# Patient Record
Sex: MALE | Race: WHITE | Employment: OTHER | URBAN - NONMETROPOLITAN AREA
[De-identification: names, ages, dates, MRNs, and addresses within clinical notes are randomized per-mention and may not be internally consistent; named-entity substitution may affect disease eponyms.]

---

## 2017-02-02 ENCOUNTER — OFFICE VISIT (OUTPATIENT)
Dept: OTOLARYNGOLOGY | Age: 62
End: 2017-02-02

## 2017-02-02 ENCOUNTER — TELEPHONE (OUTPATIENT)
Dept: OTOLARYNGOLOGY | Age: 62
End: 2017-02-02

## 2017-02-02 VITALS
HEIGHT: 68 IN | RESPIRATION RATE: 18 BRPM | HEART RATE: 80 BPM | WEIGHT: 240.7 LBS | DIASTOLIC BLOOD PRESSURE: 76 MMHG | SYSTOLIC BLOOD PRESSURE: 128 MMHG | TEMPERATURE: 98 F | BODY MASS INDEX: 36.48 KG/M2

## 2017-02-02 DIAGNOSIS — R09.89 THROAT CLEARING: ICD-10-CM

## 2017-02-02 DIAGNOSIS — T46.4X5D: Primary | ICD-10-CM

## 2017-02-02 DIAGNOSIS — F17.200 TOBACCO DEPENDENCE: ICD-10-CM

## 2017-02-02 DIAGNOSIS — K21.9 GASTROESOPHAGEAL REFLUX DISEASE, ESOPHAGITIS PRESENCE NOT SPECIFIED: ICD-10-CM

## 2017-02-02 PROCEDURE — G8427 DOCREV CUR MEDS BY ELIG CLIN: HCPCS | Performed by: OTOLARYNGOLOGY

## 2017-02-02 PROCEDURE — 99213 OFFICE O/P EST LOW 20 MIN: CPT | Performed by: OTOLARYNGOLOGY

## 2017-02-02 PROCEDURE — G8419 CALC BMI OUT NRM PARAM NOF/U: HCPCS | Performed by: OTOLARYNGOLOGY

## 2017-02-02 PROCEDURE — 4004F PT TOBACCO SCREEN RCVD TLK: CPT | Performed by: OTOLARYNGOLOGY

## 2017-02-02 PROCEDURE — 3017F COLORECTAL CA SCREEN DOC REV: CPT | Performed by: OTOLARYNGOLOGY

## 2017-02-02 PROCEDURE — 31575 DIAGNOSTIC LARYNGOSCOPY: CPT | Performed by: OTOLARYNGOLOGY

## 2017-02-02 PROCEDURE — G8484 FLU IMMUNIZE NO ADMIN: HCPCS | Performed by: OTOLARYNGOLOGY

## 2017-02-02 ASSESSMENT — ENCOUNTER SYMPTOMS
STRIDOR: 0
VOICE CHANGE: 1
APNEA: 0
VOMITING: 0
CHEST TIGHTNESS: 0
NAUSEA: 0
RHINORRHEA: 0
FACIAL SWELLING: 0
TROUBLE SWALLOWING: 0
DIARRHEA: 0
CHOKING: 0
SORE THROAT: 0
SINUS PRESSURE: 0
COLOR CHANGE: 0
WHEEZING: 0
COUGH: 1
SHORTNESS OF BREATH: 0
ABDOMINAL PAIN: 0

## 2017-02-06 ENCOUNTER — TELEPHONE (OUTPATIENT)
Dept: PHARMACY | Facility: CLINIC | Age: 62
End: 2017-02-06

## 2017-02-13 ENCOUNTER — OFFICE VISIT (OUTPATIENT)
Dept: FAMILY MEDICINE CLINIC | Age: 62
End: 2017-02-13

## 2017-02-13 VITALS
RESPIRATION RATE: 18 BRPM | BODY MASS INDEX: 35.98 KG/M2 | WEIGHT: 237.4 LBS | SYSTOLIC BLOOD PRESSURE: 134 MMHG | DIASTOLIC BLOOD PRESSURE: 72 MMHG | HEART RATE: 74 BPM | HEIGHT: 68 IN

## 2017-02-13 DIAGNOSIS — E11.8 TYPE 2 DIABETES MELLITUS WITH COMPLICATION, WITHOUT LONG-TERM CURRENT USE OF INSULIN (HCC): Primary | ICD-10-CM

## 2017-02-13 DIAGNOSIS — R39.9 LOWER URINARY TRACT SYMPTOMS (LUTS): ICD-10-CM

## 2017-02-13 DIAGNOSIS — J44.9 CHRONIC OBSTRUCTIVE PULMONARY DISEASE, UNSPECIFIED COPD TYPE (HCC): ICD-10-CM

## 2017-02-13 DIAGNOSIS — G89.29 CHRONIC BILATERAL LOW BACK PAIN WITHOUT SCIATICA: ICD-10-CM

## 2017-02-13 DIAGNOSIS — G47.33 OBSTRUCTIVE SLEEP APNEA SYNDROME: ICD-10-CM

## 2017-02-13 DIAGNOSIS — E78.5 HYPERLIPIDEMIA, UNSPECIFIED HYPERLIPIDEMIA TYPE: ICD-10-CM

## 2017-02-13 DIAGNOSIS — J43.9 PULMONARY EMPHYSEMA, UNSPECIFIED EMPHYSEMA TYPE (HCC): ICD-10-CM

## 2017-02-13 DIAGNOSIS — M54.50 CHRONIC BILATERAL LOW BACK PAIN WITHOUT SCIATICA: ICD-10-CM

## 2017-02-13 LAB
GLUCOSE BLD-MCNC: 185 MG/DL
HBA1C MFR BLD: 7 %

## 2017-02-13 PROCEDURE — 83036 HEMOGLOBIN GLYCOSYLATED A1C: CPT | Performed by: FAMILY MEDICINE

## 2017-02-13 PROCEDURE — 82962 GLUCOSE BLOOD TEST: CPT | Performed by: FAMILY MEDICINE

## 2017-02-13 PROCEDURE — 99213 OFFICE O/P EST LOW 20 MIN: CPT | Performed by: FAMILY MEDICINE

## 2017-02-13 RX ORDER — LOSARTAN POTASSIUM 25 MG/1
25 TABLET ORAL DAILY
Qty: 30 TABLET | Refills: 5 | Status: SHIPPED | OUTPATIENT
Start: 2017-02-13 | End: 2017-05-05 | Stop reason: ALTCHOICE

## 2017-02-13 RX ORDER — ROPINIROLE 1 MG/1
1 TABLET, FILM COATED ORAL 3 TIMES DAILY
Qty: 90 TABLET | Refills: 5 | Status: SHIPPED | OUTPATIENT
Start: 2017-02-13 | End: 2018-01-12 | Stop reason: SDUPTHER

## 2017-02-13 RX ORDER — IPRATROPIUM BROMIDE AND ALBUTEROL SULFATE 2.5; .5 MG/3ML; MG/3ML
1 SOLUTION RESPIRATORY (INHALATION) EVERY 6 HOURS PRN
Qty: 360 ML | Refills: 1 | Status: SHIPPED | OUTPATIENT
Start: 2017-02-13 | End: 2017-06-01 | Stop reason: SDUPTHER

## 2017-02-13 RX ORDER — TAMSULOSIN HYDROCHLORIDE 0.4 MG/1
0.4 CAPSULE ORAL NIGHTLY
Qty: 30 CAPSULE | Refills: 5 | Status: SHIPPED | OUTPATIENT
Start: 2017-02-13 | End: 2017-09-13 | Stop reason: ALTCHOICE

## 2017-02-13 RX ORDER — SIMVASTATIN 40 MG
TABLET ORAL
Qty: 30 TABLET | Refills: 5 | Status: SHIPPED | OUTPATIENT
Start: 2017-02-13 | End: 2017-09-13 | Stop reason: SDUPTHER

## 2017-02-13 ASSESSMENT — ENCOUNTER SYMPTOMS
BACK PAIN: 1
NAUSEA: 0
COUGH: 1
VOMITING: 0
CHEST TIGHTNESS: 0
SHORTNESS OF BREATH: 1
BLOOD IN STOOL: 0
ABDOMINAL PAIN: 0
CONSTIPATION: 0
EYES NEGATIVE: 1
DIARRHEA: 0

## 2017-02-14 RX ORDER — VARENICLINE TARTRATE 0.5 MG/1
1 TABLET, FILM COATED ORAL 2 TIMES DAILY
COMMUNITY
End: 2017-09-13 | Stop reason: ALTCHOICE

## 2017-02-20 ENCOUNTER — NURSE ONLY (OUTPATIENT)
Dept: FAMILY MEDICINE CLINIC | Age: 62
End: 2017-02-20

## 2017-02-20 DIAGNOSIS — E78.5 HYPERLIPIDEMIA, UNSPECIFIED HYPERLIPIDEMIA TYPE: ICD-10-CM

## 2017-02-20 DIAGNOSIS — E11.8 TYPE 2 DIABETES MELLITUS WITH COMPLICATION, WITHOUT LONG-TERM CURRENT USE OF INSULIN (HCC): ICD-10-CM

## 2017-02-20 LAB
ALBUMIN SERPL-MCNC: 4.5 GM/DL (ref 3.5–5.1)
ALP BLD-CCNC: 46 U/L (ref 38–126)
ALT SERPL-CCNC: 19 U/L (ref 11–66)
ANION GAP SERPL CALCULATED.3IONS-SCNC: 15 MEQ/L (ref 8–16)
AST SERPL-CCNC: 19 U/L (ref 5–40)
BILIRUB SERPL-MCNC: 0.3 MG/DL (ref 0.3–1.2)
BUN BLDV-MCNC: 15 MG/DL (ref 7–22)
CALCIUM SERPL-MCNC: 9.5 MG/DL (ref 8.5–10.5)
CHLORIDE BLD-SCNC: 98 MEQ/L (ref 98–111)
CHOLESTEROL, TOTAL: 138 MG/DL (ref 100–199)
CO2: 27 MEQ/L (ref 23–33)
CREAT SERPL-MCNC: 0.9 MG/DL (ref 0.4–1.2)
GFR SERPL CREATININE-BSD FRML MDRD: 85 ML/MIN/1.73M2
GLUCOSE BLD-MCNC: 150 MG/DL (ref 70–108)
HDLC SERPL-MCNC: 39 MG/DL
LDL CHOLESTEROL CALCULATED: 63 MG/DL
POTASSIUM SERPL-SCNC: 4.9 MEQ/L (ref 3.5–5.2)
SODIUM BLD-SCNC: 140 MEQ/L (ref 135–145)
TOTAL PROTEIN: 7.8 GM/DL (ref 6.1–8)
TRIGL SERPL-MCNC: 178 MG/DL (ref 0–199)

## 2017-03-17 ENCOUNTER — OFFICE VISIT (OUTPATIENT)
Dept: FAMILY MEDICINE CLINIC | Age: 62
End: 2017-03-17

## 2017-03-17 VITALS
SYSTOLIC BLOOD PRESSURE: 140 MMHG | WEIGHT: 239.4 LBS | RESPIRATION RATE: 18 BRPM | DIASTOLIC BLOOD PRESSURE: 78 MMHG | HEIGHT: 68 IN | HEART RATE: 78 BPM | BODY MASS INDEX: 36.28 KG/M2

## 2017-03-17 DIAGNOSIS — E11.8 TYPE 2 DIABETES MELLITUS WITH COMPLICATION, WITHOUT LONG-TERM CURRENT USE OF INSULIN (HCC): ICD-10-CM

## 2017-03-17 DIAGNOSIS — J43.9 PULMONARY EMPHYSEMA, UNSPECIFIED EMPHYSEMA TYPE (HCC): Primary | ICD-10-CM

## 2017-03-17 DIAGNOSIS — F17.200 SMOKING ADDICTION: ICD-10-CM

## 2017-03-17 PROCEDURE — 99213 OFFICE O/P EST LOW 20 MIN: CPT | Performed by: FAMILY MEDICINE

## 2017-03-17 RX ORDER — RANITIDINE 150 MG/1
TABLET ORAL
Refills: 0 | COMMUNITY
Start: 2017-03-02 | End: 2017-03-17 | Stop reason: SDUPTHER

## 2017-03-17 ASSESSMENT — ENCOUNTER SYMPTOMS
ABDOMINAL PAIN: 0
DIARRHEA: 0
EYES NEGATIVE: 1
BLOOD IN STOOL: 0
CHEST TIGHTNESS: 0
SHORTNESS OF BREATH: 0
BACK PAIN: 1
VOMITING: 0
CONSTIPATION: 0
NAUSEA: 0
COUGH: 0

## 2017-04-03 DIAGNOSIS — T46.4X5D: ICD-10-CM

## 2017-04-03 RX ORDER — RANITIDINE 150 MG/1
150 TABLET ORAL 2 TIMES DAILY
Qty: 60 TABLET | Refills: 3 | Status: SHIPPED | OUTPATIENT
Start: 2017-04-03 | End: 2017-08-22 | Stop reason: SDUPTHER

## 2017-04-17 ENCOUNTER — NURSE ONLY (OUTPATIENT)
Dept: FAMILY MEDICINE CLINIC | Age: 62
End: 2017-04-17

## 2017-04-17 DIAGNOSIS — R39.15 URGENCY OF URINATION: ICD-10-CM

## 2017-04-17 DIAGNOSIS — R39.12 WEAK URINARY STREAM: ICD-10-CM

## 2017-04-17 DIAGNOSIS — R39.9 LOWER URINARY TRACT SYMPTOMS (LUTS): ICD-10-CM

## 2017-04-17 LAB — PROSTATE SPECIFIC ANTIGEN: 0.69 NG/ML (ref 0–2.5)

## 2017-04-19 RX ORDER — SERTRALINE HYDROCHLORIDE 100 MG/1
TABLET, FILM COATED ORAL
Qty: 180 TABLET | Refills: 1 | Status: SHIPPED | OUTPATIENT
Start: 2017-04-19 | End: 2017-05-05 | Stop reason: SDUPTHER

## 2017-04-25 ENCOUNTER — OFFICE VISIT (OUTPATIENT)
Dept: PULMONOLOGY | Age: 62
End: 2017-04-25

## 2017-04-25 ENCOUNTER — NURSE ONLY (OUTPATIENT)
Dept: FAMILY MEDICINE CLINIC | Age: 62
End: 2017-04-25

## 2017-04-25 VITALS
HEIGHT: 68 IN | SYSTOLIC BLOOD PRESSURE: 136 MMHG | HEART RATE: 78 BPM | WEIGHT: 241 LBS | TEMPERATURE: 98 F | OXYGEN SATURATION: 95 % | BODY MASS INDEX: 36.53 KG/M2 | DIASTOLIC BLOOD PRESSURE: 84 MMHG

## 2017-04-25 DIAGNOSIS — Z23 NEED FOR VACCINATION FOR STREP PNEUMONIAE: Primary | ICD-10-CM

## 2017-04-25 DIAGNOSIS — J44.9 COPD, MILD (HCC): ICD-10-CM

## 2017-04-25 DIAGNOSIS — J45.41 MODERATE PERSISTENT ASTHMA WITH ACUTE EXACERBATION: Primary | ICD-10-CM

## 2017-04-25 DIAGNOSIS — J45.901 ASTHMA EXACERBATION: ICD-10-CM

## 2017-04-25 PROCEDURE — 3017F COLORECTAL CA SCREEN DOC REV: CPT | Performed by: INTERNAL MEDICINE

## 2017-04-25 PROCEDURE — G8417 CALC BMI ABV UP PARAM F/U: HCPCS | Performed by: INTERNAL MEDICINE

## 2017-04-25 PROCEDURE — G0009 ADMIN PNEUMOCOCCAL VACCINE: HCPCS | Performed by: EMERGENCY MEDICINE

## 2017-04-25 PROCEDURE — G8926 SPIRO NO PERF OR DOC: HCPCS | Performed by: INTERNAL MEDICINE

## 2017-04-25 PROCEDURE — 90670 PCV13 VACCINE IM: CPT | Performed by: EMERGENCY MEDICINE

## 2017-04-25 PROCEDURE — 3023F SPIROM DOC REV: CPT | Performed by: INTERNAL MEDICINE

## 2017-04-25 PROCEDURE — G8427 DOCREV CUR MEDS BY ELIG CLIN: HCPCS | Performed by: INTERNAL MEDICINE

## 2017-04-25 PROCEDURE — 1036F TOBACCO NON-USER: CPT | Performed by: INTERNAL MEDICINE

## 2017-04-25 PROCEDURE — 99215 OFFICE O/P EST HI 40 MIN: CPT | Performed by: INTERNAL MEDICINE

## 2017-04-25 RX ORDER — BUDESONIDE AND FORMOTEROL FUMARATE DIHYDRATE 160; 4.5 UG/1; UG/1
2 AEROSOL RESPIRATORY (INHALATION) 2 TIMES DAILY
Qty: 1 INHALER | Refills: 11 | Status: SHIPPED | OUTPATIENT
Start: 2017-04-25 | End: 2018-03-29 | Stop reason: ALTCHOICE

## 2017-04-25 RX ORDER — DOXYCYCLINE HYCLATE 100 MG/1
100 CAPSULE ORAL 2 TIMES DAILY
Qty: 14 CAPSULE | Refills: 0 | Status: SHIPPED | OUTPATIENT
Start: 2017-04-25 | End: 2017-05-02

## 2017-04-25 RX ORDER — PREDNISONE 10 MG/1
TABLET ORAL
Qty: 30 TABLET | Refills: 0 | Status: SHIPPED | OUTPATIENT
Start: 2017-04-25 | End: 2017-05-05

## 2017-04-25 RX ORDER — ZAFIRLUKAST 20 MG/1
20 TABLET, FILM COATED ORAL 2 TIMES DAILY
Qty: 60 TABLET | Refills: 11 | Status: SHIPPED | OUTPATIENT
Start: 2017-04-25 | End: 2018-05-21 | Stop reason: SDUPTHER

## 2017-05-02 ENCOUNTER — OFFICE VISIT (OUTPATIENT)
Dept: OTOLARYNGOLOGY | Age: 62
End: 2017-05-02

## 2017-05-02 VITALS
TEMPERATURE: 98.2 F | WEIGHT: 240 LBS | RESPIRATION RATE: 22 BRPM | BODY MASS INDEX: 36.37 KG/M2 | HEART RATE: 92 BPM | DIASTOLIC BLOOD PRESSURE: 88 MMHG | SYSTOLIC BLOOD PRESSURE: 144 MMHG | HEIGHT: 68 IN

## 2017-05-02 DIAGNOSIS — R09.89 THROAT CLEARING: ICD-10-CM

## 2017-05-02 DIAGNOSIS — K21.9 GASTROESOPHAGEAL REFLUX DISEASE, ESOPHAGITIS PRESENCE NOT SPECIFIED: ICD-10-CM

## 2017-05-02 DIAGNOSIS — T46.4X5D: Primary | ICD-10-CM

## 2017-05-02 DIAGNOSIS — F17.200 TOBACCO DEPENDENCE: ICD-10-CM

## 2017-05-02 DIAGNOSIS — E04.1 THYROID NODULE: ICD-10-CM

## 2017-05-02 PROCEDURE — G8417 CALC BMI ABV UP PARAM F/U: HCPCS | Performed by: OTOLARYNGOLOGY

## 2017-05-02 PROCEDURE — 99213 OFFICE O/P EST LOW 20 MIN: CPT | Performed by: OTOLARYNGOLOGY

## 2017-05-02 PROCEDURE — 3017F COLORECTAL CA SCREEN DOC REV: CPT | Performed by: OTOLARYNGOLOGY

## 2017-05-02 PROCEDURE — 31575 DIAGNOSTIC LARYNGOSCOPY: CPT | Performed by: OTOLARYNGOLOGY

## 2017-05-02 PROCEDURE — G8427 DOCREV CUR MEDS BY ELIG CLIN: HCPCS | Performed by: OTOLARYNGOLOGY

## 2017-05-02 PROCEDURE — 1036F TOBACCO NON-USER: CPT | Performed by: OTOLARYNGOLOGY

## 2017-05-02 ASSESSMENT — ENCOUNTER SYMPTOMS
WHEEZING: 0
DIARRHEA: 0
RHINORRHEA: 0
NAUSEA: 0
COLOR CHANGE: 0
FACIAL SWELLING: 0
TROUBLE SWALLOWING: 0
COUGH: 0
CHOKING: 0
APNEA: 0
STRIDOR: 0
SORE THROAT: 0
ABDOMINAL PAIN: 0
VOICE CHANGE: 0
VOMITING: 0
CHEST TIGHTNESS: 0
SHORTNESS OF BREATH: 0
SINUS PRESSURE: 0

## 2017-05-05 ENCOUNTER — OFFICE VISIT (OUTPATIENT)
Dept: FAMILY MEDICINE CLINIC | Age: 62
End: 2017-05-05

## 2017-05-05 VITALS
RESPIRATION RATE: 16 BRPM | DIASTOLIC BLOOD PRESSURE: 76 MMHG | HEART RATE: 82 BPM | SYSTOLIC BLOOD PRESSURE: 132 MMHG | WEIGHT: 234 LBS | HEIGHT: 68 IN | BODY MASS INDEX: 35.46 KG/M2

## 2017-05-05 DIAGNOSIS — E11.8 TYPE 2 DIABETES MELLITUS WITH COMPLICATION, WITHOUT LONG-TERM CURRENT USE OF INSULIN (HCC): Primary | ICD-10-CM

## 2017-05-05 DIAGNOSIS — E78.5 HYPERLIPIDEMIA, UNSPECIFIED HYPERLIPIDEMIA TYPE: ICD-10-CM

## 2017-05-05 DIAGNOSIS — J43.9 PULMONARY EMPHYSEMA, UNSPECIFIED EMPHYSEMA TYPE (HCC): ICD-10-CM

## 2017-05-05 DIAGNOSIS — B37.0 ORAL THRUSH: ICD-10-CM

## 2017-05-05 PROCEDURE — 99214 OFFICE O/P EST MOD 30 MIN: CPT | Performed by: FAMILY MEDICINE

## 2017-05-05 RX ORDER — SERTRALINE HYDROCHLORIDE 100 MG/1
TABLET, FILM COATED ORAL
Qty: 60 TABLET | Refills: 5 | Status: SHIPPED | OUTPATIENT
Start: 2017-05-05 | End: 2018-04-16 | Stop reason: SDUPTHER

## 2017-05-05 RX ORDER — FLUCONAZOLE 100 MG/1
100 TABLET ORAL DAILY
Qty: 7 TABLET | Refills: 0 | Status: SHIPPED | OUTPATIENT
Start: 2017-05-05 | End: 2017-05-12

## 2017-05-05 ASSESSMENT — ENCOUNTER SYMPTOMS
DIARRHEA: 0
SHORTNESS OF BREATH: 0
ABDOMINAL PAIN: 0
CONSTIPATION: 0
VOMITING: 0
CHEST TIGHTNESS: 0
EYES NEGATIVE: 1
NAUSEA: 0
COUGH: 1
BLOOD IN STOOL: 0
BACK PAIN: 1

## 2017-05-05 ASSESSMENT — PATIENT HEALTH QUESTIONNAIRE - PHQ9
1. LITTLE INTEREST OR PLEASURE IN DOING THINGS: 0
2. FEELING DOWN, DEPRESSED OR HOPELESS: 0
SUM OF ALL RESPONSES TO PHQ QUESTIONS 1-9: 0
SUM OF ALL RESPONSES TO PHQ9 QUESTIONS 1 & 2: 0

## 2017-05-19 ENCOUNTER — OFFICE VISIT (OUTPATIENT)
Dept: UROLOGY | Age: 62
End: 2017-05-19

## 2017-05-19 VITALS
DIASTOLIC BLOOD PRESSURE: 84 MMHG | WEIGHT: 234 LBS | SYSTOLIC BLOOD PRESSURE: 132 MMHG | BODY MASS INDEX: 35.46 KG/M2 | HEIGHT: 68 IN

## 2017-05-19 DIAGNOSIS — E29.1 HYPOGONADISM IN MALE: ICD-10-CM

## 2017-05-19 DIAGNOSIS — R39.15 URGENCY OF URINATION: Primary | ICD-10-CM

## 2017-05-19 DIAGNOSIS — N40.1 BENIGN NON-NODULAR PROSTATIC HYPERPLASIA WITH LOWER URINARY TRACT SYMPTOMS: ICD-10-CM

## 2017-05-19 DIAGNOSIS — N52.9 ERECTILE DYSFUNCTION, UNSPECIFIED ERECTILE DYSFUNCTION TYPE: ICD-10-CM

## 2017-05-19 LAB
BILIRUBIN URINE: NEGATIVE
BLOOD URINE, POC: NEGATIVE
CHARACTER, URINE: CLEAR
COLOR, URINE: YELLOW
GLUCOSE URINE: 500 MG/DL
KETONES, URINE: NEGATIVE
LEUKOCYTE CLUMPS, URINE: NEGATIVE
NITRITE, URINE: NEGATIVE
PH, URINE: 5.5
POST VOID RESIDUAL (PVR): 8 ML
PROTEIN, URINE: NEGATIVE MG/DL
SPECIFIC GRAVITY, URINE: 1.01 (ref 1–1.03)
UROBILINOGEN, URINE: 0.2 EU/DL

## 2017-05-19 PROCEDURE — G8417 CALC BMI ABV UP PARAM F/U: HCPCS | Performed by: UROLOGY

## 2017-05-19 PROCEDURE — 81003 URINALYSIS AUTO W/O SCOPE: CPT | Performed by: UROLOGY

## 2017-05-19 PROCEDURE — 99213 OFFICE O/P EST LOW 20 MIN: CPT | Performed by: UROLOGY

## 2017-05-19 PROCEDURE — G8427 DOCREV CUR MEDS BY ELIG CLIN: HCPCS | Performed by: UROLOGY

## 2017-05-19 PROCEDURE — 3017F COLORECTAL CA SCREEN DOC REV: CPT | Performed by: UROLOGY

## 2017-05-19 PROCEDURE — 51798 US URINE CAPACITY MEASURE: CPT | Performed by: UROLOGY

## 2017-05-19 PROCEDURE — 1036F TOBACCO NON-USER: CPT | Performed by: UROLOGY

## 2017-05-25 ENCOUNTER — HOSPITAL ENCOUNTER (OUTPATIENT)
Dept: GENERAL RADIOLOGY | Age: 62
Discharge: HOME | End: 2017-05-25

## 2017-05-25 DIAGNOSIS — M51.26 DISPLACEMENT OF LUMBAR INTERVERTEBRAL DISC WITHOUT MYELOPATHY: ICD-10-CM

## 2017-05-25 PROCEDURE — 72100 X-RAY EXAM L-S SPINE 2/3 VWS: CPT | Performed by: FAMILY MEDICINE

## 2017-06-01 RX ORDER — IPRATROPIUM BROMIDE AND ALBUTEROL SULFATE 2.5; .5 MG/3ML; MG/3ML
1 SOLUTION RESPIRATORY (INHALATION) EVERY 6 HOURS PRN
Qty: 360 ML | Refills: 1 | Status: SHIPPED | OUTPATIENT
Start: 2017-06-01 | End: 2018-02-21 | Stop reason: SDUPTHER

## 2017-08-22 ENCOUNTER — TELEPHONE (OUTPATIENT)
Dept: ENT CLINIC | Age: 62
End: 2017-08-22

## 2017-08-22 DIAGNOSIS — T46.4X5D: ICD-10-CM

## 2017-08-22 RX ORDER — RANITIDINE 150 MG/1
150 TABLET ORAL 2 TIMES DAILY
Qty: 60 TABLET | Refills: 3 | Status: SHIPPED | OUTPATIENT
Start: 2017-08-22 | End: 2018-04-16 | Stop reason: SDUPTHER

## 2017-09-13 ENCOUNTER — OFFICE VISIT (OUTPATIENT)
Dept: FAMILY MEDICINE CLINIC | Age: 62
End: 2017-09-13

## 2017-09-13 VITALS
HEART RATE: 74 BPM | RESPIRATION RATE: 16 BRPM | BODY MASS INDEX: 36.28 KG/M2 | HEIGHT: 68 IN | TEMPERATURE: 97.7 F | SYSTOLIC BLOOD PRESSURE: 132 MMHG | DIASTOLIC BLOOD PRESSURE: 78 MMHG | WEIGHT: 239.4 LBS

## 2017-09-13 DIAGNOSIS — R79.89 LOW TESTOSTERONE: ICD-10-CM

## 2017-09-13 DIAGNOSIS — J43.9 PULMONARY EMPHYSEMA, UNSPECIFIED EMPHYSEMA TYPE (HCC): ICD-10-CM

## 2017-09-13 DIAGNOSIS — M54.50 CHRONIC BILATERAL LOW BACK PAIN WITHOUT SCIATICA: ICD-10-CM

## 2017-09-13 DIAGNOSIS — G89.29 CHRONIC BILATERAL LOW BACK PAIN WITHOUT SCIATICA: ICD-10-CM

## 2017-09-13 DIAGNOSIS — E11.8 TYPE 2 DIABETES MELLITUS WITH COMPLICATION, WITHOUT LONG-TERM CURRENT USE OF INSULIN (HCC): Primary | ICD-10-CM

## 2017-09-13 DIAGNOSIS — K21.9 GASTROESOPHAGEAL REFLUX DISEASE, ESOPHAGITIS PRESENCE NOT SPECIFIED: ICD-10-CM

## 2017-09-13 DIAGNOSIS — F41.9 ANXIETY: ICD-10-CM

## 2017-09-13 DIAGNOSIS — J02.9 ACUTE PHARYNGITIS, UNSPECIFIED ETIOLOGY: ICD-10-CM

## 2017-09-13 DIAGNOSIS — E78.5 HYPERLIPIDEMIA, UNSPECIFIED HYPERLIPIDEMIA TYPE: ICD-10-CM

## 2017-09-13 LAB
GLUCOSE BLD-MCNC: 184 MG/DL
HBA1C MFR BLD: 7 %

## 2017-09-13 PROCEDURE — 82962 GLUCOSE BLOOD TEST: CPT | Performed by: FAMILY MEDICINE

## 2017-09-13 PROCEDURE — 83036 HEMOGLOBIN GLYCOSYLATED A1C: CPT | Performed by: FAMILY MEDICINE

## 2017-09-13 PROCEDURE — 99214 OFFICE O/P EST MOD 30 MIN: CPT | Performed by: FAMILY MEDICINE

## 2017-09-13 RX ORDER — TRAZODONE HYDROCHLORIDE 150 MG/1
TABLET ORAL
Refills: 0 | COMMUNITY
Start: 2017-08-12 | End: 2021-07-28 | Stop reason: SDUPTHER

## 2017-09-13 RX ORDER — CYCLOBENZAPRINE HCL 10 MG
TABLET ORAL
Refills: 0 | COMMUNITY
Start: 2017-09-01 | End: 2018-04-16 | Stop reason: ALTCHOICE

## 2017-09-13 RX ORDER — SIMVASTATIN 40 MG
TABLET ORAL
Qty: 30 TABLET | Refills: 5 | Status: SHIPPED | OUTPATIENT
Start: 2017-09-13 | End: 2018-03-19 | Stop reason: SDUPTHER

## 2017-09-13 RX ORDER — BUSPIRONE HYDROCHLORIDE 10 MG/1
10 TABLET ORAL 3 TIMES DAILY
Qty: 90 TABLET | Refills: 3 | Status: SHIPPED | OUTPATIENT
Start: 2017-09-13 | End: 2018-03-19 | Stop reason: SDUPTHER

## 2017-09-13 RX ORDER — MELOXICAM 7.5 MG/1
TABLET ORAL
Refills: 0 | COMMUNITY
Start: 2017-07-20 | End: 2018-03-29 | Stop reason: ALTCHOICE

## 2017-09-13 RX ORDER — AMOXICILLIN AND CLAVULANATE POTASSIUM 875; 125 MG/1; MG/1
1 TABLET, FILM COATED ORAL 2 TIMES DAILY
Qty: 20 TABLET | Refills: 0 | Status: SHIPPED | OUTPATIENT
Start: 2017-09-13 | End: 2017-09-23

## 2017-09-13 ASSESSMENT — ENCOUNTER SYMPTOMS
CHEST TIGHTNESS: 0
SHORTNESS OF BREATH: 0
ABDOMINAL PAIN: 0
NAUSEA: 0
BLOOD IN STOOL: 0
CONSTIPATION: 0
EYES NEGATIVE: 1
SORE THROAT: 1
VOMITING: 0
DIARRHEA: 0
BACK PAIN: 1

## 2017-09-25 ENCOUNTER — NURSE ONLY (OUTPATIENT)
Dept: FAMILY MEDICINE CLINIC | Age: 62
End: 2017-09-25
Payer: MEDICARE

## 2017-09-25 DIAGNOSIS — E78.5 HYPERLIPIDEMIA, UNSPECIFIED HYPERLIPIDEMIA TYPE: ICD-10-CM

## 2017-09-25 LAB
ALT SERPL-CCNC: 32 U/L (ref 11–66)
AST SERPL-CCNC: 28 U/L (ref 5–40)
CHOLESTEROL, TOTAL: 127 MG/DL (ref 100–199)
HDLC SERPL-MCNC: 44 MG/DL
LDL CHOLESTEROL CALCULATED: 44 MG/DL
TRIGL SERPL-MCNC: 193 MG/DL (ref 0–199)

## 2017-09-25 PROCEDURE — 36415 COLL VENOUS BLD VENIPUNCTURE: CPT | Performed by: NURSE PRACTITIONER

## 2017-10-04 ENCOUNTER — TELEPHONE (OUTPATIENT)
Dept: PHARMACY | Facility: CLINIC | Age: 62
End: 2017-10-04

## 2017-10-04 NOTE — TELEPHONE ENCOUNTER
- There are no outstanding medication issues at this time    CLINICAL PHARMACY NOTE - Medication Review  Patient outreach to review medications - Spoke with wife, Marlene Gibson, at request of patient as she handles his medications. SUBJECTIVE/OBJECTIVE:   Burgess Arshad is a 57 y.o. male referred to a clinical pharmacy specialist given their history of COPD and/or HF and number of home medications. Medications:  Medication Sig    cyclobenzaprine (FLEXERIL) 10 MG tablet take 1 tablet three times a day with food    meloxicam (MOBIC) 7.5 MG tablet take 1 tablet by mouth twice a day    traZODone (DESYREL) 150 MG tablet take 1/2 to 1 tablet by mouth once daily at bedtime if needed for pain SPASM, OR SLEEP    metFORMIN (GLUCOPHAGE) 500 MG tablet Take two tablets by mouth 2 times a day with meal.    busPIRone (BUSPAR) 10 MG tablet Take 1 tablet by mouth 3 times daily    simvastatin (ZOCOR) 40 MG tablet take 1 tablet by mouth at bedtime    ranitidine (ZANTAC) 150 MG tablet Take 1 tablet by mouth 2 times daily    ipratropium-albuterol (DUONEB) 0.5-2.5 (3) MG/3ML SOLN nebulizer solution Inhale 3 mLs into the lungs every 6 hours as needed for Shortness of Breath  Has been using about twice daily    VENTOLIN  (90 BASE) MCG/ACT inhaler inhale 2 puffs by mouth four times a day    sertraline (ZOLOFT) 100 MG tablet take 1 tablet by mouth twice a day    SYMBICORT 160-4.5 MCG/ACT AERO Inhale 2 puffs into the lungs 2 times daily  Swishes and spits after use. Uses with spacer.  zafirlukast (ACCOLATE) 20 MG tablet Take 1 tablet by mouth 2 times daily    rOPINIRole (REQUIP) 1 MG tablet Take 1 tablet by mouth 3 times daily    Testosterone (ANDROGEL) 20.25 MG/ACT (1.62%) GEL gel Place 1 Squirt onto the skin every morning (before breakfast)    azelastine (ASTELIN) 0.1 % nasal spray 2 sprays by Nasal route 2 times daily Use in each nostril as directed  Has not been using.  Said to remove from list    aspirin 81 MG tablet Buspirone and flexeril with Norco- CNS Depressants may enhance the CNS depressant effect of HYDROcodone  · Aspirin and Mobic- Nonsteroidal Anti-Inflammatory Agents (Nonselective) may enhance the adverse/toxic effect of Salicylates. An increased risk of bleeding may be associated with use of this combination. · Buspirone with trazodone and sertraline0- BusPIRone may enhance the serotonergic effect of Selective Serotonin Reuptake Inhibitors. This may cause serotonin syndrome. Selective Serotonin Reuptake Inhibitors may decrease the metabolism of BusPIRone    - Medications/Cost:  Denies any medication cost concerns at this time, including inhalers. - Inhaler Technique:   Reviewed inhaler technique with Symbicort, spacer, Duoneb and Ventolin    - Immunizations:  Reminded to get influenza vaccine soon this season. States plans to soon. Carlie Banerjee, JefD  [facility] Tracking Only    PHSO: Yes  Total # of Interventions Recommended: 1  - Updated Order #: 2 Updated Order Reason(s):  Other  Total Interventions Accepted: 1  Time Spent (min): 20

## 2017-10-11 ENCOUNTER — OFFICE VISIT (OUTPATIENT)
Dept: FAMILY MEDICINE CLINIC | Age: 62
End: 2017-10-11

## 2017-10-11 VITALS
SYSTOLIC BLOOD PRESSURE: 134 MMHG | WEIGHT: 242 LBS | RESPIRATION RATE: 14 BRPM | HEIGHT: 68 IN | BODY MASS INDEX: 36.68 KG/M2 | HEART RATE: 76 BPM | DIASTOLIC BLOOD PRESSURE: 84 MMHG

## 2017-10-11 DIAGNOSIS — M50.30 DDD (DEGENERATIVE DISC DISEASE), CERVICAL: ICD-10-CM

## 2017-10-11 DIAGNOSIS — R26.81 UNSTEADY GAIT: ICD-10-CM

## 2017-10-11 DIAGNOSIS — J43.9 PULMONARY EMPHYSEMA, UNSPECIFIED EMPHYSEMA TYPE (HCC): ICD-10-CM

## 2017-10-11 DIAGNOSIS — E78.5 HYPERLIPIDEMIA, UNSPECIFIED HYPERLIPIDEMIA TYPE: ICD-10-CM

## 2017-10-11 DIAGNOSIS — R42 LIGHTHEADEDNESS: Primary | ICD-10-CM

## 2017-10-11 PROCEDURE — 99214 OFFICE O/P EST MOD 30 MIN: CPT | Performed by: FAMILY MEDICINE

## 2017-10-11 ASSESSMENT — ENCOUNTER SYMPTOMS
EYES NEGATIVE: 1
CONSTIPATION: 0
COUGH: 0
ABDOMINAL PAIN: 0
CHEST TIGHTNESS: 0
VOMITING: 0
BLOOD IN STOOL: 0
DIARRHEA: 0
SHORTNESS OF BREATH: 0
NAUSEA: 0

## 2017-10-11 NOTE — PROGRESS NOTES
GASTROINTESTINAL ENDOSCOPY  1997    VEIN SURGERY Left September 2014    Dr. Wyatt Elam       Family History   Problem Relation Age of Onset    Cancer Mother     Breast Cancer Mother     Stroke Mother     Heart Disease Brother     Diabetes Brother     High Blood Pressure Brother     High Cholesterol Brother      Subjective:      Review of Systems   Constitutional: Negative for activity change, appetite change, diaphoresis and fever. HENT: Negative. Eyes: Negative. Respiratory: Negative for cough, chest tightness and shortness of breath. Cardiovascular: Negative for chest pain, palpitations and leg swelling. Gastrointestinal: Negative for abdominal pain, blood in stool, constipation, diarrhea, nausea and vomiting. Genitourinary: Negative. Musculoskeletal: Positive for gait problem. He states that when he gets up he feels like he looses his balance this last short time and then he is ok. This started about 7 weeks ago. Skin: Negative. Negative for rash. Neurological: Positive for light-headedness. Negative for syncope, weakness and headaches. When he turns    Psychiatric/Behavioral: The patient is nervous/anxious. Objective:   /84 (Site: Left Arm, Position: Sitting, Cuff Size: Large Adult)   Pulse 76   Resp 14   Ht 5' 8\" (1.727 m)   Wt 242 lb (109.8 kg)   BMI 36.80 kg/m²   Wt Readings from Last 3 Encounters:   10/11/17 242 lb (109.8 kg)   09/13/17 239 lb 6.4 oz (108.6 kg)   05/19/17 234 lb (106.1 kg)     Physical Exam   Constitutional: He is oriented to person, place, and time. He appears well-developed and well-nourished. No distress. HENT:   Head: Normocephalic and atraumatic. Eyes: Conjunctivae and EOM are normal. Pupils are equal, round, and reactive to light. Right eye exhibits no discharge. Left eye exhibits no discharge. No scleral icterus. Neck: Normal range of motion. Neck supple. No JVD present. No thyromegaly present.    Cardiovascular: Normal inhale 2 puffs by mouth four times a day  1    sertraline (ZOLOFT) 100 MG tablet take 1 tablet by mouth twice a day 60 tablet 5    SYMBICORT 160-4.5 MCG/ACT AERO Inhale 2 puffs into the lungs 2 times daily 1 Inhaler 11    zafirlukast (ACCOLATE) 20 MG tablet Take 1 tablet by mouth 2 times daily 60 tablet 11    glucose blood VI test strips (ASCENSIA AUTODISC VI;ONE TOUCH ULTRA TEST VI) strip One Touch Ultra Test Strip - Check blood sugar twice daily Dx: E11.9 200 each 1    rOPINIRole (REQUIP) 1 MG tablet Take 1 tablet by mouth 3 times daily 90 tablet 5    Testosterone (ANDROGEL) 20.25 MG/ACT (1.62%) GEL gel Place 1 Squirt onto the skin every morning (before breakfast) 1 Bottle 5    aspirin 81 MG tablet Take 81 mg by mouth daily      HYDROcodone-acetaminophen (NORCO) 5-325 MG per tablet Take 1 tablet by mouth every 6 hours as needed for Pain      ONETOUCH DELICA LANCETS FINE MISC Check blood sugar twice daily Dx: 250.00 200 each 1    Omega-3 Fatty Acids (FISH OIL) 1000 MG CAPS Take 1,000 mg by mouth daily.  sildenafil (VIAGRA) 100 MG tablet Take 1 tablet by mouth as needed. 8 tablet 5    ranitidine (ZANTAC) 150 MG tablet Take 1 tablet by mouth 2 times daily 60 tablet 3     No current facility-administered medications for this visit. Orders Placed This Encounter   Procedures    Tilt Table Test     Scheduling Instructions:      Dr Timoteo Nicholson current medications. Return in about 1 week (around 10/18/2017). Discussed use, benefit, and side effects of prescribed medications. All patient questions answered. Pt voiced understanding. Instructed to continue current medications, diet and exercise. Patient agreed with treatment plan.

## 2017-10-24 ENCOUNTER — HOSPITAL ENCOUNTER (OUTPATIENT)
Dept: NON INVASIVE DIAGNOSTICS | Age: 62
Discharge: HOME | End: 2017-10-24
Payer: MEDICARE

## 2017-10-24 ENCOUNTER — NURSE ONLY (OUTPATIENT)
Dept: FAMILY MEDICINE CLINIC | Age: 62
End: 2017-10-24

## 2017-10-24 VITALS — BODY MASS INDEX: 36.07 KG/M2 | HEIGHT: 68 IN | WEIGHT: 238 LBS

## 2017-10-24 DIAGNOSIS — Z23 NEED FOR IMMUNIZATION AGAINST INFLUENZA: Primary | ICD-10-CM

## 2017-10-24 PROCEDURE — 93660 TILT TABLE EVALUATION: CPT | Performed by: INTERNAL MEDICINE

## 2017-10-24 PROCEDURE — G0008 ADMIN INFLUENZA VIRUS VAC: HCPCS | Performed by: EMERGENCY MEDICINE

## 2017-11-01 NOTE — PROCEDURES
a pulse of 85. At the  end of the tilt, there were no signs or symptoms. Electrocardiogram:  Prior to tilt, the patient's baseline ECG  demonstrated normal sinus rhythm without any significant ST-T wave  changes and a RSR prime pattern in V1. Throughout the entire  tilt-table testing, the patient's rhythm remained stable as did his  rate. He had no change from sinus rhythm. At the end of the tilt,  the patient's electrocardiogram demonstrated normal sinus rhythm with  the same RSR prime pattern in V1. Immediately into recovery, the  electrocardiogram was unchanged. At the end of the recovery phase,  the patient's electrocardiogram demonstrated normal sinus rhythm with  RSR prime pattern and a heart rate of 85. At the time, the patient  felt sweaty. The electrocardiogram was unchanged from baseline. The patient tolerated the procedure well. There were no  complications. CONCLUSION:  Negative tilt-table test.    PLAN:  The patient to follow up with primary providers to discuss  continued management of his neurologic symptoms.         Ladi Johnson    D: 11/01/2017 6:36:43       T: 11/01/2017 7:47:08     [email or web address]  Job#: [id number]     Doc#: [id number]

## 2017-11-06 ENCOUNTER — OFFICE VISIT (OUTPATIENT)
Dept: PULMONOLOGY | Age: 62
End: 2017-11-06
Payer: MEDICARE

## 2017-11-06 VITALS
OXYGEN SATURATION: 97 % | WEIGHT: 246.6 LBS | BODY MASS INDEX: 37.38 KG/M2 | HEART RATE: 78 BPM | DIASTOLIC BLOOD PRESSURE: 78 MMHG | SYSTOLIC BLOOD PRESSURE: 158 MMHG | TEMPERATURE: 97.2 F | HEIGHT: 68 IN

## 2017-11-06 DIAGNOSIS — J45.40 MODERATE PERSISTENT ASTHMA WITHOUT COMPLICATION: Primary | ICD-10-CM

## 2017-11-06 PROCEDURE — G8427 DOCREV CUR MEDS BY ELIG CLIN: HCPCS | Performed by: INTERNAL MEDICINE

## 2017-11-06 PROCEDURE — 1036F TOBACCO NON-USER: CPT | Performed by: INTERNAL MEDICINE

## 2017-11-06 PROCEDURE — G8417 CALC BMI ABV UP PARAM F/U: HCPCS | Performed by: INTERNAL MEDICINE

## 2017-11-06 PROCEDURE — 99214 OFFICE O/P EST MOD 30 MIN: CPT | Performed by: INTERNAL MEDICINE

## 2017-11-06 PROCEDURE — G8484 FLU IMMUNIZE NO ADMIN: HCPCS | Performed by: INTERNAL MEDICINE

## 2017-11-06 PROCEDURE — 3017F COLORECTAL CA SCREEN DOC REV: CPT | Performed by: INTERNAL MEDICINE

## 2017-11-06 NOTE — PROGRESS NOTES
[facility]                                                 Pulmonary medicine clinic follow up note. Patient: Son Dennis  : 1955      Lung Nodule Screening   [X] Qualifies [ ] Does not qualify [ ] Declined [X] Completed CTA 5/21/15    Son Dennis is a 57 y. o.oldmale came for follow up regarding his moderate persistent asthma and mild COPD. He is currently using  Symbicort 160/4.5mcg spray MDI, 2 puffs via inhalation BID, Albuterol HFA 90mcg/Spray MDI, 2puffs  Q6Hprn and Duonebs Q6h prn in alternation with Albuterol HFA. He is currently not using any Oxygen. No recent hospitalizations or emergency room visits for asthma. He was initially referred from Dr. Billy Cornelius for chronic cough. He quit taking Flonase 50mcg/INH 2sprays each nostril daily due to development of nasal bleeds. Asthma control (Severity) questionnaire:  Asthma symptoms:  > 2 times per week -> Yes  Night time awakenings: > 2 times per month -> No  Use of short acting beta agonist for symptoms control (Other than pre exercise use) :> 2times per week  -> Yes  Interference with normal activity  -> none  Lung function: Fev1 >60% Predicted  -> Yes  Number of exacerbations per year: > 2 -> No.    Social History: Occupation: He is retired now. He used to work as a  in the past.      Patient admits to history of tobacco smoking. He used to smoke 1 to 1.5 PPD for 44 Years. He denies history of exposure to coal, foundry, [place], asbestos or significant farm dust exposure. His father was diagnosed with asbestos exposure. He used to move close to his father. Patient denies any recent travel. Patient denies history of exposure to birds, pigeons, or chickens in the past. The patient admits toto pet animals at home. Hecurrently had 2cats at home. He denies to history of recreational or IV drug use.   [place]enies history of pulmonary embolism or DVT in the past.      Review of Systems:   General/Constitutional: he gained 5lbs of weight from last visit with normal appetite. No fever or chills. HENT: Negative. Eyes: Negative. Upper respiratory tract: No nasal stuffiness or post nasal drip. Lower respiratory tract/ lungs: No cough with no sputum production. No hemoptysis. Exertional dyspnea. Cardiovascular: No palpitations or chest pain. Gastrointestinal: No nausea or vomiting. Neurological: No focal neurologiacal weakness. Extremities: No edema. Musculoskeletal: No complaints. Genitourinary: No complaints. Hematological: Negative. Psychiatric/Behavioral: Negative. Skin: No itching. Current Medications:          Past Medical History:   Diagnosis Date    Acid reflux     Arthritis     Asthma     Chronic back pain     Colon polyps 11/06    [facility]     Depression     panic attacks    Diverticulosis     Hyperlipidemia     Kidney disorder     Panic attack     Sleep apnea     Thumb amputation status 3/13/14    left tip of thumb amputated    Type II or unspecified type diabetes mellitus without mention of complication, not stated as uncontrolled        Past Surgical History:   Procedure Laterality Date    BACK SURGERY  2002    BACK SURGERY  08/11/2017    BICEPS TENDON REPAIR Right 08/16/2017    CARDIAC CATHETERIZATION  07/02 08/05    CARPAL TUNNEL RELEASE  2011    right hand    CHOLECYSTECTOMY  yrs ago    COLONOSCOPY  11/06 02/12 1/14    EYE SURGERY      HERNIA REPAIR  2004    KIDNEY STONE SURGERY  2008?     LARYNGOSCOPY  04/28/2016    Laryngoscopy Micro with Biopsy by Dr Kathleen Laughlin  01/07    uppp    ROTATOR CUFF REPAIR Left 5-20-15    TONSILLECTOMY  1985    UPPER GASTROINTESTINAL ENDOSCOPY  1997    VEIN SURGERY Left September 2014    Dr. Jemma Waters       No Known Allergies    Current Outpatient Prescriptions   Medication Sig Dispense Refill    cyclobenzaprine (FLEXERIL) 10 MG tablet take 1 tablet three times a day with food  0    meloxicam (MOBIC) 7.5 MG tablet take 1 tablet by mouth twice a day  0    traZODone (DESYREL) 150 MG tablet take 1/2 to 1 tablet by mouth once daily at bedtime if needed for pain SPASM, OR SLEEP  0    metFORMIN (GLUCOPHAGE) 500 MG tablet Take two tablets by mouth 2 times a day with meal. 120 tablet 5    busPIRone (BUSPAR) 10 MG tablet Take 1 tablet by mouth 3 times daily 90 tablet 3    simvastatin (ZOCOR) 40 MG tablet take 1 tablet by mouth at bedtime 30 tablet 5    ranitidine (ZANTAC) 150 MG tablet Take 1 tablet by mouth 2 times daily 60 tablet 3    ipratropium-albuterol (DUONEB) 0.5-2.5 (3) MG/3ML SOLN nebulizer solution Inhale 3 mLs into the lungs every 6 hours as needed for Shortness of Breath 360 mL 1    VENTOLIN  (90 BASE) MCG/ACT inhaler inhale 2 puffs by mouth four times a day  1    sertraline (ZOLOFT) 100 MG tablet take 1 tablet by mouth twice a day 60 tablet 5    SYMBICORT 160-4.5 MCG/ACT AERO Inhale 2 puffs into the lungs 2 times daily 1 Inhaler 11    zafirlukast (ACCOLATE) 20 MG tablet Take 1 tablet by mouth 2 times daily 60 tablet 11    glucose blood VI test strips (ASCENSIA AUTODISC VI;ONE TOUCH ULTRA TEST VI) strip One Touch Ultra Test Strip - Check blood sugar twice daily Dx: E11.9 200 each 1    rOPINIRole (REQUIP) 1 MG tablet Take 1 tablet by mouth 3 times daily 90 tablet 5    Testosterone (ANDROGEL) 20.25 MG/ACT (1.62%) GEL gel Place 1 Squirt onto the skin every morning (before breakfast) 1 Bottle 5    aspirin 81 MG tablet Take 81 mg by mouth daily      HYDROcodone-acetaminophen (NORCO) 5-325 MG per tablet Take 1 tablet by mouth every 6 hours as needed for Pain      ONETOUCH DELICA LANCETS FINE MISC Check blood sugar twice daily Dx: 250.00 200 each 1    Omega-3 Fatty Acids (FISH OIL) 1000 MG CAPS Take 1,000 mg by mouth daily.  sildenafil (VIAGRA) 100 MG tablet Take 1 tablet by mouth as needed.  8 tablet 5     No current facility-administered medications for this visit. Family History   Problem Relation Age of Onset   [place] Cancer Mother     Breast Cancer Mother     Stroke Mother     Heart Disease Brother     Diabetes Brother     High Blood Pressure Brother     High Cholesterol Brother          Physical Exam     VITALS:    BP (!) 158/78   Pulse 78   Temp 97.2 °F (36.2 °C)   Ht 5' 8\" (1.727 m)   Wt 246 lb 9.6 oz (111.9 kg)   SpO2 97% Comment: room air at rest  BMI 37.50 kg/m²   Nursing note and vitals reviewed. Constitutional: Patient appears moderately built and moderately nourished. No distress. Patient is oriented to person, place, and time. HENT:  Head: Normocephalic and atraumatic. Right Ear: External ear normal.   Left Ear: External ear normal.   Mouth/Throat: Oropharynx is clear and moist.  No oral thrush. Eyes: Conjunctivae are normal. Pupils are equal, round, and reactive to light. No scleral icterus. Neck: Neck supple. No JVD present. No tracheal deviation present. Cardiovascular: Normal rate, regular rhythm, normal heart sounds. No murmur heard. Pulmonary/Chest: Effort normal and breath sounds normal. No stridor. No respiratory distress. Bilateral diffuse wheezes. No rales. Patient exhibits no tenderness. Abdominal: Soft. Patient exhibits no distension. No tenderness. Musculoskeletal: Normal range of motion. Extremities: Patient exhibits no edema and no tenderness. Lymphadenopathy:  No cervical adenopathy. Neurological: Patient is alert and oriented to person, place, and time. Skin: Skin is warm and dry. Patient is not diaphoretic. Psychiatric: Patient  has a normal mood and affect. Patient behavior is normal.     Neck Circumference - 18.75\"   Mallampati - IV    Diagnostic Data:    Radiological Data:  Chest Xray:  Jan 21, 2016  PROCEDURE: XR CHEST STANDARD TWO VW  IMPRESSION: 1. Relative flattening of hemidiaphragms, consistent with the history of asthma.  2. Minimal fibrotic scarring in the lingula. 3. No acute findings. No change from prior study. May 21, 2015  PROCEDURE: CTA CHEST WITH CONTRAST  IMPRESSION:  Examination is negative for pulmonary embolism. Dependent atelectasis bilaterally left greater than right      Pulmonary function tests:              MCCT test:3/28/16: Positive. CT neck:  Mar 28, 2016  PROCEDURE: CT SOFT TISSUE NECK W CONTRAST  IMPRESSION: 1. There is asymmetric soft tissue prominence demonstrated along the posterior aspect of the left vocal cord. This may represent focal scarring however, cannot exclude a mucosal lesion. Consider correlation with direct visualization. 2. Small soft tissue prominence at the base of the tongue as described above. This may represent retained mucous secretions. Again, this can be correlated clinically with direct visualization. 3. Nonspecific mildly prominent lymph nodes along the posterior cervical chain on the left. The largest of these measures approximately 1 cm in short axis dimension. These may be reactive in nature. However, recommend followup CT evaluation to document stability. 4. Heterogeneous low-attenuation nodule within left thyroid gland is incompletely characterized on the current examination and can be further characterized by thyroid ultrasound followup. 5. Moderate centrilobular emphysema is noted at the lung apices. Spirometry 2017: His FEV1 slightly decreased from previous spirometry done in the past.    Chest Xray done at [facility]:  Exam Date: 07/24/17    Ordering Provider: Karan Nieves:   Normal x-ray examination of the chest.        Assessment:  -Moderate persistent bronchial asthma- under control with current inhalers. -Mild COPD- under control  -Chronic cough due to uncontrolled asthma Vs allergic rhinitis- resolved  -Allergic rhinitis- on Astelin nasal spray.  -Abnormal CT scan of neck with ?  Lesion over vocal cord and impression and plan. Patient verbalizes understanding.  - Schedule patient for follow up with my clinic in 9months with spirometry before clinic visit. Patient advised to make early appointment if needed. -He was requested to go for polysomnogram in the sleep lab to further evaluate for obstructive sleep apnea. However at the end of discussion he refused to go for the sleep test at this time. He verbalizes understanding of consequences of his decision including non diagnosis of obstructive sleep apnea resulting in increased morbidity and mortality with cerebro and cardiovascular events including death. He verbalizes understanding.

## 2017-12-07 ENCOUNTER — OFFICE VISIT (OUTPATIENT)
Dept: ENT CLINIC | Age: 62
End: 2017-12-07
Payer: MEDICARE

## 2017-12-07 VITALS
TEMPERATURE: 98 F | RESPIRATION RATE: 18 BRPM | DIASTOLIC BLOOD PRESSURE: 74 MMHG | HEIGHT: 68 IN | BODY MASS INDEX: 36.68 KG/M2 | SYSTOLIC BLOOD PRESSURE: 120 MMHG | WEIGHT: 242 LBS | HEART RATE: 84 BPM

## 2017-12-07 DIAGNOSIS — E04.1 LEFT THYROID NODULE: Primary | ICD-10-CM

## 2017-12-07 DIAGNOSIS — K21.9 GASTROESOPHAGEAL REFLUX DISEASE, ESOPHAGITIS PRESENCE NOT SPECIFIED: ICD-10-CM

## 2017-12-07 PROCEDURE — G8427 DOCREV CUR MEDS BY ELIG CLIN: HCPCS | Performed by: OTOLARYNGOLOGY

## 2017-12-07 PROCEDURE — G8417 CALC BMI ABV UP PARAM F/U: HCPCS | Performed by: OTOLARYNGOLOGY

## 2017-12-07 PROCEDURE — 31575 DIAGNOSTIC LARYNGOSCOPY: CPT | Performed by: OTOLARYNGOLOGY

## 2017-12-07 PROCEDURE — 1036F TOBACCO NON-USER: CPT | Performed by: OTOLARYNGOLOGY

## 2017-12-07 PROCEDURE — G8484 FLU IMMUNIZE NO ADMIN: HCPCS | Performed by: OTOLARYNGOLOGY

## 2017-12-07 PROCEDURE — 3017F COLORECTAL CA SCREEN DOC REV: CPT | Performed by: OTOLARYNGOLOGY

## 2017-12-07 PROCEDURE — 99213 OFFICE O/P EST LOW 20 MIN: CPT | Performed by: OTOLARYNGOLOGY

## 2017-12-07 RX ORDER — RANITIDINE 150 MG/1
150 TABLET ORAL 2 TIMES DAILY
Qty: 60 TABLET | Refills: 0 | Status: SHIPPED | OUTPATIENT
Start: 2017-12-07 | End: 2018-01-26 | Stop reason: SDUPTHER

## 2017-12-07 ASSESSMENT — ENCOUNTER SYMPTOMS
STRIDOR: 0
SHORTNESS OF BREATH: 0
COUGH: 0
CHEST TIGHTNESS: 0
TROUBLE SWALLOWING: 0
VOMITING: 0
CHOKING: 0
SORE THROAT: 0
ABDOMINAL PAIN: 0
COLOR CHANGE: 0
DIARRHEA: 0
RHINORRHEA: 0
VOICE CHANGE: 0
SINUS PRESSURE: 0
APNEA: 0
FACIAL SWELLING: 0
NAUSEA: 0
WHEEZING: 0

## 2017-12-07 NOTE — LETTER
[facility]  [facility] [address]  [address]  [address]  Phone: [phone]  Fax: [phone]    Antonio Villela MD        December 19, 2017    Bertha Colon MD  [facility] [id number]    Patient: Olivia King   MR Number: [id number]   YOB: 1955   Date of Visit: 12/7/2017     Dear Bertha Colon,    I recently saw your patient, Olivia King, regarding His GERD and thyroid nodule. Obtain another thyroid scan again this showed the nodule has not changed since the skin scan in 2016. His GERD is fairly well controlled. He is still not smoking. Below are the relevant portions of my assessment and plan of care. Assessment & Plan   Diagnoses and all orders for this visit:    1. Left thyroid nodule  US Thyroid   2. Gastroesophageal reflux disease, esophagitis presence not specified  [facility] L[facility]NGOS[facility]NOST[facility]       The findings were explained and his questions were answered. Options were discussed including getting a thyroid ultrasound For an before the year is up to check on his thyroid nodule. I also discussed having his head of bed elevated at night and not having a snack 2 hours before bed. He agreed. I will see him back in 6 months. Return in about 6 months (around 6/7/2018). If you have questions, please do not hesitate to call me. I look forward to following Yony along with you.     Sincerely,          Antonio Villela MD

## 2017-12-07 NOTE — PROGRESS NOTES
mouth twice a day 60 tablet 5    SYMBICORT 160-4.5 MCG/ACT AERO Inhale 2 puffs into the lungs 2 times daily 1 Inhaler 11    zafirlukast (ACCOLATE) 20 MG tablet Take 1 tablet by mouth 2 times daily 60 tablet 11    glucose blood VI test strips (ASCENSIA AUTODISC VI;ONE TOUCH ULTRA TEST VI) strip One Touch Ultra Test Strip - Check blood sugar twice daily Dx: E11.9 200 each 1    rOPINIRole (REQUIP) 1 MG tablet Take 1 tablet by mouth 3 times daily 90 tablet 5    Testosterone (ANDROGEL) 20.25 MG/ACT (1.62%) GEL gel Place 1 Squirt onto the skin every morning (before breakfast) 1 Bottle 5    aspirin 81 MG tablet Take 81 mg by mouth daily      HYDROcodone-acetaminophen (NORCO) 5-325 MG per tablet Take 1 tablet by mouth every 6 hours as needed for Pain      ONETOUCH DELICA LANCETS FINE MISC Check blood sugar twice daily Dx: 250.00 200 each 1    Omega-3 Fatty Acids (FISH OIL) 1000 MG CAPS Take 1,000 mg by mouth daily.  sildenafil (VIAGRA) 100 MG tablet Take 1 tablet by mouth as needed. 8 tablet 5    ranitidine (ZANTAC) 150 MG tablet Take 1 tablet by mouth 2 times daily 60 tablet 3     No current facility-administered medications for this visit.       Past Medical History:   Diagnosis Date    Acid reflux     Arthritis     Asthma     Chronic back pain     Colon polyps 11/06    COPD [facility]chronic obstructive pulmonary disease) ([facility]     Depression     panic attacks    Diverticulosis     Hyperlipidemia     Kidney disorder     Panic attack     Sleep apnea     Thumb amputation status 3/13/14    left tip of thumb amputated    Type II or unspecified type diabetes mellitus without mention of complication, not stated as uncontrolled       Past Surgical History:   Procedure Laterality Date    BACK SURGERY  2002    BACK SURGERY  08/11/2017    BICEPS TENDON REPAIR Right 08/16/2017    CARDIAC CATHETERIZATION  07/02 08/05    CARPAL TUNNEL RELEASE  2011    right hand    CHOLECYSTECTOMY  yrs ago    COLONOSCOPY 11/06 02/12 1/14    EYE SURGERY      HERNIA REPAIR  2004    KIDNEY STONE SURGERY  2008?  LARYNGOSCOPY  04/28/2016    Laryngoscopy Micro with Biopsy by Dr Elaine Wiley  01/07    uppp    ROTATOR CUFF REPAIR Left 5-20-15    TONSILLECTOMY  1985    UPPER GASTROINTESTINAL ENDOSCOPY  1997    VEIN SURGERY Left September 2014    Dr. Hiram York     Family History   Problem Relation Age of Onset    Cancer Mother     Breast Cancer Mother     Stroke Mother     Heart Disease Brother     Diabetes Brother     High Blood Pressure Brother     High Cholesterol Brother      Social History   Substance Use Topics    Smoking status: Former Smoker     Packs/day: 0.75     Years: 42.00     Types: Cigarettes     Start date: 6/5/1971     Quit date: 3/3/2017    Smokeless tobacco: Never Used    Alcohol use No       Subjective:      Review of Systems   Constitutional: Negative for activity change, appetite change, chills, diaphoresis, fatigue, fever and unexpected weight change. HENT: Negative for congestion, dental problem, ear discharge, ear pain, facial swelling, hearing loss, mouth sores, nosebleeds, postnasal drip, rhinorrhea, sinus pressure, sneezing, sore throat, tinnitus, trouble swallowing and voice change. Eyes: Negative for visual disturbance. Respiratory: Negative for apnea, cough, choking, chest tightness, shortness of breath, wheezing and stridor. Cardiovascular: Negative for chest pain, palpitations and leg swelling. Gastrointestinal: Negative for abdominal pain, diarrhea, nausea and vomiting. Endocrine: Negative for cold intolerance, heat intolerance, polydipsia and polyuria. Genitourinary: Negative for dysuria, enuresis and hematuria. Musculoskeletal: Negative for arthralgias, gait problem, neck pain and neck stiffness. Skin: Negative for color change and rash. Allergic/Immunologic: Negative for environmental allergies, food allergies and immunocompromised state. Neurological: Negative for dizziness, syncope, facial asymmetry, speech difficulty, light-headedness and headaches. Hematological: Negative for adenopathy. Does not bruise/bleed easily. Psychiatric/Behavioral: Negative for confusion and sleep disturbance. The patient is not nervous/anxious. Objective:     /74 (Site: Right Arm, Position: Sitting)   Pulse 84   Temp 98 °F (36.7 °C) (Oral)   Resp 18   Ht 5' 8\" (1.727 m)   Wt 242 lb (109.8 kg)   BMI 36.80 kg/m²     Physical Exam   Constitutional: He is oriented to person, place, and time. He appears well-developed and well-nourished. He is cooperative. HENT:   Head: Normocephalic and atraumatic. Head is without laceration. Right Ear: Hearing, tympanic membrane, external ear and ear canal normal. No drainage or swelling. Tympanic membrane is not scarred, not perforated and not erythematous. Tympanic membrane mobility is normal (on pneumatic massage). No middle ear effusion. Left Ear: Hearing, tympanic membrane, external ear and ear canal normal. No drainage or swelling. Tympanic membrane is not scarred, not perforated and not erythematous. Tympanic membrane mobility is normal (on pneumatic massage). No middle ear effusion. Nose: Nose normal. No mucosal edema, rhinorrhea or septal deviation. Mouth/Throat: Uvula is midline, oropharynx is clear and moist and mucous membranes are normal. Mucous membranes are not pale and not dry. No oral lesions. No uvula swelling. No oropharyngeal exudate, posterior oropharyngeal edema or posterior oropharyngeal erythema. Throat: Mild erythema Turbinates: normal  LIps: lips normal    Teeth and gums:good dentition Mallampati 1  Tonsils:Unremarkable  Base of tongue: symmetric,  Larynx, mirror exam: unable due to gag reflex     Eyes: Right eye exhibits normal extraocular motion and no nystagmus. Left eye exhibits normal extraocular motion and no nystagmus.    Conjugate gaze   Neck: Trachea normal and phonation normal. Neck supple. No spinous process tenderness present. No tracheal deviation present. No thyroid mass and no thyromegaly present. No adenopathy. Salivary glands not enlarged and normal to palpation     Cardiovascular:   No murmur heard. Pulmonary/Chest: Breath sounds normal. No stridor. Neurological: He is alert and oriented to person, place, and time. No cranial nerve deficit (VIIth N function intact bilat). Psychiatric: He has a normal mood and affect. Nursing note and vitals reviewed. Neck: Nodular thyroid gland    PROCEDURE: FIBEROPTIC LARYNGOSCOPY    A fiberoptic laryngoscopy was performed under topical anesthesia, after using Afrin and Lidocaine spray in the nasal fossa. The nasal fossa, nasopharynx, hypopharynx and larynx were carefully examined. Base of tongue was symmetrical. Epiglottis appeared normal and was not retrodisplaced. True vocal cords had normal mobility. There was mild erythema of the arytenoid area bilaterally. No masses or mucosal lesions were noted. No pooling in the pyriform sinuses. Data:  All of the past medical history, past surgical history, family history, social history, allergies and current medications were reviewed with the patient. Assessment & Plan   Diagnoses and all orders for this visit:    1. Left thyroid nodule  US Thyroid   2. Gastroesophageal reflux disease, esophagitis presence not specified  OK LARYNGOSCOPY FLEXIBLE DIAGNOSTIC       The findings were explained and his questions were answered. Options were discussed including getting a thyroid ultrasound For an before the year is up to check on his thyroid nodule. I also discussed having his head of bed elevated at night and not having a snack 2 hours before bed. He agreed. I will see him back in 6 months. Return in about 6 months (around 6/7/2018). I, Reina Kearns CMA ([facility]), am scribing for, and in the presence of Dr. Jada Matthews.  Electronically signed by Mark Malagon CMA ([facility]) on 12/7/17 at 12:33 PM.     (Please note that portions of this note were completed with a voice recognition program. Efforts were made to edit the dictations but occasionally words are mis-transcribed.)    I agree to the above documentation placed by my scribe. I have personally evaluated this patient. Additional findings are as noted. I reviewed the scribe's note and agree with the documented findings and plan of care. Any areas of disagreement are corrected. I agree with the chief complaint, past medical history, past surgical history, allergies, medications, social and family history as documented unless otherwise noted below.      Electronically signed by Madaline Spurling, MD on 12/19/2017 at 11:13 PM

## 2017-12-19 ENCOUNTER — HOSPITAL ENCOUNTER (OUTPATIENT)
Dept: ULTRASOUND IMAGING | Age: 62
Discharge: HOME | End: 2017-12-19
Payer: MEDICARE

## 2017-12-19 DIAGNOSIS — E04.1 LEFT THYROID NODULE: ICD-10-CM

## 2017-12-19 PROCEDURE — 76536 US EXAM OF HEAD AND NECK: CPT

## 2018-01-12 ENCOUNTER — OFFICE VISIT (OUTPATIENT)
Dept: FAMILY MEDICINE CLINIC | Age: 63
End: 2018-01-12

## 2018-01-12 VITALS
WEIGHT: 247.2 LBS | HEART RATE: 80 BPM | SYSTOLIC BLOOD PRESSURE: 132 MMHG | DIASTOLIC BLOOD PRESSURE: 78 MMHG | BODY MASS INDEX: 37.47 KG/M2 | RESPIRATION RATE: 16 BRPM | HEIGHT: 68 IN

## 2018-01-12 DIAGNOSIS — L72.9 SCALP CYST: ICD-10-CM

## 2018-01-12 DIAGNOSIS — J43.9 PULMONARY EMPHYSEMA, UNSPECIFIED EMPHYSEMA TYPE (HCC): ICD-10-CM

## 2018-01-12 DIAGNOSIS — K21.9 GASTROESOPHAGEAL REFLUX DISEASE, ESOPHAGITIS PRESENCE NOT SPECIFIED: ICD-10-CM

## 2018-01-12 DIAGNOSIS — E11.8 TYPE 2 DIABETES MELLITUS WITH COMPLICATION, WITHOUT LONG-TERM CURRENT USE OF INSULIN (HCC): Primary | ICD-10-CM

## 2018-01-12 DIAGNOSIS — G89.29 CHRONIC BILATERAL LOW BACK PAIN WITHOUT SCIATICA: ICD-10-CM

## 2018-01-12 DIAGNOSIS — F32.A DEPRESSION, UNSPECIFIED DEPRESSION TYPE: ICD-10-CM

## 2018-01-12 DIAGNOSIS — M54.50 CHRONIC BILATERAL LOW BACK PAIN WITHOUT SCIATICA: ICD-10-CM

## 2018-01-12 DIAGNOSIS — E78.5 HYPERLIPIDEMIA, UNSPECIFIED HYPERLIPIDEMIA TYPE: ICD-10-CM

## 2018-01-12 LAB
CREATININE URINE POCT: ABNORMAL
GLUCOSE BLD-MCNC: 188 MG/DL
HBA1C MFR BLD: 7.6 %
MICROALBUMIN/CREAT 24H UR: 100 MG/G{CREAT}
MICROALBUMIN/CREAT UR-RTO: ABNORMAL

## 2018-01-12 PROCEDURE — 83036 HEMOGLOBIN GLYCOSYLATED A1C: CPT | Performed by: FAMILY MEDICINE

## 2018-01-12 PROCEDURE — 82962 GLUCOSE BLOOD TEST: CPT | Performed by: FAMILY MEDICINE

## 2018-01-12 PROCEDURE — 99214 OFFICE O/P EST MOD 30 MIN: CPT | Performed by: FAMILY MEDICINE

## 2018-01-12 PROCEDURE — 82044 UR ALBUMIN SEMIQUANTITATIVE: CPT | Performed by: FAMILY MEDICINE

## 2018-01-12 RX ORDER — ROPINIROLE 1 MG/1
1 TABLET, FILM COATED ORAL 3 TIMES DAILY
Qty: 90 TABLET | Refills: 5 | Status: SHIPPED | OUTPATIENT
Start: 2018-01-12 | End: 2018-07-23 | Stop reason: SDUPTHER

## 2018-01-12 RX ORDER — LOSARTAN POTASSIUM 25 MG/1
25 TABLET ORAL DAILY
Qty: 30 TABLET | Refills: 5 | Status: SHIPPED | OUTPATIENT
Start: 2018-01-12 | End: 2018-07-23 | Stop reason: SDUPTHER

## 2018-01-12 RX ORDER — GLIMEPIRIDE 2 MG/1
2 TABLET ORAL DAILY
Qty: 90 TABLET | Refills: 1 | Status: SHIPPED | OUTPATIENT
Start: 2018-01-12 | End: 2018-04-23 | Stop reason: DRUGHIGH

## 2018-01-12 ASSESSMENT — ENCOUNTER SYMPTOMS
BLOOD IN STOOL: 0
DIARRHEA: 0
BACK PAIN: 1
COUGH: 0
VOMITING: 0
ABDOMINAL PAIN: 0
CONSTIPATION: 0
CHEST TIGHTNESS: 0
SHORTNESS OF BREATH: 0
EYES NEGATIVE: 1
NAUSEA: 0

## 2018-01-12 NOTE — PROGRESS NOTES
HPI:    Patient ID: Georgina Landaverde is a 74year old female. HPI  Here for f/u LLL pneumonia. Feels overall better, NO longer SOB when doing housechores. No fever. Cough resolving.  No more left chest pain     Requesting referral to see gyne for pelvic e GASTROINTESTINAL ENDOSCOPY  1997    VEIN SURGERY Left September 2014    Dr. Alban Nixon       Family History   Problem Relation Age of Onset    Cancer Mother     Breast Cancer Mother     Stroke Mother     Heart Disease Brother     Diabetes Brother     High Blood Pressure Brother     High Cholesterol Brother      Subjective:      Review of Systems   Constitutional: Negative for activity change, appetite change, diaphoresis and fever. HENT: Negative. Eyes: Negative. Respiratory: Negative for cough, chest tightness and shortness of breath. Cardiovascular: Negative for chest pain, palpitations and leg swelling. Gastrointestinal: Negative for abdominal pain, blood in stool, constipation, diarrhea, nausea and vomiting. Genitourinary: Negative. Musculoskeletal: Positive for arthralgias and back pain (chronic). Skin: Negative. Negative for rash. Neurological: Negative. Negative for dizziness, syncope, weakness, light-headedness and headaches. Psychiatric/Behavioral: Negative. Objective:   /78 (Site: Left Arm, Position: Sitting, Cuff Size: Large Adult)   Pulse 80   Resp 16   Ht 5' 8\" (1.727 m)   Wt 247 lb 3.2 oz (112.1 kg)   BMI 37.59 kg/m²   Wt Readings from Last 3 Encounters:   01/12/18 247 lb 3.2 oz (112.1 kg)   12/07/17 242 lb (109.8 kg)   11/06/17 246 lb 9.6 oz (111.9 kg)     Physical Exam   Constitutional: He is oriented to person, place, and time. He appears well-developed and well-nourished. No distress. HENT:   Head: Normocephalic and atraumatic. Eyes: Conjunctivae and EOM are normal. Pupils are equal, round, and reactive to light. Right eye exhibits no discharge. Left eye exhibits no discharge. No scleral icterus. Neck: Normal range of motion. Neck supple. No JVD present. No thyromegaly present. Cardiovascular: Normal rate, regular rhythm and normal heart sounds. No murmur heard.   Pulmonary/Chest: Effort normal and breath sounds normal. No respiratory normal    LLLPneumonia  Improved. F/u CXR.     To see Dr. Trey Steel for gyne exam        Imaging & Referrals:  [facility] - INTERNAL       F/u 6 mos and prn two tablets by mouth 2 times a day with meal. 120 tablet 5    busPIRone (BUSPAR) 10 MG tablet Take 1 tablet by mouth 3 times daily 90 tablet 3    simvastatin (ZOCOR) 40 MG tablet take 1 tablet by mouth at bedtime 30 tablet 5    ipratropium-albuterol (DUONEB) 0.5-2.5 (3) MG/3ML SOLN nebulizer solution Inhale 3 mLs into the lungs every 6 hours as needed for Shortness of Breath 360 mL 1    sertraline (ZOLOFT) 100 MG tablet take 1 tablet by mouth twice a day 60 tablet 5    SYMBICORT 160-4.5 MCG/ACT AERO Inhale 2 puffs into the lungs 2 times daily 1 Inhaler 11    zafirlukast (ACCOLATE) 20 MG tablet Take 1 tablet by mouth 2 times daily 60 tablet 11    glucose blood VI test strips (ASCENSIA AUTODISC VI;ONE TOUCH ULTRA TEST VI) strip One Touch Ultra Test Strip - Check blood sugar twice daily Dx: E11.9 200 each 1    Testosterone (ANDROGEL) 20.25 MG/ACT (1.62%) GEL gel Place 1 Squirt onto the skin every morning (before breakfast) 1 Bottle 5    aspirin 81 MG tablet Take 81 mg by mouth daily      HYDROcodone-acetaminophen (NORCO) 5-325 MG per tablet Take 1 tablet by mouth every 6 hours as needed for Pain      ONETOUCH DELICA LANCETS FINE MISC Check blood sugar twice daily Dx: 250.00 200 each 1    Omega-3 Fatty Acids (FISH OIL) 1000 MG CAPS Take 1,000 mg by mouth daily.  sildenafil (VIAGRA) 100 MG tablet Take 1 tablet by mouth as needed. 8 tablet 5    ranitidine (ZANTAC) 150 MG tablet Take 1 tablet by mouth 2 times daily 60 tablet 0    ranitidine (ZANTAC) 150 MG tablet Take 1 tablet by mouth 2 times daily 60 tablet 3     No current facility-administered medications for this visit.       Orders Placed This Encounter   Procedures   [address] Leoncio Holloway MD     Referral Priority:   Routine     Referral Type:   Consult for Advice and Opinion     Referral Reason:   Specialty Services Required     Referred to Provider:   Floresita Lozoya MD     Requested Specialty:   General

## 2018-01-16 PROBLEM — L72.3 SEBACEOUS CYST: Status: ACTIVE | Noted: 2018-01-16

## 2018-01-16 NOTE — PROGRESS NOTES
no preauricular adenopathy  LYMPH: No cervical  lymphadenopathy. Thank you for the interesting evaluation. Further recommendations as listed above.        Electronically signed by Poli Mullen MD on 1/17/2018 at 10:11 AM

## 2018-01-17 ENCOUNTER — OFFICE VISIT (OUTPATIENT)
Dept: SURGERY | Age: 63
End: 2018-01-17
Payer: MEDICARE

## 2018-01-17 VITALS
SYSTOLIC BLOOD PRESSURE: 136 MMHG | DIASTOLIC BLOOD PRESSURE: 76 MMHG | HEIGHT: 68 IN | HEART RATE: 80 BPM | BODY MASS INDEX: 37.04 KG/M2 | OXYGEN SATURATION: 93 % | WEIGHT: 244.4 LBS | RESPIRATION RATE: 18 BRPM | TEMPERATURE: 98.3 F

## 2018-01-17 DIAGNOSIS — L72.3 SEBACEOUS CYST: ICD-10-CM

## 2018-01-17 PROCEDURE — G8484 FLU IMMUNIZE NO ADMIN: HCPCS | Performed by: SURGERY

## 2018-01-17 PROCEDURE — 99201 PR OFFICE OUTPATIENT NEW 10 MINUTES: CPT | Performed by: SURGERY

## 2018-01-17 PROCEDURE — G8427 DOCREV CUR MEDS BY ELIG CLIN: HCPCS | Performed by: SURGERY

## 2018-01-17 PROCEDURE — 1036F TOBACCO NON-USER: CPT | Performed by: SURGERY

## 2018-01-17 PROCEDURE — G8417 CALC BMI ABV UP PARAM F/U: HCPCS | Performed by: SURGERY

## 2018-01-17 PROCEDURE — 3017F COLORECTAL CA SCREEN DOC REV: CPT | Performed by: SURGERY

## 2018-01-17 ASSESSMENT — ENCOUNTER SYMPTOMS
TROUBLE SWALLOWING: 0
SHORTNESS OF BREATH: 1
SINUS PRESSURE: 0
NAUSEA: 0
PHOTOPHOBIA: 0
ANAL BLEEDING: 0
VOMITING: 0
CONSTIPATION: 0
EYE DISCHARGE: 0
BACK PAIN: 1
CHOKING: 0
APNEA: 0
RHINORRHEA: 0
COLOR CHANGE: 0
SINUS PAIN: 0
EYE ITCHING: 0
CHEST TIGHTNESS: 0
STRIDOR: 0
BLOOD IN STOOL: 0
VOICE CHANGE: 0
FACIAL SWELLING: 0
ABDOMINAL PAIN: 0
RECTAL PAIN: 0
SORE THROAT: 0
EYE PAIN: 0
EYE REDNESS: 0
COUGH: 0
ABDOMINAL DISTENTION: 0
DIARRHEA: 0

## 2018-01-22 ENCOUNTER — PROCEDURE VISIT (OUTPATIENT)
Dept: SURGERY | Age: 63
End: 2018-01-22
Payer: MEDICARE

## 2018-01-22 VITALS
TEMPERATURE: 97.5 F | HEIGHT: 68 IN | WEIGHT: 243 LBS | BODY MASS INDEX: 36.83 KG/M2 | HEART RATE: 90 BPM | RESPIRATION RATE: 20 BRPM | DIASTOLIC BLOOD PRESSURE: 68 MMHG | SYSTOLIC BLOOD PRESSURE: 138 MMHG | OXYGEN SATURATION: 92 %

## 2018-01-22 DIAGNOSIS — L72.3 SEBACEOUS CYST: Primary | ICD-10-CM

## 2018-01-22 DIAGNOSIS — D17.0 LIPOMA OF FACE: ICD-10-CM

## 2018-01-22 PROCEDURE — 21012 EXC FACE LES SBQ 2 CM/>: CPT | Performed by: SURGERY

## 2018-01-26 RX ORDER — RANITIDINE 150 MG/1
150 TABLET ORAL 2 TIMES DAILY
Qty: 60 TABLET | Refills: 3 | Status: SHIPPED | OUTPATIENT
Start: 2018-01-26 | End: 2018-04-16 | Stop reason: SDUPTHER

## 2018-02-16 NOTE — TELEPHONE ENCOUNTER
Yuniel Mormon called back and said that the RX that was sent was for testing once a day and he tests twice a day so they are requesting a new RX be sent and she would like a call back when this has been done.

## 2018-02-21 RX ORDER — IPRATROPIUM BROMIDE AND ALBUTEROL SULFATE 2.5; .5 MG/3ML; MG/3ML
1 SOLUTION RESPIRATORY (INHALATION) EVERY 6 HOURS PRN
Qty: 360 ML | Refills: 1 | Status: SHIPPED | OUTPATIENT
Start: 2018-02-21 | End: 2018-03-29 | Stop reason: ALTCHOICE

## 2018-03-19 RX ORDER — SIMVASTATIN 40 MG
40 TABLET ORAL NIGHTLY
Qty: 30 TABLET | Refills: 5 | Status: SHIPPED | OUTPATIENT
Start: 2018-03-19 | End: 2018-07-23 | Stop reason: SDUPTHER

## 2018-03-19 RX ORDER — BUSPIRONE HYDROCHLORIDE 10 MG/1
10 TABLET ORAL 3 TIMES DAILY
Qty: 90 TABLET | Refills: 3 | Status: SHIPPED | OUTPATIENT
Start: 2018-03-19 | End: 2018-07-23 | Stop reason: SDUPTHER

## 2018-03-29 ENCOUNTER — OFFICE VISIT (OUTPATIENT)
Dept: PULMONOLOGY | Age: 63
End: 2018-03-29
Payer: MEDICARE

## 2018-03-29 VITALS
RESPIRATION RATE: 18 BRPM | HEART RATE: 84 BPM | OXYGEN SATURATION: 93 % | DIASTOLIC BLOOD PRESSURE: 88 MMHG | BODY MASS INDEX: 38.22 KG/M2 | WEIGHT: 252.2 LBS | SYSTOLIC BLOOD PRESSURE: 138 MMHG | HEIGHT: 68 IN

## 2018-03-29 DIAGNOSIS — R59.1 LYMPHADENOPATHY: ICD-10-CM

## 2018-03-29 DIAGNOSIS — Z87.891 PERSONAL HISTORY OF NICOTINE DEPENDENCE: ICD-10-CM

## 2018-03-29 DIAGNOSIS — J45.31 MILD PERSISTENT ASTHMA WITH ACUTE EXACERBATION: Primary | ICD-10-CM

## 2018-03-29 DIAGNOSIS — R06.02 SOB (SHORTNESS OF BREATH): ICD-10-CM

## 2018-03-29 DIAGNOSIS — F17.211 NICOTINE DEPENDENCE, CIGARETTES, IN REMISSION: ICD-10-CM

## 2018-03-29 DIAGNOSIS — J43.1 PANLOBULAR EMPHYSEMA (HCC): ICD-10-CM

## 2018-03-29 DIAGNOSIS — J45.31 MILD PERSISTENT ASTHMA WITH ACUTE EXACERBATION: ICD-10-CM

## 2018-03-29 DIAGNOSIS — J38.3 LESION OF VOCAL CORD: ICD-10-CM

## 2018-03-29 DIAGNOSIS — G47.33 OSA (OBSTRUCTIVE SLEEP APNEA): ICD-10-CM

## 2018-03-29 PROCEDURE — G8926 SPIRO NO PERF OR DOC: HCPCS | Performed by: NURSE PRACTITIONER

## 2018-03-29 PROCEDURE — 3023F SPIROM DOC REV: CPT | Performed by: NURSE PRACTITIONER

## 2018-03-29 PROCEDURE — 3017F COLORECTAL CA SCREEN DOC REV: CPT | Performed by: NURSE PRACTITIONER

## 2018-03-29 PROCEDURE — G8427 DOCREV CUR MEDS BY ELIG CLIN: HCPCS | Performed by: NURSE PRACTITIONER

## 2018-03-29 PROCEDURE — 99214 OFFICE O/P EST MOD 30 MIN: CPT | Performed by: NURSE PRACTITIONER

## 2018-03-29 PROCEDURE — G8482 FLU IMMUNIZE ORDER/ADMIN: HCPCS | Performed by: NURSE PRACTITIONER

## 2018-03-29 PROCEDURE — 94618 PULMONARY STRESS TESTING: CPT | Performed by: NURSE PRACTITIONER

## 2018-03-29 PROCEDURE — 1036F TOBACCO NON-USER: CPT | Performed by: NURSE PRACTITIONER

## 2018-03-29 PROCEDURE — G0296 VISIT TO DETERM LDCT ELIG: HCPCS | Performed by: NURSE PRACTITIONER

## 2018-03-29 PROCEDURE — G8417 CALC BMI ABV UP PARAM F/U: HCPCS | Performed by: NURSE PRACTITIONER

## 2018-03-29 NOTE — PROGRESS NOTES
complaints. Genitourinary: No complaints. Hematological: Negative. Psychiatric/Behavioral: Negative. Skin: No itching. Physical exam   /88 (Site: Left Arm, Position: Sitting, Cuff Size: Medium Adult)   Pulse 84   Resp 18   Ht 5' 8\" (1.727 m)   Wt 252 lb 3.2 oz (114.4 kg)   SpO2 93%   BMI 38.35 kg/m²      Neck Circumference - 18.75    Mallampati - IV    Physical Exam   Constitutional: Patient appears moderately built and moderately nourished. In no acute distress. Head: Normocephalic and atraumatic. Mouth/Throat: Oropharynx is clear and moist.  No oral thrush. Neck: Neck supple. No tracheal deviation present. Cardiovascular: Regular rate, regular rhythm, S1 and S2 with no murmur. No peripheral edema  Pulmonary/Chest: Normal effort with clear breath sounds. No stridor. No respiratory distress. Abdominal: Soft. No distension or tenderness to palpation. Musculoskeletal: Moves all extremities  Lymphadenopathy:  No cervical adenopathy. Neurological: Patient is alert and oriented to person, place, and time. No focal deficits. Skin: Warm and dry. No clubbing or cyanosis. Test results   Lung Nodule Screening     [x] Qualifies    [] Does not qualify   [] Declined    [] Completed           MCCT test:3/28/16: Positive.         Assessment     1. Mild persistent asthma with acute exacerbation  FULL PFT STUDY WITH PRE AND POST    6 MIN WALK TEST    CT LUNG SCREENING    Creatinine, Serum   2. Panlobular emphysema (HCC)  FULL PFT STUDY WITH PRE AND POST    6 MIN WALK TEST    CT LUNG SCREENING    Creatinine, Serum   3. PARRISH (obstructive sleep apnea)     4. Lymphadenopathy  CT LUNG SCREENING   5. Lesion of vocal cord     6. SOB (shortness of breath)  FULL PFT STUDY WITH PRE AND POST    6 MIN WALK TEST    CT LUNG SCREENING   7. Personal history of nicotine dependence   CT LUNG SCREENING   8.  Nicotine dependence, cigarettes, in remission  MA VISIT TO DISCUSS LUNG CA SCREEN W LDCT    CT Lung Screening    Creatinine, Serum         Plan         Stop Symbicort  Start Trelegy daily with rinse and spit  Continue SILKE PRN  CT Lung screening  PFT  I strongly  Will see Yony Borden back in: 1 month    Electronically signed by Camilla Palacio CNP on 3/29/2018 at 8:14 AM'      Low Dose CT (LDCT) Lung Screening criteria met   Age 55-77   Pack year smoking >30   Still smoking or less than 15 year since quit   No sign or symptoms of lung cancer   > 11 months since last LDCT     Risks and benefits of lung cancer screening with LDCT scans discussed:    Significance of positive screen - False-positive LDCT results often occur. 95% of all positive results do not lead to a diagnosis of cancer. Usually further imaging can resolve most false-positive results; however, some patients may require invasive procedures. Over diagnosis risk - 10% to 12% of screen-detected lung cancer cases are over diagnosedthat is, the cancer would not have been detected in the patient's lifetime without the screening. Need for follow up screens annually to continue lung cancer screening effectiveness     Risks associated with radiation from annual LDCT- Radiation exposure is about the same as for a mammogram, which is about 1/3 of the annual background radiation exposure from everyday life. Starting screening at age 54 is not likely to increase cancer risk from radiation exposure. Patients with comorbidities resulting in life expectancy of < 10 years, or that would preclude treatment of an abnormality identified on CT, should not be screened due to lack of benefit.     To obtain maximal benefit from this screening, smoking cessation and long-term abstinence from smoking is critical

## 2018-04-11 ENCOUNTER — HOSPITAL ENCOUNTER (OUTPATIENT)
Dept: PULMONOLOGY | Age: 63
Discharge: HOME | End: 2018-04-11
Payer: MEDICARE

## 2018-04-11 ENCOUNTER — HOSPITAL ENCOUNTER (OUTPATIENT)
Dept: CT IMAGING | Age: 63
Discharge: HOME | End: 2018-04-11
Payer: MEDICARE

## 2018-04-11 DIAGNOSIS — J43.1 PANLOBULAR EMPHYSEMA (HCC): ICD-10-CM

## 2018-04-11 DIAGNOSIS — J45.31 MILD PERSISTENT ASTHMA WITH ACUTE EXACERBATION: ICD-10-CM

## 2018-04-11 DIAGNOSIS — R06.02 SOB (SHORTNESS OF BREATH): ICD-10-CM

## 2018-04-11 DIAGNOSIS — F17.211 NICOTINE DEPENDENCE, CIGARETTES, IN REMISSION: ICD-10-CM

## 2018-04-11 DIAGNOSIS — R59.1 LYMPHADENOPATHY: ICD-10-CM

## 2018-04-11 DIAGNOSIS — Z87.891 PERSONAL HISTORY OF NICOTINE DEPENDENCE: ICD-10-CM

## 2018-04-11 PROCEDURE — 94729 DIFFUSING CAPACITY: CPT

## 2018-04-11 PROCEDURE — 94726 PLETHYSMOGRAPHY LUNG VOLUMES: CPT

## 2018-04-11 PROCEDURE — G0297 LDCT FOR LUNG CA SCREEN: HCPCS

## 2018-04-11 PROCEDURE — 94060 EVALUATION OF WHEEZING: CPT

## 2018-04-16 ENCOUNTER — OFFICE VISIT (OUTPATIENT)
Dept: FAMILY MEDICINE CLINIC | Age: 63
End: 2018-04-16

## 2018-04-16 VITALS
BODY MASS INDEX: 37.83 KG/M2 | HEART RATE: 78 BPM | RESPIRATION RATE: 16 BRPM | DIASTOLIC BLOOD PRESSURE: 72 MMHG | HEIGHT: 68 IN | WEIGHT: 249.6 LBS | SYSTOLIC BLOOD PRESSURE: 128 MMHG

## 2018-04-16 DIAGNOSIS — K21.9 GASTROESOPHAGEAL REFLUX DISEASE, ESOPHAGITIS PRESENCE NOT SPECIFIED: ICD-10-CM

## 2018-04-16 DIAGNOSIS — M79.672 LEFT FOOT PAIN: ICD-10-CM

## 2018-04-16 DIAGNOSIS — E11.8 TYPE 2 DIABETES MELLITUS WITH COMPLICATION, WITHOUT LONG-TERM CURRENT USE OF INSULIN (HCC): Primary | ICD-10-CM

## 2018-04-16 DIAGNOSIS — E78.5 HYPERLIPIDEMIA, UNSPECIFIED HYPERLIPIDEMIA TYPE: ICD-10-CM

## 2018-04-16 LAB
GLUCOSE BLD-MCNC: 236 MG/DL
HBA1C MFR BLD: 7.1 %

## 2018-04-16 PROCEDURE — 82962 GLUCOSE BLOOD TEST: CPT | Performed by: FAMILY MEDICINE

## 2018-04-16 PROCEDURE — 99214 OFFICE O/P EST MOD 30 MIN: CPT | Performed by: FAMILY MEDICINE

## 2018-04-16 PROCEDURE — 83036 HEMOGLOBIN GLYCOSYLATED A1C: CPT | Performed by: FAMILY MEDICINE

## 2018-04-16 RX ORDER — SERTRALINE HYDROCHLORIDE 100 MG/1
TABLET, FILM COATED ORAL
Qty: 60 TABLET | Refills: 5 | Status: SHIPPED | OUTPATIENT
Start: 2018-04-16 | End: 2018-10-29 | Stop reason: SDUPTHER

## 2018-04-16 RX ORDER — RANITIDINE 150 MG/1
TABLET ORAL
Refills: 0 | COMMUNITY
Start: 2018-03-31 | End: 2018-05-22

## 2018-04-16 RX ORDER — BACLOFEN 10 MG/1
TABLET ORAL 2 TIMES DAILY PRN
Refills: 0 | COMMUNITY
Start: 2018-04-05

## 2018-04-16 ASSESSMENT — ENCOUNTER SYMPTOMS
COUGH: 0
ABDOMINAL PAIN: 0
VOMITING: 0
DIARRHEA: 0
CHEST TIGHTNESS: 0
CONSTIPATION: 0
WHEEZING: 0
EYES NEGATIVE: 1
SHORTNESS OF BREATH: 1
NAUSEA: 0
BLOOD IN STOOL: 0

## 2018-04-16 NOTE — PROGRESS NOTES
Visit Date: 4/16/2018    Corin Solano is a 57 y.o. male who presents today for:  Chief Complaint   Patient presents with    Diabetes blood sugars running in the in the 200's in the AM and in the PM in the high 200's    Hypertension stable       HPI:     HPI    has a current medication list which includes the following prescription(s): baclofen, ranitidine, sertraline, metformin, fluticasone-umeclidin-vilant, buspirone, simvastatin, onetouch delica lancets fine, glucose blood vi test strips, ropinirole, glimepiride, losartan, ventolin hfa, trazodone, zafirlukast, testosterone, aspirin, hydrocodone-acetaminophen, fish oil, and sildenafil. No Known Allergies    Social History   Substance Use Topics    Smoking status: Former Smoker     Packs/day: 0.75     Years: 42.00     Types: Cigarettes     Start date: 6/5/1971     Quit date: 3/3/2017    Smokeless tobacco: Never Used    Alcohol use No       Past Medical History:   Diagnosis Date    Acid reflux     Arthritis     Asthma     Chronic back pain     Colon polyps 11/06    COPD (chronic obstructive pulmonary disease) ([facility] [address]     Depression     panic attacks    Diverticulosis     HTN (hypertension)     Hyperlipidemia     Kidney disorder     Panic attack     Sleep apnea     Thumb amputation status 3/13/14    left tip of thumb amputated    Type II or unspecified type diabetes mellitus without mention of complication, not stated as uncontrolled        Past Surgical History:   Procedure Laterality Date    BACK SURGERY  2002    BACK SURGERY  08/11/2017    BICEPS TENDON REPAIR Right 08/16/2017    CARDIAC CATHETERIZATION  07/02 08/05    CARPAL TUNNEL RELEASE  2011    right hand    CHOLECYSTECTOMY  yrs ago    COLONOSCOPY  11/06 02/12 1/14    EYE SURGERY      foreign body removal    HERNIA REPAIR  2004    Dr Viv Mccauley  2008?     LARYNGOSCOPY  04/28/2016    Laryngoscopy Micro with Biopsy by Dr Burt Elliott 01/07    uppp    ROTATOR CUFF REPAIR Left 5-20-15    TONSILLECTOMY  1985    UPPER GASTROINTESTINAL ENDOSCOPY  1997    VEIN SURGERY Left September 2014    Dr. Rox Esqueda       Family History   Problem Relation Age of Onset    Cancer Mother     Breast Cancer Mother     Stroke Mother     Heart Disease Brother     Diabetes Brother     High Blood Pressure Brother     High Cholesterol Brother      Subjective:      Review of Systems   Constitutional: Negative for activity change, appetite change, diaphoresis and fever. HENT: Negative. Eyes: Negative. Respiratory: Positive for shortness of breath. Negative for cough, chest tightness and wheezing. He is having problems with SOB and was seen by Pulmonary and has a follow up on the 27 of this month. He was given samples of Trelegy and he states that since he has been using it he is not using his rescue as much as he was. Cardiovascular: Negative for chest pain, palpitations and leg swelling. Gastrointestinal: Negative for abdominal pain, blood in stool, constipation, diarrhea, nausea and vomiting. Genitourinary: Negative. Musculoskeletal: Positive for arthralgias and back pain. Skin: Negative. Negative for rash. Neurological: Negative. Negative for dizziness, syncope, weakness, light-headedness and headaches. Psychiatric/Behavioral: Negative. Objective:   /72 (Site: Right Arm, Position: Sitting, Cuff Size: Large Adult)   Pulse 78   Resp 16   Ht 5' 8\" (1.727 m)   Wt 249 lb 9.6 oz (113.2 kg)   BMI 37.95 kg/m²   Wt Readings from Last 3 Encounters:   04/16/18 249 lb 9.6 oz (113.2 kg)   03/29/18 252 lb 3.2 oz (114.4 kg)   01/22/18 243 lb (110.2 kg)     Physical Exam   Constitutional: He is oriented to person, place, and time. He appears well-developed and well-nourished. No distress. HENT:   Head: Normocephalic and atraumatic. Eyes: Conjunctivae and EOM are normal. Pupils are equal, round, and reactive to light.  Right eye exhibits no discharge. Left eye exhibits no discharge. No scleral icterus. Neck: Normal range of motion. Neck supple. No JVD present. No thyromegaly present. Cardiovascular: Normal rate, regular rhythm and normal heart sounds. No murmur heard. Pulmonary/Chest: Effort normal and breath sounds normal. No respiratory distress. He has no wheezes. He has no rhonchi. He has no rales. Abdominal: Soft. Bowel sounds are normal. He exhibits no distension and no mass. There is no hepatosplenomegaly. There is no tenderness. There is no rebound and no guarding. Musculoskeletal: He exhibits no edema. Left foot: There is tenderness. There is normal range of motion, no swelling, normal capillary refill, no crepitus, no deformity and no laceration. Lymphadenopathy:     He has no cervical adenopathy. Neurological: He is alert and oriented to person, place, and time. Skin: Skin is warm and dry. No rash noted. He is not diaphoretic. Psychiatric: He has a normal mood and affect. His behavior is normal.   Nursing note and vitals reviewed. Assessment:       Diagnosis Orders   1. Type 2 diabetes mellitus with complication, without long-term current use of insulin (HCC)  POCT Glucose    POCT glycosylated hemoglobin (Hb A1C)    Comprehensive Metabolic Panel    CBC with Differential   2. Left foot pain  XR FOOT LEFT (MIN 3 VIEWS)   3. Hyperlipidemia, unspecified hyperlipidemia type  Comprehensive Metabolic Panel    Lipid Panel   4.  Gastroesophageal reflux disease, esophagitis presence not specified         Plan:      Current Outpatient Prescriptions   Medication Sig Dispense Refill    baclofen (LIORESAL) 10 MG tablet take 1 tablet by mouth twice a day  0    ranitidine (ZANTAC) 150 MG tablet   0    sertraline (ZOLOFT) 100 MG tablet take 1 tablet by mouth twice a day 60 tablet 5    metFORMIN (GLUCOPHAGE) 500 MG tablet Take two tablets by mouth 2 times a day with meal. 120 tablet 5    Fluticasone-Umeclidin-Vilant (TRELEGY ELLIPTA) 100-62.5-25 MCG/INH AEPB Inhale 1 puff into the lungs daily 1 each 5    busPIRone (BUSPAR) 10 MG tablet Take 1 tablet by mouth 3 times daily 90 tablet 3    simvastatin (ZOCOR) 40 MG tablet Take 1 tablet by mouth nightly 30 tablet 5    ONETOUCH DELICA LANCETS FINE MISC Check blood sugar twice daily Dx: 250.00 200 each 1    glucose blood VI test strips (ASCENSIA AUTODISC VI;ONE TOUCH ULTRA TEST VI) strip One Touch Ultra Test Strip - Check blood sugar twice daily Dx: E11.9 200 each 1    rOPINIRole (REQUIP) 1 MG tablet Take 1 tablet by mouth 3 times daily 90 tablet 5    glimepiride (AMARYL) 2 MG tablet Take 1 tablet by mouth daily 90 tablet 1    losartan (COZAAR) 25 MG tablet Take 1 tablet by mouth daily 30 tablet 5    VENTOLIN  (90 Base) MCG/ACT inhaler Inhale 2 puffs into the lungs every 6 hours as needed for Wheezing 1 Inhaler 7    traZODone (DESYREL) 150 MG tablet take 1/2 to 1 tablet by mouth once daily at bedtime if needed for pain SPASM, OR SLEEP  0    zafirlukast (ACCOLATE) 20 MG tablet Take 1 tablet by mouth 2 times daily 60 tablet 11    Testosterone (ANDROGEL) 20.25 MG/ACT (1.62%) GEL gel Place 1 Squirt onto the skin every morning (before breakfast) 1 Bottle 5    aspirin 81 MG tablet Take 81 mg by mouth daily      HYDROcodone-acetaminophen (NORCO) 5-325 MG per tablet Take 1 tablet by mouth every 6 hours as needed for Pain      Omega-3 Fatty Acids (FISH OIL) 1000 MG CAPS Take 1,000 mg by mouth daily.  sildenafil (VIAGRA) 100 MG tablet Take 1 tablet by mouth as needed. 8 tablet 5     No current facility-administered medications for this visit.       Orders Placed This Encounter   Procedures    XR FOOT LEFT (MIN 3 VIEWS)     Standing Status:   Future     Standing Expiration Date:   4/17/2019     Order Specific Question:   Reason for exam:     Answer:   pain    Comprehensive Metabolic Panel     Standing Status:   Future     Standing Expiration Date:   4/16/2019    CBC with Differential     Standing Status:   Future     Standing Expiration Date:   4/16/2019    Lipid Panel     Standing Status:   Future     Standing Expiration Date:   4/16/2019     Order Specific Question:   Is Patient Fasting?/# of Hours     Answer:   yes 12 hours    POCT Glucose    POCT glycosylated hemoglobin (Hb A1C)     Lab Results   Component Value Date    LABA1C 7.1 04/16/2018    LABA1C 7.6 01/12/2018    LABA1C 7.0 09/13/2017     Lab Results   Component Value Date    LABMICR 2.74 11/04/2016     Results for POC orders placed in visit on 04/16/18   POCT glycosylated hemoglobin (Hb A1C)   Result Value Ref Range    Hemoglobin A1C 7.1 %   POCT Glucose   Result Value Ref Range    Glucose 236 mg/dL       Continue to monitor blood sugars 1 times a day. Keep log of blood sugars and bring with you to the next appointment. Discussed use, benefit, and side effects of prescribed medications. All patient questions answered. Pt voiced understanding. Instructed to continue current medications, ADA diet and exercise. Patient agreed with treatment plan. Return in about 1 week (around 4/23/2018) for DM.

## 2018-04-17 ENCOUNTER — NURSE ONLY (OUTPATIENT)
Dept: FAMILY MEDICINE CLINIC | Age: 63
End: 2018-04-17
Payer: MEDICARE

## 2018-04-17 DIAGNOSIS — E11.8 TYPE 2 DIABETES MELLITUS WITH COMPLICATION, WITHOUT LONG-TERM CURRENT USE OF INSULIN (HCC): ICD-10-CM

## 2018-04-17 DIAGNOSIS — E78.5 HYPERLIPIDEMIA, UNSPECIFIED HYPERLIPIDEMIA TYPE: ICD-10-CM

## 2018-04-17 LAB
ALBUMIN SERPL-MCNC: 4.5 G/DL (ref 3.5–5.1)
ALP BLD-CCNC: 38 U/L (ref 38–126)
ALT SERPL-CCNC: 28 U/L (ref 11–66)
ANION GAP SERPL CALCULATED.3IONS-SCNC: 17 MEQ/L (ref 8–16)
AST SERPL-CCNC: 26 U/L (ref 5–40)
BILIRUB SERPL-MCNC: 0.3 MG/DL (ref 0.3–1.2)
BUN BLDV-MCNC: 19 MG/DL (ref 7–22)
CALCIUM SERPL-MCNC: 9.6 MG/DL (ref 8.5–10.5)
CHLORIDE BLD-SCNC: 98 MEQ/L (ref 98–111)
CHOLESTEROL, TOTAL: 129 MG/DL (ref 100–199)
CO2: 26 MEQ/L (ref 23–33)
CREAT SERPL-MCNC: 0.8 MG/DL (ref 0.4–1.2)
GFR SERPL CREATININE-BSD FRML MDRD: > 90 ML/MIN/1.73M2
GLUCOSE BLD-MCNC: 170 MG/DL (ref 70–108)
HDLC SERPL-MCNC: 45 MG/DL
LDL CHOLESTEROL CALCULATED: 59 MG/DL
POTASSIUM SERPL-SCNC: 4.5 MEQ/L (ref 3.5–5.2)
SODIUM BLD-SCNC: 141 MEQ/L (ref 135–145)
TOTAL PROTEIN: 7.4 G/DL (ref 6.1–8)
TRIGL SERPL-MCNC: 126 MG/DL (ref 0–199)

## 2018-04-17 PROCEDURE — 36415 COLL VENOUS BLD VENIPUNCTURE: CPT | Performed by: NURSE PRACTITIONER

## 2018-04-23 ENCOUNTER — OFFICE VISIT (OUTPATIENT)
Dept: FAMILY MEDICINE CLINIC | Age: 63
End: 2018-04-23

## 2018-04-23 VITALS
WEIGHT: 250 LBS | BODY MASS INDEX: 37.89 KG/M2 | HEIGHT: 68 IN | HEART RATE: 78 BPM | RESPIRATION RATE: 16 BRPM | DIASTOLIC BLOOD PRESSURE: 64 MMHG | SYSTOLIC BLOOD PRESSURE: 122 MMHG

## 2018-04-23 DIAGNOSIS — M79.672 LEFT FOOT PAIN: ICD-10-CM

## 2018-04-23 DIAGNOSIS — E11.8 TYPE 2 DIABETES MELLITUS WITH COMPLICATION, WITHOUT LONG-TERM CURRENT USE OF INSULIN (HCC): Primary | ICD-10-CM

## 2018-04-23 PROCEDURE — 99213 OFFICE O/P EST LOW 20 MIN: CPT | Performed by: FAMILY MEDICINE

## 2018-04-23 RX ORDER — GLIMEPIRIDE 2 MG/1
2 TABLET ORAL 2 TIMES DAILY
Qty: 60 TABLET | Refills: 5 | Status: SHIPPED | OUTPATIENT
Start: 2018-04-23 | End: 2018-11-15 | Stop reason: SDUPTHER

## 2018-04-23 ASSESSMENT — ENCOUNTER SYMPTOMS
ABDOMINAL PAIN: 0
CONSTIPATION: 0
COUGH: 0
BACK PAIN: 1
DIARRHEA: 0
VOMITING: 0
CHEST TIGHTNESS: 0
EYES NEGATIVE: 1
SHORTNESS OF BREATH: 0
NAUSEA: 0
BLOOD IN STOOL: 0

## 2018-04-27 ENCOUNTER — OFFICE VISIT (OUTPATIENT)
Dept: PULMONOLOGY | Age: 63
End: 2018-04-27
Payer: MEDICARE

## 2018-04-27 VITALS
DIASTOLIC BLOOD PRESSURE: 78 MMHG | BODY MASS INDEX: 38.52 KG/M2 | OXYGEN SATURATION: 92 % | HEART RATE: 75 BPM | RESPIRATION RATE: 16 BRPM | HEIGHT: 68 IN | SYSTOLIC BLOOD PRESSURE: 126 MMHG | WEIGHT: 254.2 LBS | TEMPERATURE: 97.9 F

## 2018-04-27 DIAGNOSIS — J45.20 MILD INTERMITTENT ASTHMA WITHOUT COMPLICATION: Primary | ICD-10-CM

## 2018-04-27 DIAGNOSIS — J43.2 CENTRILOBULAR EMPHYSEMA (HCC): ICD-10-CM

## 2018-04-27 DIAGNOSIS — J47.9 BRONCHIECTASIS WITHOUT COMPLICATION (HCC): ICD-10-CM

## 2018-04-27 DIAGNOSIS — J84.9 ILD (INTERSTITIAL LUNG DISEASE) (HCC): ICD-10-CM

## 2018-04-27 DIAGNOSIS — F17.211 NICOTINE DEPENDENCE, CIGARETTES, IN REMISSION: ICD-10-CM

## 2018-04-27 PROCEDURE — G8417 CALC BMI ABV UP PARAM F/U: HCPCS | Performed by: NURSE PRACTITIONER

## 2018-04-27 PROCEDURE — 1036F TOBACCO NON-USER: CPT | Performed by: NURSE PRACTITIONER

## 2018-04-27 PROCEDURE — G8427 DOCREV CUR MEDS BY ELIG CLIN: HCPCS | Performed by: NURSE PRACTITIONER

## 2018-04-27 PROCEDURE — 3023F SPIROM DOC REV: CPT | Performed by: NURSE PRACTITIONER

## 2018-04-27 PROCEDURE — 99214 OFFICE O/P EST MOD 30 MIN: CPT | Performed by: NURSE PRACTITIONER

## 2018-04-27 PROCEDURE — 3017F COLORECTAL CA SCREEN DOC REV: CPT | Performed by: NURSE PRACTITIONER

## 2018-04-27 PROCEDURE — 99999 PR VISIT TO DISCUSS LUNG CA SCREEN W LDCT: CPT | Performed by: NURSE PRACTITIONER

## 2018-04-27 PROCEDURE — G8926 SPIRO NO PERF OR DOC: HCPCS | Performed by: NURSE PRACTITIONER

## 2018-05-10 ENCOUNTER — TELEPHONE (OUTPATIENT)
Dept: FAMILY MEDICINE CLINIC | Age: 63
End: 2018-05-10

## 2018-05-10 RX ORDER — CEFDINIR 300 MG/1
300 CAPSULE ORAL 2 TIMES DAILY
Qty: 20 CAPSULE | Refills: 0 | Status: SHIPPED | OUTPATIENT
Start: 2018-05-10 | End: 2018-05-20

## 2018-05-21 DIAGNOSIS — J44.9 COPD, MILD (HCC): ICD-10-CM

## 2018-05-21 DIAGNOSIS — J45.41 MODERATE PERSISTENT ASTHMA WITH ACUTE EXACERBATION: ICD-10-CM

## 2018-05-22 ENCOUNTER — OFFICE VISIT (OUTPATIENT)
Dept: UROLOGY | Age: 63
End: 2018-05-22
Payer: MEDICARE

## 2018-05-22 VITALS
HEIGHT: 67 IN | BODY MASS INDEX: 38.45 KG/M2 | SYSTOLIC BLOOD PRESSURE: 128 MMHG | WEIGHT: 245 LBS | DIASTOLIC BLOOD PRESSURE: 78 MMHG

## 2018-05-22 DIAGNOSIS — J44.9 COPD, MILD (HCC): ICD-10-CM

## 2018-05-22 DIAGNOSIS — N40.1 BENIGN PROSTATIC HYPERPLASIA WITH LOWER URINARY TRACT SYMPTOMS, SYMPTOM DETAILS UNSPECIFIED: ICD-10-CM

## 2018-05-22 DIAGNOSIS — J45.41 MODERATE PERSISTENT ASTHMA WITH ACUTE EXACERBATION: ICD-10-CM

## 2018-05-22 DIAGNOSIS — N52.9 ERECTILE DYSFUNCTION, UNSPECIFIED ERECTILE DYSFUNCTION TYPE: ICD-10-CM

## 2018-05-22 DIAGNOSIS — E29.1 HYPOGONADISM IN MALE: Primary | ICD-10-CM

## 2018-05-22 LAB
BILIRUBIN URINE: ABNORMAL
BLOOD URINE, POC: NEGATIVE
CHARACTER, URINE: CLEAR
COLOR, URINE: YELLOW
GLUCOSE URINE: NEGATIVE MG/DL
KETONES, URINE: ABNORMAL
LEUKOCYTE CLUMPS, URINE: NEGATIVE
NITRITE, URINE: NEGATIVE
PH, URINE: 5.5
POST VOID RESIDUAL (PVR): 42 ML
PROTEIN, URINE: 100 MG/DL
SPECIFIC GRAVITY, URINE: >= 1.03 (ref 1–1.03)
UROBILINOGEN, URINE: 0.2 EU/DL

## 2018-05-22 PROCEDURE — 51798 US URINE CAPACITY MEASURE: CPT | Performed by: NURSE PRACTITIONER

## 2018-05-22 PROCEDURE — G8427 DOCREV CUR MEDS BY ELIG CLIN: HCPCS | Performed by: NURSE PRACTITIONER

## 2018-05-22 PROCEDURE — 1123F ACP DISCUSS/DSCN MKR DOCD: CPT | Performed by: NURSE PRACTITIONER

## 2018-05-22 PROCEDURE — 1036F TOBACCO NON-USER: CPT | Performed by: NURSE PRACTITIONER

## 2018-05-22 PROCEDURE — G8417 CALC BMI ABV UP PARAM F/U: HCPCS | Performed by: NURSE PRACTITIONER

## 2018-05-22 PROCEDURE — 81003 URINALYSIS AUTO W/O SCOPE: CPT | Performed by: NURSE PRACTITIONER

## 2018-05-22 PROCEDURE — 3017F COLORECTAL CA SCREEN DOC REV: CPT | Performed by: NURSE PRACTITIONER

## 2018-05-22 PROCEDURE — 99213 OFFICE O/P EST LOW 20 MIN: CPT | Performed by: NURSE PRACTITIONER

## 2018-05-22 PROCEDURE — 4040F PNEUMOC VAC/ADMIN/RCVD: CPT | Performed by: NURSE PRACTITIONER

## 2018-05-22 RX ORDER — TESTOSTERONE CYPIONATE 200 MG/ML
INJECTION INTRAMUSCULAR
Qty: 2 ML | Refills: 3 | Status: SHIPPED | OUTPATIENT
Start: 2018-05-22 | End: 2018-09-19 | Stop reason: SDUPTHER

## 2018-05-22 RX ORDER — ZAFIRLUKAST 20 MG/1
20 TABLET, FILM COATED ORAL 2 TIMES DAILY
Qty: 60 TABLET | Refills: 11 | Status: SHIPPED | OUTPATIENT
Start: 2018-05-22 | End: 2018-05-22 | Stop reason: SDUPTHER

## 2018-05-22 RX ORDER — ZAFIRLUKAST 20 MG/1
TABLET, FILM COATED ORAL
Qty: 180 TABLET | Refills: 11 | Status: SHIPPED | OUTPATIENT
Start: 2018-05-22 | End: 2019-01-25

## 2018-06-01 ENCOUNTER — TELEPHONE (OUTPATIENT)
Dept: ENT CLINIC | Age: 63
End: 2018-06-01

## 2018-06-01 RX ORDER — RANITIDINE 150 MG/1
150 TABLET ORAL 2 TIMES DAILY
Qty: 60 TABLET | Refills: 2 | Status: SHIPPED | OUTPATIENT
Start: 2018-06-01 | End: 2018-07-16 | Stop reason: ALTCHOICE

## 2018-06-07 ENCOUNTER — OFFICE VISIT (OUTPATIENT)
Dept: ENT CLINIC | Age: 63
End: 2018-06-07
Payer: MEDICARE

## 2018-06-07 VITALS
DIASTOLIC BLOOD PRESSURE: 74 MMHG | HEIGHT: 68 IN | TEMPERATURE: 98 F | HEART RATE: 80 BPM | BODY MASS INDEX: 37.28 KG/M2 | SYSTOLIC BLOOD PRESSURE: 128 MMHG | RESPIRATION RATE: 12 BRPM | WEIGHT: 246 LBS

## 2018-06-07 DIAGNOSIS — E04.1 LEFT THYROID NODULE: Primary | ICD-10-CM

## 2018-06-07 DIAGNOSIS — K21.9 GASTROESOPHAGEAL REFLUX DISEASE, ESOPHAGITIS PRESENCE NOT SPECIFIED: ICD-10-CM

## 2018-06-07 PROCEDURE — 99212 OFFICE O/P EST SF 10 MIN: CPT | Performed by: OTOLARYNGOLOGY

## 2018-06-07 PROCEDURE — 1036F TOBACCO NON-USER: CPT | Performed by: OTOLARYNGOLOGY

## 2018-06-07 PROCEDURE — 4040F PNEUMOC VAC/ADMIN/RCVD: CPT | Performed by: OTOLARYNGOLOGY

## 2018-06-07 PROCEDURE — 3017F COLORECTAL CA SCREEN DOC REV: CPT | Performed by: OTOLARYNGOLOGY

## 2018-06-07 PROCEDURE — 1123F ACP DISCUSS/DSCN MKR DOCD: CPT | Performed by: OTOLARYNGOLOGY

## 2018-06-07 PROCEDURE — G8417 CALC BMI ABV UP PARAM F/U: HCPCS | Performed by: OTOLARYNGOLOGY

## 2018-06-07 PROCEDURE — G8427 DOCREV CUR MEDS BY ELIG CLIN: HCPCS | Performed by: OTOLARYNGOLOGY

## 2018-06-07 ASSESSMENT — ENCOUNTER SYMPTOMS
TROUBLE SWALLOWING: 0
COUGH: 0
SORE THROAT: 0
STRIDOR: 0
RHINORRHEA: 0
VOMITING: 0
CHEST TIGHTNESS: 0
WHEEZING: 0
ABDOMINAL PAIN: 0
CHOKING: 0
VOICE CHANGE: 0
FACIAL SWELLING: 0
SHORTNESS OF BREATH: 0
SINUS PRESSURE: 0
APNEA: 0
COLOR CHANGE: 0
NAUSEA: 0
DIARRHEA: 0

## 2018-06-21 ENCOUNTER — NURSE ONLY (OUTPATIENT)
Dept: FAMILY MEDICINE CLINIC | Age: 63
End: 2018-06-21
Payer: MEDICARE

## 2018-06-21 DIAGNOSIS — E29.1 HYPOGONADISM IN MALE: ICD-10-CM

## 2018-06-21 PROCEDURE — 36415 COLL VENOUS BLD VENIPUNCTURE: CPT | Performed by: NURSE PRACTITIONER

## 2018-06-24 LAB — TESTOSTERONE TOTAL: 786 NG/DL (ref 300–720)

## 2018-07-16 ENCOUNTER — TELEPHONE (OUTPATIENT)
Dept: PULMONOLOGY | Age: 63
End: 2018-07-16

## 2018-07-16 ENCOUNTER — OFFICE VISIT (OUTPATIENT)
Dept: PULMONOLOGY | Age: 63
End: 2018-07-16
Payer: MEDICARE

## 2018-07-16 VITALS
BODY MASS INDEX: 37.31 KG/M2 | OXYGEN SATURATION: 91 % | TEMPERATURE: 98.4 F | HEIGHT: 68 IN | SYSTOLIC BLOOD PRESSURE: 138 MMHG | WEIGHT: 246.2 LBS | HEART RATE: 92 BPM | DIASTOLIC BLOOD PRESSURE: 78 MMHG

## 2018-07-16 DIAGNOSIS — R06.02 SOB (SHORTNESS OF BREATH): ICD-10-CM

## 2018-07-16 DIAGNOSIS — Z91.199 MEDICAL NON-COMPLIANCE: ICD-10-CM

## 2018-07-16 DIAGNOSIS — F41.9 ANXIETY: ICD-10-CM

## 2018-07-16 DIAGNOSIS — J45.40 MODERATE PERSISTENT ASTHMA, UNSPECIFIED WHETHER COMPLICATED: Primary | ICD-10-CM

## 2018-07-16 DIAGNOSIS — G47.33 OSA (OBSTRUCTIVE SLEEP APNEA): ICD-10-CM

## 2018-07-16 DIAGNOSIS — R09.02 HYPOXEMIA: ICD-10-CM

## 2018-07-16 DIAGNOSIS — J45.40 MODERATE PERSISTENT ASTHMA, UNSPECIFIED WHETHER COMPLICATED: ICD-10-CM

## 2018-07-16 PROCEDURE — 1036F TOBACCO NON-USER: CPT | Performed by: NURSE PRACTITIONER

## 2018-07-16 PROCEDURE — G8417 CALC BMI ABV UP PARAM F/U: HCPCS | Performed by: NURSE PRACTITIONER

## 2018-07-16 PROCEDURE — 3017F COLORECTAL CA SCREEN DOC REV: CPT | Performed by: NURSE PRACTITIONER

## 2018-07-16 PROCEDURE — G8427 DOCREV CUR MEDS BY ELIG CLIN: HCPCS | Performed by: NURSE PRACTITIONER

## 2018-07-16 PROCEDURE — 1123F ACP DISCUSS/DSCN MKR DOCD: CPT | Performed by: NURSE PRACTITIONER

## 2018-07-16 PROCEDURE — 1101F PT FALLS ASSESS-DOCD LE1/YR: CPT | Performed by: NURSE PRACTITIONER

## 2018-07-16 PROCEDURE — 94618 PULMONARY STRESS TESTING: CPT | Performed by: NURSE PRACTITIONER

## 2018-07-16 PROCEDURE — 4040F PNEUMOC VAC/ADMIN/RCVD: CPT | Performed by: NURSE PRACTITIONER

## 2018-07-16 PROCEDURE — 99214 OFFICE O/P EST MOD 30 MIN: CPT | Performed by: NURSE PRACTITIONER

## 2018-07-16 RX ORDER — ALBUTEROL SULFATE 2.5 MG/3ML
2.5 SOLUTION RESPIRATORY (INHALATION) EVERY 6 HOURS PRN
Qty: 120 PACKAGE | Refills: 11 | Status: SHIPPED | OUTPATIENT
Start: 2018-07-16 | End: 2019-01-25 | Stop reason: ALTCHOICE

## 2018-07-16 RX ORDER — PREDNISONE 10 MG/1
TABLET ORAL
Qty: 30 TABLET | Refills: 0 | Status: SHIPPED | OUTPATIENT
Start: 2018-07-16 | End: 2018-08-13 | Stop reason: ALTCHOICE

## 2018-07-16 RX ORDER — IPRATROPIUM BROMIDE AND ALBUTEROL SULFATE 2.5; .5 MG/3ML; MG/3ML
1 SOLUTION RESPIRATORY (INHALATION) EVERY 4 HOURS
COMMUNITY
End: 2018-07-16 | Stop reason: ALTCHOICE

## 2018-07-16 NOTE — PROGRESS NOTES
(DEPOTESTOTERONE CYPIONATE) 200 MG/ML injection Inject 1 ml into the muscle every 14 days. 2 mL 3    SYRINGE-NEEDLE, DISP, 3 ML 23G X 1-1/2\" 3 ML MISC Inject 1 Syringe into the muscle every 14 days 10 each 1    glimepiride (AMARYL) 2 MG tablet Take 1 tablet by mouth 2 times daily 60 tablet 5    baclofen (LIORESAL) 10 MG tablet take 1 tablet by mouth twice a day  0    sertraline (ZOLOFT) 100 MG tablet take 1 tablet by mouth twice a day 60 tablet 5    metFORMIN (GLUCOPHAGE) 500 MG tablet Take two tablets by mouth 2 times a day with meal. 120 tablet 5    Fluticasone-Umeclidin-Vilant (TRELEGY ELLIPTA) 100-62.5-25 MCG/INH AEPB Inhale 1 puff into the lungs daily 1 each 5    busPIRone (BUSPAR) 10 MG tablet Take 1 tablet by mouth 3 times daily 90 tablet 3    simvastatin (ZOCOR) 40 MG tablet Take 1 tablet by mouth nightly 30 tablet 5    ONETOUCH DELICA LANCETS FINE MISC Check blood sugar twice daily Dx: 250.00 200 each 1    glucose blood VI test strips (ASCENSIA AUTODISC VI;ONE TOUCH ULTRA TEST VI) strip One Touch Ultra Test Strip - Check blood sugar twice daily Dx: E11.9 200 each 1    rOPINIRole (REQUIP) 1 MG tablet Take 1 tablet by mouth 3 times daily 90 tablet 5    losartan (COZAAR) 25 MG tablet Take 1 tablet by mouth daily 30 tablet 5    traZODone (DESYREL) 150 MG tablet take 1/2 to 1 tablet by mouth once daily at bedtime if needed for pain SPASM, OR SLEEP  0    aspirin 81 MG tablet Take 81 mg by mouth daily      HYDROcodone-acetaminophen (NORCO) 5-325 MG per tablet Take 1 tablet by mouth every 6 hours as needed for Pain      Omega-3 Fatty Acids (FISH OIL) 1000 MG CAPS Take 1,000 mg by mouth daily.  sildenafil (VIAGRA) 100 MG tablet Take 1 tablet by mouth as needed. 8 tablet 5     No current facility-administered medications for this visit. Brandon Quiet ROS   General/Constitutional: No recent loss of weight or appetite changes. No fever or chills. HENT: Negative. Eyes: Negative.   Upper respiratory tract: No nasal stuffiness or post nasal drip. Lower respiratory tract/ lungs: Frequent cough with minimal sputum production. No hemoptysis. Cardiovascular: No palpitations or chest pain. Gastrointestinal: No nausea or vomiting. Neurological: No focal neurologiacal weakness. Extremities: No edema. Musculoskeletal: No complaints. Genitourinary: No complaints. Hematological: Negative. Psychiatric/Behavioral: Negative. Skin: No itching. Physical exam   /78   Pulse 92   Temp 98.4 °F (36.9 °C)   Ht 5' 8\" (1.727 m)   Wt 246 lb 3.2 oz (111.7 kg)   SpO2 91% Comment: room air at rest  BMI 37.43 kg/m²      Neck Circumference -     Mallampati -     Physical Exam   Constitutional: Patient appears moderately built and moderately nourished. In no acute distress. Head: Normocephalic and atraumatic. Mouth/Throat: Oropharynx is clear and moist.  No oral thrush. Neck: Neck supple. No tracheal deviation present. Cardiovascular: Regular rate, regular rhythm, S1 and S2 with no murmur. No peripheral edema  Pulmonary/Chest: Normal effort with clear breath sounds. No stridor. No respiratory distress. Abdominal: Soft. No distension or tenderness to palpation. Musculoskeletal: Moves all extremities  Lymphadenopathy:  No cervical adenopathy. Neurological: Patient is alert and oriented to person, place, and time. No focal deficits. Skin: Warm and dry. No clubbing or cyanosis. Test results   Lung Nodule Screening     [] Qualifies    [] Does not qualify   [] Declined    [x] Completed          CT Lung screening 4/11/18   Lungs: There is diffuse interstitial pattern of the lungs. There are centrilobular emphysematous changes which are somewhat upper lobe predominant. Bronchiectatic changes are present rather diffusely. No lung nodules are seen other than a calcified    granuloma left lower lung.                         MCCT test:3/28/16: Positive.            Assessment      Diagnosis Orders   1.

## 2018-07-16 NOTE — TELEPHONE ENCOUNTER
The pharmacy called to clarify the order for albuterol, the solution that you ordered needs to be mixed by the patient. Is this what you wanted or did you mean to order the already mixed solution? Please fax a new order to [facility] if so.

## 2018-07-17 ENCOUNTER — TELEPHONE (OUTPATIENT)
Dept: PULMONOLOGY | Age: 63
End: 2018-07-17

## 2018-07-17 RX ORDER — DOXYCYCLINE 100 MG/1
100 CAPSULE ORAL 2 TIMES DAILY
Qty: 14 CAPSULE | Refills: 0 | Status: SHIPPED | OUTPATIENT
Start: 2018-07-17 | End: 2018-07-17 | Stop reason: SDUPTHER

## 2018-07-17 RX ORDER — DOXYCYCLINE 100 MG/1
100 CAPSULE ORAL 2 TIMES DAILY
Qty: 14 CAPSULE | Refills: 0 | Status: SHIPPED | OUTPATIENT
Start: 2018-07-17 | End: 2018-12-05 | Stop reason: ALTCHOICE

## 2018-07-17 NOTE — TELEPHONE ENCOUNTER
Called patient, it is supposed to go to [facility] on [facility]. Denial letter is in media. It is being billed under part D instead of part B.  I faxed the pharmacy and informed them

## 2018-07-23 ENCOUNTER — OFFICE VISIT (OUTPATIENT)
Dept: FAMILY MEDICINE CLINIC | Age: 63
End: 2018-07-23

## 2018-07-23 VITALS
HEIGHT: 68 IN | DIASTOLIC BLOOD PRESSURE: 80 MMHG | HEART RATE: 76 BPM | BODY MASS INDEX: 37.56 KG/M2 | SYSTOLIC BLOOD PRESSURE: 150 MMHG | RESPIRATION RATE: 16 BRPM | WEIGHT: 247.8 LBS

## 2018-07-23 DIAGNOSIS — E11.9 TYPE 2 DIABETES MELLITUS WITHOUT COMPLICATION, WITHOUT LONG-TERM CURRENT USE OF INSULIN (HCC): Primary | ICD-10-CM

## 2018-07-23 DIAGNOSIS — Z23 IMMUNIZATION DUE: ICD-10-CM

## 2018-07-23 DIAGNOSIS — M54.50 CHRONIC BILATERAL LOW BACK PAIN WITHOUT SCIATICA: ICD-10-CM

## 2018-07-23 DIAGNOSIS — G89.29 CHRONIC BILATERAL LOW BACK PAIN WITHOUT SCIATICA: ICD-10-CM

## 2018-07-23 DIAGNOSIS — E78.5 HYPERLIPIDEMIA, UNSPECIFIED HYPERLIPIDEMIA TYPE: ICD-10-CM

## 2018-07-23 DIAGNOSIS — J43.1 PANLOBULAR EMPHYSEMA (HCC): ICD-10-CM

## 2018-07-23 DIAGNOSIS — I10 ESSENTIAL HYPERTENSION: ICD-10-CM

## 2018-07-23 LAB
GLUCOSE BLD-MCNC: 230 MG/DL
HBA1C MFR BLD: 7.2 %

## 2018-07-23 PROCEDURE — 1101F PT FALLS ASSESS-DOCD LE1/YR: CPT | Performed by: FAMILY MEDICINE

## 2018-07-23 PROCEDURE — 83036 HEMOGLOBIN GLYCOSYLATED A1C: CPT | Performed by: FAMILY MEDICINE

## 2018-07-23 PROCEDURE — 82962 GLUCOSE BLOOD TEST: CPT | Performed by: FAMILY MEDICINE

## 2018-07-23 PROCEDURE — 99213 OFFICE O/P EST LOW 20 MIN: CPT | Performed by: FAMILY MEDICINE

## 2018-07-23 RX ORDER — HYDROXYZINE HYDROCHLORIDE 10 MG/1
10 TABLET, FILM COATED ORAL DAILY
COMMUNITY
Start: 2018-07-10 | End: 2019-06-10 | Stop reason: ALTCHOICE

## 2018-07-23 RX ORDER — LOSARTAN POTASSIUM 25 MG/1
25 TABLET ORAL DAILY
Qty: 30 TABLET | Refills: 5 | Status: SHIPPED | OUTPATIENT
Start: 2018-07-23 | End: 2019-02-06 | Stop reason: SDUPTHER

## 2018-07-23 RX ORDER — ROPINIROLE 1 MG/1
1 TABLET, FILM COATED ORAL 3 TIMES DAILY
Qty: 90 TABLET | Refills: 5 | Status: SHIPPED | OUTPATIENT
Start: 2018-07-23 | End: 2019-02-06 | Stop reason: SDUPTHER

## 2018-07-23 RX ORDER — TRIAMCINOLONE ACETONIDE 1 MG/G
CREAM TOPICAL
Refills: 1 | Status: ON HOLD | COMMUNITY
Start: 2018-06-05 | End: 2019-04-05 | Stop reason: ALTCHOICE

## 2018-07-23 RX ORDER — BUSPIRONE HYDROCHLORIDE 10 MG/1
10 TABLET ORAL 3 TIMES DAILY
Qty: 90 TABLET | Refills: 3 | Status: SHIPPED | OUTPATIENT
Start: 2018-07-23 | End: 2018-10-29 | Stop reason: SDUPTHER

## 2018-07-23 RX ORDER — SIMVASTATIN 40 MG
40 TABLET ORAL NIGHTLY
Qty: 30 TABLET | Refills: 5 | Status: SHIPPED | OUTPATIENT
Start: 2018-07-23 | End: 2018-10-29 | Stop reason: SDUPTHER

## 2018-07-23 ASSESSMENT — ENCOUNTER SYMPTOMS
EYES NEGATIVE: 1
COUGH: 0
DIARRHEA: 0
SHORTNESS OF BREATH: 0
NAUSEA: 0
CHEST TIGHTNESS: 0
CONSTIPATION: 0
ABDOMINAL PAIN: 0
BLOOD IN STOOL: 0
VOMITING: 0

## 2018-07-23 NOTE — LETTER
[facility]  [address],[facility] [id number]  Phone: [phone]  Fax: [phone]    Susana Delgadillo MD        July 23, 2018     Patient: Gwen Serrano   YOB: 1955   Date of Visit: 7/23/2018       To Whom It May Concern: It is my medical opinion that Ada Tello requires a disability parking placard for the following reasons:  He has limited walking ability due to an arthritic condition. Duration of need: 5 years    If you have any questions or concerns, please don't hesitate to call.     Sincerely,        Susana Delgadillo MD

## 2018-07-23 NOTE — PROGRESS NOTES
Visit Date: 7/23/2018  Here with his wife  Levar Mcdonough is a 70 y.o. male who presents today for:  Chief Complaint   Patient presents with    Diabetes no log of sugars    Hypertension stable       HPI:     HPI    has a current medication list which includes the following prescription(s): hydroxyzine, triamcinolone, losartan, buspirone, simvastatin, ropinirole, doxycycline monohydrate, ventolin hfa, prednisone, albuterol, zafirlukast, syringe-needle (disp) 3 ml, glimepiride, baclofen, sertraline, metformin, fluticasone-umeclidin-vilant, onetouch delica lancets fine, blood glucose test strips, trazodone, aspirin, hydrocodone-acetaminophen, fish oil, sildenafil, and testosterone cypionate. No Known Allergies    Social History   Substance Use Topics    Smoking status: Former Smoker     Packs/day: 0.75     Years: 42.00     Types: Cigarettes     Start date: 6/5/1971     Quit date: 3/3/2017    Smokeless tobacco: Never Used      Comment: pts uses just nicotine vap    Alcohol use No       Past Medical History:   Diagnosis Date    Acid reflux     Arthritis     Asthma     Chronic back pain     Colon polyps 11/06    COPD (chronic obstructive pulmonary disease) (HCC)     Depression     panic attacks    Diverticulosis     HTN (hypertension)     Hyperlipidemia     Kidney disorder     Panic attack     Sleep apnea     Thumb amputation status 3/13/14    left tip of thumb amputated    Type II or unspecified type diabetes mellitus without mention of complication, not stated as uncontrolled        Past Surgical History:   Procedure Laterality Date    BACK SURGERY  2002    BACK SURGERY  08/11/2017    BICEPS TENDON REPAIR Right 08/16/2017    CARDIAC CATHETERIZATION  07/02 08/05    CARPAL TUNNEL RELEASE  2011    right hand    CHOLECYSTECTOMY  yrs ago    COLONOSCOPY  11/06 02/12 1/14    EYE SURGERY      foreign body removal    HERNIA REPAIR  2004    Dr Mervat Candelario  2008?     mouth daily      HYDROcodone-acetaminophen (NORCO) 5-325 MG per tablet Take 1 tablet by mouth every 6 hours as needed for Pain      Omega-3 Fatty Acids (FISH OIL) 1000 MG CAPS Take 1,000 mg by mouth daily.  sildenafil (VIAGRA) 100 MG tablet Take 1 tablet by mouth as needed. 8 tablet 5    testosterone cypionate (DEPOTESTOTERONE CYPIONATE) 200 MG/ML injection Inject 1 ml into the muscle every 14 days. 2 mL 3     No current facility-administered medications for this visit. Orders Placed This Encounter   Procedures    POCT Glucose    POCT glycosylated hemoglobin (Hb A1C)     DIABETES FOOT EXAM     Lab Results   Component Value Date    LABA1C 7.2 07/23/2018    LABA1C 7.1 04/16/2018    LABA1C 7.6 01/12/2018     Lab Results   Component Value Date    LABMICR 2.74 11/04/2016     Results for POC orders placed in visit on 07/23/18   POCT glycosylated hemoglobin (Hb A1C)   Result Value Ref Range    Hemoglobin A1C 7.2 %   POCT Glucose   Result Value Ref Range    Glucose 230 mg/dL   Need eye exam.    Continue to monitor blood sugars 1 times a day. Keep log of blood sugars and bring with you to the next appointment. Discussed use, benefit, and side effects of prescribed medications. All patient questions answered. Pt voiced understanding. Instructed to continue current medications, ADA diet and exercise. Patient agreed with treatment plan. Return in about 4 months (around 11/23/2018) for DM.

## 2018-08-01 ENCOUNTER — NURSE ONLY (OUTPATIENT)
Dept: FAMILY MEDICINE CLINIC | Age: 63
End: 2018-08-01

## 2018-08-01 VITALS — SYSTOLIC BLOOD PRESSURE: 136 MMHG | HEART RATE: 78 BPM | DIASTOLIC BLOOD PRESSURE: 76 MMHG

## 2018-08-01 DIAGNOSIS — Z23 IMMUNIZATION DUE: Primary | ICD-10-CM

## 2018-08-01 PROCEDURE — G0009 ADMIN PNEUMOCOCCAL VACCINE: HCPCS | Performed by: FAMILY MEDICINE

## 2018-08-01 PROCEDURE — 90732 PPSV23 VACC 2 YRS+ SUBQ/IM: CPT | Performed by: FAMILY MEDICINE

## 2018-08-13 ENCOUNTER — OFFICE VISIT (OUTPATIENT)
Dept: PULMONOLOGY | Age: 63
End: 2018-08-13
Payer: MEDICARE

## 2018-08-13 VITALS
HEIGHT: 68 IN | SYSTOLIC BLOOD PRESSURE: 136 MMHG | TEMPERATURE: 97.7 F | BODY MASS INDEX: 37.25 KG/M2 | OXYGEN SATURATION: 94 % | HEART RATE: 89 BPM | WEIGHT: 245.8 LBS | DIASTOLIC BLOOD PRESSURE: 78 MMHG

## 2018-08-13 DIAGNOSIS — R06.02 SHORTNESS OF BREATH: ICD-10-CM

## 2018-08-13 DIAGNOSIS — J96.01 ACUTE RESPIRATORY FAILURE WITH HYPOXIA (HCC): ICD-10-CM

## 2018-08-13 DIAGNOSIS — J44.9 CHRONIC OBSTRUCTIVE PULMONARY DISEASE, UNSPECIFIED COPD TYPE (HCC): ICD-10-CM

## 2018-08-13 DIAGNOSIS — J45.40 MODERATE PERSISTENT ASTHMA, UNSPECIFIED WHETHER COMPLICATED: Primary | ICD-10-CM

## 2018-08-13 DIAGNOSIS — E66.01 CLASS 2 SEVERE OBESITY DUE TO EXCESS CALORIES WITH SERIOUS COMORBIDITY AND BODY MASS INDEX (BMI) OF 37.0 TO 37.9 IN ADULT (HCC): ICD-10-CM

## 2018-08-13 DIAGNOSIS — G47.33 OSA (OBSTRUCTIVE SLEEP APNEA): ICD-10-CM

## 2018-08-13 DIAGNOSIS — R53.81 PHYSICAL DECONDITIONING: ICD-10-CM

## 2018-08-13 PROBLEM — E66.812 CLASS 2 SEVERE OBESITY DUE TO EXCESS CALORIES WITH SERIOUS COMORBIDITY AND BODY MASS INDEX (BMI) OF 37.0 TO 37.9 IN ADULT: Status: ACTIVE | Noted: 2018-08-13

## 2018-08-13 PROCEDURE — 3017F COLORECTAL CA SCREEN DOC REV: CPT | Performed by: NURSE PRACTITIONER

## 2018-08-13 PROCEDURE — G8417 CALC BMI ABV UP PARAM F/U: HCPCS | Performed by: NURSE PRACTITIONER

## 2018-08-13 PROCEDURE — 1101F PT FALLS ASSESS-DOCD LE1/YR: CPT | Performed by: NURSE PRACTITIONER

## 2018-08-13 PROCEDURE — G8427 DOCREV CUR MEDS BY ELIG CLIN: HCPCS | Performed by: NURSE PRACTITIONER

## 2018-08-13 PROCEDURE — 99213 OFFICE O/P EST LOW 20 MIN: CPT | Performed by: NURSE PRACTITIONER

## 2018-08-13 PROCEDURE — 1036F TOBACCO NON-USER: CPT | Performed by: NURSE PRACTITIONER

## 2018-08-13 PROCEDURE — 4040F PNEUMOC VAC/ADMIN/RCVD: CPT | Performed by: NURSE PRACTITIONER

## 2018-08-13 PROCEDURE — 3023F SPIROM DOC REV: CPT | Performed by: NURSE PRACTITIONER

## 2018-08-13 PROCEDURE — G8926 SPIRO NO PERF OR DOC: HCPCS | Performed by: NURSE PRACTITIONER

## 2018-08-13 PROCEDURE — 1123F ACP DISCUSS/DSCN MKR DOCD: CPT | Performed by: NURSE PRACTITIONER

## 2018-08-13 NOTE — PROGRESS NOTES
complication, not stated as uncontrolled      SURGICAL HISTORY:  Past Surgical History:   Procedure Laterality Date    BACK SURGERY  2002    BACK SURGERY  08/11/2017    BICEPS TENDON REPAIR Right 08/16/2017    CARDIAC CATHETERIZATION  07/02 08/05    CARPAL TUNNEL RELEASE  2011    right hand    CHOLECYSTECTOMY  yrs ago    COLONOSCOPY  11/06 02/12 1/14    EYE SURGERY      foreign body removal    HERNIA REPAIR  2004    Dr Paola May  2008?     LARYNGOSCOPY  04/28/2016    Laryngoscopy Micro with Biopsy by Dr Homero Munoz  01/07    uppp    ROTATOR CUFF REPAIR Left 5-20-15    TONSILLECTOMY  1985    UPPER GASTROINTESTINAL ENDOSCOPY  1997    VEIN SURGERY Left September 2014    Dr. Loretta Garcia:  Social History   Substance Use Topics    Smoking status: Former Smoker     Packs/day: 0.75     Years: 42.00     Types: Cigarettes     Start date: 6/5/1971     Quit date: 3/3/2017    Smokeless tobacco: Never Used      Comment: pts uses just nicotine vap    Alcohol use No     ALLERGIES:No Known Allergies  FAMILY HISTORY:  Family History   Problem Relation Age of Onset    Cancer Mother     Breast Cancer Mother     Stroke Mother     Heart Disease Brother     Diabetes Brother     High Blood Pressure Brother     High Cholesterol Brother      CURRENT MEDICATIONS:  Current Outpatient Prescriptions   Medication Sig Dispense Refill    hydrOXYzine (ATARAX) 10 MG tablet       triamcinolone (KENALOG) 0.1 % cream APPLY TOPICALLY TO THE AFFECTED AREA BID  1    losartan (COZAAR) 25 MG tablet Take 1 tablet by mouth daily 30 tablet 5    busPIRone (BUSPAR) 10 MG tablet Take 1 tablet by mouth 3 times daily 90 tablet 3    simvastatin (ZOCOR) 40 MG tablet Take 1 tablet by mouth nightly 30 tablet 5    rOPINIRole (REQUIP) 1 MG tablet Take 1 tablet by mouth 3 times daily 90 tablet 5    doxycycline monohydrate (MONODOX) 100 MG capsule Take 1 capsule by mouth 2 loss of weight or appetite changes. No fever or chills. HENT: Negative. Eyes: Negative. Upper respiratory tract: No nasal stuffiness or post nasal drip. Lower respiratory tract/ lungs: Rare cough with minimal sputum production. No hemoptysis. Cardiovascular: No palpitations or chest pain. Gastrointestinal: No nausea or vomiting. Neurological: No focal neurologiacal weakness. Extremities: No edema. Musculoskeletal: Chronic back pain. Genitourinary: No complaints. Hematological: Negative. Psychiatric/Behavioral: Negative. Skin: No itching. Physical exam   /78   Pulse 89   Temp 97.7 °F (36.5 °C)   Ht 5' 8\" (1.727 m)   Wt 245 lb 12.8 oz (111.5 kg)   SpO2 94% Comment: room air at rest  BMI 37.37 kg/m²      Physical Exam   Constitutional: Patient appears moderately built and moderately nourished. In no acute distress. Head: Normocephalic and atraumatic. Mouth/Throat: Oropharynx is clear and moist.  No oral thrush. Neck: Neck supple. No tracheal deviation present. Cardiovascular: Regular rate, regular rhythm, S1 and S2 with no murmur. No peripheral edema  Pulmonary/Chest: Normal effort with clear breath sounds. No stridor. No respiratory distress. Abdominal: Soft. No distension or tenderness to palpation. Musculoskeletal: Moves all extremities. Lymphadenopathy:  No cervical adenopathy. Neurological: Patient is alert and oriented to person, place, and time. No focal deficits. Skin: Warm and dry. No clubbing or cyanosis. Test results   Lung Nodule Screening     [] Qualifies    [] Does not qualify   [] Declined    [x] Completed    CXR 7/17/18      COMPARISON: PA and lateral chest radiographs 1/21/2016       FINDINGS: Cardiomediastinal silhouette is within normal limits. Lungs are hyperinflated with flattening of both hemidiaphragms. There are increasing reticular opacities at the bilateral lung bases. Degenerative changes are present in the thoracic spine.     There is a calcified granuloma in the spleen.           Impression   1. Minimal bibasilar atelectasis/infiltrate. 2. Chronic lung disease.                 CT Lung screening 4/11/18   Lungs: There is diffuse interstitial pattern of the lungs. There are centrilobular emphysematous changes which are somewhat upper lobe predominant. Bronchiectatic changes are present rather diffusely. No lung nodules are seen other than a calcified    granuloma left lower lung.                         MCCT test:3/28/16: Positive.                Assessment      Diagnosis Orders   1. Moderate persistent asthma, unspecified whether complicated     2. Chronic obstructive pulmonary disease, unspecified COPD type ([facility] [address]     3. Acute respiratory failure with hypoxia (HCC)     4. PARRISH (obstructive sleep apnea)     5. Class 2 severe obesity due to excess calories with serious comorbidity and body mass index (BMI) of 37.0 to 37.9 in adult ([facility] [address]     6. Physical deconditioning     7. Shortness of breath           Plan   Continue Trelegy and SILKE PRN  Continue Accolate  He refused repeat 6 minute walk due to chronic back pain and refusal of oxygen regardless  He refuses repeat sleep study  He is up to date with PNA vaccination and will receive flu vaccine in the fall with his PCP. Will need continued CT lung screening every year in April until 15 years smoke free.     Will see Yony sin in: 6 months    Electronically signed by SANTOSH Durand CNP on 8/13/2018 at 11:08 AM'

## 2018-08-25 ASSESSMENT — ENCOUNTER SYMPTOMS: BACK PAIN: 1

## 2018-09-06 ENCOUNTER — TELEPHONE (OUTPATIENT)
Dept: ENT CLINIC | Age: 63
End: 2018-09-06

## 2018-09-06 RX ORDER — RANITIDINE 150 MG/1
150 TABLET ORAL 2 TIMES DAILY
Qty: 60 TABLET | Refills: 2 | Status: SHIPPED | OUTPATIENT
Start: 2018-09-06 | End: 2019-01-25

## 2018-09-19 DIAGNOSIS — E34.9 TESTOSTERONE DEFICIENCY: Primary | ICD-10-CM

## 2018-09-19 RX ORDER — TESTOSTERONE CYPIONATE 200 MG/ML
INJECTION INTRAMUSCULAR
Qty: 2 ML | Refills: 3 | Status: SHIPPED | OUTPATIENT
Start: 2018-09-19 | End: 2018-11-27 | Stop reason: SDUPTHER

## 2018-09-19 NOTE — TELEPHONE ENCOUNTER
Rajesh, please see refill below and advise. Follow up Natali Alston on 11/27/18. Last refill on 05/22/18 2ml and 3 refills. Thank you.

## 2018-10-14 ASSESSMENT — ENCOUNTER SYMPTOMS: BACK PAIN: 1

## 2018-10-29 ENCOUNTER — OFFICE VISIT (OUTPATIENT)
Dept: FAMILY MEDICINE CLINIC | Age: 63
End: 2018-10-29

## 2018-10-29 VITALS
HEIGHT: 68 IN | RESPIRATION RATE: 16 BRPM | SYSTOLIC BLOOD PRESSURE: 134 MMHG | WEIGHT: 241.4 LBS | DIASTOLIC BLOOD PRESSURE: 76 MMHG | BODY MASS INDEX: 36.59 KG/M2 | HEART RATE: 82 BPM

## 2018-10-29 DIAGNOSIS — Z23 IMMUNIZATION DUE: ICD-10-CM

## 2018-10-29 DIAGNOSIS — I10 ESSENTIAL HYPERTENSION: ICD-10-CM

## 2018-10-29 DIAGNOSIS — J44.9 CHRONIC OBSTRUCTIVE PULMONARY DISEASE, UNSPECIFIED COPD TYPE (HCC): ICD-10-CM

## 2018-10-29 DIAGNOSIS — E11.8 TYPE 2 DIABETES MELLITUS WITH COMPLICATION, WITHOUT LONG-TERM CURRENT USE OF INSULIN (HCC): Primary | ICD-10-CM

## 2018-10-29 DIAGNOSIS — E78.5 HYPERLIPIDEMIA, UNSPECIFIED HYPERLIPIDEMIA TYPE: ICD-10-CM

## 2018-10-29 LAB
GLUCOSE BLD-MCNC: 281 MG/DL
HBA1C MFR BLD: 7.5 %

## 2018-10-29 PROCEDURE — 99213 OFFICE O/P EST LOW 20 MIN: CPT | Performed by: FAMILY MEDICINE

## 2018-10-29 PROCEDURE — G0008 ADMIN INFLUENZA VIRUS VAC: HCPCS | Performed by: FAMILY MEDICINE

## 2018-10-29 PROCEDURE — 90656 IIV3 VACC NO PRSV 0.5 ML IM: CPT | Performed by: FAMILY MEDICINE

## 2018-10-29 PROCEDURE — 83036 HEMOGLOBIN GLYCOSYLATED A1C: CPT | Performed by: FAMILY MEDICINE

## 2018-10-29 PROCEDURE — 82962 GLUCOSE BLOOD TEST: CPT | Performed by: FAMILY MEDICINE

## 2018-10-29 PROCEDURE — 1101F PT FALLS ASSESS-DOCD LE1/YR: CPT | Performed by: FAMILY MEDICINE

## 2018-10-29 RX ORDER — SERTRALINE HYDROCHLORIDE 100 MG/1
TABLET, FILM COATED ORAL
Qty: 60 TABLET | Refills: 5 | Status: SHIPPED | OUTPATIENT
Start: 2018-10-29 | End: 2018-10-31 | Stop reason: SDUPTHER

## 2018-10-29 RX ORDER — BUSPIRONE HYDROCHLORIDE 10 MG/1
10 TABLET ORAL 3 TIMES DAILY
Qty: 90 TABLET | Refills: 5 | Status: SHIPPED | OUTPATIENT
Start: 2018-10-29 | End: 2019-08-21 | Stop reason: SDUPTHER

## 2018-10-29 RX ORDER — SIMVASTATIN 40 MG
40 TABLET ORAL NIGHTLY
Qty: 30 TABLET | Refills: 5 | Status: SHIPPED | OUTPATIENT
Start: 2018-10-29 | End: 2019-09-11 | Stop reason: SDUPTHER

## 2018-10-29 ASSESSMENT — ENCOUNTER SYMPTOMS
EYES NEGATIVE: 1
CHEST TIGHTNESS: 0
SHORTNESS OF BREATH: 0
BACK PAIN: 1
VOMITING: 0
NAUSEA: 0
CONSTIPATION: 0
ABDOMINAL PAIN: 0
BLOOD IN STOOL: 0
COUGH: 0
DIARRHEA: 0

## 2018-10-29 NOTE — PROGRESS NOTES
normal. Right eye exhibits no discharge. Left eye exhibits no discharge. No scleral icterus. Neck: Normal range of motion. Neck supple. No JVD present. No thyromegaly present. Cardiovascular: Normal rate, regular rhythm and normal heart sounds. No murmur heard. Pulmonary/Chest: Effort normal and breath sounds normal. No respiratory distress. He has no wheezes. He has no rhonchi. He has no rales. Abdominal: Soft. Bowel sounds are normal. He exhibits no distension and no mass. There is no hepatosplenomegaly. There is no tenderness. There is no rebound and no guarding. Musculoskeletal: Normal range of motion. He exhibits no edema. Lymphadenopathy:     He has no cervical adenopathy. Neurological: He is alert and oriented to person, place, and time. Skin: Skin is warm and dry. No rash noted. He is not diaphoretic. Psychiatric: He has a normal mood and affect. His behavior is normal.   Nursing note and vitals reviewed. Assessment:       Diagnosis Orders   1. Type 2 diabetes mellitus with complication, without long-term current use of insulin ([facility])  POCT Glucose    POCT glycosylated hemoglobin (Hb A1C)   2. Essential hypertension     3. Chronic obstructive pulmonary disease, unspecified COPD type ([facility] [address]     4. Hyperlipidemia, unspecified hyperlipidemia type     5. Immunization due  INFLUENZA, QUADV, 5 YRS AND OLDER, IM, MDV, 0.5ML (AFLURIA QUADV)       Plan:      Current Outpatient Prescriptions   Medication Sig Dispense Refill    simvastatin (ZOCOR) 40 MG tablet Take 1 tablet by mouth nightly 30 tablet 5    busPIRone (BUSPAR) 10 MG tablet Take 1 tablet by mouth 3 times daily 90 tablet 5    sertraline (ZOLOFT) 100 MG tablet take 1 tablet by mouth twice a day 60 tablet 5    testosterone cypionate (DEPOTESTOTERONE CYPIONATE) 200 MG/ML injection Inject 1 ml into the muscle every 14 days.  2 mL 3    hydrOXYzine (ATARAX) 10 MG tablet       triamcinolone (KENALOG) 0.1 % cream APPLY TOPICALLY TO THE

## 2018-10-31 RX ORDER — SERTRALINE HYDROCHLORIDE 100 MG/1
TABLET, FILM COATED ORAL
Qty: 60 TABLET | Refills: 5 | Status: SHIPPED | OUTPATIENT
Start: 2018-10-31 | End: 2019-05-10 | Stop reason: SDUPTHER

## 2018-11-21 ENCOUNTER — NURSE ONLY (OUTPATIENT)
Dept: FAMILY MEDICINE CLINIC | Age: 63
End: 2018-11-21
Payer: MEDICARE

## 2018-11-21 DIAGNOSIS — N40.1 BENIGN PROSTATIC HYPERPLASIA WITH LOWER URINARY TRACT SYMPTOMS, SYMPTOM DETAILS UNSPECIFIED: ICD-10-CM

## 2018-11-21 DIAGNOSIS — E29.1 HYPOGONADISM IN MALE: ICD-10-CM

## 2018-11-21 LAB
HCT VFR BLD CALC: 55.8 % (ref 42–52)
HEMOGLOBIN: 18.2 GM/DL (ref 14–18)
PROSTATE SPECIFIC ANTIGEN: 1.83 NG/ML (ref 0–1)

## 2018-11-21 PROCEDURE — 36415 COLL VENOUS BLD VENIPUNCTURE: CPT | Performed by: NURSE PRACTITIONER

## 2018-11-24 LAB — TESTOSTERONE TOTAL: 498 NG/DL (ref 300–720)

## 2018-11-27 ENCOUNTER — OFFICE VISIT (OUTPATIENT)
Dept: UROLOGY | Age: 63
End: 2018-11-27
Payer: MEDICARE

## 2018-11-27 VITALS
SYSTOLIC BLOOD PRESSURE: 122 MMHG | HEIGHT: 69 IN | BODY MASS INDEX: 35.4 KG/M2 | DIASTOLIC BLOOD PRESSURE: 64 MMHG | WEIGHT: 239 LBS

## 2018-11-27 DIAGNOSIS — E34.9 TESTOSTERONE DEFICIENCY: Primary | ICD-10-CM

## 2018-11-27 DIAGNOSIS — N40.0 BPH WITHOUT URINARY OBSTRUCTION: ICD-10-CM

## 2018-11-27 LAB
BILIRUBIN URINE: NEGATIVE
BLOOD URINE, POC: NEGATIVE
CHARACTER, URINE: CLEAR
COLOR, URINE: YELLOW
GLUCOSE URINE: NEGATIVE MG/DL
KETONES, URINE: NEGATIVE
LEUKOCYTE CLUMPS, URINE: NEGATIVE
NITRITE, URINE: NEGATIVE
PH, URINE: 5.5
PROTEIN, URINE: NEGATIVE MG/DL
SPECIFIC GRAVITY, URINE: >= 1.03 (ref 1–1.03)
UROBILINOGEN, URINE: 0.2 EU/DL

## 2018-11-27 PROCEDURE — 99213 OFFICE O/P EST LOW 20 MIN: CPT | Performed by: NURSE PRACTITIONER

## 2018-11-27 PROCEDURE — 4040F PNEUMOC VAC/ADMIN/RCVD: CPT | Performed by: NURSE PRACTITIONER

## 2018-11-27 PROCEDURE — G8417 CALC BMI ABV UP PARAM F/U: HCPCS | Performed by: NURSE PRACTITIONER

## 2018-11-27 PROCEDURE — 1036F TOBACCO NON-USER: CPT | Performed by: NURSE PRACTITIONER

## 2018-11-27 PROCEDURE — 3017F COLORECTAL CA SCREEN DOC REV: CPT | Performed by: NURSE PRACTITIONER

## 2018-11-27 PROCEDURE — 1101F PT FALLS ASSESS-DOCD LE1/YR: CPT | Performed by: NURSE PRACTITIONER

## 2018-11-27 PROCEDURE — G8482 FLU IMMUNIZE ORDER/ADMIN: HCPCS | Performed by: NURSE PRACTITIONER

## 2018-11-27 PROCEDURE — 81003 URINALYSIS AUTO W/O SCOPE: CPT | Performed by: NURSE PRACTITIONER

## 2018-11-27 PROCEDURE — 1123F ACP DISCUSS/DSCN MKR DOCD: CPT | Performed by: NURSE PRACTITIONER

## 2018-11-27 PROCEDURE — G8427 DOCREV CUR MEDS BY ELIG CLIN: HCPCS | Performed by: NURSE PRACTITIONER

## 2018-11-27 RX ORDER — TESTOSTERONE CYPIONATE 200 MG/ML
INJECTION INTRAMUSCULAR
Qty: 2 ML | Refills: 3 | Status: SHIPPED | OUTPATIENT
Start: 2018-11-27 | End: 2019-06-17 | Stop reason: SDUPTHER

## 2018-11-27 NOTE — PROGRESS NOTES
[address] Andrea Coffey  Dept: [phone]  Loc: [phone]  Visit Date: 11/27/2018      HPI:     Yony Borden f/u today for Testosterone Deficiency, BPH and erectile dysfunction. Currently on Testosterone Cypionate injections 200 mg every 14 days. He is tolerating well, no side effects reported. Notices improvement with fatigue, energy level and libido. Most recent T level was 498 on 11/21/18  Trend of PSA:  1.83 11/2018  0.69 04/2017  1.00 11/2016  1.22 04/2015  Reports there is no family history of prostate cancer. In the past he was on AndroGel for testosterone replacement therapy however was discontinued due to cost, lack of benefit. ED- Viagra PRN w/ good results   Regarding his urinary symptoms, currently has no complaints. He was taking Flomax which she stopped due to lack of benefit. He comes in today with significant other. History is obtained from the patient the medical record    Current Outpatient Prescriptions   Medication Sig Dispense Refill    glimepiride (AMARYL) 2 MG tablet TAKE 1 TABLET BY MOUTH TWICE DAILY 60 tablet 5    metFORMIN (GLUCOPHAGE) 500 MG tablet TAKE 2 TABLETS BY MOUTH TWICE DAILY WITH MEALS 120 tablet 5    sertraline (ZOLOFT) 100 MG tablet take 1 tablet by mouth twice a day 60 tablet 5    simvastatin (ZOCOR) 40 MG tablet Take 1 tablet by mouth nightly 30 tablet 5    busPIRone (BUSPAR) 10 MG tablet Take 1 tablet by mouth 3 times daily 90 tablet 5    testosterone cypionate (DEPOTESTOTERONE CYPIONATE) 200 MG/ML injection Inject 1 ml into the muscle every 14 days.  2 mL 3    hydrOXYzine (ATARAX) 10 MG tablet       triamcinolone (KENALOG) 0.1 % cream APPLY TOPICALLY TO THE AFFECTED AREA BID  1    losartan (COZAAR) 25 MG tablet Take 1 tablet by mouth daily 30 tablet 5    rOPINIRole (REQUIP) 1 MG tablet Take 1 tablet by mouth 3 times daily 90 tablet 5    doxycycline monohydrate Depression; Diverticulosis; HTN (hypertension); Hyperlipidemia; Kidney disorder; Panic attack; Sleep apnea; Thumb amputation status; and Type II or unspecified type diabetes mellitus without mention of complication, not stated as uncontrolled. Past Surgical History  The patient  has a past surgical history that includes Cholecystectomy (yrs ago); back surgery (2002); hernia repair (2004); Carpal tunnel release (2011); Kidney stone surgery (2008?); Tonsillectomy (1985); Cardiac catheterization (07/02 08/05); Nasal septum surgery (01/07); Colonoscopy (11/06 02/12 1/14); Upper gastrointestinal endoscopy (1997); Vein Surgery (Left, September 2014); Rotator cuff repair (Left, 5-20-15); eye surgery; laryngoscopy (04/28/2016); back surgery (08/11/2017); and Biceps tendon repair (Right, 08/16/2017). Family History  This patient's family history includes Breast Cancer in his mother; Cancer in his mother; Diabetes in his brother; Heart Disease in his brother; High Blood Pressure in his brother; High Cholesterol in his brother; Stroke in his mother. Social History  Yony  reports that he quit smoking about 20 months ago. His smoking use included Cigarettes. He started smoking about 47 years ago. He has a 31.50 pack-year smoking history. He has never used smokeless tobacco. He reports that he does not drink alcohol or use drugs. Subjective:     Review of Systems  No problems with ears, nose or throat. No problems with eyes. No chest pain, shortness of breath, abdominal pain, extremity pain or weakness, and no neurological deficits. No rashes.  symptoms per HPI. The remainder of the review of symptoms is negative. Objective:     PE:   Vitals:    11/27/18 0830   BP: 122/64   Weight: 239 lb (108.4 kg)   Height: 5' 9\" (1.753 m)       Constitutional: Alert and oriented times 3, no acute distress and cooperative to examination with appropriate mood and affect. HENT:   Head:        Normocephalic and atraumatic. Mouth/Throat:         Mucous membranes are normal.   Eyes:         EOM are normal. No scleral icterus. PERRLA. Neck:        Supple, symmetrical, trachea midline  Pulmonary/Chest:      Chest symmetric with normal A/P diameter. Normal respiratory rate and rhthym. No use of accessory muscles. Abdominal:         Soft. No tenderness. Bowel sounds present. Musculoskeletal:         Normal range of motion. No edema or tenderness of lower extremities. Extremities: No cyanosis, clubbing, or edema present. Neurological:        Alert and oriented. No cranial nerve deficit. Emiliano Fear Psychiatric:        Normal mood and affect. Rectal: Patient deferred     Labs   Urine dip demonstrates   Results for POC orders placed in visit on 11/27/18   POCT Urinalysis No Micro (Auto)   Result Value Ref Range    Glucose, Ur Negative NEGATIVE mg/dl    Bilirubin Urine Negative     Ketones, Urine Negative NEGATIVE    Specific Gravity, Urine >= 1.030 1.002 - 1.03    Blood, UA POC Negative NEGATIVE    pH, Urine 5.50 5.0 - 9.0    Protein, Urine Negative NEGATIVE mg/dl    Urobilinogen, Urine 0.20 0.0 - 1.0 eu/dl    Nitrite, Urine Negative NEGATIVE    Leukocyte Clumps, Urine Negative NEGATIVE    Color, Urine Yellow YELLOW-STR    Character, Urine Clear [email or web address]       Patients recent PSA values are as follows  Lab Results   Component Value Date    PSA 1.83 (H) 11/21/2018    PSA 0.69 04/17/2017    PSA 1.22 04/09/2015        Recent BUN/Creatinine:  Lab Results   Component Value Date    BUN 19 04/17/2018    CREATININE 0.8 04/17/2018       Radiology  No recent imaging       Assessment & Plan:     Testosterone Deficiency  BPH w/o LUTS  Erectile dysfunction    Yony f/u today for Testosterone Deficiency. He is currently on Testosterone Cypionate 200 mg every 14 days. Most recent T level is appropriate at 498. Notices improvement with fatigue, energy level and libido. Will continue at current dose.  PSA has jumped to 1.83, need to follow agin in 6 months, he declined HAY today, will attempt HAY at 6 month visit. BPH is stable, no issues. Erectile dysfunction- Viagra PRN    Return in about 6 months (around 5/27/2019) for Testosterone Deficiency, HAY at visit  .     SANTOSH Liu  Urology

## 2018-12-05 ENCOUNTER — TELEPHONE (OUTPATIENT)
Dept: PULMONOLOGY | Age: 63
End: 2018-12-05

## 2018-12-05 RX ORDER — PREDNISONE 10 MG/1
TABLET ORAL
Qty: 30 TABLET | Refills: 0 | Status: SHIPPED | OUTPATIENT
Start: 2018-12-05 | End: 2019-01-15 | Stop reason: SDUPTHER

## 2018-12-05 RX ORDER — DOXYCYCLINE HYCLATE 100 MG/1
100 CAPSULE ORAL 2 TIMES DAILY
Qty: 14 CAPSULE | Refills: 0 | Status: SHIPPED | OUTPATIENT
Start: 2018-12-05 | End: 2018-12-12

## 2018-12-14 ENCOUNTER — TELEPHONE (OUTPATIENT)
Dept: ENT CLINIC | Age: 63
End: 2018-12-14

## 2018-12-14 RX ORDER — RANITIDINE 150 MG/1
150 TABLET ORAL 2 TIMES DAILY
Qty: 60 TABLET | Refills: 3 | Status: SHIPPED | OUTPATIENT
Start: 2018-12-14 | End: 2018-12-14 | Stop reason: SDUPTHER

## 2018-12-17 RX ORDER — RANITIDINE 150 MG/1
TABLET ORAL
Qty: 180 TABLET | Refills: 3 | Status: SHIPPED | OUTPATIENT
Start: 2018-12-17 | End: 2019-04-23 | Stop reason: SDUPTHER

## 2018-12-19 ENCOUNTER — TELEPHONE (OUTPATIENT)
Dept: FAMILY MEDICINE CLINIC | Age: 63
End: 2018-12-19

## 2019-01-15 ENCOUNTER — TELEPHONE (OUTPATIENT)
Dept: PULMONOLOGY | Age: 64
End: 2019-01-15

## 2019-01-15 DIAGNOSIS — R05.9 COUGH: Primary | ICD-10-CM

## 2019-01-15 RX ORDER — PREDNISONE 10 MG/1
TABLET ORAL
Qty: 30 TABLET | Refills: 0 | Status: SHIPPED | OUTPATIENT
Start: 2019-01-15 | End: 2019-03-08 | Stop reason: ALTCHOICE

## 2019-01-15 RX ORDER — AZITHROMYCIN 500 MG/1
500 TABLET, FILM COATED ORAL DAILY
Qty: 7 TABLET | Refills: 0 | Status: SHIPPED | OUTPATIENT
Start: 2019-01-15 | End: 2019-01-22

## 2019-01-25 ENCOUNTER — OFFICE VISIT (OUTPATIENT)
Dept: PULMONOLOGY | Age: 64
End: 2019-01-25
Payer: MEDICARE

## 2019-01-25 VITALS
TEMPERATURE: 98 F | BODY MASS INDEX: 36.37 KG/M2 | HEIGHT: 68 IN | OXYGEN SATURATION: 94 % | SYSTOLIC BLOOD PRESSURE: 130 MMHG | DIASTOLIC BLOOD PRESSURE: 84 MMHG | WEIGHT: 240 LBS | HEART RATE: 74 BPM

## 2019-01-25 DIAGNOSIS — G47.33 OSA (OBSTRUCTIVE SLEEP APNEA): ICD-10-CM

## 2019-01-25 DIAGNOSIS — R05.9 COUGH: Primary | ICD-10-CM

## 2019-01-25 DIAGNOSIS — J47.0 BRONCHIECTASIS WITH ACUTE LOWER RESPIRATORY INFECTION (HCC): ICD-10-CM

## 2019-01-25 DIAGNOSIS — J44.9 ASTHMA-COPD OVERLAP SYNDROME (HCC): ICD-10-CM

## 2019-01-25 PROCEDURE — 3017F COLORECTAL CA SCREEN DOC REV: CPT | Performed by: NURSE PRACTITIONER

## 2019-01-25 PROCEDURE — 3023F SPIROM DOC REV: CPT | Performed by: NURSE PRACTITIONER

## 2019-01-25 PROCEDURE — 1036F TOBACCO NON-USER: CPT | Performed by: NURSE PRACTITIONER

## 2019-01-25 PROCEDURE — G8427 DOCREV CUR MEDS BY ELIG CLIN: HCPCS | Performed by: NURSE PRACTITIONER

## 2019-01-25 PROCEDURE — G8417 CALC BMI ABV UP PARAM F/U: HCPCS | Performed by: NURSE PRACTITIONER

## 2019-01-25 PROCEDURE — 4040F PNEUMOC VAC/ADMIN/RCVD: CPT | Performed by: NURSE PRACTITIONER

## 2019-01-25 PROCEDURE — 99214 OFFICE O/P EST MOD 30 MIN: CPT | Performed by: NURSE PRACTITIONER

## 2019-01-25 PROCEDURE — 1101F PT FALLS ASSESS-DOCD LE1/YR: CPT | Performed by: NURSE PRACTITIONER

## 2019-01-25 PROCEDURE — 1123F ACP DISCUSS/DSCN MKR DOCD: CPT | Performed by: NURSE PRACTITIONER

## 2019-01-25 PROCEDURE — G8482 FLU IMMUNIZE ORDER/ADMIN: HCPCS | Performed by: NURSE PRACTITIONER

## 2019-01-25 PROCEDURE — G8926 SPIRO NO PERF OR DOC: HCPCS | Performed by: NURSE PRACTITIONER

## 2019-01-25 RX ORDER — BUDESONIDE 0.5 MG/2ML
1 INHALANT ORAL 2 TIMES DAILY
Qty: 60 AMPULE | Refills: 11 | Status: SHIPPED | OUTPATIENT
Start: 2019-01-25 | End: 2019-05-28 | Stop reason: ALTCHOICE

## 2019-01-25 RX ORDER — BUDESONIDE AND FORMOTEROL FUMARATE DIHYDRATE 160; 4.5 UG/1; UG/1
2 AEROSOL RESPIRATORY (INHALATION) 2 TIMES DAILY
COMMUNITY
End: 2019-04-03

## 2019-01-25 RX ORDER — IPRATROPIUM BROMIDE AND ALBUTEROL SULFATE 2.5; .5 MG/3ML; MG/3ML
1 SOLUTION RESPIRATORY (INHALATION) EVERY 4 HOURS
Qty: 360 ML | Refills: 11 | Status: SHIPPED | OUTPATIENT
Start: 2019-01-25 | End: 2019-01-25

## 2019-01-28 ENCOUNTER — NURSE ONLY (OUTPATIENT)
Dept: FAMILY MEDICINE CLINIC | Age: 64
End: 2019-01-28

## 2019-01-28 DIAGNOSIS — J44.9 ASTHMA-COPD OVERLAP SYNDROME (HCC): ICD-10-CM

## 2019-01-28 DIAGNOSIS — R05.9 COUGH: ICD-10-CM

## 2019-01-28 DIAGNOSIS — J47.0 BRONCHIECTASIS WITH ACUTE LOWER RESPIRATORY INFECTION (HCC): ICD-10-CM

## 2019-01-30 LAB
GRAM STAIN RESULT: ABNORMAL
ORGANISM: ABNORMAL
RESPIRATORY CULTURE: ABNORMAL

## 2019-02-02 ENCOUNTER — HOSPITAL ENCOUNTER (OUTPATIENT)
Dept: CT IMAGING | Age: 64
Discharge: HOME | End: 2019-02-02
Payer: MEDICARE

## 2019-02-02 DIAGNOSIS — J44.9 ASTHMA-COPD OVERLAP SYNDROME (HCC): ICD-10-CM

## 2019-02-02 DIAGNOSIS — J47.0 BRONCHIECTASIS WITH ACUTE LOWER RESPIRATORY INFECTION (HCC): ICD-10-CM

## 2019-02-02 DIAGNOSIS — R05.9 COUGH: ICD-10-CM

## 2019-02-02 PROCEDURE — 71250 CT THORAX DX C-: CPT

## 2019-02-06 ENCOUNTER — OFFICE VISIT (OUTPATIENT)
Dept: FAMILY MEDICINE CLINIC | Age: 64
End: 2019-02-06

## 2019-02-06 VITALS
RESPIRATION RATE: 18 BRPM | WEIGHT: 239.8 LBS | HEIGHT: 68 IN | BODY MASS INDEX: 36.34 KG/M2 | HEART RATE: 82 BPM | SYSTOLIC BLOOD PRESSURE: 114 MMHG | DIASTOLIC BLOOD PRESSURE: 72 MMHG

## 2019-02-06 DIAGNOSIS — I10 ESSENTIAL HYPERTENSION: ICD-10-CM

## 2019-02-06 DIAGNOSIS — E11.8 TYPE 2 DIABETES MELLITUS WITH COMPLICATION, WITHOUT LONG-TERM CURRENT USE OF INSULIN (HCC): Primary | ICD-10-CM

## 2019-02-06 DIAGNOSIS — J44.9 CHRONIC OBSTRUCTIVE PULMONARY DISEASE, UNSPECIFIED COPD TYPE (HCC): ICD-10-CM

## 2019-02-06 DIAGNOSIS — E78.5 HYPERLIPIDEMIA, UNSPECIFIED HYPERLIPIDEMIA TYPE: ICD-10-CM

## 2019-02-06 LAB
CREATININE URINE POCT: ABNORMAL
GLUCOSE BLD-MCNC: 326 MG/DL
HBA1C MFR BLD: 8.7 %
MICROALBUMIN/CREAT 24H UR: 50 MG/G{CREAT}
MICROALBUMIN/CREAT UR-RTO: ABNORMAL

## 2019-02-06 PROCEDURE — 1123F ACP DISCUSS/DSCN MKR DOCD: CPT | Performed by: FAMILY MEDICINE

## 2019-02-06 PROCEDURE — 83036 HEMOGLOBIN GLYCOSYLATED A1C: CPT | Performed by: FAMILY MEDICINE

## 2019-02-06 PROCEDURE — 82962 GLUCOSE BLOOD TEST: CPT | Performed by: FAMILY MEDICINE

## 2019-02-06 PROCEDURE — 1036F TOBACCO NON-USER: CPT | Performed by: FAMILY MEDICINE

## 2019-02-06 PROCEDURE — 3017F COLORECTAL CA SCREEN DOC REV: CPT | Performed by: FAMILY MEDICINE

## 2019-02-06 PROCEDURE — 1101F PT FALLS ASSESS-DOCD LE1/YR: CPT | Performed by: FAMILY MEDICINE

## 2019-02-06 PROCEDURE — 99213 OFFICE O/P EST LOW 20 MIN: CPT | Performed by: FAMILY MEDICINE

## 2019-02-06 PROCEDURE — G8417 CALC BMI ABV UP PARAM F/U: HCPCS | Performed by: FAMILY MEDICINE

## 2019-02-06 PROCEDURE — 82044 UR ALBUMIN SEMIQUANTITATIVE: CPT | Performed by: FAMILY MEDICINE

## 2019-02-06 PROCEDURE — 4040F PNEUMOC VAC/ADMIN/RCVD: CPT | Performed by: FAMILY MEDICINE

## 2019-02-06 PROCEDURE — G8427 DOCREV CUR MEDS BY ELIG CLIN: HCPCS | Performed by: FAMILY MEDICINE

## 2019-02-06 RX ORDER — GLIMEPIRIDE 4 MG/1
4 TABLET ORAL 2 TIMES DAILY
Qty: 60 TABLET | Refills: 5 | Status: SHIPPED | OUTPATIENT
Start: 2019-02-06 | End: 2019-09-11 | Stop reason: SDUPTHER

## 2019-02-06 RX ORDER — ROPINIROLE 1 MG/1
1 TABLET, FILM COATED ORAL 3 TIMES DAILY
Qty: 90 TABLET | Refills: 5 | Status: SHIPPED | OUTPATIENT
Start: 2019-02-06 | End: 2019-09-11 | Stop reason: SDUPTHER

## 2019-02-06 RX ORDER — LOSARTAN POTASSIUM 25 MG/1
25 TABLET ORAL DAILY
Qty: 30 TABLET | Refills: 5 | Status: SHIPPED | OUTPATIENT
Start: 2019-02-06 | End: 2020-09-09 | Stop reason: DRUGHIGH

## 2019-02-06 ASSESSMENT — ENCOUNTER SYMPTOMS
CHEST TIGHTNESS: 0
BLOOD IN STOOL: 0
WHEEZING: 1
CONSTIPATION: 0
NAUSEA: 0
SHORTNESS OF BREATH: 1
COUGH: 1
EYES NEGATIVE: 1
VOMITING: 0
ABDOMINAL PAIN: 0
DIARRHEA: 0

## 2019-02-06 ASSESSMENT — PATIENT HEALTH QUESTIONNAIRE - PHQ9
SUM OF ALL RESPONSES TO PHQ QUESTIONS 1-9: 0
2. FEELING DOWN, DEPRESSED OR HOPELESS: 0
1. LITTLE INTEREST OR PLEASURE IN DOING THINGS: 0
SUM OF ALL RESPONSES TO PHQ9 QUESTIONS 1 & 2: 0
SUM OF ALL RESPONSES TO PHQ QUESTIONS 1-9: 0

## 2019-03-08 ENCOUNTER — OFFICE VISIT (OUTPATIENT)
Dept: PULMONOLOGY | Age: 64
End: 2019-03-08
Payer: MEDICARE

## 2019-03-08 VITALS
RESPIRATION RATE: 22 BRPM | OXYGEN SATURATION: 95 % | WEIGHT: 243.4 LBS | HEIGHT: 68 IN | DIASTOLIC BLOOD PRESSURE: 64 MMHG | TEMPERATURE: 98.5 F | BODY MASS INDEX: 36.89 KG/M2 | SYSTOLIC BLOOD PRESSURE: 112 MMHG | HEART RATE: 83 BPM

## 2019-03-08 DIAGNOSIS — J43.9 PULMONARY EMPHYSEMA, UNSPECIFIED EMPHYSEMA TYPE (HCC): ICD-10-CM

## 2019-03-08 DIAGNOSIS — G47.33 OSA (OBSTRUCTIVE SLEEP APNEA): ICD-10-CM

## 2019-03-08 DIAGNOSIS — J44.9 ASTHMA-COPD OVERLAP SYNDROME (HCC): Primary | ICD-10-CM

## 2019-03-08 DIAGNOSIS — J96.11 CHRONIC RESPIRATORY FAILURE WITH HYPOXIA (HCC): ICD-10-CM

## 2019-03-08 DIAGNOSIS — F17.218 CIGARETTE NICOTINE DEPENDENCE WITH OTHER NICOTINE-INDUCED DISORDER: ICD-10-CM

## 2019-03-08 PROCEDURE — 3017F COLORECTAL CA SCREEN DOC REV: CPT | Performed by: NURSE PRACTITIONER

## 2019-03-08 PROCEDURE — G8926 SPIRO NO PERF OR DOC: HCPCS | Performed by: NURSE PRACTITIONER

## 2019-03-08 PROCEDURE — G8482 FLU IMMUNIZE ORDER/ADMIN: HCPCS | Performed by: NURSE PRACTITIONER

## 2019-03-08 PROCEDURE — 1101F PT FALLS ASSESS-DOCD LE1/YR: CPT | Performed by: NURSE PRACTITIONER

## 2019-03-08 PROCEDURE — G8427 DOCREV CUR MEDS BY ELIG CLIN: HCPCS | Performed by: NURSE PRACTITIONER

## 2019-03-08 PROCEDURE — 4040F PNEUMOC VAC/ADMIN/RCVD: CPT | Performed by: NURSE PRACTITIONER

## 2019-03-08 PROCEDURE — 3023F SPIROM DOC REV: CPT | Performed by: NURSE PRACTITIONER

## 2019-03-08 PROCEDURE — 99214 OFFICE O/P EST MOD 30 MIN: CPT | Performed by: NURSE PRACTITIONER

## 2019-03-08 PROCEDURE — 1036F TOBACCO NON-USER: CPT | Performed by: NURSE PRACTITIONER

## 2019-03-08 PROCEDURE — 1123F ACP DISCUSS/DSCN MKR DOCD: CPT | Performed by: NURSE PRACTITIONER

## 2019-03-08 PROCEDURE — G8417 CALC BMI ABV UP PARAM F/U: HCPCS | Performed by: NURSE PRACTITIONER

## 2019-03-18 ENCOUNTER — TELEPHONE (OUTPATIENT)
Dept: PULMONOLOGY | Age: 64
End: 2019-03-18

## 2019-04-02 ENCOUNTER — NURSE ONLY (OUTPATIENT)
Dept: FAMILY MEDICINE CLINIC | Age: 64
End: 2019-04-02
Payer: MEDICARE

## 2019-04-02 ENCOUNTER — OFFICE VISIT (OUTPATIENT)
Dept: PULMONOLOGY | Age: 64
End: 2019-04-02
Payer: MEDICARE

## 2019-04-02 VITALS
SYSTOLIC BLOOD PRESSURE: 138 MMHG | TEMPERATURE: 98.3 F | HEART RATE: 70 BPM | WEIGHT: 244.2 LBS | DIASTOLIC BLOOD PRESSURE: 86 MMHG | HEIGHT: 68 IN | BODY MASS INDEX: 37.01 KG/M2 | OXYGEN SATURATION: 95 %

## 2019-04-02 DIAGNOSIS — J44.9 ASTHMA-COPD OVERLAP SYNDROME (HCC): ICD-10-CM

## 2019-04-02 DIAGNOSIS — J45.991 COUGH VARIANT ASTHMA: ICD-10-CM

## 2019-04-02 DIAGNOSIS — E34.9 TESTOSTERONE DEFICIENCY: ICD-10-CM

## 2019-04-02 DIAGNOSIS — R05.9 COUGH: ICD-10-CM

## 2019-04-02 DIAGNOSIS — N40.0 BPH WITHOUT URINARY OBSTRUCTION: ICD-10-CM

## 2019-04-02 DIAGNOSIS — R05.9 COUGH: Primary | ICD-10-CM

## 2019-04-02 DIAGNOSIS — R13.10 DYSPHAGIA, UNSPECIFIED TYPE: ICD-10-CM

## 2019-04-02 LAB
BASOPHILS # BLD: 0.5 %
BASOPHILS ABSOLUTE: 0 THOU/MM3 (ref 0–0.1)
EOSINOPHIL # BLD: 1.2 %
EOSINOPHILS ABSOLUTE: 0.1 THOU/MM3 (ref 0–0.4)
ERYTHROCYTE [DISTWIDTH] IN BLOOD BY AUTOMATED COUNT: 15 % (ref 11.5–14.5)
ERYTHROCYTE [DISTWIDTH] IN BLOOD BY AUTOMATED COUNT: 49.3 FL (ref 35–45)
HCT VFR BLD CALC: 52.2 % (ref 42–52)
HEMOGLOBIN: 17.7 GM/DL (ref 14–18)
IMMATURE GRANS (ABS): 0.03 THOU/MM3 (ref 0–0.07)
IMMATURE GRANULOCYTES: 0.4 %
LYMPHOCYTES # BLD: 19.4 %
LYMPHOCYTES ABSOLUTE: 1.6 THOU/MM3 (ref 1–4.8)
MCH RBC QN AUTO: 30.8 PG (ref 26–33)
MCHC RBC AUTO-ENTMCNC: 33.9 GM/DL (ref 32.2–35.5)
MCV RBC AUTO: 90.9 FL (ref 80–94)
MONOCYTES # BLD: 8.8 %
MONOCYTES ABSOLUTE: 0.7 THOU/MM3 (ref 0.4–1.3)
NUCLEATED RED BLOOD CELLS: 0 /100 WBC
PLATELET # BLD: 142 THOU/MM3 (ref 130–400)
PMV BLD AUTO: 11.7 FL (ref 9.4–12.4)
RBC # BLD: 5.74 MILL/MM3 (ref 4.7–6.1)
SEG NEUTROPHILS: 69.7 %
SEGMENTED NEUTROPHILS ABSOLUTE COUNT: 5.7 THOU/MM3 (ref 1.8–7.7)
WBC # BLD: 8.2 THOU/MM3 (ref 4.8–10.8)

## 2019-04-02 PROCEDURE — 3023F SPIROM DOC REV: CPT | Performed by: NURSE PRACTITIONER

## 2019-04-02 PROCEDURE — 4040F PNEUMOC VAC/ADMIN/RCVD: CPT | Performed by: NURSE PRACTITIONER

## 2019-04-02 PROCEDURE — G8427 DOCREV CUR MEDS BY ELIG CLIN: HCPCS | Performed by: NURSE PRACTITIONER

## 2019-04-02 PROCEDURE — G8417 CALC BMI ABV UP PARAM F/U: HCPCS | Performed by: NURSE PRACTITIONER

## 2019-04-02 PROCEDURE — 1036F TOBACCO NON-USER: CPT | Performed by: NURSE PRACTITIONER

## 2019-04-02 PROCEDURE — 3017F COLORECTAL CA SCREEN DOC REV: CPT | Performed by: NURSE PRACTITIONER

## 2019-04-02 PROCEDURE — G8926 SPIRO NO PERF OR DOC: HCPCS | Performed by: NURSE PRACTITIONER

## 2019-04-02 PROCEDURE — 99215 OFFICE O/P EST HI 40 MIN: CPT | Performed by: NURSE PRACTITIONER

## 2019-04-02 PROCEDURE — 1123F ACP DISCUSS/DSCN MKR DOCD: CPT | Performed by: NURSE PRACTITIONER

## 2019-04-02 PROCEDURE — 36415 COLL VENOUS BLD VENIPUNCTURE: CPT | Performed by: NURSE PRACTITIONER

## 2019-04-02 NOTE — PROGRESS NOTES
[facility]    Patient: Tyron Chambers, 70 y.o.   : 1955  2019    Pt of Dr. Pallavi Wyatt   Patient presents with    Wheezing     f/u bronchitis, wheezing, coughing, bringing up green phlegm         HPI  Yony is here for follow up for recurrent asthma/COPD exacerbations. See below visits for details. Just seen in follow up on 3/8/19. Called in on 3/18/19 with recurrent symptoms in which I requested he be scheduled with Dr. Ruth Simmonds to evaluate for Bronch with BAL, scheduled to see 19. He requested to be seen today regardless to address additional concerns he has. He has new issues with dysphagia and wants to make sure I am aware of his history of MRSA to his face and history of mold exposure. He is currently off ATB and steroids, has increased cough, productive of green sputum but no signs of acute exacerbation. Continues on Pulmicort and DuoNebs 4 times daily with Mucinex and acapella. AAT MM.    3/8/19 visit  Yony is here for 4 week follow up for after treatment for bronchitis with asthma/COPD exacerbation with HRCT of chest to evaluate for possible ILD. He is doing well after treatment for bronchitis. His sputum grew e-coli sensitive to Doxycycline. CXR showed no infiltrates. He required 2 rounds of ATB and steroids. I was concerned he may need a bronch with BAL. He loves the acapella prescribed and does better with Nebulizer treatments, using Pulmicort BID and DuoNeb 4 times daily. His HRCT showed no findings of fibrosis/ILD nor bronchiectasis. He has refused oxygen even though needed in the past. He feels the 6 minute walks are not accurate due to him having back pain and struggling to breathe because of the pain. He otherwise states he is active, currently restoring a car with no worsening GUTIERREZ. He is avoiding the cold. He stopped treatment for PARRISH after having UP3 and refuses repeat PSG to determine if successfully treated.  He denies any allergy or chronic sinus symptoms.     1/27/19 office visit:  Allan Dudley is here for follow up for his moderate persistent asthma with mild COPD. He has required 2 coarses of ATB and prednisone taper since my last seening him in August. I requested he have a CXR and follow up with me. CXR shows chronic findings, no acute infiltrates. He has responded to treatment, having 3 days of prednisone left and back to baseline. I prescribed Doxycycline first round and Zithromax second. He reports progression of GUTIERREZ and cough since last seen. Cough is more productive of yellow/green and thick. He finds better benefit with Nebulizer treatments. He stopped Trelegy on his own because he read it was not indicated for asthma. I expressed his overlap syndrome and need for 02 in the past and concern for continued PARRISH. He brings in material on bronchiectasis for prior imaging has shown.      He stopped smoking in November 2017. He smoked 1.5 PPD for 44 years. He has a prior history of PARRISH in which he had surgery but never followed through with repeat testing. He refused oxygen in this past July. 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     Prior CT Lung screening showed diffuse interstitial pattern of the lungs with centrilobular emphysematous changes upper lobe predominant and diffuse Bronchiectatic changes but improved on follow up imaging. He reports a history of exposure to black mold. FEV1 on PFT's is 71 with RLD findings. DLC0 53. History of + MCCT in 2016. Progress History:   Since last visit any new medical issues? Yes Dysphagia  New ER or hospitlal visits? No  Any new or changes in medicines? No  Using inhalers? Yes Pulmicort and DuoNeb  Are they helpful?  Yes   Past Medical hx   PMH:  Past Medical History:   Diagnosis Date    Acid reflux     Arthritis     Asthma     Chronic back pain     Class 2 severe obesity due to excess calories with serious comorbidity and body mass index (BMI) of 37.0 to 37.9 in adult [facility]) 2018    Colon polyps     COPD (chronic obstructive pulmonary disease) (HCC)     Depression     panic attacks    Diverticulosis     HTN (hypertension)     Hyperlipidemia     Kidney disorder     Panic attack     Sleep apnea     Thumb amputation status 3/13/14    left tip of thumb amputated    Type II or unspecified type diabetes mellitus without mention of complication, not stated as uncontrolled      SURGICAL HISTORY:  Past Surgical History:   Procedure Laterality Date    BACK SURGERY  2002    BACK SURGERY  2017    BICEPS TENDON REPAIR Right 2017    CARDIAC CATHETERIZATION      CARPAL TUNNEL RELEASE      right hand    CHOLECYSTECTOMY  yrs ago    COLONOSCOPY      EYE SURGERY      foreign body removal    HERNIA REPAIR      Dr Js Richardson  ?     LARYNGOSCOPY  2016    Laryngoscopy Micro with Biopsy by Dr Timi Taylor      uppp    ROTATOR CUFF REPAIR Left -15    TONSILLECTOMY  1985    UPPER GASTROINTESTINAL ENDOSCOPY      VEIN SURGERY Left 2014    Dr. Kenney Dates:  Social History     Tobacco Use    Smoking status: Former Smoker     Packs/day: 2.00     Years: 42.00     Pack years: 84.00     Types: Cigarettes     Start date: 1971     Last attempt to quit: 3/3/2017     Years since quittin.0    Smokeless tobacco: Never Used    Tobacco comment: pts uses just nicotine vap   Substance Use Topics    Alcohol use: No     Alcohol/week: 0.0 oz    Drug use: No     ALLERGIES:No Known Allergies  FAMILY HISTORY:  Family History   Problem Relation Age of Onset    Cancer Mother     Breast Cancer Mother     Stroke Mother     Heart Disease Brother     Diabetes Brother     High Blood Pressure Brother     High Cholesterol Brother      CURRENT MEDICATIONS:  Current Outpatient Medications   Medication Sig Dispense Refill    SYRINGE-NEEDLE, DISP, 3 ML 23G X 1-1/2\" 3 ML MISC Inject 1 Syringe into the muscle every 14 days 10 each 1    rOPINIRole (REQUIP) 1 MG tablet Take 1 tablet by mouth 3 times daily 90 tablet 5    losartan (COZAAR) 25 MG tablet Take 1 tablet by mouth daily 30 tablet 5    glimepiride (AMARYL) 4 MG tablet Take 1 tablet by mouth 2 times daily 60 tablet 5    budesonide-formoterol (SYMBICORT) 160-4.5 MCG/ACT AERO Inhale 2 puffs into the lungs 2 times daily      budesonide (PULMICORT) 0.5 MG/2ML nebulizer suspension Take 2 mLs by nebulization 2 times daily Pulmicort Respules 0.5mg via nebulization Bid 60 ampule 11    VENTOLIN  (90 Base) MCG/ACT inhaler Inhale 2 puffs into the lungs every 6 hours as needed for Wheezing 1 Inhaler 11    Misc.  Devices (ACAPELLA) MISC 1 Device by Does not apply route 4 times daily 1 each 0    ranitidine (ZANTAC) 150 MG tablet TAKE 1 TABLET BY MOUTH TWICE DAILY 180 tablet 3    metFORMIN (GLUCOPHAGE) 500 MG tablet TAKE 2 TABLETS BY MOUTH TWICE DAILY WITH MEALS 120 tablet 5    sertraline (ZOLOFT) 100 MG tablet take 1 tablet by mouth twice a day 60 tablet 5    simvastatin (ZOCOR) 40 MG tablet Take 1 tablet by mouth nightly 30 tablet 5    busPIRone (BUSPAR) 10 MG tablet Take 1 tablet by mouth 3 times daily 90 tablet 5    hydrOXYzine (ATARAX) 10 MG tablet       triamcinolone (KENALOG) 0.1 % cream APPLY TOPICALLY TO THE AFFECTED AREA BID  1    baclofen (LIORESAL) 10 MG tablet take 1 tablet by mouth twice a day  0    ONETOUCH DELICA LANCETS FINE MISC Check blood sugar twice daily Dx: 250.00 200 each 1    glucose blood VI test strips (ASCENSIA AUTODISC VI;ONE TOUCH ULTRA TEST VI) strip One Touch Ultra Test Strip - Check blood sugar twice daily Dx: E11.9 200 each 1    traZODone (DESYREL) 150 MG tablet take 1/2 to 1 tablet by mouth once daily at bedtime if needed for pain SPASM, OR SLEEP  0    aspirin 81 MG tablet Take 81 mg by mouth daily      HYDROcodone-acetaminophen (NORCO) 5-325 MG per tablet Take 1 tablet by mouth every 6 hours as needed for Pain      Omega-3 Fatty Acids (FISH OIL) 1000 MG CAPS Take 1,000 mg by mouth daily.  sildenafil (VIAGRA) 100 MG tablet Take 1 tablet by mouth as needed. 8 tablet 5    ipratropium-albuterol (DUONEB) 0.5-2.5 (3) MG/3ML SOLN nebulizer solution Inhale 3 mLs into the lungs every 4 hours as needed for Shortness of Breath 1620 mL 11    testosterone cypionate (DEPOTESTOTERONE CYPIONATE) 200 MG/ML injection Inject 1 ml into the muscle every 14 days. 2 mL 3     No current facility-administered medications for this visit. Ramakrishna ARNOLD   General/Constitutional: No recent loss of weight or appetite changes. No fever or chills. HENT: Negative. Eyes: Negative. Upper respiratory tract: No nasal stuffiness or post nasal drip. Lower respiratory tract/ lungs: Frequent cough with green sputum production. No hemoptysis. Cardiovascular: No palpitations or chest pain. Gastrointestinal: No nausea or vomiting. Neurological: No focal neurologiacal weakness. Extremities: No edema. Musculoskeletal: No complaints. Genitourinary: No complaints. Hematological: Negative. Psychiatric/Behavioral: Negative. Skin: No itching. Physical exam   /86 (Site: Right Upper Arm, Position: Sitting, Cuff Size: Large Adult)   Pulse 70   Temp 98.3 °F (36.8 °C) (Tympanic)   Ht 5' 8\" (1.727 m)   Wt 244 lb 3.2 oz (110.8 kg)   SpO2 95%   BMI 37.13 kg/m²      Physical Exam   Constitutional: Patient appears moderately built and moderately nourished. In no acute distress. Head: Normocephalic and atraumatic. Mouth/Throat: Oropharynx is clear and moist.  No oral thrush. Neck: Neck supple. No tracheal deviation present. Cardiovascular: Regular rate, regular rhythm, S1 and S2 with no murmur. No peripheral edema  Pulmonary/Chest: Normal effort with clear breath sounds. No stridor.  No respiratory distress. Abdominal: Soft. No distension or tenderness to palpation. Musculoskeletal: Moves all extremities. Lymphadenopathy:  No cervical adenopathy. Neurological: Patient is alert and oriented to person, place, and time. No focal deficits. Skin: Warm and dry. No clubbing or cyanosis. Test results   Lung Nodule Screening     [x] Qualifies    [] Does not qualify   [] Declined    [x] Completed     HRCT 2/2/19        FINDINGS:        The central airways appear patent. Mild dependent bibasilar atelectasis is noted. There is moderate upper zone predominant centrilobular emphysema. This is most pronounced within the right upper lobe. Mild probable bandlike scarring or atelectasis is    noted within the lingula. There is a calcified granuloma at the left lung base.       No honeycombing is seen. No significant bronchiectasis is demonstrated.       No lymphadenopathy is demonstrated. Limited evaluation of the upper abdomen appears unremarkable. No acute osseous findings are demonstrated.       There are nonspecific ovoid soft tissue opacities demonstrated along the bilateral paraspinal regions on axial image 43 at the T9-10 level. This is nonspecific.           Impression   1. Moderate upper zone predominant centrilobular emphysema, most pronounced within the right upper lobe, is demonstrated. 2. Mild dependent opacities at the lung bases bilaterally are favored to represent mild atelectasis. 3.There are nonspecific ovoid soft tissue opacities demonstrated along the bilateral paraspinal regions on axial image 43 at the T9-10 level. These findings are nonspecific. Additional characterization could be obtained with a contrast enhanced thoracic    spine MRI.          1/16/19 with 7/17/18    Impression   Bibasilar opacities which could be related to atelectasis or infiltrate. CT soft tissue neck 3/28/16  IMPRESSION:   1.  There is asymmetric soft tissue prominence demonstrated along the posterior aspect of the left vocal cord. This may represent focal scarring however, cannot exclude a mucosal lesion. Consider correlation with direct visualization. 2. Small soft tissue prominence at the base of the tongue as described above. This may represent retained mucous secretions. Again, this can be correlated clinically with direct visualization. 3. Nonspecific mildly prominent lymph nodes along the posterior cervical chain on the left. The largest of these measures approximately 1 cm in short axis dimension. These may be reactive in nature. However, recommend followup CT evaluation to document    stability. 4. Heterogeneous low-attenuation nodule within left thyroid gland is incompletely characterized on the current examination and can be further characterized by thyroid ultrasound followup. 5. Moderate centrilobular emphysema is noted at the lung apices.                               Component Collected Lab   Gram Stain Result 01/28/2019 11:44 AM [facility]   Quality of sputum specimen: Specimen acceptable. Few segmented neutrophils observed. Rare epithelial cells observed. Many gram positive cocci in pairs and chains. Many gram negative bacilli. Few gram positive bacilli. Few budding yeast and pseudohyphae observed. Organism Abnormal  01/28/2019 11:44  [facility]   Escherichia coli    Respiratory Culture 01/28/2019 11:44 AM [facility]   heavy growth    Testing Performed By      Lab - Abbreviation Name Director Address Valid Date Range   [id number] [id number] [facility] Libertad Rodriguez MD [address] [place] [id number] 08/30/17 0855-Present   Narrative   Performed by: [facility]   Source: Expectorated sputum       Site:           Current Antibiotics: not stated   Susceptibility      Escherichia coli (1)             Antibiotic Interpretation CAIT Status   cefTRIAXone Sensitive <=1 mcg/mL Final   gentamicin Sensitive <=1 mcg/mL Final   cefOXitin Sensitive <=4 mcg/mL Final   levofloxacin Sensitive <=0.12 mcg/mL Final   cefuroxime Intermediate     Final   tetracycline Sensitive <=1 mcg/mL Final   trimethoprim-sulfamethoxazole Sensitive <=20 mcg/mL Final                                           Assessment      Diagnosis Orders   1. Cough  Bronchoscopy    CBC With Auto Differential    RAST GR1    POCT Nitric Oxide   2. Asthma-COPD overlap syndrome (HCC)  Bronchoscopy    CBC With Auto Differential    RAST GR1    POCT Nitric Oxide   3. Dysphagia, unspecified type  FL Modified Barium Swallow W Video         Plan   I will review his case with Dr. Edith Dugan and need for possible bronchoscopy with BAL. He has been on multiple rounds of ATB and steroids with temporary improvement. He has a history of having MRSA to his face as well as mold exposure. Treated for E-coli on last sputum culture. The dysphagia is new and with e-coli in sputum there is concern for possible aspiration. He is due for repeat EGD for his history of esophagitis. He continues on H2-Blocker/PPI and reports no symptoms of worsening GERD. Will obtain MBS in the interim. He is to call and follow up on his EGD and report his symptoms of dysphagia. No infiltrates on recent imaging. We discussed triggers of asthma in regards to allergies and or chronic sinus issues. He denies symptoms from both aspects. We attempted a NIOX today in the office which was aborted after multiple tries. Not because of his lack of effort or distress, the machine would not detect a recording for unclear reasons. He shows no signs of acute exacerbation at this time. He agrees to proceed with allergic work up with IGE, CBC with diff and Mini-Rast. He is not on Singulair or any allergy treatment at this time. No prior evaluation of his sinuses. He previously took Flonase for allergic rhinitis but currently has no complaints.     He did have an abnormal CT scan of signs and symptoms with examination, reviewing diagnostic testing in correlation to his current status and formulating a plan of care.     Will see Yony Borden back in: Follow up after bronch and MBS with allergy testing.     Electronically signed by SANTOSH Cross CNP on 4/2/2019 at 11:56 AM'

## 2019-04-03 ENCOUNTER — OFFICE VISIT (OUTPATIENT)
Dept: PULMONOLOGY | Age: 64
End: 2019-04-03
Payer: MEDICARE

## 2019-04-03 VITALS
SYSTOLIC BLOOD PRESSURE: 128 MMHG | DIASTOLIC BLOOD PRESSURE: 80 MMHG | HEIGHT: 68 IN | WEIGHT: 243 LBS | RESPIRATION RATE: 20 BRPM | OXYGEN SATURATION: 91 % | TEMPERATURE: 98.1 F | BODY MASS INDEX: 36.83 KG/M2 | HEART RATE: 79 BPM

## 2019-04-03 DIAGNOSIS — J45.40 MODERATE PERSISTENT ASTHMA WITHOUT COMPLICATION: ICD-10-CM

## 2019-04-03 DIAGNOSIS — R93.89 ABNORMAL CT OF THE CHEST: ICD-10-CM

## 2019-04-03 DIAGNOSIS — R05.3 CHRONIC COUGH: ICD-10-CM

## 2019-04-03 DIAGNOSIS — J45.40 MODERATE PERSISTENT ASTHMA WITHOUT COMPLICATION: Primary | ICD-10-CM

## 2019-04-03 DIAGNOSIS — J44.9 STAGE 1 MILD COPD BY GOLD CLASSIFICATION (HCC): ICD-10-CM

## 2019-04-03 PROCEDURE — 3023F SPIROM DOC REV: CPT | Performed by: INTERNAL MEDICINE

## 2019-04-03 PROCEDURE — G8427 DOCREV CUR MEDS BY ELIG CLIN: HCPCS | Performed by: INTERNAL MEDICINE

## 2019-04-03 PROCEDURE — 99214 OFFICE O/P EST MOD 30 MIN: CPT | Performed by: INTERNAL MEDICINE

## 2019-04-03 PROCEDURE — 3017F COLORECTAL CA SCREEN DOC REV: CPT | Performed by: INTERNAL MEDICINE

## 2019-04-03 PROCEDURE — G8417 CALC BMI ABV UP PARAM F/U: HCPCS | Performed by: INTERNAL MEDICINE

## 2019-04-03 PROCEDURE — 4040F PNEUMOC VAC/ADMIN/RCVD: CPT | Performed by: INTERNAL MEDICINE

## 2019-04-03 PROCEDURE — 1036F TOBACCO NON-USER: CPT | Performed by: INTERNAL MEDICINE

## 2019-04-03 PROCEDURE — 1123F ACP DISCUSS/DSCN MKR DOCD: CPT | Performed by: INTERNAL MEDICINE

## 2019-04-03 PROCEDURE — G8926 SPIRO NO PERF OR DOC: HCPCS | Performed by: INTERNAL MEDICINE

## 2019-04-03 RX ORDER — MONTELUKAST SODIUM 10 MG/1
10 TABLET ORAL NIGHTLY
Qty: 30 TABLET | Refills: 11 | Status: SHIPPED | OUTPATIENT
Start: 2019-04-03 | End: 2019-04-03 | Stop reason: SDUPTHER

## 2019-04-03 RX ORDER — MONTELUKAST SODIUM 10 MG/1
10 TABLET ORAL NIGHTLY
Qty: 90 TABLET | Refills: 3 | Status: SHIPPED | OUTPATIENT
Start: 2019-04-03 | End: 2020-04-06

## 2019-04-03 NOTE — PATIENT INSTRUCTIONS
Recommendations/Plan:  - Schedule patient for Bronchoscopy for air way examination, BAL and bronchwashings without fluoroscopy in endoscopy suite at [address] [place] [address] with out anesthesia to further evaluate the etiology of chronic cough and recurrent infections.  -Yony ROMMEL Borden educated and informed about bronchoscopy procedure,method, complicantions of procedure including but not limited to development of respiratory failure with requirement of ventilator. He agreed for the procedure and verbalizes understanding.   -Continue Albuterol HFA 90mcg/Spray MDI, 2puffs  Q6Hprn.  -Start patient on Singulair 10mg 1 tablet at bed time daily. He  was informed about adverse effects of Singulair. He verbalizes understanding.  -Pulmicort 0.5mg via neb bid. -Duonebs Q6h prn in alternation with Albuterol HFA. -He instructed to use Albuterol HFA  inhaler 2 puffs Q6h prn or Duonebs nebs Q6h prn (the nebulizer) at one time as rescue medication not both at the same time. He verbalizes understanding.   -Continue Zantac for his GERD. -The pathophysiology of reflux is discussed. Anti-reflux measures such as raising the head of the bed, avoiding tight clothing or belts, avoiding eating late at night and not lying down shortly after mealtime and achieving weight loss are discussed. Avoid ASA, NSAID's, caffeine, peppermints, alcohol and tobacco. Patient should alert me if there are persistent symptoms, dysphagia, weight loss or GI bleeding.   -Patient educated to update his pneumococcal vaccine with family physician and take influenza vaccine in coming season with out fail. Patient verbalizes understanding.  -Yony CARNEY Michi educated about environmental safety precautions he need to practice to prevent exacerbation ofhis Asthma. Yony CARNEY Michi verbalizes understanding.   - He is following with Dr.Charles Tammy MD from [facility] for his Left thyroid nodule.      - Patient and his wife were educated about my

## 2019-04-03 NOTE — PROGRESS NOTES
Neck Circumference -  18.75   Mallampati - IV    Lung Nodule Screening     [x] Qualifies    [] Does not qualify   [] Declined    [] Completed 4-27-18

## 2019-04-03 NOTE — PROGRESS NOTES
uncontrolled        Past Surgical History:   Procedure Laterality Date    BACK SURGERY  2002    BACK SURGERY  08/11/2017    BICEPS TENDON REPAIR Right 08/16/2017    CARDIAC CATHETERIZATION  07/02 08/05    CARPAL TUNNEL RELEASE  2011    right hand    CHOLECYSTECTOMY  yrs ago    COLONOSCOPY  11/06 02/12 1/14    EYE SURGERY      foreign body removal    HERNIA REPAIR  2004    Dr Mitchell Engel  2008?  LARYNGOSCOPY  04/28/2016    Laryngoscopy Micro with Biopsy by Dr Elyse Acuna  01/07    uppp    ROTATOR CUFF REPAIR Left 5-20-15    TONSILLECTOMY  1985    UPPER GASTROINTESTINAL ENDOSCOPY  1997    VEIN SURGERY Left September 2014    Dr. Rae Gilbert       No Known Allergies    Current Outpatient Medications   Medication Sig Dispense Refill    SYRINGE-NEEDLE, DISP, 3 ML 23G X 1-1/2\" 3 ML MISC Inject 1 Syringe into the muscle every 14 days 10 each 1    rOPINIRole (REQUIP) 1 MG tablet Take 1 tablet by mouth 3 times daily 90 tablet 5    losartan (COZAAR) 25 MG tablet Take 1 tablet by mouth daily 30 tablet 5    glimepiride (AMARYL) 4 MG tablet Take 1 tablet by mouth 2 times daily 60 tablet 5    budesonide-formoterol (SYMBICORT) 160-4.5 MCG/ACT AERO Inhale 2 puffs into the lungs 2 times daily      budesonide (PULMICORT) 0.5 MG/2ML nebulizer suspension Take 2 mLs by nebulization 2 times daily Pulmicort Respules 0.5mg via nebulization Bid 60 ampule 11    VENTOLIN  (90 Base) MCG/ACT inhaler Inhale 2 puffs into the lungs every 6 hours as needed for Wheezing 1 Inhaler 11    Misc.  Devices (ACAPELLA) MISC 1 Device by Does not apply route 4 times daily 1 each 0    ranitidine (ZANTAC) 150 MG tablet TAKE 1 TABLET BY MOUTH TWICE DAILY 180 tablet 3    metFORMIN (GLUCOPHAGE) 500 MG tablet TAKE 2 TABLETS BY MOUTH TWICE DAILY WITH MEALS 120 tablet 5    sertraline (ZOLOFT) 100 MG tablet take 1 tablet by mouth twice a day 60 tablet 5    simvastatin (ZOCOR) 40 MG tablet Take 1 tablet by mouth nightly 30 tablet 5    busPIRone (BUSPAR) 10 MG tablet Take 1 tablet by mouth 3 times daily 90 tablet 5    hydrOXYzine (ATARAX) 10 MG tablet       triamcinolone (KENALOG) 0.1 % cream APPLY TOPICALLY TO THE AFFECTED AREA BID  1    baclofen (LIORESAL) 10 MG tablet take 1 tablet by mouth twice a day  0    ONETOUCH DELICA LANCETS FINE MISC Check blood sugar twice daily Dx: 250.00 200 each 1    glucose blood VI test strips (ASCENSIA AUTODISC VI;ONE TOUCH ULTRA TEST VI) strip One Touch Ultra Test Strip - Check blood sugar twice daily Dx: E11.9 200 each 1    traZODone (DESYREL) 150 MG tablet take 1/2 to 1 tablet by mouth once daily at bedtime if needed for pain SPASM, OR SLEEP  0    aspirin 81 MG tablet Take 81 mg by mouth daily      HYDROcodone-acetaminophen (NORCO) 5-325 MG per tablet Take 1 tablet by mouth every 6 hours as needed for Pain      Omega-3 Fatty Acids (FISH OIL) 1000 MG CAPS Take 1,000 mg by mouth daily.  sildenafil (VIAGRA) 100 MG tablet Take 1 tablet by mouth as needed. 8 tablet 5    ipratropium-albuterol (DUONEB) 0.5-2.5 (3) MG/3ML SOLN nebulizer solution Inhale 3 mLs into the lungs every 4 hours as needed for Shortness of Breath 1620 mL 11    testosterone cypionate (DEPOTESTOTERONE CYPIONATE) 200 MG/ML injection Inject 1 ml into the muscle every 14 days. 2 mL 3     No current facility-administered medications for this visit. Family History   Problem Relation Age of Onset    Cancer Mother     Breast Cancer Mother     Stroke Mother     Heart Disease Brother     Diabetes Brother     High Blood Pressure Brother     High Cholesterol Brother          Physical Exam     VITALS:    /80 (Site: Left Upper Arm, Position: Sitting, Cuff Size: Medium Adult)   Pulse 79   Temp 98.1 °F (36.7 °C) (Oral)   Resp 20   Ht 5' 8\" (1.727 m)   Wt 243 lb (110.2 kg)   SpO2 91% Comment: on RA  BMI 36.95 kg/m²   Nursing note and vitals reviewed.   Constitutional: Patient appears moderately built and moderately nourished. No distress. Patient is oriented to person, place, and time. HENT:   Head: Normocephalic and atraumatic. Right Ear: External ear normal.   Left Ear: External ear normal.   Mouth/Throat: Oropharynx is clear and moist.  No oral thrush. Eyes: Conjunctivae are normal. Pupils are equal, round, and reactive to light. No scleral icterus. Neck: Neck supple. No JVD present. No tracheal deviation present. Cardiovascular: Normal rate, regular rhythm, normal heart sounds. No murmur heard. Pulmonary/Chest: Effort normal and breath sounds normal. No stridor. No respiratory distress. Occasional expiratory wheezes. No rales. Patient exhibits no tenderness. Abdominal: Soft. Patient exhibits no distension. No tenderness. Musculoskeletal: Normal range of motion. Extremities: Patient exhibits no edema and no tenderness. Lymphadenopathy:  No cervical adenopathy. Neurological: Patient is alert and oriented to person, place, and time. Skin: Skin is warm and dry. Patient is not diaphoretic. Psychiatric: Patient  has a normal mood and affect. Patient behavior is normal.     Neck Circumference - 18.75\"   Mallampati - IV    Diagnostic Data:  Radiological Data:  May 21, 2015  PROCEDURE: CTA CHEST WITH CONTRAST  IMPRESSION:  Examination is negative for pulmonary embolism. Dependent atelectasis bilaterally left greater than right    Pulmonary function tests: 2018                          MCCT test:3/28/16: Positive. CT neck:  Mar 28, 2016  PROCEDURE: CT SOFT TISSUE NECK W CONTRAST  IMPRESSION: 1. There is asymmetric soft tissue prominence demonstrated along the posterior aspect of the left vocal cord. This may represent focal scarring however, cannot exclude a mucosal lesion. Consider correlation with direct visualization. 2. Small soft tissue prominence at the base of the tongue as described above. This may represent retained mucous secretions.  Again, this can be correlated clinically with direct visualization. 3. Nonspecific mildly prominent lymph nodes along the posterior cervical chain on the left. The largest of these measures approximately 1 cm in short axis dimension. These may be reactive in nature. However, recommend followup CT evaluation to document stability. 4. Heterogeneous low-attenuation nodule within left thyroid gland is incompletely characterized on the current examination and can be further characterized by thyroid ultrasound followup. 5. Moderate centrilobular emphysema is noted at the lung apices. HRCT of chest:  Feb 2, 2019   PROCEDURE: CT CHEST HIGH RESOLUTION   1. Moderate upper zone predominant centrilobular emphysema, most pronounced within the right upper lobe, is demonstrated. 2. Mild dependent opacities at the lung bases bilaterally are favored to represent mild atelectasis. 3.There are nonspecific ovoid soft tissue opacities demonstrated along the bilateral paraspinal regions on axial image 43 at the T9-10 level. These findings are nonspecific. Additional characterization could be obtained with a contrast enhanced thoracic spine MRI.    Ordering Provider: [address] [place] [address]    IMPRESSION:   Normal x-ray examination of the chest.            Respiratory cultures:  01/28/2019 11:44 AM   Narrative   Performed by: [facility]   Source: Expectorated sputum       Site:           Current Antibiotics: not stated   Susceptibility     Escherichia coli (1)     Antibiotic Interpretation CAIT Status    cefTRIAXone Sensitive <=1 mcg/mL Final    gentamicin Sensitive <=1 mcg/mL Final    cefOXitin Sensitive <=4 mcg/mL Final    levofloxacin Sensitive <=0.12 mcg/mL Final    cefuroxime Intermediate   Final    tetracycline Sensitive <=1 mcg/mL Final    trimethoprim-sulfamethoxazole Sensitive <=20 mcg/mL         Assessment:  -Moderate persistent bronchial asthma- Not under control with frequent exacerbations.  -Abnormal CT chest with hx of E coli infection and MRSA infection of left side of face. -Mild COPD with hx of tobacco smoking- under control  -Chronic cough due to uncontrolled asthma Vs allergic rhinitis  -Allergic rhinitis- on Astelin nasal spray.  -Abnormal CT scan of neck with ? Lesion over vocal cord and cervical lymphadenopathy on left side S/p Microlaryngoscopy and biopsy by Dr. Laura Suresh on 4/28/16. He is following with ENT. -Heterogeneous low-attenuation nodule within left thyroid gland is incompletely characterized on the current examination. Following with ENT. -Type II or unspecified type diabetes mellitus without mention of complication, not stated as uncontrolled ([address] -Acid reflux on treatment with Nexium.  -Chronic tobacco smoking- He quit smoking 2years backback.  -Depression. -Anxiety disorder.  -Chronic back pain.  -Blood pressure- not under control. He denies any chest pain, headache or dizziness at this time.   -Sleep apnea. He used to be on CPAP in the past. He underwent surgery 4 to 5 years back. He never had a follow up sleep test. He refused go for sleep test or get treated,      Recommendations/Plan:  - Schedule patient for Bronchoscopy for air way examination, BAL and bronchwashings without fluoroscopy in endoscopy suite at [address] [place] [address] with out anesthesia to further evaluate the etiology of chronic cough and recurrent infections.  -Yony Borden educated and informed about bronchoscopy procedure,method, complicantions of procedure including but not limited to development of respiratory failure with requirement of ventilator. He agreed for the procedure and verbalizes understanding.   -Continue Albuterol HFA 90mcg/Spray MDI, 2puffs  Q6Hprn.  -Start patient on Singulair 10mg 1 tablet at bed time daily. He  was informed about adverse effects of Singulair. He verbalizes understanding.  -Pulmicort 0.5mg via neb bid.   -Duonebs Q6h prn in alternation with Albuterol HFA. -He instructed to use Albuterol HFA  inhaler 2 puffs Q6h prn or Duonebs nebs Q6h prn (the nebulizer) at one time as rescue medication not both at the same time. He verbalizes understanding.   -Continue Zantac for his GERD. -The pathophysiology of reflux is discussed. Anti-reflux measures such as raising the head of the bed, avoiding tight clothing or belts, avoiding eating late at night and not lying down shortly after mealtime and achieving weight loss are discussed. Avoid ASA, NSAID's, caffeine, peppermints, alcohol and tobacco. Patient should alert me if there are persistent symptoms, dysphagia, weight loss or GI bleeding.   -Patient educated to update his pneumococcal vaccine with family physician and take influenza vaccine in coming season with out fail. Patient verbalizes understanding.  -Yony Pascualkevin educated about environmental safety precautions he need to practice to prevent exacerbation ofhis Asthma. Yony Pascualkevin verbalizes understanding.   - He is following with Dr.Charles Tammy MD from [facility] for his Left thyroid nodule. - Patient and his wife were educated about my impression and plan. They verbalizes understanding.  - Schedule patient for follow up with [facility] in 2 to 3 weeks after bronch to go over bronch results along with barium swallow ordered. Patient advised to make early appointment if needed. -He was requested to go for polysomnogram in the sleep lab to further evaluate for obstructive sleep apnea. However at the end of discussion he refused to go for the sleep test at this time. He verbalizes understanding of consequences of his decision including non diagnosis of obstructive sleep apnea resulting in increased morbidity and mortality with cerebro and cardiovascular events including death. He verbalizes understanding.

## 2019-04-05 ENCOUNTER — HOSPITAL ENCOUNTER (OUTPATIENT)
Age: 64
Setting detail: OUTPATIENT SURGERY
Discharge: HOME | DRG: 177 | End: 2019-04-05
Attending: INTERNAL MEDICINE | Admitting: INTERNAL MEDICINE
Payer: MEDICARE

## 2019-04-05 ENCOUNTER — ANESTHESIA EVENT (OUTPATIENT)
Dept: ENDOSCOPY | Age: 64
DRG: 177 | End: 2019-04-05
Payer: MEDICARE

## 2019-04-05 ENCOUNTER — ANESTHESIA (OUTPATIENT)
Dept: ENDOSCOPY | Age: 64
DRG: 177 | End: 2019-04-05
Payer: MEDICARE

## 2019-04-05 VITALS
DIASTOLIC BLOOD PRESSURE: 78 MMHG | OXYGEN SATURATION: 93 % | SYSTOLIC BLOOD PRESSURE: 147 MMHG | RESPIRATION RATE: 18 BRPM

## 2019-04-05 VITALS
HEIGHT: 68 IN | TEMPERATURE: 98.1 F | BODY MASS INDEX: 35.77 KG/M2 | OXYGEN SATURATION: 90 % | DIASTOLIC BLOOD PRESSURE: 71 MMHG | RESPIRATION RATE: 20 BRPM | HEART RATE: 87 BPM | WEIGHT: 236 LBS | SYSTOLIC BLOOD PRESSURE: 120 MMHG

## 2019-04-05 DIAGNOSIS — J38.3 VOCAL CORD DISEASE: Primary | ICD-10-CM

## 2019-04-05 DIAGNOSIS — K21.9 LARYNGITIS FROM REFLUX OF STOMACH ACID: ICD-10-CM

## 2019-04-05 DIAGNOSIS — J04.0 LARYNGITIS FROM REFLUX OF STOMACH ACID: ICD-10-CM

## 2019-04-05 PROCEDURE — 87116 MYCOBACTERIA CULTURE: CPT

## 2019-04-05 PROCEDURE — 7100000001 HC PACU RECOVERY - ADDTL 15 MIN: Performed by: INTERNAL MEDICINE

## 2019-04-05 PROCEDURE — 87070 CULTURE OTHR SPECIMN AEROBIC: CPT

## 2019-04-05 PROCEDURE — 2500000003 HC RX 250 WO HCPCS

## 2019-04-05 PROCEDURE — 87081 CULTURE SCREEN ONLY: CPT

## 2019-04-05 PROCEDURE — 87255 GENET VIRUS ISOLATE HSV: CPT

## 2019-04-05 PROCEDURE — 87206 SMEAR FLUORESCENT/ACID STAI: CPT

## 2019-04-05 PROCEDURE — 3609010800 HC BRONCHOSCOPY ALVEOLAR LAVAGE: Performed by: INTERNAL MEDICINE

## 2019-04-05 PROCEDURE — 87077 CULTURE AEROBIC IDENTIFY: CPT

## 2019-04-05 PROCEDURE — 3700000000 HC ANESTHESIA ATTENDED CARE: Performed by: INTERNAL MEDICINE

## 2019-04-05 PROCEDURE — 87106 FUNGI IDENTIFICATION YEAST: CPT

## 2019-04-05 PROCEDURE — 0B968ZZ DRAINAGE OF RIGHT LOWER LOBE BRONCHUS, VIA NATURAL OR ARTIFICIAL OPENING ENDOSCOPIC: ICD-10-PCS | Performed by: INTERNAL MEDICINE

## 2019-04-05 PROCEDURE — 2500000003 HC RX 250 WO HCPCS: Performed by: NURSE ANESTHETIST, CERTIFIED REGISTERED

## 2019-04-05 PROCEDURE — 6370000000 HC RX 637 (ALT 250 FOR IP)

## 2019-04-05 PROCEDURE — 87278 LEGION PNEUMOPHILIA AG IF: CPT

## 2019-04-05 PROCEDURE — 87205 SMEAR GRAM STAIN: CPT

## 2019-04-05 PROCEDURE — 3700000001 HC ADD 15 MINUTES (ANESTHESIA): Performed by: INTERNAL MEDICINE

## 2019-04-05 PROCEDURE — 87184 SC STD DISK METHOD PER PLATE: CPT

## 2019-04-05 PROCEDURE — 3609027000 HC BRONCHOSCOPY: Performed by: INTERNAL MEDICINE

## 2019-04-05 PROCEDURE — 87186 SC STD MICRODIL/AGAR DIL: CPT

## 2019-04-05 PROCEDURE — 2709999900 HC NON-CHARGEABLE SUPPLY: Performed by: INTERNAL MEDICINE

## 2019-04-05 PROCEDURE — 2580000003 HC RX 258: Performed by: INTERNAL MEDICINE

## 2019-04-05 PROCEDURE — 7100000000 HC PACU RECOVERY - FIRST 15 MIN: Performed by: INTERNAL MEDICINE

## 2019-04-05 PROCEDURE — 0B9F8ZX DRAINAGE OF RIGHT LOWER LUNG LOBE, VIA NATURAL OR ARTIFICIAL OPENING ENDOSCOPIC, DIAGNOSTIC: ICD-10-PCS | Performed by: INTERNAL MEDICINE

## 2019-04-05 PROCEDURE — 88112 CYTOPATH CELL ENHANCE TECH: CPT

## 2019-04-05 PROCEDURE — 87102 FUNGUS ISOLATION CULTURE: CPT

## 2019-04-05 PROCEDURE — 0B9B8ZZ DRAINAGE OF LEFT LOWER LOBE BRONCHUS, VIA NATURAL OR ARTIFICIAL OPENING ENDOSCOPIC: ICD-10-PCS | Performed by: INTERNAL MEDICINE

## 2019-04-05 PROCEDURE — 31624 DX BRONCHOSCOPE/LAVAGE: CPT | Performed by: INTERNAL MEDICINE

## 2019-04-05 PROCEDURE — 6360000002 HC RX W HCPCS: Performed by: NURSE ANESTHETIST, CERTIFIED REGISTERED

## 2019-04-05 PROCEDURE — 88305 TISSUE EXAM BY PATHOLOGIST: CPT

## 2019-04-05 PROCEDURE — 89051 BODY FLUID CELL COUNT: CPT

## 2019-04-05 RX ORDER — SODIUM CHLORIDE 450 MG/100ML
INJECTION, SOLUTION INTRAVENOUS CONTINUOUS
Status: DISCONTINUED | OUTPATIENT
Start: 2019-04-05 | End: 2019-04-05 | Stop reason: HOSPADM

## 2019-04-05 RX ORDER — LIDOCAINE HYDROCHLORIDE 20 MG/ML
INJECTION, SOLUTION INFILTRATION; PERINEURAL PRN
Status: DISCONTINUED | OUTPATIENT
Start: 2019-04-05 | End: 2019-04-05 | Stop reason: SDUPTHER

## 2019-04-05 RX ORDER — PROPOFOL 10 MG/ML
INJECTION, EMULSION INTRAVENOUS PRN
Status: DISCONTINUED | OUTPATIENT
Start: 2019-04-05 | End: 2019-04-05 | Stop reason: SDUPTHER

## 2019-04-05 RX ADMIN — SODIUM CHLORIDE: 4.5 INJECTION, SOLUTION INTRAVENOUS at 13:56

## 2019-04-05 RX ADMIN — PROPOFOL 200 MG: 10 INJECTION, EMULSION INTRAVENOUS at 14:54

## 2019-04-05 RX ADMIN — LIDOCAINE HYDROCHLORIDE 100 MG: 20 INJECTION, SOLUTION INFILTRATION; PERINEURAL at 14:54

## 2019-04-05 ASSESSMENT — PAIN - FUNCTIONAL ASSESSMENT: PAIN_FUNCTIONAL_ASSESSMENT: 0-10

## 2019-04-05 ASSESSMENT — PAIN SCALES - GENERAL
PAINLEVEL_OUTOF10: 0
PAINLEVEL_OUTOF10: 0

## 2019-04-05 NOTE — ANESTHESIA PRE PROCEDURE
Department of Anesthesiology  Preprocedure Note       Name:  April Harvey   Age:  70 y.o.  :  1955                                          MRN:  [id number]         Date:  2019      Surgeon: Efren Castillo):  Nereida Easley MD    Procedure: BRONCHOSCOPY (N/A )    Medications prior to admission:   Prior to Admission medications    Medication Sig Start Date End Date Taking?  Authorizing Provider   montelukast (SINGULAIR) 10 MG tablet Take 1 tablet by mouth nightly 4/3/19 4/2/20 Yes Nereida aEsley MD   rOPINIRole (REQUIP) 1 MG tablet Take 1 tablet by mouth 3 times daily 19  Yes Sue Sommer MD   losartan (COZAAR) 25 MG tablet Take 1 tablet by mouth daily 19  Yes Sue Sommer MD   glimepiride (AMARYL) 4 MG tablet Take 1 tablet by mouth 2 times daily 19  Yes Sue Sommer MD   budesonide (PULMICORT) 0.5 MG/2ML nebulizer suspension Take 2 mLs by nebulization 2 times daily Pulmicort Respules 0.5mg via nebulization Bid 19  Yes SANTOSH Mariscal CNP   VENTOLIN  (90 Base) MCG/ACT inhaler Inhale 2 puffs into the lungs every 6 hours as needed for Wheezing 19  Yes SANTOSH Patrick CNP   ranitidine (ZANTAC) 150 MG tablet TAKE 1 TABLET BY MOUTH TWICE DAILY 18  Yes SANTOSH Donnelly CNP   metFORMIN (GLUCOPHAGE) 500 MG tablet TAKE 2 TABLETS BY MOUTH TWICE DAILY WITH MEALS 18  Yes Sue Sommer MD   sertraline (ZOLOFT) 100 MG tablet take 1 tablet by mouth twice a day 10/31/18  Yes Sue Sommer MD   simvastatin (ZOCOR) 40 MG tablet Take 1 tablet by mouth nightly 10/29/18  Yes Sue Sommer MD   busPIRone (BUSPAR) 10 MG tablet Take 1 tablet by mouth 3 times daily 10/29/18  Yes Sue Sommer MD   baclofen (LIORESAL) 10 MG tablet take 1 tablet by mouth twice a day 18  Yes Historical Provider, MD   glucose blood VI test strips (ASCENSIA AUTODISC VI;ONE TOUCH ULTRA TEST VI) strip One Touch Ultra Test Strip - Check blood sugar twice daily Dx: E11.9 2/16/18  Yes Toña Kwon MD   traZODone (DESYREL) 150 MG tablet take 1/2 to 1 tablet by mouth once daily at bedtime if needed for pain SPASM, OR SLEEP 8/12/17  Yes Historical Provider, MD   HYDROcodone-acetaminophen (NORCO) 5-325 MG per tablet Take 1 tablet by mouth every 6 hours as needed for Pain   Yes Historical Provider, MD   Omega-3 Fatty Acids (FISH OIL) 1000 MG CAPS Take 1,000 mg by mouth daily. Yes Historical Provider, MD   SYRINGE-NEEDLE, DISP, 3 ML 23G X 1-1/2\" 3 ML MISC Inject 1 Syringe into the muscle every 14 days 3/1/19   SANTOSH Martinez CNP   Misc. Devices (ACAPELLA) MISC 1 Device by Does not apply route 4 times daily 1/25/19   SANTOSH Martinez CNP   ipratropium-albuterol (DUONEB) 0.5-2.5 (3) MG/3ML SOLN nebulizer solution Inhale 3 mLs into the lungs every 4 hours as needed for Shortness of Breath 1/25/19 2/24/19  SANTOSH Martinez CNP   testosterone cypionate (DEPOTESTOTERONE CYPIONATE) 200 MG/ML injection Inject 1 ml into the muscle every 14 days. 11/27/18 2/18/19  SANTOSH Martinez CNP   hydrOXYzine (ATARAX) 10 MG tablet  7/10/18   Historical Provider, MD Nancy Eli LANCETS FINE MISC Check blood sugar twice daily Dx: 250.00 2/19/18   Toña Kwon MD   sildenafil (VIAGRA) 100 MG tablet Take 1 tablet by mouth as needed.  9/12/12   Toña Kwon MD       Current medications:    Current Facility-Administered Medications   Medication Dose Route Frequency Provider Last Rate Last Dose    0.45 % sodium chloride infusion   Intravenous Continuous Yong Villa MD 75 mL/hr at 04/05/19 1356         Allergies:  No Known Allergies    Problem List:    Patient Active Problem List   Diagnosis Code    Diabetes ([facility] [address] E11.9    Hyperlipidemia E78.5    Acid reflux K21.9    Sleep apnea G47.30    Chronic back pain M54.9, G89.29    DDD (degenerative disc disease), cervical M50.30    Asthma J45.909    Rectus diastasis M62.08    S/P rotator cuff surgery Z98.890    Lower urinary tract symptoms (LUTS) R39.9    Hypogonadism in male E29.1    Low testosterone R79.89    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    Left thyroid nodule E04.1    COPD (chronic obstructive pulmonary disease) (HCC) J44.9    Sebaceous cyst L72.3    Acute respiratory failure with hypoxia ([facility]) J96.01    Class 2 severe obesity due to excess calories with serious comorbidity and body mass index (BMI) of 37.0 to 37.9 in adult ([facility] [address] [address] E66.01, Z68.37       Past Medical History:        Diagnosis Date    Acid reflux     Arthritis     Asthma     Chronic back pain     Class 2 severe obesity due to excess calories with serious comorbidity and body mass index (BMI) of 37.0 to 37.9 in adult [facility]) 8/13/2018    Colon polyps 11/06    COPD (chronic obstructive pulmonary disease) ([facility])     Depression     panic attacks    Diverticulosis     HTN (hypertension)     Hyperlipidemia     Kidney disorder     Panic attack     Sleep apnea     Thumb amputation status 3/13/14    left tip of thumb amputated    Type II or unspecified type diabetes mellitus without mention of complication, not stated as uncontrolled        Past Surgical History:        Procedure Laterality Date    BACK SURGERY  2002    BACK SURGERY  08/11/2017    BICEPS TENDON REPAIR Right 08/16/2017    BRONCHOSCOPY      CARDIAC CATHETERIZATION  07/02 08/05    CARPAL TUNNEL RELEASE  2011    right hand    CHOLECYSTECTOMY  yrs ago    COLONOSCOPY  11/06 02/12 1/14    EYE SURGERY      foreign body removal    HERNIA REPAIR  2004    Dr Glory Weller  2008?     LARYNGOSCOPY  04/28/2016    Laryngoscopy Micro with Biopsy by Dr Packer Askew  01/07    uppp    ROTATOR CUFF REPAIR Left 5-20-15    TONSILLECTOMY  1985    UPPER GASTROINTESTINAL ENDOSCOPY  1997    VEIN SURGERY Left September 2014    Dr. Haley Pompa for input(s): POCGLU, POCNA, POCK, POCCL, POCBUN, POCHEMO, POCHCT in the last 72 hours. Coags:   Lab Results   Component Value Date    PROTIME 11.5 07/24/2017    INR 1.00 07/24/2017    APTT 33.0 07/24/2017       HCG (If Applicable): No results found for: PREGTESTUR, PREGSERUM, HCG, HCGQUANT     ABGs: No results found for: PHART, PO2ART, WEL0VNC, SNM2JJC, BEART, J4MXSDAA     Type & Screen (If Applicable):  No results found for: [facility]    Anesthesia Evaluation  Patient summary reviewed  Airway: Mallampati: III  TM distance: >3 FB     Mouth opening: > = 3 FB Dental:          Pulmonary:   (+) COPD:  sleep apnea:  asthma:                            Cardiovascular:    (+) hypertension:,                   Neuro/Psych:   (+) neuromuscular disease:, psychiatric history:            GI/Hepatic/Renal:   (+) GERD:,           Endo/Other:    (+) Diabetes, . Abdominal:   (+) obese,         Vascular:                                        Anesthesia Plan      MAC     ASA 3       Induction: intravenous. Anesthetic plan and risks discussed with patient. Plan discussed with attending.                   Tiarra Ibrahim, SANTOSH - MYRON   4/5/2019

## 2019-04-05 NOTE — H&P
[facility]                                                 Pulmonary medicine clinic follow up note     Patient: Brian Jones                : 1955                             Lung Nodule Screening   [X] Qualifies [ ] Does not qualify [ ] Declined [X] Completed CTA 5/21/15     [place]: Brian Jones is a 70 y. o.oldmale was recently evaluated in my office regarding his moderate persistent asthma, mild COPD and chronic cough. He underwent treatment for Escherichia coli infection in lungs in the past. He was treated with Doxycycline 100mg po bid in . He was seen by Ms Francisco J Mata CNP on 19. He was advised to have bronch to obtain lower respiratory tract specimen for further evaluation of his chronic cough.     He had MRSA infection of left side of face. He underwent treatment with IV antibiotics by Dr. Merissa Hassan at that time. He also gives a hx of exposure to black mold.      He is currently using:  -Pulmicort 0.5mg via neb bid.  -Albuterol HFA 90mcg/Spray MDI, 2puffs  Q6Hprn  -Duonebs Q6h prn in alternation with Albuterol HFA.     He feels better with nebulizer meds compared to inhalers.      He is currently not using any Oxygen. No recent hospitalizations or emergency room visits for asthma.       He was initially referred from Dr. Rod Crook for chronic cough.     He quit taking Flonase 50mcg/INH 2sprays each nostril daily due to development of nasal bleeds. He used to be on treatment with Symbicort 160/4.5mcg spray MDI, 2 puffs via inhalation BID.         Asthma control (Severity) questionnaire:  Asthma symptoms:  > 2 times per week -> Yes  Night time awakenings: > 2 times per month -> No  Use of short acting beta agonist for symptoms control (Other than pre exercise use) :> 2times per week  -> Yes  Interference with normal activity  -> none  Lung function: Fev1 >60% Predicted  -> Yes  Number of exacerbations per year: > 2 -> No.     Social History: Occupation: He is retired now. He used to work as a  in the past.      Patient admits to history of tobacco smoking. He used to smoke 1 to 1.5 PPD for 44 Years. He denies history of exposure to coal, foundry, [employer], asbestos or significant farm dust exposure. His father was diagnosed with asbestos exposure. He used to move close to his father. Patient denies any recent travel.      Patient denies history of exposure to birds, pigeons, or chickens in the past. The patient admits toto pet animals at home. Hecurrently had 2cats at home. He denies to history of recreational or IV drug use. Hedenies history of pulmonary embolism or DVT in the past.        Review of Systems:   General/Constitutional: he remain at same weight from the last visit with normal appetite. No fever or chills. HENT: Negative. Eyes: Negative. Upper respiratory tract: No nasal stuffiness or post nasal drip. Lower respiratory tract/ lungs: Frequent cough with sputum production. No hemoptysis. Cardiovascular: No palpitations or chest pain. Gastrointestinal: No nausea or vomiting. Neurological: No focal neurologiacal weakness. Extremities: No edema. Musculoskeletal: No complaints. Genitourinary: No complaints. Hematological: Negative. Psychiatric/Behavioral: Negative. Skin: No itching.       Past Medical History:   Diagnosis Date    Acid reflux     Arthritis     Asthma     Chronic back pain     Class 2 severe obesity due to excess calories with serious comorbidity and body mass index (BMI) of 37.0 to 37.9 in adult [facility]) 8/13/2018    Colon polyps 11/06    COPD (chronic obstructive pulmonary disease) (HCC)     Depression     panic attacks    Diverticulosis     HTN (hypertension)     Hyperlipidemia     Kidney disorder     Panic attack     Sleep apnea     Thumb amputation status 3/13/14    left tip of thumb amputated    Type II or unspecified type diabetes mellitus without mention of complication, not stated as uncontrolled        Past Surgical History:   Procedure Laterality Date    BACK SURGERY  2002    BACK SURGERY  2017    BICEPS TENDON REPAIR Right 2017    BRONCHOSCOPY      CARDIAC CATHETERIZATION      CARPAL TUNNEL RELEASE      right hand    CHOLECYSTECTOMY  yrs ago    COLONOSCOPY      EYE SURGERY      foreign body removal    HERNIA REPAIR  2004    Dr Joaquin Stephenson  ?  LARYNGOSCOPY  2016    Laryngoscopy Micro with Biopsy by Dr Cristobal Roe      uppp    ROTATOR CUFF REPAIR Left 5-20-15    TONSILLECTOMY      UPPER GASTROINTESTINAL ENDOSCOPY      VEIN SURGERY Left 2014    Dr. Arnav Song       Social History     Tobacco Use    Smoking status: Former Smoker     Packs/day: 2.00     Years: 42.00     Pack years: 84.00     Types: Cigarettes     Start date: 1971     Last attempt to quit: 3/3/2017     Years since quittin.0    Smokeless tobacco: Never Used    Tobacco comment: pts uses just nicotine vap   Substance Use Topics    Alcohol use: No     Alcohol/week: 0.0 oz    Drug use: No       No Known Allergies    Current Facility-Administered Medications   Medication Dose Route Frequency Provider Last Rate Last Dose    0.45 % sodium chloride infusion   Intravenous Continuous Kali Ruiz MD 75 mL/hr at 19 1356         Family History   Problem Relation Age of Onset    Cancer Mother     Breast Cancer Mother     Stroke Mother     Heart Disease Brother     Diabetes Brother     High Blood Pressure Brother     High Cholesterol Brother           Vitals: BP (!) 148/85   Pulse 87   Temp 97.5 °F (36.4 °C) (Temporal)   Resp 18   Ht 5' 8\" (1.727 m)   Wt 236 lb (107 kg)   SpO2 93%   BMI 35.88 kg/m²           Nursing note and vitals reviewed. Constitutional: Patient appears moderately built and moderately nourished.  No distress. Patient is oriented to person, place, and time. HENT:   Head: Normocephalic and atraumatic. Right Ear: External ear normal.   Left Ear: External ear normal.   Mouth/Throat: Oropharynx is clear and moist.  No oral thrush. Eyes: Conjunctivae are normal. Pupils are equal, round, and reactive to light. No scleral icterus. Neck: Neck supple. No JVD present. No tracheal deviation present. Cardiovascular: Normal rate, regular rhythm, normal heart sounds. No murmur heard. Pulmonary/Chest: Effort normal and breath sounds normal. No stridor. No respiratory distress. No wheezes. No rales. Patient exhibits no tenderness. Abdominal: Soft. Patient exhibits no distension. No tenderness. Musculoskeletal: Normal range of motion. Extremities: Patient exhibits no edema and no tenderness. Lymphadenopathy:  No cervical adenopathy. Neurological: Patient is alert and oriented to person, place, and time. Skin: Skin is warm and dry. Patient is not diaphoretic. Psychiatric: Patient  has a normal mood and affect. Patient behavior is normal.        Neck Circumference - 18.75\"   Mallampati - IV     Diagnostic Data:  Radiological Data:    CT neck:  Mar 28, 2016  PROCEDURE: CT SOFT TISSUE NECK W CONTRAST  IMPRESSION: 1. There is asymmetric soft tissue prominence demonstrated along the posterior aspect of the left vocal cord. This may represent focal scarring however, cannot exclude a mucosal lesion. Consider correlation with direct visualization. 2. Small soft tissue prominence at the base of the tongue as described above. This may represent retained mucous secretions. Again, this can be correlated clinically with direct visualization. 3. Nonspecific mildly prominent lymph nodes along the posterior cervical chain on the left. The largest of these measures approximately 1 cm in short axis dimension. These may be reactive in nature. However, recommend followup CT evaluation to document stability.  4. Heterogeneous low-attenuation nodule within left thyroid gland is incompletely characterized on the current examination and can be further characterized by thyroid ultrasound followup. 5. Moderate centrilobular emphysema is noted at the lung apices.     HRCT of chest:  Feb 2, 2019   PROCEDURE: CT CHEST HIGH RESOLUTION   1. Moderate upper zone predominant centrilobular emphysema, most pronounced within the right upper lobe, is demonstrated. 2. Mild dependent opacities at the lung bases bilaterally are favored to represent mild atelectasis. 3.There are nonspecific ovoid soft tissue opacities demonstrated along the bilateral paraspinal regions on axial image 43 at the T9-10 level. These findings are nonspecific. Additional characterization could be obtained with a contrast enhanced thoracic spine MRI. Ordering Provider: [address] [place] [address]    IMPRESSION:   Normal x-ray examination of the chest.              Respiratory cultures:  01/28/2019 11:44 AM   Narrative   Performed by: [facility]   Source: Expectorated sputum       Site:           Current Antibiotics: not stated   Susceptibility      Escherichia coli (1)              Antibiotic Interpretation CAIT Status     cefTRIAXone Sensitive <=1 mcg/mL Final     gentamicin Sensitive <=1 mcg/mL Final     cefOXitin Sensitive <=4 mcg/mL Final     levofloxacin Sensitive <=0.12 mcg/mL Final     cefuroxime Intermediate     Final     tetracycline Sensitive <=1 mcg/mL Final     trimethoprim-sulfamethoxazole Sensitive <=20 mcg/mL             Assessment:  -Moderate persistent bronchial asthma- Not under control with frequent exacerbations.  -Abnormal CT chest with hx of E coli infection and MRSA infection of left side of face. -Mild COPD with hx of tobacco smoking- under control  -Chronic cough due to uncontrolled asthma Vs allergic rhinitis  -Allergic rhinitis- on Astelin nasal spray.  -Abnormal CT scan of neck with ?  Lesion over vocal cord and cervical lymphadenopathy on left side S/p Microlaryngoscopy and biopsy by Dr. Emy Veronica on 4/28/16. He is following with ENT. -Heterogeneous low-attenuation nodule within left thyroid gland is incompletely characterized on the current examination. Following with ENT. -Type II or unspecified type diabetes mellitus without mention of complication, not stated as uncontrolled ([facility] [facility] -Acid reflux on treatment with Nexium.  -Chronic tobacco smoking- He quit smoking 2years backback.  -Depression. -Anxiety disorder.  -Chronic back pain.  -Blood pressure- not under control. He denies any chest pain, headache or dizziness at this time.   -Sleep apnea. He used to be on CPAP in the past. He underwent surgery 4 to 5 years back. He never had a follow up sleep test. He refused go for sleep test or get treated,        Recommendations/Plan:  - Schedule patient for Bronchoscopy for air way examination, BAL and bronchwashings without fluoroscopy in endoscopy suite at [address] [place] [address] with out anesthesia to further evaluate the etiology of chronic cough and recurrent infections.  -Yony Borden educated and informed about bronchoscopy procedure,method, complicantions of procedure including but not limited to development of respiratory failure with requirement of ventilator. He agreed for the procedure and verbalizes understanding.   -Continue Albuterol HFA 90mcg/Spray MDI, 2puffs  Q6Hprn.  -Start patient on Singulair 10mg 1 tablet at bed time daily. He  was informed about adverse effects of Singulair. He verbalizes understanding.  -Pulmicort 0.5mg via neb bid. -Duonebs Q6h prn in alternation with Albuterol HFA. -He instructed to use Albuterol HFA  inhaler 2 puffs Q6h prn or Duonebs nebs Q6h prn (the nebulizer) at one time as rescue medication not both at the same time. He verbalizes understanding.   -Continue Zantac for his GERD. -The pathophysiology of reflux is discussed.   Anti-reflux measures such as raising the head of the bed, avoiding tight clothing or belts, avoiding eating late at night and not lying down shortly after mealtime and achieving weight loss are discussed. Avoid ASA, NSAID's, caffeine, peppermints, alcohol and tobacco. Patient should alert me if there are persistent symptoms, dysphagia, weight loss or GI bleeding.   -Patient educated to update his pneumococcal vaccine with family physician and take influenza vaccine in coming season with out fail. Patient verbalizes understanding.  -Yony Borden educated about environmental safety precautions he need to practice to prevent exacerbation ofhis Asthma. Yony Borden verbalizes understanding.   - He is following with Dr.Charles Tammy MD from Ear, Nose and Throat speciality for his Left thyroid nodule. - Patient and his wife were educated about my impression and plan. They verbalizes understanding.  - Schedule patient for follow up with Ms.Stacy Patterson clinic in 2 to 3 weeks after bronch to go over bronch results along with barium swallow ordered.  Patient advised to make early appointment if needed.

## 2019-04-05 NOTE — PRE SEDATION
[address] [address] Endoscopy  Sedation Pre-Procedure Note    Patient Name: Madie Graham    YOB: 1955   Room/Bed: [address] [place] [address]  Medical Record Number: [id number]  Date: 4/5/2019  Time: 3:32 PM     Indication:  Pain control for Flexible Fibrooptic Bronchoscopy. Consent: I have discussed with the patient and/or the patient representative the indication, alternatives, and the possible risks and/or complications of the planned procedure and the anesthesia methods. The patient and/or patient representative appear to understand and agree to proceed.     Vital Signs: /75   Pulse 88   Temp 98.1 °F (36.7 °C) (Temporal)   Resp 24   Ht 5' 8\" (1.727 m)   Wt 236 lb (107 kg)   SpO2 92%   BMI 35.88 kg/m²       Past Medical History:  Past Medical History:   Diagnosis Date    Acid reflux     Arthritis     Asthma     Chronic back pain     Class 2 severe obesity due to excess calories with serious comorbidity and body mass index (BMI) of 37.0 to 37.9 in adult [facility]) 8/13/2018    Colon polyps 11/06    [facility]     Depression     panic attacks    Diverticulosis     HTN (hypertension)     Hyperlipidemia     Kidney disorder     Panic attack     Sleep apnea     Thumb amputation status 3/13/14    left tip of thumb amputated    Type II or unspecified type diabetes mellitus without mention of complication, not stated as uncontrolled          Allergy:  No Known Allergies      Past Surgical History:  Past Surgical History:   Procedure Laterality Date    BACK SURGERY  2002    BACK SURGERY  08/11/2017    BICEPS TENDON REPAIR Right 08/16/2017    BRONCHOSCOPY      CARDIAC CATHETERIZATION  07/02 08/05    CARPAL TUNNEL RELEASE  2011    right hand    CHOLECYSTECTOMY  yrs ago    COLONOSCOPY  11/06 02/12 1/14    EYE SURGERY      foreign body removal    HERNIA REPAIR 2004    Dr Sandy Karimi  2008?  LARYNGOSCOPY  04/28/2016    Laryngoscopy Micro with Biopsy by Dr Winston Roberts  01/07    uppp    ROTATOR CUFF REPAIR Left 5-20-15    TONSILLECTOMY  1985    UPPER GASTROINTESTINAL ENDOSCOPY  1997    VEIN SURGERY Left September 2014    Dr. Brandy Guerra       Medications:   Current Facility-Administered Medications   Medication Dose Route Frequency Provider Last Rate Last Dose    0.45 % sodium chloride infusion   Intravenous Continuous Nereida Easley MD 75 mL/hr at 04/05/19 1356           Coumadin Use Last 7 Days:  no  Antiplatelet drug therapy use last 7 days: no  Other anticoagulant use last 7 days: no       Pre-Sedation Documentation and Exam:   I have personally completed a history, physical exam & review of systems for this patient (see notes). Mallampati Airway Assessment:  Mallampati Class  IV - (soft palate not visible)    Prior History of Anesthesia Complications:   none    ASA Classification:  Class 2 - A normal healthy patient with mild systemic disease. Sedation/ Anesthesia Plan:   Patient was given anesthesia by MYRON Angel  from anesthesiology dept. Please see detailed note by CRNA for details.     Nereida Easley MD, [facility]  4/5/2019, 3:32 PM

## 2019-04-05 NOTE — PROGRESS NOTES
Received to PACU post-bronchoscopy. Patient fully awake. Intermittent non-productive cough. Verbalizes mild sore throat. Denies pain, shortness of breath, nausea. Wife at bedside.

## 2019-04-05 NOTE — OP NOTE
Bronchoscopy Procedure Note    Patient: Shawn Terrazas  : 1955        Surgeon: Soila Espinoza    Operation: Flexible diagnostic fiberoptic bronchoscopy without fluoroscopy. During procedure Bronch washings obtained  from bilateral lower lobes. Bronchioalveolar lavage was performed from right lower lobe posterior segment. Date of Operation: 19    Indication for procedure: Chronic persistent cough with recurrent exacerbations of asthma along with abnormal CT chest of uncertain etiology. The bronchoscopy was performed to obtain lower respiratory tract specimen to aid diagnosis and to exclude atypical infections including atypical mycobacterial infections. Pre operative diagnoses:   Chronic persistent cough. Recurrent exacerbations of asthma. Abnormal CT chest of uncertain etiology. Pre operative diagnoses:   Abnormal looking vocal cords- ? Chronic laryngitis from ? GERD Vs other etiologies. Chronic persistent cough. Recurrent exacerbations of asthma. Abnormal CT chest of uncertain etiology. Consent: Shawn Terrazas is a 70 y.o. male . The risks, benefits, complications, treatment options and expected outcomes were discussed with the patient. Consent obtained from the patient after explaining the risks and complications of the procedure. The possibilities of reaction to medication, pulmonary aspiration, perforation of a viscus, bleeding, failure to diagnose a condition and creating a complication requiring transfusion or operation were discussed with the patient who freely signed the consent. Anesthesia:  Patient was given anesthesia by CRNA Mr. Florecita Batres from anesthesiology dept. Please see her note for details. Description of Procedure: The patient was taken to endoscopy suite, identified as Yony CARNEY Michi and the procedure verified as Flexible Fiberoptic Bronchoscopy. A Time Out was held and the above information confirmed.      After the induction of topical nasopharyngeal anesthesia, the patient was placed in appropriate position and the Flexible Fibrooptic bronchoscope was passed through the left nostril. The vocal cords were visualized and 1% Lidocaine was topically placed onto the cords. The cords were looking abnormal I.e beefy red with white patches present bilaterally over the anterior aspect. How ever they are moving equally with no growths. The scope was then passed into the trachea. Lidocaine 1% was used topically on the kailey. The kailey is sharp with no growths. Careful inspection of the tracheal lumen was accomplished with no growths. The scope was  advanced into the right main bronchus and then into the Right upper lobe, Right middle lobe, and Right lower lobe bronchi and segmental bronchi. The scope was sequentially passed into the left main and then left upper and lower bronchi and segmental bronchi. Bronchioalveolar lavage performed from right lower lobe posterior segment. A good amount of Bronchioalveolar lavage was obtained I.e ~25 ml after instilling 150ml of sterile 0.9NS and sent to lab for further analysis. Bronchial washings were done form bilateral I.e right and left lower lobes. More than 40 ml of Bronchial washings were obtained and sent to lab for further analysis. Endobronchial findings:   Trachea: Normal mucosa. Kailey: Normal mucosa  Right main bronchus: Normal mucosa  Right upper lobe bronchus: Normal mucosa  Right Middle lobe bronchus: Normal mucosa  Right Lower lobe bronchus:Normal mucosa  Left main bronchus: Normal mucosa  Left upper lobe bronchus: Normal mucosa  Left lower lobe bronchus: Inflamed mucosa    No endobronchial lesions seen. The Patient was taken to the Endoscopy Recovery area in satisfactory condition. Estimated Blood Loss: None. Complications: None. Recommendation/Plan:    1. F/U on culturs results  2. F/U on cytology results.   3. Will make a follow up appointment with his Ear, Nose and Throat specialist I.e Dr. Giana Munoz MD/ [address] [place] [address],[address] [facility] for further evaluation of abnormally looking vocal cords and management. ? Chronic laryngitis from ? GERD Vs other etiologies. Attestation: I performed the procedure.     Sudheer Naidu

## 2019-04-05 NOTE — ANESTHESIA POSTPROCEDURE EVALUATION
Department of Anesthesiology  Postprocedure Note    Patient: Brian Jones  MRN: [id number]  YOB: 1955  Date of evaluation: 4/5/2019  Time:  3:12 PM     Procedure Summary     Date:  04/05/19 Room / Location:  [facility] / [facility]    Anesthesia Start:  1450 Anesthesia Stop:  9498    Procedure:  BRONCHOSCOPY (N/A ) Diagnosis:  (cough, asthma-COPD overlap syndrome)    Surgeon:  William Faustin MD Responsible Provider:      Anesthesia Type:  MAC ASA Status:  3          Anesthesia Type: No value filed. Karie Phase I: Karie Score: 10    Karie Phase II:      Last vitals: Reviewed and per EMR flowsheets.        Anesthesia Post Evaluation    Patient location during evaluation: bedside  Patient participation: complete - patient participated  Level of consciousness: awake  Airway patency: patent  Nausea & Vomiting: no vomiting and no nausea  Complications: no  Cardiovascular status: hemodynamically stable  Respiratory status: acceptable  Hydration status: stable

## 2019-04-06 LAB
BAL CHARACTER: ABNORMAL
BAL COLLECTION SITE: ABNORMAL
BAL COLOR: COLORLESS
CILIATED/EPITHELIAL CELLS BAL: 8 % (ref 0–5)
LYMPHOCYTES, BAL: 16 % (ref 10–15)
MACROPHAGE/MONOCYTE BAL: 74 % (ref 86–100)
PATHOLOGIST REVIEW: ABNORMAL
RBC BAL: 163 /CUMM
SEGMENTED NEUTROPHILS, BAL: 2 % (ref 0–3)
TOTAL NUCLEATED CELLS BAL: 490 /CUMM
TOTAL VOLUME RECEIVED BAL: 30 ML

## 2019-04-08 ENCOUNTER — APPOINTMENT (OUTPATIENT)
Dept: GENERAL RADIOLOGY | Age: 64
DRG: 177 | End: 2019-04-08
Payer: MEDICARE

## 2019-04-08 ENCOUNTER — HOSPITAL ENCOUNTER (INPATIENT)
Age: 64
LOS: 2 days | Discharge: HOME | DRG: 177 | End: 2019-04-10
Attending: EMERGENCY MEDICINE | Admitting: INTERNAL MEDICINE
Payer: MEDICARE

## 2019-04-08 DIAGNOSIS — J44.1 COPD EXACERBATION (HCC): ICD-10-CM

## 2019-04-08 DIAGNOSIS — J18.9 PNEUMONIA DUE TO ORGANISM: Primary | ICD-10-CM

## 2019-04-08 DIAGNOSIS — J15.5 PNEUMONIA OF BOTH LOWER LOBES DUE TO ESCHERICHIA COLI (HCC): ICD-10-CM

## 2019-04-08 DIAGNOSIS — J15.5 PNEUMONIA OF BOTH LOWER LOBES DUE TO ESCHERICHIA COLI (HCC): Primary | ICD-10-CM

## 2019-04-08 LAB
ALBUMIN SERPL-MCNC: 4.4 G/DL (ref 3.5–5.1)
ALP BLD-CCNC: 39 U/L (ref 38–126)
ALT SERPL-CCNC: 18 U/L (ref 11–66)
ANION GAP SERPL CALCULATED.3IONS-SCNC: 17 MEQ/L (ref 8–16)
APTT: 35.6 SECONDS (ref 22–38)
AST SERPL-CCNC: 22 U/L (ref 5–40)
BASOPHILS # BLD: 0.3 %
BASOPHILS ABSOLUTE: 0 THOU/MM3 (ref 0–0.1)
BILIRUB SERPL-MCNC: 0.3 MG/DL (ref 0.3–1.2)
BILIRUBIN DIRECT: < 0.2 MG/DL (ref 0–0.3)
BUN BLDV-MCNC: 17 MG/DL (ref 7–22)
CALCIUM SERPL-MCNC: 9.1 MG/DL (ref 8.5–10.5)
CHLORIDE BLD-SCNC: 103 MEQ/L (ref 98–111)
CO2: 21 MEQ/L (ref 23–33)
CREAT SERPL-MCNC: 1.2 MG/DL (ref 0.4–1.2)
EKG ATRIAL RATE: 109 BPM
EKG P AXIS: 36 DEGREES
EKG P-R INTERVAL: 150 MS
EKG Q-T INTERVAL: 320 MS
EKG QRS DURATION: 96 MS
EKG QTC CALCULATION (BAZETT): 430 MS
EKG R AXIS: -142 DEGREES
EKG T AXIS: 54 DEGREES
EKG VENTRICULAR RATE: 109 BPM
EOSINOPHIL # BLD: 1 %
EOSINOPHILS ABSOLUTE: 0.1 THOU/MM3 (ref 0–0.4)
ERYTHROCYTE [DISTWIDTH] IN BLOOD BY AUTOMATED COUNT: 15.4 % (ref 11.5–14.5)
ERYTHROCYTE [DISTWIDTH] IN BLOOD BY AUTOMATED COUNT: 48.9 FL (ref 35–45)
ETHYL ALCOHOL, SERUM: < 0.01 %
FLU A ANTIGEN: NEGATIVE
FLU B ANTIGEN: NEGATIVE
GFR SERPL CREATININE-BSD FRML MDRD: 61 ML/MIN/1.73M2
GLUCOSE BLD-MCNC: 119 MG/DL (ref 70–108)
GRAM STAIN RESULT: ABNORMAL
HCT VFR BLD CALC: 53 % (ref 42–52)
HEMOGLOBIN: 18.1 GM/DL (ref 14–18)
IMMATURE GRANS (ABS): 0.04 THOU/MM3 (ref 0–0.07)
IMMATURE GRANULOCYTES: 0.4 %
INR BLD: 0.99 (ref 0.85–1.13)
LIPASE: 21.1 U/L (ref 5.6–51.3)
LYMPHOCYTES # BLD: 8.4 %
LYMPHOCYTES ABSOLUTE: 0.9 THOU/MM3 (ref 1–4.8)
MAGNESIUM: 1.4 MG/DL (ref 1.6–2.4)
MCH RBC QN AUTO: 30.9 PG (ref 26–33)
MCHC RBC AUTO-ENTMCNC: 34.2 GM/DL (ref 32.2–35.5)
MCV RBC AUTO: 90.6 FL (ref 80–94)
MONOCYTES # BLD: 10.1 %
MONOCYTES ABSOLUTE: 1 THOU/MM3 (ref 0.4–1.3)
NUCLEATED RED BLOOD CELLS: 0 /100 WBC
ORGANISM: ABNORMAL
OSMOLALITY CALCULATION: 283.9 MOSMOL/KG (ref 275–300)
PLATELET # BLD: 163 THOU/MM3 (ref 130–400)
PMV BLD AUTO: 11.4 FL (ref 9.4–12.4)
POTASSIUM SERPL-SCNC: 4.6 MEQ/L (ref 3.5–5.2)
PRO-BNP: 21.6 PG/ML (ref 0–900)
RBC # BLD: 5.85 MILL/MM3 (ref 4.7–6.1)
RESPIRATORY CULTURE: ABNORMAL
SEG NEUTROPHILS: 79.8 %
SEGMENTED NEUTROPHILS ABSOLUTE COUNT: 8.1 THOU/MM3 (ref 1.8–7.7)
SODIUM BLD-SCNC: 141 MEQ/L (ref 135–145)
TOTAL PROTEIN: 7.7 G/DL (ref 6.1–8)
TROPONIN T: < 0.01 NG/ML
WBC # BLD: 10.2 THOU/MM3 (ref 4.8–10.8)

## 2019-04-08 PROCEDURE — 83880 ASSAY OF NATRIURETIC PEPTIDE: CPT

## 2019-04-08 PROCEDURE — 1200000003 HC TELEMETRY R&B

## 2019-04-08 PROCEDURE — 94761 N-INVAS EAR/PLS OXIMETRY MLT: CPT

## 2019-04-08 PROCEDURE — 85610 PROTHROMBIN TIME: CPT

## 2019-04-08 PROCEDURE — 85730 THROMBOPLASTIN TIME PARTIAL: CPT

## 2019-04-08 PROCEDURE — 93005 ELECTROCARDIOGRAM TRACING: CPT | Performed by: EMERGENCY MEDICINE

## 2019-04-08 PROCEDURE — 96374 THER/PROPH/DIAG INJ IV PUSH: CPT

## 2019-04-08 PROCEDURE — 83735 ASSAY OF MAGNESIUM: CPT

## 2019-04-08 PROCEDURE — 2709999900 HC NON-CHARGEABLE SUPPLY

## 2019-04-08 PROCEDURE — 83690 ASSAY OF LIPASE: CPT

## 2019-04-08 PROCEDURE — 87804 INFLUENZA ASSAY W/OPTIC: CPT

## 2019-04-08 PROCEDURE — 6360000002 HC RX W HCPCS: Performed by: EMERGENCY MEDICINE

## 2019-04-08 PROCEDURE — G0480 DRUG TEST DEF 1-7 CLASSES: HCPCS

## 2019-04-08 PROCEDURE — 99284 EMERGENCY DEPT VISIT MOD MDM: CPT

## 2019-04-08 PROCEDURE — 36415 COLL VENOUS BLD VENIPUNCTURE: CPT

## 2019-04-08 PROCEDURE — 71045 X-RAY EXAM CHEST 1 VIEW: CPT

## 2019-04-08 PROCEDURE — 2700000000 HC OXYGEN THERAPY PER DAY

## 2019-04-08 PROCEDURE — 94640 AIRWAY INHALATION TREATMENT: CPT

## 2019-04-08 PROCEDURE — 85025 COMPLETE CBC W/AUTO DIFF WBC: CPT

## 2019-04-08 PROCEDURE — 6370000000 HC RX 637 (ALT 250 FOR IP): Performed by: EMERGENCY MEDICINE

## 2019-04-08 PROCEDURE — 80048 BASIC METABOLIC PNL TOTAL CA: CPT

## 2019-04-08 PROCEDURE — 80076 HEPATIC FUNCTION PANEL: CPT

## 2019-04-08 PROCEDURE — 84484 ASSAY OF TROPONIN QUANT: CPT

## 2019-04-08 RX ORDER — IPRATROPIUM BROMIDE AND ALBUTEROL SULFATE 2.5; .5 MG/3ML; MG/3ML
1 SOLUTION RESPIRATORY (INHALATION) ONCE
Status: COMPLETED | OUTPATIENT
Start: 2019-04-08 | End: 2019-04-08

## 2019-04-08 RX ORDER — METHYLPREDNISOLONE SODIUM SUCCINATE 125 MG/2ML
125 INJECTION, POWDER, LYOPHILIZED, FOR SOLUTION INTRAMUSCULAR; INTRAVENOUS ONCE
Status: COMPLETED | OUTPATIENT
Start: 2019-04-08 | End: 2019-04-08

## 2019-04-08 RX ORDER — DOXYCYCLINE HYCLATE 100 MG/1
100 CAPSULE ORAL 2 TIMES DAILY
Qty: 16 CAPSULE | Refills: 0 | Status: SHIPPED | OUTPATIENT
Start: 2019-04-08 | End: 2019-04-09

## 2019-04-08 RX ADMIN — IPRATROPIUM BROMIDE AND ALBUTEROL SULFATE 1 AMPULE: .5; 3 SOLUTION RESPIRATORY (INHALATION) at 22:32

## 2019-04-08 RX ADMIN — METHYLPREDNISOLONE SODIUM SUCCINATE 125 MG: 125 INJECTION, POWDER, FOR SOLUTION INTRAMUSCULAR; INTRAVENOUS at 23:09

## 2019-04-08 ASSESSMENT — PAIN DESCRIPTION - LOCATION: LOCATION: BACK

## 2019-04-08 ASSESSMENT — PAIN SCALES - GENERAL: PAINLEVEL_OUTOF10: 8

## 2019-04-09 LAB
FUNGUS SMEAR: ABNORMAL
FUNGUS SMEAR: ABNORMAL
GLUCOSE BLD-MCNC: 284 MG/DL (ref 70–108)
GLUCOSE BLD-MCNC: 337 MG/DL (ref 70–108)
GRAM STAIN RESULT: ABNORMAL
LEGIONELLA PNEUMOPHILIA DFA SOURCE: NORMAL
LEGIONELLA, DFA: NEGATIVE
MISC. #1 REFERENCE GROUP TEST: NORMAL
ORGANISM: ABNORMAL
RESPIRATORY CULTURE: ABNORMAL
RESPIRATORY CULTURE: ABNORMAL

## 2019-04-09 PROCEDURE — 6370000000 HC RX 637 (ALT 250 FOR IP): Performed by: INTERNAL MEDICINE

## 2019-04-09 PROCEDURE — 6360000002 HC RX W HCPCS: Performed by: PHARMACIST

## 2019-04-09 PROCEDURE — 6360000002 HC RX W HCPCS: Performed by: INTERNAL MEDICINE

## 2019-04-09 PROCEDURE — 92610 EVALUATE SWALLOWING FUNCTION: CPT

## 2019-04-09 PROCEDURE — 2580000003 HC RX 258: Performed by: EMERGENCY MEDICINE

## 2019-04-09 PROCEDURE — 2580000003 HC RX 258: Performed by: PHARMACIST

## 2019-04-09 PROCEDURE — 94640 AIRWAY INHALATION TREATMENT: CPT

## 2019-04-09 PROCEDURE — 6360000002 HC RX W HCPCS: Performed by: EMERGENCY MEDICINE

## 2019-04-09 PROCEDURE — 82948 REAGENT STRIP/BLOOD GLUCOSE: CPT

## 2019-04-09 PROCEDURE — 2709999900 HC NON-CHARGEABLE SUPPLY

## 2019-04-09 PROCEDURE — 1200000003 HC TELEMETRY R&B

## 2019-04-09 PROCEDURE — 99223 1ST HOSP IP/OBS HIGH 75: CPT | Performed by: INTERNAL MEDICINE

## 2019-04-09 PROCEDURE — 2580000003 HC RX 258: Performed by: INTERNAL MEDICINE

## 2019-04-09 PROCEDURE — 93010 ELECTROCARDIOGRAM REPORT: CPT | Performed by: NUCLEAR MEDICINE

## 2019-04-09 RX ORDER — IPRATROPIUM BROMIDE AND ALBUTEROL SULFATE 2.5; .5 MG/3ML; MG/3ML
1 SOLUTION RESPIRATORY (INHALATION)
Status: DISCONTINUED | OUTPATIENT
Start: 2019-04-09 | End: 2019-04-10 | Stop reason: HOSPADM

## 2019-04-09 RX ORDER — ALBUTEROL SULFATE 90 UG/1
2 AEROSOL, METERED RESPIRATORY (INHALATION) EVERY 6 HOURS PRN
Status: DISCONTINUED | OUTPATIENT
Start: 2019-04-09 | End: 2019-04-10 | Stop reason: HOSPADM

## 2019-04-09 RX ORDER — IPRATROPIUM BROMIDE AND ALBUTEROL SULFATE 2.5; .5 MG/3ML; MG/3ML
1 SOLUTION RESPIRATORY (INHALATION) ONCE
Status: DISCONTINUED | OUTPATIENT
Start: 2019-04-09 | End: 2019-04-10 | Stop reason: HOSPADM

## 2019-04-09 RX ORDER — GLIMEPIRIDE 4 MG/1
4 TABLET ORAL 2 TIMES DAILY
Status: DISCONTINUED | OUTPATIENT
Start: 2019-04-09 | End: 2019-04-10 | Stop reason: HOSPADM

## 2019-04-09 RX ORDER — LOSARTAN POTASSIUM 25 MG/1
25 TABLET ORAL DAILY
Status: DISCONTINUED | OUTPATIENT
Start: 2019-04-09 | End: 2019-04-10 | Stop reason: HOSPADM

## 2019-04-09 RX ORDER — SIMVASTATIN 40 MG
40 TABLET ORAL NIGHTLY
Status: DISCONTINUED | OUTPATIENT
Start: 2019-04-09 | End: 2019-04-10 | Stop reason: HOSPADM

## 2019-04-09 RX ORDER — DEXTROSE MONOHYDRATE 25 G/50ML
12.5 INJECTION, SOLUTION INTRAVENOUS PRN
Status: DISCONTINUED | OUTPATIENT
Start: 2019-04-09 | End: 2019-04-10 | Stop reason: HOSPADM

## 2019-04-09 RX ORDER — ACETAMINOPHEN 325 MG/1
650 TABLET ORAL EVERY 4 HOURS PRN
Status: DISCONTINUED | OUTPATIENT
Start: 2019-04-09 | End: 2019-04-10 | Stop reason: HOSPADM

## 2019-04-09 RX ORDER — FAMOTIDINE 20 MG/1
20 TABLET, FILM COATED ORAL 2 TIMES DAILY
Status: DISCONTINUED | OUTPATIENT
Start: 2019-04-09 | End: 2019-04-10 | Stop reason: HOSPADM

## 2019-04-09 RX ORDER — MONTELUKAST SODIUM 10 MG/1
10 TABLET ORAL NIGHTLY
Status: DISCONTINUED | OUTPATIENT
Start: 2019-04-09 | End: 2019-04-10 | Stop reason: HOSPADM

## 2019-04-09 RX ORDER — SODIUM CHLORIDE 0.9 % (FLUSH) 0.9 %
10 SYRINGE (ML) INJECTION EVERY 12 HOURS SCHEDULED
Status: DISCONTINUED | OUTPATIENT
Start: 2019-04-09 | End: 2019-04-10 | Stop reason: HOSPADM

## 2019-04-09 RX ORDER — MORPHINE SULFATE 4 MG/ML
4 INJECTION, SOLUTION INTRAMUSCULAR; INTRAVENOUS EVERY 4 HOURS PRN
Status: DISCONTINUED | OUTPATIENT
Start: 2019-04-09 | End: 2019-04-10 | Stop reason: HOSPADM

## 2019-04-09 RX ORDER — SODIUM CHLORIDE 0.9 % (FLUSH) 0.9 %
10 SYRINGE (ML) INJECTION PRN
Status: DISCONTINUED | OUTPATIENT
Start: 2019-04-09 | End: 2019-04-10 | Stop reason: HOSPADM

## 2019-04-09 RX ORDER — SERTRALINE HYDROCHLORIDE 100 MG/1
100 TABLET, FILM COATED ORAL DAILY
Status: DISCONTINUED | OUTPATIENT
Start: 2019-04-09 | End: 2019-04-10 | Stop reason: HOSPADM

## 2019-04-09 RX ORDER — MAGNESIUM SULFATE IN WATER 40 MG/ML
2 INJECTION, SOLUTION INTRAVENOUS ONCE
Status: COMPLETED | OUTPATIENT
Start: 2019-04-09 | End: 2019-04-09

## 2019-04-09 RX ORDER — DEXTROSE MONOHYDRATE 50 MG/ML
100 INJECTION, SOLUTION INTRAVENOUS PRN
Status: DISCONTINUED | OUTPATIENT
Start: 2019-04-09 | End: 2019-04-10 | Stop reason: HOSPADM

## 2019-04-09 RX ORDER — BUSPIRONE HYDROCHLORIDE 5 MG/1
10 TABLET ORAL 3 TIMES DAILY
Status: DISCONTINUED | OUTPATIENT
Start: 2019-04-09 | End: 2019-04-10 | Stop reason: HOSPADM

## 2019-04-09 RX ORDER — ONDANSETRON 2 MG/ML
4 INJECTION INTRAMUSCULAR; INTRAVENOUS EVERY 6 HOURS PRN
Status: DISCONTINUED | OUTPATIENT
Start: 2019-04-09 | End: 2019-04-09 | Stop reason: SDUPTHER

## 2019-04-09 RX ORDER — NICOTINE POLACRILEX 4 MG
15 LOZENGE BUCCAL PRN
Status: DISCONTINUED | OUTPATIENT
Start: 2019-04-09 | End: 2019-04-10 | Stop reason: HOSPADM

## 2019-04-09 RX ORDER — ONDANSETRON 2 MG/ML
4 INJECTION INTRAMUSCULAR; INTRAVENOUS EVERY 6 HOURS PRN
Status: DISCONTINUED | OUTPATIENT
Start: 2019-04-09 | End: 2019-04-10 | Stop reason: HOSPADM

## 2019-04-09 RX ORDER — ROPINIROLE 1 MG/1
1 TABLET, FILM COATED ORAL 3 TIMES DAILY
Status: DISCONTINUED | OUTPATIENT
Start: 2019-04-09 | End: 2019-04-10 | Stop reason: HOSPADM

## 2019-04-09 RX ORDER — BUDESONIDE 0.5 MG/2ML
500 INHALANT ORAL 2 TIMES DAILY
Status: DISCONTINUED | OUTPATIENT
Start: 2019-04-09 | End: 2019-04-10 | Stop reason: HOSPADM

## 2019-04-09 RX ORDER — ACETAMINOPHEN 325 MG/1
650 TABLET ORAL EVERY 4 HOURS PRN
Status: DISCONTINUED | OUTPATIENT
Start: 2019-04-09 | End: 2019-04-09 | Stop reason: SDUPTHER

## 2019-04-09 RX ADMIN — LOSARTAN POTASSIUM 25 MG: 25 TABLET, FILM COATED ORAL at 08:20

## 2019-04-09 RX ADMIN — BUDESONIDE 500 MCG: 0.5 INHALANT RESPIRATORY (INHALATION) at 21:33

## 2019-04-09 RX ADMIN — SIMVASTATIN 40 MG: 40 TABLET, FILM COATED ORAL at 20:14

## 2019-04-09 RX ADMIN — IPRATROPIUM BROMIDE AND ALBUTEROL SULFATE 1 AMPULE: .5; 3 SOLUTION RESPIRATORY (INHALATION) at 21:29

## 2019-04-09 RX ADMIN — METFORMIN HYDROCHLORIDE 500 MG: 500 TABLET ORAL at 16:55

## 2019-04-09 RX ADMIN — GLIMEPIRIDE 4 MG: 4 TABLET ORAL at 20:14

## 2019-04-09 RX ADMIN — CEFTRIAXONE SODIUM 1 G: 1 INJECTION, POWDER, FOR SOLUTION INTRAMUSCULAR; INTRAVENOUS at 00:28

## 2019-04-09 RX ADMIN — MAGNESIUM SULFATE HEPTAHYDRATE 2 G: 40 INJECTION, SOLUTION INTRAVENOUS at 06:08

## 2019-04-09 RX ADMIN — SERTRALINE 100 MG: 100 TABLET, FILM COATED ORAL at 08:20

## 2019-04-09 RX ADMIN — ROPINIROLE HYDROCHLORIDE 1 MG: 1 TABLET, FILM COATED ORAL at 20:14

## 2019-04-09 RX ADMIN — FAMOTIDINE 20 MG: 20 TABLET ORAL at 20:14

## 2019-04-09 RX ADMIN — ENOXAPARIN SODIUM 40 MG: 40 INJECTION SUBCUTANEOUS at 08:20

## 2019-04-09 RX ADMIN — IPRATROPIUM BROMIDE AND ALBUTEROL SULFATE 1 AMPULE: .5; 3 SOLUTION RESPIRATORY (INHALATION) at 12:56

## 2019-04-09 RX ADMIN — MONTELUKAST SODIUM 10 MG: 10 TABLET, FILM COATED ORAL at 20:14

## 2019-04-09 RX ADMIN — IPRATROPIUM BROMIDE AND ALBUTEROL SULFATE 1 AMPULE: .5; 3 SOLUTION RESPIRATORY (INHALATION) at 08:38

## 2019-04-09 RX ADMIN — GENTAMICIN SULFATE 160 MG: 40 INJECTION, SOLUTION INTRAMUSCULAR; INTRAVENOUS at 20:38

## 2019-04-09 RX ADMIN — METFORMIN HYDROCHLORIDE 500 MG: 500 TABLET ORAL at 08:20

## 2019-04-09 RX ADMIN — GENTAMICIN SULFATE 167.2 MG: 40 INJECTION, SOLUTION INTRAMUSCULAR; INTRAVENOUS at 01:49

## 2019-04-09 RX ADMIN — Medication 10 ML: at 20:14

## 2019-04-09 RX ADMIN — FAMOTIDINE 20 MG: 20 TABLET ORAL at 10:53

## 2019-04-09 RX ADMIN — BUDESONIDE 500 MCG: 0.5 INHALANT RESPIRATORY (INHALATION) at 08:38

## 2019-04-09 RX ADMIN — BUSPIRONE HYDROCHLORIDE 10 MG: 5 TABLET ORAL at 16:55

## 2019-04-09 RX ADMIN — CEFTRIAXONE SODIUM 1 G: 1 INJECTION, POWDER, FOR SOLUTION INTRAMUSCULAR; INTRAVENOUS at 10:53

## 2019-04-09 RX ADMIN — BUSPIRONE HYDROCHLORIDE 10 MG: 5 TABLET ORAL at 08:20

## 2019-04-09 RX ADMIN — ROPINIROLE HYDROCHLORIDE 1 MG: 1 TABLET, FILM COATED ORAL at 16:55

## 2019-04-09 RX ADMIN — IPRATROPIUM BROMIDE AND ALBUTEROL SULFATE 1 AMPULE: .5; 3 SOLUTION RESPIRATORY (INHALATION) at 16:40

## 2019-04-09 RX ADMIN — GLIMEPIRIDE 4 MG: 4 TABLET ORAL at 08:20

## 2019-04-09 RX ADMIN — BUSPIRONE HYDROCHLORIDE 10 MG: 5 TABLET ORAL at 20:14

## 2019-04-09 RX ADMIN — ROPINIROLE HYDROCHLORIDE 1 MG: 1 TABLET, FILM COATED ORAL at 08:20

## 2019-04-09 ASSESSMENT — ENCOUNTER SYMPTOMS
CONSTIPATION: 0
COUGH: 0
SHORTNESS OF BREATH: 1
EYE ITCHING: 0
PHOTOPHOBIA: 0
EYE REDNESS: 0
CHOKING: 0
DIARRHEA: 0
RHINORRHEA: 0
SINUS PRESSURE: 0
SORE THROAT: 0
EYE PAIN: 0
NAUSEA: 0
VOMITING: 0
EYE DISCHARGE: 0
ABDOMINAL DISTENTION: 0
CHEST TIGHTNESS: 0
BACK PAIN: 0
ABDOMINAL PAIN: 0
WHEEZING: 0
TROUBLE SWALLOWING: 0
BLOOD IN STOOL: 0
VOICE CHANGE: 0

## 2019-04-09 ASSESSMENT — PAIN SCALES - GENERAL
PAINLEVEL_OUTOF10: 0

## 2019-04-09 NOTE — CONSULTS
Consults                                  CONSULTATION NOTE :ID       Patient - Orlin Castellano,  Age - 70 y.o.    - 1955      Room Number - 8A-16/016-A   N -  [id number]   [facility]  Date of Admission -  2019 10:02 PM  Patient's PCP: Aliza New MD     Requesting Physician: Francisco Winkler MD    REASON FOR CONSULTATION   Isolation of E.coli on BAL    HISTORY OF PRESENT ILLNESS       This is a very pleasant 70 y.o. male who was admitted to the hospital with a chief complaints of shortness of breath and wheeze. He had a recent BAL (last Friday) and was prescribed oral doxycycline. he reported of sudden shortness of breath and wheeze while he was paining his boat. He feels it all started after he has been painting. He denies chest pain, no fever or chills. He has hx of asthma and COPD. Has GERD and post nasal drip.he is on iv rocephin. His medical record was reviewed.     PAST MEDICAL  HISTORY       Past Medical History:   Diagnosis Date    Acid reflux     Arthritis     Asthma     Chronic back pain     Class 2 severe obesity due to excess calories with serious comorbidity and body mass index (BMI) of 37.0 to 37.9 in adult [facility]) 2018    Colon polyps     COPD (chronic obstructive pulmonary disease) (HCC)     Depression     panic attacks    Diverticulosis     HTN (hypertension)     Hyperlipidemia     Kidney disorder     Panic attack     Pneumonia     Sleep apnea     Thumb amputation status 3/13/14    left tip of thumb amputated    Thyroid disease     Type II or unspecified type diabetes mellitus without mention of complication, not stated as uncontrolled        PAST SURGICAL HISTORY     Past Surgical History:   Procedure Laterality Date    BACK SURGERY  2002    BACK SURGERY  2017    BICEPS TENDON REPAIR Right 2017    BRONCHOSCOPY      BRONCHOSCOPY N/A 2019    BRONCHOSCOPY performed by Regine Rodriguez MD at [address] [place] [address] 4/5/2019    BRONCHOSCOPY ALVEOLAR LAVAGE performed by Baylee Coto MD at [address]  07/02 08/05    CARPAL TUNNEL RELEASE  2011    right hand    CHOLECYSTECTOMY  yrs ago    COLONOSCOPY  11/06 02/12 1/14    ENDOSCOPY, COLON, DIAGNOSTIC      EYE SURGERY      foreign body removal    HERNIA REPAIR  2004    Dr Boyd Raines  2008?  LARYNGOSCOPY  04/28/2016    Laryngoscopy Micro with Biopsy by Dr Luz Maria Gaston  01/07    uppp    ROTATOR CUFF REPAIR Left 5-20-15    SKIN BIOPSY      TONSILLECTOMY  1985    UPPER GASTROINTESTINAL ENDOSCOPY  1997    VEIN SURGERY Left September 2014    Dr. Roldan Gip:       Scheduled Meds:   ipratropium-albuterol  1 ampule Inhalation Once    budesonide  500 mcg Nebulization BID    busPIRone  10 mg Oral TID    glimepiride  4 mg Oral BID    losartan  25 mg Oral Daily    metFORMIN  500 mg Oral BID WC    montelukast  10 mg Oral Nightly    rOPINIRole  1 mg Oral TID    sertraline  100 mg Oral Daily    simvastatin  40 mg Oral Nightly    sodium chloride flush  10 mL Intravenous 2 times per day    enoxaparin  40 mg Subcutaneous Daily    ipratropium-albuterol  1 ampule Inhalation Q4H WA    magnesium replacement protocol   Other RX Placeholder    famotidine  20 mg Oral BID    cefTRIAXone (ROCEPHIN) IV  1 g Intravenous Q24H     Continuous Infusions:  PRN Meds:morphine, albuterol sulfate HFA, sodium chloride flush, magnesium hydroxide, ondansetron, acetaminophen  Allergies:   ALLERGIES:    Patient has no known allergies. SOCIAL HISTORY:     TOBACCO:   reports that he quit smoking about 2 years ago. His smoking use included cigarettes. He started smoking about 47 years ago. He has a 84.00 pack-year smoking history. He has never used smokeless tobacco.     ETOH:   reports that he does not drink alcohol.   Patient currently lives with family        FAMILY HISTORY: Problem Relation Age of Onset    Cancer Mother     Breast Cancer Mother     Stroke Mother     Heart Disease Brother     Diabetes Brother     High Blood Pressure Brother     High Cholesterol Brother        REVIEW OF SYSTEMS:     Constitutional: no fever, no night sweats, no fatigue. Head: no head ache , no head injury, no migranes. Eye: no blurring of vision, no double vision. Ears: no hearing difficulty, no tinnitus  Mouth/throat: no ulceration, dental caries , dysphagia  Lungs: + cough no chest pain  CVS: no palpitation, no chest pain, no shortness of breath  GI: no abdominal pain, no nausea , no vomiting, no constipation  PILLO: no dysuria, frequency and urgency, no hematuria, no kidney stones  Musculoskeletal: no joint pain, swelling , stiffness,  Endocrine: no polyuria, polydipsia, no cold or heat intolerance  Hematology: no anemia, no easy brusing or bleeding, no hx of clotting disorder  Dermatology: no skin rash, no eczema, no pruritis,  Psychiatry: no depression, no anxiety,no panic attacks, no suicide ideation    PHYSICAL EXAM:     /64   Pulse 83   Temp 97.6 °F (36.4 °C) (Oral)   Resp 20   Ht 5' 8\" (1.727 m)   Wt 238 lb 12.8 oz (108.3 kg)   SpO2 90%   BMI 36.31 kg/m²   General:  Awake, alert, not in distress. obese  HEENT: pink conjunctiva, unicteric sclera, moist oral mucosa. Chest:   Bilateral diminished breath sound  Cardiovascular:  RRR ,S1S2, no murmur or gallop. Abdomen:  Soft, non tender to palpation. Extremities: no rash  Skin:  Warm and dry. CNS: oriented        LABS:     CBC:   Recent Labs     04/08/19  2251   WBC 10.2   HGB 18.1*        BMP:    Recent Labs     04/08/19  2251      K 4.6      CO2 21*   BUN 17   CREATININE 1.2   GLUCOSE 119*     Calcium:  Recent Labs     04/08/19  2251   CALCIUM 9.1     Ionized Calcium:No results for input(s): IONCA in the last 72 hours.   Magnesium:  Recent Labs     04/08/19  2251   MG 1.4*    INR:   Recent Labs 04/08/19  2251   INR 0.99     Hepatic:   Recent Labs     04/08/19  2251   ALKPHOS 39   ALT 18   AST 22   PROT 7.7   BILITOT 0.3   BILIDIR <0.2   LABALBU 4.4         Problem list of patient      Patient Active Problem List   Diagnosis Code    Diabetes ([facility] [address] E11.9    Hyperlipidemia E78.5    Acid reflux K21.9    Sleep apnea G47.30    Chronic back pain M54.9, G89.29    DDD (degenerative disc disease), cervical M50.30    Asthma J45.909    Rectus diastasis M62.08    S/P rotator cuff surgery Z98.890    Lower urinary tract symptoms (LUTS) R39.9    Hypogonadism in male E29.1    Low testosterone R79.89    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 Advised on minimizing exposure. · GERD: will have follow up as out patient. · Chronic bronchites/COPD: recurrent infection with E.coli: advised to continue oral doxycyline recently prescribed by pulmonary.(discussed on photosensitivity with doxycycline)  · Patient can be discharged home today. Thank you Zachary Freitas MD for allowing me to participate in this patient's care.     Damien Garcia MD,FACP 4/9/2019 12:00 PM

## 2019-04-09 NOTE — CONSULTS
[facility]    Patient Debbie Trevizo   MRN -  [id number]   [facility]   - 1955      Date of Admission -  2019 10:02 PM  Date of evaluation -  2019  Room - -16/016-A   Hospital Day - 1  Consulting - Vinay Salvador MD Primary Care Physician - Chikis Dempsey MD     Problem List      Active Hospital Problems    Diagnosis Date Noted    Pneumonia [J18.9] 2019     Reason for Consult    For management of Pneumonia. HPI   History Obtained From: Patient and electronic medical record. Cori Ronquillo is a 70 y.o. male  was initially admitted under hospitalist service. Pulmonary medicine was consulted for further management of Pneumonia. The patient is a 70 y.o. male who was seen by me last week in my clinic underwent Flexible diagnostic fiberoptic bronchoscopy without fluoroscopy. During procedure Bronch washings obtained  from bilateral lower lobes. Bronchioalveolar lavage was performed from right lower lobe posterior segment on 19. He started having fever with chills and rigors yesterday. He also told me that he exposed to cleaning chemicals while working on his boat yesterday. His wife called our office yesterday with a Fever of 102.6F. He was advised to go to ER for further evaluation. He also develoeped shortness of breath. He was evaluated in the Emergency room by ER physician and admitted under hospitalist service for further evaluation. He is having cough: Yes  Duration of cough: for few weeks. His cough is associated with sputum production: No   Hemoptysis:No  Diurnal variation: None. Relieving factors: No  Aggravating factors: No    He gives a history of fever: Yes  Duration: 1 day. Onset: sudden   Chills & rigors: Yes  Associated night sweats: Yes. Recent travel history:No.    Rrecent sick contacts: No.      He is having chest pain:No.    He denies orthopnea or paroxysmal nocturnal dyspnea.     Prior to his current hospitalization:  He is currently not using any oxygen supplementation at rest, exercise or during sleep/at night time. He was never diagnosed with pulmonary diseases I.e,Pulmonary fibrosis, Sarcoidosis, pulmonary embolism,pulmonary hypertension or pleural effusion/s in the past.    History of pulmonary tuberculosis in the past: No    Recent exposure to any patients with tuberculosis:No  Recent travel to endemic places of tuberculosis:No.      PMHx   Past Medical History      Diagnosis Date    Acid reflux     Arthritis     Asthma     Chronic back pain     Class 2 severe obesity due to excess calories with serious comorbidity and body mass index (BMI) of 37.0 to 37.9 in adult [facility]) 8/13/2018    Colon polyps 11/06    COPD (chronic obstructive pulmonary disease) ([facility] [address]     Depression     panic attacks    Diverticulosis     HTN (hypertension)     Hyperlipidemia     Kidney disorder     Panic attack     Pneumonia     Sleep apnea     Thumb amputation status 3/13/14    left tip of thumb amputated    Thyroid disease     Type II or unspecified type diabetes mellitus without mention of complication, not stated as uncontrolled       Past Surgical History        Procedure Laterality Date    BACK SURGERY  2002    BACK SURGERY  08/11/2017    BICEPS TENDON REPAIR Right 08/16/2017    BRONCHOSCOPY      BRONCHOSCOPY N/A 4/5/2019    BRONCHOSCOPY performed by Rajiv Castano MD at [facility]  4/5/2019    BRONCHOSCOPY ALVEOLAR LAVAGE performed by Rajiv Castano MD at [facility]   [address]  07/02 08/05    CARPAL TUNNEL RELEASE  2011    right hand    CHOLECYSTECTOMY  yrs ago    COLONOSCOPY  11/06 02/12 1/14    ENDOSCOPY, COLON, DIAGNOSTIC      EYE SURGERY      foreign body removal    HERNIA REPAIR  2004    Dr Jojo Christiansen  2008?     LARYNGOSCOPY  04/28/2016    Laryngoscopy Micro with Biopsy by Dr David Dougherty SURGERY  01/07    uppp    ROTATOR CUFF REPAIR Left 5-20-15    SKIN BIOPSY      TONSILLECTOMY  1985    UPPER GASTROINTESTINAL ENDOSCOPY  1997    VEIN SURGERY Left September 2014    Dr. Marlo Malik     Meds    Current Medications    ipratropium-albuterol  1 ampule Inhalation Once    budesonide  500 mcg Nebulization BID    busPIRone  10 mg Oral TID    glimepiride  4 mg Oral BID    losartan  25 mg Oral Daily    metFORMIN  500 mg Oral BID     montelukast  10 mg Oral Nightly    rOPINIRole  1 mg Oral TID    sertraline  100 mg Oral Daily    simvastatin  40 mg Oral Nightly    sodium chloride flush  10 mL Intravenous 2 times per day    enoxaparin  40 mg Subcutaneous Daily    ipratropium-albuterol  1 ampule Inhalation Q4H WA    magnesium replacement protocol   Other RX Placeholder    magnesium sulfate  2 g Intravenous Once     morphine, albuterol sulfate HFA, sodium chloride flush, magnesium hydroxide, ondansetron, acetaminophen  IV Drips/Infusions    Home Medications  Medications Prior to Admission: montelukast (SINGULAIR) 10 MG tablet, Take 1 tablet by mouth nightly  SYRINGE-NEEDLE, DISP, 3 ML 23G X 1-1/2\" 3 ML MISC, Inject 1 Syringe into the muscle every 14 days  rOPINIRole (REQUIP) 1 MG tablet, Take 1 tablet by mouth 3 times daily  losartan (COZAAR) 25 MG tablet, Take 1 tablet by mouth daily  glimepiride (AMARYL) 4 MG tablet, Take 1 tablet by mouth 2 times daily  budesonide (PULMICORT) 0.5 MG/2ML nebulizer suspension, Take 2 mLs by nebulization 2 times daily Pulmicort Respules 0.5mg via nebulization Bid  VENTOLIN  (90 Base) MCG/ACT inhaler, Inhale 2 puffs into the lungs every 6 hours as needed for Wheezing  Misc.  Devices (ACAPELLA) MISC, 1 Device by Does not apply route 4 times daily  ranitidine (ZANTAC) 150 MG tablet, TAKE 1 TABLET BY MOUTH TWICE DAILY  metFORMIN (GLUCOPHAGE) 500 MG tablet, TAKE 2 TABLETS BY MOUTH TWICE DAILY WITH MEALS  sertraline (ZOLOFT) 100 MG tablet, take 1 tablet by mouth twice a day  simvastatin (ZOCOR) 40 MG tablet, Take 1 tablet by mouth nightly  busPIRone (BUSPAR) 10 MG tablet, Take 1 tablet by mouth 3 times daily  hydrOXYzine (ATARAX) 10 MG tablet,   baclofen (LIORESAL) 10 MG tablet, take 1 tablet by mouth twice a day  ONETOUCH DELICA LANCETS FINE MISC, Check blood sugar twice daily Dx: 250.00  glucose blood VI test strips (ASCENSIA AUTODISC VI;ONE TOUCH ULTRA TEST VI) strip, One Touch Ultra Test Strip - Check blood sugar twice daily Dx: E11.9  traZODone (DESYREL) 150 MG tablet, take 1/2 to 1 tablet by mouth once daily at bedtime if needed for pain SPASM, OR SLEEP  HYDROcodone-acetaminophen (NORCO) 5-325 MG per tablet, Take 1 tablet by mouth every 6 hours as needed for Pain  Omega-3 Fatty Acids (FISH OIL) 1000 MG CAPS, Take 1,000 mg by mouth daily. sildenafil (VIAGRA) 100 MG tablet, Take 1 tablet by mouth as needed. ipratropium-albuterol (DUONEB) 0.5-2.5 (3) MG/3ML SOLN nebulizer solution, Inhale 3 mLs into the lungs every 4 hours as needed for Shortness of Breath  testosterone cypionate (DEPOTESTOTERONE CYPIONATE) 200 MG/ML injection, Inject 1 ml into the muscle every 14 days. Diet    DIET CARB CONTROL; Allergies    Patient has no known allergies.   Family History          Problem Relation Age of Onset    Cancer Mother     Breast Cancer Mother     Stroke Mother     Heart Disease Brother     Diabetes Brother     High Blood Pressure Brother     High Cholesterol Brother      Sleep History    He was diagnosed with sleep apnea in the past    Social History     Social History     Socioeconomic History    Marital status:      Spouse name: Not on file    Number of children: Not on file    Years of education: Not on file    Highest education level: Not on file   Occupational History    Not on file   Social Needs    Financial resource strain: Not on file    Food insecurity:     Worry: Not on file     Inability: Not on file    Transportation needs:     Medical: Not on file     Non-medical: Not on file   Tobacco Use    Smoking status: Former Smoker     Packs/day: 2.00     Years: 42.00     Pack years: 84.00     Types: Cigarettes     Start date: 1971     Last attempt to quit: 3/3/2017     Years since quittin.1    Smokeless tobacco: Never Used    Tobacco comment: pts uses just nicotine vap   Substance and Sexual Activity    Alcohol use: No     Alcohol/week: 0.0 oz    Drug use: No    Sexual activity: Yes     Partners: Female   Lifestyle    Physical activity:     Days per week: Not on file     Minutes per session: Not on file    Stress: Not on file   Relationships    Social connections:     Talks on phone: Not on file     Gets together: Not on file     Attends Hindu service: Not on file     Active member of club or organization: Not on file     Attends meetings of clubs or organizations: Not on file     Relationship status: Not on file    Intimate partner violence:     Fear of current or ex partner: Not on file     Emotionally abused: Not on file     Physically abused: Not on file     Forced sexual activity: Not on file   Other Topics Concern    Not on file   Social History Narrative    Not on file       Riview of systems   General/Constitutional:  Recent weight loss: No  Appetite changes: Normal.  Fever:Yes and No  Chills:Yes  HENT: Negative. Eyes: Negative. Upper respiratory tract: Occasional nasal stuffiness with no post nasal drip. Lower respiratory tract/ lungs: See HPI for details. No hemoptysis. Cardiovascular: No palpitations or chest pain. Gastrointestinal: No nausea or vomiting. Neurological: No focal neurologiacal weakness. Extremities: No edema. Musculoskeletal: No complaints. Genitourinary: No complaints. Hematological: Negative. Psychiatric/Behavioral: Negative. Skin: No itching. Vitals     height is 5' 8\" (1.727 m) and weight is 238 lb 12.8 oz (108.3 kg). His axillary temperature is 97.6 °F (36.4 °C).  His blood pressure is 109/55 (abnormal) and his pulse is 70. His respiration is 18 and oxygen saturation is 95%. Body mass index is 36.31 kg/m². SUPPLEMENTAL O2: O2 Flow Rate (L/min): 3 L/min       I/O        Intake/Output Summary (Last 24 hours) at 4/9/2019 0732  Last data filed at 4/9/2019 0502  Gross per 24 hour   Intake 15 ml   Output --   Net 15 ml     I/O last 3 completed shifts: In: 15 [P.O.:15]  Out: -    Patient Vitals for the past 96 hrs (Last 3 readings):   Weight   04/09/19 0114 238 lb 12.8 oz (108.3 kg)   04/08/19 2311 234 lb (106.1 kg)       Exam   General Appearance: moderately built, moderately nourished in no acute distress on room air. HEENT: Normal, Head is normocephalic, atraumatic. Oropharynx is clear and moist.  No oral thrush. PERRL  Neck - Supple, No JVD present. No tracheal deviation. Lungs - Bilateral air entry present. Good breath sounds on both sides of chest. No wheezes. No rales. Cardiovascular - Heart sounds are normal.  Regular rhythm normal rate without murmur, gallop or rub. Abdomen - Soft, nontender, nondistended, no masses or organomegaly  Neurologic - Awake, alert, oriented. There are no focal motor or sensory deficits. Extremities - No cyanosis, clubbing or edema. Musculoskeletal: Normal range of motion. Patient exhibits no tenderness. Lymphadenopathy:  No cervical adenopathy. Psychiatric: Patient  has a normal mood and affect. Skin - No bruising or bleeding.     Labs  - Old records and notes have been reviewed in [facility]   ABG  No results found for: PH, PO2, PCO2, HCO3, O2SAT  No results found for: IFIO2, MODE, SETTIDVOL, SETPEEP  CBC  Recent Labs     04/08/19 2251   WBC 10.2   RBC 5.85   HGB 18.1*   HCT 53.0*   MCV 90.6   MCH 30.9   MCHC 34.2      MPV 11.4      BMP  Recent Labs     04/08/19  2251      K 4.6      CO2 21*   BUN 17   CREATININE 1.2   GLUCOSE 119*   MG 1.4*   CALCIUM 9.1     LFT  Recent Labs     04/08/19  2251   AST 22   ALT 18   BILITOT 0.3 ALKPHOS 39   LIPASE 21.1     TROP  Lab Results   Component Value Date    TROPONINT < 0.010 04/08/2019    TROPONINT < 0.010 05/22/2015    TROPONINT < 0.010 05/21/2015     BNP  No results for input(s): BNP in the last 72 hours. Lactic Acid  No results for input(s): LACTA in the last 72 hours. INR  Recent Labs     04/08/19 2251   INR 0.99     PTT  Recent Labs     04/08/19 2251   APTT 35.6     Glucose  No results for input(s): POCGLU in the last 72 hours. UA No results for input(s): SPECGRAV, PHUR, COLORU, CLARITYU, MUCUS, PROTEINU, BLOODU, RBCUA, WBCUA, BACTERIA, NITRU, GLUCOSEU, BILIRUBINUR, UROBILINOGEN, KETUA, LABCAST, LABCASTTY, AMORPHOS in the last 72 hours. Invalid input(s): CRYSTALS. PFTs   Pulmonary function tests: 2018                 Cultures      Source: bronchial alveolar lavage       Site: right lower          Current Antibiotics: none   Susceptibility     Escherichia coli (1)     Antibiotic Interpretation CAIT Status   cefTRIAXone Sensitive <=1 mcg/mL Final   gentamicin Sensitive <=1 mcg/mL Final   cefOXitin Sensitive <=4 mcg/mL Final   tetracycline Sensitive <=1 mcg/mL Final   ciprofloxacin Sensitive =0.012 mcg/mL Final   trimethoprim-sulfamethoxazole Sensitive <=20 mcg/mL Final   cefuroxime Intermediate   Final   Lab and Collection       Radiology    CXR  Apr 8, 2019   PROCEDURE: XR CHEST PORTABLE   1. Slightly decreased volumes with mild venous congestion and/or bronchitis changes noted. CT Scans  (See actual reports for details)  CT neck:  Mar 28, 2016  PROCEDURE: CT SOFT TISSUE NECK W CONTRAST  IMPRESSION: 1. There is asymmetric soft tissue prominence demonstrated along the posterior aspect of the left vocal cord. This may represent focal scarring however, cannot exclude a mucosal lesion. Consider correlation with direct visualization. 2. Small soft tissue prominence at the base of the tongue as described above. This may represent retained mucous secretions.  Again, this can be correlated clinically with direct visualization. 3. Nonspecific mildly prominent lymph nodes along the posterior cervical chain on the left. The largest of these measures approximately 1 cm in short axis dimension. These may be reactive in nature. However, recommend followup CT evaluation to document stability. 4. Heterogeneous low-attenuation nodule within left thyroid gland is incompletely characterized on the current examination and can be further characterized by thyroid ultrasound followup. 5. Moderate centrilobular emphysema is noted at the lung apices.     HRCT of chest:  Feb 2, 2019   PROCEDURE: CT CHEST HIGH RESOLUTION   1. Moderate upper zone predominant centrilobular emphysema, most pronounced within the right upper lobe, is demonstrated. 2. Mild dependent opacities at the lung bases bilaterally are favored to represent mild atelectasis. 3.There are nonspecific ovoid soft tissue opacities demonstrated along the bilateral paraspinal regions on axial image 43 at the T9-10 level. These findings are nonspecific. Additional characterization could be obtained with a contrast enhanced thoracic spine MRI. Ordering Provider: [address] [place] [address]    IMPRESSION:   Normal x-ray examination of the chest.                Assessment   -Pneumonia on bilateral side due to E. Coli  -Acute hypoxic respiratory failure due to pneumonia-improved. -Moderate persistent bronchial asthma- under moderate control.  -Mild COPD with hx of tobacco smoking- under control  -Chronic cough due to reflux laryngitis -not under control- He is waiting for follow up with Dr. Coco Morton from ENT. -Allergic rhinitis- on Astelin nasal spray. -Type II or unspecified type diabetes mellitus without mention of complication, not stated as uncontrolled ([facility] [address] -GERD on treatment with Zantac at home- Pepcid now  -Chronic tobacco smoking- He quit smoking 2years backback.  -Depression.   -Anxiety disorder.  -Chronic back pain.   -Sleep apnea. He used to be on CPAP in the past. He underwent surgery 4 to 5 years back. He never had a follow up sleep test. He refused go for sleep test or get treated,    Plan   -Continue current antibiotic with Rocephin.  -He was prescribed with Doxycycline 100mg po bid yesterday,  -Follow pending bronch cultures.  -Titrate Oxygen to keep Spo2 >90%. -Deep Venous Thrombosis Prophylaxis: lovenox.  -He was advised to keep scheduled follow up appointment with Dr. Army Miller for evaluation of abnormal vocal cord appearance during bronch. He had appt today- need to be rescheduled.  -Lenora Garth was advised to keep his scheduled follow up at [facility] for pulmonary disease) with Ms. Myra [facility]an [facility] clinic on 4/17/19.  -Schedule patient for Infectious Disease consultation with MD Yee as soon as possible for further evaluation and management of recurrent pneumonias with E. Coli. -Speech path eval to r/o aspiration due to recurrent E coli infection of lungs and ok for MBS if needed. \"Thank you for asking us to see this patient\"     -Discussed with registered nurse taking care of patient regarding patient condition and my management plan. Lenora Liang educated about my impression and plan. He verbalizes understanding. Questions and concerns addressed.       Electronically signed by   Dale Mccann MD on 4/9/2019 at 7:32 AM

## 2019-04-09 NOTE — ED PROVIDER NOTES
[facility]  eMERGENCY dEPARTMENT eNCOUnter          CHIEF COMPLAINT       Chief Complaint   Patient presents with    Cough    Fever       Nurses Notes reviewed and I agreeexcept as noted in the HPI. HISTORY OF PRESENT ILLNESS    Yony Rosado is a 70 y.o. male who presents to the Emergency Department for the evaluation of a cough and shortness of breath. The patient states that he was outside for several hours cleaning his boat. The patient states that he has been evaluated previously by Dr. Carolann Ramires, pulmonologist, who per the patient states that \"he has nothing wrong with his lungs\". However several CTs, pulmonary function tests and other imaging, performed by Dr. Carolann Ramires, showed pan lobe emphysema. The patient also has a history of his vocal cords. The patient states that he had a bronchoscopy, which per the patient was clear. The patient states that he was informed by Dr. Carolann Ramires that the patient has E. Coli in his lungs. The patient states that he was prescribed antibiotics but has not yet filled the prescriptions. The patient admits a fever (Tmax 103.8 degrees farenheit) and chills which started with his shortness of breath this evening. The patient denies any other signs or symptoms at this time. The patient does not have access to O2 therapy at home. At his arrival in the department the patient's SpO2 was 88% on room air and was placed on 5L O2/per min via nasal canula. The patient has a history of GERD. He has not been treated with steroids recently. The patient reports that he is a former smoker of cigarettes who quit approximately 2 years ago. The HPI was provided by the patient. REVIEW OF SYSTEMS      Review of Systems   Constitutional: Positive for chills and fever. Negative for activity change, appetite change, diaphoresis, fatigue and unexpected weight change.    HENT: Negative for congestion, ear discharge, ear pain, hearing loss, rhinorrhea, sinus pressure, sore throat, trouble swallowing and voice change. Eyes: Negative for photophobia, pain, discharge, redness and itching. Respiratory: Positive for shortness of breath. Negative for cough, choking, chest tightness and wheezing. Cardiovascular: Negative for chest pain, palpitations and leg swelling. Gastrointestinal: Negative for abdominal distention, abdominal pain, blood in stool, constipation, diarrhea, nausea and vomiting. Endocrine: Negative for polydipsia, polyphagia and polyuria. Genitourinary: Negative for decreased urine volume, difficulty urinating, dysuria, enuresis, frequency, hematuria and urgency. Musculoskeletal: Negative for arthralgias, back pain, gait problem, myalgias, neck pain and neck stiffness. Skin: Negative for pallor and rash. Allergic/Immunologic: Negative for immunocompromised state. Neurological: Negative for dizziness, tremors, seizures, syncope, facial asymmetry, weakness, light-headedness, numbness and headaches. Hematological: Negative for adenopathy. Does not bruise/bleed easily. Psychiatric/Behavioral: Negative for agitation, hallucinations and suicidal ideas. The patient is not nervous/anxious. PAST MEDICAL HISTORY    has a past medical history of Acid reflux, Arthritis, Asthma, Chronic back pain, Class 2 severe obesity due to excess calories with serious comorbidity and body mass index (BMI) of 37.0 to 37.9 in adult [facility]), Colon polyps, COPD (chronic obstructive pulmonary disease) ([facility] [address] Depression, Diverticulosis, HTN (hypertension), Hyperlipidemia, Kidney disorder, Panic attack, Pneumonia, Sleep apnea, Thumb amputation status, Thyroid disease, and Type II or unspecified type diabetes mellitus without mention of complication, not stated as uncontrolled. SURGICAL HISTORY      has a past surgical history that includes Cholecystectomy (yrs ago); back surgery (2002); hernia repair (2004); Carpal tunnel release (2011);  Kidney stone surgery (2008?); Tonsillectomy (1985); Cardiac catheterization (07/02 08/05); Nasal septum surgery (01/07); Colonoscopy (11/06 02/12 1/14); Upper gastrointestinal endoscopy (1997); Vein Surgery (Left, September 2014); Rotator cuff repair (Left, 5-20-15); eye surgery; laryngoscopy (04/28/2016); back surgery (08/11/2017); Biceps tendon repair (Right, 08/16/2017); bronchoscopy; bronchoscopy (N/A, 4/5/2019); bronchoscopy (4/5/2019); Endoscopy, colon, diagnostic; and skin biopsy. CURRENT MEDICATIONS       Current Discharge Medication List      CONTINUE these medications which have NOT CHANGED    Details   montelukast (SINGULAIR) 10 MG tablet Take 1 tablet by mouth nightly  Qty: 90 tablet, Refills: 3    Associated Diagnoses: Moderate persistent asthma without complication; Chronic cough; Stage 1 mild COPD by GOLD classification ([address] Abnormal CT of the chest      SYRINGE-NEEDLE, DISP, 3 ML 23G X 1-1/2\" 3 ML MISC Inject 1 Syringe into the muscle every 14 days  Qty: 10 each, Refills: 1      rOPINIRole (REQUIP) 1 MG tablet Take 1 tablet by mouth 3 times daily  Qty: 90 tablet, Refills: 5      losartan (COZAAR) 25 MG tablet Take 1 tablet by mouth daily  Qty: 30 tablet, Refills: 5      glimepiride (AMARYL) 4 MG tablet Take 1 tablet by mouth 2 times daily  Qty: 60 tablet, Refills: 5      budesonide (PULMICORT) 0.5 MG/2ML nebulizer suspension Take 2 mLs by nebulization 2 times daily Pulmicort Respules 0.5mg via nebulization Bid  Qty: 60 ampule, Refills: 11      VENTOLIN  (90 Base) MCG/ACT inhaler Inhale 2 puffs into the lungs every 6 hours as needed for Wheezing  Qty: 1 Inhaler, Refills: 11      Misc.  Devices (ACAPELLA) MISC 1 Device by Does not apply route 4 times daily  Qty: 1 each, Refills: 0    Associated Diagnoses: Bronchiectasis with acute lower respiratory infection (HCC)      ranitidine (ZANTAC) 150 MG tablet TAKE 1 TABLET BY MOUTH TWICE DAILY  Qty: 180 tablet, Refills: 3    Comments: **Patient requests 90 days supply** Breast Cancer in his mother; Cancer in his mother; Diabetes in his brother; Heart Disease in his brother; High Blood Pressure in his brother; High Cholesterol in his brother; Stroke in his mother. SOCIAL HISTORY    reports that he quit smoking about 2 years ago. His smoking use included cigarettes. He started smoking about 47 years ago. He has a 84.00 pack-year smoking history. He has never used smokeless tobacco. He reports that he does not drink alcohol or use drugs. PHYSICAL EXAM     INITIAL VITALS:  height is 5' 8\" (1.727 m) and weight is 238 lb 12.8 oz (108.3 kg). His temperature is 98.4 °F (36.9 °C). His blood pressure is 123/58 (abnormal) and his pulse is 98. His respiration is 16 and oxygen saturation is 91%. Physical Exam   Constitutional: He is oriented to person, place, and time. He appears well-developed and well-nourished. No distress. Nasal cannula in place. HENT:   Head: Normocephalic and atraumatic. Right Ear: External ear normal.   Left Ear: External ear normal.   Nose: Nose normal.   Mouth/Throat: Oropharynx is clear and moist. No oropharyngeal exudate. Eyes: Pupils are equal, round, and reactive to light. Conjunctivae and EOM are normal. Right eye exhibits no discharge. Left eye exhibits no discharge. No scleral icterus. Neck: Normal range of motion. Neck supple. No JVD present. No tracheal deviation present. Cardiovascular: Regular rhythm, normal heart sounds and intact distal pulses. Tachycardia present. Exam reveals no gallop and no friction rub. No murmur heard. Pulmonary/Chest: Effort normal. He has wheezes. He has no rales. Abdominal: Soft. Bowel sounds are normal. He exhibits no mass. There is no tenderness. There is no rebound and no guarding. Musculoskeletal: Normal range of motion. He exhibits no edema or tenderness. Lymphadenopathy:     He has no cervical adenopathy. Neurological: He is alert and oriented to person, place, and time. He has normal reflexes. MAGNESIUM - Abnormal; Notable for the following components:    Magnesium 1.4 (*)     All other components within normal limits   ANION GAP - Abnormal; Notable for the following components:    Anion Gap 17.0 (*)     All other components within normal limits   GLOMERULAR FILTRATION RATE, ESTIMATED - Abnormal; Notable for the following components:    Est, Glom Filt Rate 61 (*)     All other components within normal limits   RAPID INFLUENZA A/B ANTIGENS   PROTIME-INR   APTT   BRAIN NATRIURETIC PEPTIDE   HEPATIC FUNCTION PANEL   LIPASE   TROPONIN   ETHANOL   OSMOLALITY       EMERGENCY DEPARTMENT COURSE:   Vitals:    Vitals:    04/08/19 2232 04/08/19 2311 04/09/19 0027 04/09/19 0114   BP:  (!) 156/80 (!) 148/79 (!) 123/58   Pulse:  112 108 98   Resp:  18 22 16   Temp:    98.4 °F (36.9 °C)   TempSrc:       SpO2: 92% 92% 92% 91%   Weight:  234 lb (106.1 kg)  238 lb 12.8 oz (108.3 kg)   Height:  5' 8\" (1.727 m)  5' 8\" (1.727 m)     10:35 PM: The patient was seen andevaluated. Appropriate labs were ordered and reviewed. The patient was seen and evaluated in a timely manner for shortness of breath. The patient was noted to be hypertensive, tachycardic and hypoxic at various times during his encounter. The patient's blood pressure was elevated while in the ED and at the time of admission. The patient has a medical history of hypertension and is compliant with their antihypertensive medications. The patient was informed of their elevated blood pressure while in the ED and was advised to follow up with their PCP for blood pressure recheck and further management of their antihypertensive medications. During the physical exam I noted tachycardia and wheezing. I ordered appropriate labs and a chest x-ray. The patient's x-ray showed slightly decreased volumes with mild venous congestion and/or bronchitis changes noted. Laboratory and imaging results were reviewed and discussed with the patient.  Within the department, the patient was treated with solu-medrol, duoneb, and rocephin. Although the patient responded well to treatment, I decided that the patient could benefit from admission to the hospital for further evaluation and observation. After discussing the patient with the physician on call for Dr. Madina Yo, the patient's PCP, I discussed the case with Dr. Irina Chaparro, internal medicine specialist, who graciously agreed to accept the patient. I explained my proposed course of admission to the patient and his significant other at bedside, and they verbalized understanding and agreement with my proposed plan. All their questions were addressed at bedside. The patient was admitted under Dr. Irina Chaparro, internal medicine. CRITICALCARE:   None     CONSULTS:  11:58 PM: After discussing the patient with the physician on call for Dr. Madina Yo, the patient's PCP, I discussed the case with Dr. Irina Chaparro, internal medicine specialist, who graciously agreed to accept the patient. PROCEDURES:  None    FINAL IMPRESSION      1. Pneumonia due to organism    2. COPD exacerbation [facility])          DISPOSITION/PLAN   Admitted    PATIENT REFERRED TO:  Madeleine Stevens MD  [address]  [phone]            DISCHARGE MEDICATIONS:  Current Discharge Medication List          (Please note that portions of this note were completed with a voice recognition program.  Efforts weremade to edit the dictations but occasionally words are mis-transcribed.)    Scribe:  Aurora Matson 4/8/19 10:35 PM Scribing for and in the presence ofBladimir Mercado DO. Scribe: Aurora Matson 4/8/19 10:35 PM    Provider:  I personally performed the services described in the documentation, reviewed and edited the documentation which was dictated to the scribe inmy presence, and it accurately records my words and actions.     Silvano Villarreal DO 4/8/19 2:11 AM      Silvano Villarreal DO  04/09/19 [id number]

## 2019-04-09 NOTE — CARE COORDINATION
[address] Transitions Interview     2019    Patient: Erick Ferrara Patient : 1955   MRN: [id number]  Reason for Admission: Pneumonia [J18.9]  RARS: Readmission Risk Score: 11    Met with: Yony and wife for inpatient Care Transition interview. Identified self/role. Explained CTC process, verbalized understanding and agreeable to CTC calls. Readmission Risk  Patient Active Problem List   Diagnosis    Diabetes ([facility] [address]    Hyperlipidemia    Acid reflux    Sleep apnea    Chronic back pain    DDD (degenerative disc disease), cervical    Asthma    Rectus diastasis    S/P rotator cuff surgery    Lower urinary tract symptoms (LUTS)    Hypogonadism in male   [facility] Low testosterone    Combined arterial insufficiency and corporo-venous occlusive erectile dysfunction    Left thyroid nodule    COPD (chronic obstructive [facility] disease) ([facility])    Sebaceous cyst    Acute respiratory failure with hypoxia ([facility])    Class 2 severe obesity due to excess calories with serious comorbidity and body mass index (BMI) of 37.0 to 37.9 in adult [facility])    Vocal cord disease    Abnormal CT of the chest    Chronic cough    Pneumonia       Inpatient Assessment  Care Transitions Summary    Care Transitions Inpatient Review  Medication Review  Are you able to afford your medications?:  Yes  How often do you have difficulty taking your medications?:  I always take them as prescribed. Housing Review  Who do you live with?:  Partner/Spouse/SO  Are you an active caregiver in your home?:  No  Social Support  Do you have a ?:  No  Do you have a [address] [place] [address]?:  No  Durable Medical Equipment  Patient DME:  Straight cane, Walker  Functional Review  Ability to seek help/take action for Emergent/Urgent situations i.e. fire, crime, inclement weather or health crisis. :  Independent  Ability handle personal hygiene needs (bathing/dressing/grooming):   Independent  Ability to manage medications:  Needs Assistance  Ability to prepare food:  Independent  Ability to maintain home (clean home, laundry): Independent  Ability to drive and/or has transportation:  Independent  Ability to do shopping:  Independent  Ability to manage finances: Independent  Is patient able to live independently?:  Yes  Hearing and Vision  Visual Impairment:  Visual impairment (Glasses/contacts)  Hearing Impairment:  None  Care Transitions Interventions  No Identified Needs       Patient presented to the ED on 04/08/2019 with report of cough, SOB, and fever. In ED, Sp02 88% on room air. Patient not on oxygen at home. PMH includes COPD, GERD, HTN, HLD, and DM. Patient admitted for Pneumonia [J18.9]. Consults with Pulmonology and Infectious Disease. Case management following. Patient is from home where he resides with his wife. Patient drives and is independent with ADL's. Patient's wife manages his medications. Patient denies use of assistive devices prior to arrival. Patient has a scale, blood pressure cuff, and glucometer for chronic disease management. Patient denies additional needs or concerns at this time. Business card with contact information for CTC provided. Patient understands CTC will follow upon discharge.      Follow Up  Future Appointments   Date Time Provider Doug Shah   4/17/2019  9:30 AM SANTOSH Brower - CNP Pulm Med [facility] MARY  [address]   4/30/2019  2:30 PM [facility]te [facility]  [address]   5/8/2019  8:40 AM Anne Marie Reese MD Mydish [employer] AFL  [employer]   5/28/2019  8:30 AM Yue Orantes Urology [facility] Maintenance  Health Maintenance Due   Topic Date Due    Lipid screen  04/17/2019       Ludwig Betancur, RN  Care Transition Coordinator  [phone]  21

## 2019-04-09 NOTE — ED NOTES
Called Floor. Transporting pt to  [address]16 On cart. Pt going via [address] transport. Cameron Zuñiga LPN  10/62/18 [id number]

## 2019-04-09 NOTE — H&P
INTERNAL MEDICINE SPECIALTIES    History & Physical    Patient:  Alison Polk  YOB: 1955  Date of Service: 4/9/2019  MRN: [id number]   Acct:  [de-identified]   Primary Care Physician: Angus Parkinson MD    Chief Complaint: Shortness of breath    History of Present Illness:   History obtained from the patient. The patient is a 70 y.o. male who presents from home with complaints of shortness of breath. Context: patient is currently being followed up on account of COPD and had a recent bronchoscopy which revealed chronic E. Coli infection and was placed on oral antibiotics but was yet to fill out the prescription. He now presents with complaints of worsened dyspnea, chills and rigor after spending several hours cleaning out his boat.     Past Medical History:        Diagnosis Date    Acid reflux     Arthritis     Asthma     Chronic back pain     Class 2 severe obesity due to excess calories with serious comorbidity and body mass index (BMI) of 37.0 to 37.9 in adult [facility]) 8/13/2018    Colon polyps 11/06    COPD (chronic obstructive pulmonary disease) (HCC)     Depression     panic attacks    Diverticulosis     HTN (hypertension)     Hyperlipidemia     Kidney disorder     Panic attack     Pneumonia     Sleep apnea     Thumb amputation status 3/13/14    left tip of thumb amputated    Thyroid disease     Type II or unspecified type diabetes mellitus without mention of complication, not stated as uncontrolled        Past Surgical History:        Procedure Laterality Date    BACK SURGERY  2002    BACK SURGERY  08/11/2017    BICEPS TENDON REPAIR Right 08/16/2017    BRONCHOSCOPY      BRONCHOSCOPY N/A 4/5/2019    BRONCHOSCOPY performed by Yudy Jung MD at [address] [place] [address]  4/5/2019    BRONCHOSCOPY ALVEOLAR LAVAGE performed by Yudy Jung MD at [address] [facility]  07/02 08/05    CARPAL TUNNEL RELEASE  2011    right hand    CHOLECYSTECTOMY  yrs ago    COLONOSCOPY  11/06 02/12 1/14    ENDOSCOPY, COLON, DIAGNOSTIC      EYE SURGERY      foreign body removal    HERNIA REPAIR  2004    Dr Evelin Moreira  2008?  LARYNGOSCOPY  04/28/2016    Laryngoscopy Micro with Biopsy by Dr Clarita Gasca  01/07    uppp    ROTATOR CUFF REPAIR Left 5-20-15    SKIN BIOPSY      TONSILLECTOMY  1985    UPPER GASTROINTESTINAL ENDOSCOPY  1997    VEIN SURGERY Left September 2014    Dr. Wilda Hunter Medications:   No current facility-administered medications on file prior to encounter. Current Outpatient Medications on File Prior to Encounter   Medication Sig Dispense Refill    montelukast (SINGULAIR) 10 MG tablet Take 1 tablet by mouth nightly 90 tablet 3    SYRINGE-NEEDLE, DISP, 3 ML 23G X 1-1/2\" 3 ML MISC Inject 1 Syringe into the muscle every 14 days 10 each 1    rOPINIRole (REQUIP) 1 MG tablet Take 1 tablet by mouth 3 times daily 90 tablet 5    losartan (COZAAR) 25 MG tablet Take 1 tablet by mouth daily 30 tablet 5    glimepiride (AMARYL) 4 MG tablet Take 1 tablet by mouth 2 times daily 60 tablet 5    budesonide (PULMICORT) 0.5 MG/2ML nebulizer suspension Take 2 mLs by nebulization 2 times daily Pulmicort Respules 0.5mg via nebulization Bid 60 ampule 11    VENTOLIN  (90 Base) MCG/ACT inhaler Inhale 2 puffs into the lungs every 6 hours as needed for Wheezing 1 Inhaler 11    Misc.  Devices (ACAPELLA) MISC 1 Device by Does not apply route 4 times daily 1 each 0    ranitidine (ZANTAC) 150 MG tablet TAKE 1 TABLET BY MOUTH TWICE DAILY 180 tablet 3    metFORMIN (GLUCOPHAGE) 500 MG tablet TAKE 2 TABLETS BY MOUTH TWICE DAILY WITH MEALS 120 tablet 5    sertraline (ZOLOFT) 100 MG tablet take 1 tablet by mouth twice a day 60 tablet 5    simvastatin (ZOCOR) 40 MG tablet Take 1 tablet by mouth nightly 30 tablet 5    busPIRone (BUSPAR) 10 MG tablet Take 1 tablet by mouth 3 times daily 90 tablet 5    hydrOXYzine (ATARAX) 10 MG tablet       baclofen (LIORESAL) 10 MG tablet take 1 tablet by mouth twice a day  0    ONETOUCH DELICA LANCETS FINE MISC Check blood sugar twice daily Dx: 250.00 200 each 1    glucose blood VI test strips (ASCENSIA AUTODISC VI;ONE TOUCH ULTRA TEST VI) strip One Touch Ultra Test Strip - Check blood sugar twice daily Dx: E11.9 200 each 1    traZODone (DESYREL) 150 MG tablet take 1/2 to 1 tablet by mouth once daily at bedtime if needed for pain SPASM, OR SLEEP  0    HYDROcodone-acetaminophen (NORCO) 5-325 MG per tablet Take 1 tablet by mouth every 6 hours as needed for Pain      Omega-3 Fatty Acids (FISH OIL) 1000 MG CAPS Take 1,000 mg by mouth daily.  sildenafil (VIAGRA) 100 MG tablet Take 1 tablet by mouth as needed. 8 tablet 5    ipratropium-albuterol (DUONEB) 0.5-2.5 (3) MG/3ML SOLN nebulizer solution Inhale 3 mLs into the lungs every 4 hours as needed for Shortness of Breath 1620 mL 11    testosterone cypionate (DEPOTESTOTERONE CYPIONATE) 200 MG/ML injection Inject 1 ml into the muscle every 14 days. 2 mL 3       Allergies:  Patient has no known allergies. Social History:    reports that he quit smoking about 2 years ago. His smoking use included cigarettes. He started smoking about 47 years ago. He has a 84.00 pack-year smoking history. He has never used smokeless tobacco. He reports that he does not drink alcohol or use drugs. Family History:       Problem Relation Age of Onset    Cancer Mother     Breast Cancer Mother     Stroke Mother     Heart Disease Brother     Diabetes Brother     High Blood Pressure Brother     High Cholesterol Brother        Review of systems:  Pertinent positives and negatives as contained in HPI. All other systems reviewed with no clinically significant findings. 10 point review of systems completed, all other than noted above are negative.        Vitals:   Vitals:    04/09/19 1127   BP: 127/64   Pulse: 83   Resp: 20   Temp: 97.6 °F (36.4 °C)   SpO2: 90%      BMI: Body mass index is 36.31 kg/m².                 Exam:  Physical Examination:   General appearance: alert, appears stated age, cooperative and mild distress  Neck: no adenopathy, no carotid bruit, no JVD, supple, symmetrical, trachea midline and thyroid not enlarged, symmetric, no tenderness/mass/nodules  Lungs: rhonchi posterior - left  Heart: regular rate and rhythm, S1, S2 normal, no murmur, click, rub or gallop  Abdomen: soft, non-tender; bowel sounds normal; no masses,  no organomegaly  Extremities: extremities normal, atraumatic, no cyanosis or edema  Pulses: 2+ and symmetric  Skin: Skin color, texture, turgor normal. No rashes or lesions  Neurologic: Grossly normal    Review of Labs and Diagnostic Testing:    Recent Results (from the past 24 hour(s))   EKG SOB    Collection Time: 04/08/19 10:09 PM   Result Value Ref Range    Ventricular Rate 109 BPM    Atrial Rate 109 BPM    P-R Interval 150 ms    QRS Duration 96 ms    Q-T Interval 320 ms    QTc Calculation (Bazett) 430 ms    P Axis 36 degrees    R Axis -142 degrees    T Axis 54 degrees   Rapid influenza A/B antigens    Collection Time: 04/08/19 10:50 PM   Result Value Ref Range    Flu A Antigen NEGATIVE NEGATIVE    Flu B Antigen NEGATIVE NEGATIVE   Protime-INR    Collection Time: 04/08/19 10:51 PM   Result Value Ref Range    INR 0.99 0.85 - 1.13   APTT    Collection Time: 04/08/19 10:51 PM   Result Value Ref Range    aPTT 35.6 22.0 - 38.0 seconds   CBC auto differential    Collection Time: 04/08/19 10:51 PM   Result Value Ref Range    WBC 10.2 4.8 - 10.8 thou/mm3    RBC 5.85 4.70 - 6.10 mill/mm3    Hemoglobin 18.1 (H) 14.0 - 18.0 gm/dl    Hematocrit 53.0 (H) 42.0 - 52.0 %    MCV 90.6 80.0 - 94.0 fL    MCH 30.9 26.0 - 33.0 pg    MCHC 34.2 32.2 - 35.5 gm/dl    RDW-CV 15.4 (H) 11.5 - 14.5 %    RDW-SD 48.9 (H) 35.0 - 45.0 fL    Platelets 407 895 - 259 thou/mm3    MPV 11.4 9.4 - 12.4 fL    Seg Neutrophils 79.8 % Lymphocytes 8.4 %    Monocytes 10.1 %    Eosinophils 1.0 %    Basophils 0.3 %    Immature Granulocytes 0.4 %    Segs Absolute 8.1 (H) 1.8 - 7.7 thou/mm3    Lymphocytes # 0.9 (L) 1.0 - 4.8 thou/mm3    Monocytes # 1.0 0.4 - 1.3 thou/mm3    Eosinophils # 0.1 0.0 - 0.4 thou/mm3    Basophils # 0.0 0.0 - 0.1 thou/mm3    Immature Grans (Abs) 0.04 0.00 - 0.07 thou/mm3    nRBC 0 /100 wbc   Brain Natriuretic Peptide    Collection Time: 04/08/19 10:51 PM   Result Value Ref Range    Pro-BNP 21.6 0.0 - 900.0 pg/mL   Basic Metabolic Panel    Collection Time: 04/08/19 10:51 PM   Result Value Ref Range    Sodium 141 135 - 145 meq/L    Potassium 4.6 3.5 - 5.2 meq/L    Chloride 103 98 - 111 meq/L    CO2 21 (L) 23 - 33 meq/L    Glucose 119 (H) 70 - 108 mg/dL    BUN 17 7 - 22 mg/dL    CREATININE 1.2 0.4 - 1.2 mg/dL    Calcium 9.1 8.5 - 10.5 mg/dL   Hepatic function panel    Collection Time: 04/08/19 10:51 PM   Result Value Ref Range    Alb 4.4 3.5 - 5.1 g/dL    Total Bilirubin 0.3 0.3 - 1.2 mg/dL    Bilirubin, Direct <0.2 0.0 - 0.3 mg/dL    Alkaline Phosphatase 39 38 - 126 U/L    AST 22 5 - 40 U/L    ALT 18 11 - 66 U/L    Total Protein 7.7 6.1 - 8.0 g/dL   Lipase    Collection Time: 04/08/19 10:51 PM   Result Value Ref Range    Lipase 21.1 5.6 - 51.3 U/L   Troponin    Collection Time: 04/08/19 10:51 PM   Result Value Ref Range    Troponin T < 0.010 ng/ml   Magnesium    Collection Time: 04/08/19 10:51 PM   Result Value Ref Range    Magnesium 1.4 (L) 1.6 - 2.4 mg/dL   Ethanol    Collection Time: 04/08/19 10:51 PM   Result Value Ref Range    ETHYL ALCOHOL, SERUM < 0.01 0.00 %   Anion Gap    Collection Time: 04/08/19 10:51 PM   Result Value Ref Range    Anion Gap 17.0 (H) 8.0 - 16.0 meq/L   Osmolality    Collection Time: 04/08/19 10:51 PM   Result Value Ref Range    Osmolality Calc 283.9 275.0 - 300 mOsmol/kg   Glomerular Filtration Rate, Estimated    Collection Time: 04/08/19 10:51 PM   Result Value Ref Range    Est, Glom Filt Rate 61 (A) ml/min/1.73m2       Radiology:     Xr Chest Portable    Result Date: 4/8/2019  PROCEDURE: XR CHEST PORTABLE CLINICAL INFORMATION: CP SOB. COMPARISON: January 26, 2019 TECHNIQUE: AP upright view of the chest. FINDINGS: There is stable configuration of the heart and mediastinum with redemonstration of coarse perihilar markings. Changes of mild venous congestion and/or bronchitis are considered. There is mild increase of airspace opacities at the lung bases. The skeleton  is negative for acute appearing changes. There is no obvious effusion or pneumothorax. 1. Slightly decreased volumes with mild venous congestion and/or bronchitis changes noted. **This report has been created using voice recognition software. It may contain minor errors which are inherent in voice recognition technology. ** Final report electronically signed by Dr. Gabe Phillips on 4/8/2019 10:34 PM        EKG: normal sinus rhythm, no blocks or conduction defects, no ischemic changes      Active Problems:    Pneumonia  Resolved Problems:    * No resolved hospital problems. *      Impression:  Chronic bronchitis with E. Coli infection    Assessment & Plan:    1. Admit for further evaluation and management  2. Start broad spectrum antibiotics  3. Pulmonary and ID consultation  4. Resume home medications as clinically indicated  5.  Further treatment will be guided by evolution of clinical course      DVT prophylaxis: [x] Lovenox                                 [] SCDs                                 [] SQ Heparin                                 [] Encourage ambulation, low risk for DVT, no chemical or mechanical prophylaxis necessary              [] Already on Anticoagulation                Anticipated Disposition upon discharge: [x] Home                                                                         [] Home with Home Health                                                                         [] [facility] / Assisted Living facility                                                                         [] [address] [place] [address],[address]          Electronically signed by Marimar Wheeler MD on 4/9/2019 at 12:13 PM

## 2019-04-09 NOTE — ED NOTES
Pt arrived to ED room 2 c/c of SOB and middle chest pain that started at 1600 today. Pt states nothing makes the breathing or chest discomfort worse or better. Pt had bronchoscopy done on 4/5/19. Pt is more comfortable sitting on side of bed. Pt was told by Dr. Flavia Sepulveda he has ecoli in his lungs. Pt complains of having being cold an dhaving shakes earlier on today. Pt denies nausea and vomiting but pt is coughing a congested wet cough. Pt is hooked up to monitor.              Cheryl Sinclair RN  04/08/19 0662

## 2019-04-09 NOTE — FLOWSHEET NOTE
04/09/19 1044   Provider Notification   Reason for Communication Review case   Provider Name Dr. Shavon Anton   Provider Notification Physician   Method of Communication Secure Message   Response Waiting for response   Notification Time [phone]   Consult from Dr. Jerri Orr

## 2019-04-09 NOTE — PROGRESS NOTES
Impaired []DNT    Cough [x]WFL [] Impaired []DNT    Other: []WFL [] Impaired []DNT    Other: []WFL [] Impaired []DNT        PATIENT WAS EVALUATED USING:  Thin via cup and straw,  Puree, hard solids     ORAL PHASE: [x] WFL  [] Impaired   [] Loss of bolus from lips [] Impaired AP movement [] Pocketing Left   [] Pocketing Right  [] Lingual Pumping  [] Impaired Mastication   [] Reduced Bolus Formation [] Impaired Oral Initiation    [] OTHER:    PHARYNGEAL PHASE: [x] WFL: Pharyngeal phase appears WFLs, but cannot completely rule out pharyngeal phase deficits from a bedside swallow evaluation alone. [] Impaired   [] Delayed Swallow  [] Decreased Hyolaryngeal Elevation [] Audible Swallow   [] Suspected Pharyngeal Residue due to spontaneous multiple swallow. [] OTHER:    SIGNS AND SYMPTOMS OF LARYNGEAL PENETRATION / ASPIRATION:  [x] NO sign/symptoms of aspiration evident with this evaluation, but cannot rule out silent aspiration. [] Throat Clear  [] Immediate Cough [] Delayed Cough [] Wet Vocal Quality  [] Change in Pulmonary Status  [] OTHER:    IMPRESSIONS: Patient presents with swallowing function that is essentially [facility]. All lingual/labial/pharyngeal structures present to be intact and functioning approprietly. Patient with baseline cough prior to PO intake. Patient completed PO trials of thin, puree, hard solids without s/s of aspiration. Patient demonstrated with timely, effective mastication, bolus formation, timely swallow trigger, adequate laryngeal elevation, effective oral clearance, no s/s of aspiration with all consistencies, vocal quality dry and clear. Patient reports, \"I cough with dinner but only when my wife makes a really good meal and I eat really fast.\"  ST provided skilled level of education supporting slow rate of oral presentation to reduce risk of aspiration as well as importance of breath/swallow coordination. Patient and wife verbalized understanding.  Recommend regular diet with thin liquids with close pulmonary monitoring, if decline presents will complete instrumental evaluation as approrapite. Patient would benefit from skilled ST services to monitor diet tolerance and complete pulmonary monitoring. *Per documentation Eloy Sanchez MD ok with MBS as determined by SLP      RECOMMENDATIONS:     Modified Barium Swallow: [] Is indicated to further assess    [x] Is NOT indicated at this time; Will recommend as        appropriate. DIET RECOMMENDATIONS:  Regular with thin       STRATEGIES: [] Strategies pending MBS results. [x] Full upright position  [x] Small bite/sip [] No Straw [] Multiple Swallow  [] Chin tuck [] Head turn [x] Pulmonary monitoring [] Oral care after all meals  [] Supervision  [] Medication in applesauce []Direct 1:1 Supervision  [] Spoon all liquids [] Alternate solid / liquid [] Limit distractions [] Monitor for fatigue  [] PMV in place for all po [x] OTHER: SLOW RATE       EDUCATION:   Learner: [x]Patient [x] Significant other [] Son/Daughter [] Parent     [] Other:   Education: [x] Reviewed results and recommendations of this evaluation     [x] Reviewed diet and strategies     [x] Reviewed signs, symptoms and risk of aspiration     [] Demonstrated how to thick liquid appropriately. [x] Reviewed goals and Plan of Care     [] OTHER:   Method: [x] Discussion [x] Demonstration [] Hand-out     [] OTHER:   Evaluation of Education:     [x] Verbalizes understanding [x] Demonstrates with assistance     [] Demonstrates without assistance []Needs further instruction     [] No evidence of learning  [] Family not present    PATIENT GOALS: [] Pt did not state. Will further assess during treatment. [] Return to the least restricted diet possible     [x] Return to previous level of function     [] OTHER:    PLAN / TREATMENT RECOMMENDATIONS:  [x] Skilled SLP intervention on acute care 3-5 x per week or until goals met and/or pt plateaus in function.   Specific

## 2019-04-09 NOTE — PROGRESS NOTES
Pharmacy Note  Aminoglycoside Consult    Merian Sandifer is a 70 y.o. male ordered gentamicin for recurrent E coli pneumonia; consult received from Dr. Ole Zamora to manage therapy. Also receiving ceftriaxone. Patient Active Problem List   Diagnosis    Diabetes ([address]    Hyperlipidemia    Acid reflux    Sleep apnea    Chronic back pain    DDD (degenerative disc disease), cervical    Asthma    Rectus diastasis    S/P rotator cuff surgery    Lower urinary tract symptoms (LUTS)    Hypogonadism in male    Low testosterone    Combined arterial insufficiency and corporo-venous occlusive erectile dysfunction    Left thyroid nodule    COPD (chronic obstructive pulmonary disease) ([facility])    Sebaceous cyst    Acute respiratory failure with hypoxia ([facility])    Class 2 severe obesity due to excess calories with serious comorbidity and body mass index (BMI) of 37.0 to 37.9 in adult [facility])    Vocal cord disease    Abnormal CT of the chest    Chronic cough    Pneumonia       Allergies:  Patient has no known allergies. Temp max: 98.4    Recent Labs     04/08/19  2251   BUN 17       Recent Labs     04/08/19  2251   CREATININE 1.2       Recent Labs     04/08/19  2251   WBC 10.2         Intake/Output Summary (Last 24 hours) at 4/9/2019 1209  Last data filed at 4/9/2019 0502  Gross per 24 hour   Intake 15 ml   Output --   Net 15 ml       Culture Date Source Results   4/8/19 Rapid flu A/B Neg/neg   4/5/19 Bronchial washings E Coli/anusha albicans   4/5/19 BAL E Coli/Candida albicans   1/28/19 Sputum E Coli     Height:   Ht Readings from Last 1 Encounters:   04/09/19 5' 8\" (1.727 m)     Weight:  Wt Readings from Last 1 Encounters:   04/09/19 238 lb 12.8 oz (108.3 kg)     Estimated Creatinine Clearance: 73 mL/min (based on SCr of 1.2 mg/dL). Assessment/Plan:  Received dose of 167.2mg in ED this morning at 0149  Initiate gentamicin 160mg IV q18h    Thank you for the consult. Will continue to follow.      Tony Hinton, PharmD, BCPS 4/9/2019 12:25 PM

## 2019-04-10 VITALS
HEART RATE: 82 BPM | HEIGHT: 68 IN | BODY MASS INDEX: 36.19 KG/M2 | DIASTOLIC BLOOD PRESSURE: 71 MMHG | RESPIRATION RATE: 18 BRPM | TEMPERATURE: 98.1 F | WEIGHT: 238.8 LBS | OXYGEN SATURATION: 95 % | SYSTOLIC BLOOD PRESSURE: 155 MMHG

## 2019-04-10 LAB
BASOPHILS # BLD: 0.5 %
BASOPHILS ABSOLUTE: 0 THOU/MM3 (ref 0–0.1)
EOSINOPHIL # BLD: 0.7 %
EOSINOPHILS ABSOLUTE: 0.1 THOU/MM3 (ref 0–0.4)
ERYTHROCYTE [DISTWIDTH] IN BLOOD BY AUTOMATED COUNT: 15 % (ref 11.5–14.5)
ERYTHROCYTE [DISTWIDTH] IN BLOOD BY AUTOMATED COUNT: 51.4 FL (ref 35–45)
GLUCOSE BLD-MCNC: 275 MG/DL (ref 70–108)
HCT VFR BLD CALC: 49.8 % (ref 42–52)
HEMOGLOBIN: 16.6 GM/DL (ref 14–18)
IMMATURE GRANS (ABS): 0.06 THOU/MM3 (ref 0–0.07)
IMMATURE GRANULOCYTES: 0.7 %
LYMPHOCYTES # BLD: 12.7 %
LYMPHOCYTES ABSOLUTE: 1.1 THOU/MM3 (ref 1–4.8)
MAGNESIUM: 1.6 MG/DL (ref 1.6–2.4)
MCH RBC QN AUTO: 30.9 PG (ref 26–33)
MCHC RBC AUTO-ENTMCNC: 33.3 GM/DL (ref 32.2–35.5)
MCV RBC AUTO: 92.7 FL (ref 80–94)
MISC. #1 REFERENCE GROUP TEST: NORMAL
MONOCYTES # BLD: 12.2 %
MONOCYTES ABSOLUTE: 1 THOU/MM3 (ref 0.4–1.3)
NUCLEATED RED BLOOD CELLS: 0 /100 WBC
PLATELET # BLD: 169 THOU/MM3 (ref 130–400)
PMV BLD AUTO: 11.2 FL (ref 9.4–12.4)
PROCALCITONIN: 0.06 NG/ML (ref 0.01–0.09)
RBC # BLD: 5.37 MILL/MM3 (ref 4.7–6.1)
SEG NEUTROPHILS: 73.2 %
SEGMENTED NEUTROPHILS ABSOLUTE COUNT: 6.1 THOU/MM3 (ref 1.8–7.7)
WBC # BLD: 8.4 THOU/MM3 (ref 4.8–10.8)

## 2019-04-10 PROCEDURE — 6370000000 HC RX 637 (ALT 250 FOR IP): Performed by: INTERNAL MEDICINE

## 2019-04-10 PROCEDURE — 84145 PROCALCITONIN (PCT): CPT

## 2019-04-10 PROCEDURE — 83735 ASSAY OF MAGNESIUM: CPT

## 2019-04-10 PROCEDURE — 92526 ORAL FUNCTION THERAPY: CPT

## 2019-04-10 PROCEDURE — 94640 AIRWAY INHALATION TREATMENT: CPT

## 2019-04-10 PROCEDURE — 6360000002 HC RX W HCPCS: Performed by: INTERNAL MEDICINE

## 2019-04-10 PROCEDURE — 85025 COMPLETE CBC W/AUTO DIFF WBC: CPT

## 2019-04-10 PROCEDURE — 36415 COLL VENOUS BLD VENIPUNCTURE: CPT

## 2019-04-10 PROCEDURE — 99232 SBSQ HOSP IP/OBS MODERATE 35: CPT | Performed by: INTERNAL MEDICINE

## 2019-04-10 PROCEDURE — 82948 REAGENT STRIP/BLOOD GLUCOSE: CPT

## 2019-04-10 PROCEDURE — 94761 N-INVAS EAR/PLS OXIMETRY MLT: CPT

## 2019-04-10 RX ORDER — DOXYCYCLINE HYCLATE 100 MG/1
100 CAPSULE ORAL 2 TIMES DAILY
Qty: 16 CAPSULE | Refills: 0 | Status: SHIPPED | OUTPATIENT
Start: 2019-04-10 | End: 2019-04-18

## 2019-04-10 RX ADMIN — LOSARTAN POTASSIUM 25 MG: 25 TABLET, FILM COATED ORAL at 08:07

## 2019-04-10 RX ADMIN — BUDESONIDE 500 MCG: 0.5 INHALANT RESPIRATORY (INHALATION) at 07:33

## 2019-04-10 RX ADMIN — FAMOTIDINE 20 MG: 20 TABLET ORAL at 08:09

## 2019-04-10 RX ADMIN — METFORMIN HYDROCHLORIDE 500 MG: 500 TABLET ORAL at 08:08

## 2019-04-10 RX ADMIN — BUSPIRONE HYDROCHLORIDE 10 MG: 5 TABLET ORAL at 08:07

## 2019-04-10 RX ADMIN — GLIMEPIRIDE 4 MG: 4 TABLET ORAL at 08:07

## 2019-04-10 RX ADMIN — IPRATROPIUM BROMIDE AND ALBUTEROL SULFATE 1 AMPULE: .5; 3 SOLUTION RESPIRATORY (INHALATION) at 07:30

## 2019-04-10 RX ADMIN — SERTRALINE 100 MG: 100 TABLET, FILM COATED ORAL at 08:08

## 2019-04-10 RX ADMIN — ROPINIROLE HYDROCHLORIDE 1 MG: 1 TABLET, FILM COATED ORAL at 08:08

## 2019-04-10 ASSESSMENT — PAIN SCALES - GENERAL: PAINLEVEL_OUTOF10: 0

## 2019-04-10 NOTE — DISCHARGE INSTR - ACTIVITY
Up as tolerated
Skin normal color for race, warm, dry and intact. No evidence of rash. Capillary refill less than 2 seconds.

## 2019-04-10 NOTE — PROGRESS NOTES
[facility]  INPATIENT SPEECH THERAPY  [facility] MED SURG 8A    TIME   SLP Individual Minutes  Time In: 3119  Time Out: 4359  Minutes: 9  Timed Code Treatment Minutes: 0 Minutes       []Daily Note  []Progress Note  [x]Discharge Note    Date: 4/10/2019  Patient Name: Cb Patton        MRN: [id number]    : 1955  (70 y.o.)  Gender: male   Primary Provider: Eden Crystal MD  Admitting Diagnosis:  pneumonia  Secondary Diagnosis: dysphagia  Precautions: aspiration  Swallowing Status/Diet: regular and thin  Swallowing Strategies: upright positioning during and for 30 minutes after meal  DATE of last MBS:  BSE 19  Pain:  0/10    Subjective: Pt seen sitting at edge of bed. Alert and cooperative. SHORT TERM GOAL #1:  Goal 1: Patient will tolerate regular diet with thin liquids without s/s of aspiration in order to safley maintain adequate hydration and nutrition GOAL MET  INTERVENTIONS: Pt tolerated dry soda cracker without s/s of aspiration. Thin liquids by straw without difficulty as well. Pt reports recent bronchoscopy showing severe acid reflux and inflamed vocal cords from reflux. Swallowing function is intact for current diet level. Recommend pt remain on regular and thin diet with follow up for reflux control. SHORT TERM GOAL #2:  Goal 2: Monitor pulmonary status, if decline presents recommend completin of instrumental evaluation GOAL MET  INTERVENTIONS:RN reports mild wheezes this morning. Based on diet analysis, MBS is not indicated.          ASSESSMENT:  Assessment: [x]Progressing towards goals          []Not Progressing towards goals       Patient Tolerance of Treatment:   [x]Tolerated well []Tolerated fair []Required rest breaks []Fatigued  Education:  Learner:  [x]Patient          []Significant Other          []Other  Education provided regarding:  [x]Goals and POC   [x]Diet and swallowing precautions    []Home Exercise Program  []Progress and/or discharge information  Method of Education:  [x]Discussion          []Demonstration          []Handout          []Other  Evaluation of Education:   [x]Verbalized understanding   []Demonstrates without assistance  []Demonstrates with assistance  []Needs further instruction     []No evidence of learning                  [x]No family present      Plan: []Continue with current plan of care    []Modify current plan of care as follows:    [x]Discharge patient    Discharge [address] [id number]    Services/Supervision Recommended: upright position during and after all meals.     Nithin Pemberton M.S. [id number]

## 2019-04-10 NOTE — FLOWSHEET NOTE
04/09/19 2140   Encounter Summary   Services provided to: Patient   Referral/Consult From: [facility]   Support System Spouse; Children   Continue Visiting Yes  (4/8)   Complexity of Encounter Moderate   Length of Encounter 15 minutes   Spiritual Assessment Completed Yes   Grief and Life Adjustment   Type Adjustment to illness;New Diagnosis   Assessment Approachable   Intervention Nurtured hope;Explored feelings, thoughts, concerns;Prayer;Discussed illness/injury and it's impact   Outcome Expressed gratitude;Engaged in conversation;Receptive;Encouraged     Patient was laying in his bed in the observation section of the hospital. He explained he is here de to E-coli I his lungs. He explained he had sleep apnea and had a surgery to remove part of his palette. As a result too much was taken and now when laying flat food goes to his lungs. We discussed his support system to help with his aftercare along with the lifestyle changes he now will encounter. He feels as though he has the necessary people in his court and will be able to recover at home. When asked about his antonio life, he reported believing in God however does not practice at any [employer] at this point in his life. Further support from our team could be helpful for him.  He expressed appreciation for the visit and accepted prayer

## 2019-04-10 NOTE — PROGRESS NOTES
[facility]    Marcia Villegas   MRN -  [id number]   [facility] - [de-identified]   - 1955      Date of Admission -  2019 10:02 PM  Date of evaluation -  4/10/2019  Room - 8A-16/016-A   Hospital Day - 2  Consulting - No att. providers found Primary Care Physician - Inge Watts MD     This patient was seen today between 6;30Am to 7:45Am by me. This is a late entry note. Problem List      Active Hospital Problems    Diagnosis Date Noted    Pneumonia [J18.9] 2019     Chief complaint. For management of pneumonia. HPI   History Obtained From: Patient and electronic medical record. Stacy Freitas is a 70 y.o. male  was initially admitted under hospitalist service. Pulmonary medicine was consulted for further management of Pneumonia. The patient is a 70 y.o. male who was seen by me last week in my clinic underwent Flexible diagnostic fiberoptic bronchoscopy without fluoroscopy. During procedure Bronch washings obtained  from bilateral lower lobes. Bronchioalveolar lavage was performed from right lower lobe posterior segment on 19. He started having fever with chills and rigors yesterday. He also told me that he exposed to cleaning chemicals while working on his boat yesterday. His wife called our office yesterday with a Fever of 102.6F. He was advised to go to ER for further evaluation. He also develoeped shortness of breath. He was evaluated in the Emergency room by ER physician and admitted under hospitalist service for further evaluation. He is having cough: Yes  Duration of cough: for few weeks. His cough is associated with sputum production: No   Hemoptysis:No  Diurnal variation: None. Relieving factors: No  Aggravating factors: No    He gives a history of fever: Yes  Duration: 1 day. Onset: sudden   Chills & rigors: Yes  Associated night sweats: Yes.   Recent travel history:No.    Rrecent sick contacts: No.      He is having chest pain:No.    He denies orthopnea or paroxysmal nocturnal dyspnea. Prior to his current hospitalization:  He is currently not using any oxygen supplementation at rest, exercise or during sleep/at night time. He was never diagnosed with pulmonary diseases I.e,Pulmonary fibrosis, Sarcoidosis, pulmonary embolism,pulmonary hypertension or pleural effusion/s in the past.    History of pulmonary tuberculosis in the past: No    Recent exposure to any patients with tuberculosis:No  Recent travel to endemic places of tuberculosis:No.    Subjective/Events Past 24 hours/ROS   On room air. Feels better. No chest pain. Afebrile. Episode of cough with mucus. Rest of the body systems were reviewed.      PMHx   Past Medical History      Diagnosis Date    Acid reflux     Arthritis     Asthma     Chronic back pain     Class 2 severe obesity due to excess calories with serious comorbidity and body mass index (BMI) of 37.0 to 37.9 in adult [facility]) 8/13/2018    Colon polyps 11/06    COPD (chronic obstructive pulmonary disease) (HCC)     Depression     panic attacks    Diverticulosis     HTN (hypertension)     Hyperlipidemia     Kidney disorder     Panic attack     Pneumonia     Sleep apnea     Thumb amputation status 3/13/14    left tip of thumb amputated    Thyroid disease     Type II or unspecified type diabetes mellitus without mention of complication, not stated as uncontrolled       Past Surgical History        Procedure Laterality Date    BACK SURGERY  2002    BACK SURGERY  08/11/2017    BICEPS TENDON REPAIR Right 08/16/2017    BRONCHOSCOPY      BRONCHOSCOPY N/A 4/5/2019    BRONCHOSCOPY performed by Rajiv Castano MD at [address] [place] [address]  4/5/2019    BRONCHOSCOPY ALVEOLAR LAVAGE performed by Rajiv Castano MD at [address] [facility]  07/02 08/05    CARPAL TUNNEL RELEASE  2011    right hand    CHOLECYSTECTOMY  yrs ago    COLONOSCOPY  11/06 02/12     ENDOSCOPY, COLON, DIAGNOSTIC      EYE SURGERY      foreign body removal    HERNIA REPAIR      Dr Samuel Pemberton  ?  LARYNGOSCOPY  2016    Laryngoscopy Micro with Biopsy by Dr Nahum Webre      uppp    ROTATOR CUFF REPAIR Left 5-20-15    SKIN BIOPSY      TONSILLECTOMY  1985    UPPER GASTROINTESTINAL ENDOSCOPY      VEIN SURGERY Left 2014    Dr. Augusta Jamison    Current Medications       IV Drips/Infusions    Home Medications  No medications prior to admission. Diet    No diet orders on file  Allergies    Patient has no known allergies.   Family History          Problem Relation Age of Onset    Cancer Mother     Breast Cancer Mother     Stroke Mother     Heart Disease Brother     Diabetes Brother     High Blood Pressure Brother     High Cholesterol Brother      Sleep History    He was diagnosed with sleep apnea in the past    Social History     Social History     Socioeconomic History    Marital status:      Spouse name: Not on file    Number of children: Not on file    Years of education: Not on file    Highest education level: Not on file   Occupational History    Not on file   Social Needs    Financial resource strain: Not on file    Food insecurity:     Worry: Not on file     Inability: Not on file    Transportation needs:     Medical: Not on file     Non-medical: Not on file   Tobacco Use    Smoking status: Former Smoker     Packs/day: 2.00     Years: 42.00     Pack years: 84.00     Types: Cigarettes     Start date: 1971     Last attempt to quit: 3/3/2017     Years since quittin.1    Smokeless tobacco: Never Used    Tobacco comment: pts uses just nicotine vap   Substance and Sexual Activity    Alcohol use: No     Alcohol/week: 0.0 oz    Drug use: No    Sexual activity: Yes     Partners: Female   Lifestyle    Physical activity:     Days per week: Not on file     Minutes per session: Not on file    Stress: Not on file   Relationships    Social connections:     Talks on phone: Not on file     Gets together: Not on file     Attends Yarsanism service: Not on file     Active member of club or organization: Not on file     Attends meetings of clubs or organizations: Not on file     Relationship status: Not on file    Intimate partner violence:     Fear of current or ex partner: Not on file     Emotionally abused: Not on file     Physically abused: Not on file     Forced sexual activity: Not on file   Other Topics Concern    Not on file   Social History Narrative    Not on file   . Vitals     height is 5' 8\" (1.727 m) and weight is 238 lb 12.8 oz (108.3 kg). His oral temperature is 98.1 °F (36.7 °C). His blood pressure is 155/71 (abnormal) and his pulse is 82. His respiration is 18 and oxygen saturation is 95%. Body mass index is 36.31 kg/m². SUPPLEMENTAL O2: O2 Flow Rate (L/min): 2 L/min     I/O        Intake/Output Summary (Last 24 hours) at 4/10/2019 1931  Last data filed at 4/10/2019 0539  Gross per 24 hour   Intake 750 ml   Output 225 ml   Net 525 ml     I/O last 3 completed shifts: In: 1105 [P.O.:1105]  Out: 225 [Urine:225]   Patient Vitals for the past 96 hrs (Last 3 readings):   Weight   04/09/19 0114 238 lb 12.8 oz (108.3 kg)   04/08/19 2311 234 lb (106.1 kg)       Exam   Constitutional: Patient appears in no distress. Mouth/Throat: Oropharynx is clear and moist.  No oral thrush. Neck: Neck supple. Cardiovascular: S1 and S2 heard. No murmurs. Pulmonary/Chest: Bilateral air entry present. Good breath sounds on both sides, diminished at bases. No wheezes. No rales. Abdominal: Soft. No tenderness. Extremities: Patient exhibits no edema. Neurological: Patient is awake and alert.       Labs  - Old records and notes have been reviewed in [facility]   ABG  No results found for: PH, PO2, PCO2, HCO3, O2SAT  No results found for: IFIO2, MODE, SETTIDVOL, SETPEEP  CBC  Recent Labs     04/08/19  2251 04/10/19  0549   WBC 10.2 8.4   RBC 5.85 5.37   HGB 18.1* 16.6   HCT 53.0* 49.8   MCV 90.6 92.7   MCH 30.9 30.9   MCHC 34.2 33.3    169   MPV 11.4 11.2      BMP  Recent Labs     04/08/19  2251 04/10/19  0549     --    K 4.6  --      --    CO2 21*  --    BUN 17  --    CREATININE 1.2  --    GLUCOSE 119*  --    MG 1.4* 1.6   CALCIUM 9.1  --      LFT  Recent Labs     04/08/19 2251   AST 22   ALT 18   BILITOT 0.3   ALKPHOS 39   LIPASE 21.1     TROP  Lab Results   Component Value Date    TROPONINT < 0.010 04/08/2019    TROPONINT < 0.010 05/22/2015    TROPONINT < 0.010 05/21/2015     BNP  No results for input(s): BNP in the last 72 hours. Lactic Acid  No results for input(s): LACTA in the last 72 hours. INR  Recent Labs     04/08/19 2251   INR 0.99     PTT  Recent Labs     04/08/19 2251   APTT 35.6     Glucose  Recent Labs     04/09/19  1631 04/09/19 2039 04/10/19  0852   POCGLU 337* 284* 275*     UA No results for input(s): SPECGRAV, PHUR, COLORU, CLARITYU, MUCUS, PROTEINU, BLOODU, RBCUA, WBCUA, BACTERIA, NITRU, GLUCOSEU, BILIRUBINUR, UROBILINOGEN, KETUA, LABCAST, LABCASTTY, AMORPHOS in the last 72 hours. Invalid input(s): CRYSTALS. PFTs   Pulmonary function tests: 2018                 Cultures      Source: bronchial alveolar lavage       Site: right lower          Current Antibiotics: none   Susceptibility     Escherichia coli (1)     Antibiotic Interpretation CAIT Status   cefTRIAXone Sensitive <=1 mcg/mL Final   gentamicin Sensitive <=1 mcg/mL Final   cefOXitin Sensitive <=4 mcg/mL Final   tetracycline Sensitive <=1 mcg/mL Final   ciprofloxacin Sensitive =0.012 mcg/mL Final   trimethoprim-sulfamethoxazole Sensitive <=20 mcg/mL Final   cefuroxime Intermediate   Final   Lab and Collection       Radiology    CXR  Apr 8, 2019   PROCEDURE: XR CHEST PORTABLE   1. Slightly decreased volumes with mild venous congestion and/or bronchitis changes noted. approprietly. Patient with baseline cough prior to PO intake. Patient completed PO trials of thin, puree, hard solids without s/s of aspiration. Patient demonstrated with timely, effective mastication, bolus formation, timely swallow trigger, adequate laryngeal elevation, effective oral clearance, no s/s of aspiration with all consistencies, vocal quality dry and clear. Patient reports, \"I cough with dinner but only when my wife makes a really good meal and I eat really fast.\"  ST provided skilled level of education supporting slow rate of oral presentation to reduce risk of aspiration as well as importance of breath/swallow coordination. Patient and wife verbalized understanding. Recommend regular diet with thin liquids with close pulmonary monitoring, if decline presents will complete instrumental evaluation as approrapite. Patient would benefit from skilled ST services to monitor diet tolerance and complete pulmonary monitoring. MBS- not needed. Assessment   -Pneumonia on bilateral side due to E. Coli- he is clinically improving.  -Acute febrile illness due to pneumonia- improved  -Acute hypoxic respiratory failure due to pneumonia-improved. -Moderate persistent bronchial asthma- under moderate control.  -Mild COPD with hx of tobacco smoking- under control  -Chronic cough due to reflux laryngitis -not under control- He is waiting for follow up with Dr. Michi Baez from ENT. -Allergic rhinitis- on Astelin nasal spray. -Type II or unspecified type diabetes mellitus without mention of complication, not stated as uncontrolled ([facility] [address] -GERD on treatment with Zantac at home- Pepcid now  -Chronic tobacco smoking- He quit smoking 2years backback.  -Depression. -Anxiety disorder.  -Chronic back pain.   -Sleep apnea. He used to be on CPAP in the past. He underwent surgery 4 to 5 years back.  He never had a follow up sleep test. He refused go for sleep test or get treated,    Plan   -Continue Doxycycline 100mg po bid for 8days  -Follow pending bronch cultures.  -Titrate Oxygen to keep Spo2 >90%. -Deep Venous Thrombosis Prophylaxis: lovenox.  -He was advised to keep scheduled follow up appointment with Dr. Reggie Alejo for evaluation of abnormal vocal cord appearance during bronch. He had appt today- need to be rescheduled.  -Alison Polk was advised to keep his scheduled follow up at [facility] for pulmonary disease) with Ms. Dale Calderon Pulmonary clinic on 4/17/19.  -Infectious Disease consultation with MD Yee appreciated for further evaluation and management of recurrent pneumonias with E. Coli. -Speech path eval appreciated- Swallowing function is essentially WFL. -Yony Borden was advised to keep his scheduled follow up at [facility] for pulmonary disease) with Ms Serge Cuevas Pulmonary clinic. Alison Polk educated about my impression and plan. He verbalizes understanding. Questions and concerns addressed.       Electronically signed by   Yudy Jung MD on 4/10/2019 at 7:31 PM

## 2019-04-10 NOTE — PROGRESS NOTES
Discharge teaching and instructions completed with patient and wife using teachback method. AVS reviewed. Patient and wife voiced understanding regarding prescriptions, follow up appointments, and care of self at home. Discharged in a wheelchair to home with support per wife.

## 2019-04-10 NOTE — DISCHARGE SUMMARY
[facility]     Discharge Summary    Patient:  Cb Patton  YOB: 1955    MRN: [id number]   Acct: [de-identified]    Primary Care Physician: Genie Hooks MD    Admit date:  4/8/2019    Discharge date:  4/10/2019       Discharge Diagnoses:   Pneumonia  Principal Problem:    Pneumonia  Resolved Problems:    * No resolved hospital problems. *        Admitted for: (HPI) History obtained from the patient.     The patient is a 70 y.o. male who presents from home with complaints of shortness of breath.     Context: patient is currently being followed up on account of COPD and had a recent bronchoscopy which revealed chronic E. Coli infection and was placed on oral antibiotics but was yet to fill out the prescription. He now presents with complaints of worsened dyspnea, chills and rigor after spending several hours cleaning out his boat. Hospital Course: Patient was admitted with a primary diagnosis of Pneumonia and was started on broad spectrum antibiotics which was later narrowed per ID recommendations. Patient's symptoms improved and he was discharged home to continue with oral antibiotics.     Consults: pulmonary/intensive care and ID    Discharge Medications:       Medication List      CONTINUE taking these medications    ACAPELLA Misc  1 Device by Does not apply route 4 times daily     baclofen 10 MG tablet  Commonly known as:  LIORESAL     blood glucose test strips strip  Commonly known as:  ASCENSIA AUTODISC VI;ONE TOUCH ULTRA TEST VI  One Touch Ultra Test Strip - Check blood sugar twice daily Dx: E11.9     budesonide 0.5 MG/2ML nebulizer suspension  Commonly known as:  PULMICORT  Take 2 mLs by nebulization 2 times daily Pulmicort Respules 0.5mg via nebulization Bid     busPIRone 10 MG tablet  Commonly known as:  BUSPAR  Take 1 tablet by mouth 3 times daily     doxycycline hyclate 100 MG capsule  Commonly known as:  VIBRAMYCIN  Take 1 capsule by mouth 2 times daily for 8 · doxycycline hyclate 100 MG capsule           Vitals:  Vitals:    04/09/19 2008 04/10/19 0423 04/10/19 0730 04/10/19 0801   BP: (!) 126/58 (!) 122/57  (!) 155/71   Pulse: 78 77  82   Resp: 20 20 20 18   Temp: 97.9 °F (36.6 °C) 97.9 °F (36.6 °C)  98.1 °F (36.7 °C)   TempSrc: Oral Oral  Oral   SpO2: 91% 92% 91% 95%   Weight:       Height:         Weight: Weight: 238 lb 12.8 oz (108.3 kg)     24 hour intake/output:    Intake/Output Summary (Last 24 hours) at 4/10/2019 1909  Last data filed at 4/10/2019 0539  Gross per 24 hour   Intake 750 ml   Output 225 ml   Net 525 ml       Discharge Exam:  General appearance: alert, appears stated age, cooperative and mild distress  Neck: no adenopathy, no carotid bruit, no JVD, supple, symmetrical, trachea midline and thyroid not enlarged, symmetric, no tenderness/mass/nodules  Lungs: rhonchi posterior - left  Heart: regular rate and rhythm, S1, S2 normal, no murmur, click, rub or gallop  Abdomen: soft, non-tender; bowel sounds normal; no masses,  no organomegaly  Extremities: extremities normal, atraumatic, no cyanosis or edema  Pulses: 2+ and symmetric  Skin: Skin color, texture, turgor normal. No rashes or lesions  Neurologic: Grossly normal      Procedures:  None    Significant Diagnostic Studies: see reports below    Radiology reports as per the Radiologist  Radiology: Xr Chest Portable    Result Date: 4/8/2019  PROCEDURE: XR CHEST PORTABLE CLINICAL INFORMATION: CP SOB. COMPARISON: January 26, 2019 TECHNIQUE: AP upright view of the chest. FINDINGS: There is stable configuration of the heart and mediastinum with redemonstration of coarse perihilar markings. Changes of mild venous congestion and/or bronchitis are considered. There is mild increase of airspace opacities at the lung bases. The skeleton  is negative for acute appearing changes. There is no obvious effusion or pneumothorax.      1. Slightly decreased volumes with mild venous congestion and/or bronchitis changes noted. **This report has been created using voice recognition software. It may contain minor errors which are inherent in voice recognition technology. ** Final report electronically signed by Dr. Elvia Rodríguez on 4/8/2019 10:34 PM       Results for orders placed or performed during the hospital encounter of 04/08/19   Rapid influenza A/B antigens   Result Value Ref Range    Flu A Antigen NEGATIVE NEGATIVE    Flu B Antigen NEGATIVE NEGATIVE   Protime-INR   Result Value Ref Range    INR 0.99 0.85 - 1.13   APTT   Result Value Ref Range    aPTT 35.6 22.0 - 38.0 seconds   CBC auto differential   Result Value Ref Range    WBC 10.2 4.8 - 10.8 thou/mm3    RBC 5.85 4.70 - 6.10 mill/mm3    Hemoglobin 18.1 (H) 14.0 - 18.0 gm/dl    Hematocrit 53.0 (H) 42.0 - 52.0 %    MCV 90.6 80.0 - 94.0 fL    MCH 30.9 26.0 - 33.0 pg    MCHC 34.2 32.2 - 35.5 gm/dl    RDW-CV 15.4 (H) 11.5 - 14.5 %    RDW-SD 48.9 (H) 35.0 - 45.0 fL    Platelets 422 951 - 949 thou/mm3    MPV 11.4 9.4 - 12.4 fL    Seg Neutrophils 79.8 %    Lymphocytes 8.4 %    Monocytes 10.1 %    Eosinophils 1.0 %    Basophils 0.3 %    Immature Granulocytes 0.4 %    Segs Absolute 8.1 (H) 1.8 - 7.7 thou/mm3    Lymphocytes # 0.9 (L) 1.0 - 4.8 thou/mm3    Monocytes # 1.0 0.4 - 1.3 thou/mm3    Eosinophils # 0.1 0.0 - 0.4 thou/mm3    Basophils # 0.0 0.0 - 0.1 thou/mm3    Immature Grans (Abs) 0.04 0.00 - 0.07 thou/mm3    nRBC 0 /100 wbc   Brain Natriuretic Peptide   Result Value Ref Range    Pro-BNP 21.6 0.0 - 900.0 pg/mL   Basic Metabolic Panel   Result Value Ref Range    Sodium 141 135 - 145 meq/L    Potassium 4.6 3.5 - 5.2 meq/L    Chloride 103 98 - 111 meq/L    CO2 21 (L) 23 - 33 meq/L    Glucose 119 (H) 70 - 108 mg/dL    BUN 17 7 - 22 mg/dL    CREATININE 1.2 0.4 - 1.2 mg/dL    Calcium 9.1 8.5 - 10.5 mg/dL   Hepatic function panel   Result Value Ref Range    Alb 4.4 3.5 - 5.1 g/dL    Total Bilirubin 0.3 0.3 - 1.2 mg/dL    Bilirubin, Direct <0.2 0.0 - 0.3 mg/dL    Alkaline Phosphatase 39 38 - 126 U/L    AST 22 5 - 40 U/L    ALT 18 11 - 66 U/L    Total Protein 7.7 6.1 - 8.0 g/dL   Lipase   Result Value Ref Range    Lipase 21.1 5.6 - 51.3 U/L   Troponin   Result Value Ref Range    Troponin T < 0.010 ng/ml   Magnesium   Result Value Ref Range    Magnesium 1.4 (L) 1.6 - 2.4 mg/dL   Ethanol   Result Value Ref Range    ETHYL ALCOHOL, SERUM < 0.01 0.00 %   Anion Gap   Result Value Ref Range    Anion Gap 17.0 (H) 8.0 - 16.0 meq/L   Osmolality   Result Value Ref Range    Osmolality Calc 283.9 275.0 - 300 mOsmol/kg   Glomerular Filtration Rate, Estimated   Result Value Ref Range    Est, Glom Filt Rate 61 (A) ml/min/1.73m2   CBC auto differential   Result Value Ref Range    WBC 8.4 4.8 - 10.8 thou/mm3    RBC 5.37 4.70 - 6.10 mill/mm3    Hemoglobin 16.6 14.0 - 18.0 gm/dl    Hematocrit 49.8 42.0 - 52.0 %    MCV 92.7 80.0 - 94.0 fL    MCH 30.9 26.0 - 33.0 pg    MCHC 33.3 32.2 - 35.5 gm/dl    RDW-CV 15.0 (H) 11.5 - 14.5 %    RDW-SD 51.4 (H) 35.0 - 45.0 fL    Platelets 532 980 - 978 thou/mm3    MPV 11.2 9.4 - 12.4 fL    Seg Neutrophils 73.2 %    Lymphocytes 12.7 %    Monocytes 12.2 %    Eosinophils 0.7 %    Basophils 0.5 %    Immature Granulocytes 0.7 %    Segs Absolute 6.1 1.8 - 7.7 thou/mm3    Lymphocytes # 1.1 1.0 - 4.8 thou/mm3    Monocytes # 1.0 0.4 - 1.3 thou/mm3    Eosinophils # 0.1 0.0 - 0.4 thou/mm3    Basophils # 0.0 0.0 - 0.1 thou/mm3    Immature Grans (Abs) 0.06 0.00 - 0.07 thou/mm3    nRBC 0 /100 wbc   Magnesium   Result Value Ref Range    Magnesium 1.6 1.6 - 2.4 mg/dL   Procalcitonin   Result Value Ref Range    Procalcitonin 0.06 0.01 - 0.09 ng/mL   POCT glucose   Result Value Ref Range    POC Glucose 337 (H) 70 - 108 mg/dl   POCT glucose   Result Value Ref Range    POC Glucose 284 (H) 70 - 108 mg/dl   POCT glucose   Result Value Ref Range    POC Glucose 275 (H) 70 - 108 mg/dl   EKG SOB   Result Value Ref Range    Ventricular Rate 109 BPM    Atrial Rate 109 BPM    P-R Interval 150 ms QRS Duration 96 ms    Q-T Interval 320 ms    QTc Calculation (Bazett) 430 ms    P Axis 36 degrees    R Axis -142 degrees    T Axis 54 degrees       Diet:  No diet orders on file    Activity:  Up ad carlton (Patient can move independently)    Follow-up:  in the next few days with Yvonne Pemberton MD,  in the next few weeks with Pulmonology    Disposition: home    Condition: Stable      Time Spent: 45 minutes    Electronically signed by Heidi Monroe MD on 4/10/2019 at 7:09 PM    Attending Physician

## 2019-04-10 NOTE — CARE COORDINATION
4/10/19, 10:58 AM          Spoke with Yony earlier this am; declines any in-home services. Home with spouse. Discharge plan discussed by  and . Discharge plan reviewed with patient/ family. Patient/ family verbalize understanding of discharge plan and are in agreement with plan. Understanding was demonstrated using the teach back method.        IMM Letter  IMM Letter given to Patient/Family/Significant other/Guardian/POA/by[de-identified]   IMM Letter date given[de-identified] 04/09/19  IMM Letter time given[de-identified] 3567

## 2019-04-11 ENCOUNTER — CARE COORDINATION (OUTPATIENT)
Dept: CASE MANAGEMENT | Age: 64
End: 2019-04-11

## 2019-04-11 NOTE — CARE COORDINATION
[facility] Transitions Initial Follow Up Call    Call within 2 business days of discharge: Yes    Patient: Madie Graham Patient : 1955   MRN: [id number]  Reason for Admission: Pneumonia       Discharge Date: 4/10/19 RARS: Readmission Risk Score: 12      Last Discharge [facility]       Complaint Diagnosis Description Type Department Provider    19 Cough; Fever Pneumonia due to organism . .. ED to Hosp-Admission (Discharged) (ADMITTED) [facility] 8A Meeta Vásquez MD; Andrew Tello. .. Spoke with: [facility]: [facility]    Non-face-to-face services provided:  Obtained and reviewed discharge summary and/or continuity of care documents    Care Transitions 24 Hour Call    Do you have any ongoing symptoms?:  Yes  Patient-reported symptoms:  Cough  Interventions for patient-reported symptoms:  Other (Comment: wife stated she will call the PCP)  Do you have a copy of your discharge instructions?:  Yes  Do you have all of your prescriptions and are they filled?:  Yes  Have you scheduled your follow up appointment?:  No  Were you discharged with any Home Care or Post Acute Services:  No  Patient DME:  Yusuf pelletier Walker  Do you have support at home?:  Partner/Spouse/SO  Do you feel like you have everything you need to keep you well at home?:  Yes  Are you an active caregiver in your home?:  No  Care Transitions Interventions  No Identified Needs     Called pt for the care transition initial follow up call. Pt was admitted for Pneumonia. Spoke with pt wife, pt was coughing. Thomas Ingram reported the pt started \"having coughing jags\" just since being home. Thomas Ingram reported the pt was not doing that in the hospital.  Pt felt warm, Guzman Dianekevin checked his temp was 98.3. Pt slept in the recliner last night, he was SOB lying flat in bed. Pt is C/O of abdominal pain from coughing. Pt denied any palpitations or chest pain. Thomas Ingram declined to review meds. She reported the pt started his vibramycin.     CTC called the

## 2019-04-12 ENCOUNTER — OFFICE VISIT (OUTPATIENT)
Dept: FAMILY MEDICINE CLINIC | Age: 64
End: 2019-04-12

## 2019-04-12 ENCOUNTER — NURSE ONLY (OUTPATIENT)
Dept: FAMILY MEDICINE CLINIC | Age: 64
End: 2019-04-12
Payer: MEDICARE

## 2019-04-12 VITALS
WEIGHT: 233.6 LBS | HEART RATE: 88 BPM | SYSTOLIC BLOOD PRESSURE: 120 MMHG | HEIGHT: 68 IN | OXYGEN SATURATION: 85 % | BODY MASS INDEX: 35.4 KG/M2 | TEMPERATURE: 98.3 F | DIASTOLIC BLOOD PRESSURE: 68 MMHG | RESPIRATION RATE: 18 BRPM

## 2019-04-12 DIAGNOSIS — J44.9 CHRONIC OBSTRUCTIVE PULMONARY DISEASE, UNSPECIFIED COPD TYPE (HCC): ICD-10-CM

## 2019-04-12 DIAGNOSIS — E78.5 HYPERLIPIDEMIA, UNSPECIFIED HYPERLIPIDEMIA TYPE: ICD-10-CM

## 2019-04-12 DIAGNOSIS — R05.3 CHRONIC COUGH: ICD-10-CM

## 2019-04-12 DIAGNOSIS — J45.20 INTERMITTENT ASTHMA, UNSPECIFIED ASTHMA SEVERITY, UNSPECIFIED WHETHER COMPLICATED: ICD-10-CM

## 2019-04-12 DIAGNOSIS — R09.02 HYPOXIA: ICD-10-CM

## 2019-04-12 DIAGNOSIS — R09.02 HYPOXIA: Primary | ICD-10-CM

## 2019-04-12 DIAGNOSIS — I10 ESSENTIAL HYPERTENSION: Primary | ICD-10-CM

## 2019-04-12 DIAGNOSIS — E11.8 TYPE 2 DIABETES MELLITUS WITH COMPLICATION, WITHOUT LONG-TERM CURRENT USE OF INSULIN (HCC): ICD-10-CM

## 2019-04-12 DIAGNOSIS — J96.01 ACUTE RESPIRATORY FAILURE WITH HYPOXIA (HCC): ICD-10-CM

## 2019-04-12 DIAGNOSIS — J45.40 MODERATE PERSISTENT ASTHMA, UNSPECIFIED WHETHER COMPLICATED: ICD-10-CM

## 2019-04-12 LAB
VIRAL CULTURE,RAPID,RESPIRATOR: NORMAL
VIRAL CULTURE,RAPID,RESPIRATOR: NORMAL

## 2019-04-12 PROCEDURE — G8417 CALC BMI ABV UP PARAM F/U: HCPCS | Performed by: FAMILY MEDICINE

## 2019-04-12 PROCEDURE — 94618 PULMONARY STRESS TESTING: CPT | Performed by: NURSE PRACTITIONER

## 2019-04-12 PROCEDURE — 99213 OFFICE O/P EST LOW 20 MIN: CPT | Performed by: FAMILY MEDICINE

## 2019-04-12 PROCEDURE — 4040F PNEUMOC VAC/ADMIN/RCVD: CPT | Performed by: FAMILY MEDICINE

## 2019-04-12 PROCEDURE — 1123F ACP DISCUSS/DSCN MKR DOCD: CPT | Performed by: FAMILY MEDICINE

## 2019-04-12 PROCEDURE — G8427 DOCREV CUR MEDS BY ELIG CLIN: HCPCS | Performed by: FAMILY MEDICINE

## 2019-04-12 PROCEDURE — 1111F DSCHRG MED/CURRENT MED MERGE: CPT | Performed by: FAMILY MEDICINE

## 2019-04-12 PROCEDURE — 1036F TOBACCO NON-USER: CPT | Performed by: FAMILY MEDICINE

## 2019-04-12 PROCEDURE — 99211 OFF/OP EST MAY X REQ PHY/QHP: CPT | Performed by: NURSE PRACTITIONER

## 2019-04-12 PROCEDURE — 3017F COLORECTAL CA SCREEN DOC REV: CPT | Performed by: FAMILY MEDICINE

## 2019-04-12 RX ORDER — BENZONATATE 100 MG/1
200 CAPSULE ORAL 3 TIMES DAILY PRN
Qty: 45 CAPSULE | Refills: 1 | Status: SHIPPED | OUTPATIENT
Start: 2019-04-12 | End: 2019-04-27

## 2019-04-12 ASSESSMENT — ENCOUNTER SYMPTOMS
VOMITING: 0
NAUSEA: 0
ABDOMINAL PAIN: 0
CHEST TIGHTNESS: 0
WHEEZING: 1
BLOOD IN STOOL: 0
EYES NEGATIVE: 1
DIARRHEA: 0
COUGH: 1
SHORTNESS OF BREATH: 1
CONSTIPATION: 0

## 2019-04-12 NOTE — PROGRESS NOTES
Yony presents today for a 6 minute walk. He is undergoing evaluation/treatment for cough, went to his PCP today because he had been coughing so hard, he developed abdominal soreness and wanted something to suppress his cough. They noted him to be hypoxic, called Dr. Daquan Veliz who requested a 6 minute walk. Yony was recently hospitalized and discharged without oxygen but he has qualified in the past but refused. He qualifies today, order placed and Rx for Tessalon Pearls sent to his pharmacy. We are calling to reschedule his follow up with Dr. Katrin Alvarez. Six Minute Walk Test  Yony Borden 1955    Six minute walk test done in my office today by my medical assistant. Yony's oxygen saturation at rest on room air was 88%. The six minute walk test was repeated with oxygen supplementation. Oxygen supplimentation was started with 2LPM via nasal cannula and titrated to 4 LPM via nasal cannula. At the end of the test Yony Borden's oxygen saturation remained at 90% on 4 LPM with exertion. He is mobile in the home and requires oxygen as outlined above. He walked a total of 648 feet. His resting heart rate was 85 and 90 upon completion of walk. He tolerated the walk with with mild SOB. Nasal Oxygen order:  3 lpm to be used with rest and 4 Liters with activity. Walking Yes   Sleep Yes Continuous Yes. Will arrange for overnight pulsox on 3 Liters. He has follow up with me 4/17/19. DME Medical Necessity Documentation    Yony was seen in the office on 4/12/2019 for the diagnosis asthma. I am prescribing oxygen because the diagnosis and testing requires the patient to have oxygen in the home. his condition will improve or be benefited by oxygen use. The patient is able to perform good mobility in a home setting and therefore does require the use of a portable oxygen system.     Electronically signed by SANTOSH Barbour CNP on 4/12/2019 at 12:30 PM

## 2019-04-12 NOTE — PROGRESS NOTES
Date: 2019  Here with his wife  Shawn Terrazas is a 70 y.o. male who presents today for:  Chief Complaint   Patient presents with    Cough he had a bronchoscopy last week on Friday and he is currently on antibiotics. He was placed on oxygen and he really does not want to use it. He really desaturates when he is just sitting if he doesn't take deep breaths. He states that he was found to have something on his vocal cords during bronchoscopy but he was then seen by dr Maria Alejandra Hernandez and everything was ok. HPI:     HPI    has a current medication list which includes the following prescription(s): doxycycline hyclate, montelukast, syringe-needle (disp) 3 ml, ropinirole, losartan, glimepiride, budesonide, ventolin hfa, acapella, ranitidine, metformin, sertraline, simvastatin, buspirone, hydroxyzine, baclofen, onetouch delica lancets fine, blood glucose test strips, trazodone, hydrocodone-acetaminophen, fish oil, sildenafil, ipratropium-albuterol, and testosterone cypionate.     No Known Allergies    Social History     Tobacco Use    Smoking status: Former Smoker     Packs/day: 2.00     Years: 42.00     Pack years: 84.00     Types: Cigarettes     Start date: 1971     Last attempt to quit: 3/3/2017     Years since quittin.1    Smokeless tobacco: Never Used    Tobacco comment: pts uses just nicotine vap   Substance Use Topics    Alcohol use: No     Alcohol/week: 0.0 oz    Drug use: No       Past Medical History:   Diagnosis Date    Acid reflux     Arthritis     Asthma     Chronic back pain     Class 2 severe obesity due to excess calories with serious comorbidity and body mass index (BMI) of 37.0 to 37.9 in adult [facility]) 2018    Colon polyps     COPD (chronic obstructive pulmonary disease) (HCC)     Depression     panic attacks    Diverticulosis     HTN (hypertension)     Hyperlipidemia     Kidney disorder     Panic attack     Pneumonia     Sleep apnea     Thumb amputation status 3/13/14    left tip of thumb amputated    Thyroid disease     Type II or unspecified type diabetes mellitus without mention of complication, not stated as uncontrolled        Past Surgical History:   Procedure Laterality Date    BACK SURGERY  2002    BACK SURGERY  08/11/2017    BICEPS TENDON REPAIR Right 08/16/2017    BRONCHOSCOPY      BRONCHOSCOPY N/A 4/5/2019    BRONCHOSCOPY performed by Grace Cabral [facility] at [facility]    BRONCHOSCOPY  4/5/2019    BRONCHOSCOPY ALVEOLAR LAVAGE performed by Grace Cabral MD at [facility]   [address]  07/02 08/05    CARPAL TUNNEL RELEASE  2011    right hand    CHOLECYSTECTOMY  yrs ago    COLONOSCOPY  11/06 02/12 1/14    ENDOSCOPY, COLON, DIAGNOSTIC      EYE SURGERY      foreign body removal    HERNIA REPAIR  2004    Dr Nishant Porter  2008?  LARYNGOSCOPY  04/28/2016    Laryngoscopy Micro with Biopsy by Dr Dinora Hernandez  01/07    uppp    ROTATOR CUFF REPAIR Left 5-20-15    SKIN BIOPSY      TONSILLECTOMY  1985    UPPER GASTROINTESTINAL ENDOSCOPY  1997    VEIN SURGERY Left September 2014    Dr. Enid Romero       Family History   Problem Relation Age of Onset    Cancer Mother     Breast Cancer Mother     Stroke Mother     Heart Disease Brother     Diabetes Brother     High Blood Pressure Brother     High Cholesterol Brother      Subjective:     Review of Systems   Constitutional: Negative for activity change, appetite change, diaphoresis and fever. HENT: Negative. Eyes: Negative. Respiratory: Positive for cough, shortness of breath and wheezing. Negative for chest tightness. Cardiovascular: Negative for chest pain, palpitations and leg swelling. Gastrointestinal: Negative for abdominal pain, blood in stool, constipation, diarrhea, nausea and vomiting. Genitourinary: Negative. Musculoskeletal: Negative. Skin: Negative. Negative for rash.    Neurological: Negative. Negative for dizziness, syncope, weakness, light-headedness and headaches. Psychiatric/Behavioral: Negative. His pulse ox was 93 % after he took deep breaths.   :   /68 (Site: Left Upper Arm, Position: Sitting, Cuff Size: Large Adult)   Pulse 88   Temp 98.3 °F (36.8 °C)   Resp 18   Ht 5' 8\" (1.727 m)   Wt 233 lb 9.6 oz (106 kg)   SpO2 (!) 85%   BMI 35.52 kg/m²   Wt Readings from Last 3 Encounters:   04/12/19 233 lb 9.6 oz (106 kg)   04/09/19 238 lb 12.8 oz (108.3 kg)   04/05/19 236 lb (107 kg)     Physical Exam   Constitutional: He is oriented to person, place, and time. He appears well-developed and well-nourished. No distress. HENT:   Head: Normocephalic and atraumatic. Eyes: Pupils are equal, round, and reactive to light. Conjunctivae and EOM are normal. Right eye exhibits no discharge. Left eye exhibits no discharge. No scleral icterus. Neck: Normal range of motion. Neck supple. No JVD present. No thyromegaly present. Cardiovascular: Normal rate, regular rhythm and normal heart sounds. No murmur heard. Pulmonary/Chest: Effort normal and breath sounds normal. No respiratory distress. He has no wheezes. He has no rhonchi. He has no rales. Abdominal: Soft. Bowel sounds are normal. He exhibits no distension and no mass. There is no hepatosplenomegaly. There is no tenderness. There is no rebound and no guarding. Musculoskeletal: Normal range of motion. He exhibits no edema. Lymphadenopathy:     He has no cervical adenopathy. Neurological: He is alert and oriented to person, place, and time. Skin: Skin is warm and dry. No rash noted. He is not diaphoretic. Psychiatric: He has a normal mood and affect. His behavior is normal.   Nursing note and vitals reviewed.    :       Diagnosis Orders   1. Essential hypertension     2. Type 2 diabetes mellitus with complication, without long-term current use of insulin ([address]     3.  Chronic obstructive pulmonary disease, unspecified COPD type ([address]     4. Hyperlipidemia, unspecified hyperlipidemia type         :      Requested Prescriptions      No prescriptions requested or ordered in this encounter     Current Outpatient Medications   Medication Sig Dispense Refill    doxycycline hyclate (VIBRAMYCIN) 100 MG capsule Take 1 capsule by mouth 2 times daily for 8 days 16 capsule 0    montelukast (SINGULAIR) 10 MG tablet Take 1 tablet by mouth nightly 90 tablet 3    SYRINGE-NEEDLE, DISP, 3 ML 23G X 1-1/2\" 3 ML MISC Inject 1 Syringe into the muscle every 14 days 10 each 1    rOPINIRole (REQUIP) 1 MG tablet Take 1 tablet by mouth 3 times daily 90 tablet 5    losartan (COZAAR) 25 MG tablet Take 1 tablet by mouth daily 30 tablet 5    glimepiride (AMARYL) 4 MG tablet Take 1 tablet by mouth 2 times daily 60 tablet 5    budesonide (PULMICORT) 0.5 MG/2ML nebulizer suspension Take 2 mLs by nebulization 2 times daily Pulmicort Respules 0.5mg via nebulization Bid 60 ampule 11    VENTOLIN  (90 Base) MCG/ACT inhaler Inhale 2 puffs into the lungs every 6 hours as needed for Wheezing 1 Inhaler 11    Misc.  Devices (ACAPELLA) MISC 1 Device by Does not apply route 4 times daily 1 each 0    ranitidine (ZANTAC) 150 MG tablet TAKE 1 TABLET BY MOUTH TWICE DAILY 180 tablet 3    metFORMIN (GLUCOPHAGE) 500 MG tablet TAKE 2 TABLETS BY MOUTH TWICE DAILY WITH MEALS 120 tablet 5    sertraline (ZOLOFT) 100 MG tablet take 1 tablet by mouth twice a day 60 tablet 5    simvastatin (ZOCOR) 40 MG tablet Take 1 tablet by mouth nightly 30 tablet 5    busPIRone (BUSPAR) 10 MG tablet Take 1 tablet by mouth 3 times daily 90 tablet 5    hydrOXYzine (ATARAX) 10 MG tablet       baclofen (LIORESAL) 10 MG tablet take 1 tablet by mouth twice a day  0    ONETOUCH DELICA LANCETS FINE MISC Check blood sugar twice daily Dx: 250.00 200 each 1    glucose blood VI test strips (ASCENSIA AUTODISC VI;ONE TOUCH ULTRA TEST VI) strip One Touch Ultra Test Strip - Check blood sugar twice daily Dx: E11.9 200 each 1    traZODone (DESYREL) 150 MG tablet take 1/2 to 1 tablet by mouth once daily at bedtime if needed for pain SPASM, OR SLEEP  0    HYDROcodone-acetaminophen (NORCO) 5-325 MG per tablet Take 1 tablet by mouth every 6 hours as needed for Pain      Omega-3 Fatty Acids (FISH OIL) 1000 MG CAPS Take 1,000 mg by mouth daily.  sildenafil (VIAGRA) 100 MG tablet Take 1 tablet by mouth as needed. 8 tablet 5    ipratropium-albuterol (DUONEB) 0.5-2.5 (3) MG/3ML SOLN nebulizer solution Inhale 3 mLs into the lungs every 4 hours as needed for Shortness of Breath 1620 mL 11    testosterone cypionate (DEPOTESTOTERONE CYPIONATE) 200 MG/ML injection Inject 1 ml into the muscle every 14 days. 2 mL 3     No current facility-administered medications for this visit. No orders of the defined types were placed in this encounter. Spoke to Dr Tiara Burleson and he recommends that he uses his oxygen. Discussed with patient. Continue to see consultants. Continue current medications. Return in about 4 months (around 8/12/2019) for DM. Discussed use, benefit, and side effects of prescribed medications. All patient questions answered. Pt voiced understanding. Patient agreed with treatment plan.

## 2019-04-15 ENCOUNTER — CARE COORDINATION (OUTPATIENT)
Dept: CASE MANAGEMENT | Age: 64
End: 2019-04-15

## 2019-04-15 NOTE — CARE COORDINATION
[facility] Transitions Follow Up Call    4/15/2019    Patient: Brown Rivera  Patient : 1955   MRN: [id number]  Reason for Admission: No admission diagnoses are documented for this encounter. Discharge Date: 4/10/19 RARS: Readmission Risk Score: 12    Spoke with: Isaias Romero, patient's wife, for sub Care Transition follow up. Identified self/role. Care Transitions Subsequent and Final Call    Subsequent and Final Calls  Do you have any ongoing symptoms?:  Yes  Patient-reported symptoms:  Shortness of Breath, Cough  Have your medications changed?:  No  Do you have any questions related to your medications?:  No  Do you currently have any active services?:  No  Do you have any needs or concerns that I can assist you with?:  No  Identified Barriers:  None  Care Transitions Interventions  No Identified Needs  Other Interventions: Wife advised patient continues to report SOB. Wife states cough is \"calming down\". Wife denies chest pain, abdominal pain, fever, chills, and N/V/D. Wife states patient is using nebulizer at home. Wife states weight, blood pressure, and glucose are WNL. Reviewed pneumonia zone tool, verbalized understanding. Reviewed upcoming pulmonary testing and appointments, verbalized understanding. Wife denies additional needs or concerns at this time. Wife instructed to notify Pre-Service or CTC for any new or worsening symptoms, contact information provided including weight gain of 3 lbs in 1 day or 5 lbs in 1 week, increase in SOB from baseline, and increase in swelling from baseline. Wife verbalized understanding. CTC will continue to follow.       Follow Up  Future Appointments   Date Time Provider Doug Shah   2019 10:00 AM STR PULMONARY FUNCTION ROOM 3 [facility] RESP None   2019  2:30 PM Sergio Lackey MD ENT [facility] [place] [address]   2019  9:00 AM SANTOSH Tran - CNP Pulm Med [facility] [place]   2019  8:40 AM Yvonne Pemberton MD [facility]   2019 8:30 AM [facility] - Anthony Banegas, RN  Care Transition Coordinator  (Z) [phone]  (N) [phone]

## 2019-04-16 LAB — LEGIONELLA SPECIES CULTURE: NORMAL

## 2019-04-18 ENCOUNTER — CARE COORDINATION (OUTPATIENT)
Dept: CASE MANAGEMENT | Age: 64
End: 2019-04-18

## 2019-04-19 ENCOUNTER — NURSE ONLY (OUTPATIENT)
Dept: FAMILY MEDICINE CLINIC | Age: 64
End: 2019-04-19

## 2019-04-19 ENCOUNTER — CARE COORDINATION (OUTPATIENT)
Dept: CASE MANAGEMENT | Age: 64
End: 2019-04-19

## 2019-04-19 DIAGNOSIS — J21.9 ACUTE BRONCHIOLITIS DUE TO UNSPECIFIED ORGANISM: Primary | ICD-10-CM

## 2019-04-19 NOTE — CARE COORDINATION
[facility] Transitions Follow Up Call    2019    Patient: Stone Tobias  Patient : 1955   MRN: [id number]  Reason for Admission: pneumonia  Discharge Date: 4/10/19 RARS: Readmission Risk Score: 12    Spoke with: <Yony    Care Transitions Subsequent and Final Call    Subsequent and Final Calls  Do you have any ongoing symptoms?:  Yes  Patient-reported symptoms:  Cough  Have your medications changed?:  Yes  Patient Reports:  tessalon perles were added  Do you have any questions related to your medications?:  No  Do you currently have any active services?:  No  Do you have any needs or concerns that I can assist you with?:  No  Identified Barriers:  None  Care Transitions Interventions  No Identified Needs  Other Interventions:        Called pt for the sub transition call. Pt stated he continues to cough, it is a little better, is taking Tessalon Perles now. Pt reported his breathing is a lot better. Pt has O2 @ 3L/M, he is using at nighttime only. Pt stated he has to breath through his nose, more SOB with mouth breathing Pt stated his \"vocal cords are almost swollen shut from the acid reflux\"  Pt has appt with Dr Edin Yuan on . Pt stated he will not use a CPAP, declined the sleep study. Pt denied any needs or concerns. CTC will continue to follow.     Follow Up  Future Appointments   Date Time Provider Doug Shah   2019  9:00 AM STR PULMONARY FUNCTION ROOM 3 [facility] RESP None   2019  2:30 PM Deyvi Chappell MD ENT [facility] - [facility] [place]   2019  9:00 AM SANTOSH Walker CNP Pulm Med [facility] - [facility] [place]   2019  8:40 AM Manoj Calle MD [facility] [address] [facility]   2019  8:30 AM Dimitrios Locks, APRN - CNP [place] Urology [facility] - Edgardo Souza RN  Care Transition Coordinator  [phone]

## 2019-04-22 ENCOUNTER — TELEPHONE (OUTPATIENT)
Dept: ENT CLINIC | Age: 64
End: 2019-04-22

## 2019-04-22 NOTE — TELEPHONE ENCOUNTER
Patient's wife left message on clinical voicemail asking for a refill for ranitidine be sent to [facility] [address]. (correct in Epic).

## 2019-04-23 LAB
ALLERGEN BERMUDA GRASS IGE: < 0.1 KU/L
ALLERGEN BIRCH IGE: < 0.1 KU/L
ALLERGEN BOX ELDER: < 0.1 KU/L
ALLERGEN CAT DANDER IGE: 3.08 KU/L
ALLERGEN COMMON SHORT RAGWEED IGE: 1.01 KU/L
ALLERGEN COTTONWOOD: < 0.1 KU/L
ALLERGEN D. FARINAE (DUST MITE): 6.53 KU/L
ALLERGEN DOG DANDER IGE: 0.69 KU/L
ALLERGEN ELM IGE: < 0.1 KU/L
ALLERGEN FUNGI/MOLD A. ALTERNATA IGE: 0.87 KU/L
ALLERGEN FUNGI/MOLD A. FUMIGATUS IGE: < 0.1 KU/L
ALLERGEN FUNGI/MOLD M.RACEMOSUS IGE: < 0.1 KU/L
ALLERGEN FUNGI/MOLD P. NOTATUM IGE: < 0.1 KU/L
ALLERGEN GERMAN COCKROACH IGE: 0.15 KU/L
ALLERGEN INTERPRETATION/SCORE: ABNORMAL
ALLERGEN MILK IGE: < 0.1 KU/L
ALLERGEN MITE DUST PTERONYSSINUS IGE: 6.86 KU/L
ALLERGEN MOUNTAIN CEDAR: < 0.1 KU/L
ALLERGEN MOUSE EPITHELIA IGE: < 0.1 KU/L
ALLERGEN PEANUT (F13) IGE: < 0.1 KU/L
ALLERGEN PECAN TREE IGE: < 0.1 KU/L
ALLERGEN RUSSIAN THISTLE IGE: < 0.1 KU/L
ALLERGEN SHEEP SORREL (W18) IGE: < 0.1 KU/L
ALLERGEN TIMOTHY GRASS: < 0.1 KU/L
ALLERGEN TREE SYCAMORE: < 0.1 KU/L
ALLERGEN WALNUT TREE IGE: < 0.1 KU/L
ALLERGEN WEED, PIGWEED IGE: < 0.1 KU/L
ALLERGEN WHITE ASH: < 0.1 KU/L
ALLERGEN WHITE MULBERRY TREE, IGE: < 0.1 KU/L
ALLERGEN, FUNGI/MOLD: < 0.1 KU/L
ALLERGEN, TREE, OAK: < 0.1 KU/L
IMMUNOGLOBULIN E: 447 KU/L

## 2019-04-23 RX ORDER — RANITIDINE 150 MG/1
TABLET ORAL
Qty: 180 TABLET | Refills: 3 | Status: SHIPPED | OUTPATIENT
Start: 2019-04-23 | End: 2020-03-05

## 2019-04-24 ENCOUNTER — CARE COORDINATION (OUTPATIENT)
Dept: CASE MANAGEMENT | Age: 64
End: 2019-04-24

## 2019-04-24 NOTE — CARE COORDINATION
- [address] [facility] - Corinne Pulido, RN  Care Transition Coordinator  (I) [phone] 21 [phone]

## 2019-04-30 ENCOUNTER — OFFICE VISIT (OUTPATIENT)
Dept: ENT CLINIC | Age: 64
End: 2019-04-30
Payer: MEDICARE

## 2019-04-30 VITALS
WEIGHT: 235.8 LBS | TEMPERATURE: 98 F | RESPIRATION RATE: 14 BRPM | HEIGHT: 68 IN | DIASTOLIC BLOOD PRESSURE: 82 MMHG | BODY MASS INDEX: 35.74 KG/M2 | SYSTOLIC BLOOD PRESSURE: 144 MMHG | HEART RATE: 76 BPM

## 2019-04-30 DIAGNOSIS — Z04.9 SUSPECTED CONDITION NOT FOUND: ICD-10-CM

## 2019-04-30 DIAGNOSIS — Q31.8 VOCAL CORD ANOMALY: Primary | ICD-10-CM

## 2019-04-30 DIAGNOSIS — J34.3 HYPERTROPHY OF INFERIOR NASAL TURBINATE: ICD-10-CM

## 2019-04-30 DIAGNOSIS — T46.4X5D: ICD-10-CM

## 2019-04-30 DIAGNOSIS — R06.5 CHRONIC MOUTH BREATHING: ICD-10-CM

## 2019-04-30 PROCEDURE — 1123F ACP DISCUSS/DSCN MKR DOCD: CPT | Performed by: OTOLARYNGOLOGY

## 2019-04-30 PROCEDURE — 3017F COLORECTAL CA SCREEN DOC REV: CPT | Performed by: OTOLARYNGOLOGY

## 2019-04-30 PROCEDURE — 99213 OFFICE O/P EST LOW 20 MIN: CPT | Performed by: OTOLARYNGOLOGY

## 2019-04-30 PROCEDURE — 31575 DIAGNOSTIC LARYNGOSCOPY: CPT | Performed by: OTOLARYNGOLOGY

## 2019-04-30 PROCEDURE — G8427 DOCREV CUR MEDS BY ELIG CLIN: HCPCS | Performed by: OTOLARYNGOLOGY

## 2019-04-30 PROCEDURE — 1111F DSCHRG MED/CURRENT MED MERGE: CPT | Performed by: OTOLARYNGOLOGY

## 2019-04-30 PROCEDURE — 4040F PNEUMOC VAC/ADMIN/RCVD: CPT | Performed by: OTOLARYNGOLOGY

## 2019-04-30 PROCEDURE — G8417 CALC BMI ABV UP PARAM F/U: HCPCS | Performed by: OTOLARYNGOLOGY

## 2019-04-30 PROCEDURE — 1036F TOBACCO NON-USER: CPT | Performed by: OTOLARYNGOLOGY

## 2019-04-30 RX ORDER — MOMETASONE FUROATE 50 UG/1
1 SPRAY, METERED NASAL DAILY
Qty: 1 INHALER | Refills: 1 | Status: SHIPPED | OUTPATIENT
Start: 2019-04-30 | End: 2019-09-11

## 2019-04-30 ASSESSMENT — ENCOUNTER SYMPTOMS
CHOKING: 1
SORE THROAT: 0
APNEA: 0
RHINORRHEA: 0
CHEST TIGHTNESS: 0
SHORTNESS OF BREATH: 1
STRIDOR: 0
COLOR CHANGE: 0
ABDOMINAL PAIN: 0
TROUBLE SWALLOWING: 0
NAUSEA: 0
DIARRHEA: 0
VOICE CHANGE: 0
VOMITING: 0
COUGH: 1
WHEEZING: 0
FACIAL SWELLING: 0
SINUS PRESSURE: 0

## 2019-04-30 NOTE — LETTER
[address] and [address] [address] [place] [address] [address]  [address]  Phone: [phone]  Fax: [phone]    Zee Linares MD        May 1, 2019    Orly Santiago MD  [id number]    Patient: Madie Graham   MR Number: [id number]   YOB: 1955   Date of Visit: 4/30/2019     Dear Orly Santiago,    Thank you for referring your patient, Madie Graham, regarding limited excursion of the vocal cords noted on bronchoscopy. I did not see any of the black spots mentioned at this time is vocal cords move extremely well. He has normal voice and no stridor. Abduction is completely normal.  He does have significant nasal obstruction from hypertrophic turbinates which we could help him with, because of his chronic mouth breathing. Stopping the mouth breathing would probably help his lungs. He was not pleased with your nurse practitioner. Details of his issues are in the actual office note under HPI, if you wish to review that. Below are the relevant portions of my assessment and plan of care. Assessment & Plan   Diagnoses and all orders for this visit:     Diagnosis Orders   1. Vocal cord anomaly  NE LARYNGOSCOPY FLEXIBLE DIAGNOSTIC    Inadequate abduction apparently has resolved   2. Suspected condition not found  NE LARYNGOSCOPY FLEXIBLE DIAGNOSTIC   3. Hypertrophy of inferior nasal turbinate  mometasone (NASONEX) 50 MCG/ACT nasal spray   4. 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 PROCEDURE: FIBEROPTIC LARYNGOSCOPY    A fiberoptic laryngoscopy was performed under topical anesthesia, after using Afrin and Lidocaine spray in the nasal fossa. The nasal fossa, nasopharynx, hypopharynx and larynx were carefully examined.  Base of tongue was symmetrical. Epiglottis appeared normal and was not retrodisplaced. True vocal cords had normal mobility. There was no erythema. No mucosal lesions or masses were noted. No pooling in the pyriform sinuses. There is mild postcricoid edema consistent with being on losartan still. At this time, vocal cord excursion is excellent and there are no lesions or any \"black spots\" anywhere visible in the hypopharynx and larynx. Asking him to sniff led to a very appropriate wide abduction. I also showed his wife how his vocal cords would separate widely. He still has the postcricoid edema and swelling from his angiotensin medication    Because of his chronic mouth breathing and turbinate hypertrophy, he is placed on Nasonex for a month than a return visit. He is a candidate for partial SMR both inferior turbinates to improve his airway and the mouth breathing. This is minimally invasive with very little morbidity and a very short procedure under general anesthesia. The nurse practitioner might be interested to know that surgery is very effective at correcting airways. Return in about 6 weeks (around 6/11/2019) for Follow-Up chronic nasal congestion. If you have questions, please do not hesitate to call me. I look forward to following Yony along with you.     Sincerely,          Marshall Reich MD

## 2019-04-30 NOTE — PROGRESS NOTES
[address] [place] [facility]  [address]  [address] [address]  Dept: [phone]  Dept Fax: [phone]  Loc: [phone]    Maria Alejandra Estrella is a 70 y.o. male who was referred byArya Espinoza* for:  Chief Complaint   Patient presents with    Other     Pt. presents for abnormal and black spots on his vocal cords. Mary Anne Mattson HPI:     Maria Alejandra Estrella is a 70 y.o. male who presents today for evaluation of his larynx. His pulmonologist during a bronchoscopy thought his vocal cords were not  adequately. As for his overall airway, his wife states he does not snore much and he certainly does not obstruct. He stopped seeing in the [facility] in pulmonology because she said that \"surgery doesn't work\" and she did not talk to him, only his wife. History:     No Known Allergies  Current Outpatient Medications   Medication Sig Dispense Refill    mometasone (NASONEX) 50 MCG/ACT nasal spray 1 spray by Nasal route daily 1 Inhaler 1    ranitidine (ZANTAC) 150 MG tablet TAKE 1 TABLET BY MOUTH TWICE DAILY 180 tablet 3    montelukast (SINGULAIR) 10 MG tablet Take 1 tablet by mouth nightly 90 tablet 3    SYRINGE-NEEDLE, DISP, 3 ML 23G X 1-1/2\" 3 ML MISC Inject 1 Syringe into the muscle every 14 days 10 each 1    rOPINIRole (REQUIP) 1 MG tablet Take 1 tablet by mouth 3 times daily 90 tablet 5    losartan (COZAAR) 25 MG tablet Take 1 tablet by mouth daily 30 tablet 5    glimepiride (AMARYL) 4 MG tablet Take 1 tablet by mouth 2 times daily 60 tablet 5    budesonide (PULMICORT) 0.5 MG/2ML nebulizer suspension Take 2 mLs by nebulization 2 times daily Pulmicort Respules 0.5mg via nebulization Bid 60 ampule 11    VENTOLIN  (90 Base) MCG/ACT inhaler Inhale 2 puffs into the lungs every 6 hours as needed for Wheezing 1 Inhaler 11    Misc.  Devices (ACAPELLA) MISC 1 Device by Does not apply route 4 times daily 1 each 0    metFORMIN (GLUCOPHAGE) 500 MG tablet TAKE 2 TABLETS BY MOUTH TWICE DAILY WITH MEALS 120 tablet 5    sertraline (ZOLOFT) 100 MG tablet take 1 tablet by mouth twice a day 60 tablet 5    simvastatin (ZOCOR) 40 MG tablet Take 1 tablet by mouth nightly 30 tablet 5    busPIRone (BUSPAR) 10 MG tablet Take 1 tablet by mouth 3 times daily 90 tablet 5    hydrOXYzine (ATARAX) 10 MG tablet       baclofen (LIORESAL) 10 MG tablet take 1 tablet by mouth twice a day  0    traZODone (DESYREL) 150 MG tablet take 1/2 to 1 tablet by mouth once daily at bedtime if needed for pain SPASM, OR SLEEP  0    HYDROcodone-acetaminophen (NORCO) 5-325 MG per tablet Take 1 tablet by mouth every 6 hours as needed for Pain      Omega-3 Fatty Acids (FISH OIL) 1000 MG CAPS Take 1,000 mg by mouth daily.  sildenafil (VIAGRA) 100 MG tablet Take 1 tablet by mouth as needed. 8 tablet 5    fluticasone (FLONASE) 50 MCG/ACT nasal spray 1 spray by Each Nare route daily 1 Bottle 1    blood glucose test strips (ASCENSIA AUTODISC VI;ONE TOUCH ULTRA TEST VI) strip One Touch Ultra Test Strip - Check blood sugar twice daily Dx: E11.9 200 each 1    ONETOUCH DELICA LANCETS FINE MISC Check blood sugar twice daily Dx: E11.9 200 each 1    ipratropium-albuterol (DUONEB) 0.5-2.5 (3) MG/3ML SOLN nebulizer solution Inhale 3 mLs into the lungs every 4 hours as needed for Shortness of Breath 1620 mL 11    testosterone cypionate (DEPOTESTOTERONE CYPIONATE) 200 MG/ML injection Inject 1 ml into the muscle every 14 days. 2 mL 3     No current facility-administered medications for this visit.       Past Medical History:   Diagnosis Date    Acid reflux     Arthritis     Asthma     Chronic back pain     Class 2 severe obesity due to excess calories with serious comorbidity and body mass index (BMI) of 37.0 to 37.9 in adult [facility]) 8/13/2018    Colon polyps 11/06    COPD (chronic obstructive pulmonary disease) (HCC)     Depression     panic attacks    Diverticulosis     HTN (hypertension)  Hyperlipidemia     Kidney disorder     Panic attack     Pneumonia     Sleep apnea     Thumb amputation status 3/13/14    left tip of thumb amputated    Thyroid disease     Type II or unspecified type diabetes mellitus without mention of complication, not stated as uncontrolled       Past Surgical History:   Procedure Laterality Date    BACK SURGERY  2002    BACK SURGERY  2017    BICEPS TENDON REPAIR Right 2017    BRONCHOSCOPY      BRONCHOSCOPY N/A 2019    BRONCHOSCOPY performed by Rosalind Fenton MD at [address]  2019    BRONCHOSCOPY ALVEOLAR LAVAGE performed by [facility]alind Fenton MD at [facility]   [address]      CARPAL TUNNEL RELEASE      right hand    CHOLECYSTECTOMY  yrs ago    COLONOSCOPY      ENDOSCOPY, COLON, DIAGNOSTIC      EYE SURGERY      foreign body removal    HERNIA REPAIR      Dr Sylvia Salinas  ?     LARYNGOSCOPY  2016    Laryngoscopy Micro with Biopsy by Dr Roselyn Bowles      uppp    ROTATOR CUFF REPAIR Left 15    SKIN BIOPSY      TONSILLECTOMY      UPPER GASTROINTESTINAL ENDOSCOPY      VEIN SURGERY Left 2014    Dr. Cristy Elder     Family History   Problem Relation Age of Onset    Cancer Mother     Breast Cancer Mother     Stroke Mother     Heart Disease Brother     Diabetes Brother     High Blood Pressure Brother     High Cholesterol Brother      Social History     Tobacco Use    Smoking status: Former Smoker     Packs/day: 2.00     Years: 42.00     Pack years: 84.00     Types: Cigarettes     Start date: 1971     Last attempt to quit: 3/3/2017     Years since quittin.1    Smokeless tobacco: Never Used    Tobacco comment: pts uses just nicotine vap   Substance Use Topics    Alcohol use: No     Alcohol/week: 0.0 oz       Subjective:      Review of Systems Constitutional: Negative for activity change, appetite change, chills, diaphoresis, fatigue, fever and unexpected weight change. HENT: Negative for congestion, dental problem, ear discharge, ear pain, facial swelling, hearing loss, mouth sores, nosebleeds, postnasal drip, rhinorrhea, sinus pressure, sneezing, sore throat, tinnitus, trouble swallowing and voice change. Eyes: Negative for visual disturbance. Respiratory: Positive for cough, choking and shortness of breath. Negative for apnea, chest tightness, wheezing and stridor. Cardiovascular: Negative for chest pain, palpitations and leg swelling. Gastrointestinal: Negative for abdominal pain, diarrhea, nausea and vomiting. Endocrine: Negative for cold intolerance, heat intolerance, polydipsia and polyuria. Genitourinary: Negative for dysuria, enuresis and hematuria. Musculoskeletal: Negative for arthralgias, gait problem, neck pain and neck stiffness. Skin: Negative for color change and rash. Allergic/Immunologic: Negative for environmental allergies, food allergies and immunocompromised state. Neurological: Negative for dizziness, syncope, facial asymmetry, speech difficulty, light-headedness and headaches. Hematological: Negative for adenopathy. Does not bruise/bleed easily. Psychiatric/Behavioral: Negative for confusion and sleep disturbance. The patient is not nervous/anxious. Objective:   BP (!) 144/82 (Site: Left Upper Arm, Position: Sitting)   Pulse 76   Temp 98 °F (36.7 °C) (Oral)   Resp 14   Ht 5' 8\" (1.727 m)   Wt 235 lb 12.8 oz (107 kg)   BMI 35.85 kg/m²     Physical Exam   Hypertrophy both inferior turbinates  Normal clear voice  No stridor on inspiration. He has some wheezing from his chest on expiration and    Data:  All of the past medical history, past surgical history, family history,social history, allergies and current medications were reviewed with the patient.      Assessment & Plan   Diagnoses and all orders for this visit:     Diagnosis Orders   1. Vocal cord anomaly  DE LARYNGOSCOPY FLEXIBLE DIAGNOSTIC    Inadequate abduction apparently has resolved   2. Suspected condition not found  DE LARYNGOSCOPY FLEXIBLE DIAGNOSTIC   3. Hypertrophy of inferior nasal turbinate  mometasone (NASONEX) 50 MCG/ACT nasal spray   4. 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 PROCEDURE: FIBEROPTIC LARYNGOSCOPY    A fiberoptic laryngoscopy was performed under topical anesthesia, after using Afrin and Lidocaine spray in the nasal fossa. The nasal fossa, nasopharynx, hypopharynx and larynx were carefully examined. Base of tongue was symmetrical. Epiglottis appeared normal and was not retrodisplaced. True vocal cords had normal mobility. There was no erythema. No mucosal lesions or masses were noted. No pooling in the pyriform sinuses. There is mild postcricoid edema consistent with being on losartan still. At this time, vocal cord excursion is excellent and there are no lesions or any \"black spots\" anywhere visible in the hypopharynx and larynx. Asking him to sniff led to a very appropriate wide abduction. I also showed his wife how his vocal cords would separate widely. He still has the postcricoid edema and swelling from his angiotensin medication    Because of his chronic mouth breathing and turbinate hypertrophy, he is placed on Nasonex for a month than a return visit. He is a candidate for partial SMR both inferior turbinates to improve his airway and the mouth breathing. This is minimally invasive with very little morbidity and a very short procedure under general anesthesia. The nurse practitioner might be interested to know that surgery is very effective at correcting airways. Return in about 6 weeks (around 6/11/2019) for Follow-Up chronic nasal congestion.          Jacki Shi. Dario French MD    **This report has been created using voice recognition software. It may contain minor errors which are inherent in voicerecognition technology. **

## 2019-05-01 ENCOUNTER — TELEPHONE (OUTPATIENT)
Dept: ENT CLINIC | Age: 64
End: 2019-05-01

## 2019-05-01 DIAGNOSIS — J34.3 HYPERTROPHY OF INFERIOR NASAL TURBINATE: Primary | ICD-10-CM

## 2019-05-01 RX ORDER — FLUTICASONE PROPIONATE 50 MCG
1 SPRAY, SUSPENSION (ML) NASAL DAILY
Qty: 1 BOTTLE | Refills: 1 | Status: SHIPPED | OUTPATIENT
Start: 2019-05-01 | End: 2019-08-05 | Stop reason: SDUPTHER

## 2019-05-01 NOTE — TELEPHONE ENCOUNTER
Marti Sharif called req refills for    One Touch Delica Lancets AND    [employer] One Touch Ultra test strips. Pt test BID.     [facility]

## 2019-05-01 NOTE — TELEPHONE ENCOUNTER
I called Pt. And spoke to Pt's wife and notified her of the Rx sent to the pharmacy and gave her the directions and she understood and thanked us very much.

## 2019-05-01 NOTE — TELEPHONE ENCOUNTER
Pt. Wife called in stating that the Rx that Dr. Adeline Boucher called into the Pharmacy on 4/30/19 is not covered in their network. The pharmacist informed him that Kina Clarissa is covered and if we could send that into the pharmacy by chance.     Thanks, Su Carty

## 2019-05-01 NOTE — TELEPHONE ENCOUNTER
Date of last visit:  4/12/2019  Date of next visit:  5/8/2019    Requested Prescriptions      No prescriptions requested or ordered in this encounter

## 2019-05-10 ENCOUNTER — OFFICE VISIT (OUTPATIENT)
Dept: FAMILY MEDICINE CLINIC | Age: 64
End: 2019-05-10

## 2019-05-10 VITALS
RESPIRATION RATE: 16 BRPM | DIASTOLIC BLOOD PRESSURE: 80 MMHG | HEIGHT: 68 IN | WEIGHT: 234.8 LBS | SYSTOLIC BLOOD PRESSURE: 132 MMHG | HEART RATE: 78 BPM | BODY MASS INDEX: 35.58 KG/M2

## 2019-05-10 DIAGNOSIS — E78.5 HYPERLIPIDEMIA, UNSPECIFIED HYPERLIPIDEMIA TYPE: ICD-10-CM

## 2019-05-10 DIAGNOSIS — I10 ESSENTIAL HYPERTENSION: ICD-10-CM

## 2019-05-10 DIAGNOSIS — E11.8 TYPE 2 DIABETES MELLITUS WITH COMPLICATION, WITHOUT LONG-TERM CURRENT USE OF INSULIN (HCC): Primary | ICD-10-CM

## 2019-05-10 LAB
CHP ED QC CHECK: NORMAL
GLUCOSE BLD-MCNC: 118 MG/DL
HBA1C MFR BLD: 7.5 %

## 2019-05-10 PROCEDURE — 1123F ACP DISCUSS/DSCN MKR DOCD: CPT | Performed by: FAMILY MEDICINE

## 2019-05-10 PROCEDURE — 83036 HEMOGLOBIN GLYCOSYLATED A1C: CPT | Performed by: FAMILY MEDICINE

## 2019-05-10 PROCEDURE — G8417 CALC BMI ABV UP PARAM F/U: HCPCS | Performed by: FAMILY MEDICINE

## 2019-05-10 PROCEDURE — 1036F TOBACCO NON-USER: CPT | Performed by: FAMILY MEDICINE

## 2019-05-10 PROCEDURE — 4040F PNEUMOC VAC/ADMIN/RCVD: CPT | Performed by: FAMILY MEDICINE

## 2019-05-10 PROCEDURE — 99213 OFFICE O/P EST LOW 20 MIN: CPT | Performed by: FAMILY MEDICINE

## 2019-05-10 PROCEDURE — 82962 GLUCOSE BLOOD TEST: CPT | Performed by: FAMILY MEDICINE

## 2019-05-10 PROCEDURE — G8427 DOCREV CUR MEDS BY ELIG CLIN: HCPCS | Performed by: FAMILY MEDICINE

## 2019-05-10 PROCEDURE — 1111F DSCHRG MED/CURRENT MED MERGE: CPT | Performed by: FAMILY MEDICINE

## 2019-05-10 PROCEDURE — 3017F COLORECTAL CA SCREEN DOC REV: CPT | Performed by: FAMILY MEDICINE

## 2019-05-10 RX ORDER — SERTRALINE HYDROCHLORIDE 100 MG/1
TABLET, FILM COATED ORAL
Qty: 60 TABLET | Refills: 5 | Status: SHIPPED | OUTPATIENT
Start: 2019-05-10 | End: 2019-11-14 | Stop reason: SDUPTHER

## 2019-05-10 ASSESSMENT — ENCOUNTER SYMPTOMS
ABDOMINAL PAIN: 0
COUGH: 0
BACK PAIN: 1
BLOOD IN STOOL: 0
EYES NEGATIVE: 1
SHORTNESS OF BREATH: 0
CHEST TIGHTNESS: 0
NAUSEA: 0
VOMITING: 0
DIARRHEA: 0
CONSTIPATION: 0

## 2019-05-10 NOTE — PROGRESS NOTES
Date: 5/10/2019  Here with his wife. Shari Metzger is a 75 y.o. male who presents today for:  Chief Complaint   Patient presents with    Diabetes he states that his sugars have been good, better than before.  Hypertension stable  He is got off oxygen and he is done with his antibiotics and he states that he is doing really good. He feels best now than in the last couple of years. Has seen Dr Rekha Lundy and his throat and vocal cord are good. HPI:     HPI    has a current medication list which includes the following prescription(s): metformin, sertraline, fluticasone, blood glucose test strips, onetouch delica lancets fine, mometasone, ranitidine, montelukast, syringe-needle (disp) 3 ml, ropinirole, losartan, glimepiride, budesonide, ventolin hfa, acapella, simvastatin, buspirone, hydroxyzine, baclofen, trazodone, hydrocodone-acetaminophen, fish oil, sildenafil, ipratropium-albuterol, and testosterone cypionate.     No Known Allergies    Social History     Tobacco Use    Smoking status: Former Smoker     Packs/day: 2.00     Years: 42.00     Pack years: 84.00     Types: Cigarettes     Start date: 1971     Last attempt to quit: 3/3/2017     Years since quittin.1    Smokeless tobacco: Never Used    Tobacco comment: pts uses just nicotine vap   Substance Use Topics    Alcohol use: No     Alcohol/week: 0.0 oz    Drug use: No       Past Medical History:   Diagnosis Date    Acid reflux     Arthritis     Asthma     Chronic back pain     Class 2 severe obesity due to excess calories with serious comorbidity and body mass index (BMI) of 37.0 to 37.9 in adult [facility]) 2018    Colon polyps     COPD (chronic obstructive pulmonary disease) ([facility] [address]     Depression     panic attacks    Diverticulosis     HTN (hypertension)     Hyperlipidemia     Kidney disorder     Panic attack     Pneumonia     Sleep apnea     Thumb amputation status 3/13/14    left tip of thumb amputated    Thyroid disease  Type II or unspecified type diabetes mellitus without mention of complication, not stated as uncontrolled        Past Surgical History:   Procedure Laterality Date    BACK SURGERY  2002    BACK SURGERY  08/11/2017    BICEPS TENDON REPAIR Right 08/16/2017    BRONCHOSCOPY      BRONCHOSCOPY N/A 4/5/2019    BRONCHOSCOPY performed by William Faustin MD at [address]  4/5/2019    BRONCHOSCOPY ALVEOLAR LAVAGE performed by William Faustin MD at [address] Endoscopy   [address]  07/02 08/05    CARPAL TUNNEL RELEASE  2011    right hand    CHOLECYSTECTOMY  yrs ago    COLONOSCOPY  11/06 02/12 1/14    ENDOSCOPY, COLON, DIAGNOSTIC      EYE SURGERY      foreign body removal    HERNIA REPAIR  2004    Dr Compa Redd  2008?  LARYNGOSCOPY  04/28/2016    Laryngoscopy Micro with Biopsy by Dr Silas Restrepo  01/07    uppp    ROTATOR CUFF REPAIR Left 5-20-15    SKIN BIOPSY      TONSILLECTOMY  1985    UPPER GASTROINTESTINAL ENDOSCOPY  1997    VEIN SURGERY Left September 2014    Dr. Jorge Alberto Sims       Family History   Problem Relation Age of Onset    Cancer Mother     Breast Cancer Mother     Stroke Mother     Heart Disease Brother     Diabetes Brother     High Blood Pressure Brother     High Cholesterol Brother      Subjective:     Review of Systems   Constitutional: Negative for activity change, appetite change, diaphoresis and fever. HENT: Negative. Eyes: Negative. Respiratory: Negative for cough, chest tightness and shortness of breath. Cardiovascular: Negative for chest pain, palpitations and leg swelling. Gastrointestinal: Negative for abdominal pain, blood in stool, constipation, diarrhea, nausea and vomiting. Genitourinary: Negative. Musculoskeletal: Positive for arthralgias and back pain. Skin: Negative. Negative for rash. Neurological: Negative.   Negative for dizziness, syncope, weakness, light-headedness and headaches. Psychiatric/Behavioral: Negative.        :   /80 (Site: Left Upper Arm, Position: Sitting, Cuff Size: Large Adult)   Pulse 78   Resp 16   Ht 5' 8\" (1.727 m)   Wt 234 lb 12.8 oz (106.5 kg)   BMI 35.70 kg/m²   Wt Readings from Last 3 Encounters:   05/10/19 234 lb 12.8 oz (106.5 kg)   04/30/19 235 lb 12.8 oz (107 kg)   04/12/19 233 lb 9.6 oz (106 kg)     Physical Exam   Constitutional: He is oriented to person, place, and time. He appears well-developed and well-nourished. No distress. HENT:   Head: Normocephalic and atraumatic. Eyes: Pupils are equal, round, and reactive to light. Conjunctivae and EOM are normal. Right eye exhibits no discharge. Left eye exhibits no discharge. No scleral icterus. Neck: Normal range of motion. Neck supple. No JVD present. No thyromegaly present. Cardiovascular: Normal rate, regular rhythm and normal heart sounds. No murmur heard. Pulmonary/Chest: Effort normal and breath sounds normal. No respiratory distress. He has no wheezes. He has no rhonchi. He has no rales. Abdominal: Soft. Bowel sounds are normal. He exhibits no distension and no mass. There is no hepatosplenomegaly. There is no tenderness. There is no rebound and no guarding. Musculoskeletal: Normal range of motion. He exhibits no edema. Lymphadenopathy:     He has no cervical adenopathy. Neurological: He is alert and oriented to person, place, and time. Skin: Skin is warm and dry. No rash noted. He is not diaphoretic. Psychiatric: He has a normal mood and affect. His behavior is normal.   Nursing note and vitals reviewed.    :       Diagnosis Orders   1. Type 2 diabetes mellitus with complication, without long-term current use of insulin (HCC)  POCT Glucose    POCT glycosylated hemoglobin (Hb A1C)   2. Essential hypertension     3.  Hyperlipidemia, unspecified hyperlipidemia type         :      Current Outpatient Medications   Medication Sig Dispense Refill  metFORMIN (GLUCOPHAGE) 500 MG tablet Take 2 tablets by mouth 2 times daily (with meals) 360 tablet 5    sertraline (ZOLOFT) 100 MG tablet take 1 tablet by mouth twice a day 60 tablet 5    fluticasone (FLONASE) 50 MCG/ACT nasal spray 1 spray by Each Nare route daily 1 Bottle 1    blood glucose test strips (ASCENSIA AUTODISC VI;ONE TOUCH ULTRA TEST VI) strip One Touch Ultra Test Strip - Check blood sugar twice daily Dx: E11.9 200 each 1    ONETOUCH DELICA LANCETS FINE MISC Check blood sugar twice daily Dx: E11.9 200 each 1    mometasone (NASONEX) 50 MCG/ACT nasal spray 1 spray by Nasal route daily 1 Inhaler 1    ranitidine (ZANTAC) 150 MG tablet TAKE 1 TABLET BY MOUTH TWICE DAILY 180 tablet 3    montelukast (SINGULAIR) 10 MG tablet Take 1 tablet by mouth nightly 90 tablet 3    SYRINGE-NEEDLE, DISP, 3 ML 23G X 1-1/2\" 3 ML MISC Inject 1 Syringe into the muscle every 14 days 10 each 1    rOPINIRole (REQUIP) 1 MG tablet Take 1 tablet by mouth 3 times daily 90 tablet 5    losartan (COZAAR) 25 MG tablet Take 1 tablet by mouth daily 30 tablet 5    glimepiride (AMARYL) 4 MG tablet Take 1 tablet by mouth 2 times daily 60 tablet 5    budesonide (PULMICORT) 0.5 MG/2ML nebulizer suspension Take 2 mLs by nebulization 2 times daily Pulmicort Respules 0.5mg via nebulization Bid 60 ampule 11    VENTOLIN  (90 Base) MCG/ACT inhaler Inhale 2 puffs into the lungs every 6 hours as needed for Wheezing 1 Inhaler 11    Misc.  Devices (ACAPELLA) MISC 1 Device by Does not apply route 4 times daily 1 each 0    simvastatin (ZOCOR) 40 MG tablet Take 1 tablet by mouth nightly 30 tablet 5    busPIRone (BUSPAR) 10 MG tablet Take 1 tablet by mouth 3 times daily 90 tablet 5    hydrOXYzine (ATARAX) 10 MG tablet       baclofen (LIORESAL) 10 MG tablet take 1 tablet by mouth twice a day  0    traZODone (DESYREL) 150 MG tablet take 1/2 to 1 tablet by mouth once daily at bedtime if needed for pain SPASM, OR SLEEP  0    HYDROcodone-acetaminophen (NORCO) 5-325 MG per tablet Take 1 tablet by mouth every 6 hours as needed for Pain      Omega-3 Fatty Acids (FISH OIL) 1000 MG CAPS Take 1,000 mg by mouth daily.  sildenafil (VIAGRA) 100 MG tablet Take 1 tablet by mouth as needed. 8 tablet 5    ipratropium-albuterol (DUONEB) 0.5-2.5 (3) MG/3ML SOLN nebulizer solution Inhale 3 mLs into the lungs every 4 hours as needed for Shortness of Breath 1620 mL 11    testosterone cypionate (DEPOTESTOTERONE CYPIONATE) 200 MG/ML injection Inject 1 ml into the muscle every 14 days. 2 mL 3     No current facility-administered medications for this visit. Orders Placed This Encounter   Procedures    POCT Glucose    POCT glycosylated hemoglobin (Hb A1C)     Lab Results   Component Value Date    LABA1C 7.5 05/10/2019    LABA1C 8.7 02/06/2019    LABA1C 7.5 10/29/2018     Lab Results   Component Value Date    LABMICR 2.74 11/04/2016     Results for POC orders placed in visit on 05/10/19   POCT glycosylated hemoglobin (Hb A1C)   Result Value Ref Range    Hemoglobin A1C 7.5 %   POCT Glucose   Result Value Ref Range    Glucose 118 mg/dL    QC OK? Continue to monitor blood sugars 1 times a day. Keep log of blood sugars and bring with you to the next appointment. Discussed use, benefit, and side effects of prescribed medications. All patient questions answered. Pt voiced understanding. Instructed to continue current medications, ADA diet and exercise. Patient agreed with treatment plan. Return in about 4 months (around 9/10/2019) for DM.

## 2019-05-19 LAB
AFB CULTURE & SMEAR: NORMAL
AFB CULTURE & SMEAR: NORMAL

## 2019-05-28 ENCOUNTER — OFFICE VISIT (OUTPATIENT)
Dept: PULMONOLOGY | Age: 64
End: 2019-05-28
Payer: MEDICARE

## 2019-05-28 VITALS
DIASTOLIC BLOOD PRESSURE: 72 MMHG | HEIGHT: 68 IN | TEMPERATURE: 98.2 F | OXYGEN SATURATION: 92 % | WEIGHT: 233 LBS | SYSTOLIC BLOOD PRESSURE: 138 MMHG | BODY MASS INDEX: 35.31 KG/M2 | HEART RATE: 72 BPM

## 2019-05-28 DIAGNOSIS — R89.9 ABNORMAL LABORATORY TEST: ICD-10-CM

## 2019-05-28 DIAGNOSIS — J45.51 SEVERE PERSISTENT ASTHMA WITH ACUTE EXACERBATION: ICD-10-CM

## 2019-05-28 DIAGNOSIS — A49.8 E COLI INFECTION: Primary | ICD-10-CM

## 2019-05-28 PROCEDURE — 4040F PNEUMOC VAC/ADMIN/RCVD: CPT | Performed by: INTERNAL MEDICINE

## 2019-05-28 PROCEDURE — G8417 CALC BMI ABV UP PARAM F/U: HCPCS | Performed by: INTERNAL MEDICINE

## 2019-05-28 PROCEDURE — 3017F COLORECTAL CA SCREEN DOC REV: CPT | Performed by: INTERNAL MEDICINE

## 2019-05-28 PROCEDURE — G8427 DOCREV CUR MEDS BY ELIG CLIN: HCPCS | Performed by: INTERNAL MEDICINE

## 2019-05-28 PROCEDURE — 1036F TOBACCO NON-USER: CPT | Performed by: INTERNAL MEDICINE

## 2019-05-28 PROCEDURE — 1123F ACP DISCUSS/DSCN MKR DOCD: CPT | Performed by: INTERNAL MEDICINE

## 2019-05-28 PROCEDURE — 99215 OFFICE O/P EST HI 40 MIN: CPT | Performed by: INTERNAL MEDICINE

## 2019-05-28 RX ORDER — DOXYCYCLINE HYCLATE 100 MG/1
100 CAPSULE ORAL 2 TIMES DAILY
Qty: 20 CAPSULE | Refills: 0 | Status: SHIPPED | OUTPATIENT
Start: 2019-05-28 | End: 2019-06-07

## 2019-05-28 RX ORDER — BUDESONIDE AND FORMOTEROL FUMARATE DIHYDRATE 160; 4.5 UG/1; UG/1
2 AEROSOL RESPIRATORY (INHALATION) 2 TIMES DAILY
Qty: 1 INHALER | Refills: 11 | Status: SHIPPED | OUTPATIENT
Start: 2019-05-28 | End: 2020-05-05 | Stop reason: SDUPTHER

## 2019-05-28 NOTE — PROGRESS NOTES
[facility]                                  Pulmonary medicine clinic follow up note. Patient: Stone Tobias  : 1955      Lung Nodule Screening   [X] Qualifies [ ] Does not qualify [ ] Declined [X] Completed CTA 5/21/15      Chief complaint and [place]:  Stone Tobias is a 75 y. o.oldmale came for follow up regarding his severe persistent Asthma mild COPD and chronic cough. He is having cough with yellow expectoration. No fever or chills. No recent hospitalizations or emergency room visits. He is using inhalers and nebs with good compliance. He uses rescue inhaler too frequently. He underwent treatment for Escherichia coli infection in lungs. He was treated with Doxycycline 100mg po bid in . He was seen by Ms Markus Reed CNP on 19. He underwent bronch to obtain lower respiratory tract specimen for further evaluation of his chronic cough. His bronch specimen grew Escherichia coli     He had MRSA infection of left side of face. He underwent treatment with IV antibiotics by Dr. Omayra Kelly at that time. He also gives a hx of exposure to black mold. He was evaluated by Dr. Junior Marshall from ENT for his abnormally looking vocal cords noted during bronch. He follows with Dr. Garret Bahena for his GERD and underwent EGD 1 week back. It was reported as normal per patient. No official report is available. He is currently using:  -Pulmicort 0.5mg via neb bid.  -Albuterol HFA 90mcg/Spray MDI, 2puffs  Q6Hprn  -Duonebs Q6h prn in alternation with Albuterol HFA. He feels better with nebulizer meds compared to inhalers. He is currently using 2LPM Oxygen during night. He was initially referred from Dr. Suan Dubin for chronic cough. He quit taking Flonase 50mcg/INH 2sprays each nostril daily due to development of nasal bleeds.   He used to be on treatment with Symbicort 160/4.5mcg spray MDI, 2 puffs via inhalation BID. Asthma control (Severity) questionnaire:  Asthma symptoms:  > 2 times per week -> Yes  Night time awakenings: > 2 times per month -> Yes  Use of short acting beta agonist for symptoms control (Other than pre exercise use) :> 2times per week  -> Yes  Interference with normal activity  -> none  Lung function: Fev1 >60% Predicted  -> Yes  Number of exacerbations per year: > 2 -> Yes    Social History: Occupation: He is retired now. He used to work as a  in the past.      Patient admits to history of tobacco smoking. He used to smoke 1 to 1.5 PPD for 44 Years. He denies history of exposure to coal, foundry, [place], asbestos or significant farm dust exposure. His father was diagnosed with asbestos exposure. He used to move close to his father. Patient denies any recent travel. Patient denies history of exposure to birds, pigeons, or chickens in the past. The patient admits toto pet animals at home. Hecurrently had 2cats at home. He denies to history of recreational or IV drug use. Hedenies history of pulmonary embolism or DVT in the past.      Review of Systems:   General/Constitutional: he lost 10 lbs of weight from the last visit with normal appetite. No fever or chills. HENT: Negative. Eyes: Negative. Upper respiratory tract: No nasal stuffiness or post nasal drip. Lower respiratory tract/ lungs: See HPI. No hemoptysis. Cardiovascular: No palpitations or chest pain. Gastrointestinal: No nausea or vomiting. Neurological: No focal neurologiacal weakness. Extremities: No edema. Musculoskeletal: No complaints. Genitourinary: No complaints. Hematological: Negative. Psychiatric/Behavioral: Negative. Skin: No itching.       Current Medications:      Past Medical History:   Diagnosis Date    Acid reflux     Arthritis     Asthma     Chronic back pain     Class 2 severe obesity due to excess calories with serious comorbidity and body mass index (BMI) of 37.0 to - Check blood sugar twice daily Dx: E11.9 200 each 1    ONETOUCH DELICA LANCETS FINE MISC Check blood sugar twice daily Dx: E11.9 200 each 1    mometasone (NASONEX) 50 MCG/ACT nasal spray 1 spray by Nasal route daily 1 Inhaler 1    ranitidine (ZANTAC) 150 MG tablet TAKE 1 TABLET BY MOUTH TWICE DAILY 180 tablet 3    montelukast (SINGULAIR) 10 MG tablet Take 1 tablet by mouth nightly 90 tablet 3    SYRINGE-NEEDLE, DISP, 3 ML 23G X 1-1/2\" 3 ML MISC Inject 1 Syringe into the muscle every 14 days 10 each 1    rOPINIRole (REQUIP) 1 MG tablet Take 1 tablet by mouth 3 times daily 90 tablet 5    losartan (COZAAR) 25 MG tablet Take 1 tablet by mouth daily 30 tablet 5    glimepiride (AMARYL) 4 MG tablet Take 1 tablet by mouth 2 times daily 60 tablet 5    budesonide (PULMICORT) 0.5 MG/2ML nebulizer suspension Take 2 mLs by nebulization 2 times daily Pulmicort Respules 0.5mg via nebulization Bid 60 ampule 11    VENTOLIN  (90 Base) MCG/ACT inhaler Inhale 2 puffs into the lungs every 6 hours as needed for Wheezing 1 Inhaler 11    Misc. Devices (ACAPELLA) MISC 1 Device by Does not apply route 4 times daily 1 each 0    simvastatin (ZOCOR) 40 MG tablet Take 1 tablet by mouth nightly 30 tablet 5    busPIRone (BUSPAR) 10 MG tablet Take 1 tablet by mouth 3 times daily 90 tablet 5    hydrOXYzine (ATARAX) 10 MG tablet       baclofen (LIORESAL) 10 MG tablet take 1 tablet by mouth twice a day  0    traZODone (DESYREL) 150 MG tablet take 1/2 to 1 tablet by mouth once daily at bedtime if needed for pain SPASM, OR SLEEP  0    HYDROcodone-acetaminophen (NORCO) 5-325 MG per tablet Take 1 tablet by mouth every 6 hours as needed for Pain      Omega-3 Fatty Acids (FISH OIL) 1000 MG CAPS Take 1,000 mg by mouth daily.  sildenafil (VIAGRA) 100 MG tablet Take 1 tablet by mouth as needed.  8 tablet 5    ipratropium-albuterol (DUONEB) 0.5-2.5 (3) MG/3ML SOLN nebulizer solution Inhale 3 mLs into the lungs every 4 hours as needed for Shortness of Breath 1620 mL 11    testosterone cypionate (DEPOTESTOTERONE CYPIONATE) 200 MG/ML injection Inject 1 ml into the muscle every 14 days. 2 mL 3     No current facility-administered medications for this visit. Family History   Problem Relation Age of Onset    Cancer Mother     Breast Cancer Mother     Stroke Mother     Heart Disease Brother     Diabetes Brother     High Blood Pressure Brother     High Cholesterol Brother          Physical Exam     VITALS:    /72 (Site: Left Upper Arm, Position: Sitting)   Pulse 72   Temp 98.2 °F (36.8 °C)   Ht 5' 8\" (1.727 m)   Wt 233 lb (105.7 kg)   SpO2 92% Comment: on RA  BMI 35.43 kg/m²   Nursing note and vitals reviewed. Constitutional: Patient appears moderately built and moderately nourished. No distress. Patient is oriented to person, place, and time. HENT:   Head: Normocephalic and atraumatic. Right Ear: External ear normal.   Left Ear: External ear normal.   Mouth/Throat: Oropharynx is clear and moist.  No oral thrush. Eyes: Conjunctivae are normal. Pupils are equal, round, and reactive to light. No scleral icterus. Neck: Neck supple. No JVD present. No tracheal deviation present. Cardiovascular: Normal rate, regular rhythm, normal heart sounds. No murmur heard. Pulmonary/Chest: Effort normal and breath sounds normal. No stridor. No respiratory distress. Bilateral expiratory wheezes. No rales. Patient exhibits no tenderness. Abdominal: Soft. Patient exhibits no distension. No tenderness. Musculoskeletal: Normal range of motion. Extremities: Patient exhibits no edema and no tenderness. Lymphadenopathy:  No cervical adenopathy. Neurological: Patient is alert and oriented to person, place, and time. Skin: Skin is warm and dry. Patient is not diaphoretic. Psychiatric: Patient  has a normal mood and affect.  Patient behavior is normal.          Neck Circumference - 18.75\"   Mallampati - IV    Diagnostic IMPRESSION:   Normal x-ray examination of the chest.            Respiratory cultures:  01/28/2019 11:44 AM   Narrative   Performed by: [facility]   Source: Expectorated sputum       Site:           Current Antibiotics: not stated   Susceptibility     Escherichia coli (1)     Antibiotic Interpretation CAIT Status    cefTRIAXone Sensitive <=1 mcg/mL Final    gentamicin Sensitive <=1 mcg/mL Final    cefOXitin Sensitive <=4 mcg/mL Final    levofloxacin Sensitive <=0.12 mcg/mL Final    cefuroxime Intermediate   Final    tetracycline Sensitive <=1 mcg/mL Final    trimethoprim-sulfamethoxazole Sensitive <=20 mcg/mL       Bronchoscopy results:  Hospital performed: [address] [address]. Date of procedure: 4/5/2019  Procedure: Flexible diagnostic fiberoptic bronchoscopy without fluoroscopy. During procedure Bronch washings obtained  from bilateral lower lobes. Bronchioalveolar lavage was performed from right lower lobe posterior segment. Bronch washings:   Acid fast bacilli:  negative  Fungal cultures: Candida albicans   Routine cultures: Escherichia coli   Viral cultures:      negative  Legionella cultures:  negative  Cytology: No malignant cells seen. Multinucleated giant cells. Alveolar macrophages.   Susceptibility     Escherichia coli (1)     Antibiotic Interpretation CAIT Status    gentamicin Sensitive <=1 mcg/mL Final    tetracycline Sensitive <=1 mcg/mL Final    cefOXitin Sensitive <=4 mcg/mL Final    trimethoprim-sulfamethoxazole Sensitive <=20 mcg/mL Final    ciprofloxacin Sensitive =0.016 mcg/mL Final    cefuroxime Sensitive             BAL ( Broncho alveolar lavage):  Acid fast bacilli:   negative  Fungal cultures:  negative  Routine cultures: Escherichia coli   Susceptibility     Escherichia coli (1)     Antibiotic Interpretation CAIT Status    cefTRIAXone Sensitive <=1 mcg/mL Final    gentamicin Sensitive <=1 mcg/mL Final    cefOXitin Sensitive <=4 mcg/mL Final    tetracycline Sensitive <=1 mcg/mL Final    ciprofloxacin Sensitive =0.012 mcg/mL Final    trimethoprim-sulfamethoxazole Sensitive <=20 mcg/mL Final    cefuroxime Intermediate         Viral cultures:    POSITIVE for Adenovirus at day 3  Legionella cultures:  negative  Cytology:     No malignant cells seen. Multinucleated giant cells. Alveolar macrophages. 4/6/2019  9:29 AM - Edi, Marcy Hatchet Incoming Lab Results From Soft     Component Value Ref Range & Units Status Collected Lab   BAL Color COLORLESS   Final 04/05/2019  6:02 PM [facility]   BAL Character CLOUDY/MILKY   Final 04/05/2019  6:02 PM [place] [address] Lab   BAL Collection Site RLL and LLL   Final 04/05/2019  6:02 PM [facility]   Total Volume Received BAL 30  ml Final 04/05/2019  6:02 PM [place] [id number] Lab   Total Nucleated Cells   /cumm Final 04/05/2019  6:02 PM [place] [address] Lab   RBC   /cumm Final 04/05/2019  6:02 PM [place] [address] Lab   Macrophage/Monocyte BAL 74Low   86 - 100 % Final 04/05/2019  6:02 PM [place] [address] Lab   Lymphocytes, BAL 16High   10 - 15 % Final 04/05/2019  6:02 PM [place] [address] Lab   Segmented Neutrophils, BAL 2  0 - 3 % Final 04/05/2019  6:02 PM [facility]   Few atypical cells, favor reactive. Ciliated/Epithelial Cells BAL 8High   0 - 5 % Final 04/05/2019  6:02 PM [facility]   Pathologist Review [facility]   Final 04/05/2019  6:02 PM [address] [place] [address] Lab   Performed at [address], [address]       Abnormally looking vocal cords and management. ? Chronic laryngitis from ? GERD Vs other etiologies    [facility] labs:  4/23/2019  8:43 PM - Edi, Marcy Hatchet Incoming Lab Results From Soft     Component Value Ref Range & Units Status Collected Lab   Allergen Fungi/Mold A. Alternata Ige 0. 87High   <=0.34 kU/L Final 04/19/2019 12:00 PM [facility]   Allergen Box Elder < 0.10  <=0.34 kU/L Final 04/19/2019 12:00 PM [facility]   Cat Dander IgE 3. 08High   <=0.34 kU/L Final 04/19/2019 12:00 PM [facility]   ALLERGEN MOUNTAIN CEDAR < 0.10  <=0.34 kU/L Final 04/19/2019 12:00 PM [facility]   ALLERGEN COTTONWOOD IGE < 0.10  <=0.34 kU/L Final 04/19/2019 12:00 PM [facility]   Milk IgE < 0.10  <=0.34 kU/L Final 04/19/2019 12:00 PM [facility]   Performed by [facility],   [place] [id number] [facility] [phone]   www. Sai Charles MD - Lab. Director    Peanut IgE < 0.10  <=0.34 kU/L Final 04/19/2019 12:00 PM [facility]   ALLERGEN WEED, PIGWEED IGE < 0.10  <=0.34 kU/L Final 04/19/2019 12:00 PM [facility]   [place] Thistle IgE < 0.10  <=0.34 kU/L Final 04/19/2019 12:00 PM [facility]   ALLERGEN TRISTEN GRASS < 0.10  <=0.34 kU/L Final 04/19/2019 12:00 PM [facility]   Allergen, Fungi/Mold < 0.10  <=0.34 kU/L Final 04/19/2019 12:00 PM [facility]   Elm IgE < 0.10  <=0.34 kU/L Final 04/19/2019 12:00 PM [facility]   Allergen, Tree, Oak < 0.10  <=0.34 kU/L Final 04/19/2019 12:00 PM [facility]   ALLERGEN BIRCH IgE < 0.10  <=0.34 kU/L Final 04/19/2019 12:00 PM [facility]   Allergen Fungi/Mold A. Fumigatus IgE < 0.10  <=0.34 kU/L Final 04/19/2019 12:00 PM [facility]   Mite Dust Pteronyssinus IgE 6. 86High   <=0.34 kU/L Final 04/19/2019 12:00 PM [facility]   ALLERGEN D. FARINAE (DUST MITE) 6. 53High   <=0.34 kU/L Final 04/19/2019 12:00 PM [facility]   Bermuda Grass IgE < 0.10  <=0.34 kU/L Final 04/19/2019 12:00 PM [facility]   Allergen White Mukund < 0.10  <=0.34 kU/L Final 04/19/2019 12:00 PM [facility]   Allergen Fungi/Mold P. Notatum IgE < 0.10  <=0.34 kU/L Final 04/19/2019 12:00 PM [facility]   Common-Short Ragweed IgE 1. 01High   <=0.34 kU/L Final 04/19/2019 12:00 PM [facility]   Cockroach IgE 0.15  <=0.34 kU/L Final 04/19/2019 12:00 PM [facility]   Allergen Tree [place] < 0.10  <=0.34 kU/L Final 04/19/2019 12:00 PM [facility]   [place] Tree IgE < 0.10  <=0.34 kU/L Final 04/19/2019 12:00 PM [facility]   Pecan Tree IgE < 0.10  <=0.34 kU/L Final 04/19/2019 12:00 PM [facility]   Mouse Epithelial < 0.10  <=0.34 kU/L Final 04/19/2019 12:00 PM [facility]   Allergen Fungi/Mold, M. racemosus IGE < 0.10  <=0.34 kU/L Final 04/19/2019 12:00 PM [facility]   Allergen White [place] Tree, IGE < 0.10  <=0.34 kU/L Final 04/19/2019 12:00 PM [facility]   Dog Dander IgE 0. 69High   <=0.34 kU/L Final 04/19/2019 12:00 PM [facility]   Sheep Sorrel IgE < 0.10  <=0.34 kU/L Final 04/19/2019 12:00 PM [facility]   Immunoglobulin E 447High   <=214 kU/L Final 04/19/2019 12:00 PM [facility]   REFERENCE INTERVAL: Immunoglobulin E, Serum   Access complete set of age- and/or gender-specific reference   intervals for this test in the [facility] Laboratory Test Directory   ([employer]). Assessment:  -Severe persistent bronchial asthma- Not under control with ongoing exacerbations. -hx of recurrent E coli infection in the lungs and MRSA infection of left side of face in the past.  -Mild COPD with hx of tobacco smoking- under control.  -Abnormal allergy test with elevated serum Ig E level- need further evaluation.  -Chronic cough due to uncontrolled asthma Vs allergic rhinitis  -Allergic rhinitis- on Astelin nasal spray.  -Abnormal CT scan of neck with ? Lesion over vocal cord and cervical lymphadenopathy on left side S/p Microlaryngoscopy and biopsy by Dr. Emy Veronica on 4/28/16. He is following with ENT. -Heterogeneous low-attenuation nodule within left thyroid gland is incompletely characterized on the current examination. Following with ENT. -Type II or unspecified type diabetes mellitus without mention of complication, not stated as uncontrolled ([address] -Acid reflux on treatment with Nexium. He follows with Dr. Gamal Phan  -Chronic tobacco smoking- He quit smoking 2years back. -Depression. -Anxiety disorder.  -Chronic back pain.  -Blood pressure- under control. He denies any chest pain, headache or dizziness at this time.   -Sleep apnea. He used to be on CPAP in the past. He underwent surgery 4 to 5 years back.  He never had a follow up sleep test. He refused go for sleep test or get treated,      Recommendations/Plan:  - Start patient on Symbicort 160/4.5mcg spray MDI, 2 puffs via inhalation BID. Hewas informed about adverse effects of Symbicort. He verbalizes understanding.  - Stop Pulmicort nebs. - Doxycycline 100mg po bid for 10 days . No refills.  -Continue Albuterol HFA 90mcg/Spray MDI, 2puffs  Q6Hprn.  -Continue patient on Singulair 10mg 1 tablet at bed time daily. He  was informed about adverse effects of Singulair. He verbalizes understanding.  -Duonebs Q6h prn in alternation with Albuterol HFA. -He instructed to use Albuterol HFA  inhaler 2 puffs Q6h prn or Duonebs nebs Q6h prn (the nebulizer) at one time as rescue medication not both at the same time. He verbalizes understanding.   -Continue follow up and management by ISABELLA Stoner for his GERD. - Schedule patient for Allergy and immunology consult with an Allergy and immunologist as soon as possible for further evaluation of elevated serum IgE levels with positive allergy testing for multiple allergies.  -He was advised to practice anti-reflux measures such as raising the head of the bed, avoiding tight clothing or belts, avoiding eating late at night and not lying down shortly after mealtime and achieving weight loss are discussed. Avoid ASA, NSAID's, caffeine, peppermints, alcohol and tobacco. Patient should alert me if there are persistent symptoms, dysphagia, weight loss or GI bleeding.   -Patient educated to update his pneumococcal vaccine with family physician and take influenza vaccine in coming season with out fail. Patient verbalizes understanding.  -Yony Borden educated about environmental safety precautions he need to practice to prevent exacerbation ofhis Asthma. Yony Borden verbalizes understanding.   - He is following with Dr.Charles Tammy MD from [facility] speciality for his Left thyroid nodule. - Patient and his wife were educated about my impression and plan. They verbalizes understanding.  - Schedule patient for follow up with my clinic in 3months with recommended tests.  Patient advised to make early appointment if needed. -He was requested to go for polysomnogram in the sleep lab to further evaluate for obstructive sleep apnea. However at the end of discussion he refused to go for the sleep test at this time. He verbalizes understanding of consequences of his decision including non diagnosis of obstructive sleep apnea resulting in increased morbidity and mortality with cerebro and cardiovascular events including death. He verbalizes understanding. I had a discussion with patient regarding other avialable treatment options for his sleep disorder breathing including but not limited to Dental appliance placement with referral to a local dentist Vs other available surgical options including Uvulopalatopharyngoplasty, maxillomandibular ostomy, Inspire device placement and tracheostomy as last option. At the end of discussion, he decided not to go for any treatment. he verbalizes understanding. Total time spent interviewing the patient and/or family, evaluating lab data and processing orders was 45 Minutes. I personally spent more than 50% of the appointment time face to face with the patient providing counseling and coordinating the patient's care.

## 2019-05-28 NOTE — PATIENT INSTRUCTIONS
Recommendations/Plan:  - Start patient on Symbicort 160/4.5mcg spray MDI, 2 puffs via inhalation BID. Hewas informed about adverse effects of Symbicort. He verbalizes understanding.  - Stop Pulmicort nebs. - Doxycycline 100mg po bid for 10 days . No refills.  -Continue Albuterol HFA 90mcg/Spray MDI, 2puffs  Q6Hprn.  -Continue patient on Singulair 10mg 1 tablet at bed time daily. He  was informed about adverse effects of Singulair. He verbalizes understanding.  -Duonebs Q6h prn in alternation with Albuterol HFA. -He instructed to use Albuterol HFA  inhaler 2 puffs Q6h prn or Duonebs nebs Q6h prn (the nebulizer) at one time as rescue medication not both at the same time. He verbalizes understanding.   -Continue follow up and management by ISABELLA Stoner for his GERD. - Schedule patient for Allergy and immunology consult with an Allergy and immunologist as soon as possible for further evaluation of elevated serum IgE levels with positive allergy testing for multiple allergies.  -He was advised to practice anti-reflux measures such as raising the head of the bed, avoiding tight clothing or belts, avoiding eating late at night and not lying down shortly after mealtime and achieving weight loss are discussed. Avoid ASA, NSAID's, caffeine, peppermints, alcohol and tobacco. Patient should alert me if there are persistent symptoms, dysphagia, weight loss or GI bleeding.   -Patient educated to update his pneumococcal vaccine with family physician and take influenza vaccine in coming season with out fail. Patient verbalizes understanding.  -Yony Borden educated about environmental safety precautions he need to practice to prevent exacerbation ofhis Asthma. Yony Borden verbalizes understanding.   - He is following with Dr.Charles Tammy MD from [facility] Th[facility] speciality for his Left thyroid nodule. - Patient and his wife were educated about my impression and plan.  They verbalizes understanding.  - Schedule patient

## 2019-06-03 ENCOUNTER — OFFICE VISIT (OUTPATIENT)
Dept: ALLERGY | Age: 64
End: 2019-06-03
Payer: MEDICARE

## 2019-06-03 ENCOUNTER — NURSE ONLY (OUTPATIENT)
Dept: LAB | Age: 64
End: 2019-06-03

## 2019-06-03 ENCOUNTER — CARE COORDINATION (OUTPATIENT)
Dept: CASE MANAGEMENT | Age: 64
End: 2019-06-03

## 2019-06-03 VITALS
BODY MASS INDEX: 34.65 KG/M2 | WEIGHT: 228.6 LBS | TEMPERATURE: 98 F | RESPIRATION RATE: 20 BRPM | HEART RATE: 68 BPM | DIASTOLIC BLOOD PRESSURE: 82 MMHG | HEIGHT: 68 IN | SYSTOLIC BLOOD PRESSURE: 138 MMHG

## 2019-06-03 DIAGNOSIS — H10.10 SEASONAL AND PERENNIAL ALLERGIC RHINOCONJUNCTIVITIS: ICD-10-CM

## 2019-06-03 DIAGNOSIS — H10.10 ALLERGIC RHINOCONJUNCTIVITIS: ICD-10-CM

## 2019-06-03 DIAGNOSIS — J30.9 ALLERGIC RHINOCONJUNCTIVITIS: ICD-10-CM

## 2019-06-03 DIAGNOSIS — J30.89 ALLERGIC RHINITIS DUE TO AMERICAN HOUSE DUST MITE: ICD-10-CM

## 2019-06-03 DIAGNOSIS — J45.40 MODERATE PERSISTENT ASTHMA, UNSPECIFIED WHETHER COMPLICATED: ICD-10-CM

## 2019-06-03 DIAGNOSIS — J30.81 ALLERGY TO DOG DANDER: ICD-10-CM

## 2019-06-03 DIAGNOSIS — J30.81 CAT ALLERGIES: ICD-10-CM

## 2019-06-03 DIAGNOSIS — J30.89 SEASONAL AND PERENNIAL ALLERGIC RHINOCONJUNCTIVITIS: ICD-10-CM

## 2019-06-03 DIAGNOSIS — J30.1 ACUTE SEASONAL ALLERGIC RHINITIS DUE TO POLLEN: ICD-10-CM

## 2019-06-03 DIAGNOSIS — D72.10 EOSINOPHILIA: Primary | ICD-10-CM

## 2019-06-03 DIAGNOSIS — J30.2 SEASONAL AND PERENNIAL ALLERGIC RHINOCONJUNCTIVITIS: ICD-10-CM

## 2019-06-03 DIAGNOSIS — J45.40 MODERATE PERSISTENT EXTRINSIC ASTHMA WITHOUT COMPLICATION: ICD-10-CM

## 2019-06-03 DIAGNOSIS — Z91.09 MITE ALLERGY: ICD-10-CM

## 2019-06-03 DIAGNOSIS — D72.10 EOSINOPHILIA: ICD-10-CM

## 2019-06-03 LAB
IGA: 184 MG/DL (ref 70–400)
IGE: 342 IU/ML
IGG: 942 MG/DL (ref 700–1600)
IGM: 106 MG/DL (ref 40–230)

## 2019-06-03 PROCEDURE — G8427 DOCREV CUR MEDS BY ELIG CLIN: HCPCS | Performed by: NURSE PRACTITIONER

## 2019-06-03 PROCEDURE — 4040F PNEUMOC VAC/ADMIN/RCVD: CPT | Performed by: NURSE PRACTITIONER

## 2019-06-03 PROCEDURE — G8417 CALC BMI ABV UP PARAM F/U: HCPCS | Performed by: NURSE PRACTITIONER

## 2019-06-03 PROCEDURE — 1123F ACP DISCUSS/DSCN MKR DOCD: CPT | Performed by: NURSE PRACTITIONER

## 2019-06-03 PROCEDURE — 99214 OFFICE O/P EST MOD 30 MIN: CPT | Performed by: NURSE PRACTITIONER

## 2019-06-03 PROCEDURE — 3017F COLORECTAL CA SCREEN DOC REV: CPT | Performed by: NURSE PRACTITIONER

## 2019-06-03 PROCEDURE — 1036F TOBACCO NON-USER: CPT | Performed by: NURSE PRACTITIONER

## 2019-06-03 ASSESSMENT — ENCOUNTER SYMPTOMS
SINUS PAIN: 0
DIARRHEA: 0
CONSTIPATION: 0
EYE PAIN: 0
SHORTNESS OF BREATH: 1
RHINORRHEA: 1
COUGH: 1
ABDOMINAL PAIN: 0
EYE REDNESS: 1
BACK PAIN: 0
STRIDOR: 0
VOMITING: 0
EYE DISCHARGE: 0
WHEEZING: 0
SORE THROAT: 0
NAUSEA: 0
EYE ITCHING: 1

## 2019-06-03 NOTE — PROGRESS NOTES
Allergy & Asthma   [address] [facility], [address]  Ph:[phone]  Fax: [phone]          Chief Complaint:   Chief Complaint   Patient presents with    New Patient     Patient here for asthma and abnormal labs referred by Dr. Nancy Gore: NEW PATIENT TO PRACTICE   Newly diagnosed with allergies. Has had recent problems with breathing. Has 2 inside cats. Has had cats and dogs all of life. Denies any previous history. Over the course of the last couple years patient has been diagnosed with lung problems including asthma. He states he quit smoking approximately 2 years ago. More recently in the last few months she's been diagnosed with emphysema. Patient reports that he also had a E. coli infection and MRSA in his lungs. He was treated with antibiotics. He states that he did not have any progressive shortness of breath until after he was diagnosed with the common or infections. He denies any current shortness of breath. He denies any nausea vomiting or fever. Denies hemoptysis. Patient states that he did go to his [email or web address] which he states he breathes a lot better at. After coming home and exposed to cats he began having issues again. Patient states he's had cats and dogs throughout his lifetime. Most recently he's had these cats for 12 years. He understands that he may have to make a decision to be able to breathe better he must get rid cats. This bleeding between him and his wife. Severity symptoms are moderate. Patient states that although he does use his inhalers he does not think that this may be any better. He knows that getting around the cats has made little bit worse and [place]. Patient also easily has had evaluation of his vocal cords and thyroid by Dr. Russell Ramírez who is managing. Review of Systems:  Review of Systems   Constitutional: Negative for fever.    HENT: Positive for congestion, postnasal drip and rhinorrhea. Negative for ear discharge, ear pain, hearing loss, sinus pain, sore throat and tinnitus. Eyes: Positive for redness and itching. Negative for pain and discharge. Respiratory: Positive for cough and shortness of breath. Negative for wheezing and stridor. Cardiovascular: Negative for chest pain and palpitations. Gastrointestinal: Negative for abdominal pain, constipation, diarrhea, nausea and vomiting. Reflux   Musculoskeletal: Negative for back pain, myalgias and neck pain. Skin: Negative for rash. Allergic/Immunologic: Positive for environmental allergies and immunocompromised state. Neurological: Negative for dizziness and headaches. Hematological: Does not bruise/bleed easily. Psychiatric/Behavioral: The patient is not nervous/anxious. All other systems reviewed and are negative.       Past Medical History:    Past Medical History:   Diagnosis Date    Acid reflux     Arthritis     Asthma     Chronic back pain     Class 2 severe obesity due to excess calories with serious comorbidity and body mass index (BMI) of 37.0 to 37.9 in adult [facility]) 8/13/2018    Colon polyps 11/06    COPD (chronic obstructive pulmonary disease) (HCC)     Depression     panic attacks    Diverticulosis     HTN (hypertension)     Hyperlipidemia     Kidney disorder     Panic attack     Pneumonia     Sleep apnea     Thumb amputation status 3/13/14    left tip of thumb amputated    Thyroid disease     Type II or unspecified type diabetes mellitus without mention of complication, not stated as uncontrolled        Past Surgical History:    Past Surgical History:   Procedure Laterality Date    BACK SURGERY  2002    BACK SURGERY  08/11/2017    BICEPS TENDON REPAIR Right 08/16/2017    BRONCHOSCOPY      BRONCHOSCOPY N/A 4/5/2019    BRONCHOSCOPY performed by Yong Villa MD at [address] [place] [address]  4/5/2019    BRONCHOSCOPY ALVEOLAR LAVAGE performed by Mariah Qiu MD at [address] [facility] [place]      CARPAL TUNNEL RELEASE  2011    right hand    CHOLECYSTECTOMY  yrs ago    COLONOSCOPY      ENDOSCOPY, COLON, DIAGNOSTIC      EYE SURGERY      foreign body removal    HERNIA REPAIR      Dr Gabo Dunaway  ?  LARYNGOSCOPY  2016    Laryngoscopy Micro with Biopsy by Dr Narayan Vazquez      uppp    ROTATOR CUFF REPAIR Left 15    SKIN BIOPSY      TONSILLECTOMY  1985    UPPER GASTROINTESTINAL ENDOSCOPY      VEIN SURGERY Left 2014    Dr. Tato Jurado       Family History:   Family History   Problem Relation Age of Onset    Cancer Mother     Breast Cancer Mother     Stroke Mother     Heart Disease Brother     Diabetes Brother     High Blood Pressure Brother     High Cholesterol Brother        Social History:   Social History     Tobacco Use    Smoking status: Former Smoker     Packs/day: 2.00     Years: 42.00     Pack years: 84.00     Types: Cigarettes     Start date: 1971     Last attempt to quit: 3/3/2017     Years since quittin.2    Smokeless tobacco: Never Used    Tobacco comment: pts uses just nicotine vap   Substance Use Topics    Alcohol use: No     Alcohol/week: 0.0 oz        Allergies:    No Known Allergies    Current Medications:   Prior to Visit Medications    Medication Sig Taking? Authorizing Provider   budesonide-formoterol (SYMBICORT) 160-4.5 MCG/ACT AERO Inhale 2 puffs into the lungs 2 times daily Rinse mouth after its use.  Yes Mariah Qiu MD   VENTOLIN  (90 Base) MCG/ACT inhaler Inhale 2 puffs into the lungs every 6 hours as needed for Wheezing Yes Mariah Qiu MD   metFORMIN (GLUCOPHAGE) 500 MG tablet Take 2 tablets by mouth 2 times daily (with meals) Yes Manoj Calle MD   sertraline (ZOLOFT) 100 MG tablet take 1 tablet by mouth twice note    PROCEDURES:    Skin Testing performed on: Given education on medications to avoid given handout. I did explain to the patient the easiest thing is to avoid any oral antihistamines & singulair for one week and to avoid steroid nasal and oral inhalers one day prior          Assessment/Plan   Yony was seen today for new patient. Diagnoses and all orders for this visit:    Eosinophilia  -     IgA; Future  -     IgE; Future  -     IgG; Future  -     IgM; Future    Allergic rhinitis due to [employer] house dust mite  -     IgA; Future  -     IgE; Future  -     IgG; Future  -     IgM; Future    Allergic rhinoconjunctivitis  -     IgA; Future  -     IgE; Future  -     IgG; Future  -     IgM; Future    Allergy to dog dander  -     IgA; Future  -     IgE; Future  -     IgG; Future  -     IgM; Future    Moderate persistent asthma, unspecified whether complicated  -     IgA; Future  -     IgE; Future  -     IgG; Future  -     IgM; Future    Acute seasonal allergic rhinitis due to pollen  -     IgA; Future  -     IgE; Future  -     IgG; Future  -     IgM; Future    Moderate persistent extrinsic asthma without complication    Cat allergies    Mite allergy    Seasonal and perennial allergic rhinoconjunctivitis          Return in about 1 week (around 6/10/2019) for Allergy management, Follow Up Lab Testing. Total time spent with patient greater than 45 minutes with at least 50% being in education. (Please note that portions of this note may have been completed with a voice recognition program.  Efforts were made to edit the dictation but occasionally words are mis-transcribed.)    Explained little bit about allergy injections and/or nucala this patient to see if this does help with allergic asthma.   Will discuss more at length next visit         Signed:   SANTOSH Hernandez CNP  6/3/2019  10:25 AM

## 2019-06-03 NOTE — PATIENT INSTRUCTIONS
Patient Education        Dust Control for Children With Allergies: Care Instructions  Your Care Instructions    Many children are allergic to dust and dust mites. Dust mites are tiny bugs that get into bedding, furniture, and carpets. Dust mites are too small to be seen with the naked eye. When you sit on a chair, walk over a carpet, or lie on a bed, material produced by the mites is blown into the air. When breathed in, these can cause a runny nose, wheezing, and other symptoms. It is impossible to get rid of dust or dust mites completely, but reducing them in your house may improve your child's allergy symptoms. Keep in mind that some of these measures may be costly. Start by doing what you and your budget can manage. Since your child spends one-third of his or her day in bed, focus on your child's bedroom first.  Follow-up care is a key part of your child's treatment and safety. Be sure to make and go to all appointments, and call your doctor if your child is having problems. It's also a good idea to know your child's test results and keep a list of the medicines your child takes. How can you care for your child at home? · The most important thing you can do is decrease the dust around your child's bed:  ? Wash sheets, pillowcases, and other bedding every week in hot water. ? Use airtight, dust-proof covers for pillows, duvets, and mattresses. Avoid plastic covers because they tend to tear quickly and do not \"breathe. \" Wash according to the instructions. ? Remove extra blankets and pillows that your child does not need. ? Use blankets that are machine-washable. · Look for \"dust catchers\" in your child's room. Cloth-covered furniture, heavy drapes, flowers and houseplants, bookshelves, blinds, and stuffed animals collect dust and should be removed or wiped down with a wet cloth every week. ?  Use a wooden or metal chair instead of an upholstered one.  ? If you need to cover the windows, use lightweight curtains. Wash them every week with the bedding. · Mop floors and wipe down furniture, tables, and other hard surfaces with a moist cloth 1 or 2 times a week. · Change the air filter in your furnace every month. Use high-efficiency air filters. · Keep the windows closed. · Do not use window or attic fans, which draw dust into the air. · Do not use home humidifiers. They can help mites live longer. Your doctor can give you further instructions on how to control dust and mites. · Keep only clothes in the closet. Do not leave clothes on the floor. · If possible, replace gade-sk-gksh carpet in bedrooms with tile, hardwood, or linoleum. You can use throw rugs as long as you wash them often. If you cannot remove carpeting, vacuum it at least 2 times a week. Use a vacuum  with a HEPA filter or a special double-thickness filter. Keep your child out of the room for several hours after you vacuum. Where can you learn more? Go to https://[employer]. org and sign in to your [employer] account. Enter C601 in the [facility] box to learn more about \"Dust Control for Children With Allergies: Care Instructions. \"     If you do not have an account, please click on the \"Sign Up Now\" link. Current as of: June 27, 2018  Content Version: 12.0  © 2824-9954 [employer]. Care instructions adapted under license by [facility] [address] If you have questions about a medical condition or this instruction, always ask your healthcare professional. [place] [address] any warranty or liability for your use of this information.

## 2019-06-06 ENCOUNTER — HOSPITAL ENCOUNTER (OUTPATIENT)
Dept: ULTRASOUND IMAGING | Age: 64
Discharge: HOME | End: 2019-06-06
Payer: MEDICARE

## 2019-06-06 DIAGNOSIS — E04.1 LEFT THYROID NODULE: ICD-10-CM

## 2019-06-06 PROCEDURE — 76536 US EXAM OF HEAD AND NECK: CPT

## 2019-06-10 ENCOUNTER — PROCEDURE VISIT (OUTPATIENT)
Dept: ALLERGY | Age: 64
End: 2019-06-10
Payer: MEDICARE

## 2019-06-10 VITALS
DIASTOLIC BLOOD PRESSURE: 82 MMHG | BODY MASS INDEX: 34.75 KG/M2 | TEMPERATURE: 98.3 F | WEIGHT: 229.3 LBS | SYSTOLIC BLOOD PRESSURE: 130 MMHG | HEART RATE: 84 BPM | RESPIRATION RATE: 16 BRPM | HEIGHT: 68 IN

## 2019-06-10 DIAGNOSIS — J30.89 SEASONAL AND PERENNIAL ALLERGIC RHINOCONJUNCTIVITIS: ICD-10-CM

## 2019-06-10 DIAGNOSIS — Z91.09 POLLEN ALLERGIES: Primary | ICD-10-CM

## 2019-06-10 DIAGNOSIS — D72.10 EOSINOPHILIA: ICD-10-CM

## 2019-06-10 DIAGNOSIS — J30.89 NON-SEASONAL ALLERGIC RHINITIS, UNSPECIFIED TRIGGER: ICD-10-CM

## 2019-06-10 DIAGNOSIS — Z91.048 ALLERGY TO MOLD: ICD-10-CM

## 2019-06-10 DIAGNOSIS — Z91.09 MITE ALLERGY: ICD-10-CM

## 2019-06-10 DIAGNOSIS — J45.40 MODERATE PERSISTENT ASTHMA, UNSPECIFIED WHETHER COMPLICATED: ICD-10-CM

## 2019-06-10 DIAGNOSIS — J30.2 SEASONAL AND PERENNIAL ALLERGIC RHINOCONJUNCTIVITIS: ICD-10-CM

## 2019-06-10 DIAGNOSIS — J30.81 CAT ALLERGIES: ICD-10-CM

## 2019-06-10 DIAGNOSIS — H10.10 SEASONAL AND PERENNIAL ALLERGIC RHINOCONJUNCTIVITIS: ICD-10-CM

## 2019-06-10 DIAGNOSIS — H10.10 ALLERGIC RHINOCONJUNCTIVITIS: ICD-10-CM

## 2019-06-10 DIAGNOSIS — J30.9 ALLERGIC RHINOCONJUNCTIVITIS: ICD-10-CM

## 2019-06-10 DIAGNOSIS — J45.50 SEVERE PERSISTENT ASTHMA WITHOUT COMPLICATION: ICD-10-CM

## 2019-06-10 PROCEDURE — 95004 PERQ TESTS W/ALRGNC XTRCS: CPT | Performed by: NURSE PRACTITIONER

## 2019-06-10 ASSESSMENT — ENCOUNTER SYMPTOMS
SORE THROAT: 0
BACK PAIN: 0
WHEEZING: 0
CONSTIPATION: 0
EYE DISCHARGE: 0
SINUS PAIN: 0
DIARRHEA: 0
COUGH: 0
VOMITING: 0
EYE REDNESS: 0
NAUSEA: 0
EYE PAIN: 0
STRIDOR: 0
SHORTNESS OF BREATH: 0
ABDOMINAL PAIN: 0

## 2019-06-10 NOTE — PROGRESS NOTES
[facility]    Allergy & Asthma   [address] [facility], [address]  Ph:   [phone]  Fax:[phone]    Provider:  Dr. Karl Singletary:   Chief Complaint   Patient presents with    Follow-up     Allergy Testing completed 06/03/2019           HISTORY OF PRESENT ILLNESS: ESTABLISHED PATIENT HERE FOR EVALUATION   HPI        Review of Systems:  Review of Systems   Constitutional: Negative for fever. HENT: Negative for congestion, ear discharge, ear pain, hearing loss, sinus pain, sore throat and tinnitus. Eyes: Negative for pain, discharge and redness. Respiratory: Negative for cough, shortness of breath, wheezing and stridor. Cardiovascular: Negative for chest pain and palpitations. Gastrointestinal: Negative for abdominal pain, constipation, diarrhea, nausea and vomiting. Musculoskeletal: Negative for back pain, myalgias and neck pain. Skin: Negative for rash. Allergic/Immunologic: Negative for environmental allergies. Neurological: Negative for dizziness and headaches. Hematological: Does not bruise/bleed easily. Psychiatric/Behavioral: The patient is not nervous/anxious.           Past MedicalHistory:    Past Medical History:   Diagnosis Date    Acid reflux     Arthritis     Asthma     Chronic back pain     Class 2 severe obesity due to excess calories with serious comorbidity and body mass index (BMI) of 37.0 to 37.9 in adult [facility]) 8/13/2018    Colon polyps 11/06    COPD (chronic obstructive pulmonary disease) (HCC)     Depression     panic attacks    Diverticulosis     HTN (hypertension)     Hyperlipidemia     Kidney disorder     Panic attack     Pneumonia     Sleep apnea     Thumb amputation status 3/13/14    left tip of thumb amputated    Thyroid disease     Type II or unspecified type diabetes mellitus without mention of complication, not stated as uncontrolled        Past Surgical History:  Past Surgical History: Procedure Laterality Date    BACK SURGERY     [facility] SURGERY  2017    BICEPS TENDON REPAIR Right 2017    BRONCHOSCOPY      BRONCHOSCOPY N/A 2019    BRONCHOSCOPY performed by Brynn Martinez MD at [address] [facility]  2019    BRONCHOSCOPY ALVEOLAR LAVAGE performed by Brynn Martinez MD at [facility]   [address]      CARPAL TUNNEL RELEASE      right hand    CHOLECYSTECTOMY  yrs ago    COLONOSCOPY      ENDOSCOPY, COLON, DIAGNOSTIC      EYE SURGERY      foreign body removal    HERNIA REPAIR      Dr John Cunha  ?  LARYNGOSCOPY  2016    Laryngoscopy Micro with Biopsy by Dr Aleman Bottom      uppp    ROTATOR CUFF REPAIR Left 5-20-15    SKIN BIOPSY      TONSILLECTOMY      UPPER GASTROINTESTINAL ENDOSCOPY      VEIN SURGERY Left 2014    Dr. Aníbal Paz       Family History:   Family History   Problem Relation Age of Onset    Cancer Mother     Breast Cancer Mother     Stroke Mother     Heart Disease Brother     Diabetes Brother     High Blood Pressure Brother     High Cholesterol Brother        Social History:   Social History     Tobacco Use    Smoking status: Former Smoker     Packs/day: 2.00     Years: 42.00     Pack years: 84.00     Types: Cigarettes     Start date: 1971     Last attempt to quit: 3/3/2017     Years since quittin.2    Smokeless tobacco: Never Used    Tobacco comment: pts uses just nicotine vap   Substance Use Topics    Alcohol use: No     Alcohol/week: 0.0 oz        Allergies:  Patient has no known allergies.     CurrentMedications:     Current Outpatient Medications:     mepolizumab (NUCALA) 100 MG SOLR injection, Inject 1 mL into the skin once for 1 dose, Disp: 4 each, Rfl: 1    budesonide-formoterol (SYMBICORT) 160-4.5 MCG/ACT AERO, Inhale 2 puffs into the lungs 2 times daily Rinse mouth after its use., Disp: 1 Inhaler, Rfl: 11    metFORMIN (GLUCOPHAGE) 500 MG tablet, Take 2 tablets by mouth 2 times daily (with meals), Disp: 360 tablet, Rfl: 5    sertraline (ZOLOFT) 100 MG tablet, take 1 tablet by mouth twice a day, Disp: 60 tablet, Rfl: 5    blood glucose test strips (ASCENSIA AUTODISC VI;ONE TOUCH ULTRA TEST VI) strip, One Touch Ultra Test Strip - Check blood sugar twice daily Dx: E11.9, Disp: 200 each, Rfl: 1    ONETOUCH DELICA LANCETS FINE MISC, Check blood sugar twice daily Dx: E11.9, Disp: 200 each, Rfl: 1    mometasone (NASONEX) 50 MCG/ACT nasal spray, 1 spray by Nasal route daily, Disp: 1 Inhaler, Rfl: 1    SYRINGE-NEEDLE, DISP, 3 ML 23G X 1-1/2\" 3 ML MISC, Inject 1 Syringe into the muscle every 14 days, Disp: 10 each, Rfl: 1    rOPINIRole (REQUIP) 1 MG tablet, Take 1 tablet by mouth 3 times daily, Disp: 90 tablet, Rfl: 5    losartan (COZAAR) 25 MG tablet, Take 1 tablet by mouth daily, Disp: 30 tablet, Rfl: 5    glimepiride (AMARYL) 4 MG tablet, Take 1 tablet by mouth 2 times daily, Disp: 60 tablet, Rfl: 5    Misc. Devices (ACAPELLA) MISC, 1 Device by Does not apply route 4 times daily, Disp: 1 each, Rfl: 0    simvastatin (ZOCOR) 40 MG tablet, Take 1 tablet by mouth nightly, Disp: 30 tablet, Rfl: 5    busPIRone (BUSPAR) 10 MG tablet, Take 1 tablet by mouth 3 times daily, Disp: 90 tablet, Rfl: 5    baclofen (LIORESAL) 10 MG tablet, take 1 tablet by mouth twice a day, Disp: , Rfl: 0    traZODone (DESYREL) 150 MG tablet, take 1/2 to 1 tablet by mouth once daily at bedtime if needed for pain SPASM, OR SLEEP, Disp: , Rfl: 0    HYDROcodone-acetaminophen (NORCO) 5-325 MG per tablet, Take 1 tablet by mouth every 6 hours as needed for Pain, Disp: , Rfl:     Omega-3 Fatty Acids (FISH OIL) 1000 MG CAPS, Take 1,000 mg by mouth daily. , Disp: , Rfl:     sildenafil (VIAGRA) 100 MG tablet, Take 1 tablet by mouth as needed. , Disp: 8 tablet, Rfl: 5    VENTOLIN  (90 Base) MCG/ACT inhaler, Inhale 2 puffs into the lungs every 6 hours as needed for Wheezing, Disp: 1 Inhaler, Rfl: 8    fluticasone (FLONASE) 50 MCG/ACT nasal spray, 1 spray by Each Nare route daily, Disp: 1 Bottle, Rfl: 1    ranitidine (ZANTAC) 150 MG tablet, TAKE 1 TABLET BY MOUTH TWICE DAILY, Disp: 180 tablet, Rfl: 3    montelukast (SINGULAIR) 10 MG tablet, Take 1 tablet by mouth nightly, Disp: 90 tablet, Rfl: 3    ipratropium-albuterol (DUONEB) 0.5-2.5 (3) MG/3ML SOLN nebulizer solution, Inhale 3 mLs into the lungs every 4 hours as needed for Shortness of Breath, Disp: 1620 mL, Rfl: 11    testosterone cypionate (DEPOTESTOTERONE CYPIONATE) 200 MG/ML injection, Inject 1 ml into the muscle every 14 days. , Disp: 2 mL, Rfl: 3      Physical Exam:    Vitals:    Temp: 98.3 °F (36.8 °C) I @FLOWSTAT(6)@ IPulse: 84 I @FLOWSTAT(8)@ I BP: 130/82 I @GEKNFY(56)@; @ELQFCS(50)@ I Resp: 16 I @FLOWSTAT(9)@ I   I @FLOWSTAT(10)@ I   I Height: 5' 8\" (172.7 cm) I   I Facility age limit for growth percentiles is 20 years. I     Facility age limit for growth percentiles is 20 years. Facility age limit for growth percentiles is 20 years. Facility age limit for growth percentiles is 20 years. Facility age limit for growth percentiles is 20 years. Physical Exam:    Physical Exam   Constitutional: He is oriented to person, place, and time. He appears well-developed and well-nourished. HENT:   Head: Normocephalic. Bilateral tympanic effusions present. Cobblestoning noted. Inferior turbinates with erythema, moist and boggy   Eyes: Pupils are equal, round, and reactive to light. Conjunctivae and EOM are normal.   Neck: Normal range of motion. Neck supple. Cardiovascular: Normal rate, regular rhythm and normal heart sounds. Pulmonary/Chest: Effort normal and breath sounds normal.   Musculoskeletal: Normal range of motion. Neurological: He is alert and oriented to person, place, and time. Skin: Skin is warm and dry. Psychiatric: He has a normal mood and affect. His behavior is normal. Judgment and thought content normal.   Nursing note and vitals reviewed. DATA:  Lab Review:    CBC:   Lab Results   Component Value Date    WBC 8.4 04/10/2019    RBC 5.37 04/10/2019    RBC 4.87 03/01/2012    HGB 16.6 04/10/2019    HCT 49.8 04/10/2019    MCV 92.7 04/10/2019    MCH 30.9 04/10/2019    MCHC 33.3 04/10/2019    RDW 14.2 07/24/2017     04/10/2019          IgE   Date/Time Value Ref Range Status   06/03/2019 10:01  (H) <101 IU/mL Final     Comment:     [address] [phone]      IgG   Date/Time Value Ref Range Status   06/03/2019 10:01  700 - 1600 mg/dL Final     Comment:     [address] [phone]     IgA   Date/Time Value Ref Range Status   06/03/2019 10:00  70 - 400 mg/dL Final     Comment:     [address] [phone]      IgM   Date/Time Value Ref Range Status   06/03/2019 10:01  40 - 230 mg/dL Final     Comment:     [address] [phone]       No results found for: CLAUDIA   No results found for: RF       No results found for this or any previous visit. No results found for this or any previous visit. Data from outside facility:     PROCEDURES:    PFT PERFORMED VIA IQ SPIROMETER FOR SHORTNESS OF BREATH SYMPTOMS   6/10/2019   FVC PREDICTED:    FEV1 PREDICTED:    FEV1/FVC % PREDICTED:    INTERPRETATION:    Skin Testing performed on: 06/10/19        Last use of antihistamine (or other medication affecting response to histamine): greater than one week    Patient informed of risks of skin allergy testing mild, moderate, and severe including potential for anaphylaxis. Patient wishes to proceed.   Consent signed: Yes    Location: Back  Testing preformed: Intradermal    Panel A Epictuaneous   Panel A  Epictuaneous    Site Allergen  W (mm) F  Site Allergen  W (mm) F   A1 Histamine postive control 7.1 9.5  A6 Cockroach Mix 2.5 7.7   A2 Glycerin negative control 0 5  A7 Feather Mix 0 7.7   A3 Dust mite mix 9.8 24  A8 Mouse epithelia 0 6   A4 Cat hair 9.4 12  A9 [place]. Red/green Tree 0 4.9   A5 Dog epithelia 2.2 4.4  A10 [place], east Tree 3 6              Panel B Epictuaneous   Panel B  Epictuaneous    Site Allergen  W (mm) F  Site Allergen  W (mm) F   B11 Birch mix tree 0 5.9  B16 [place], red Tree 0 5.6   B12 [place] mix, [place] Tree 2.4 5.8  B17 Pine, white/eastern Tree 2 4   B13 Elm mix tree 0 3  B18 [place], eastern Tree 0 6   B14 [place] Tree 0 5.8  B19 [place], black Tree 2 7   B15 [place], shagbark Tree 0 5  B20 [place], black Tree 2.5 3.5              Panel C Epictuaneous   Panel C  Epictuaneous    Site Allergen  W (mm) F  Site Allergen  W (mm) F   C21 [place] Grass 2.5 6  C26 Dock-sorrel mix Weed 5 7   C22 B[place] Grass 0 5  C27 Ragweed mix 0 4.2   C23 7 - Grass Mix 4 5.5  C28 Lamb's Quarter [place] 3 4.8   C24 Cocklebur [place] 5.1 12.5  C29 Nettle [place] 0 5   C25 Kochia/firebush 3.2 6  C30 Pigweed, rough Weed 3.1 5              Panel D Epictuaneous   Panel D  Epictuaneous    Site Allergen  W (mm) F  Site Allergen  W (mm) F   D31 Plantain, English Weed 3 5  D36 Biplolaris sor/[place]min[place] Mold 4.3 11   D32 S[place]brush, [place] 0 7  D37 Cladosporium herb. Mold 0 6.2   D33 Acremonium strictum Mold 4.8 12  D38 Cladosporium sphaer. Mold 5 13   D34 Aspergillus fumigatus Mold 0 8  D39 Drechselera spifcif. Mold 0 6.8   D35 Aureobasidium pull. Mold 0 11  D40 Penicillium kevin.  Mold 0 6.3              Panel E Epictuaneous   Panel E  Epictuaneous    Site Allergen  W (mm) F  Site Allergen  W (mm) F   E41 [place] Food 0 4.2  E46 Milk, cow Food 0 2.5   E42 Corn Food 0 5  E47 Peanut Food 0 4.8   E43 Egg, whole chicken Food 0 3  E48 Sesame Seed Food 0 3   E44 GS Fish Mix Food 0 3  E49 Soybean Food 0 3   E45 GS Shellfish Mix Food 0 8  E50 Wheat, whole Food 0 3              Panel F Epictuaneous   Panel F  Epictuaneous    Site Allergen  W (mm) F  Site Allergen  W (mm) F                                                              50 Panel Skin test performed. All results interpreted as 0 mm unless noted above. Controls performed with Histamine (positive) and Glycerin (negative). Assessment/Orders:    Diagnosis Orders   1. Pollen allergies     2. Seasonal and perennial allergic rhinoconjunctivitis     3. Cat allergies     4. Mite allergy     5. Non-seasonal allergic rhinitis, unspecified trigger     6. Allergy to mold     7. Allergic rhinoconjunctivitis     8. Moderate persistent asthma, unspecified whether complicated  mepolizumab (NUCALA) 100 MG SOLR injection   9. Eosinophilia  mepolizumab (NUCALA) 100 MG SOLR injection         Plan:  Follow Up:2 weeks    Recommended allergy injections - patient to verify insurance    Recommended Nucala. Form sent to [facility] by Nick luke MA    Patient to resume antihistamines and Singulair. Should follow-up with us if problems exacerbate or does not do better  Recommended to patient dust mite protective covers and to find a new home for his cats    Patient has eosinophilia.   I have recommended nucala      Total time sent with patient 30 minutes with greater than 50% in patient education    (Please note that portions of this note may have been completed with a voice recognition program.  Efforts were made to edit the dictation but occasionally words are mis-transcribed.)         Signed:  SANTOSH Little CNP  6/10/2019  3:51 PM

## 2019-06-10 NOTE — PATIENT INSTRUCTIONS
Patient Education        Dust Control for Children With Allergies: Care Instructions  Your Care Instructions    Many children are allergic to dust and dust mites. Dust mites are tiny bugs that get into bedding, furniture, and carpets. Dust mites are too small to be seen with the naked eye. When you sit on a chair, walk over a carpet, or lie on a bed, material produced by the mites is blown into the air. When breathed in, these can cause a runny nose, wheezing, and other symptoms. It is impossible to get rid of dust or dust mites completely, but reducing them in your house may improve your child's allergy symptoms. Keep in mind that some of these measures may be costly. Start by doing what you and your budget can manage. Since your child spends one-third of his or her day in bed, focus on your child's bedroom first.  Follow-up care is a key part of your child's treatment and safety. Be sure to make and go to all appointments, and call your doctor if your child is having problems. It's also a good idea to know your child's test results and keep a list of the medicines your child takes. How can you care for your child at home? · The most important thing you can do is decrease the dust around your child's bed:  ? Wash sheets, pillowcases, and other bedding every week in hot water. ? Use airtight, dust-proof covers for pillows, duvets, and mattresses. Avoid plastic covers because they tend to tear quickly and do not \"breathe. \" Wash according to the instructions. ? Remove extra blankets and pillows that your child does not need. ? Use blankets that are machine-washable. · Look for \"dust catchers\" in your child's room. Cloth-covered furniture, heavy drapes, flowers and houseplants, bookshelves, blinds, and stuffed animals collect dust and should be removed or wiped down with a wet cloth every week. ?  Use a wooden or metal chair instead of an upholstered one.  ? If you need to cover the windows, use lightweight

## 2019-06-12 DIAGNOSIS — R97.20 ELEVATED PSA: Primary | ICD-10-CM

## 2019-06-13 ENCOUNTER — TELEPHONE (OUTPATIENT)
Dept: ENT CLINIC | Age: 64
End: 2019-06-13

## 2019-06-13 ENCOUNTER — CARE COORDINATION (OUTPATIENT)
Dept: CASE MANAGEMENT | Age: 64
End: 2019-06-13

## 2019-06-13 NOTE — CARE COORDINATION
Name: Anali Pi    ### Patient Details  YOB: 1955  MRN: [id number]    ### Encounter Details  Encounter ID: [id number]  Arrival Date: N/A  Discharge Date: N/A    ### Related interaction  COPD-Diabetes High Touch UA (Welcome Call) ([email or web address]    ### Required Interventions and Feedback     [facility] Update         *Patient Status changed in [facility] to[de-identified]     Patient Declined (selected by W on 06/13/2019 12:52 PM EDT)     Call Status         *Call Status:     Other (Provide details below) (edited by W on 06/13/2019 12:52 PM EDT)    Additional Call Status Details[de-identified]     Attempted to contact patient regarding the [facility].  Left message on answering machine with contact information and request for call back.   (edited by W on 06/13/2019 12:52 PM EDT)    Anthony Jones RN  Tele-Health Coordinator

## 2019-06-13 NOTE — TELEPHONE ENCOUNTER
[place] to [employer] form faxed to [facility] [employer] and scanned into media.

## 2019-06-14 ENCOUNTER — NURSE ONLY (OUTPATIENT)
Dept: LAB | Age: 64
End: 2019-06-14

## 2019-06-14 ENCOUNTER — OFFICE VISIT (OUTPATIENT)
Dept: ENT CLINIC | Age: 64
End: 2019-06-14
Payer: MEDICARE

## 2019-06-14 VITALS
SYSTOLIC BLOOD PRESSURE: 128 MMHG | BODY MASS INDEX: 35.46 KG/M2 | HEART RATE: 68 BPM | WEIGHT: 234 LBS | RESPIRATION RATE: 14 BRPM | DIASTOLIC BLOOD PRESSURE: 84 MMHG | TEMPERATURE: 97.4 F | HEIGHT: 68 IN

## 2019-06-14 DIAGNOSIS — J34.3 HYPERTROPHY OF INFERIOR NASAL TURBINATE: Primary | ICD-10-CM

## 2019-06-14 DIAGNOSIS — E04.1 LEFT THYROID NODULE: ICD-10-CM

## 2019-06-14 DIAGNOSIS — R06.5 CHRONIC MOUTH BREATHING: ICD-10-CM

## 2019-06-14 LAB
HCT VFR BLD CALC: 47.6 % (ref 42–52)
HEMOGLOBIN: 16.6 GM/DL (ref 14–18)
PROSTATE SPECIFIC ANTIGEN: 1.1 NG/ML (ref 0–1)
T3 FREE: 4.18 PG/ML (ref 2.02–4.43)
T4 FREE: 0.87 NG/DL (ref 0.93–1.76)
TSH SERPL DL<=0.05 MIU/L-ACNC: 2.27 UIU/ML (ref 0.4–4.2)

## 2019-06-14 PROCEDURE — 1123F ACP DISCUSS/DSCN MKR DOCD: CPT | Performed by: OTOLARYNGOLOGY

## 2019-06-14 PROCEDURE — G8427 DOCREV CUR MEDS BY ELIG CLIN: HCPCS | Performed by: OTOLARYNGOLOGY

## 2019-06-14 PROCEDURE — 4040F PNEUMOC VAC/ADMIN/RCVD: CPT | Performed by: OTOLARYNGOLOGY

## 2019-06-14 PROCEDURE — 3017F COLORECTAL CA SCREEN DOC REV: CPT | Performed by: OTOLARYNGOLOGY

## 2019-06-14 PROCEDURE — 1036F TOBACCO NON-USER: CPT | Performed by: OTOLARYNGOLOGY

## 2019-06-14 PROCEDURE — G8417 CALC BMI ABV UP PARAM F/U: HCPCS | Performed by: OTOLARYNGOLOGY

## 2019-06-14 PROCEDURE — 99213 OFFICE O/P EST LOW 20 MIN: CPT | Performed by: OTOLARYNGOLOGY

## 2019-06-14 ASSESSMENT — ENCOUNTER SYMPTOMS
COLOR CHANGE: 0
SHORTNESS OF BREATH: 0
RHINORRHEA: 0
DIARRHEA: 0
VOMITING: 0
NAUSEA: 0
COUGH: 0
APNEA: 0
FACIAL SWELLING: 0
VOICE CHANGE: 0
TROUBLE SWALLOWING: 0
SORE THROAT: 0
WHEEZING: 0
CHOKING: 0
STRIDOR: 0
CHEST TIGHTNESS: 0
SINUS PRESSURE: 0
ABDOMINAL PAIN: 0

## 2019-06-14 NOTE — PROGRESS NOTES
[address] [facility]  [place] [address]  Fredo Xie Roas [address] [address]  Dept: [phone]  Dept Fax: [phone]  Loc: [phone]    Kamila Chou is a 75 y.o. male who was referred byNo ref. provider found for:  Chief Complaint   Patient presents with    Follow-up     Pt. returns for a 1 mo. follow up for chronic nasal congestion. Kevin Medeiros HPI:     Kamila Chou is a 75 y.o. male who presents today for 1 month follow up for chronic nasal congestion. He was placed on Nasonex for a month. He is a candidate for partial SMR both inferior turbinates to improve his airway and mouth breathing. Thyroid ultrasound reviewed with patient. US Thyroid  Impression   1. There is a stable 2.2 x 1.7 x 1.5 cm heterogeneously isoechoic solid nodule with a small cystic space within the midpole the left lobe of the thyroid gland (low suspicion pattern American Thyroid Association criteria (. This nodule has been previously    biopsied on 6/9/2016.   2. Stable diffuse heterogeneity of the thyroid parenchymal echogenicity without hypervascularity. 3. Stable scattered microlithiasis. 4. A follow-up thyroid ultrasound examination in one year is recommended to confirm continued stability.               **This report has been created using voice recognition software.  It may contain minor errors which are inherent in voice recognition technology. **       Final report electronically signed by Dr. Kendell Petit on 6/6/2019     He is happy and breathing ok. He is snoring. He does get nosebleeds when he uses the steroid sprays. He didn't have a lot of energy and his family doctor found the nodule on an ultrasound. He has a follow up for Allergies with Cloretta Hair.       History:     No Known Allergies  Current Outpatient Medications   Medication Sig Dispense Refill    budesonide-formoterol (SYMBICORT) 160-4.5 MCG/ACT AERO Inhale 2 puffs into the lungs 2 times daily Rinse mouth after its use. 1 Inhaler 11    VENTOLIN  (90 Base) MCG/ACT inhaler Inhale 2 puffs into the lungs every 6 hours as needed for Wheezing 1 Inhaler 8    sertraline (ZOLOFT) 100 MG tablet take 1 tablet by mouth twice a day 60 tablet 5    fluticasone (FLONASE) 50 MCG/ACT nasal spray 1 spray by Each Nare route daily 1 Bottle 1    blood glucose test strips (ASCENSIA AUTODISC VI;ONE TOUCH ULTRA TEST VI) strip One Touch Ultra Test Strip - Check blood sugar twice daily Dx: E11.9 200 each 1    ONETOUCH DELICA LANCETS FINE MISC Check blood sugar twice daily Dx: E11.9 200 each 1    mometasone (NASONEX) 50 MCG/ACT nasal spray 1 spray by Nasal route daily 1 Inhaler 1    ranitidine (ZANTAC) 150 MG tablet TAKE 1 TABLET BY MOUTH TWICE DAILY 180 tablet 3    montelukast (SINGULAIR) 10 MG tablet Take 1 tablet by mouth nightly 90 tablet 3    rOPINIRole (REQUIP) 1 MG tablet Take 1 tablet by mouth 3 times daily 90 tablet 5    losartan (COZAAR) 25 MG tablet Take 1 tablet by mouth daily 30 tablet 5    glimepiride (AMARYL) 4 MG tablet Take 1 tablet by mouth 2 times daily 60 tablet 5    Misc. Devices (ACAPELLA) MISC 1 Device by Does not apply route 4 times daily 1 each 0    simvastatin (ZOCOR) 40 MG tablet Take 1 tablet by mouth nightly 30 tablet 5    busPIRone (BUSPAR) 10 MG tablet Take 1 tablet by mouth 3 times daily 90 tablet 5    baclofen (LIORESAL) 10 MG tablet take 1 tablet by mouth twice a day  0    traZODone (DESYREL) 150 MG tablet take 1/2 to 1 tablet by mouth once daily at bedtime if needed for pain SPASM, OR SLEEP  0    HYDROcodone-acetaminophen (NORCO) 5-325 MG per tablet Take 1 tablet by mouth every 6 hours as needed for Pain      Omega-3 Fatty Acids (FISH OIL) 1000 MG CAPS Take 1,000 mg by mouth daily.  sildenafil (VIAGRA) 100 MG tablet Take 1 tablet by mouth as needed.  8 tablet 5    hydrochlorothiazide (HYDRODIURIL) 12.5 MG tablet TK 1 T PO QD IN THE MORNING  3    EPINEPHrine (EPIPEN 2-HUBER) 0.3 MG/0.3ML SOAJ injection Inject one pen as directed STAT for allergic reaction, may disp generic NDC 29388-304-27 6 each 99    levocetirizine (XYZAL ALLERGY 24HR) 5 MG tablet Take 1 tablet by mouth nightly 30 tablet 1    metFORMIN (GLUCOPHAGE) 500 MG tablet TAKE 2 TABLETS BY MOUTH TWICE DAILY WITH MEALS 120 tablet 0    testosterone cypionate (DEPOTESTOTERONE CYPIONATE) 200 MG/ML injection Inject 1 ml into the muscle every 14 days 2 mL 3    SYRINGE-NEEDLE, DISP, 3 ML 23G X 1-1/2\" 3 ML MISC Inject 1 Syringe into the muscle every 14 days 10 each 1    ipratropium-albuterol (DUONEB) 0.5-2.5 (3) MG/3ML SOLN nebulizer solution Inhale 3 mLs into the lungs every 4 hours as needed for Shortness of Breath 1620 mL 11     No current facility-administered medications for this visit.       Past Medical History:   Diagnosis Date    Acid reflux     Arthritis     Asthma     Chronic back pain     Class 2 severe obesity due to excess calories with serious comorbidity and body mass index (BMI) of 37.0 to 37.9 in adult [facility]) 8/13/2018    Colon polyps 11/06    COPD (chronic obstructive pulmonary disease) (HCC)     Depression     panic attacks    Diverticulosis     HTN (hypertension)     Hyperlipidemia     Kidney disorder     Panic attack     Pneumonia     Sleep apnea     Thumb amputation status 3/13/14    left tip of thumb amputated    Thyroid disease     Type II or unspecified type diabetes mellitus without mention of complication, not stated as uncontrolled       Past Surgical History:   Procedure Laterality Date    BACK SURGERY  2002    BACK SURGERY  08/11/2017    BICEPS TENDON REPAIR Right 08/16/2017    BRONCHOSCOPY      BRONCHOSCOPY N/A 4/5/2019    BRONCHOSCOPY performed by Aamir Gustafson MD at [address] [place] [address]  4/5/2019    BRONCHOSCOPY ALVEOLAR LAVAGE performed by Aamir Gustafson MD at [facility]  07/02 08/05   [address]  2011 right hand    CHOLECYSTECTOMY  yrs ago    COLONOSCOPY      ENDOSCOPY, COLON, DIAGNOSTIC      EYE SURGERY      foreign body removal    HERNIA REPAIR      Dr Glory Weller  ?  LARYNGOSCOPY  2016    Laryngoscopy Micro with Biopsy by Dr Packer Askew      uppp    ROTATOR CUFF REPAIR Left 5-20-15    SKIN BIOPSY      TONSILLECTOMY  1985    UPPER GASTROINTESTINAL ENDOSCOPY      VEIN SURGERY Left 2014    Dr. Gris Cantu     Family History   Problem Relation Age of Onset    Cancer Mother     Breast Cancer Mother     Stroke Mother     Heart Disease Brother     Diabetes Brother     High Blood Pressure Brother     High Cholesterol Brother      Social History     Tobacco Use    Smoking status: Former Smoker     Packs/day: 2.00     Years: 42.00     Pack years: 84.00     Types: Cigarettes     Start date: 1971     Last attempt to quit: 3/3/2017     Years since quittin.3    Smokeless tobacco: Never Used    Tobacco comment: pts uses just nicotine vap   Substance Use Topics    Alcohol use: No     Alcohol/week: 0.0 oz       Subjective:       Review of Systems   Constitutional: Negative for activity change, appetite change, chills, diaphoresis, fatigue, fever and unexpected weight change. HENT: Negative for congestion, dental problem, ear discharge, ear pain, facial swelling, hearing loss, mouth sores, nosebleeds, postnasal drip, rhinorrhea, sinus pressure, sneezing, sore throat, tinnitus, trouble swallowing and voice change. Eyes: Negative for visual disturbance. Respiratory: Negative for apnea, cough, choking, chest tightness, shortness of breath, wheezing and stridor. Cardiovascular: Negative for chest pain, palpitations and leg swelling. Gastrointestinal: Negative for abdominal pain, diarrhea, nausea and vomiting.    Endocrine: Negative for cold intolerance, heat intolerance, polydipsia and polyuria. Genitourinary: Negative for dysuria, enuresis and hematuria. Musculoskeletal: Negative for arthralgias, gait problem, neck pain and neck stiffness. Skin: Negative for color change and rash. Allergic/Immunologic: Negative for environmental allergies, food allergies and immunocompromised state. Neurological: Negative for dizziness, syncope, facial asymmetry, speech difficulty, light-headedness and headaches. Hematological: Negative for adenopathy. Does not bruise/bleed easily. Psychiatric/Behavioral: Negative for confusion and sleep disturbance. The patient is not nervous/anxious. Objective:     /84 (Site: Left Upper Arm, Position: Sitting)   Pulse 68   Temp 97.4 °F (36.3 °C) (Oral)   Resp 14   Ht 5' 8\" (1.727 m)   Wt 234 lb (106.1 kg)   BMI 35.58 kg/m²     Physical Exam   : Inferior turbinates are congested    Data:  All of the past medical history, past surgical history, family history,social history, allergies and current medications were reviewed with the patient. Assessment & Plan   Diagnoses and all orders for this visit:     Diagnosis Orders   1. Hypertrophy of inferior nasal turbinate     2. Chronic mouth breathing     3. Left thyroid nodule  US Thyroid    T3, Free    T4, Free    TSH without Reflex       The findings were explained and his questions were answered. Options were discussed including  ordering an ultrasound in a year. I also ordered thyroid labs to be drawn now. I also discussed that he will need to try to get off the steroid spray. He agreed. He understands it is allergic management does not fail to get his inferior turbinates to get smaller on that he has a surgical alternative    1 year follow up after thyroid ultrasound    Return for Imaging Results Review. Ten CARLISLE CMA ([facility]), am scribing for, and in the presence of Dr. Mae Blevins.  Electronically signed by Marissa Espinal CMA ([facility]) on 6/14/19 at 10:00 AM.     (Please note that portions of this note were completed with a voice recognition program. Efforts were made to edit the dictations butoccasionally words are mis-transcribed.)    I agree to the above documentation placed by my scribe. I have personally evaluated this patient. Additional findings are as noted. I reviewed the scribe's note and agree with the documented findings and plan of care. Any areas of disagreement are corrected. I agree with the chief complaint, past medical history, past surgical history, allergies, medications, social and family history as documented unless otherwise noted below.      Electronically signed by Marshall Reich MD on 6/24/2019 at 8:18 PM

## 2019-06-16 LAB — TESTOSTERONE TOTAL: 418 NG/DL (ref 300–720)

## 2019-06-17 ENCOUNTER — OFFICE VISIT (OUTPATIENT)
Dept: UROLOGY | Age: 64
End: 2019-06-17
Payer: MEDICARE

## 2019-06-17 ENCOUNTER — TELEPHONE (OUTPATIENT)
Dept: ENT CLINIC | Age: 64
End: 2019-06-17

## 2019-06-17 VITALS
SYSTOLIC BLOOD PRESSURE: 132 MMHG | BODY MASS INDEX: 35.74 KG/M2 | DIASTOLIC BLOOD PRESSURE: 86 MMHG | WEIGHT: 235.8 LBS | HEIGHT: 68 IN

## 2019-06-17 DIAGNOSIS — E34.9 TESTOSTERONE DEFICIENCY: Primary | ICD-10-CM

## 2019-06-17 DIAGNOSIS — N52.9 ERECTILE DYSFUNCTION, UNSPECIFIED ERECTILE DYSFUNCTION TYPE: ICD-10-CM

## 2019-06-17 LAB
BILIRUBIN URINE: NEGATIVE
BLOOD URINE, POC: NEGATIVE
CHARACTER, URINE: CLEAR
COLOR, URINE: YELLOW
GLUCOSE URINE: NEGATIVE MG/DL
KETONES, URINE: NEGATIVE
LEUKOCYTE CLUMPS, URINE: NEGATIVE
NITRITE, URINE: NEGATIVE
PH, URINE: 6.5 (ref 5–9)
PROTEIN, URINE: NEGATIVE MG/DL
SPECIFIC GRAVITY, URINE: 1.01 (ref 1–1.03)
UROBILINOGEN, URINE: 0.2 EU/DL (ref 0–1)

## 2019-06-17 PROCEDURE — G8428 CUR MEDS NOT DOCUMENT: HCPCS | Performed by: NURSE PRACTITIONER

## 2019-06-17 PROCEDURE — 1036F TOBACCO NON-USER: CPT | Performed by: NURSE PRACTITIONER

## 2019-06-17 PROCEDURE — 99213 OFFICE O/P EST LOW 20 MIN: CPT | Performed by: NURSE PRACTITIONER

## 2019-06-17 PROCEDURE — 1123F ACP DISCUSS/DSCN MKR DOCD: CPT | Performed by: NURSE PRACTITIONER

## 2019-06-17 PROCEDURE — 4040F PNEUMOC VAC/ADMIN/RCVD: CPT | Performed by: NURSE PRACTITIONER

## 2019-06-17 PROCEDURE — 81003 URINALYSIS AUTO W/O SCOPE: CPT | Performed by: NURSE PRACTITIONER

## 2019-06-17 PROCEDURE — 3017F COLORECTAL CA SCREEN DOC REV: CPT | Performed by: NURSE PRACTITIONER

## 2019-06-17 PROCEDURE — G8417 CALC BMI ABV UP PARAM F/U: HCPCS | Performed by: NURSE PRACTITIONER

## 2019-06-17 RX ORDER — TESTOSTERONE CYPIONATE 200 MG/ML
INJECTION INTRAMUSCULAR
Qty: 2 ML | Refills: 3 | Status: SHIPPED | OUTPATIENT
Start: 2019-06-17 | End: 2019-10-30 | Stop reason: SDUPTHER

## 2019-06-17 NOTE — TELEPHONE ENCOUNTER
Luz Marina from [facility] to [place] called to verify we received the fax sent to us regarding the patient. I confirmed we did. She stated it will be covered under [employer]. I told her we do not do buy and bill in our office. She states that is the way it is covered under the patient's plan. It is covered under the medical, not the prescription plan. I will need to call Teto Rogers, our rep to see what we can do for this patient.

## 2019-06-17 NOTE — PROGRESS NOTES
[address]  [facility]  Dept: [phone]  Loc: [phone]  Visit Date: 6/17/2019      HPI:     Yony Borden f/u today for Testosterone Deficiency, BPH and erectile dysfunction. Currently on Testosterone Cypionate injections 200 mg every 14 days. He is tolerating well, no side effects reported. Notices improvement with fatigue, energy level and libido. Still having issues with ED, has trouble maintaining erections and issues with not achieving orgasm. He takes Viagra PRN. Most recent T level was 418 on 06/2019. H&H are normal.   Trend of PSA:  1.10 06/2019  1.83 11/2018  0.69 04/2017  1.00 11/2016  1.22 04/2015  Reports there is no family hx of prostate cancer. In the past he was on AndroGel for testosterone replacement therapy however was discontinued due to cost, lack of benefit. Regarding his urinary symptoms, currently has no complaints. He was taking Flomax which she stopped due to lack of benefit. He comes in today with significant other. Hx is obtained from the patient the medical record    Current Outpatient Medications   Medication Sig Dispense Refill    testosterone cypionate (DEPOTESTOTERONE CYPIONATE) 200 MG/ML injection Inject 1 ml into the muscle every 14 days 2 mL 3    SYRINGE-NEEDLE, DISP, 3 ML 23G X 1-1/2\" 3 ML MISC Inject 1 Syringe into the muscle every 14 days 10 each 1    budesonide-formoterol (SYMBICORT) 160-4.5 MCG/ACT AERO Inhale 2 puffs into the lungs 2 times daily Rinse mouth after its use.  1 Inhaler 11    VENTOLIN  (90 Base) MCG/ACT inhaler Inhale 2 puffs into the lungs every 6 hours as needed for Wheezing 1 Inhaler 8    metFORMIN (GLUCOPHAGE) 500 MG tablet Take 2 tablets by mouth 2 times daily (with meals) 360 tablet 5    sertraline (ZOLOFT) 100 MG tablet take 1 tablet by mouth twice a day 60 tablet 5    fluticasone (FLONASE) 50 MCG/ACT nasal spray 1 spray by Each Nare route daily 1 Bottle 1    blood glucose test strips (ASCENSIA AUTODISC VI;ONE TOUCH ULTRA TEST VI) strip One Touch Ultra Test Strip - Check blood sugar twice daily Dx: E11.9 200 each 1    ONETOUCH DELICA LANCETS FINE MISC Check blood sugar twice daily Dx: E11.9 200 each 1    mometasone (NASONEX) 50 MCG/ACT nasal spray 1 spray by Nasal route daily 1 Inhaler 1    ranitidine (ZANTAC) 150 MG tablet TAKE 1 TABLET BY MOUTH TWICE DAILY 180 tablet 3    montelukast (SINGULAIR) 10 MG tablet Take 1 tablet by mouth nightly 90 tablet 3    rOPINIRole (REQUIP) 1 MG tablet Take 1 tablet by mouth 3 times daily 90 tablet 5    losartan (COZAAR) 25 MG tablet Take 1 tablet by mouth daily 30 tablet 5    glimepiride (AMARYL) 4 MG tablet Take 1 tablet by mouth 2 times daily 60 tablet 5    Misc. Devices (ACAPELLA) MISC 1 Device by Does not apply route 4 times daily 1 each 0    simvastatin (ZOCOR) 40 MG tablet Take 1 tablet by mouth nightly 30 tablet 5    busPIRone (BUSPAR) 10 MG tablet Take 1 tablet by mouth 3 times daily 90 tablet 5    baclofen (LIORESAL) 10 MG tablet take 1 tablet by mouth twice a day  0    traZODone (DESYREL) 150 MG tablet take 1/2 to 1 tablet by mouth once daily at bedtime if needed for pain SPASM, OR SLEEP  0    HYDROcodone-acetaminophen (NORCO) 5-325 MG per tablet Take 1 tablet by mouth every 6 hours as needed for Pain      Omega-3 Fatty Acids (FISH OIL) 1000 MG CAPS Take 1,000 mg by mouth daily.  sildenafil (VIAGRA) 100 MG tablet Take 1 tablet by mouth as needed. 8 tablet 5    ipratropium-albuterol (DUONEB) 0.5-2.5 (3) MG/3ML SOLN nebulizer solution Inhale 3 mLs into the lungs every 4 hours as needed for Shortness of Breath 1620 mL 11     No current facility-administered medications for this visit.         Past Medical History  Yony  has a past medical history of Acid reflux, Arthritis, Asthma, Chronic back pain, Class 2 severe obesity due to excess calories with serious comorbidity and body mass index (BMI) of 37.0 to 37.9 in adult [facility]), Colon polyps, COPD (chronic obstructive pulmonary disease) ([facility] [address] Depression, Diverticulosis, HTN (hypertension), Hyperlipidemia, Kidney disorder, Panic attack, Pneumonia, Sleep apnea, Thumb amputation status, Thyroid disease, and Type II or unspecified type diabetes mellitus without mention of complication, not stated as uncontrolled. Past Surgical History  The patient  has a past surgical history that includes Cholecystectomy (yrs ago); back surgery (2002); hernia repair (2004); Carpal tunnel release (2011); Kidney stone surgery (2008?); Tonsillectomy (1985); Cardiac catheterization (07/02 08/05); Nasal septum surgery (01/07); Colonoscopy (11/06 02/12 1/14); Upper gastrointestinal endoscopy (1997); Vein Surgery (Left, September 2014); Rotator cuff repair (Left, 5-20-15); eye surgery; laryngoscopy (04/28/2016); back surgery (08/11/2017); Biceps tendon repair (Right, 08/16/2017); bronchoscopy; bronchoscopy (N/A, 4/5/2019); bronchoscopy (4/5/2019); Endoscopy, colon, diagnostic; and skin biopsy. Family History  This patient's family history includes Breast Cancer in his mother; Cancer in his mother; Diabetes in his brother; Heart Disease in his brother; High Blood Pressure in his brother; High Cholesterol in his brother; Stroke in his mother. Social History  Yony  reports that he quit smoking about 2 years ago. His smoking use included cigarettes. He started smoking about 48 years ago. He has a 84.00 pack-year smoking history. He has never used smokeless tobacco. He reports that he does not drink alcohol or use drugs. Subjective:     Review of Systems  No problems with ears, nose or throat. No problems with eyes. No chest pain, shortness of breath, abdominal pain, extremity pain or weakness, and no neurological deficits. No rashes.  symptoms per HPI. The remainder of the review of symptoms is negative.     Objective:     PE:   Vitals:    06/17/19 0920   BP: 132/86 Weight: 235 lb 12.8 oz (107 kg)   Height: 5' 8\" (1.727 m)       Constitutional: Alert and oriented times 3, no acute distress and cooperative to examination with appropriate mood and affect. HENT:   Head:        Normocephalic and atraumatic. Mouth/Throat:         Mucous membranes are normal.   Eyes:         EOM are normal. No scleral icterus. PERRLA. Neck:        Supple, symmetrical, trachea midline  Pulmonary/Chest:      Chest symmetric with normal A/P diameter. Normal respiratory rate and rhthym. No use of accessory muscles. Abdominal:         Soft. No tenderness. Bowel sounds present. Musculoskeletal:         Normal range of motion. No edema or tenderness of lower extremities. Extremities: No cyanosis, clubbing, or edema present. Neurological:        Alert and oriented. No cranial nerve deficit. [facility] Psychiatric:        Normal mood and affect.   Genitalia & Rectal: Patient deferred     Labs   Urine dip demonstrates   Results for POC orders placed in visit on 06/17/19   POCT Urinalysis No Micro (Auto)   Result Value Ref Range    Glucose, Ur Negative NEGATIVE mg/dl    Bilirubin Urine Negative     Ketones, Urine Negative NEGATIVE    Specific Gravity, Urine 1.010 1.002 - 1.03    Blood, UA POC Negative NEGATIVE    pH, Urine 6.50 5.0 - 9.0    Protein, Urine Negative NEGATIVE mg/dl    Urobilinogen, Urine 0.20 0.0 - 1.0 eu/dl    Nitrite, Urine Negative NEGATIVE    Leukocyte Clumps, Urine Negative NEGATIVE    Color, Urine Yellow YELLOW-STR    Character, Urine Clear [id number]       Patients recent PSA values are as follows  Lab Results   Component Value Date    PSA 1.10 (H) 06/14/2019    PSA 1.83 (H) 11/21/2018    PSA 0.69 04/17/2017        Recent BUN/Creatinine:  Lab Results   Component Value Date    BUN 17 04/08/2019    CREATININE 1.2 04/08/2019       Radiology  No recent imaging       Assessment & Plan:     Testosterone Deficiency  BPH w/o LUTS  Erectile dysfunction    Yony f/u today for Testosterone Deficiency. He is currently on Testosterone Cypionate 200 mg every 14 days, tolerating well. Most recent T level is appropriate at 410. Will continue at current dose      BPH is stable, no issues. He deferred HAY today      Erectile dysfunction- Viagra PRN. Reports possible varicocele on the left only during intercourse, refused exam today. Return in about 6 months (around 12/17/2019) for Testosterone Deficiency .     Arnav Elena, SANTOSH  Urology

## 2019-06-20 NOTE — TELEPHONE ENCOUNTER
Date of last visit:  5/10/2019  Date of next visit:  9/11/2019    Requested Prescriptions     Pending Prescriptions Disp Refills    metFORMIN (GLUCOPHAGE) 500 MG tablet [Pharmacy Med Name: METFORMIN 500MG TABLETS] 120 tablet 0     Sig: TAKE 2 TABLETS BY MOUTH TWICE DAILY WITH MEALS

## 2019-06-24 ENCOUNTER — NURSE ONLY (OUTPATIENT)
Dept: LAB | Age: 64
End: 2019-06-24

## 2019-06-24 ENCOUNTER — TELEPHONE (OUTPATIENT)
Dept: ENT CLINIC | Age: 64
End: 2019-06-24

## 2019-06-24 ENCOUNTER — OFFICE VISIT (OUTPATIENT)
Dept: ALLERGY | Age: 64
End: 2019-06-24
Payer: MEDICARE

## 2019-06-24 VITALS
WEIGHT: 226.9 LBS | DIASTOLIC BLOOD PRESSURE: 74 MMHG | BODY MASS INDEX: 34.39 KG/M2 | TEMPERATURE: 98.2 F | SYSTOLIC BLOOD PRESSURE: 122 MMHG | HEIGHT: 68 IN | HEART RATE: 68 BPM | RESPIRATION RATE: 16 BRPM

## 2019-06-24 DIAGNOSIS — J30.81 CAT ALLERGIES: ICD-10-CM

## 2019-06-24 DIAGNOSIS — J30.81 ALLERGY TO DOG DANDER: ICD-10-CM

## 2019-06-24 DIAGNOSIS — J30.89 ALLERGIC RHINITIS DUE TO AMERICAN HOUSE DUST MITE: ICD-10-CM

## 2019-06-24 DIAGNOSIS — D72.10 EOSINOPHILIA: ICD-10-CM

## 2019-06-24 DIAGNOSIS — J30.9 ALLERGIC RHINOCONJUNCTIVITIS: ICD-10-CM

## 2019-06-24 DIAGNOSIS — Z29.8 PROPHYLACTIC IMMUNOTHERAPY: Primary | ICD-10-CM

## 2019-06-24 DIAGNOSIS — H10.10 ALLERGIC RHINOCONJUNCTIVITIS: ICD-10-CM

## 2019-06-24 DIAGNOSIS — J82.83 EOSINOPHILIC ASTHMA: ICD-10-CM

## 2019-06-24 DIAGNOSIS — E04.1 LEFT THYROID NODULE: Primary | ICD-10-CM

## 2019-06-24 DIAGNOSIS — Z91.048 ALLERGY TO MOLD: ICD-10-CM

## 2019-06-24 DIAGNOSIS — E04.1 LEFT THYROID NODULE: ICD-10-CM

## 2019-06-24 DIAGNOSIS — J45.40 MODERATE PERSISTENT ASTHMA, UNSPECIFIED WHETHER COMPLICATED: ICD-10-CM

## 2019-06-24 DIAGNOSIS — Z91.09 MITE ALLERGY: ICD-10-CM

## 2019-06-24 PROCEDURE — G8417 CALC BMI ABV UP PARAM F/U: HCPCS | Performed by: NURSE PRACTITIONER

## 2019-06-24 PROCEDURE — 1123F ACP DISCUSS/DSCN MKR DOCD: CPT | Performed by: NURSE PRACTITIONER

## 2019-06-24 PROCEDURE — 3017F COLORECTAL CA SCREEN DOC REV: CPT | Performed by: NURSE PRACTITIONER

## 2019-06-24 PROCEDURE — G8427 DOCREV CUR MEDS BY ELIG CLIN: HCPCS | Performed by: NURSE PRACTITIONER

## 2019-06-24 PROCEDURE — 4040F PNEUMOC VAC/ADMIN/RCVD: CPT | Performed by: NURSE PRACTITIONER

## 2019-06-24 PROCEDURE — 95165 ANTIGEN THERAPY SERVICES: CPT | Performed by: NURSE PRACTITIONER

## 2019-06-24 PROCEDURE — 1036F TOBACCO NON-USER: CPT | Performed by: NURSE PRACTITIONER

## 2019-06-24 PROCEDURE — 99213 OFFICE O/P EST LOW 20 MIN: CPT | Performed by: NURSE PRACTITIONER

## 2019-06-24 RX ORDER — LEVOCETIRIZINE DIHYDROCHLORIDE 5 MG/1
5 TABLET, FILM COATED ORAL NIGHTLY
Qty: 30 TABLET | Refills: 1 | COMMUNITY
Start: 2019-06-24 | End: 2019-07-24

## 2019-06-24 RX ORDER — HYDROCHLOROTHIAZIDE 12.5 MG/1
TABLET ORAL
Refills: 3 | COMMUNITY
Start: 2019-06-20 | End: 2022-03-03 | Stop reason: ALTCHOICE

## 2019-06-24 RX ORDER — EPINEPHRINE 0.3 MG/.3ML
INJECTION SUBCUTANEOUS
Qty: 6 EACH | Refills: 99 | Status: SHIPPED | OUTPATIENT
Start: 2019-06-24

## 2019-06-24 ASSESSMENT — ENCOUNTER SYMPTOMS
ABDOMINAL PAIN: 0
RHINORRHEA: 1
DIARRHEA: 0
BACK PAIN: 0
CONSTIPATION: 0
EYE PAIN: 0
EYE DISCHARGE: 0
SORE THROAT: 0
SHORTNESS OF BREATH: 1
EYE REDNESS: 0
STRIDOR: 0
COUGH: 1
WHEEZING: 0
VOMITING: 0
NAUSEA: 0
SINUS PAIN: 0

## 2019-06-24 NOTE — PATIENT INSTRUCTIONS
Patient Education        Allergy Shots: Care Instructions  Your Care Instructions    When you get an allergy shot, your allergist or doctor injects small doses of substances that you are allergic to (allergens) under your skin. This helps your body \"get used to\" the allergen, which can reduce or prevent symptoms. At first, you may need to get allergy shots once a week and then once a month. It may take up to a full year of shots before you see any change in your symptoms. The allergy shot may cause mild problems, such as soreness, redness, warmth, or swelling on the arm where you got the shot. It may also cause itching, hives, or a rash that spreads to other parts of your body. Follow-up care is a key part of your treatment and safety. Be sure to make and go to all appointments, and call your doctor if you are having problems. It's also a good idea to know your test results and keep a list of the medicines you take. How can you care for yourself at home? · Do not smoke or allow others to smoke around you. Smoking makes allergies worse. If you need help quitting, talk to your doctor about stop-smoking programs and medicines. These can increase your chances of quitting for good. · If there is a lot of pollution, pollen, or dust outside, stay inside and keep the windows closed. Use an air conditioner when it's hot outside, and use an air filter in your home. · If dust or dust mites trigger your asthma, decrease the dust around your bed:  ? Wash sheets, pillowcases, and other bedding in hot water every week. ? Use dust-proof covers for pillows, duvets, and mattresses. Avoid plastic covers, because they tear easily and do not \"breathe. \" Wash as instructed on the label. ? Do not use any blankets and pillows that you do not need. ? Use blankets that you can wash in your washing machine. ? Consider removing drapes and carpets, which attract and hold dust, from your bedroom.   · If mold triggers your allergies, get rid of furniture, rugs, and drapes that smell musty. Check for mold under sinks and in the bathroom, attic, and basement. Use a dehumidifier to control mold in these areas. · If pet dander triggers your allergies, keep pets outside or out of your bedroom. Old carpet and cloth furniture can hold a lot of animal dander. You may need to replace them. · If your allergies are triggered by cold air, wear a scarf around your face, and breathe through your nose. · Avoid colds and flu. Get a pneumococcal vaccine shot. If you have had one before, ask your doctor whether you need another dose. Get a flu vaccine every year. If you must be around people with colds or the flu, wash your hands often. When should you call for help? Give an epinephrine shot if:    · You think you are having a severe allergic reaction.    After giving an epinephrine shot call 911, even if you feel better.   Call 911 if:    · You have symptoms of a severe allergic reaction. These may include:  ? Sudden raised, red areas (hives) all over your body. ? Swelling of the throat, mouth, lips, or tongue. ? Trouble breathing. ? Passing out (losing consciousness). Or you may feel very lightheaded or suddenly feel weak, confused, or restless.     · You have been given an epinephrine shot, even if you feel better.    Call your doctor now or seek immediate medical care if:    · You have symptoms of an allergic reaction, such as:  ? A rash or hives (raised, red areas on the skin). ? Itching. ? Swelling. ? Belly pain, nausea, or vomiting.    Watch closely for changes in your health, and be sure to contact your doctor if:    · You do not get better as expected. Where can you learn more? Go to [email or web address] org and sign in to your Healint account. Enter X909 in the TowerJazz box to learn more about \"Allergy Shots: Care Instructions. \"     If you do not have an account, please click on the \"Sign Up Now\" link.   Current as of: June 27, 2018  Content Version: 12.0  © 9458-9928 Healthwise, Incorporated. Care instructions adapted under license by [address] If you have questions about a medical condition or this instruction, always ask your healthcare professional. [address] any warranty or liability for your use of this information.

## 2019-06-24 NOTE — TELEPHONE ENCOUNTER
Pt. Has an appt today and was aware of the of the labs that needed to be done due to being slightly off.

## 2019-06-24 NOTE — TELEPHONE ENCOUNTER
----- Message from Evelin Del Angel sent at 6/24/2019  8:19 AM EDT -----  Please call the patient and let him know that his thyroid tests are a bit off. We would like to complete a different set of labs, which have been ordered. He can get these drawn whenever he would like and is available.

## 2019-06-24 NOTE — PROGRESS NOTES
[facility]    Allergy & Asthma   [address] [facility], [address]  Ph:   [phone]  Fax:[phone]    Provider:  Dr. Karl Singletary:   Chief Complaint   Patient presents with    2 Week Follow-Up     Patient here for 2 week Follow-up review labs completed 06/14/2019           HISTORY OF PRESENT ILLNESS: ESTABLISHED PATIENT HERE FOR EVALUATION   64-year-old  male here today for follow-up on allergic rhinitis. Patient has stated that he wants to go on allergy shots. Patient states nothing else has helped his allergies. His symptoms are severe enough that is starting to interfere with his breathing. He denies any nausea vomiting or fever. Denies any history of anaphylaxis. Patient has had chronic allergies. He is tried several over-the-counter medications and prescription medications including Singulair to help which have proven to be effective. Patient understands risk and benefits of allergy shots and chooses to proceed          Review of Systems:  Review of Systems   Constitutional: Negative for fever. HENT: Positive for postnasal drip and rhinorrhea. Negative for congestion, ear discharge, ear pain, hearing loss, sinus pain, sore throat and tinnitus. Eyes: Negative for pain, discharge and redness. Respiratory: Positive for cough and shortness of breath. Negative for wheezing and stridor. Cardiovascular: Negative for chest pain and palpitations. Gastrointestinal: Negative for abdominal pain, constipation, diarrhea, nausea and vomiting. Musculoskeletal: Negative for back pain, myalgias and neck pain. Skin: Negative for rash. Allergic/Immunologic: Negative for environmental allergies. Neurological: Negative for dizziness and headaches. Hematological: Does not bruise/bleed easily. Psychiatric/Behavioral: The patient is not nervous/anxious. All other systems reviewed and are negative.         Past MedicalHistory:    Past Medical History:   Diagnosis Date    Acid reflux     Arthritis     Asthma     Chronic back pain     Class 2 severe obesity due to excess calories with serious comorbidity and body mass index (BMI) of 37.0 to 37.9 in adult [facility]) 8/13/2018    Colon polyps 11/06    COPD (chronic obstructive pulmonary disease) (HCC)     Depression     panic attacks    Diverticulosis     HTN (hypertension)     Hyperlipidemia     Kidney disorder     Panic attack     Pneumonia     Sleep apnea     Thumb amputation status 3/13/14    left tip of thumb amputated    Thyroid disease     Type II or unspecified type diabetes mellitus without mention of complication, not stated as uncontrolled        Past Surgical History:  Past Surgical History:   Procedure Laterality Date    BACK SURGERY  2002    BACK SURGERY  08/11/2017    BICEPS TENDON REPAIR Right 08/16/2017    BRONCHOSCOPY      BRONCHOSCOPY N/A 4/5/2019    BRONCHOSCOPY performed by Ray Carrasco MD at [address] [place] [address]  4/5/2019    BRONCHOSCOPY ALVEOLAR LAVAGE performed by Ray Carrasco MD at [facility]  07/02 08/05    CARPAL TUNNEL RELEASE  2011    right hand    CHOLECYSTECTOMY  yrs ago    COLONOSCOPY  11/06 02/12 1/14    ENDOSCOPY, COLON, DIAGNOSTIC      EYE SURGERY      foreign body removal    HERNIA REPAIR  2004    Dr Jacob Roblero  2008?     LARYNGOSCOPY  04/28/2016    Laryngoscopy Micro with Biopsy by Dr Savannah Berumen  01/07    uppp    ROTATOR CUFF REPAIR Left 5-20-15    SKIN BIOPSY      TONSILLECTOMY  1985    UPPER GASTROINTESTINAL ENDOSCOPY  1997    VEIN SURGERY Left September 2014    Dr. Sammy Zafar       Family History:   Family History   Problem Relation Age of Onset    Cancer Mother     Breast Cancer Mother     Stroke Mother     Heart Disease Brother     Diabetes Brother     High Blood Pressure Brother     High Cholesterol Brother Social History:   Social History     Tobacco Use    Smoking status: Former Smoker     Packs/day: 2.00     Years: 42.00     Pack years: 84.00     Types: Cigarettes     Start date: 1971     Last attempt to quit: 3/3/2017     Years since quittin.3    Smokeless tobacco: Never Used    Tobacco comment: pts uses just nicotine vap   Substance Use Topics    Alcohol use: No     Alcohol/week: 0.0 oz        Allergies:  Patient has no known allergies.     CurrentMedications:     Current Outpatient Medications:     hydrochlorothiazide (HYDRODIURIL) 12.5 MG tablet, TK 1 T PO QD IN THE MORNING, Disp: , Rfl: 3    EPINEPHrine (EPIPEN 2-HUBER) 0.3 MG/0.3ML SOAJ injection, Inject one pen as directed STAT for allergic reaction, may disp generic NDC 75397-792-06, Disp: 6 each, Rfl: 99    levocetirizine (XYZAL ALLERGY 24HR) 5 MG tablet, Take 1 tablet by mouth nightly, Disp: 30 tablet, Rfl: 1    metFORMIN (GLUCOPHAGE) 500 MG tablet, TAKE 2 TABLETS BY MOUTH TWICE DAILY WITH MEALS, Disp: 120 tablet, Rfl: 0    testosterone cypionate (DEPOTESTOTERONE CYPIONATE) 200 MG/ML injection, Inject 1 ml into the muscle every 14 days, Disp: 2 mL, Rfl: 3    SYRINGE-NEEDLE, DISP, 3 ML 23G X 1-1/2\" 3 ML MISC, Inject 1 Syringe into the muscle every 14 days, Disp: 10 each, Rfl: 1    budesonide-formoterol (SYMBICORT) 160-4.5 MCG/ACT AERO, Inhale 2 puffs into the lungs 2 times daily Rinse mouth after its use., Disp: 1 Inhaler, Rfl: 11    VENTOLIN  (90 Base) MCG/ACT inhaler, Inhale 2 puffs into the lungs every 6 hours as needed for Wheezing, Disp: 1 Inhaler, Rfl: 8    sertraline (ZOLOFT) 100 MG tablet, take 1 tablet by mouth twice a day, Disp: 60 tablet, Rfl: 5    fluticasone (FLONASE) 50 MCG/ACT nasal spray, 1 spray by Each Nare route daily, Disp: 1 Bottle, Rfl: 1    blood glucose test strips (ASCENSIA AUTODISC VI;ONE TOUCH ULTRA TEST VI) strip, One Touch Ultra Test Strip - Check blood sugar twice daily Dx: E11.9, Disp: 200 each, Rfl: 1    ONETOUCH DELICA LANCETS FINE MISC, Check blood sugar twice daily Dx: E11.9, Disp: 200 each, Rfl: 1    mometasone (NASONEX) 50 MCG/ACT nasal spray, 1 spray by Nasal route daily, Disp: 1 Inhaler, Rfl: 1    ranitidine (ZANTAC) 150 MG tablet, TAKE 1 TABLET BY MOUTH TWICE DAILY, Disp: 180 tablet, Rfl: 3    montelukast (SINGULAIR) 10 MG tablet, Take 1 tablet by mouth nightly, Disp: 90 tablet, Rfl: 3    rOPINIRole (REQUIP) 1 MG tablet, Take 1 tablet by mouth 3 times daily, Disp: 90 tablet, Rfl: 5    losartan (COZAAR) 25 MG tablet, Take 1 tablet by mouth daily, Disp: 30 tablet, Rfl: 5    glimepiride (AMARYL) 4 MG tablet, Take 1 tablet by mouth 2 times daily, Disp: 60 tablet, Rfl: 5    Misc. Devices (ACAPELLA) MISC, 1 Device by Does not apply route 4 times daily, Disp: 1 each, Rfl: 0    simvastatin (ZOCOR) 40 MG tablet, Take 1 tablet by mouth nightly, Disp: 30 tablet, Rfl: 5    busPIRone (BUSPAR) 10 MG tablet, Take 1 tablet by mouth 3 times daily, Disp: 90 tablet, Rfl: 5    baclofen (LIORESAL) 10 MG tablet, take 1 tablet by mouth twice a day, Disp: , Rfl: 0    traZODone (DESYREL) 150 MG tablet, take 1/2 to 1 tablet by mouth once daily at bedtime if needed for pain SPASM, OR SLEEP, Disp: , Rfl: 0    HYDROcodone-acetaminophen (NORCO) 5-325 MG per tablet, Take 1 tablet by mouth every 6 hours as needed for Pain, Disp: , Rfl:     Omega-3 Fatty Acids (FISH OIL) 1000 MG CAPS, Take 1,000 mg by mouth daily. , Disp: , Rfl:     sildenafil (VIAGRA) 100 MG tablet, Take 1 tablet by mouth as needed. , Disp: 8 tablet, Rfl: 5    ipratropium-albuterol (DUONEB) 0.5-2.5 (3) MG/3ML SOLN nebulizer solution, Inhale 3 mLs into the lungs every 4 hours as needed for Shortness of Breath, Disp: 1620 mL, Rfl: 11      Physical Exam:    Vitals:    Temp: 98.2 °F (36.8 °C) I @FLOWSTAT(6)@ IPulse: 68 I @FLOWSTAT(8)@ I BP: 122/74 I @TBNNVR(46)@; @LXKVRP(79)@ I Resp: 16 I @FLOWSTAT(9)@ I   I @FLOWSTAT(10)@ I   I Height: 5' 8\"  70 - 400 mg/dL Final     Comment:     [facility] [address], [address] [place] [address] [phone]      IgM   Date/Time Value Ref Range Status   06/03/2019 10:01  40 - 230 mg/dL Final     Comment:     [facility] [address] [facility], [address] [place] [address] [phone]       No results found for: CLAUDIA   No results found for: RF       No results found for this or any previous visit. No results found for this or any previous visit. PROCEDURES:      Skin Testing performed on: 06/10/2019    Assessment/Orders:    Diagnosis Orders   1. Prophylactic immunotherapy  EPINEPHrine (EPIPEN 2-HUBER) 0.3 MG/0.3ML SOAJ injection   2. Allergy to dog dander     3. Allergic rhinitis due to American house dust mite     4. Mite allergy     5. Cat allergies     6. Allergy to mold     7. Eosinophilia     8. Allergic rhinoconjunctivitis     9. Eosinophilic asthma ([address]           Plan:  Follow Up:6 months to see me  Allergy shots twice weekly if scheduling allows  Total time sent with patient 30 minutes with greater than 50% in patient education  Patient will have repeat labs including IgM and IgA in 6 weeks  Patient had full sinus films for chronic sinusitis. If abnormal will refer to ENT  Patient to resume antihistamines and Singulair  Patient may take nasal steroid 1 spray daily. He reports very rarely he may get a nosebleed from nasal steroids      (Please note that portions of this note may have been completed with a voice recognition program.  Efforts were made to edit the dictation but occasionally words are mis-transcribed.)     Instructions for patients receiving injections for allergy injections:    1. If wheezing, hives, total body itching, nasal allergy symptoms or any symptoms other than at the site where you got your shot develop after any allergy injection, you should seek medical attention immediately.  Though rare, this may be the onset of a generalized allergic reaction which may progressively worsen without treatment. 2. It is important that you report delayed or late symptoms or reactions before your next injection. 3. For your own safety, you must wait in the waiting area for at least 30 minutes after each injection. The injection site must be checked by staff before you leave. 4. You have your own personal serum vial(s) labeled with your name. Determine with the nurse giving the injection that you are receiving the correct serum. Identify your vial prior to every injection. 5. You should not get your allergy shot if you have hives, wheezing, or a temperature greater than 100° fahrenheit. 6. You should not scratch or rub your arm where your injection has been given. This will irritate the general area and cause local swelling. If your arm itches or becomes sore, an ice pack may be applied and you may take an antihistamine. This will not decrease the effectiveness of the injection. It is important that you carry a bottle of liquid antihistamine with you when you come for an injection-in case you start to have a reaction on the way home. Zyrtec (cetirizine) and Claritin (loratidine) syrup are available over the counter. 7. You should not participate in any strenuous physical activity 60 minutes before or after an injection. 8. After your first allergy injection, you need to make a 3 month follow appointment with Santos Ortiz DNP, APRN-BC and then been seen and have labs done at least annually and as needed for follow up. The total length of time you will receive allergy injections will be a minimum of three to five years. Some allergic patients may be advised to stay on shots indefinitely depending upon your symptoms and the changes in your labs over time. 9.  You will begin allergy shots by doing a \"building phase. \"  Once the building phase is completed you will then be placed on maintenance injections.   Maintenance injections are given every 1-4 weeks depending on symptoms. 10.  Missed or delayed injections will delay reaching and maintaining maintenance injections. 11.  You must notify this office should you become pregnant or nursing. Allergy injections will be stopped at that time until after delivery and/or cessation of breast feeding. 12.  Epi-pens are prescribed and it is the patient's responsibility to always have an epi-pen available incase of anaphylaxis during or after immunotherapy. These medications typically have a one year expiration. Inform the provider you need another prescription prior to the medication expiring. Signed:  SANTOSH Gallo CNP  6/24/2019  11:53 AM     ALLERGY EXTRACT - NEW INJECTION BUILDING PHASE    TOTAL VIALS PREPARED . .. 10 (2 SETS)  TOTAL DOSES 100 INJECTIONS (50 IN EACH SET OR 10 SHOTS PER VIAL)    An allergy extract was prepared according to written prescription by provider. Provider onsite during allergy extract preparation. Patient vial(s) include the following:     RED VIAL - 1:1 CONCENTRATION - EXPIRES 1 YEAR  YELLOW VIAL-1:10 CONCENTRATION - EXPIRES 6 MONTHS  BLUE VIAL-1:100 CONCENTRATION - EXPIRES 6 MONTHS  GREEN VIAL-1:1,000 CONCENTRATION - EXPIRES 3 MONTHS  SILVER VIAL-1:10,000 CONCENTRATION - EXPIRES 3 MONTHS    Allergy extract was prepared by the following method: Once the  one-to-one concentration was prepared in the red vial, 0.5 ML's was then extracted in place into the yellow vial to achieve a 1:10 concentration. Then 0.5 ML's was extracted from the yellow vial and placed into the blue file with dilutant to achieve a 1:100 concentration. Then 0.5 ML's was extracted from the blue vial and placed into Green vial with dilute to achieve a 1:1,000 concentration. Finally 0.5 ML's was taken from the green vial and placed in the Silver vial with dilutant to achieve a 1:10,000 concentration.   TOTAL DOSES 50     Patient vials were labeled with name, date-of-birth, vial number, concentration, and expiration. When injecting from a new  vial the first injections is 0.05 ml and are increased by 0.05 increments until 0.5ml from the vial is achieved or the patient reaches maximum tolerated dose. Injections are given once ot three times weekly and at least 24 hours apart, according to provider orders. If patient has complications or \"knots\" or maximum tolerance the dose building is reduced, stopped, or maintained according to patient reaction and provider orders. This is documented on the injection log. Patients on build-up plan should be seen every 3 months or sooner if necessary. The injection record and serum is reviewed and documented at every injection appointment. When down to the last 1/3 of the bottle of the red 1:1 concentration the patient should be scheduled an appointment for new orders for allergy maintenance concentrations. If it is the patients preference to receive and injection at an outside medical office, is is allowed only after the patient receives the first dose from each vial at this practice. Patients requesting refills that receive injections at outside medical facilities must include the patient's demographic sheet, current insurance information and the injection records. Allow 2-3 weeks for delivery after the refill has been requested. PROTOCOL FOR LATE INJECTIONS  Days late determined from the due date of the injection    Shot Frequency 5-10 Days Late 11-16 Days Late 17-30 Days Late 31-60 Days Late 61+ Days Late   Twice Weekly Repeat last dose Reduce last dose . 2 Reduce last dose . 4 Dilute back 1 vial, start at .05 Patient must start over     Weekly Repeat last dose Reduce last dose . 2 Reduce last dose . 4 Dilute back 1 vial, start at .05 Patient must start over   Every 2 Weeks Repeat last dose Reduce last dose . 2. Reduce last dose . 4 Ask provider for instruction Ask provider for instruction   Every 3 Weeks Repeat last dose Reduce last dose . 2 Reduce last dose .4 Ask provider for instruction Ask provider for instruction   Every 4 Weeks Repeat last dose Reduce last dose . 2 Reduce last dose . 4 Ask provider for instruction Ask provider for instruction     Patient/gaurdian made aware of potential anaphylaxis risks associated with receiving allergy injections and wishes to proceed. SHOT REACTION TREATMENT INSTRUCTIONS    During the 30 minute wait after an allergy injection the following symptoms should be reported:    Itching other than at the injection site  Hives or swelling other than at the injection site  Redness other than at the injection site  Difficulty breathing  Chest tightness  Difficulty swallowing  Throat tightness    If these symptoms occur, NOTIFY PROVIDER and the following treatment should be administered:    1. Epinephrine 1:1000 IM - 0.3 ml if > 66 lbs or more, 0.15 ml if 33 - 63 lbs, or 0.1 ml if <33 lbs   2. Diphenhydramine - give all intramuscular:     2 to <6 years (off-label use): 6.25 mg,    6 to <12 years: 12.5 to 25 mg;    ?12 years: 25-50 mg.  3.  Famotidine:  Adults 40 mg oral    Adolescents age 12 years and >88 lbs: 40 mg    Children and Adolescents ? 12years of age: Initial: 0.25 mg/kg/dose   every 12 hours (maximum daily dose: 40 mg/day)    Epi dose may me repeated in 5-15 minutes if adequate resolution of symptoms does not occur    Patient should be observed for at least one hour after final epi dose and must be seen by provider. Patients cannot drive themselves if they have received diphenhydramine.

## 2019-06-25 LAB
THYROGLOBULIN AB: < 0.9 IU/ML (ref 0–4)
THYROID PEROXIDASE ANTIBODY: 0.3 IU/ML (ref 0–9)

## 2019-06-26 NOTE — TELEPHONE ENCOUNTER
Spoke to Agusofmehrdad regarding this patient. She states this patient is going to be receiving allergy injections and since patient is not willing to pay out of pocket for the [address] we will not proceed with trying to get it approved.

## 2019-06-27 LAB — THYROID STIMULATING IMMUNOGLOBULIN: 88 %

## 2019-06-28 ENCOUNTER — TELEPHONE (OUTPATIENT)
Dept: ENT CLINIC | Age: 64
End: 2019-06-28

## 2019-07-17 ENCOUNTER — CLINICAL DOCUMENTATION (OUTPATIENT)
Dept: ALLERGY | Age: 64
End: 2019-07-17

## 2019-07-17 ENCOUNTER — OFFICE VISIT (OUTPATIENT)
Dept: ALLERGY | Age: 64
End: 2019-07-17
Payer: MEDICARE

## 2019-07-17 VITALS
HEIGHT: 68 IN | SYSTOLIC BLOOD PRESSURE: 122 MMHG | BODY MASS INDEX: 34.59 KG/M2 | RESPIRATION RATE: 16 BRPM | DIASTOLIC BLOOD PRESSURE: 78 MMHG | TEMPERATURE: 98 F | HEART RATE: 88 BPM | WEIGHT: 228.2 LBS

## 2019-07-17 DIAGNOSIS — J45.50 SEVERE PERSISTENT EXTRINSIC ASTHMA WITHOUT COMPLICATION: ICD-10-CM

## 2019-07-17 DIAGNOSIS — H10.10 ALLERGIC RHINOCONJUNCTIVITIS: Primary | ICD-10-CM

## 2019-07-17 DIAGNOSIS — J30.89 ALLERGY TO DUST: ICD-10-CM

## 2019-07-17 DIAGNOSIS — R76.8 ELEVATED IGE LEVEL: ICD-10-CM

## 2019-07-17 DIAGNOSIS — J30.89 NON-SEASONAL ALLERGIC RHINITIS, UNSPECIFIED TRIGGER: ICD-10-CM

## 2019-07-17 DIAGNOSIS — J30.9 ALLERGIC RHINOCONJUNCTIVITIS: Primary | ICD-10-CM

## 2019-07-17 DIAGNOSIS — Z91.048 ALLERGY TO MOLD: ICD-10-CM

## 2019-07-17 DIAGNOSIS — J45.40 MODERATE PERSISTENT ASTHMA, UNSPECIFIED WHETHER COMPLICATED: ICD-10-CM

## 2019-07-17 DIAGNOSIS — J45.50 SEVERE PERSISTENT ASTHMA WITHOUT COMPLICATION: ICD-10-CM

## 2019-07-17 DIAGNOSIS — J30.81 ALLERGY TO DOG DANDER: ICD-10-CM

## 2019-07-17 DIAGNOSIS — J30.1 ALLERGY TO TREES: ICD-10-CM

## 2019-07-17 PROCEDURE — G8427 DOCREV CUR MEDS BY ELIG CLIN: HCPCS | Performed by: NURSE PRACTITIONER

## 2019-07-17 PROCEDURE — 3017F COLORECTAL CA SCREEN DOC REV: CPT | Performed by: NURSE PRACTITIONER

## 2019-07-17 PROCEDURE — 95165 ANTIGEN THERAPY SERVICES: CPT | Performed by: NURSE PRACTITIONER

## 2019-07-17 PROCEDURE — 1036F TOBACCO NON-USER: CPT | Performed by: NURSE PRACTITIONER

## 2019-07-17 PROCEDURE — 1123F ACP DISCUSS/DSCN MKR DOCD: CPT | Performed by: NURSE PRACTITIONER

## 2019-07-17 PROCEDURE — G8417 CALC BMI ABV UP PARAM F/U: HCPCS | Performed by: NURSE PRACTITIONER

## 2019-07-17 PROCEDURE — 99214 OFFICE O/P EST MOD 30 MIN: CPT | Performed by: NURSE PRACTITIONER

## 2019-07-17 PROCEDURE — 4040F PNEUMOC VAC/ADMIN/RCVD: CPT | Performed by: NURSE PRACTITIONER

## 2019-07-17 ASSESSMENT — ENCOUNTER SYMPTOMS
DIARRHEA: 0
SHORTNESS OF BREATH: 1
WHEEZING: 0
STRIDOR: 0
EYE PAIN: 0
SINUS PAIN: 0
NAUSEA: 0
CONSTIPATION: 0
SORE THROAT: 0
SINUS PRESSURE: 1
EYE ITCHING: 1
BACK PAIN: 0
RHINORRHEA: 1
VOMITING: 0
COUGH: 0
EYE REDNESS: 1
ABDOMINAL PAIN: 0
EYE DISCHARGE: 0

## 2019-07-17 NOTE — PATIENT INSTRUCTIONS
Allergy Avoidance Measures  1. Shower and wash your hair before going to bed during pollen season. 2. Don't dry clothes and bedding outside where pollen and molds will stick to them. 3. Wash your hands after petting an animal.  4. Run a night light continuously in dark closets and basements to help reduce molds. 5. Place stuffed animal in the freezer for a day to kill dust mites. 6. Be aware that during the mid morning and early evening, pollen levels are the highest of the day. 7. Undress outside your bedroom, leaving allergens from other places away from where you sleep. 8. Remove and wash your clothing immediately after visiting friends with pets. 9. Seal your pillows and mattresses in allergy-proof coverings so dust mites can't get to your nose and eyes. 10. Use a dehumidifier to reduce molds, especially in damp, humid places like                 basements, maintaining a humidity level between 30%-50%. 11. Remove carpeting in your home if allergic to pets. Hardwood, tile, and linoleum are easier to keep dander free. 12. If you are allergic to bees, wasps, or yellow jackets, avoid bright colored clothing, scented hairspray, deodorant or perfume, and avoid picnics and BBQ'S. 13. Dust mites and dander will accumulate in upholstered furniture. 14. To thoroughly clean, dust with a damp rag or mop rather than dry dusting/sweeping. 15. Use a vacuum  with a HEPA filter and clean/change the filter when needed. 16. Wear a dust mask while vacuuming to avoid inhaling stirred-up-dust.  17. Leave a room that was just dusted or vacuumed for at least 20 minutes to allow        airborne dust to settle. 18. If you live with a pet, cover the air ducts to your bedroom if you have forced-air heating/cooling. 19. Thoroughly clean moldy areas with a 1 to 10 part diluted mixture of chlorine bleach   and water. 21. Do not allow smoking in your home.   21. Have a non-allergic person clean the cages of small full year of shots before you see any change in your symptoms. The allergy shot may cause mild problems, such as soreness, redness, warmth, or swelling on the arm where you got the shot. It may also cause itching, hives, or a rash that spreads to other parts of your body. Follow-up care is a key part of your treatment and safety. Be sure to make and go to all appointments, and call your doctor if you are having problems. It's also a good idea to know your test results and keep a list of the medicines you take. How can you care for yourself at home? · Do not smoke or allow others to smoke around you. Smoking makes allergies worse. If you need help quitting, talk to your doctor about stop-smoking programs and medicines. These can increase your chances of quitting for good. · If there is a lot of pollution, pollen, or dust outside, stay inside and keep the windows closed. Use an air conditioner when it's hot outside, and use an air filter in your home. · If dust or dust mites trigger your asthma, decrease the dust around your bed:  ? Wash sheets, pillowcases, and other bedding in hot water every week. ? Use dust-proof covers for pillows, duvets, and mattresses. Avoid plastic covers, because they tear easily and do not \"breathe. \" Wash as instructed on the label. ? Do not use any blankets and pillows that you do not need. ? Use blankets that you can wash in your washing machine. ? Consider removing drapes and carpets, which attract and hold dust, from your bedroom. · If mold triggers your allergies, get rid of furniture, rugs, and drapes that smell musty. Check for mold under sinks and in the bathroom, attic, and basement. Use a dehumidifier to control mold in these areas. · If pet dander triggers your allergies, keep pets outside or out of your bedroom. Old carpet and cloth furniture can hold a lot of animal dander. You may need to replace them.   · If your allergies are triggered by cold air, wear a scarf

## 2019-07-17 NOTE — PROGRESS NOTES
2016    Laryngoscopy Micro with Biopsy by Dr Chu Bearden      uppp    ROTATOR CUFF REPAIR Left 5-20-15    SKIN BIOPSY      TONSILLECTOMY      UPPER GASTROINTESTINAL ENDOSCOPY      VEIN SURGERY Left 2014    Dr. Enid Hand       Family History:   Family History   Problem Relation Age of Onset    Cancer Mother     Breast Cancer Mother     Stroke Mother     Heart Disease Brother     Diabetes Brother     High Blood Pressure Brother     High Cholesterol Brother        Social History:   Social History     Tobacco Use    Smoking status: Former Smoker     Packs/day: 2.00     Years: 42.00     Pack years: 84.00     Types: Cigarettes     Start date: 1971     Last attempt to quit: 3/3/2017     Years since quittin.3    Smokeless tobacco: Never Used    Tobacco comment: pts uses just nicotine vap   Substance Use Topics    Alcohol use: No     Alcohol/week: 0.0 standard drinks        Allergies:  Patient has no known allergies.     CurrentMedications:     Current Outpatient Medications:     hydrochlorothiazide (HYDRODIURIL) 12.5 MG tablet, TK 1 T PO QD IN THE MORNING, Disp: , Rfl: 3    EPINEPHrine (EPIPEN 2-HUBER) 0.3 MG/0.3ML SOAJ injection, Inject one pen as directed STAT for allergic reaction, may disp generic NDC 94221-587-78, Disp: 6 each, Rfl: 99    levocetirizine (XYZAL ALLERGY 24HR) 5 MG tablet, Take 1 tablet by mouth nightly, Disp: 30 tablet, Rfl: 1    metFORMIN (GLUCOPHAGE) 500 MG tablet, TAKE 2 TABLETS BY MOUTH TWICE DAILY WITH MEALS, Disp: 120 tablet, Rfl: 0    testosterone cypionate (DEPOTESTOTERONE CYPIONATE) 200 MG/ML injection, Inject 1 ml into the muscle every 14 days, Disp: 2 mL, Rfl: 3    SYRINGE-NEEDLE, DISP, 3 ML 23G X 1-1/2\" 3 ML MISC, Inject 1 Syringe into the muscle every 14 days, Disp: 10 each, Rfl: 1    budesonide-formoterol (SYMBICORT) 160-4.5 MCG/ACT AERO, Inhale 2 puffs into the lungs 2 times daily Rinse mouth after its use., Disp: 1 Inhaler, Rfl: 11    VENTOLIN  (90 Base) MCG/ACT inhaler, Inhale 2 puffs into the lungs every 6 hours as needed for Wheezing, Disp: 1 Inhaler, Rfl: 8    sertraline (ZOLOFT) 100 MG tablet, take 1 tablet by mouth twice a day, Disp: 60 tablet, Rfl: 5    fluticasone (FLONASE) 50 MCG/ACT nasal spray, 1 spray by Each Nare route daily, Disp: 1 Bottle, Rfl: 1    blood glucose test strips (ASCENSIA AUTODISC VI;ONE TOUCH ULTRA TEST VI) strip, One Touch Ultra Test Strip - Check blood sugar twice daily Dx: E11.9, Disp: 200 each, Rfl: 1    ONETOUCH DELICA LANCETS FINE MISC, Check blood sugar twice daily Dx: E11.9, Disp: 200 each, Rfl: 1    mometasone (NASONEX) 50 MCG/ACT nasal spray, 1 spray by Nasal route daily, Disp: 1 Inhaler, Rfl: 1    ranitidine (ZANTAC) 150 MG tablet, TAKE 1 TABLET BY MOUTH TWICE DAILY, Disp: 180 tablet, Rfl: 3    montelukast (SINGULAIR) 10 MG tablet, Take 1 tablet by mouth nightly, Disp: 90 tablet, Rfl: 3    rOPINIRole (REQUIP) 1 MG tablet, Take 1 tablet by mouth 3 times daily, Disp: 90 tablet, Rfl: 5    losartan (COZAAR) 25 MG tablet, Take 1 tablet by mouth daily, Disp: 30 tablet, Rfl: 5    glimepiride (AMARYL) 4 MG tablet, Take 1 tablet by mouth 2 times daily, Disp: 60 tablet, Rfl: 5    Misc.  Devices (ACAPELLA) MISC, 1 Device by Does not apply route 4 times daily, Disp: 1 each, Rfl: 0    simvastatin (ZOCOR) 40 MG tablet, Take 1 tablet by mouth nightly, Disp: 30 tablet, Rfl: 5    busPIRone (BUSPAR) 10 MG tablet, Take 1 tablet by mouth 3 times daily, Disp: 90 tablet, Rfl: 5    baclofen (LIORESAL) 10 MG tablet, take 1 tablet by mouth twice a day, Disp: , Rfl: 0    traZODone (DESYREL) 150 MG tablet, take 1/2 to 1 tablet by mouth once daily at bedtime if needed for pain SPASM, OR SLEEP, Disp: , Rfl: 0    HYDROcodone-acetaminophen (NORCO) 5-325 MG per tablet, Take 1 tablet by mouth every 6 hours as needed for Pain, Disp: , Rfl:     Omega-3 Fatty Acids (FISH OIL) 1000 MG CAPS, Take 1,000 mg by mouth daily. , Disp: , Rfl:     sildenafil (VIAGRA) 100 MG tablet, Take 1 tablet by mouth as needed. , Disp: 8 tablet, Rfl: 5    ipratropium-albuterol (DUONEB) 0.5-2.5 (3) MG/3ML SOLN nebulizer solution, Inhale 3 mLs into the lungs every 4 hours as needed for Shortness of Breath (Patient not taking: Reported on 7/17/2019), Disp: 1620 mL, Rfl: 11      Physical Exam:    Vitals:    Temp: 98 °F (36.7 °C) I @FLOWSTAT(6)@ IPulse: 88 I @FLOWSTAT(8)@ I BP: 122/78 I @ELOELD(19)@; @OSGDYE(72)@ I Resp: 16 I @FLOWSTAT(9)@ I   I @FLOWSTAT(10)@ I   I Height: 5' 8\" (172.7 cm) I   I Facility age limit for growth percentiles is 20 years. I     Facility age limit for growth percentiles is 20 years. Facility age limit for growth percentiles is 20 years. Facility age limit for growth percentiles is 20 years. Facility age limit for growth percentiles is 20 years. Physical Exam:    Physical Exam   Constitutional: He is oriented to person, place, and time. He appears well-developed and well-nourished. HENT:   Head: Normocephalic and atraumatic. Right Ear: External ear normal.   Left Ear: External ear normal.   Nose: Nose normal.   Mouth/Throat: Oropharynx is clear and moist.   Eyes: Pupils are equal, round, and reactive to light. Conjunctivae and EOM are normal.   Inferior turbinates pale moist and boggy. Sclera injected   Neck: Normal range of motion. Neck supple. Cardiovascular: Normal rate, regular rhythm and normal heart sounds. Pulmonary/Chest: Effort normal and breath sounds normal.   Musculoskeletal: Normal range of motion. Neurological: He is alert and oriented to person, place, and time. Skin: Skin is warm and dry. Psychiatric: He has a normal mood and affect. His behavior is normal. Judgment and thought content normal.   Nursing note and vitals reviewed.           DATA:  Lab Review:    CBC:   Lab Results   Component Value Date    WBC 8.4 04/10/2019    RBC 5.37 04/10/2019    RBC 4.87 03/01/2012 HGB 16.6 06/14/2019    HCT 47.6 06/14/2019    MCV 92.7 04/10/2019    MCH 30.9 04/10/2019    MCHC 33.3 04/10/2019    RDW 14.2 07/24/2017     04/10/2019          IgE   Date/Time Value Ref Range Status   06/03/2019 10:01  (H) <101 IU/mL Final     Comment:     [address], [address] [place] [address] [phone]      IgG   Date/Time Value Ref Range Status   06/03/2019 10:01  700 - 1600 mg/dL Final     Comment:     [address], [address] [place] [address] [phone]     IgA   Date/Time Value Ref Range Status   06/03/2019 10:00  70 - 400 mg/dL Final     Comment:     [address], [address] [place] [address] [phone]      IgM   Date/Time Value Ref Range Status   06/03/2019 10:01  40 - 230 mg/dL Final     Comment:     [address], [address] [place] [address] [phone]       No results found for: CLAUDIA   No results found for: RF       No results found for this or any previous visit. No results found for this or any previous visit. Data from outside facility: YES pft ON 04/16/18    PROCEDURES:    PFT PERFORMED VIA [facility] FOR SHORTNESS OF BREATH SYMPTOMS   04/16/18 at hospital   FVC PREDICTED: 72%    FEV1 PREDICTED: 71%    FEV1/FVC % PREDICTED: 99%    INTERPRETATION: moderate persistent asthma    Skin Testing performed on: 06/10/19    Assessment/Orders:    Diagnosis Orders   1. Allergic rhinoconjunctivitis  IgA    IgE    IgG    IgM   2. Moderate persistent asthma, unspecified whether complicated  IgA    IgE    IgG    IgM   3. Severe persistent asthma without complication     4. Severe persistent extrinsic asthma without complication     5. Allergy to dog dander     6. Allergy to dust     7. Allergy to mold     8. Allergy to trees     9. Non-seasonal allergic rhinitis, unspecified trigger     10.  Elevated IgE level           Plan:  Follow Up:6 months    45 minutes spent with patient education of at least 50%    Patient have

## 2019-07-25 ENCOUNTER — NURSE ONLY (OUTPATIENT)
Dept: ALLERGY | Age: 64
End: 2019-07-25
Payer: MEDICARE

## 2019-07-25 VITALS
HEIGHT: 68 IN | WEIGHT: 230 LBS | BODY MASS INDEX: 34.86 KG/M2 | TEMPERATURE: 97.9 F | RESPIRATION RATE: 14 BRPM | SYSTOLIC BLOOD PRESSURE: 138 MMHG | DIASTOLIC BLOOD PRESSURE: 88 MMHG | HEART RATE: 68 BPM

## 2019-07-25 DIAGNOSIS — J30.9 ALLERGIC RHINOCONJUNCTIVITIS: Primary | ICD-10-CM

## 2019-07-25 DIAGNOSIS — J45.50 SEVERE PERSISTENT ASTHMA WITHOUT COMPLICATION: ICD-10-CM

## 2019-07-25 DIAGNOSIS — J96.01 ACUTE RESPIRATORY FAILURE WITH HYPOXIA (HCC): ICD-10-CM

## 2019-07-25 DIAGNOSIS — J45.50 SEVERE PERSISTENT EXTRINSIC ASTHMA WITHOUT COMPLICATION: ICD-10-CM

## 2019-07-25 DIAGNOSIS — H10.10 ALLERGIC RHINOCONJUNCTIVITIS: Primary | ICD-10-CM

## 2019-07-25 DIAGNOSIS — J45.40 MODERATE PERSISTENT ASTHMA, UNSPECIFIED WHETHER COMPLICATED: ICD-10-CM

## 2019-07-25 PROCEDURE — 95117 IMMUNOTHERAPY INJECTIONS: CPT | Performed by: NURSE PRACTITIONER

## 2019-07-25 NOTE — PROGRESS NOTES
After consent obtained/verified, allergy injection given in back of R/L arm(s). Documentation of vial injection specific to arm(s) noted on Allergy Immunotherapy Administration Form. Patient waited 30 minutes for observation. Patient tolerated well without adverse reaction. SHOT REACTION TREATMENT INSTRUCTIONS    During the 30 minute wait after an allergy injection the following symptoms should be reported:    Itching other than at the injection site  Hives or swelling other than at the injection site  Redness other than at the injection site  Difficulty breathing  Chest tightness  Difficulty swallowing  Throat tightness    If these symptoms occur, NOTIFY PROVIDER and the following treatment should be administered:    1. Epinephrine 1:1000 IM - 0.3 ml if > 66 lbs or more, 0.15 ml if 33 - 63 lbs, or 0.1 ml if <33 lbs   2. Diphenhydramine - give all intramuscular:     2 to <6 years (off-label use): 6.25 mg,    6 to <12 years: 12.5 to 25 mg;    ?12 years: 25-50 mg.  3.  Famotidine:  Adults 40 mg oral    Adolescents age 12 years and >88 lbs: 40 mg    Children and Adolescents ? 12years of age: Initial: 0.25 mg/kg/dose   every 12 hours (maximum daily dose: 40 mg/day)    Epi dose may me repeated in 5-15 minutes if adequate resolution of symptoms does not occur    Patient should be observed for at least one hour after final epi dose and must be seen by provider. Patients cannot drive themselves if they have received diphenhydramine.

## 2019-07-29 ENCOUNTER — NURSE ONLY (OUTPATIENT)
Dept: ALLERGY | Age: 64
End: 2019-07-29
Payer: MEDICARE

## 2019-07-29 VITALS
SYSTOLIC BLOOD PRESSURE: 130 MMHG | BODY MASS INDEX: 35.45 KG/M2 | HEART RATE: 80 BPM | TEMPERATURE: 98.4 F | WEIGHT: 233.9 LBS | RESPIRATION RATE: 24 BRPM | DIASTOLIC BLOOD PRESSURE: 80 MMHG | HEIGHT: 68 IN

## 2019-07-29 DIAGNOSIS — J30.81 ALLERGY TO DOG DANDER: ICD-10-CM

## 2019-07-29 DIAGNOSIS — Z51.6 NEED FOR DESENSITIZATION TO ALLERGENS: Primary | ICD-10-CM

## 2019-07-29 PROCEDURE — 95117 IMMUNOTHERAPY INJECTIONS: CPT | Performed by: NURSE PRACTITIONER

## 2019-08-01 ENCOUNTER — NURSE ONLY (OUTPATIENT)
Dept: ALLERGY | Age: 64
End: 2019-08-01
Payer: MEDICARE

## 2019-08-01 VITALS
TEMPERATURE: 98.3 F | RESPIRATION RATE: 24 BRPM | HEART RATE: 68 BPM | BODY MASS INDEX: 35.68 KG/M2 | WEIGHT: 235.4 LBS | DIASTOLIC BLOOD PRESSURE: 86 MMHG | HEIGHT: 68 IN | SYSTOLIC BLOOD PRESSURE: 134 MMHG

## 2019-08-01 DIAGNOSIS — J30.89 ALLERGY TO DUST: ICD-10-CM

## 2019-08-01 DIAGNOSIS — J30.81 CAT ALLERGIES: ICD-10-CM

## 2019-08-01 DIAGNOSIS — J30.81 ALLERGY TO DOG DANDER: ICD-10-CM

## 2019-08-01 DIAGNOSIS — J30.1 ALLERGY TO TREES: ICD-10-CM

## 2019-08-01 DIAGNOSIS — Z91.048 ALLERGY TO MOLD: ICD-10-CM

## 2019-08-01 DIAGNOSIS — Z51.6 NEED FOR DESENSITIZATION TO ALLERGENS: Primary | ICD-10-CM

## 2019-08-01 DIAGNOSIS — Z91.09 MITE ALLERGY: ICD-10-CM

## 2019-08-01 PROCEDURE — 95117 IMMUNOTHERAPY INJECTIONS: CPT | Performed by: NURSE PRACTITIONER

## 2019-08-05 ENCOUNTER — NURSE ONLY (OUTPATIENT)
Dept: ALLERGY | Age: 64
End: 2019-08-05
Payer: MEDICARE

## 2019-08-05 VITALS
RESPIRATION RATE: 16 BRPM | HEIGHT: 68 IN | SYSTOLIC BLOOD PRESSURE: 122 MMHG | WEIGHT: 234.4 LBS | TEMPERATURE: 97.8 F | DIASTOLIC BLOOD PRESSURE: 72 MMHG | HEART RATE: 80 BPM | BODY MASS INDEX: 35.52 KG/M2

## 2019-08-05 DIAGNOSIS — Z51.6 NEED FOR DESENSITIZATION TO ALLERGENS: ICD-10-CM

## 2019-08-05 DIAGNOSIS — J30.81 CAT ALLERGIES: ICD-10-CM

## 2019-08-05 DIAGNOSIS — J45.40 MODERATE PERSISTENT EXTRINSIC ASTHMA WITHOUT COMPLICATION: Primary | ICD-10-CM

## 2019-08-05 DIAGNOSIS — Z91.09 MITE ALLERGY: ICD-10-CM

## 2019-08-05 DIAGNOSIS — Z91.048 ALLERGY TO MOLD: ICD-10-CM

## 2019-08-05 DIAGNOSIS — J30.1 ALLERGY TO TREES: ICD-10-CM

## 2019-08-05 DIAGNOSIS — J34.3 HYPERTROPHY OF INFERIOR NASAL TURBINATE: ICD-10-CM

## 2019-08-05 DIAGNOSIS — J30.81 ALLERGY TO DOG DANDER: ICD-10-CM

## 2019-08-05 PROCEDURE — 95117 IMMUNOTHERAPY INJECTIONS: CPT | Performed by: NURSE PRACTITIONER

## 2019-08-05 RX ORDER — FLUTICASONE PROPIONATE 50 MCG
1 SPRAY, SUSPENSION (ML) NASAL DAILY
Qty: 1 BOTTLE | Refills: 11 | Status: SHIPPED | OUTPATIENT
Start: 2019-08-05 | End: 2020-07-14 | Stop reason: SDUPTHER

## 2019-08-08 ENCOUNTER — NURSE ONLY (OUTPATIENT)
Dept: ALLERGY | Age: 64
End: 2019-08-08
Payer: MEDICARE

## 2019-08-08 VITALS
DIASTOLIC BLOOD PRESSURE: 80 MMHG | TEMPERATURE: 98.4 F | BODY MASS INDEX: 35.51 KG/M2 | HEART RATE: 76 BPM | RESPIRATION RATE: 20 BRPM | SYSTOLIC BLOOD PRESSURE: 122 MMHG | WEIGHT: 234.3 LBS | HEIGHT: 68 IN

## 2019-08-08 DIAGNOSIS — Z91.09 MITE ALLERGY: ICD-10-CM

## 2019-08-08 DIAGNOSIS — J30.81 ALLERGY TO DOG DANDER: ICD-10-CM

## 2019-08-08 DIAGNOSIS — J30.81 CAT ALLERGIES: ICD-10-CM

## 2019-08-08 DIAGNOSIS — Z91.048 ALLERGY TO MOLD: ICD-10-CM

## 2019-08-08 DIAGNOSIS — Z51.6 NEED FOR DESENSITIZATION TO ALLERGENS: Primary | ICD-10-CM

## 2019-08-08 DIAGNOSIS — J30.1 ALLERGY TO TREES: ICD-10-CM

## 2019-08-08 PROCEDURE — 95117 IMMUNOTHERAPY INJECTIONS: CPT | Performed by: NURSE PRACTITIONER

## 2019-08-12 ENCOUNTER — NURSE ONLY (OUTPATIENT)
Dept: ALLERGY | Age: 64
End: 2019-08-12
Payer: MEDICARE

## 2019-08-12 VITALS
DIASTOLIC BLOOD PRESSURE: 86 MMHG | RESPIRATION RATE: 20 BRPM | TEMPERATURE: 98.1 F | HEART RATE: 80 BPM | BODY MASS INDEX: 35.54 KG/M2 | HEIGHT: 68 IN | WEIGHT: 234.5 LBS | SYSTOLIC BLOOD PRESSURE: 132 MMHG

## 2019-08-12 DIAGNOSIS — Z91.09 MITE ALLERGY: ICD-10-CM

## 2019-08-12 DIAGNOSIS — Z51.6 NEED FOR DESENSITIZATION TO ALLERGENS: Primary | ICD-10-CM

## 2019-08-12 DIAGNOSIS — J30.1 ALLERGY TO TREES: ICD-10-CM

## 2019-08-12 DIAGNOSIS — J30.81 ALLERGY TO DOG DANDER: ICD-10-CM

## 2019-08-12 DIAGNOSIS — J30.81 CAT ALLERGIES: ICD-10-CM

## 2019-08-12 DIAGNOSIS — J30.89 ALLERGY TO DUST: ICD-10-CM

## 2019-08-12 DIAGNOSIS — Z91.048 ALLERGY TO MOLD: ICD-10-CM

## 2019-08-12 PROCEDURE — 95117 IMMUNOTHERAPY INJECTIONS: CPT | Performed by: NURSE PRACTITIONER

## 2019-08-13 NOTE — PROGRESS NOTES
puffs via inhalation BID. Asthma control (Severity) questionnaire:  Asthma symptoms:  > 2 times per week -> Yes  Night time awakenings: > 2 times per month -> Yes  Use of short acting beta agonist for symptoms control (Other than pre exercise use) :> 2times per week  -> Yes  Interference with normal activity  -> none  Lung function: Fev1 >60% Predicted  -> Yes  Number of exacerbations per year: > 2 -> Yes    Social History: Occupation: He is retired now. He used to work as a  in the past.      Patient admits to history of tobacco smoking. He used to smoke 1 to 1.5 PPD for 44 Years. He denies history of exposure to coal, foundry, [place], asbestos or significant farm dust exposure. His father was diagnosed with asbestos exposure. He used to move close to his father. Patient denies any recent travel. Patient denies history of exposure to birds, pigeons, or chickens in the past. The patient admits toto pet animals at home. Hecurrently had 2cats at home. He denies to history of recreational or IV drug use. Hedenies history of pulmonary embolism or DVT in the past.      Review of Systems:   General/Constitutional: he lost 10 lbs of weight from the last visit with normal appetite. No fever or chills. HENT: Negative. Eyes: Negative. Upper respiratory tract: No nasal stuffiness or post nasal drip. Lower respiratory tract/ lungs: See HPI. No hemoptysis. Cardiovascular: No palpitations or chest pain. Gastrointestinal: No nausea or vomiting. Neurological: No focal neurologiacal weakness. Extremities: No edema. Musculoskeletal: No complaints. Genitourinary: No complaints. Hematological: Negative. Psychiatric/Behavioral: Negative. Skin: No itching.       Current Medications:      Past Medical History:   Diagnosis Date    Acid reflux     Arthritis     Asthma     Chronic back pain     Class 2 severe obesity due to excess calories with serious comorbidity and body mass index (BMI) of 37.0 to tablet take 1 tablet by mouth twice a day  0    traZODone (DESYREL) 150 MG tablet take 1/2 to 1 tablet by mouth once daily at bedtime if needed for pain SPASM, OR SLEEP  0    HYDROcodone-acetaminophen (NORCO) 5-325 MG per tablet Take 1 tablet by mouth every 6 hours as needed for Pain      Omega-3 Fatty Acids (FISH OIL) 1000 MG CAPS Take 1,000 mg by mouth daily.  sildenafil (VIAGRA) 100 MG tablet Take 1 tablet by mouth as needed. 8 tablet 5     No current facility-administered medications for this visit. Family History   Problem Relation Age of Onset    Cancer Mother     Breast Cancer Mother     Stroke Mother     Heart Disease Brother     Diabetes Brother     High Blood Pressure Brother     High Cholesterol Brother          Physical Exam     VITALS:    There were no vitals taken for this visit. Nursing note and vitals reviewed. Constitutional: Patient appears moderately built and moderately nourished. No distress. Patient is oriented to person, place, and time. HENT:   Head: Normocephalic and atraumatic. Right Ear: External ear normal.   Left Ear: External ear normal.   Mouth/Throat: Oropharynx is clear and moist.  No oral thrush. Eyes: Conjunctivae are normal. Pupils are equal, round, and reactive to light. No scleral icterus. Neck: Neck supple. No JVD present. No tracheal deviation present. Cardiovascular: Normal rate, regular rhythm, normal heart sounds. No murmur heard. Pulmonary/Chest: Effort normal and breath sounds normal. No stridor. No respiratory distress. Bilateral expiratory wheezes. No rales. Patient exhibits no tenderness. Abdominal: Soft. Patient exhibits no distension. No tenderness. Musculoskeletal: Normal range of motion. Extremities: Patient exhibits no edema and no tenderness. Lymphadenopathy:  No cervical adenopathy. Neurological: Patient is alert and oriented to person, place, and time. Skin: Skin is warm and dry. Patient is not diaphoretic. cefTRIAXone Sensitive <=1 mcg/mL Final    gentamicin Sensitive <=1 mcg/mL Final    cefOXitin Sensitive <=4 mcg/mL Final    tetracycline Sensitive <=1 mcg/mL Final    ciprofloxacin Sensitive =0.012 mcg/mL Final    trimethoprim-sulfamethoxazole Sensitive <=20 mcg/mL Final    cefuroxime Intermediate         Viral cultures:    POSITIVE for Adenovirus at day 3  Legionella cultures:  negative  Cytology:     No malignant cells seen. Multinucleated giant cells. Alveolar macrophages. 4/6/2019  9:29 AM - Luis Rich Incoming Lab Results From Soft     Component Value Ref Range & Units Status Collected Lab   BAL Color COLORLESS   Final 04/05/2019  6:02 PM [facility] Lab   BAL Character CLOUDY/MILKY   Final 04/05/2019  6:02 PM  - [facility]   BAL Collection Site RLL and LLL   Final 04/05/2019  6:02 PM [facility] Lab   Total Volume Received BAL 30  ml Final 04/05/2019  6:02 PM [place] [address] Lab   Total Nucleated Cells   /cumm Final 04/05/2019  6:02 PM [facility]   RBC   /cumm Final 04/05/2019  6:02 PM [facility] [address] [facility]   Macrophage/Monocyte BAL 74Low   86 - 100 % Final 04/05/2019  6:02 PM  [facility]   Lymphocytes, BAL 16High   10 - 15 % Final 04/05/2019  6:02 PM [facility] Lab   Segmented Neutrophils, BAL 2  0 - 3 % Final 04/05/2019  6:02 PM [facility] Lab   Few atypical cells, favor reactive. Ciliated/Epithelial Cells BAL 8High   0 - 5 % Final 04/05/2019  6:02 PM [facility] Lab   Pathologist Review ALP   Final 04/05/2019  6:02 PM [address] [place] [address] Lab   Performed at [address], [address]       Abnormally looking vocal cords and management. ? Chronic laryngitis from ? GERD Vs other etiologies    [facility] labs:  4/23/2019  8:43 PM - [facility]ella Incoming Lab Results From Soft     Component Value Ref Range & Units Status Collected Lab   Allergen Fungi/Mold A. Alternata Ige 0. 87High   <=0.34 kU/L sleep test. He refused go for sleep test or get treated,      Recommendations/Plan:  - Start patient on Symbicort 160/4.5mcg spray MDI, 2 puffs via inhalation BID. Hewas informed about adverse effects of Symbicort. He verbalizes understanding.  - Stop Pulmicort nebs. - Doxycycline 100mg po bid for 10 days . No refills.  -Continue Albuterol HFA 90mcg/Spray MDI, 2puffs  Q6Hprn.  -Continue patient on Singulair 10mg 1 tablet at bed time daily. He  was informed about adverse effects of Singulair. He verbalizes understanding.  -Duonebs Q6h prn in alternation with Albuterol HFA. -He instructed to use Albuterol HFA  inhaler 2 puffs Q6h prn or Duonebs nebs Q6h prn (the nebulizer) at one time as rescue medication not both at the same time. He verbalizes understanding.   -Continue follow up and management by ISABELLA Stoner for his GERD. - Schedule patient for Allergy and immunology consult with an Allergy and immunologist as soon as possible for further evaluation of elevated serum IgE levels with positive allergy testing for multiple allergies.  -He was advised to practice anti-reflux measures such as raising the head of the bed, avoiding tight clothing or belts, avoiding eating late at night and not lying down shortly after mealtime and achieving weight loss are discussed. Avoid ASA, NSAID's, caffeine, peppermints, alcohol and tobacco. Patient should alert me if there are persistent symptoms, dysphagia, weight loss or GI bleeding.   -Patient educated to update his pneumococcal vaccine with family physician and take influenza vaccine in coming season with out fail. Patient verbalizes understanding.  -Yony Bodren educated about environmental safety precautions he need to practice to prevent exacerbation ofhis Asthma. Yony Pascualkevin verbalizes understanding.   - He is following with Dr.Charles Tammy MD from [facility] speciality for his Left thyroid nodule.      - Patient and his wife were educated about my impression

## 2019-08-15 ENCOUNTER — NURSE ONLY (OUTPATIENT)
Dept: ALLERGY | Age: 64
End: 2019-08-15
Payer: MEDICARE

## 2019-08-15 VITALS
HEART RATE: 72 BPM | DIASTOLIC BLOOD PRESSURE: 84 MMHG | WEIGHT: 233.3 LBS | SYSTOLIC BLOOD PRESSURE: 126 MMHG | BODY MASS INDEX: 35.47 KG/M2 | RESPIRATION RATE: 20 BRPM | TEMPERATURE: 97.9 F

## 2019-08-15 DIAGNOSIS — Z91.09 ENCOUNTER FOR DESENSITIZATION TO POLLEN ALLERGEN: Primary | ICD-10-CM

## 2019-08-15 DIAGNOSIS — Z51.6 ENCOUNTER FOR DESENSITIZATION TO POLLEN ALLERGEN: Primary | ICD-10-CM

## 2019-08-15 DIAGNOSIS — J30.81 ALLERGY TO DOG DANDER: ICD-10-CM

## 2019-08-15 DIAGNOSIS — J30.1 ALLERGY TO TREES: ICD-10-CM

## 2019-08-15 DIAGNOSIS — J30.81 CAT ALLERGIES: ICD-10-CM

## 2019-08-15 PROCEDURE — 95117 IMMUNOTHERAPY INJECTIONS: CPT | Performed by: NURSE PRACTITIONER

## 2019-08-19 ENCOUNTER — NURSE ONLY (OUTPATIENT)
Dept: ALLERGY | Age: 64
End: 2019-08-19
Payer: MEDICARE

## 2019-08-19 VITALS
WEIGHT: 235.6 LBS | DIASTOLIC BLOOD PRESSURE: 82 MMHG | BODY MASS INDEX: 35.82 KG/M2 | HEART RATE: 68 BPM | RESPIRATION RATE: 16 BRPM | SYSTOLIC BLOOD PRESSURE: 130 MMHG | TEMPERATURE: 97.9 F

## 2019-08-19 DIAGNOSIS — J30.81 ALLERGY TO DOG DANDER: ICD-10-CM

## 2019-08-19 DIAGNOSIS — Z51.6 ENCOUNTER FOR DESENSITIZATION TO POLLEN ALLERGEN: Primary | ICD-10-CM

## 2019-08-19 DIAGNOSIS — J30.1 ALLERGY TO TREES: ICD-10-CM

## 2019-08-19 DIAGNOSIS — Z91.09 ENCOUNTER FOR DESENSITIZATION TO POLLEN ALLERGEN: Primary | ICD-10-CM

## 2019-08-19 DIAGNOSIS — J30.81 CAT ALLERGIES: ICD-10-CM

## 2019-08-19 PROCEDURE — 95117 IMMUNOTHERAPY INJECTIONS: CPT | Performed by: NURSE PRACTITIONER

## 2019-08-20 NOTE — TELEPHONE ENCOUNTER
The pharmacy is requesting a refill of the below medication which has been pended for you:     Requested Prescriptions     Pending Prescriptions Disp Refills    busPIRone (BUSPAR) 10 MG tablet 90 tablet 5     Sig: Take 1 tablet by mouth 3 times daily       Last Appointment Date: 5/10/2019  Next Appointment Date: 9/11/2019    No Known Allergies

## 2019-08-21 RX ORDER — BUSPIRONE HYDROCHLORIDE 10 MG/1
10 TABLET ORAL 3 TIMES DAILY
Qty: 90 TABLET | Refills: 2 | Status: SHIPPED | OUTPATIENT
Start: 2019-08-21 | End: 2020-01-13 | Stop reason: SDUPTHER

## 2019-08-22 ENCOUNTER — NURSE ONLY (OUTPATIENT)
Dept: ALLERGY | Age: 64
End: 2019-08-22
Payer: MEDICARE

## 2019-08-22 VITALS
SYSTOLIC BLOOD PRESSURE: 124 MMHG | TEMPERATURE: 97.7 F | BODY MASS INDEX: 35.61 KG/M2 | HEART RATE: 84 BPM | RESPIRATION RATE: 14 BRPM | WEIGHT: 235 LBS | HEIGHT: 68 IN | DIASTOLIC BLOOD PRESSURE: 80 MMHG

## 2019-08-22 DIAGNOSIS — J45.40 MODERATE PERSISTENT ASTHMA, UNSPECIFIED WHETHER COMPLICATED: ICD-10-CM

## 2019-08-22 DIAGNOSIS — Z51.6 NEED FOR DESENSITIZATION TO ALLERGENS: ICD-10-CM

## 2019-08-22 DIAGNOSIS — J45.40 MODERATE PERSISTENT EXTRINSIC ASTHMA WITHOUT COMPLICATION: ICD-10-CM

## 2019-08-22 DIAGNOSIS — Z91.09 ENCOUNTER FOR DESENSITIZATION TO POLLEN ALLERGEN: Primary | ICD-10-CM

## 2019-08-22 DIAGNOSIS — Z51.6 ENCOUNTER FOR DESENSITIZATION TO POLLEN ALLERGEN: Primary | ICD-10-CM

## 2019-08-22 DIAGNOSIS — J30.89 NON-SEASONAL ALLERGIC RHINITIS, UNSPECIFIED TRIGGER: ICD-10-CM

## 2019-08-22 PROCEDURE — 95117 IMMUNOTHERAPY INJECTIONS: CPT | Performed by: NURSE PRACTITIONER

## 2019-08-26 ENCOUNTER — OFFICE VISIT (OUTPATIENT)
Dept: PULMONOLOGY | Age: 64
End: 2019-08-26
Payer: MEDICARE

## 2019-08-26 ENCOUNTER — NURSE ONLY (OUTPATIENT)
Dept: ALLERGY | Age: 64
End: 2019-08-26
Payer: MEDICARE

## 2019-08-26 VITALS
HEIGHT: 68 IN | WEIGHT: 233.6 LBS | DIASTOLIC BLOOD PRESSURE: 82 MMHG | HEART RATE: 88 BPM | RESPIRATION RATE: 16 BRPM | BODY MASS INDEX: 35.4 KG/M2 | TEMPERATURE: 97.9 F | SYSTOLIC BLOOD PRESSURE: 126 MMHG

## 2019-08-26 VITALS
HEIGHT: 68 IN | DIASTOLIC BLOOD PRESSURE: 68 MMHG | SYSTOLIC BLOOD PRESSURE: 118 MMHG | BODY MASS INDEX: 35.46 KG/M2 | WEIGHT: 234 LBS | TEMPERATURE: 99.1 F | OXYGEN SATURATION: 93 % | HEART RATE: 88 BPM

## 2019-08-26 DIAGNOSIS — Z91.09 ENCOUNTER FOR DESENSITIZATION TO POLLEN ALLERGEN: Primary | ICD-10-CM

## 2019-08-26 DIAGNOSIS — Z91.09 POLLEN ALLERGIES: ICD-10-CM

## 2019-08-26 DIAGNOSIS — Z51.6 ENCOUNTER FOR DESENSITIZATION TO POLLEN ALLERGEN: Primary | ICD-10-CM

## 2019-08-26 DIAGNOSIS — Z91.09 MITE ALLERGY: ICD-10-CM

## 2019-08-26 DIAGNOSIS — J30.89 ALLERGY TO DUST: ICD-10-CM

## 2019-08-26 DIAGNOSIS — J30.81 ALLERGY TO DOG DANDER: ICD-10-CM

## 2019-08-26 DIAGNOSIS — J44.9 STAGE 1 MILD COPD BY GOLD CLASSIFICATION (HCC): Primary | ICD-10-CM

## 2019-08-26 DIAGNOSIS — Z91.048 ALLERGY TO MOLD: ICD-10-CM

## 2019-08-26 DIAGNOSIS — Z87.891 PERSONAL HISTORY OF TOBACCO USE, PRESENTING HAZARDS TO HEALTH: ICD-10-CM

## 2019-08-26 DIAGNOSIS — J30.1 ALLERGY TO TREES: ICD-10-CM

## 2019-08-26 DIAGNOSIS — Z87.891 PERSONAL HISTORY OF TOBACCO USE: ICD-10-CM

## 2019-08-26 DIAGNOSIS — J30.81 CAT ALLERGIES: ICD-10-CM

## 2019-08-26 DIAGNOSIS — J45.50 SEVERE PERSISTENT ASTHMA WITHOUT COMPLICATION: ICD-10-CM

## 2019-08-26 PROCEDURE — G8417 CALC BMI ABV UP PARAM F/U: HCPCS | Performed by: INTERNAL MEDICINE

## 2019-08-26 PROCEDURE — 3017F COLORECTAL CA SCREEN DOC REV: CPT | Performed by: INTERNAL MEDICINE

## 2019-08-26 PROCEDURE — 3023F SPIROM DOC REV: CPT | Performed by: INTERNAL MEDICINE

## 2019-08-26 PROCEDURE — G8926 SPIRO NO PERF OR DOC: HCPCS | Performed by: INTERNAL MEDICINE

## 2019-08-26 PROCEDURE — G0296 VISIT TO DETERM LDCT ELIG: HCPCS | Performed by: INTERNAL MEDICINE

## 2019-08-26 PROCEDURE — 99214 OFFICE O/P EST MOD 30 MIN: CPT | Performed by: INTERNAL MEDICINE

## 2019-08-26 PROCEDURE — 95117 IMMUNOTHERAPY INJECTIONS: CPT | Performed by: NURSE PRACTITIONER

## 2019-08-26 PROCEDURE — G8427 DOCREV CUR MEDS BY ELIG CLIN: HCPCS | Performed by: INTERNAL MEDICINE

## 2019-08-26 PROCEDURE — 4040F PNEUMOC VAC/ADMIN/RCVD: CPT | Performed by: INTERNAL MEDICINE

## 2019-08-26 PROCEDURE — 1036F TOBACCO NON-USER: CPT | Performed by: INTERNAL MEDICINE

## 2019-08-26 PROCEDURE — 1123F ACP DISCUSS/DSCN MKR DOCD: CPT | Performed by: INTERNAL MEDICINE

## 2019-08-26 RX ORDER — HYDROXYZINE HYDROCHLORIDE 10 MG/1
10 TABLET, FILM COATED ORAL NIGHTLY
COMMUNITY
End: 2021-08-16

## 2019-08-26 RX ORDER — BETAMETHASONE DIPROPIONATE 0.5 MG/G
CREAM TOPICAL
Refills: 0 | COMMUNITY
Start: 2019-08-23 | End: 2019-08-26 | Stop reason: SDUPTHER

## 2019-08-26 RX ORDER — BETAMETHASONE DIPROPIONATE 0.5 MG/G
CREAM TOPICAL 2 TIMES DAILY
COMMUNITY
End: 2020-09-09

## 2019-08-26 NOTE — PROGRESS NOTES
Neck Circumference -   18.75  Mallampati - 4  Lung Nodule Screening     [] Qualifies    [] Does not qualify   [] Declined    [x] Completed ct chest 2/19

## 2019-08-29 ENCOUNTER — NURSE ONLY (OUTPATIENT)
Dept: ALLERGY | Age: 64
End: 2019-08-29
Payer: MEDICARE

## 2019-08-29 VITALS
SYSTOLIC BLOOD PRESSURE: 122 MMHG | HEART RATE: 88 BPM | WEIGHT: 233.2 LBS | DIASTOLIC BLOOD PRESSURE: 74 MMHG | RESPIRATION RATE: 20 BRPM | HEIGHT: 68 IN | TEMPERATURE: 98.3 F | BODY MASS INDEX: 35.34 KG/M2

## 2019-08-29 DIAGNOSIS — J30.89 ALLERGY TO DUST: ICD-10-CM

## 2019-08-29 DIAGNOSIS — Z51.6 ENCOUNTER FOR DESENSITIZATION TO POLLEN ALLERGEN: Primary | ICD-10-CM

## 2019-08-29 DIAGNOSIS — Z91.09 MITE ALLERGY: ICD-10-CM

## 2019-08-29 DIAGNOSIS — Z91.09 ENCOUNTER FOR DESENSITIZATION TO POLLEN ALLERGEN: Primary | ICD-10-CM

## 2019-08-29 DIAGNOSIS — J30.1 ALLERGY TO TREES: ICD-10-CM

## 2019-08-29 DIAGNOSIS — J30.81 CAT ALLERGIES: ICD-10-CM

## 2019-08-29 DIAGNOSIS — Z91.048 ALLERGY TO MOLD: ICD-10-CM

## 2019-08-29 DIAGNOSIS — Z91.09 POLLEN ALLERGIES: ICD-10-CM

## 2019-08-29 DIAGNOSIS — J30.81 ALLERGY TO DOG DANDER: ICD-10-CM

## 2019-08-29 PROCEDURE — 95117 IMMUNOTHERAPY INJECTIONS: CPT | Performed by: NURSE PRACTITIONER

## 2019-09-05 ENCOUNTER — NURSE ONLY (OUTPATIENT)
Dept: ALLERGY | Age: 64
End: 2019-09-05
Payer: MEDICARE

## 2019-09-05 VITALS
HEART RATE: 82 BPM | WEIGHT: 233 LBS | HEIGHT: 68 IN | SYSTOLIC BLOOD PRESSURE: 126 MMHG | TEMPERATURE: 97.8 F | RESPIRATION RATE: 12 BRPM | DIASTOLIC BLOOD PRESSURE: 78 MMHG | BODY MASS INDEX: 35.31 KG/M2

## 2019-09-05 DIAGNOSIS — Z51.6 ENCOUNTER FOR DESENSITIZATION TO POLLEN ALLERGEN: Primary | ICD-10-CM

## 2019-09-05 DIAGNOSIS — Z51.6 NEED FOR DESENSITIZATION TO ALLERGENS: ICD-10-CM

## 2019-09-05 DIAGNOSIS — J45.40 MODERATE PERSISTENT ASTHMA, UNSPECIFIED WHETHER COMPLICATED: ICD-10-CM

## 2019-09-05 DIAGNOSIS — Z91.09 ENCOUNTER FOR DESENSITIZATION TO POLLEN ALLERGEN: Primary | ICD-10-CM

## 2019-09-05 DIAGNOSIS — J45.50 SEVERE PERSISTENT EXTRINSIC ASTHMA WITHOUT COMPLICATION: ICD-10-CM

## 2019-09-05 DIAGNOSIS — J30.89 NON-SEASONAL ALLERGIC RHINITIS, UNSPECIFIED TRIGGER: ICD-10-CM

## 2019-09-05 PROCEDURE — 95117 IMMUNOTHERAPY INJECTIONS: CPT | Performed by: NURSE PRACTITIONER

## 2019-09-09 ENCOUNTER — NURSE ONLY (OUTPATIENT)
Dept: ALLERGY | Age: 64
End: 2019-09-09
Payer: MEDICARE

## 2019-09-09 VITALS
SYSTOLIC BLOOD PRESSURE: 134 MMHG | BODY MASS INDEX: 35.61 KG/M2 | TEMPERATURE: 97.6 F | HEART RATE: 64 BPM | DIASTOLIC BLOOD PRESSURE: 80 MMHG | WEIGHT: 234.2 LBS | RESPIRATION RATE: 12 BRPM

## 2019-09-09 DIAGNOSIS — J30.89 ALLERGY TO DUST: ICD-10-CM

## 2019-09-09 DIAGNOSIS — J30.1 ALLERGY TO TREES: ICD-10-CM

## 2019-09-09 DIAGNOSIS — Z51.6 ENCOUNTER FOR DESENSITIZATION TO POLLEN ALLERGEN: Primary | ICD-10-CM

## 2019-09-09 DIAGNOSIS — Z91.09 ENCOUNTER FOR DESENSITIZATION TO POLLEN ALLERGEN: Primary | ICD-10-CM

## 2019-09-09 DIAGNOSIS — Z91.048 ALLERGY TO MOLD: ICD-10-CM

## 2019-09-09 PROCEDURE — 95117 IMMUNOTHERAPY INJECTIONS: CPT | Performed by: NURSE PRACTITIONER

## 2019-09-11 ENCOUNTER — OFFICE VISIT (OUTPATIENT)
Dept: FAMILY MEDICINE CLINIC | Age: 64
End: 2019-09-11

## 2019-09-11 VITALS
HEART RATE: 80 BPM | RESPIRATION RATE: 16 BRPM | WEIGHT: 234.2 LBS | SYSTOLIC BLOOD PRESSURE: 130 MMHG | DIASTOLIC BLOOD PRESSURE: 72 MMHG | BODY MASS INDEX: 35.49 KG/M2 | HEIGHT: 68 IN

## 2019-09-11 DIAGNOSIS — I10 ESSENTIAL HYPERTENSION: ICD-10-CM

## 2019-09-11 DIAGNOSIS — J44.9 ASTHMA-COPD OVERLAP SYNDROME (HCC): ICD-10-CM

## 2019-09-11 DIAGNOSIS — E78.5 HYPERLIPIDEMIA, UNSPECIFIED HYPERLIPIDEMIA TYPE: ICD-10-CM

## 2019-09-11 DIAGNOSIS — E11.8 TYPE 2 DIABETES MELLITUS WITH COMPLICATION, WITHOUT LONG-TERM CURRENT USE OF INSULIN (HCC): Primary | ICD-10-CM

## 2019-09-11 LAB
CHP ED QC CHECK: ABNORMAL
GLUCOSE BLD-MCNC: 265 MG/DL
HBA1C MFR BLD: 7.1 %

## 2019-09-11 PROCEDURE — G8417 CALC BMI ABV UP PARAM F/U: HCPCS | Performed by: FAMILY MEDICINE

## 2019-09-11 PROCEDURE — 99213 OFFICE O/P EST LOW 20 MIN: CPT | Performed by: FAMILY MEDICINE

## 2019-09-11 PROCEDURE — 82962 GLUCOSE BLOOD TEST: CPT | Performed by: FAMILY MEDICINE

## 2019-09-11 PROCEDURE — 4040F PNEUMOC VAC/ADMIN/RCVD: CPT | Performed by: FAMILY MEDICINE

## 2019-09-11 PROCEDURE — 3017F COLORECTAL CA SCREEN DOC REV: CPT | Performed by: FAMILY MEDICINE

## 2019-09-11 PROCEDURE — 83036 HEMOGLOBIN GLYCOSYLATED A1C: CPT | Performed by: FAMILY MEDICINE

## 2019-09-11 PROCEDURE — G8427 DOCREV CUR MEDS BY ELIG CLIN: HCPCS | Performed by: FAMILY MEDICINE

## 2019-09-11 PROCEDURE — 1036F TOBACCO NON-USER: CPT | Performed by: FAMILY MEDICINE

## 2019-09-11 PROCEDURE — 1123F ACP DISCUSS/DSCN MKR DOCD: CPT | Performed by: FAMILY MEDICINE

## 2019-09-11 RX ORDER — SIMVASTATIN 40 MG
40 TABLET ORAL NIGHTLY
Qty: 30 TABLET | Refills: 5 | Status: SHIPPED | OUTPATIENT
Start: 2019-09-11 | End: 2019-11-14 | Stop reason: SDUPTHER

## 2019-09-11 RX ORDER — ROPINIROLE 1 MG/1
1 TABLET, FILM COATED ORAL 3 TIMES DAILY
Qty: 90 TABLET | Refills: 5 | Status: SHIPPED | OUTPATIENT
Start: 2019-09-11 | End: 2020-05-01

## 2019-09-11 RX ORDER — GLIMEPIRIDE 4 MG/1
4 TABLET ORAL 2 TIMES DAILY
Qty: 60 TABLET | Refills: 5 | Status: SHIPPED | OUTPATIENT
Start: 2019-09-11 | End: 2020-01-13 | Stop reason: SDUPTHER

## 2019-09-11 ASSESSMENT — ENCOUNTER SYMPTOMS
COUGH: 0
EYES NEGATIVE: 1
DIARRHEA: 0
ABDOMINAL PAIN: 0
VOMITING: 0
BACK PAIN: 1
SHORTNESS OF BREATH: 0
CONSTIPATION: 0
CHEST TIGHTNESS: 0
NAUSEA: 0
BLOOD IN STOOL: 0

## 2019-09-12 ENCOUNTER — NURSE ONLY (OUTPATIENT)
Dept: ALLERGY | Age: 64
End: 2019-09-12
Payer: MEDICARE

## 2019-09-12 ENCOUNTER — NURSE ONLY (OUTPATIENT)
Dept: FAMILY MEDICINE CLINIC | Age: 64
End: 2019-09-12
Payer: MEDICARE

## 2019-09-12 VITALS
BODY MASS INDEX: 35.64 KG/M2 | SYSTOLIC BLOOD PRESSURE: 126 MMHG | WEIGHT: 234.4 LBS | HEART RATE: 68 BPM | TEMPERATURE: 97.5 F | RESPIRATION RATE: 16 BRPM | DIASTOLIC BLOOD PRESSURE: 72 MMHG

## 2019-09-12 DIAGNOSIS — J30.89 ALLERGY TO DUST: ICD-10-CM

## 2019-09-12 DIAGNOSIS — E78.5 HYPERLIPIDEMIA, UNSPECIFIED HYPERLIPIDEMIA TYPE: ICD-10-CM

## 2019-09-12 DIAGNOSIS — Z91.09 ENCOUNTER FOR DESENSITIZATION TO POLLEN ALLERGEN: Primary | ICD-10-CM

## 2019-09-12 DIAGNOSIS — Z51.6 ENCOUNTER FOR DESENSITIZATION TO POLLEN ALLERGEN: Primary | ICD-10-CM

## 2019-09-12 DIAGNOSIS — I10 ESSENTIAL HYPERTENSION: ICD-10-CM

## 2019-09-12 DIAGNOSIS — Z91.048 ALLERGY TO MOLD: ICD-10-CM

## 2019-09-12 DIAGNOSIS — J30.1 ALLERGY TO TREES: ICD-10-CM

## 2019-09-12 LAB
ALBUMIN SERPL-MCNC: 4.7 G/DL (ref 3.5–5.1)
ALP BLD-CCNC: 37 U/L (ref 38–126)
ALT SERPL-CCNC: 28 U/L (ref 11–66)
ANION GAP SERPL CALCULATED.3IONS-SCNC: 14 MEQ/L (ref 8–16)
AST SERPL-CCNC: 28 U/L (ref 5–40)
BASOPHILS # BLD: 0.4 %
BASOPHILS ABSOLUTE: 0 THOU/MM3 (ref 0–0.1)
BILIRUB SERPL-MCNC: 0.5 MG/DL (ref 0.3–1.2)
BUN BLDV-MCNC: 16 MG/DL (ref 7–22)
CALCIUM SERPL-MCNC: 9.6 MG/DL (ref 8.5–10.5)
CHLORIDE BLD-SCNC: 99 MEQ/L (ref 98–111)
CHOLESTEROL, TOTAL: 117 MG/DL (ref 100–199)
CO2: 27 MEQ/L (ref 23–33)
CREAT SERPL-MCNC: 0.9 MG/DL (ref 0.4–1.2)
EOSINOPHIL # BLD: 1.1 %
EOSINOPHILS ABSOLUTE: 0.1 THOU/MM3 (ref 0–0.4)
ERYTHROCYTE [DISTWIDTH] IN BLOOD BY AUTOMATED COUNT: 15.2 % (ref 11.5–14.5)
ERYTHROCYTE [DISTWIDTH] IN BLOOD BY AUTOMATED COUNT: 51.3 FL (ref 35–45)
GFR SERPL CREATININE-BSD FRML MDRD: 84 ML/MIN/1.73M2
GLUCOSE BLD-MCNC: 180 MG/DL (ref 70–108)
HCT VFR BLD CALC: 54.9 % (ref 42–52)
HDLC SERPL-MCNC: 31 MG/DL
HEMOGLOBIN: 18.2 GM/DL (ref 14–18)
IMMATURE GRANS (ABS): 0.04 THOU/MM3 (ref 0–0.07)
IMMATURE GRANULOCYTES: 0 %
LDL CHOLESTEROL CALCULATED: 54 MG/DL
LYMPHOCYTES # BLD: 18.9 %
LYMPHOCYTES ABSOLUTE: 1.8 THOU/MM3 (ref 1–4.8)
MCH RBC QN AUTO: 30.8 PG (ref 26–33)
MCHC RBC AUTO-ENTMCNC: 33.2 GM/DL (ref 32.2–35.5)
MCV RBC AUTO: 93.1 FL (ref 80–94)
MONOCYTES # BLD: 7.7 %
MONOCYTES ABSOLUTE: 0.7 THOU/MM3 (ref 0.4–1.3)
NUCLEATED RED BLOOD CELLS: 0 /100 WBC
PLATELET # BLD: 121 THOU/MM3 (ref 130–400)
PMV BLD AUTO: 12.6 FL (ref 9.4–12.4)
POTASSIUM SERPL-SCNC: 4.5 MEQ/L (ref 3.5–5.2)
RBC # BLD: 5.9 MILL/MM3 (ref 4.7–6.1)
SEG NEUTROPHILS: 71.5 %
SEGMENTED NEUTROPHILS ABSOLUTE COUNT: 6.8 THOU/MM3 (ref 1.8–7.7)
SODIUM BLD-SCNC: 140 MEQ/L (ref 135–145)
TOTAL PROTEIN: 7.4 G/DL (ref 6.1–8)
TRIGL SERPL-MCNC: 161 MG/DL (ref 0–199)
WBC # BLD: 9.5 THOU/MM3 (ref 4.8–10.8)

## 2019-09-12 PROCEDURE — 95117 IMMUNOTHERAPY INJECTIONS: CPT | Performed by: NURSE PRACTITIONER

## 2019-09-12 PROCEDURE — 36415 COLL VENOUS BLD VENIPUNCTURE: CPT | Performed by: NURSE PRACTITIONER

## 2019-09-16 ENCOUNTER — NURSE ONLY (OUTPATIENT)
Dept: ALLERGY | Age: 64
End: 2019-09-16
Payer: MEDICARE

## 2019-09-16 VITALS
BODY MASS INDEX: 35.82 KG/M2 | HEART RATE: 68 BPM | TEMPERATURE: 97.8 F | DIASTOLIC BLOOD PRESSURE: 76 MMHG | WEIGHT: 235.6 LBS | RESPIRATION RATE: 16 BRPM | SYSTOLIC BLOOD PRESSURE: 124 MMHG

## 2019-09-16 DIAGNOSIS — J30.1 ALLERGY TO TREES: ICD-10-CM

## 2019-09-16 DIAGNOSIS — J30.89 ALLERGY TO DUST: ICD-10-CM

## 2019-09-16 DIAGNOSIS — Z51.6 ENCOUNTER FOR DESENSITIZATION TO POLLEN ALLERGEN: Primary | ICD-10-CM

## 2019-09-16 DIAGNOSIS — Z91.048 ALLERGY TO MOLD: ICD-10-CM

## 2019-09-16 DIAGNOSIS — Z91.09 ENCOUNTER FOR DESENSITIZATION TO POLLEN ALLERGEN: Primary | ICD-10-CM

## 2019-09-16 PROCEDURE — 95117 IMMUNOTHERAPY INJECTIONS: CPT | Performed by: NURSE PRACTITIONER

## 2019-09-19 ENCOUNTER — TELEPHONE (OUTPATIENT)
Dept: FAMILY MEDICINE CLINIC | Age: 64
End: 2019-09-19

## 2019-09-19 ENCOUNTER — NURSE ONLY (OUTPATIENT)
Dept: ALLERGY | Age: 64
End: 2019-09-19
Payer: MEDICARE

## 2019-09-19 VITALS
DIASTOLIC BLOOD PRESSURE: 78 MMHG | WEIGHT: 233 LBS | BODY MASS INDEX: 35.31 KG/M2 | HEIGHT: 68 IN | RESPIRATION RATE: 16 BRPM | SYSTOLIC BLOOD PRESSURE: 136 MMHG | TEMPERATURE: 98.1 F | HEART RATE: 80 BPM

## 2019-09-19 DIAGNOSIS — Z51.6 ENCOUNTER FOR DESENSITIZATION TO POLLEN ALLERGEN: ICD-10-CM

## 2019-09-19 DIAGNOSIS — J30.81 ALLERGY TO DOG DANDER: ICD-10-CM

## 2019-09-19 DIAGNOSIS — Z91.09 ENCOUNTER FOR DESENSITIZATION TO POLLEN ALLERGEN: ICD-10-CM

## 2019-09-19 DIAGNOSIS — D69.6 THROMBOCYTOPENIA (HCC): Primary | ICD-10-CM

## 2019-09-19 DIAGNOSIS — Z91.048 ALLERGY TO MOLD: Primary | ICD-10-CM

## 2019-09-19 DIAGNOSIS — Z91.09 POLLEN ALLERGIES: ICD-10-CM

## 2019-09-19 DIAGNOSIS — J30.89 NON-SEASONAL ALLERGIC RHINITIS, UNSPECIFIED TRIGGER: ICD-10-CM

## 2019-09-19 DIAGNOSIS — Z91.09 MITE ALLERGY: ICD-10-CM

## 2019-09-19 DIAGNOSIS — J30.1 ALLERGY TO TREES: ICD-10-CM

## 2019-09-19 DIAGNOSIS — J30.81 CAT ALLERGIES: ICD-10-CM

## 2019-09-19 DIAGNOSIS — J30.89 ALLERGY TO DUST: ICD-10-CM

## 2019-09-19 PROCEDURE — 95117 IMMUNOTHERAPY INJECTIONS: CPT | Performed by: NURSE PRACTITIONER

## 2019-09-19 NOTE — TELEPHONE ENCOUNTER
----- Message from Cori Saha MD sent at 9/18/2019  4:20 PM EDT -----  Please let him know that her platelet were slightly decreased and also his hemoglobin was increased  We need to check a CBC in 3-4 weeks make sure that he keeps himself hydrated.

## 2019-09-23 ENCOUNTER — NURSE ONLY (OUTPATIENT)
Dept: ALLERGY | Age: 64
End: 2019-09-23
Payer: MEDICARE

## 2019-09-23 VITALS
BODY MASS INDEX: 35.31 KG/M2 | HEIGHT: 68 IN | DIASTOLIC BLOOD PRESSURE: 76 MMHG | RESPIRATION RATE: 22 BRPM | HEART RATE: 82 BPM | TEMPERATURE: 98.2 F | SYSTOLIC BLOOD PRESSURE: 124 MMHG | WEIGHT: 233 LBS

## 2019-09-23 DIAGNOSIS — J45.50 SEVERE PERSISTENT ASTHMA WITHOUT COMPLICATION: ICD-10-CM

## 2019-09-23 DIAGNOSIS — J30.2 SEASONAL AND PERENNIAL ALLERGIC RHINOCONJUNCTIVITIS: ICD-10-CM

## 2019-09-23 DIAGNOSIS — Z51.6 ENCOUNTER FOR DESENSITIZATION TO POLLEN ALLERGEN: ICD-10-CM

## 2019-09-23 DIAGNOSIS — H10.10 SEASONAL AND PERENNIAL ALLERGIC RHINOCONJUNCTIVITIS: ICD-10-CM

## 2019-09-23 DIAGNOSIS — J45.50 SEVERE PERSISTENT EXTRINSIC ASTHMA WITHOUT COMPLICATION: ICD-10-CM

## 2019-09-23 DIAGNOSIS — J30.1 ACUTE SEASONAL ALLERGIC RHINITIS DUE TO POLLEN: ICD-10-CM

## 2019-09-23 DIAGNOSIS — Z91.09 ENCOUNTER FOR DESENSITIZATION TO POLLEN ALLERGEN: ICD-10-CM

## 2019-09-23 DIAGNOSIS — J82.83 EOSINOPHILIC ASTHMA: ICD-10-CM

## 2019-09-23 DIAGNOSIS — J30.89 SEASONAL AND PERENNIAL ALLERGIC RHINOCONJUNCTIVITIS: ICD-10-CM

## 2019-09-23 DIAGNOSIS — J45.40 MODERATE PERSISTENT ASTHMA, UNSPECIFIED WHETHER COMPLICATED: ICD-10-CM

## 2019-09-23 DIAGNOSIS — J30.89 NON-SEASONAL ALLERGIC RHINITIS, UNSPECIFIED TRIGGER: Primary | ICD-10-CM

## 2019-09-23 PROCEDURE — 95117 IMMUNOTHERAPY INJECTIONS: CPT | Performed by: NURSE PRACTITIONER

## 2019-09-30 ENCOUNTER — NURSE ONLY (OUTPATIENT)
Dept: ALLERGY | Age: 64
End: 2019-09-30
Payer: MEDICARE

## 2019-09-30 VITALS
RESPIRATION RATE: 16 BRPM | DIASTOLIC BLOOD PRESSURE: 70 MMHG | HEART RATE: 88 BPM | WEIGHT: 235.7 LBS | HEIGHT: 68 IN | TEMPERATURE: 97.3 F | SYSTOLIC BLOOD PRESSURE: 136 MMHG | BODY MASS INDEX: 35.72 KG/M2

## 2019-09-30 DIAGNOSIS — H10.10 SEASONAL AND PERENNIAL ALLERGIC RHINOCONJUNCTIVITIS: ICD-10-CM

## 2019-09-30 DIAGNOSIS — Z91.09 MITE ALLERGY: ICD-10-CM

## 2019-09-30 DIAGNOSIS — J30.81 ALLERGY TO DOG DANDER: ICD-10-CM

## 2019-09-30 DIAGNOSIS — J30.89 ALLERGY TO DUST: ICD-10-CM

## 2019-09-30 DIAGNOSIS — J30.1 ACUTE SEASONAL ALLERGIC RHINITIS DUE TO POLLEN: ICD-10-CM

## 2019-09-30 DIAGNOSIS — Z91.09 POLLEN ALLERGIES: ICD-10-CM

## 2019-09-30 DIAGNOSIS — Z91.09 ENCOUNTER FOR DESENSITIZATION TO POLLEN ALLERGEN: Primary | ICD-10-CM

## 2019-09-30 DIAGNOSIS — J30.2 SEASONAL AND PERENNIAL ALLERGIC RHINOCONJUNCTIVITIS: ICD-10-CM

## 2019-09-30 DIAGNOSIS — J30.1 ALLERGY TO TREES: ICD-10-CM

## 2019-09-30 DIAGNOSIS — Z51.6 ENCOUNTER FOR DESENSITIZATION TO POLLEN ALLERGEN: Primary | ICD-10-CM

## 2019-09-30 DIAGNOSIS — J30.81 CAT ALLERGIES: ICD-10-CM

## 2019-09-30 DIAGNOSIS — Z91.048 ALLERGY TO MOLD: ICD-10-CM

## 2019-09-30 DIAGNOSIS — J30.89 SEASONAL AND PERENNIAL ALLERGIC RHINOCONJUNCTIVITIS: ICD-10-CM

## 2019-09-30 PROCEDURE — 95117 IMMUNOTHERAPY INJECTIONS: CPT | Performed by: NURSE PRACTITIONER

## 2019-10-03 ENCOUNTER — NURSE ONLY (OUTPATIENT)
Dept: ALLERGY | Age: 64
End: 2019-10-03
Payer: MEDICARE

## 2019-10-03 VITALS
TEMPERATURE: 98.4 F | RESPIRATION RATE: 12 BRPM | SYSTOLIC BLOOD PRESSURE: 144 MMHG | BODY MASS INDEX: 35.92 KG/M2 | HEART RATE: 68 BPM | HEIGHT: 68 IN | DIASTOLIC BLOOD PRESSURE: 76 MMHG | WEIGHT: 237 LBS

## 2019-10-03 DIAGNOSIS — Z91.09 ENCOUNTER FOR DESENSITIZATION TO POLLEN ALLERGEN: Primary | ICD-10-CM

## 2019-10-03 DIAGNOSIS — Z51.6 ENCOUNTER FOR DESENSITIZATION TO POLLEN ALLERGEN: Primary | ICD-10-CM

## 2019-10-03 DIAGNOSIS — J30.2 SEASONAL AND PERENNIAL ALLERGIC RHINOCONJUNCTIVITIS: ICD-10-CM

## 2019-10-03 DIAGNOSIS — J30.89 SEASONAL AND PERENNIAL ALLERGIC RHINOCONJUNCTIVITIS: ICD-10-CM

## 2019-10-03 DIAGNOSIS — H10.10 SEASONAL AND PERENNIAL ALLERGIC RHINOCONJUNCTIVITIS: ICD-10-CM

## 2019-10-03 DIAGNOSIS — J30.89 NON-SEASONAL ALLERGIC RHINITIS, UNSPECIFIED TRIGGER: ICD-10-CM

## 2019-10-03 DIAGNOSIS — J30.1 ACUTE SEASONAL ALLERGIC RHINITIS DUE TO POLLEN: ICD-10-CM

## 2019-10-03 DIAGNOSIS — J45.40 MODERATE PERSISTENT ASTHMA, UNSPECIFIED WHETHER COMPLICATED: ICD-10-CM

## 2019-10-03 PROCEDURE — 95117 IMMUNOTHERAPY INJECTIONS: CPT | Performed by: NURSE PRACTITIONER

## 2019-10-07 ENCOUNTER — NURSE ONLY (OUTPATIENT)
Dept: ALLERGY | Age: 64
End: 2019-10-07
Payer: MEDICARE

## 2019-10-07 VITALS
RESPIRATION RATE: 12 BRPM | HEART RATE: 68 BPM | DIASTOLIC BLOOD PRESSURE: 84 MMHG | WEIGHT: 237.5 LBS | TEMPERATURE: 97.4 F | BODY MASS INDEX: 36.11 KG/M2 | SYSTOLIC BLOOD PRESSURE: 138 MMHG

## 2019-10-07 DIAGNOSIS — J30.89 ALLERGY TO DUST: ICD-10-CM

## 2019-10-07 DIAGNOSIS — J30.81 CAT ALLERGIES: ICD-10-CM

## 2019-10-07 DIAGNOSIS — Z91.048 ALLERGY TO MOLD: ICD-10-CM

## 2019-10-07 DIAGNOSIS — Z91.09 ENCOUNTER FOR DESENSITIZATION TO POLLEN ALLERGEN: Primary | ICD-10-CM

## 2019-10-07 DIAGNOSIS — J30.1 ALLERGY TO TREES: ICD-10-CM

## 2019-10-07 DIAGNOSIS — Z51.6 ENCOUNTER FOR DESENSITIZATION TO POLLEN ALLERGEN: Primary | ICD-10-CM

## 2019-10-07 PROCEDURE — 95117 IMMUNOTHERAPY INJECTIONS: CPT | Performed by: NURSE PRACTITIONER

## 2019-10-14 ENCOUNTER — NURSE ONLY (OUTPATIENT)
Dept: ALLERGY | Age: 64
End: 2019-10-14
Payer: MEDICARE

## 2019-10-14 VITALS
SYSTOLIC BLOOD PRESSURE: 132 MMHG | DIASTOLIC BLOOD PRESSURE: 80 MMHG | WEIGHT: 239.1 LBS | RESPIRATION RATE: 12 BRPM | HEART RATE: 68 BPM | BODY MASS INDEX: 36.36 KG/M2 | TEMPERATURE: 97.6 F

## 2019-10-14 DIAGNOSIS — Z91.048 ALLERGY TO MOLD: ICD-10-CM

## 2019-10-14 DIAGNOSIS — J30.89 ALLERGY TO DUST: ICD-10-CM

## 2019-10-14 DIAGNOSIS — J30.1 ALLERGY TO TREES: ICD-10-CM

## 2019-10-14 DIAGNOSIS — Z51.6 ENCOUNTER FOR DESENSITIZATION TO POLLEN ALLERGEN: Primary | ICD-10-CM

## 2019-10-14 DIAGNOSIS — Z91.09 ENCOUNTER FOR DESENSITIZATION TO POLLEN ALLERGEN: Primary | ICD-10-CM

## 2019-10-14 PROCEDURE — 95117 IMMUNOTHERAPY INJECTIONS: CPT | Performed by: NURSE PRACTITIONER

## 2019-10-21 ENCOUNTER — NURSE ONLY (OUTPATIENT)
Dept: ALLERGY | Age: 64
End: 2019-10-21
Payer: MEDICARE

## 2019-10-21 VITALS
HEART RATE: 72 BPM | SYSTOLIC BLOOD PRESSURE: 132 MMHG | BODY MASS INDEX: 35.97 KG/M2 | DIASTOLIC BLOOD PRESSURE: 80 MMHG | TEMPERATURE: 97.8 F | WEIGHT: 236.6 LBS | RESPIRATION RATE: 16 BRPM

## 2019-10-21 DIAGNOSIS — Z91.048 ALLERGY TO MOLD: ICD-10-CM

## 2019-10-21 DIAGNOSIS — Z91.09 ENCOUNTER FOR DESENSITIZATION TO POLLEN ALLERGEN: Primary | ICD-10-CM

## 2019-10-21 DIAGNOSIS — J30.89 ALLERGY TO DUST: ICD-10-CM

## 2019-10-21 DIAGNOSIS — Z51.6 ENCOUNTER FOR DESENSITIZATION TO POLLEN ALLERGEN: Primary | ICD-10-CM

## 2019-10-21 DIAGNOSIS — J30.1 ALLERGY TO TREES: ICD-10-CM

## 2019-10-21 PROCEDURE — 95117 IMMUNOTHERAPY INJECTIONS: CPT | Performed by: NURSE PRACTITIONER

## 2019-10-28 ENCOUNTER — NURSE ONLY (OUTPATIENT)
Dept: FAMILY MEDICINE CLINIC | Age: 64
End: 2019-10-28
Payer: MEDICARE

## 2019-10-28 DIAGNOSIS — D69.6 THROMBOCYTOPENIA (HCC): ICD-10-CM

## 2019-10-28 LAB
BASOPHILS # BLD: 0.5 %
BASOPHILS ABSOLUTE: 0 THOU/MM3 (ref 0–0.1)
EOSINOPHIL # BLD: 2.1 %
EOSINOPHILS ABSOLUTE: 0.2 THOU/MM3 (ref 0–0.4)
ERYTHROCYTE [DISTWIDTH] IN BLOOD BY AUTOMATED COUNT: 15.1 % (ref 11.5–14.5)
ERYTHROCYTE [DISTWIDTH] IN BLOOD BY AUTOMATED COUNT: 50.2 FL (ref 35–45)
HCT VFR BLD CALC: 54.1 % (ref 42–52)
HEMOGLOBIN: 17.8 GM/DL (ref 14–18)
IMMATURE GRANS (ABS): 0.07 THOU/MM3 (ref 0–0.07)
IMMATURE GRANULOCYTES: 0.7 %
LYMPHOCYTES # BLD: 24.6 %
LYMPHOCYTES ABSOLUTE: 2.3 THOU/MM3 (ref 1–4.8)
MCH RBC QN AUTO: 30.5 PG (ref 26–33)
MCHC RBC AUTO-ENTMCNC: 32.9 GM/DL (ref 32.2–35.5)
MCV RBC AUTO: 92.6 FL (ref 80–94)
MONOCYTES # BLD: 8.5 %
MONOCYTES ABSOLUTE: 0.8 THOU/MM3 (ref 0.4–1.3)
NUCLEATED RED BLOOD CELLS: 0 /100 WBC
PLATELET # BLD: 137 THOU/MM3 (ref 130–400)
PMV BLD AUTO: 11.7 FL (ref 9.4–12.4)
RBC # BLD: 5.84 MILL/MM3 (ref 4.7–6.1)
SEG NEUTROPHILS: 63.6 %
SEGMENTED NEUTROPHILS ABSOLUTE COUNT: 6 THOU/MM3 (ref 1.8–7.7)
WBC # BLD: 9.5 THOU/MM3 (ref 4.8–10.8)

## 2019-10-28 PROCEDURE — 36415 COLL VENOUS BLD VENIPUNCTURE: CPT | Performed by: NURSE PRACTITIONER

## 2019-10-30 DIAGNOSIS — E34.9 TESTOSTERONE DEFICIENCY: ICD-10-CM

## 2019-10-31 ENCOUNTER — NURSE ONLY (OUTPATIENT)
Dept: FAMILY MEDICINE CLINIC | Age: 64
End: 2019-10-31

## 2019-10-31 ENCOUNTER — NURSE ONLY (OUTPATIENT)
Dept: ALLERGY | Age: 64
End: 2019-10-31
Payer: MEDICARE

## 2019-10-31 VITALS
TEMPERATURE: 97.9 F | RESPIRATION RATE: 20 BRPM | HEART RATE: 84 BPM | BODY MASS INDEX: 36.09 KG/M2 | SYSTOLIC BLOOD PRESSURE: 139 MMHG | HEIGHT: 68 IN | WEIGHT: 238.1 LBS | DIASTOLIC BLOOD PRESSURE: 90 MMHG

## 2019-10-31 DIAGNOSIS — Z51.6 NEED FOR DESENSITIZATION TO ALLERGENS: Primary | ICD-10-CM

## 2019-10-31 DIAGNOSIS — Z91.048 ALLERGY TO MOLD: ICD-10-CM

## 2019-10-31 DIAGNOSIS — J30.89 ALLERGY TO DUST: ICD-10-CM

## 2019-10-31 DIAGNOSIS — Z23 NEED FOR INFLUENZA VACCINATION: Primary | ICD-10-CM

## 2019-10-31 PROCEDURE — G0008 ADMIN INFLUENZA VIRUS VAC: HCPCS | Performed by: FAMILY MEDICINE

## 2019-10-31 PROCEDURE — 90756 CCIIV4 VACC ABX FREE IM: CPT | Performed by: FAMILY MEDICINE

## 2019-10-31 PROCEDURE — 95117 IMMUNOTHERAPY INJECTIONS: CPT | Performed by: NURSE PRACTITIONER

## 2019-10-31 RX ORDER — TESTOSTERONE CYPIONATE 200 MG/ML
INJECTION INTRAMUSCULAR
Qty: 2 ML | Refills: 5 | Status: SHIPPED | OUTPATIENT
Start: 2019-10-31 | End: 2020-08-13

## 2019-11-04 ENCOUNTER — NURSE ONLY (OUTPATIENT)
Dept: ALLERGY | Age: 64
End: 2019-11-04
Payer: MEDICARE

## 2019-11-04 VITALS
TEMPERATURE: 98.1 F | BODY MASS INDEX: 36.04 KG/M2 | DIASTOLIC BLOOD PRESSURE: 84 MMHG | HEART RATE: 68 BPM | RESPIRATION RATE: 12 BRPM | WEIGHT: 237 LBS | SYSTOLIC BLOOD PRESSURE: 132 MMHG

## 2019-11-04 DIAGNOSIS — Z91.048 ALLERGY TO MOLD: ICD-10-CM

## 2019-11-04 DIAGNOSIS — Z51.6 NEED FOR DESENSITIZATION TO ALLERGENS: Primary | ICD-10-CM

## 2019-11-04 DIAGNOSIS — J30.89 ALLERGY TO DUST: ICD-10-CM

## 2019-11-04 PROCEDURE — 95117 IMMUNOTHERAPY INJECTIONS: CPT | Performed by: NURSE PRACTITIONER

## 2019-11-07 ENCOUNTER — NURSE ONLY (OUTPATIENT)
Dept: ALLERGY | Age: 64
End: 2019-11-07
Payer: MEDICARE

## 2019-11-07 VITALS — DIASTOLIC BLOOD PRESSURE: 80 MMHG | SYSTOLIC BLOOD PRESSURE: 140 MMHG

## 2019-11-07 DIAGNOSIS — Z91.048 ALLERGY TO MOLD: ICD-10-CM

## 2019-11-07 DIAGNOSIS — Z51.6 NEED FOR DESENSITIZATION TO ALLERGENS: Primary | ICD-10-CM

## 2019-11-07 PROCEDURE — 95117 IMMUNOTHERAPY INJECTIONS: CPT | Performed by: NURSE PRACTITIONER

## 2019-11-14 ENCOUNTER — NURSE ONLY (OUTPATIENT)
Dept: ALLERGY | Age: 64
End: 2019-11-14
Payer: MEDICARE

## 2019-11-14 VITALS
SYSTOLIC BLOOD PRESSURE: 118 MMHG | BODY MASS INDEX: 36.04 KG/M2 | HEIGHT: 68 IN | RESPIRATION RATE: 22 BRPM | DIASTOLIC BLOOD PRESSURE: 78 MMHG | HEART RATE: 88 BPM

## 2019-11-14 DIAGNOSIS — J45.40 MODERATE PERSISTENT ASTHMA, UNSPECIFIED WHETHER COMPLICATED: ICD-10-CM

## 2019-11-14 DIAGNOSIS — H10.10 SEASONAL AND PERENNIAL ALLERGIC RHINOCONJUNCTIVITIS: ICD-10-CM

## 2019-11-14 DIAGNOSIS — J30.1 ACUTE SEASONAL ALLERGIC RHINITIS DUE TO POLLEN: ICD-10-CM

## 2019-11-14 DIAGNOSIS — J30.89 SEASONAL AND PERENNIAL ALLERGIC RHINOCONJUNCTIVITIS: ICD-10-CM

## 2019-11-14 DIAGNOSIS — Z91.09 ENCOUNTER FOR DESENSITIZATION TO POLLEN ALLERGEN: ICD-10-CM

## 2019-11-14 DIAGNOSIS — J30.89 NON-SEASONAL ALLERGIC RHINITIS, UNSPECIFIED TRIGGER: ICD-10-CM

## 2019-11-14 DIAGNOSIS — J30.2 SEASONAL AND PERENNIAL ALLERGIC RHINOCONJUNCTIVITIS: ICD-10-CM

## 2019-11-14 DIAGNOSIS — Z51.6 ENCOUNTER FOR DESENSITIZATION TO POLLEN ALLERGEN: ICD-10-CM

## 2019-11-14 DIAGNOSIS — Z91.048 ALLERGY TO MOLD: ICD-10-CM

## 2019-11-14 DIAGNOSIS — J30.1 ALLERGY TO TREES: ICD-10-CM

## 2019-11-14 DIAGNOSIS — Z51.6 NEED FOR DESENSITIZATION TO ALLERGENS: Primary | ICD-10-CM

## 2019-11-14 PROCEDURE — 95117 IMMUNOTHERAPY INJECTIONS: CPT | Performed by: NURSE PRACTITIONER

## 2019-11-14 RX ORDER — SERTRALINE HYDROCHLORIDE 100 MG/1
TABLET, FILM COATED ORAL
Qty: 60 TABLET | Refills: 5 | Status: SHIPPED | OUTPATIENT
Start: 2019-11-14 | End: 2020-05-15

## 2019-11-14 RX ORDER — SIMVASTATIN 40 MG
40 TABLET ORAL NIGHTLY
Qty: 30 TABLET | Refills: 5 | Status: SHIPPED | OUTPATIENT
Start: 2019-11-14 | End: 2020-06-01 | Stop reason: SINTOL

## 2019-11-18 ENCOUNTER — NURSE ONLY (OUTPATIENT)
Dept: ALLERGY | Age: 64
End: 2019-11-18
Payer: MEDICARE

## 2019-11-18 VITALS — SYSTOLIC BLOOD PRESSURE: 128 MMHG | DIASTOLIC BLOOD PRESSURE: 72 MMHG | RESPIRATION RATE: 16 BRPM | HEART RATE: 68 BPM

## 2019-11-18 DIAGNOSIS — Z91.09 ENCOUNTER FOR DESENSITIZATION TO POLLEN ALLERGEN: Primary | ICD-10-CM

## 2019-11-18 DIAGNOSIS — J30.89 ALLERGY TO DUST: ICD-10-CM

## 2019-11-18 DIAGNOSIS — Z91.048 ALLERGY TO MOLD: ICD-10-CM

## 2019-11-18 DIAGNOSIS — Z51.6 ENCOUNTER FOR DESENSITIZATION TO POLLEN ALLERGEN: Primary | ICD-10-CM

## 2019-11-18 DIAGNOSIS — J30.1 ALLERGY TO TREES: ICD-10-CM

## 2019-11-18 PROCEDURE — 95117 IMMUNOTHERAPY INJECTIONS: CPT | Performed by: NURSE PRACTITIONER

## 2019-11-25 ENCOUNTER — NURSE ONLY (OUTPATIENT)
Dept: ALLERGY | Age: 64
End: 2019-11-25
Payer: MEDICARE

## 2019-11-25 VITALS — RESPIRATION RATE: 16 BRPM | SYSTOLIC BLOOD PRESSURE: 136 MMHG | HEART RATE: 72 BPM | DIASTOLIC BLOOD PRESSURE: 84 MMHG

## 2019-11-25 DIAGNOSIS — Z51.6 ENCOUNTER FOR DESENSITIZATION TO POLLEN ALLERGEN: Primary | ICD-10-CM

## 2019-11-25 DIAGNOSIS — Z91.048 ALLERGY TO MOLD: ICD-10-CM

## 2019-11-25 DIAGNOSIS — J30.1 ALLERGY TO TREES: ICD-10-CM

## 2019-11-25 DIAGNOSIS — J45.50 SEVERE PERSISTENT ASTHMA WITHOUT COMPLICATION: Primary | ICD-10-CM

## 2019-11-25 DIAGNOSIS — J30.89 ALLERGY TO DUST: ICD-10-CM

## 2019-11-25 DIAGNOSIS — Z91.09 ENCOUNTER FOR DESENSITIZATION TO POLLEN ALLERGEN: Primary | ICD-10-CM

## 2019-11-25 PROCEDURE — 95117 IMMUNOTHERAPY INJECTIONS: CPT | Performed by: NURSE PRACTITIONER

## 2019-12-02 ENCOUNTER — OFFICE VISIT (OUTPATIENT)
Dept: FAMILY MEDICINE CLINIC | Age: 64
End: 2019-12-02

## 2019-12-02 ENCOUNTER — NURSE ONLY (OUTPATIENT)
Dept: ALLERGY | Age: 64
End: 2019-12-02
Payer: MEDICARE

## 2019-12-02 VITALS — SYSTOLIC BLOOD PRESSURE: 132 MMHG | HEART RATE: 72 BPM | DIASTOLIC BLOOD PRESSURE: 72 MMHG | RESPIRATION RATE: 16 BRPM

## 2019-12-02 VITALS
BODY MASS INDEX: 35.39 KG/M2 | HEART RATE: 82 BPM | TEMPERATURE: 98.1 F | WEIGHT: 233.5 LBS | RESPIRATION RATE: 18 BRPM | DIASTOLIC BLOOD PRESSURE: 80 MMHG | HEIGHT: 68 IN | SYSTOLIC BLOOD PRESSURE: 128 MMHG

## 2019-12-02 DIAGNOSIS — J30.89 ALLERGY TO DUST: ICD-10-CM

## 2019-12-02 DIAGNOSIS — Z51.6 ENCOUNTER FOR DESENSITIZATION TO POLLEN ALLERGEN: Primary | ICD-10-CM

## 2019-12-02 DIAGNOSIS — J30.1 ALLERGY TO TREES: ICD-10-CM

## 2019-12-02 DIAGNOSIS — J02.9 ACUTE PHARYNGITIS, UNSPECIFIED ETIOLOGY: Primary | ICD-10-CM

## 2019-12-02 DIAGNOSIS — Z91.048 ALLERGY TO MOLD: ICD-10-CM

## 2019-12-02 DIAGNOSIS — Z91.09 ENCOUNTER FOR DESENSITIZATION TO POLLEN ALLERGEN: Primary | ICD-10-CM

## 2019-12-02 DIAGNOSIS — J45.50 SEVERE PERSISTENT ASTHMA WITHOUT COMPLICATION: ICD-10-CM

## 2019-12-02 PROCEDURE — 99213 OFFICE O/P EST LOW 20 MIN: CPT | Performed by: FAMILY MEDICINE

## 2019-12-02 PROCEDURE — G8427 DOCREV CUR MEDS BY ELIG CLIN: HCPCS | Performed by: FAMILY MEDICINE

## 2019-12-02 PROCEDURE — 1123F ACP DISCUSS/DSCN MKR DOCD: CPT | Performed by: FAMILY MEDICINE

## 2019-12-02 PROCEDURE — 3017F COLORECTAL CA SCREEN DOC REV: CPT | Performed by: FAMILY MEDICINE

## 2019-12-02 PROCEDURE — 4040F PNEUMOC VAC/ADMIN/RCVD: CPT | Performed by: FAMILY MEDICINE

## 2019-12-02 PROCEDURE — G8417 CALC BMI ABV UP PARAM F/U: HCPCS | Performed by: FAMILY MEDICINE

## 2019-12-02 PROCEDURE — 1036F TOBACCO NON-USER: CPT | Performed by: FAMILY MEDICINE

## 2019-12-02 PROCEDURE — 95117 IMMUNOTHERAPY INJECTIONS: CPT | Performed by: NURSE PRACTITIONER

## 2019-12-02 RX ORDER — AMOXICILLIN AND CLAVULANATE POTASSIUM 875; 125 MG/1; MG/1
1 TABLET, FILM COATED ORAL 2 TIMES DAILY
Qty: 20 TABLET | Refills: 0 | Status: SHIPPED | OUTPATIENT
Start: 2019-12-02 | End: 2019-12-12

## 2019-12-02 ASSESSMENT — ENCOUNTER SYMPTOMS
SHORTNESS OF BREATH: 0
NAUSEA: 0
SORE THROAT: 1
RHINORRHEA: 1
DIARRHEA: 0
COUGH: 1
ABDOMINAL PAIN: 0
CHEST TIGHTNESS: 0
BLOOD IN STOOL: 0
VOMITING: 0
CONSTIPATION: 0
EYES NEGATIVE: 1

## 2019-12-04 DIAGNOSIS — J45.50 SEVERE PERSISTENT ASTHMA WITHOUT COMPLICATION: ICD-10-CM

## 2019-12-05 ENCOUNTER — NURSE ONLY (OUTPATIENT)
Dept: ALLERGY | Age: 64
End: 2019-12-05
Payer: MEDICARE

## 2019-12-05 VITALS — DIASTOLIC BLOOD PRESSURE: 82 MMHG | RESPIRATION RATE: 12 BRPM | SYSTOLIC BLOOD PRESSURE: 134 MMHG | HEART RATE: 68 BPM

## 2019-12-05 DIAGNOSIS — J30.89 ALLERGY TO DUST: ICD-10-CM

## 2019-12-05 DIAGNOSIS — J30.1 ALLERGY TO TREES: ICD-10-CM

## 2019-12-05 DIAGNOSIS — Z51.6 ENCOUNTER FOR DESENSITIZATION TO POLLEN ALLERGEN: Primary | ICD-10-CM

## 2019-12-05 DIAGNOSIS — Z91.09 ENCOUNTER FOR DESENSITIZATION TO POLLEN ALLERGEN: Primary | ICD-10-CM

## 2019-12-05 DIAGNOSIS — Z91.048 ALLERGY TO MOLD: ICD-10-CM

## 2019-12-05 PROCEDURE — 95117 IMMUNOTHERAPY INJECTIONS: CPT | Performed by: NURSE PRACTITIONER

## 2019-12-09 ENCOUNTER — NURSE ONLY (OUTPATIENT)
Dept: ALLERGY | Age: 64
End: 2019-12-09
Payer: MEDICARE

## 2019-12-09 VITALS
BODY MASS INDEX: 35.5 KG/M2 | SYSTOLIC BLOOD PRESSURE: 152 MMHG | RESPIRATION RATE: 12 BRPM | HEIGHT: 68 IN | DIASTOLIC BLOOD PRESSURE: 82 MMHG | HEART RATE: 84 BPM

## 2019-12-09 DIAGNOSIS — Z51.6 ENCOUNTER FOR DESENSITIZATION TO POLLEN ALLERGEN: Primary | ICD-10-CM

## 2019-12-09 DIAGNOSIS — J30.89 NON-SEASONAL ALLERGIC RHINITIS, UNSPECIFIED TRIGGER: ICD-10-CM

## 2019-12-09 DIAGNOSIS — J82.83 EOSINOPHILIC ASTHMA: ICD-10-CM

## 2019-12-09 DIAGNOSIS — J45.40 MODERATE PERSISTENT ASTHMA, UNSPECIFIED WHETHER COMPLICATED: ICD-10-CM

## 2019-12-09 DIAGNOSIS — J30.89 SEASONAL AND PERENNIAL ALLERGIC RHINOCONJUNCTIVITIS: ICD-10-CM

## 2019-12-09 DIAGNOSIS — H10.10 SEASONAL AND PERENNIAL ALLERGIC RHINOCONJUNCTIVITIS: ICD-10-CM

## 2019-12-09 DIAGNOSIS — J30.2 SEASONAL AND PERENNIAL ALLERGIC RHINOCONJUNCTIVITIS: ICD-10-CM

## 2019-12-09 DIAGNOSIS — J45.50 SEVERE PERSISTENT EXTRINSIC ASTHMA WITHOUT COMPLICATION: ICD-10-CM

## 2019-12-09 DIAGNOSIS — J30.1 ACUTE SEASONAL ALLERGIC RHINITIS DUE TO POLLEN: ICD-10-CM

## 2019-12-09 DIAGNOSIS — Z91.09 ENCOUNTER FOR DESENSITIZATION TO POLLEN ALLERGEN: Primary | ICD-10-CM

## 2019-12-09 DIAGNOSIS — Z51.6 NEED FOR DESENSITIZATION TO ALLERGENS: ICD-10-CM

## 2019-12-09 PROCEDURE — 95117 IMMUNOTHERAPY INJECTIONS: CPT | Performed by: NURSE PRACTITIONER

## 2019-12-12 ENCOUNTER — NURSE ONLY (OUTPATIENT)
Dept: ALLERGY | Age: 64
End: 2019-12-12
Payer: MEDICARE

## 2019-12-12 VITALS — DIASTOLIC BLOOD PRESSURE: 80 MMHG | HEART RATE: 68 BPM | RESPIRATION RATE: 12 BRPM | SYSTOLIC BLOOD PRESSURE: 134 MMHG

## 2019-12-12 DIAGNOSIS — J30.89 ALLERGY TO DUST: ICD-10-CM

## 2019-12-12 DIAGNOSIS — Z51.6 ENCOUNTER FOR DESENSITIZATION TO POLLEN ALLERGEN: Primary | ICD-10-CM

## 2019-12-12 DIAGNOSIS — Z91.09 ENCOUNTER FOR DESENSITIZATION TO POLLEN ALLERGEN: Primary | ICD-10-CM

## 2019-12-12 DIAGNOSIS — J30.1 ALLERGY TO TREES: ICD-10-CM

## 2019-12-12 DIAGNOSIS — Z91.048 ALLERGY TO MOLD: ICD-10-CM

## 2019-12-12 PROCEDURE — 95117 IMMUNOTHERAPY INJECTIONS: CPT | Performed by: NURSE PRACTITIONER

## 2019-12-13 ENCOUNTER — HOSPITAL ENCOUNTER (OUTPATIENT)
Age: 64
Discharge: HOME | End: 2019-12-13
Payer: MEDICARE

## 2019-12-13 ENCOUNTER — OFFICE VISIT (OUTPATIENT)
Dept: FAMILY MEDICINE CLINIC | Age: 64
End: 2019-12-13

## 2019-12-13 VITALS
HEART RATE: 78 BPM | SYSTOLIC BLOOD PRESSURE: 128 MMHG | WEIGHT: 234.25 LBS | HEIGHT: 68 IN | DIASTOLIC BLOOD PRESSURE: 64 MMHG | BODY MASS INDEX: 35.5 KG/M2 | RESPIRATION RATE: 16 BRPM

## 2019-12-13 DIAGNOSIS — E11.8 TYPE 2 DIABETES MELLITUS WITH COMPLICATION, WITHOUT LONG-TERM CURRENT USE OF INSULIN (HCC): Primary | ICD-10-CM

## 2019-12-13 DIAGNOSIS — I10 ESSENTIAL HYPERTENSION: ICD-10-CM

## 2019-12-13 DIAGNOSIS — E34.9 TESTOSTERONE DEFICIENCY: ICD-10-CM

## 2019-12-13 DIAGNOSIS — E78.5 HYPERLIPIDEMIA, UNSPECIFIED HYPERLIPIDEMIA TYPE: ICD-10-CM

## 2019-12-13 DIAGNOSIS — J44.9 ASTHMA-COPD OVERLAP SYNDROME (HCC): ICD-10-CM

## 2019-12-13 LAB
CHP ED QC CHECK: ABNORMAL
GLUCOSE BLD-MCNC: 207 MG/DL
HBA1C MFR BLD: 7 %
HCT VFR BLD CALC: 57 % (ref 42–52)
HEMOGLOBIN: 19.1 GM/DL (ref 14–18)
PROSTATE SPECIFIC ANTIGEN: 1.92 NG/ML (ref 0–1)

## 2019-12-13 PROCEDURE — G8427 DOCREV CUR MEDS BY ELIG CLIN: HCPCS | Performed by: FAMILY MEDICINE

## 2019-12-13 PROCEDURE — 3017F COLORECTAL CA SCREEN DOC REV: CPT | Performed by: FAMILY MEDICINE

## 2019-12-13 PROCEDURE — 1036F TOBACCO NON-USER: CPT | Performed by: FAMILY MEDICINE

## 2019-12-13 PROCEDURE — 83036 HEMOGLOBIN GLYCOSYLATED A1C: CPT | Performed by: FAMILY MEDICINE

## 2019-12-13 PROCEDURE — G8417 CALC BMI ABV UP PARAM F/U: HCPCS | Performed by: FAMILY MEDICINE

## 2019-12-13 PROCEDURE — 84153 ASSAY OF PSA TOTAL: CPT

## 2019-12-13 PROCEDURE — 82962 GLUCOSE BLOOD TEST: CPT | Performed by: FAMILY MEDICINE

## 2019-12-13 PROCEDURE — 85014 HEMATOCRIT: CPT

## 2019-12-13 PROCEDURE — 1123F ACP DISCUSS/DSCN MKR DOCD: CPT | Performed by: FAMILY MEDICINE

## 2019-12-13 PROCEDURE — 36415 COLL VENOUS BLD VENIPUNCTURE: CPT

## 2019-12-13 PROCEDURE — 84403 ASSAY OF TOTAL TESTOSTERONE: CPT

## 2019-12-13 PROCEDURE — 99213 OFFICE O/P EST LOW 20 MIN: CPT | Performed by: FAMILY MEDICINE

## 2019-12-13 PROCEDURE — 85018 HEMOGLOBIN: CPT

## 2019-12-13 PROCEDURE — 4040F PNEUMOC VAC/ADMIN/RCVD: CPT | Performed by: FAMILY MEDICINE

## 2019-12-13 ASSESSMENT — ENCOUNTER SYMPTOMS
SHORTNESS OF BREATH: 0
DIARRHEA: 0
CONSTIPATION: 0
VOMITING: 0
COUGH: 0
BACK PAIN: 1
BLOOD IN STOOL: 0
EYES NEGATIVE: 1
ABDOMINAL PAIN: 0
CHEST TIGHTNESS: 0
NAUSEA: 0

## 2019-12-16 ENCOUNTER — NURSE ONLY (OUTPATIENT)
Dept: ALLERGY | Age: 64
End: 2019-12-16
Payer: MEDICARE

## 2019-12-16 VITALS — RESPIRATION RATE: 12 BRPM | SYSTOLIC BLOOD PRESSURE: 128 MMHG | DIASTOLIC BLOOD PRESSURE: 84 MMHG | HEART RATE: 72 BPM

## 2019-12-16 DIAGNOSIS — J30.1 ALLERGY TO TREES: ICD-10-CM

## 2019-12-16 DIAGNOSIS — J30.89 ALLERGY TO DUST: ICD-10-CM

## 2019-12-16 DIAGNOSIS — Z51.6 ENCOUNTER FOR DESENSITIZATION TO POLLEN ALLERGEN: Primary | ICD-10-CM

## 2019-12-16 DIAGNOSIS — Z91.09 ENCOUNTER FOR DESENSITIZATION TO POLLEN ALLERGEN: Primary | ICD-10-CM

## 2019-12-16 DIAGNOSIS — Z91.048 ALLERGY TO MOLD: ICD-10-CM

## 2019-12-16 LAB — TESTOSTERONE TOTAL: 911 NG/DL (ref 300–720)

## 2019-12-16 PROCEDURE — 95117 IMMUNOTHERAPY INJECTIONS: CPT | Performed by: NURSE PRACTITIONER

## 2019-12-17 ENCOUNTER — OFFICE VISIT (OUTPATIENT)
Dept: UROLOGY | Age: 64
End: 2019-12-17
Payer: MEDICARE

## 2019-12-17 VITALS
DIASTOLIC BLOOD PRESSURE: 74 MMHG | WEIGHT: 233 LBS | BODY MASS INDEX: 35.31 KG/M2 | SYSTOLIC BLOOD PRESSURE: 123 MMHG | HEIGHT: 68 IN

## 2019-12-17 DIAGNOSIS — N52.03 COMBINED ARTERIAL INSUFFICIENCY AND CORPORO-VENOUS OCCLUSIVE ERECTILE DYSFUNCTION: ICD-10-CM

## 2019-12-17 DIAGNOSIS — D75.1 ERYTHROCYTOSIS: ICD-10-CM

## 2019-12-17 DIAGNOSIS — R79.89 LOW TESTOSTERONE: Primary | ICD-10-CM

## 2019-12-17 DIAGNOSIS — E29.1 HYPOGONADISM IN MALE: ICD-10-CM

## 2019-12-17 LAB
BILIRUBIN URINE: NEGATIVE
BLOOD URINE, POC: NEGATIVE
CHARACTER, URINE: CLEAR
COLOR, URINE: YELLOW
GLUCOSE URINE: NEGATIVE MG/DL
KETONES, URINE: NEGATIVE
LEUKOCYTE CLUMPS, URINE: NEGATIVE
NITRITE, URINE: NEGATIVE
PH, URINE: 5.5 (ref 5–9)
PROTEIN, URINE: NEGATIVE MG/DL
SPECIFIC GRAVITY, URINE: 1.02 (ref 1–1.03)
UROBILINOGEN, URINE: 0.2 EU/DL (ref 0–1)

## 2019-12-17 PROCEDURE — 1123F ACP DISCUSS/DSCN MKR DOCD: CPT | Performed by: NURSE PRACTITIONER

## 2019-12-17 PROCEDURE — 99214 OFFICE O/P EST MOD 30 MIN: CPT | Performed by: NURSE PRACTITIONER

## 2019-12-17 PROCEDURE — 81003 URINALYSIS AUTO W/O SCOPE: CPT | Performed by: NURSE PRACTITIONER

## 2019-12-17 PROCEDURE — 4040F PNEUMOC VAC/ADMIN/RCVD: CPT | Performed by: NURSE PRACTITIONER

## 2019-12-17 PROCEDURE — 1036F TOBACCO NON-USER: CPT | Performed by: NURSE PRACTITIONER

## 2019-12-17 PROCEDURE — 3017F COLORECTAL CA SCREEN DOC REV: CPT | Performed by: NURSE PRACTITIONER

## 2019-12-17 PROCEDURE — G8482 FLU IMMUNIZE ORDER/ADMIN: HCPCS | Performed by: NURSE PRACTITIONER

## 2019-12-17 PROCEDURE — G8427 DOCREV CUR MEDS BY ELIG CLIN: HCPCS | Performed by: NURSE PRACTITIONER

## 2019-12-17 PROCEDURE — G8417 CALC BMI ABV UP PARAM F/U: HCPCS | Performed by: NURSE PRACTITIONER

## 2019-12-17 ASSESSMENT — ENCOUNTER SYMPTOMS
GASTROINTESTINAL NEGATIVE: 1
SHORTNESS OF BREATH: 0
EYES NEGATIVE: 1
COUGH: 0

## 2019-12-18 ENCOUNTER — TELEPHONE (OUTPATIENT)
Dept: ENT CLINIC | Age: 64
End: 2019-12-18

## 2019-12-19 ENCOUNTER — NURSE ONLY (OUTPATIENT)
Dept: ALLERGY | Age: 64
End: 2019-12-19
Payer: MEDICARE

## 2019-12-19 VITALS — RESPIRATION RATE: 16 BRPM | HEART RATE: 78 BPM | DIASTOLIC BLOOD PRESSURE: 82 MMHG | SYSTOLIC BLOOD PRESSURE: 128 MMHG

## 2019-12-19 DIAGNOSIS — J30.89 ALLERGY TO DUST: ICD-10-CM

## 2019-12-19 DIAGNOSIS — J30.1 ALLERGY TO TREES: ICD-10-CM

## 2019-12-19 DIAGNOSIS — Z51.6 ENCOUNTER FOR DESENSITIZATION TO POLLEN ALLERGEN: Primary | ICD-10-CM

## 2019-12-19 DIAGNOSIS — Z91.09 ENCOUNTER FOR DESENSITIZATION TO POLLEN ALLERGEN: Primary | ICD-10-CM

## 2019-12-19 DIAGNOSIS — Z91.048 ALLERGY TO MOLD: ICD-10-CM

## 2019-12-19 PROCEDURE — 95117 IMMUNOTHERAPY INJECTIONS: CPT | Performed by: NURSE PRACTITIONER

## 2019-12-23 ENCOUNTER — NURSE ONLY (OUTPATIENT)
Dept: ALLERGY | Age: 64
End: 2019-12-23
Payer: MEDICARE

## 2019-12-23 ENCOUNTER — TELEPHONE (OUTPATIENT)
Dept: ENT CLINIC | Age: 64
End: 2019-12-23

## 2019-12-23 VITALS — HEIGHT: 68 IN | BODY MASS INDEX: 35.43 KG/M2 | SYSTOLIC BLOOD PRESSURE: 122 MMHG | DIASTOLIC BLOOD PRESSURE: 84 MMHG

## 2019-12-23 DIAGNOSIS — J82.83 EOSINOPHILIC ASTHMA: ICD-10-CM

## 2019-12-23 DIAGNOSIS — H10.10 SEASONAL AND PERENNIAL ALLERGIC RHINOCONJUNCTIVITIS: ICD-10-CM

## 2019-12-23 DIAGNOSIS — J30.89 NON-SEASONAL ALLERGIC RHINITIS, UNSPECIFIED TRIGGER: ICD-10-CM

## 2019-12-23 DIAGNOSIS — J30.1 ACUTE SEASONAL ALLERGIC RHINITIS DUE TO POLLEN: ICD-10-CM

## 2019-12-23 DIAGNOSIS — D72.10 EOSINOPHILIA: ICD-10-CM

## 2019-12-23 DIAGNOSIS — J30.2 SEASONAL AND PERENNIAL ALLERGIC RHINOCONJUNCTIVITIS: ICD-10-CM

## 2019-12-23 DIAGNOSIS — Z51.6 NEED FOR DESENSITIZATION TO ALLERGENS: ICD-10-CM

## 2019-12-23 DIAGNOSIS — J30.89 SEASONAL AND PERENNIAL ALLERGIC RHINOCONJUNCTIVITIS: ICD-10-CM

## 2019-12-23 DIAGNOSIS — J30.9 ALLERGIC RHINOCONJUNCTIVITIS: ICD-10-CM

## 2019-12-23 DIAGNOSIS — J45.40 MODERATE PERSISTENT ASTHMA, UNSPECIFIED WHETHER COMPLICATED: ICD-10-CM

## 2019-12-23 DIAGNOSIS — Z91.09 ENCOUNTER FOR DESENSITIZATION TO POLLEN ALLERGEN: Primary | ICD-10-CM

## 2019-12-23 DIAGNOSIS — J45.50 SEVERE PERSISTENT ASTHMA WITHOUT COMPLICATION: ICD-10-CM

## 2019-12-23 DIAGNOSIS — H10.10 ALLERGIC RHINOCONJUNCTIVITIS: ICD-10-CM

## 2019-12-23 DIAGNOSIS — Z51.6 ENCOUNTER FOR DESENSITIZATION TO POLLEN ALLERGEN: Primary | ICD-10-CM

## 2019-12-23 PROCEDURE — 95117 IMMUNOTHERAPY INJECTIONS: CPT | Performed by: NURSE PRACTITIONER

## 2020-01-06 ENCOUNTER — NURSE ONLY (OUTPATIENT)
Dept: ALLERGY | Age: 65
End: 2020-01-06
Payer: MEDICARE

## 2020-01-06 VITALS
SYSTOLIC BLOOD PRESSURE: 132 MMHG | HEIGHT: 68 IN | DIASTOLIC BLOOD PRESSURE: 86 MMHG | HEART RATE: 64 BPM | BODY MASS INDEX: 35.43 KG/M2 | RESPIRATION RATE: 12 BRPM

## 2020-01-06 PROCEDURE — 95117 IMMUNOTHERAPY INJECTIONS: CPT | Performed by: NURSE PRACTITIONER

## 2020-01-09 ENCOUNTER — NURSE ONLY (OUTPATIENT)
Dept: ALLERGY | Age: 65
End: 2020-01-09
Payer: MEDICARE

## 2020-01-09 VITALS
DIASTOLIC BLOOD PRESSURE: 82 MMHG | SYSTOLIC BLOOD PRESSURE: 128 MMHG | RESPIRATION RATE: 12 BRPM | HEART RATE: 66 BPM | BODY MASS INDEX: 35.43 KG/M2 | HEIGHT: 68 IN

## 2020-01-09 PROCEDURE — 95117 IMMUNOTHERAPY INJECTIONS: CPT | Performed by: NURSE PRACTITIONER

## 2020-01-13 ENCOUNTER — NURSE ONLY (OUTPATIENT)
Dept: ALLERGY | Age: 65
End: 2020-01-13
Payer: MEDICARE

## 2020-01-13 VITALS
SYSTOLIC BLOOD PRESSURE: 132 MMHG | DIASTOLIC BLOOD PRESSURE: 82 MMHG | BODY MASS INDEX: 35.43 KG/M2 | HEIGHT: 68 IN | RESPIRATION RATE: 14 BRPM | HEART RATE: 72 BPM

## 2020-01-13 PROCEDURE — 95117 IMMUNOTHERAPY INJECTIONS: CPT | Performed by: NURSE PRACTITIONER

## 2020-01-13 RX ORDER — GLIMEPIRIDE 4 MG/1
TABLET ORAL
Qty: 180 TABLET | Refills: 0 | Status: SHIPPED | OUTPATIENT
Start: 2020-01-13 | End: 2020-05-26 | Stop reason: SDUPTHER

## 2020-01-13 RX ORDER — GLIMEPIRIDE 4 MG/1
4 TABLET ORAL 2 TIMES DAILY
Qty: 60 TABLET | Refills: 5 | Status: SHIPPED | OUTPATIENT
Start: 2020-01-13 | End: 2020-01-13

## 2020-01-13 RX ORDER — BUSPIRONE HYDROCHLORIDE 10 MG/1
10 TABLET ORAL 3 TIMES DAILY
Qty: 90 TABLET | Refills: 2 | Status: SHIPPED | OUTPATIENT
Start: 2020-01-13 | End: 2020-05-26 | Stop reason: SDUPTHER

## 2020-01-16 ENCOUNTER — NURSE ONLY (OUTPATIENT)
Dept: ALLERGY | Age: 65
End: 2020-01-16
Payer: MEDICARE

## 2020-01-16 VITALS — RESPIRATION RATE: 16 BRPM | SYSTOLIC BLOOD PRESSURE: 132 MMHG | HEART RATE: 80 BPM | DIASTOLIC BLOOD PRESSURE: 82 MMHG

## 2020-01-16 PROCEDURE — 95117 IMMUNOTHERAPY INJECTIONS: CPT | Performed by: NURSE PRACTITIONER

## 2020-01-17 ENCOUNTER — NURSE ONLY (OUTPATIENT)
Dept: FAMILY MEDICINE CLINIC | Age: 65
End: 2020-01-17
Payer: MEDICARE

## 2020-01-17 LAB
HCT VFR BLD CALC: 58.2 % (ref 42–52)
HEMOGLOBIN: 19.7 GM/DL (ref 14–18)

## 2020-01-17 PROCEDURE — 36415 COLL VENOUS BLD VENIPUNCTURE: CPT | Performed by: NURSE PRACTITIONER

## 2020-01-18 LAB
IGA: 195 MG/DL (ref 70–400)
IGE: 490 IU/ML
IGG: 975 MG/DL (ref 700–1600)
IGM: 127 MG/DL (ref 40–230)

## 2020-01-19 LAB — TESTOSTERONE TOTAL: 145 NG/DL (ref 300–720)

## 2020-01-20 ENCOUNTER — OFFICE VISIT (OUTPATIENT)
Dept: ALLERGY | Age: 65
End: 2020-01-20
Payer: MEDICARE

## 2020-01-20 VITALS
HEIGHT: 68 IN | DIASTOLIC BLOOD PRESSURE: 80 MMHG | SYSTOLIC BLOOD PRESSURE: 126 MMHG | WEIGHT: 226 LBS | BODY MASS INDEX: 34.25 KG/M2 | RESPIRATION RATE: 16 BRPM

## 2020-01-20 PROCEDURE — 4040F PNEUMOC VAC/ADMIN/RCVD: CPT | Performed by: NURSE PRACTITIONER

## 2020-01-20 PROCEDURE — 95117 IMMUNOTHERAPY INJECTIONS: CPT | Performed by: NURSE PRACTITIONER

## 2020-01-20 PROCEDURE — 1123F ACP DISCUSS/DSCN MKR DOCD: CPT | Performed by: NURSE PRACTITIONER

## 2020-01-20 PROCEDURE — 1036F TOBACCO NON-USER: CPT | Performed by: NURSE PRACTITIONER

## 2020-01-20 PROCEDURE — G8482 FLU IMMUNIZE ORDER/ADMIN: HCPCS | Performed by: NURSE PRACTITIONER

## 2020-01-20 PROCEDURE — G8427 DOCREV CUR MEDS BY ELIG CLIN: HCPCS | Performed by: NURSE PRACTITIONER

## 2020-01-20 PROCEDURE — G8417 CALC BMI ABV UP PARAM F/U: HCPCS | Performed by: NURSE PRACTITIONER

## 2020-01-20 PROCEDURE — 3017F COLORECTAL CA SCREEN DOC REV: CPT | Performed by: NURSE PRACTITIONER

## 2020-01-20 PROCEDURE — 99213 OFFICE O/P EST LOW 20 MIN: CPT | Performed by: NURSE PRACTITIONER

## 2020-01-20 ASSESSMENT — ENCOUNTER SYMPTOMS
SHORTNESS OF BREATH: 1
ABDOMINAL PAIN: 0
CONSTIPATION: 0
EYE DISCHARGE: 0
WHEEZING: 0
SORE THROAT: 0
RHINORRHEA: 1
VOMITING: 0
BACK PAIN: 0
COUGH: 0
STRIDOR: 0
EYE PAIN: 0
SINUS PAIN: 0
NAUSEA: 0
EYE REDNESS: 0
DIARRHEA: 0

## 2020-01-20 NOTE — PROGRESS NOTES
Melissa Grover       Family History:   Family History   Problem Relation Age of Onset   [facility] Cancer Mother     Breast Cancer Mother     Stroke Mother     Heart Disease Brother     Diabetes Brother     High Blood Pressure Brother     High Cholesterol Brother        Social History:   Social History     Tobacco Use    Smoking status: Former Smoker     Packs/day: 2.00     Years: 42.00     Pack years: 84.00     Types: Cigarettes     Start date: 1971     Last attempt to quit: 3/3/2017     Years since quittin.8    Smokeless tobacco: Never Used    Tobacco comment: pts uses just nicotine vap   Substance Use Topics    Alcohol use: No     Alcohol/week: 0.0 standard drinks        Allergies:  Patient has no known allergies. CurrentMedications:     Current Outpatient Medications:     busPIRone (BUSPAR) 10 MG tablet, Take 1 tablet by mouth 3 times daily, Disp: 90 tablet, Rfl: 2    glimepiride (AMARYL) 4 MG tablet, TAKE 1 TABLET BY MOUTH TWICE DAILY, Disp: 180 tablet, Rfl: 0    dupilumab (DUPIXENT) 300 MG/2ML SOSY injection, Inject 2 mLs into the skin every 14 days Needs 1st injection of 600mg starting dose. Then patient to start 300mg every 2 weeks, Disp: 2 Syringe, Rfl: 11    sertraline (ZOLOFT) 100 MG tablet, take 1 tablet by mouth twice a day, Disp: 60 tablet, Rfl: 5    simvastatin (ZOCOR) 40 MG tablet, Take 1 tablet by mouth nightly, Disp: 30 tablet, Rfl: 5    testosterone cypionate (DEPOTESTOTERONE CYPIONATE) 200 MG/ML injection, ADMINISTER 1 ML IN THE MUSCLE EVERY 14 DAYS, Disp: 2 mL, Rfl: 5    rOPINIRole (REQUIP) 1 MG tablet, Take 1 tablet by mouth 3 times daily, Disp: 90 tablet, Rfl: 5    hydrOXYzine (ATARAX) 10 MG tablet, Take 10 mg by mouth nightly, Disp: , Rfl:     betamethasone dipropionate (DIPROLENE) 0.05 % cream, Apply topically 2 times daily Apply topically 2 times daily. , Disp: , Rfl:     fluticasone (FLONASE) 50 MCG/ACT nasal spray, 1 spray by Each Nostril route daily, Disp: 1 Bottle, Rfl: Lab Results   Component Value Date    WBC 9.5 10/28/2019    RBC 5.84 10/28/2019    RBC 4.87 03/01/2012    HGB 19.7 01/17/2020    HCT 58.2 01/17/2020    MCV 92.6 10/28/2019    MCH 30.5 10/28/2019    MCHC 32.9 10/28/2019    RDW 14.2 07/24/2017     10/28/2019          IgE   Date/Time Value Ref Range Status   01/17/2020 11:05  (H) <101 IU/mL Final     Comment:     [address] [phone]      IgG   Date/Time Value Ref Range Status   01/17/2020 11:05  700 - 1600 mg/dL Final     Comment:     [address] [phone]     IgA   Date/Time Value Ref Range Status   01/17/2020 11:06  70 - 400 mg/dL Final     Comment:     [address] [phone]      IgM   Date/Time Value Ref Range Status   01/17/2020 11:05  40 - 230 mg/dL Final     Comment:     [address] [phone]       No results found for: CLAUDIA   No results found for: RF       No results found for this or any previous visit. No results found for this or any previous visit. PROCEDURES:  Patient refused PFT today       PFT PERFORMED VIA [facility] SPIR[facility]ETER FOR SHORTNESS OF BREATH SYMPTOMS   04/16/18 at hospital   FVC PREDICTED: 72%                          FEV1 PREDICTED: 71%                          FEV1/FVC % PREDICTED: 99%                     INTERPRETATION: moderate persistent asthma     Skin Testing performed on: 06/10/19    Assessment/Orders:    Diagnosis Orders   1. Encounter for desensitization to allergens     2. Allergy to mold     3. Mite allergy     4. Eosinophilia     5. Allergic rhinoconjunctivitis     6. Severe persistent asthma without complication     7. Allergy to trees     8. Cat allergies     9. Allergy to dog dander     10. Pollen allergies     11. Allergy to dust     12.  Acute seasonal allergic rhinitis due to pollen           Plan:  Follow Up:6 months    Total time sent with patient 30 minutes with greater than 50% in patient education    (Please note that portions of this note may have been completed with a voice recognition program.  Efforts were made to edit the dictation but occasionally words are mis-transcribed.)     Will reduce allergy immunotherapy to once weekly after completing red vial of 1-1 and his building phase.     SANTOSH Lugo CNP  1/20/2020  9:55 AM

## 2020-01-21 ENCOUNTER — TELEPHONE (OUTPATIENT)
Dept: UROLOGY | Age: 65
End: 2020-01-21

## 2020-01-23 ENCOUNTER — TELEPHONE (OUTPATIENT)
Dept: FAMILY MEDICINE CLINIC | Age: 65
End: 2020-01-23

## 2020-01-23 NOTE — TELEPHONE ENCOUNTER
Yony's Hemoglobin and Hematocrit are elevated. He should stop Testosterone injections now. I recommend he donate blood to help bring these levels down. Elevated H&H levels can put him at risk for blood clots, heart attack, and stroke. I will send a referral to hematology/oncology to evaluate. I recommend he start taking ASA daily as well. Please call the patient as soon as possible.

## 2020-01-23 NOTE — TELEPHONE ENCOUNTER
----- Message from Maritza Gandara MD sent at 1/22/2020  4:42 PM EST -----  Is he following with Dr Brennan Schwartz ?

## 2020-01-23 NOTE — TELEPHONE ENCOUNTER
Yes, pt is following with him, stated he flunked the stress test, not enough blood to lower left lobe. Gita wouldn't do cath d/t hemoglobin being increased and referred him to a hematologist Jesica Iglesias). Stated they are taking 2 pints of blood then Edd Wilkins will check to see what he can do on 1/31.

## 2020-01-27 ENCOUNTER — NURSE ONLY (OUTPATIENT)
Dept: ALLERGY | Age: 65
End: 2020-01-27
Payer: MEDICARE

## 2020-01-27 VITALS — HEART RATE: 82 BPM | DIASTOLIC BLOOD PRESSURE: 80 MMHG | RESPIRATION RATE: 16 BRPM | SYSTOLIC BLOOD PRESSURE: 128 MMHG

## 2020-01-27 PROCEDURE — 95117 IMMUNOTHERAPY INJECTIONS: CPT | Performed by: NURSE PRACTITIONER

## 2020-01-30 ENCOUNTER — NURSE ONLY (OUTPATIENT)
Dept: ALLERGY | Age: 65
End: 2020-01-30
Payer: MEDICARE

## 2020-01-30 VITALS — DIASTOLIC BLOOD PRESSURE: 82 MMHG | HEART RATE: 78 BPM | SYSTOLIC BLOOD PRESSURE: 126 MMHG | RESPIRATION RATE: 14 BRPM

## 2020-01-30 PROCEDURE — 95117 IMMUNOTHERAPY INJECTIONS: CPT | Performed by: NURSE PRACTITIONER

## 2020-02-07 ENCOUNTER — HOSPITAL ENCOUNTER (OUTPATIENT)
Age: 65
Setting detail: SPECIMEN
Discharge: HOME | End: 2020-02-07
Payer: MEDICARE

## 2020-02-10 ENCOUNTER — NURSE ONLY (OUTPATIENT)
Dept: ALLERGY | Age: 65
End: 2020-02-10
Payer: MEDICARE

## 2020-02-10 VITALS — HEART RATE: 76 BPM | RESPIRATION RATE: 12 BRPM | SYSTOLIC BLOOD PRESSURE: 128 MMHG | DIASTOLIC BLOOD PRESSURE: 84 MMHG

## 2020-02-10 PROCEDURE — 95117 IMMUNOTHERAPY INJECTIONS: CPT | Performed by: NURSE PRACTITIONER

## 2020-02-12 LAB
ANION GAP SERPL CALCULATED.3IONS-SCNC: 13 MMOL/L (ref 9–17)
BUN BLDV-MCNC: 15 MG/DL (ref 8–23)
BUN/CREAT BLD: ABNORMAL (ref 9–20)
CALCIUM SERPL-MCNC: 9.7 MG/DL (ref 8.6–10.4)
CHLORIDE BLD-SCNC: 99 MMOL/L (ref 98–107)
CO2: 26 MMOL/L (ref 20–31)
CREAT SERPL-MCNC: 0.87 MG/DL (ref 0.7–1.2)
GFR AFRICAN AMERICAN: >60 ML/MIN
GFR NON-AFRICAN AMERICAN: >60 ML/MIN
GFR SERPL CREATININE-BSD FRML MDRD: ABNORMAL ML/MIN/{1.73_M2}
GFR SERPL CREATININE-BSD FRML MDRD: ABNORMAL ML/MIN/{1.73_M2}
GLUCOSE BLD-MCNC: 198 MG/DL (ref 70–99)
POTASSIUM SERPL-SCNC: 5 MMOL/L (ref 3.7–5.3)
SODIUM BLD-SCNC: 138 MMOL/L (ref 135–144)

## 2020-02-16 NOTE — PROGRESS NOTES
[facility]                              Pulmonary medicine clinic follow up note. Patient: Ryan Hansen  : 1955      Lung Nodule Screening     [x]? Qualifies    []? Does not qualify   []? Declined    [x]? Completed    Chief complaint and Platinum:  Ryan Hansen is a 75 y. o.oldmale came for follow up regarding his severe persistent Asthma and mild COPD. No fever or chills. No recent hospitalizations or emergency room visits. He is using inhalers with good compliance. He rarely uses rescue inhaler. He denies any nocturnal symptoms. He came for face to face evaluation as per insurance requirements to continue his home oxygen. He is currently using 3LPM Oxygen via nasal cannula at rest, sleep and 4LPM during activity. He underwent cardiac cath and stent placement by Dr. Jeanna Gilliam 1 week back due to abnormal stress test.    He was also evaluated by Ms Alysia Hills for his multiple allergies. He is currently receiving Immunotherapy along with desensitization therapy for his allergens from her office. He was evaluated by Dr. Lenora Mims from ENT service for his thyroid nodule along with hypertrophied nasal turbinates along with abnormally looking vocal cords noted during bronch. He follows with Dr. Janes Tim for his GERD. He is currently using:  -Symbicort 160/4.5mcg spray MDI, 2 puffs via inhalation BID. -Singulair 10mg 1 tablet at bed time daily. He  -Albuterol HFA 90mcg/Spray MDI, 2puffs  [id number]    He feels better with nebulizer meds compared to inhalers. He was initially referred from Dr. Shanelle Judd for chronic cough. He quit taking Flonase 50mcg/INH 2sprays each nostril daily due to development of nasal bleeds. He used to be on treatment with Symbicort 160/4.5mcg spray MDI, 2 puffs via inhalation BID.       Asthma control (Severity) questionnaire:  Asthma symptoms:  > 2 times  COPD (chronic obstructive pulmonary disease) (HCC)     Depression     panic attacks    Diverticulosis     HTN (hypertension)     Hyperlipidemia     Kidney disorder     Panic attack     Pneumonia     Rash     Recurrent upper respiratory infection (URI)     Sleep apnea     Thumb amputation status 3/13/14    left tip of thumb amputated    Thyroid disease     Type II or unspecified type diabetes mellitus without mention of complication, not stated as uncontrolled        Past Surgical History:   Procedure Laterality Date    BACK SURGERY  2002    BACK SURGERY  08/11/2017    BICEPS TENDON REPAIR Right 08/16/2017    BRONCHOSCOPY      BRONCHOSCOPY N/A 4/5/2019    BRONCHOSCOPY performed by Mariano Bhakta MD at [facility]  4/5/2019    BRONCHOSCOPY ALVEOLAR LAVAGE performed by Mariano Bhakta MD at [facility]   Sharonda Lizeth  07/02 08/05    CARPAL TUNNEL RELEASE  2011    right hand    CHOLECYSTECTOMY  yrs ago    COLONOSCOPY  11/06 02/12 1/14    CORONARY ANGIOPLASTY WITH STENT PLACEMENT  02/2020    ENDOSCOPY, COLON, DIAGNOSTIC      EYE SURGERY      foreign body removal    HERNIA REPAIR  2004    Dr Micah Davidson  2008?     LARYNGOSCOPY  04/28/2016    Laryngoscopy Micro with Biopsy by Dr Aislinn Saenz  01/07    uppp    ROTATOR CUFF REPAIR Left 5-20-15    SKIN BIOPSY      TONSILLECTOMY  1985    UPPER GASTROINTESTINAL ENDOSCOPY  1997    VEIN SURGERY Left September 2014    Dr. Jennifer Ashby       No Known Allergies    Current Outpatient Medications   Medication Sig Dispense Refill    prasugrel (EFFIENT) 10 MG TABS Take 10 mg by mouth daily      busPIRone (BUSPAR) 10 MG tablet Take 1 tablet by mouth 3 times daily 90 tablet 2    glimepiride (AMARYL) 4 MG tablet TAKE 1 TABLET BY MOUTH TWICE DAILY 180 tablet 0    sertraline (ZOLOFT) 100 MG tablet take 1 tablet by mouth twice a day 60 tablet 5    SLEEP  0    HYDROcodone-acetaminophen (NORCO) 5-325 MG per tablet Take 1 tablet by mouth every 6 hours as needed for Pain      Omega-3 Fatty Acids (FISH OIL) 1000 MG CAPS Take 1,000 mg by mouth daily.  sildenafil (VIAGRA) 100 MG tablet Take 1 tablet by mouth as needed. 8 tablet 5    testosterone cypionate (DEPOTESTOTERONE CYPIONATE) 200 MG/ML injection ADMINISTER 1 ML IN THE MUSCLE EVERY 14 DAYS 2 mL 5     No current facility-administered medications for this visit. Family History   Problem Relation Age of Onset    Cancer Mother     Breast Cancer Mother     Stroke Mother     Heart Disease Brother     Diabetes Brother     High Blood Pressure Brother     High Cholesterol Brother          Physical Exam     VITALS:    /80 (Site: Left Upper Arm, Position: Sitting, Cuff Size: Medium Adult)   Pulse 70   Temp 97.6 °F (36.4 °C) Comment: Oral  Resp 16   Ht 5' 8\" (1.727 m)   Wt 233 lb 12.8 oz (106.1 kg)   SpO2 96% Comment: on RA  BMI 35.55 kg/m²   Nursing note and vitals reviewed. Constitutional: Patient appears moderately built and moderately nourished. No distress. Patient is oriented to person, place, and time. HENT:   Head: Normocephalic and atraumatic. Right Ear: External ear normal.   Left Ear: External ear normal.   Mouth/Throat: Oropharynx is clear and moist.  No oral thrush. Eyes: Conjunctivae are normal. Pupils are equal, round, and reactive to light. No scleral icterus. Neck: Neck supple. No JVD present. No tracheal deviation present. Cardiovascular: Normal rate, regular rhythm, normal heart sounds. No murmur heard. Pulmonary/Chest: Effort normal and breath sounds normal. No stridor. No respiratory distress. No wheezes. No rales. Patient exhibits no tenderness. Abdominal: Soft. Patient exhibits no distension. No tenderness. Musculoskeletal: Normal range of motion. Extremities: Patient exhibits no edema and no tenderness.    Lymphadenopathy:  No cervical opacities demonstrated along the bilateral paraspinal regions on axial image 43 at the T9-10 level. These findings are nonspecific. Additional characterization could be obtained with a contrast enhanced thoracic spine MRI. Ordering Provider: [address] [place] [address]    IMPRESSION:   Normal x-ray examination of the chest.            Respiratory cultures:  01/28/2019 11:44 AM   Narrative   Performed by: [facility]   Source: Expectorated sputum       Site:           Current Antibiotics: not stated   Susceptibility     Escherichia coli (1)     Antibiotic Interpretation CAIT Status    cefTRIAXone Sensitive <=1 mcg/mL Final    gentamicin Sensitive <=1 mcg/mL Final    cefOXitin Sensitive <=4 mcg/mL Final    levofloxacin Sensitive <=0.12 mcg/mL Final    cefuroxime Intermediate   Final    tetracycline Sensitive <=1 mcg/mL Final    trimethoprim-sulfamethoxazole Sensitive <=20 mcg/mL       Bronchoscopy results:  Hospital performed: [facility]. Date of procedure: 4/5/2019  Procedure: Flexible diagnostic fiberoptic bronchoscopy without fluoroscopy. During procedure Bronch washings obtained  from bilateral lower lobes. Bronchioalveolar lavage was performed from right lower lobe posterior segment. Bronch washings:   Acid fast bacilli:  negative  Fungal cultures: Candida albicans   Routine cultures: Escherichia coli   Viral cultures:      negative  Legionella cultures:  negative  Cytology: No malignant cells seen. Multinucleated giant cells. Alveolar macrophages.   Susceptibility     Escherichia coli (1)     Antibiotic Interpretation CAIT Status    gentamicin Sensitive <=1 mcg/mL Final    tetracycline Sensitive <=1 mcg/mL Final    cefOXitin Sensitive <=4 mcg/mL Final    trimethoprim-sulfamethoxazole Sensitive <=20 mcg/mL Final    ciprofloxacin Sensitive =0.016 mcg/mL Final    cefuroxime Sensitive           BAL ( Broncho alveolar lavage):  Acid fast bacilli:   negative  Fungal Lab Results From Soft     Component Value Ref Range & Units Status Collected Lab   Allergen Fungi/Mold A. Alternata Ige 0. 87High   <=0.34 kU/L Final 04/19/2019 12:00 PM [facility]   Allergen Box Elder < 0.10  <=0.34 kU/L Final 04/19/2019 12:00 PM [facility]   Cat Dander IgE 3. 08High   <=0.34 kU/L Final 04/19/2019 12:00 PM [facility]   ALLERGEN MOUNTAIN CEDAR < 0.10  <=0.34 kU/L Final 04/19/2019 12:00 PM [facility]   ALLERGEN COTTONWOOD IGE < 0.10  <=0.34 kU/L Final 04/19/2019 12:00 PM [facility]   Milk IgE < 0.10  <=0.34 kU/L Final 04/19/2019 12:00 PM [facility]   Performed by [place] [id number], [id number] [facility] [phone]   www. Dipak Lau MD - Lab. Director    Peanut IgE < 0.10  <=0.34 kU/L Final 04/19/2019 12:00 PM [facility]   ALLERGEN WEED, PIGWEED IGE < 0.10  <=0.34 kU/L Final 04/19/2019 12:00 PM [facility]   [place] Thistle IgE < 0.10  <=0.34 kU/L Final 04/19/2019 12:00 PM [facility]   ALLERGEN TRISTEN GRASS < 0.10  <=0.34 kU/L Final 04/19/2019 12:00 PM [facility]   Allergen, Fungi/Mold < 0.10  <=0.34 kU/L Final 04/19/2019 12:00 PM [facility]   Elm IgE < 0.10  <=0.34 kU/L Final 04/19/2019 12:00 PM [facility]   Allergen, Tree, Oak < 0.10  <=0.34 kU/L Final 04/19/2019 12:00 PM [facility]   ALLERGEN BIRCH IgE < 0.10  <=0.34 kU/L Final 04/19/2019 12:00 PM [facility]   Allergen Fungi/Mold A. Fumigatus IgE < 0.10  <=0.34 kU/L Final 04/19/2019 12:00 PM [facility]   Mite Dust Pteronyssinus IgE 6. 86High   <=0.34 kU/L Final 04/19/2019 12:00 PM [facility]   ALLERGEN D. FARINAE (DUST MITE) 6. 53High   <=0.34 kU/L Final 04/19/2019 12:00 PM [facility]   Bermuda Grass IgE < 0.10  <=0.34 kU/L Final 04/19/2019 12:00 PM [facility]   Allergen White Mukund < 0.10  <=0.34 kU/L Final 04/19/2019 12:00 PM [facility]   Allergen Fungi/Mold P. Notatum IgE < 0.10  <=0.34 kU/L Final 04/19/2019 12:00 PM [facility]   Common-Short Ragweed IgE 1. 01High   <=0.34 kU/L Final 04/19/2019 12:00 PM [facility]   Cockroach IgE 0.15  <=0.34 kU/L Final 04/19/2019 12:00 PM [facility]   Allergen Tree Max < 0.10  <=0.34 kU/L Final 04/19/2019 12:00 PM -He was requested to go for polysomnogram in the sleep lab to further evaluate for obstructive sleep apnea. However at the end of discussion he refused to go for the sleep test at this time. He verbalizes understanding of consequences of his decision including non diagnosis of obstructive sleep apnea resulting in increased morbidity and mortality with cerebro and cardiovascular events including death. He verbalizes understanding. I had a discussion with patient regarding other avialable treatment options for his sleep disorder breathing including but not limited to Dental appliance placement with referral to a local dentist Vs other available surgical options including Uvulopalatopharyngoplasty, maxillomandibular ostomy, Inspire device placement and tracheostomy as last option. At the end of discussion, he decided not to go for any treatment. he verbalizes understanding.

## 2020-02-17 ENCOUNTER — NURSE ONLY (OUTPATIENT)
Dept: ALLERGY | Age: 65
End: 2020-02-17
Payer: MEDICARE

## 2020-02-17 VITALS — DIASTOLIC BLOOD PRESSURE: 76 MMHG | SYSTOLIC BLOOD PRESSURE: 124 MMHG | RESPIRATION RATE: 16 BRPM | HEART RATE: 64 BPM

## 2020-02-17 PROCEDURE — 95117 IMMUNOTHERAPY INJECTIONS: CPT | Performed by: NURSE PRACTITIONER

## 2020-02-17 RX ORDER — PRASUGREL 10 MG/1
10 TABLET, FILM COATED ORAL DAILY
COMMUNITY
End: 2021-02-24

## 2020-02-18 ENCOUNTER — OFFICE VISIT (OUTPATIENT)
Dept: PULMONOLOGY | Age: 65
End: 2020-02-18
Payer: MEDICARE

## 2020-02-18 VITALS
DIASTOLIC BLOOD PRESSURE: 80 MMHG | RESPIRATION RATE: 16 BRPM | BODY MASS INDEX: 35.43 KG/M2 | HEIGHT: 68 IN | WEIGHT: 233.8 LBS | TEMPERATURE: 97.6 F | OXYGEN SATURATION: 96 % | SYSTOLIC BLOOD PRESSURE: 124 MMHG | HEART RATE: 70 BPM

## 2020-02-18 PROCEDURE — 1123F ACP DISCUSS/DSCN MKR DOCD: CPT | Performed by: INTERNAL MEDICINE

## 2020-02-18 PROCEDURE — G8482 FLU IMMUNIZE ORDER/ADMIN: HCPCS | Performed by: INTERNAL MEDICINE

## 2020-02-18 PROCEDURE — G8926 SPIRO NO PERF OR DOC: HCPCS | Performed by: INTERNAL MEDICINE

## 2020-02-18 PROCEDURE — G8427 DOCREV CUR MEDS BY ELIG CLIN: HCPCS | Performed by: INTERNAL MEDICINE

## 2020-02-18 PROCEDURE — G8417 CALC BMI ABV UP PARAM F/U: HCPCS | Performed by: INTERNAL MEDICINE

## 2020-02-18 PROCEDURE — 3017F COLORECTAL CA SCREEN DOC REV: CPT | Performed by: INTERNAL MEDICINE

## 2020-02-18 PROCEDURE — 4040F PNEUMOC VAC/ADMIN/RCVD: CPT | Performed by: INTERNAL MEDICINE

## 2020-02-18 PROCEDURE — 1036F TOBACCO NON-USER: CPT | Performed by: INTERNAL MEDICINE

## 2020-02-18 PROCEDURE — 3023F SPIROM DOC REV: CPT | Performed by: INTERNAL MEDICINE

## 2020-02-18 PROCEDURE — 99214 OFFICE O/P EST MOD 30 MIN: CPT | Performed by: INTERNAL MEDICINE

## 2020-02-18 NOTE — PROGRESS NOTES
Neck Circumference -   18.5 in  Mallampati - II  Lung Nodule Screening     [x] Qualifies    [] Does not qualify   [] Declined    [x] Completed

## 2020-02-20 ENCOUNTER — NURSE ONLY (OUTPATIENT)
Dept: ALLERGY | Age: 65
End: 2020-02-20
Payer: MEDICARE

## 2020-02-20 VITALS — SYSTOLIC BLOOD PRESSURE: 124 MMHG | RESPIRATION RATE: 16 BRPM | DIASTOLIC BLOOD PRESSURE: 72 MMHG | HEART RATE: 76 BPM

## 2020-02-20 PROCEDURE — 95117 IMMUNOTHERAPY INJECTIONS: CPT | Performed by: NURSE PRACTITIONER

## 2020-02-24 ENCOUNTER — NURSE ONLY (OUTPATIENT)
Dept: ALLERGY | Age: 65
End: 2020-02-24
Payer: MEDICARE

## 2020-02-24 VITALS — DIASTOLIC BLOOD PRESSURE: 80 MMHG | RESPIRATION RATE: 16 BRPM | HEART RATE: 72 BPM | SYSTOLIC BLOOD PRESSURE: 124 MMHG

## 2020-02-24 PROCEDURE — 95117 IMMUNOTHERAPY INJECTIONS: CPT | Performed by: NURSE PRACTITIONER

## 2020-03-02 ENCOUNTER — NURSE ONLY (OUTPATIENT)
Dept: ALLERGY | Age: 65
End: 2020-03-02
Payer: MEDICARE

## 2020-03-02 VITALS — SYSTOLIC BLOOD PRESSURE: 124 MMHG | HEART RATE: 80 BPM | DIASTOLIC BLOOD PRESSURE: 70 MMHG | RESPIRATION RATE: 16 BRPM

## 2020-03-02 PROCEDURE — 95117 IMMUNOTHERAPY INJECTIONS: CPT | Performed by: NURSE PRACTITIONER

## 2020-03-05 ENCOUNTER — NURSE ONLY (OUTPATIENT)
Dept: ALLERGY | Age: 65
End: 2020-03-05
Payer: MEDICARE

## 2020-03-05 VITALS — HEART RATE: 82 BPM | DIASTOLIC BLOOD PRESSURE: 86 MMHG | RESPIRATION RATE: 16 BRPM | SYSTOLIC BLOOD PRESSURE: 124 MMHG

## 2020-03-05 PROCEDURE — 95117 IMMUNOTHERAPY INJECTIONS: CPT | Performed by: NURSE PRACTITIONER

## 2020-03-05 RX ORDER — RANOLAZINE 500 MG/1
500 TABLET, FILM COATED, EXTENDED RELEASE ORAL 2 TIMES DAILY
COMMUNITY

## 2020-03-05 RX ORDER — PANTOPRAZOLE SODIUM 40 MG/1
40 TABLET, DELAYED RELEASE ORAL 2 TIMES DAILY
COMMUNITY

## 2020-03-06 ENCOUNTER — HOSPITAL ENCOUNTER (OUTPATIENT)
Age: 65
Discharge: HOME | End: 2020-03-06
Payer: MEDICARE

## 2020-03-06 LAB
ALBUMIN SERPL-MCNC: 4.6 G/DL (ref 3.5–5.1)
ALP BLD-CCNC: 37 U/L (ref 38–126)
ALT SERPL-CCNC: 26 U/L (ref 11–66)
ANION GAP SERPL CALCULATED.3IONS-SCNC: 13 MEQ/L (ref 8–16)
AST SERPL-CCNC: 23 U/L (ref 5–40)
BILIRUB SERPL-MCNC: 0.5 MG/DL (ref 0.3–1.2)
BILIRUBIN DIRECT: < 0.2 MG/DL (ref 0–0.3)
BUN BLDV-MCNC: 16 MG/DL (ref 7–22)
C-REACTIVE PROTEIN: 0.15 MG/DL (ref 0–1)
CALCIUM SERPL-MCNC: 9.7 MG/DL (ref 8.5–10.5)
CHLORIDE BLD-SCNC: 100 MEQ/L (ref 98–111)
CO2: 27 MEQ/L (ref 23–33)
CREAT SERPL-MCNC: 0.9 MG/DL (ref 0.4–1.2)
ERYTHROCYTE [DISTWIDTH] IN BLOOD BY AUTOMATED COUNT: 13.8 % (ref 11.5–14.5)
ERYTHROCYTE [DISTWIDTH] IN BLOOD BY AUTOMATED COUNT: 43.6 FL (ref 35–45)
GFR SERPL CREATININE-BSD FRML MDRD: 84 ML/MIN/1.73M2
GLUCOSE BLD-MCNC: 138 MG/DL (ref 70–108)
HCT VFR BLD CALC: 48.6 % (ref 42–52)
HEMOGLOBIN: 16.8 GM/DL (ref 14–18)
MCH RBC QN AUTO: 30.1 PG (ref 26–33)
MCHC RBC AUTO-ENTMCNC: 34.6 GM/DL (ref 32.2–35.5)
MCV RBC AUTO: 87.1 FL (ref 80–94)
PLATELET # BLD: 162 THOU/MM3 (ref 130–400)
PMV BLD AUTO: 11.6 FL (ref 9.4–12.4)
POTASSIUM SERPL-SCNC: 4.5 MEQ/L (ref 3.5–5.2)
RBC # BLD: 5.58 MILL/MM3 (ref 4.7–6.1)
SODIUM BLD-SCNC: 140 MEQ/L (ref 135–145)
TOTAL PROTEIN: 7.8 G/DL (ref 6.1–8)
WBC # BLD: 10.3 THOU/MM3 (ref 4.8–10.8)

## 2020-03-06 PROCEDURE — 80053 COMPREHEN METABOLIC PANEL: CPT

## 2020-03-06 PROCEDURE — 85027 COMPLETE CBC AUTOMATED: CPT

## 2020-03-06 PROCEDURE — 82248 BILIRUBIN DIRECT: CPT

## 2020-03-06 PROCEDURE — 86140 C-REACTIVE PROTEIN: CPT

## 2020-03-06 PROCEDURE — 36415 COLL VENOUS BLD VENIPUNCTURE: CPT

## 2020-03-09 ENCOUNTER — NURSE ONLY (OUTPATIENT)
Dept: ALLERGY | Age: 65
End: 2020-03-09
Payer: MEDICARE

## 2020-03-09 VITALS — RESPIRATION RATE: 12 BRPM | HEART RATE: 72 BPM | DIASTOLIC BLOOD PRESSURE: 80 MMHG | SYSTOLIC BLOOD PRESSURE: 122 MMHG

## 2020-03-09 PROCEDURE — 95117 IMMUNOTHERAPY INJECTIONS: CPT | Performed by: NURSE PRACTITIONER

## 2020-03-12 ENCOUNTER — NURSE ONLY (OUTPATIENT)
Dept: ALLERGY | Age: 65
End: 2020-03-12
Payer: MEDICARE

## 2020-03-12 VITALS — RESPIRATION RATE: 16 BRPM | SYSTOLIC BLOOD PRESSURE: 128 MMHG | DIASTOLIC BLOOD PRESSURE: 82 MMHG | HEART RATE: 70 BPM

## 2020-03-12 PROCEDURE — 95117 IMMUNOTHERAPY INJECTIONS: CPT | Performed by: NURSE PRACTITIONER

## 2020-03-16 ENCOUNTER — NURSE ONLY (OUTPATIENT)
Dept: ALLERGY | Age: 65
End: 2020-03-16
Payer: MEDICARE

## 2020-03-16 VITALS
TEMPERATURE: 98.7 F | RESPIRATION RATE: 14 BRPM | HEART RATE: 82 BPM | SYSTOLIC BLOOD PRESSURE: 130 MMHG | DIASTOLIC BLOOD PRESSURE: 80 MMHG

## 2020-03-16 PROCEDURE — 95117 IMMUNOTHERAPY INJECTIONS: CPT | Performed by: NURSE PRACTITIONER

## 2020-03-16 NOTE — PROGRESS NOTES
After consent obtained/verified, allergy injection given in back of R/L arm(s). Documentation of vial injection specific to arm(s) noted on Allergy Immunotherapy Administration Form. Patient did not wait 30 minutes for observation. Patient tolerated well without adverse reaction. SHOT REACTION TREATMENT INSTRUCTIONS    During the 30 minute wait after an allergy injection the following symptoms should be reported:    Itching other than at the injection site  Hives or swelling other than at the injection site  Redness other than at the injection site  Difficulty breathing  Chest tightness  Difficulty swallowing  Throat tightness    If these symptoms occur, NOTIFY PROVIDER and the following treatment should be administered:    1. Epinephrine 1:1000 IM - 0.3 ml if > 66 lbs or more, 0.15 ml if 33 - 63 lbs, or 0.1 ml if <33 lbs   2. Diphenhydramine - give all intramuscular:     2 to <6 years (off-label use): 6.25 mg,    6 to <12 years: 12.5 to 25 mg;    ?12 years: 25-50 mg.  3.  Famotidine:  Adults 40 mg oral    Adolescents age 12 years and >88 lbs: 40 mg    Children and Adolescents ? 12years of age: Initial: 0.25 mg/kg/dose   every 12 hours (maximum daily dose: 40 mg/day)    Epi dose may me repeated in 5-15 minutes if adequate resolution of symptoms does not occur    Patient should be observed for at least one hour after final epi dose and must be seen by provider. Patients cannot drive themselves if they have received diphenhydramine.

## 2020-03-19 ENCOUNTER — TELEPHONE (OUTPATIENT)
Dept: FAMILY MEDICINE CLINIC | Age: 65
End: 2020-03-19

## 2020-03-19 NOTE — TELEPHONE ENCOUNTER
Pharmacist called looking to clarify refill for pt's test strips. Strips were ordered at BID but the pharmacy CMN was sent over at QD. Pharmacist informed me if QD directions, pharmacy will need a new script for QD. If pt is to test bid, the CMN will need to be changed to BID and question #9 will need filled out explaining why a non insulin user test BID.     Clarification needs to go to   Luke Arvizu

## 2020-03-20 NOTE — TELEPHONE ENCOUNTER
Date of last visit:  12/13/2019  Date of next visit:  3/27/2020    Requested Prescriptions     Pending Prescriptions Disp Refills    blood glucose test strips (ONE TOUCH ULTRA TEST) strip 100 strip 0     Sig: CHECK BLOOD SUGAR DAILY

## 2020-03-23 ENCOUNTER — NURSE ONLY (OUTPATIENT)
Dept: ALLERGY | Age: 65
End: 2020-03-23
Payer: MEDICARE

## 2020-03-23 VITALS
TEMPERATURE: 98 F | RESPIRATION RATE: 12 BRPM | SYSTOLIC BLOOD PRESSURE: 136 MMHG | DIASTOLIC BLOOD PRESSURE: 84 MMHG | HEART RATE: 80 BPM

## 2020-03-23 PROCEDURE — 95117 IMMUNOTHERAPY INJECTIONS: CPT | Performed by: NURSE PRACTITIONER

## 2020-03-30 ENCOUNTER — NURSE ONLY (OUTPATIENT)
Dept: ALLERGY | Age: 65
End: 2020-03-30
Payer: MEDICARE

## 2020-03-30 VITALS — HEART RATE: 80 BPM | DIASTOLIC BLOOD PRESSURE: 82 MMHG | RESPIRATION RATE: 16 BRPM | SYSTOLIC BLOOD PRESSURE: 140 MMHG

## 2020-03-30 PROCEDURE — 95117 IMMUNOTHERAPY INJECTIONS: CPT | Performed by: NURSE PRACTITIONER

## 2020-04-06 ENCOUNTER — NURSE ONLY (OUTPATIENT)
Dept: ALLERGY | Age: 65
End: 2020-04-06
Payer: MEDICARE

## 2020-04-06 VITALS
RESPIRATION RATE: 16 BRPM | HEART RATE: 88 BPM | TEMPERATURE: 98 F | DIASTOLIC BLOOD PRESSURE: 82 MMHG | SYSTOLIC BLOOD PRESSURE: 128 MMHG

## 2020-04-06 PROCEDURE — 95117 IMMUNOTHERAPY INJECTIONS: CPT | Performed by: NURSE PRACTITIONER

## 2020-04-06 RX ORDER — MONTELUKAST SODIUM 10 MG/1
TABLET ORAL
Qty: 30 TABLET | Refills: 3 | Status: SHIPPED | OUTPATIENT
Start: 2020-04-06 | End: 2020-07-14 | Stop reason: SDUPTHER

## 2020-04-16 ENCOUNTER — NURSE ONLY (OUTPATIENT)
Dept: ALLERGY | Age: 65
End: 2020-04-16
Payer: MEDICARE

## 2020-04-16 VITALS — SYSTOLIC BLOOD PRESSURE: 128 MMHG | HEART RATE: 88 BPM | DIASTOLIC BLOOD PRESSURE: 84 MMHG | RESPIRATION RATE: 14 BRPM

## 2020-04-16 PROCEDURE — 95117 IMMUNOTHERAPY INJECTIONS: CPT | Performed by: NURSE PRACTITIONER

## 2020-04-20 ENCOUNTER — NURSE ONLY (OUTPATIENT)
Dept: ALLERGY | Age: 65
End: 2020-04-20
Payer: MEDICARE

## 2020-04-20 VITALS — SYSTOLIC BLOOD PRESSURE: 132 MMHG | RESPIRATION RATE: 16 BRPM | HEART RATE: 80 BPM | DIASTOLIC BLOOD PRESSURE: 88 MMHG

## 2020-04-20 PROCEDURE — 95117 IMMUNOTHERAPY INJECTIONS: CPT | Performed by: NURSE PRACTITIONER

## 2020-04-27 ENCOUNTER — NURSE ONLY (OUTPATIENT)
Dept: ALLERGY | Age: 65
End: 2020-04-27
Payer: MEDICARE

## 2020-04-27 VITALS
HEART RATE: 88 BPM | TEMPERATURE: 98.2 F | DIASTOLIC BLOOD PRESSURE: 82 MMHG | SYSTOLIC BLOOD PRESSURE: 130 MMHG | RESPIRATION RATE: 14 BRPM

## 2020-04-27 PROCEDURE — 95117 IMMUNOTHERAPY INJECTIONS: CPT | Performed by: NURSE PRACTITIONER

## 2020-05-04 ENCOUNTER — NURSE ONLY (OUTPATIENT)
Dept: ALLERGY | Age: 65
End: 2020-05-04
Payer: MEDICARE

## 2020-05-04 VITALS — TEMPERATURE: 97.8 F | DIASTOLIC BLOOD PRESSURE: 82 MMHG | SYSTOLIC BLOOD PRESSURE: 130 MMHG

## 2020-05-04 PROCEDURE — 95117 IMMUNOTHERAPY INJECTIONS: CPT | Performed by: NURSE PRACTITIONER

## 2020-05-04 RX ORDER — BUDESONIDE AND FORMOTEROL FUMARATE DIHYDRATE 160; 4.5 UG/1; UG/1
2 AEROSOL RESPIRATORY (INHALATION) 2 TIMES DAILY
Qty: 1 INHALER | Refills: 11 | Status: CANCELLED | OUTPATIENT
Start: 2020-05-04 | End: 2021-05-04

## 2020-05-05 RX ORDER — BUDESONIDE AND FORMOTEROL FUMARATE DIHYDRATE 160; 4.5 UG/1; UG/1
2 AEROSOL RESPIRATORY (INHALATION) 2 TIMES DAILY
Qty: 1 INHALER | Refills: 11 | Status: SHIPPED | OUTPATIENT
Start: 2020-05-05 | End: 2020-07-14 | Stop reason: SDUPTHER

## 2020-05-11 ENCOUNTER — NURSE ONLY (OUTPATIENT)
Dept: ALLERGY | Age: 65
End: 2020-05-11
Payer: MEDICARE

## 2020-05-11 VITALS
TEMPERATURE: 97.7 F | SYSTOLIC BLOOD PRESSURE: 130 MMHG | DIASTOLIC BLOOD PRESSURE: 80 MMHG | RESPIRATION RATE: 16 BRPM | HEART RATE: 88 BPM

## 2020-05-11 PROCEDURE — 95117 IMMUNOTHERAPY INJECTIONS: CPT | Performed by: NURSE PRACTITIONER

## 2020-05-13 ENCOUNTER — TELEPHONE (OUTPATIENT)
Dept: PULMONOLOGY | Age: 65
End: 2020-05-13

## 2020-05-15 ENCOUNTER — TELEPHONE (OUTPATIENT)
Dept: ENT CLINIC | Age: 65
End: 2020-05-15

## 2020-05-15 RX ORDER — SERTRALINE HYDROCHLORIDE 100 MG/1
TABLET, FILM COATED ORAL
Qty: 180 TABLET | Refills: 0 | Status: SHIPPED | OUTPATIENT
Start: 2020-05-15 | End: 2020-08-10

## 2020-05-18 ENCOUNTER — TELEPHONE (OUTPATIENT)
Dept: ENT CLINIC | Age: 65
End: 2020-05-18

## 2020-05-26 ENCOUNTER — TELEPHONE (OUTPATIENT)
Dept: FAMILY MEDICINE CLINIC | Age: 65
End: 2020-05-26

## 2020-05-26 RX ORDER — GLIMEPIRIDE 4 MG/1
TABLET ORAL
Qty: 180 TABLET | Refills: 0 | Status: SHIPPED | OUTPATIENT
Start: 2020-05-26 | End: 2020-05-27 | Stop reason: SDUPTHER

## 2020-05-26 RX ORDER — BUSPIRONE HYDROCHLORIDE 10 MG/1
10 TABLET ORAL 3 TIMES DAILY
Qty: 270 TABLET | Refills: 0 | Status: SHIPPED | OUTPATIENT
Start: 2020-05-26 | End: 2020-05-27 | Stop reason: SDUPTHER

## 2020-05-26 NOTE — TELEPHONE ENCOUNTER
Pt's wife called requesting 90 day Rx for the following:    Buspar    Glimepiride [facility])    Appt June 3rd here.

## 2020-05-27 ENCOUNTER — HOSPITAL ENCOUNTER (OUTPATIENT)
Dept: GENERAL RADIOLOGY | Age: 65
Discharge: HOME | End: 2020-05-27
Payer: MEDICARE

## 2020-05-27 PROCEDURE — 74248 X-RAY SM INT F-THRU STD: CPT

## 2020-05-27 PROCEDURE — 2500000003 HC RX 250 WO HCPCS: Performed by: NURSE PRACTITIONER

## 2020-05-27 PROCEDURE — A4641 RADIOPHARM DX AGENT NOC: HCPCS | Performed by: NURSE PRACTITIONER

## 2020-05-27 PROCEDURE — 6370000000 HC RX 637 (ALT 250 FOR IP): Performed by: NURSE PRACTITIONER

## 2020-05-27 PROCEDURE — 6360000004 HC RX CONTRAST MEDICATION: Performed by: NURSE PRACTITIONER

## 2020-05-27 RX ORDER — GLIMEPIRIDE 4 MG/1
TABLET ORAL
Qty: 180 TABLET | Refills: 0 | Status: SHIPPED | OUTPATIENT
Start: 2020-05-27 | End: 2020-09-09 | Stop reason: SDUPTHER

## 2020-05-27 RX ORDER — BUSPIRONE HYDROCHLORIDE 10 MG/1
10 TABLET ORAL 3 TIMES DAILY
Qty: 270 TABLET | Refills: 0 | Status: SHIPPED | OUTPATIENT
Start: 2020-05-27 | End: 2020-09-09 | Stop reason: SDUPTHER

## 2020-05-27 RX ADMIN — ANTACID/ANTIFLATULENT 1 EACH: 380; 550; 10; 10 GRANULE, EFFERVESCENT ORAL at 08:34

## 2020-05-27 RX ADMIN — BARIUM SULFATE 140 ML: 980 POWDER, FOR SUSPENSION ORAL at 08:34

## 2020-05-27 RX ADMIN — BARIUM SULFATE 100 ML: 0.6 SUSPENSION ORAL at 08:34

## 2020-05-28 ENCOUNTER — NURSE ONLY (OUTPATIENT)
Dept: ALLERGY | Age: 65
End: 2020-05-28
Payer: MEDICARE

## 2020-05-28 VITALS
RESPIRATION RATE: 16 BRPM | HEART RATE: 76 BPM | DIASTOLIC BLOOD PRESSURE: 84 MMHG | SYSTOLIC BLOOD PRESSURE: 126 MMHG | TEMPERATURE: 98 F

## 2020-05-28 PROCEDURE — 95117 IMMUNOTHERAPY INJECTIONS: CPT | Performed by: NURSE PRACTITIONER

## 2020-05-29 ENCOUNTER — HOSPITAL ENCOUNTER (OUTPATIENT)
Dept: CT IMAGING | Age: 65
Discharge: HOME | End: 2020-05-29
Payer: MEDICARE

## 2020-05-29 ENCOUNTER — HOSPITAL ENCOUNTER (OUTPATIENT)
Dept: PULMONOLOGY | Age: 65
End: 2020-05-29
Payer: MEDICARE

## 2020-05-29 PROCEDURE — G0297 LDCT FOR LUNG CA SCREEN: HCPCS

## 2020-06-01 ENCOUNTER — OFFICE VISIT (OUTPATIENT)
Dept: FAMILY MEDICINE CLINIC | Age: 65
End: 2020-06-01

## 2020-06-01 ENCOUNTER — NURSE ONLY (OUTPATIENT)
Dept: ALLERGY | Age: 65
End: 2020-06-01
Payer: MEDICARE

## 2020-06-01 VITALS
BODY MASS INDEX: 36.54 KG/M2 | HEIGHT: 68 IN | DIASTOLIC BLOOD PRESSURE: 72 MMHG | HEART RATE: 68 BPM | RESPIRATION RATE: 16 BRPM | WEIGHT: 241.13 LBS | SYSTOLIC BLOOD PRESSURE: 138 MMHG

## 2020-06-01 VITALS
TEMPERATURE: 98.3 F | RESPIRATION RATE: 16 BRPM | HEART RATE: 88 BPM | DIASTOLIC BLOOD PRESSURE: 80 MMHG | SYSTOLIC BLOOD PRESSURE: 124 MMHG

## 2020-06-01 PROBLEM — Z86.59 HISTORY OF ANXIETY DISORDER: Status: ACTIVE | Noted: 2020-06-01

## 2020-06-01 LAB
CHP ED QC CHECK: ABNORMAL
GLUCOSE BLD-MCNC: 185 MG/DL
HBA1C MFR BLD: 7 %

## 2020-06-01 PROCEDURE — 99214 OFFICE O/P EST MOD 30 MIN: CPT | Performed by: FAMILY MEDICINE

## 2020-06-01 PROCEDURE — G8427 DOCREV CUR MEDS BY ELIG CLIN: HCPCS | Performed by: FAMILY MEDICINE

## 2020-06-01 PROCEDURE — G0438 PPPS, INITIAL VISIT: HCPCS | Performed by: FAMILY MEDICINE

## 2020-06-01 PROCEDURE — 4040F PNEUMOC VAC/ADMIN/RCVD: CPT | Performed by: FAMILY MEDICINE

## 2020-06-01 PROCEDURE — G8417 CALC BMI ABV UP PARAM F/U: HCPCS | Performed by: FAMILY MEDICINE

## 2020-06-01 PROCEDURE — 83036 HEMOGLOBIN GLYCOSYLATED A1C: CPT | Performed by: FAMILY MEDICINE

## 2020-06-01 PROCEDURE — 1123F ACP DISCUSS/DSCN MKR DOCD: CPT | Performed by: FAMILY MEDICINE

## 2020-06-01 PROCEDURE — 3017F COLORECTAL CA SCREEN DOC REV: CPT | Performed by: FAMILY MEDICINE

## 2020-06-01 PROCEDURE — 82962 GLUCOSE BLOOD TEST: CPT | Performed by: FAMILY MEDICINE

## 2020-06-01 PROCEDURE — 1036F TOBACCO NON-USER: CPT | Performed by: FAMILY MEDICINE

## 2020-06-01 PROCEDURE — 95117 IMMUNOTHERAPY INJECTIONS: CPT | Performed by: NURSE PRACTITIONER

## 2020-06-01 RX ORDER — ATORVASTATIN CALCIUM 20 MG/1
20 TABLET, FILM COATED ORAL DAILY
COMMUNITY
Start: 2020-05-31

## 2020-06-01 ASSESSMENT — LIFESTYLE VARIABLES: HOW OFTEN DO YOU HAVE A DRINK CONTAINING ALCOHOL: 0

## 2020-06-01 ASSESSMENT — PATIENT HEALTH QUESTIONNAIRE - PHQ9
SUM OF ALL RESPONSES TO PHQ QUESTIONS 1-9: 0
SUM OF ALL RESPONSES TO PHQ QUESTIONS 1-9: 0

## 2020-06-01 ASSESSMENT — ENCOUNTER SYMPTOMS
BACK PAIN: 1
ABDOMINAL PAIN: 0
CHEST TIGHTNESS: 0
BLOOD IN STOOL: 0
CONSTIPATION: 0
DIARRHEA: 0
COUGH: 0
VOMITING: 0
SHORTNESS OF BREATH: 1
NAUSEA: 0
EYES NEGATIVE: 1

## 2020-06-01 NOTE — PROGRESS NOTES
mouth nightly Yes Historical Provider, MD   betamethasone dipropionate (DIPROLENE) 0.05 % cream Apply topically 2 times daily Apply topically 2 times daily. Yes Historical Provider, MD   fluticasone (FLONASE) 50 MCG/ACT nasal spray 1 spray by Each Nostril route daily Yes SANTOSH Doe CNP   hydrochlorothiazide (HYDRODIURIL) 12.5 MG tablet TK 1 T PO QD IN THE MORNING Yes Historical Provider, MD   EPINEPHrine (EPIPEN 2-HUBER) 0.3 MG/0.3ML SOAJ injection Inject one pen as directed STAT for allergic reaction, may disp generic NDC 24947-316-19 Yes SANTOSH Doe CNP   SYRINGE-NEEDLE, DISP, 3 ML 23G X 1-1/2\" 3 ML MISC Inject 1 Syringe into the muscle every 14 days Yes SANTOSH Andrews CNP   VENTOLIN  (90 Base) MCG/ACT inhaler Inhale 2 puffs into the lungs every 6 hours as needed for Wheezing Yes Briseyda Marley MD   ONETOUCH DELICA LANCETS FINE MISC Check blood sugar twice daily Dx: E11.9 Yes Alma Carrion MD   losartan (COZAAR) 25 MG tablet Take 1 tablet by mouth daily Yes Alma Carrion MD   Misc. Devices (ACAPELLA) MISC 1 Device by Does not apply route 4 times daily Yes SANTOSH Rhodes CNP   baclofen (LIORESAL) 10 MG tablet take 1 tablet by mouth twice a day Yes Historical Provider, MD   traZODone (DESYREL) 150 MG tablet take 1/2 to 1 tablet by mouth once daily at bedtime if needed for pain SPASM, OR SLEEP Yes Historical Provider, MD   HYDROcodone-acetaminophen (NORCO) 5-325 MG per tablet Take 1 tablet by mouth every 6 hours as needed for Pain Yes Historical Provider, MD   Omega-3 Fatty Acids (FISH OIL) 1000 MG CAPS Take 1,000 mg by mouth daily. Yes Historical Provider, MD   sildenafil (VIAGRA) 100 MG tablet Take 1 tablet by mouth as needed.  Yes Alma Carrion MD   testosterone cypionate (DEPOTESTOTERONE CYPIONATE) 200 MG/ML injection ADMINISTER 1 ML IN THE MUSCLE EVERY 15 DAYS  SANTOSH Rogers CNP         Past Medical History:   Diagnosis Date    Acid reflux symptoms: chest pain, dyspnea and exertional chest pressure/discomfort. She is seeing Dr Karime Lazcano and he is scheduled next week for a cardiac cath. Patient denies: claudication, irregular heart beat, lower extremity edema, palpitations and syncope. Cardiovascular risk factors: advanced age (older than 54 for men, 72 for women), diabetes mellitus, dyslipidemia, hypertension, male gender and obesity (BMI >= 30 kg/m2). Use of agents associated with hypertension: none. History of target organ damage: ASCVD. Current diabetic symptoms include: none. Patient denies foot ulcerations, polydipsia and polyuria. Evaluation to date has included: fasting blood sugar, fasting lipid panel, hemoglobin A1C and microalbuminuria. Home sugars: BGs range between 100 and 280. Last dilated eye exam this year. has a current medication list which includes the following prescription(s): atorvastatin, buspirone, glimepiride, sertraline, metformin, budesonide-formoterol, ropinirole, montelukast, one touch ultra test, pantoprazole, ranolazine, prasugrel, hydroxyzine, betamethasone dipropionate, fluticasone, hydrochlorothiazide, epinephrine, syringe-needle (disp) 3 ml, ventolin hfa, onetouch delica lancets fine, losartan, acapella, baclofen, trazodone, hydrocodone-acetaminophen, fish oil, sildenafil, and testosterone cypionate.     No Known Allergies    Social History     Tobacco Use    Smoking status: Former Smoker     Packs/day: 2.00     Years: 42.00     Pack years: 84.00     Types: Cigarettes     Start date: 6/5/1971     Last attempt to quit: 3/3/2017     Years since quitting: 3.2    Smokeless tobacco: Never Used    Tobacco comment: pts uses just nicotine vap   Substance Use Topics    Alcohol use: No     Alcohol/week: 0.0 standard drinks    Drug use: No       Past Medical History:   Diagnosis Date    Acid reflux     Arthritis     Asthma     Chronic back pain     Class 2 severe obesity due to excess calories with serious  hydrOXYzine (ATARAX) 10 MG tablet Take 10 mg by mouth nightly      betamethasone dipropionate (DIPROLENE) 0.05 % cream Apply topically 2 times daily Apply topically 2 times daily.  fluticasone (FLONASE) 50 MCG/ACT nasal spray 1 spray by Each Nostril route daily 1 Bottle 11    hydrochlorothiazide (HYDRODIURIL) 12.5 MG tablet TK 1 T PO QD IN THE MORNING  3    EPINEPHrine (EPIPEN 2-HUBER) 0.3 MG/0.3ML SOAJ injection Inject one pen as directed STAT for allergic reaction, may disp generic NDC [id number] 6 each 99    SYRINGE-NEEDLE, DISP, 3 ML 23G X 1-1/2\" 3 ML MISC Inject 1 Syringe into the muscle every 14 days 10 each 1    VENTOLIN  (90 Base) MCG/ACT inhaler Inhale 2 puffs into the lungs every 6 hours as needed for Wheezing 1 Inhaler 8    ONETOUCH DELICA LANCETS FINE MISC Check blood sugar twice daily Dx: E11.9 200 each 1    losartan (COZAAR) 25 MG tablet Take 1 tablet by mouth daily 30 tablet 5    Misc. Devices (ACAPELLA) MISC 1 Device by Does not apply route 4 times daily 1 each 0    baclofen (LIORESAL) 10 MG tablet take 1 tablet by mouth twice a day  0    traZODone (DESYREL) 150 MG tablet take 1/2 to 1 tablet by mouth once daily at bedtime if needed for pain SPASM, OR SLEEP  0    HYDROcodone-acetaminophen (NORCO) 5-325 MG per tablet Take 1 tablet by mouth every 6 hours as needed for Pain      Omega-3 Fatty Acids (FISH OIL) 1000 MG CAPS Take 1,000 mg by mouth daily.  sildenafil (VIAGRA) 100 MG tablet Take 1 tablet by mouth as needed. 8 tablet 5    testosterone cypionate (DEPOTESTOTERONE CYPIONATE) 200 MG/ML injection ADMINISTER 1 ML IN THE MUSCLE EVERY 14 DAYS 2 mL 5     No current facility-administered medications for this visit.       Orders Placed This Encounter   Procedures    POCT Glucose    POCT glycosylated hemoglobin (Hb A1C)     Lab Results   Component Value Date    LABA1C 7.0 (A) 06/01/2020    LABA1C 7.0 (A) 12/13/2019    LABA1C 7.1 09/11/2019     Lab Results   Component

## 2020-06-01 NOTE — PROGRESS NOTES
After consent obtained/verified, allergy injection given in back of R/L arm(s). Documentation of vial injection specific to arm(s) noted on Allergy Immunotherapy Administration Form. Patient signed waiver and declined to waited 30 minutes for observation. Patient tolerated Maintenance vials well at 0.50ml without adverse reaction. SHOT REACTION TREATMENT INSTRUCTIONS    During the 30 minute wait after an allergy injection the following symptoms should be reported:    Itching other than at the injection site  Hives or swelling other than at the injection site  Redness other than at the injection site  Difficulty breathing  Chest tightness  Difficulty swallowing  Throat tightness    If these symptoms occur, NOTIFY PROVIDER and the following treatment should be administered:    1. Epinephrine 1:1000 IM - 0.3 ml if > 66 lbs or more, 0.15 ml if 33 - 63 lbs, or 0.1 ml if <33 lbs   2. Diphenhydramine - give all intramuscular:     2 to <6 years (off-label use): 6.25 mg,    6 to <12 years: 12.5 to 25 mg;    ?12 years: 25-50 mg.  3.  Famotidine:  Adults 40 mg oral    Adolescents age 12 years and >88 lbs: 40 mg    Children and Adolescents ? 12years of age: Initial: 0.25 mg/kg/dose   every 12 hours (maximum daily dose: 40 mg/day)    Epi dose may me repeated in 5-15 minutes if adequate resolution of symptoms does not occur    Patient should be observed for at least one hour after final epi dose and must be seen by provider. Patients cannot drive themselves if they have received diphenhydramine.

## 2020-06-11 ENCOUNTER — NURSE ONLY (OUTPATIENT)
Dept: ALLERGY | Age: 65
End: 2020-06-11
Payer: MEDICARE

## 2020-06-11 VITALS
RESPIRATION RATE: 14 BRPM | SYSTOLIC BLOOD PRESSURE: 136 MMHG | HEART RATE: 70 BPM | DIASTOLIC BLOOD PRESSURE: 86 MMHG | TEMPERATURE: 98.1 F

## 2020-06-11 PROCEDURE — 95117 IMMUNOTHERAPY INJECTIONS: CPT | Performed by: NURSE PRACTITIONER

## 2020-06-14 NOTE — PROGRESS NOTES
[facility]                              Pulmonary medicine clinic follow up note. Patient: Zoe Tamez  : 1955      Lung Nodule Screening     [x]? Qualifies    []? Does not qualify   []? Declined    [x]? Completed    Chief complaint and [place]:  Zoe Tamez is a 77 y. o.oldmale came for follow up regarding his severe persistent Asthma. He denies any cough or expectoration. No fever or chills. No recent hospitalizations or emergency room visits. He is using inhalers with good compliance. He rarely uses rescue inhaler. He denies any nocturnal symptoms. He underwent stent placement by Dr. Roque Solis at [facility] in . He supposed to be using 3LPM Oxygen via nasal cannula at rest, sleep and 4LPM during activity. How ever he came to my clinic with out his home oxygen. He also told me that he is not using home oxygen as prescribed. He feels that he don't need oxygen any more.,    He was also evaluated by Ms Naeem Muñoz for his multiple allergies. He is currently receiving Immunotherapy along with desensitization therapy for his allergens from her office. He was evaluated by Dr. Vani Cesar from ENT service for his thyroid nodule along with hypertrophied nasal turbinates along with abnormally looking vocal cords noted during bronch. He follows with Dr. Vandana Ash for his GERD. He is currently using:  -Symbicort 160/4.5mcg spray MDI, 2 puffs via inhalation BID. -Singulair 10mg 1 tablet at bed time daily. He  -Albuterol HFA 90mcg/Spray MDI, 2puffs  Q6Hprn  -Flonase 50mcg/INH 2sprays each nostril daily. He feels better with nebulizer meds compared to inhalers. He was initially referred from Dr. Shana Werner for chronic cough.       Asthma control (Severity) questionnaire:  Asthma symptoms:  > 2 times per week -> No  Night time awakenings: > 2 times per month -> No  Use of short acting beta agonist for symptoms control (Other than pre exercise use) :> 2times per week  -> No  Interference with normal activity  -> none  Lung function: Fev1 >60% Predicted  -> Yes  Number of exacerbations per year: > 2 -> Yes    Social History: Occupation: He is retired now. He used to work as a  in the past.      Patient admits to history of tobacco smoking. He used to smoke 1 to 1.5 PPD for 44 Years. He quit smoking in March, 2017. He denies history of exposure to coal, foundry, [employer], asbestos or significant farm dust exposure. His father was diagnosed with asbestos exposure. He used to move close to his father. Patient denies any recent travel. Patient denies history of exposure to birds, pigeons, or chickens in the past. The patient admits toto pet animals at home. Hecurrently had 2cats at home. He denies to history of recreational or IV drug use. Hedenies history of pulmonary embolism or DVT in the past.      Review of Systems:   General/Constitutional: he gained 7lbs of weight from the last visit with normal appetite. No fever or chills. HENT: Negative. Eyes: Negative. Upper respiratory tract: No nasal stuffiness or post nasal drip. Lower respiratory tract/ lungs: No cough or sputum production. No hemoptysis. Improving exertional dyspnea  Cardiovascular: No palpitations or chest pain. Gastrointestinal: No nausea or vomiting. Neurological: No focal neurologiacal weakness. Extremities: No edema. Musculoskeletal: No complaints. Genitourinary: No complaints. Hematological: Negative. Psychiatric/Behavioral: Negative. Skin: No itching.       Current Medications:      Past Medical History:   Diagnosis Date    Acid reflux     Arthritis     Asthma     Chronic back pain     Class 2 severe obesity due to excess calories with serious comorbidity and body mass index (BMI) of 37.0 to 37.9 in adult [facility]) 8/13/2018    Colon polyps 11/06    [facility]     Depression     panic attacks    Diverticulosis     HTN (hypertension)     Hyperlipidemia     Kidney disorder     Panic attack     Pneumonia     Rash     Recurrent upper respiratory infection (URI)     Sleep apnea     Thumb amputation status 3/13/14    left tip of thumb amputated    Thyroid disease     Type II or unspecified type diabetes mellitus without mention of complication, not stated as uncontrolled        Past Surgical History:   Procedure Laterality Date    BACK SURGERY  2002    BACK SURGERY  08/11/2017    BICEPS TENDON REPAIR Right 08/16/2017    BRONCHOSCOPY      BRONCHOSCOPY N/A 4/5/2019    BRONCHOSCOPY performed by Diamante Santana MD at [facility]  4/5/2019    BRONCHOSCOPY ALVEOLAR LAVAGE performed by Diamante Santana MD at [facility]  07/02 08/05    CARPAL TUNNEL RELEASE  2011    right hand    CHOLECYSTECTOMY  yrs ago    COLONOSCOPY  11/06 02/12 1/14    CORONARY ANGIOPLASTY WITH STENT PLACEMENT  02/2020    ENDOSCOPY, COLON, DIAGNOSTIC      EYE SURGERY      foreign body removal    HERNIA REPAIR  2004    Dr Gabriel Goodson  2008?  LARYNGOSCOPY  04/28/2016    Laryngoscopy Micro with Biopsy by Dr Miguel Coto  01/07    uppp    ROTATOR CUFF REPAIR Left 5-20-15    SKIN BIOPSY      TONSILLECTOMY  1985    UPPER GASTROINTESTINAL ENDOSCOPY  1997    VEIN SURGERY Left September 2014    Dr. Weiner Canal       No Known Allergies    Current Outpatient Medications   Medication Sig Dispense Refill    budesonide-formoterol (SYMBICORT) 160-4.5 MCG/ACT AERO Inhale 2 puffs into the lungs 2 times daily Rinse mouth after its use.  1 Inhaler 11    montelukast (SINGULAIR) 10 MG tablet Take 1 tablet by mouth nightly 30 tablet 11    VENTOLIN  (90 Base) MCG/ACT inhaler Inhale 2 puffs into the lungs every 6 hours as needed for Wheezing 1 Inhaler 8    fluticasone (FLONASE) 50 MCG/ACT nasal spray 1 spray by Each Nostril route daily 1 Bottle 11    atorvastatin (LIPITOR) 20 MG tablet       busPIRone (BUSPAR) 10 MG tablet Take 1 tablet by mouth 3 times daily 270 tablet 0    glimepiride (AMARYL) 4 MG tablet TAKE 1 TABLET BY MOUTH TWICE DAILY 180 tablet 0    sertraline (ZOLOFT) 100 MG tablet TAKE 1 TABLET BY MOUTH TWICE DAILY 180 tablet 0    metFORMIN (GLUCOPHAGE) 500 MG tablet TAKE 2 TABLETS BY MOUTH TWICE DAILY WITH MEALS 360 tablet 0    rOPINIRole (REQUIP) 1 MG tablet TAKE 1 TABLET BY MOUTH THREE TIMES DAILY 270 tablet 0    blood glucose test strips (ONE TOUCH ULTRA TEST) strip CHECK BLOOD SUGAR DAILY 100 strip 0    pantoprazole (PROTONIX) 40 MG tablet Take 40 mg by mouth 2 times daily       ranolazine (RANEXA) 500 MG extended release tablet Take 500 mg by mouth 2 times daily      prasugrel (EFFIENT) 10 MG TABS Take 10 mg by mouth daily      hydrOXYzine (ATARAX) 10 MG tablet Take 10 mg by mouth nightly      betamethasone dipropionate (DIPROLENE) 0.05 % cream Apply topically 2 times daily Apply topically 2 times daily.  hydrochlorothiazide (HYDRODIURIL) 12.5 MG tablet TK 1 T PO QD IN THE MORNING  3    EPINEPHrine (EPIPEN 2-HUBER) 0.3 MG/0.3ML SOAJ injection Inject one pen as directed STAT for allergic reaction, may disp generic NDC 18681-611-74 6 each 99    SYRINGE-NEEDLE, DISP, 3 ML 23G X 1-1/2\" 3 ML MISC Inject 1 Syringe into the muscle every 14 days 10 each 1    ONETOUCH DELICA LANCETS FINE MISC Check blood sugar twice daily Dx: E11.9 200 each 1    losartan (COZAAR) 25 MG tablet Take 1 tablet by mouth daily 30 tablet 5    Misc.  Devices (ACAPELLA) MISC 1 Device by Does not apply route 4 times daily 1 each 0    baclofen (LIORESAL) 10 MG tablet take 1 tablet by mouth twice a day  0    traZODone (DESYREL) 150 MG tablet take 1/2 to 1 tablet by mouth once daily at bedtime if needed for pain SPASM, OR SLEEP  0    HYDROcodone-acetaminophen (NORCO) 5-325 MG per tablet Take 1 tablet by mouth every 6 hours as needed for Pain      Omega-3 Fatty Acids (FISH OIL) 1000 MG CAPS Take 1,000 mg by mouth daily.  sildenafil (VIAGRA) 100 MG tablet Take 1 tablet by mouth as needed. 8 tablet 5    testosterone cypionate (DEPOTESTOTERONE CYPIONATE) 200 MG/ML injection ADMINISTER 1 ML IN THE MUSCLE EVERY 14 DAYS 2 mL 5     No current facility-administered medications for this visit. Family History   Problem Relation Age of Onset    Cancer Mother     Breast Cancer Mother     Stroke Mother     Heart Disease Brother     Diabetes Brother     High Blood Pressure Brother     High Cholesterol Brother          Physical Exam     VITALS:    /78 (Site: Left Upper Arm, Position: Sitting, Cuff Size: Medium Adult)   Pulse 79   Temp 98 °F (36.7 °C)   Ht 5' 8\" (1.727 m)   Wt 240 lb (108.9 kg)   SpO2 93% Comment: room air at rest  BMI 36.49 kg/m²   Nursing note and vitals reviewed. Constitutional: Patient appears well built, obese and well nourished. No distress. Patient is oriented to person, place, and time. HENT:   Head: Normocephalic and atraumatic. Right Ear: External ear normal.   Left Ear: External ear normal.   Mouth/Throat: Oropharynx is clear and moist.  No oral thrush. Eyes: Conjunctivae are normal. Pupils are equal, round, and reactive to light. No scleral icterus. Neck: Neck supple. No JVD present. No tracheal deviation present. Cardiovascular: Normal rate, regular rhythm, normal heart sounds. No murmur heard. Pulmonary/Chest: Effort normal and breath sounds normal. No stridor. No respiratory distress. Occasional expiratory wheezes. No rales. Patient exhibits no tenderness. Abdominal: Soft. Patient exhibits no distension. No tenderness. Musculoskeletal: Normal range of motion. Extremities: Patient exhibits no edema and no tenderness. Lymphadenopathy:  No cervical adenopathy.    Neurological: Patient is alert and oriented to person, place, and time.   Skin: Skin is warm and dry. Patient is not diaphoretic. Psychiatric: Patient  has a normal mood and affect. Patient behavior is normal.         Neck Circumference -   18.5 in  Mallampati - II    Diagnostic Data:  Radiological Data:  May 21, 2015  PROCEDURE: CTA CHEST WITH CONTRAST  IMPRESSION:  Examination is negative for pulmonary embolism. Dependent atelectasis bilaterally left greater than right    Pulmonary function tests: 2018                          MCCT test:3/28/16: Positive. CT neck:  Mar 28, 2016  PROCEDURE: CT SOFT TISSUE NECK W CONTRAST  IMPRESSION: 1. There is asymmetric soft tissue prominence demonstrated along the posterior aspect of the left vocal cord. This may represent focal scarring however, cannot exclude a mucosal lesion. Consider correlation with direct visualization. 2. Small soft tissue prominence at the base of the tongue as described above. This may represent retained mucous secretions. Again, this can be correlated clinically with direct visualization. 3. Nonspecific mildly prominent lymph nodes along the posterior cervical chain on the left. The largest of these measures approximately 1 cm in short axis dimension. These may be reactive in nature. However, recommend followup CT evaluation to document stability. 4. Heterogeneous low-attenuation nodule within left thyroid gland is incompletely characterized on the current examination and can be further characterized by thyroid ultrasound followup. 5. Moderate centrilobular emphysema is noted at the lung apices. HRCT of chest:  Feb 2, 2019   PROCEDURE: CT CHEST HIGH RESOLUTION   1. Moderate upper zone predominant centrilobular emphysema, most pronounced within the right upper lobe, is demonstrated. 2. Mild dependent opacities at the lung bases bilaterally are favored to represent mild atelectasis.  3.There are nonspecific ovoid soft tissue opacities demonstrated along the bilateral paraspinal regions on axial image 43 at the T9-10 level. These findings are nonspecific. Additional characterization could be obtained with a contrast enhanced thoracic spine MRI. Ordering Provider: [address] [place] [address]    IMPRESSION:   Normal x-ray examination of the chest.            Respiratory cultures:  01/28/2019 11:44 AM   Narrative   Performed by: [facility]   Source: Expectorated sputum       Site:           Current Antibiotics: not stated   Susceptibility     Escherichia coli (1)     Antibiotic Interpretation CAIT Status    cefTRIAXone Sensitive <=1 mcg/mL Final    gentamicin Sensitive <=1 mcg/mL Final    cefOXitin Sensitive <=4 mcg/mL Final    levofloxacin Sensitive <=0.12 mcg/mL Final    cefuroxime Intermediate   Final    tetracycline Sensitive <=1 mcg/mL Final    trimethoprim-sulfamethoxazole Sensitive <=20 mcg/mL       Bronchoscopy results:  Hospital performed: [facility]. Date of procedure: 4/5/2019  Procedure: Flexible diagnostic fiberoptic bronchoscopy without fluoroscopy. During procedure Bronch washings obtained  from bilateral lower lobes. Bronchioalveolar lavage was performed from right lower lobe posterior segment. Bronch washings:   Acid fast bacilli:  negative  Fungal cultures: Candida albicans   Routine cultures: Escherichia coli   Viral cultures:      negative  Legionella cultures:  negative  Cytology: No malignant cells seen. Multinucleated giant cells. Alveolar macrophages.   Susceptibility     Escherichia coli (1)     Antibiotic Interpretation CAIT Status    gentamicin Sensitive <=1 mcg/mL Final    tetracycline Sensitive <=1 mcg/mL Final    cefOXitin Sensitive <=4 mcg/mL Final    trimethoprim-sulfamethoxazole Sensitive <=20 mcg/mL Final    ciprofloxacin Sensitive =0.016 mcg/mL Final    cefuroxime Sensitive           BAL ( Broncho alveolar lavage):  Acid fast bacilli:   negative  Fungal cultures:  negative  Routine cultures: Escherichia coli   Susceptibility Escherichia coli (1)     Antibiotic Interpretation CAIT Status    cefTRIAXone Sensitive <=1 mcg/mL Final    gentamicin Sensitive <=1 mcg/mL Final    cefOXitin Sensitive <=4 mcg/mL Final    tetracycline Sensitive <=1 mcg/mL Final    ciprofloxacin Sensitive =0.012 mcg/mL Final    trimethoprim-sulfamethoxazole Sensitive <=20 mcg/mL Final    cefuroxime Intermediate         Viral cultures:    POSITIVE for Adenovirus at day 3  Legionella cultures:  negative  Cytology:     No malignant cells seen. Multinucleated giant cells. Alveolar macrophages. 4/6/2019  9:29 AM - Hilario, [employer] Incoming Lab Results From Soft     Component Value Ref Range & Units Status Collected Lab   BAL Color COLORLESS   Final 04/05/2019  6:02 PM [facility]   BAL Character CLOUDY/MILKY   Final 04/05/2019  6:02 PM  - [facility]   BAL Collection Site RLL and LLL   Final 04/05/2019  6:02 PM [facility]   Total Volume Received BAL 30  ml Final 04/05/2019  6:02 PM  - [facility]   Total Nucleated Cells   /cumm Final 04/05/2019  6:02 PM  - [facility]   RBC   /cumm Final 04/05/2019  6:02 PM  - [facility]   Macrophage/Monocyte BAL 74Low   86 - 100 % Final 04/05/2019  6:02 PM  - [facility]   Lymphocytes, BAL 16High   10 - 15 % Final 04/05/2019  6:02 PM  - [facility]   Segmented Neutrophils, BAL 2  0 - 3 % Final 04/05/2019  6:02 PM [facility]   Few atypical cells, favor reactive. Ciliated/Epithelial Cells BAL 8High   0 - 5 % Final 04/05/2019  6:02 PM [facility]   Pathologist Review ALP   Final 04/05/2019  6:02 PM [facility]   Performed at [address], [address]       Abnormally looking vocal cords and management. ? Chronic laryngitis from ?  GERD Vs other etiologies    [facility] labs:  4/23/2019  8:43 PM - Hilario, [employer] Incoming Lab Results From Soft     Component Value Ref Range & Units Status Collected Lab   Allergen Fungi/Mold A. Alternata Ige 0. 87High   <=0.34 kU/L Final 04/19/2019 12:00 PM [facility]   Allergen Box Elder < 0.10  <=0.34 kU/L Final 04/19/2019 12:00 PM [facility]   Cat Dander IgE 3. 08High   <=0.34 kU/L Final 04/19/2019 12:00 PM [facility]   ALLERGEN MOUNTAIN CEDAR < 0.10  <=0.34 kU/L Final 04/19/2019 12:00 PM [facility]   ALLERGEN COTTONWOOD IGE < 0.10  <=0.34 kU/L Final 04/19/2019 12:00 PM [facility]   Milk IgE < 0.10  <=0.34 kU/L Final 04/19/2019 12:00 PM [facility]   Performed by [place] [id number], [address] [facility] [phone]   www. Mauricio Diana MD - Lab. Director    Peanut IgE < 0.10  <=0.34 kU/L Final 04/19/2019 12:00 PM [facility]   ALLERGEN WEED, PIGWEED IGE < 0.10  <=0.34 kU/L Final 04/19/2019 12:00 PM [facility]   [place] Thistle IgE < 0.10  <=0.34 kU/L Final 04/19/2019 12:00 PM [facility]   ALLERGEN TRISTEN GRASS < 0.10  <=0.34 kU/L Final 04/19/2019 12:00 PM [facility]   Allergen, Fungi/Mold < 0.10  <=0.34 kU/L Final 04/19/2019 12:00 PM [facility]   Elm IgE < 0.10  <=0.34 kU/L Final 04/19/2019 12:00 PM [facility]   Allergen, Tree, Oak < 0.10  <=0.34 kU/L Final 04/19/2019 12:00 PM [facility]   ALLERGEN BIRCH IgE < 0.10  <=0.34 kU/L Final 04/19/2019 12:00 PM [facility]   Allergen Fungi/Mold A. Fumigatus IgE < 0.10  <=0.34 kU/L Final 04/19/2019 12:00 PM [facility]   Mite Dust Pteronyssinus IgE 6. 86High   <=0.34 kU/L Final 04/19/2019 12:00 PM [facility]   ALLERGEN D. FARINAE (DUST MITE) 6. 53High   <=0.34 kU/L Final 04/19/2019 12:00 PM [facility]   Bermuda Grass IgE < 0.10  <=0.34 kU/L Final 04/19/2019 12:00 PM [facility]   Allergen White Mukund < 0.10  <=0.34 kU/L Final 04/19/2019 12:00 PM [facility]   Allergen Fungi/Mold P. Notatum IgE < 0.10  <=0.34 kU/L Final 04/19/2019 12:00 PM [facility]   Common-Short Ragweed IgE 1. 01High   <=0.34 kU/L Final 04/19/2019 12:00 PM [facility]   Cockroach IgE 0.15  <=0.34 kU/L Final 04/19/2019 12:00 PM [facility]   Allergen Tree Tyner < 0.10  <=0.34 kU/L Final 04/19/2019 12:00 PM [facility]   [place] Tree IgE < 0.10  <=0.34 kU/L Final 04/19/2019 12:00 PM [facility]   Pecan Tree IgE < 0.10  <=0.34 kU/L Final 04/19/2019 12:00 PM [facility]   Mouse Epithelial < 0.10  <=0.34 kU/L Final 04/19/2019 12:00 PM [facility]   Allergen Fungi/Mold, M. racemosus IGE < 0.10  <=0.34 kU/L Final 04/19/2019 12:00 PM [facility]   Allergen White [place] Tree, IGE < 0.10  <=0.34 kU/L Final 04/19/2019 12:00 PM [facility]   Dog Dander IgE 0. 69High   <=0.34 kU/L Final 04/19/2019 12:00 PM [facility]   Sheep Sorrel IgE < 0.10  <=0.34 kU/L Final 04/19/2019 12:00 PM [facility]   Immunoglobulin E 447High   <=214 kU/L Final 04/19/2019 12:00 PM [facility]   REFERENCE INTERVAL: Immunoglobulin E, Serum   Access complete set of age- and/or gender-specific reference   intervals for this test in the [facility] Laboratory Test Directory   (Pembe Panjur). Spirometry: 6/26/2020        His FEV1 decreased from previous spirometry done in the past.    Low dose CT chest:  May 29, 2020   NONCONTRAST SCREENING CT CHEST:   1. There are no suspicious masses or nodules within the lung fields. 2. There are no pathologically enlarged lymph nodes. 3. LUNGRADS ASSESSMENT VALUE: 2,         Six Minute Walk Test done on 7/14/20 at 8:42 AM EDT    Yony Borden 1955    Six minute walk test done in my office today by medical assistant MissChato Cardozo   Oxygen saturation at rest on room air was (Percent): 90  Oxygen saturation on room air with exertion dropped to (Percent): 86      Time from beginning of the test to above desaturation: 2 minute 0 seconds. The six minute walk test was repeated with oxygen supplementation. Oxygen supplimentation was started with 2LPM via nasal cannula and titrated to 2 LPM via nasal cannula. At the end of the test his oxygen saturation remained at 90 % on 2 LPM with exertion. He is mobile in the home and requires oxygen as outlined above. He needs no home O2 at rest. He needs 2 LPM of home O2 with exercise. He is currently using 3LPM of home O2 at night time.   He will be evaluated for nocturnal home O2 requirement- see separte order. Please see scanned six minute walk test document in media for details. DME Medical Necessity Documentation    Patient was seen in my clinic on 7/14/20 for the diagnosis COPD. I am prescribing oxygen because the diagnosis and testing requires the patient to have oxygen in the home. Condition will improve or be benefited by oxygen use. The patient is  able to perform good mobility in a home setting and therefore does require the use of a portable oxygen system. Assessment:  -Severe persistent bronchial asthma-  under control.  -Moderate COPD with hx of tobacco smoking- under control. -CAD S/p Stent placement. He follows with Dr. Scot Cross  -hx of recurrent E coli infection in the lungs and MRSA infection of left side of face in the past- resolved  -Abnormal allergy test with elevated serum Ig E level- he is following with an Allergist.  -Chronic cough due to uncontrolled asthma Vs allergic rhinitis- Improved  -Allergic rhinitis- on treatment with Flonase nasal spray.  -Abnormal CT scan of neck with ? Lesion over vocal cord and cervical lymphadenopathy on left side S/p Microlaryngoscopy and biopsy by Dr. Matt Streeter on 4/28/16. He follows with ENT. -Heterogeneous low-attenuation nodule within left thyroid gland is incompletely characterized on the current examination. Following with ENT. -Type II or unspecified type diabetes mellitus without mention of complication, not stated as uncontrolled ([address] -Acid reflux on treatment with Nexium. He follows with Dr. Natasha Lara  -Chronic tobacco smoking- He quit smoking several years back. -Depression. -Anxiety disorder.  -Chronic back pain.  -Blood pressure- under control.   -Sleep apnea. He used to be on CPAP in the past. He underwent surgery 4 to 5 years back.  He never had a follow up sleep test. He refused go for sleep test or get treated,      Recommendations/Plan:  -Continue patient on Symbicort 160/4.5mcg spray MDI, 2 puffs via inhalation BID. Refills given  -Continue Albuterol HFA 90mcg/Spray MDI, 2puffs  Q6Hprn. Refills given  -Continue patient on Singulair 10mg 1 tablet at bed time daily. Refills given  -Continue follow up and management by ISABELLA Stoner for his GERD.  -He was advised to continue to use 3LPM Oxygen via nasal cannula at rest, sleep and 4LPM during activity.  -He was advised to keep scheduled follow up with his Allergy and immunologist  for further evaluation of elevated serum IgE levels with positive allergy testing for multiple allergies. He is currently on immunotherapy with Ms. Richie CNP at [facility].  -Patient educated to update his pneumococcal vaccine with family physician and take influenza vaccine in coming season with out fail. Patient verbalizes understanding.   -Yony Borden educated about environmental safety precautions he need to practice to prevent exacerbation ofhis Asthma. Yony Borden verbalizes understanding.   -He needs no home O2 at rest. He needs 2 LPM of home O2 with exercise. He is currently using 3LPM of home O2 at night time.   -Will schedule nocturnal pulse ox study on room air to check for the continuation/discontinuation of patient current home O2 at night time.  -Schedule patient for Spirometry before clinic visit with Bronchodilator response in 1year.  -Schedule patient for low dose CT scan of chest with out IV contrast as recommended by the  U.S. Preventive Services Task Force and the NCCN for lung Cancer Screening in 1year. - Keep sheduled follow up with my clinic in 6months for clinical re evaluation and in 1year with spirometry before clinic visit along with low dose CT chest before clinic visit. Patient advised to make early appointment if needed. -- Patient and his wife were educated about my impression and plan. They verbalizes understanding.        - He is following with Dede Munoz MD from [facility] Th[facility] speciality for his Left thyroid nodule.      -He was advised to practice anti-reflux measures such as raising the head of the bed, avoiding tight clothing or belts, avoiding eating late at night and not lying down shortly after mealtime and achieving weight loss are discussed. Avoid ASA, NSAID's, caffeine, peppermints, alcohol and tobacco. Patient should alert me if there are persistent symptoms, dysphagia, weight loss or GI bleeding.   -He was requested to go for polysomnogram in the sleep lab to further evaluate for obstructive sleep apnea. However at the end of discussion he refused to go for the sleep test at this time. He verbalizes understanding of consequences of his decision including non diagnosis of obstructive sleep apnea resulting in increased morbidity and mortality with cerebro and cardiovascular events including death. He verbalizes understanding. I had a discussion with patient regarding other avialable treatment options for his sleep disorder breathing including but not limited to Dental appliance placement with referral to a local dentist Vs other available surgical options including Uvulopalatopharyngoplasty, maxillomandibular ostomy, Inspire device placement and tracheostomy as last option. At the end of discussion, he decided not to go for any treatment. he verbalizes understanding. Low Dose CT (LDCT) Lung Screening criteria met   Age 50-69   Pack year smoking >30   Still smoking or less than 15 year since quit   No sign or symptoms of lung cancer   > 11 months since last LDCT     Risks and benefits of lung cancer screening with LDCT scans discussed:    Significance of positive screen - False-positive LDCT results often occur. 95% of all positive results do not lead to a diagnosis of cancer. Usually further imaging can resolve most false-positive results; however, some patients may require invasive procedures.     Over diagnosis risk - 10% to 12% of screen-detected lung cancer cases are over diagnosed--that is, the cancer would not have been detected in the patient's lifetime without the screening. Need for follow up screens annually to continue lung cancer screening effectiveness     Risks associated with radiation from annual LDCT- Radiation exposure is about the same as for a mammogram, which is about 1/3 of the annual background radiation exposure from everyday life. Starting screening at age 54 is not likely to increase cancer risk from radiation exposure. Patients with comorbidities resulting in life expectancy of < 10 years, or that would preclude treatment of an abnormality identified on CT, should not be screened due to lack of benefit.     To obtain maximal benefit from this screening, smoking cessation and long-term abstinence from smoking is critical

## 2020-06-18 ENCOUNTER — NURSE ONLY (OUTPATIENT)
Dept: ALLERGY | Age: 65
End: 2020-06-18
Payer: MEDICARE

## 2020-06-18 VITALS
HEART RATE: 70 BPM | DIASTOLIC BLOOD PRESSURE: 82 MMHG | TEMPERATURE: 97.1 F | RESPIRATION RATE: 14 BRPM | SYSTOLIC BLOOD PRESSURE: 128 MMHG

## 2020-06-18 PROCEDURE — 95117 IMMUNOTHERAPY INJECTIONS: CPT | Performed by: NURSE PRACTITIONER

## 2020-06-20 ENCOUNTER — HOSPITAL ENCOUNTER (OUTPATIENT)
Age: 65
Discharge: HOME | End: 2020-06-20
Payer: MEDICARE

## 2020-06-20 PROCEDURE — U0002 COVID-19 LAB TEST NON-CDC: HCPCS

## 2020-06-21 LAB
PERFORMING LAB: NORMAL
REPORT: NORMAL
SARS-COV-2: NOT DETECTED

## 2020-06-22 ENCOUNTER — NURSE ONLY (OUTPATIENT)
Dept: ALLERGY | Age: 65
End: 2020-06-22
Payer: MEDICARE

## 2020-06-22 VITALS
DIASTOLIC BLOOD PRESSURE: 76 MMHG | TEMPERATURE: 97.8 F | HEART RATE: 70 BPM | SYSTOLIC BLOOD PRESSURE: 124 MMHG | RESPIRATION RATE: 14 BRPM

## 2020-06-22 PROCEDURE — 95117 IMMUNOTHERAPY INJECTIONS: CPT | Performed by: NURSE PRACTITIONER

## 2020-06-26 ENCOUNTER — HOSPITAL ENCOUNTER (OUTPATIENT)
Dept: PULMONOLOGY | Age: 65
Discharge: HOME | End: 2020-06-26
Payer: MEDICARE

## 2020-06-26 PROCEDURE — 94060 EVALUATION OF WHEEZING: CPT

## 2020-06-29 ENCOUNTER — NURSE ONLY (OUTPATIENT)
Dept: ALLERGY | Age: 65
End: 2020-06-29
Payer: MEDICARE

## 2020-06-29 VITALS
TEMPERATURE: 97.9 F | RESPIRATION RATE: 12 BRPM | DIASTOLIC BLOOD PRESSURE: 80 MMHG | SYSTOLIC BLOOD PRESSURE: 128 MMHG | HEART RATE: 68 BPM

## 2020-06-29 PROCEDURE — 95117 IMMUNOTHERAPY INJECTIONS: CPT | Performed by: NURSE PRACTITIONER

## 2020-07-07 ENCOUNTER — HOSPITAL ENCOUNTER (OUTPATIENT)
Dept: NUCLEAR MEDICINE | Age: 65
Discharge: HOME | End: 2020-07-07
Payer: MEDICARE

## 2020-07-07 PROCEDURE — 3430000000 HC RX DIAGNOSTIC RADIOPHARMACEUTICAL: Performed by: NURSE PRACTITIONER

## 2020-07-07 PROCEDURE — 78264 GASTRIC EMPTYING IMG STUDY: CPT

## 2020-07-07 PROCEDURE — A9541 TC99M SULFUR COLLOID: HCPCS | Performed by: NURSE PRACTITIONER

## 2020-07-07 RX ADMIN — Medication 1 MILLICURIE: at 08:09

## 2020-07-09 ENCOUNTER — NURSE ONLY (OUTPATIENT)
Dept: ALLERGY | Age: 65
End: 2020-07-09
Payer: MEDICARE

## 2020-07-09 VITALS
RESPIRATION RATE: 16 BRPM | DIASTOLIC BLOOD PRESSURE: 88 MMHG | TEMPERATURE: 97.2 F | HEART RATE: 70 BPM | SYSTOLIC BLOOD PRESSURE: 144 MMHG

## 2020-07-09 PROCEDURE — 95117 IMMUNOTHERAPY INJECTIONS: CPT | Performed by: NURSE PRACTITIONER

## 2020-07-09 NOTE — PROGRESS NOTES
After consent obtained/verified, allergy injection given in back of R/L arm(s). Documentation of vial injection specific to arm(s) noted on Allergy Immunotherapy Administration Form. Patient signed waiver and declined to wait  30 minutes for observation. Patient tolerated well without adverse reaction. SHOT REACTION TREATMENT INSTRUCTIONS    During the 30 minute wait after an allergy injection the following symptoms should be reported:    Itching other than at the injection site  Hives or swelling other than at the injection site  Redness other than at the injection site  Difficulty breathing  Chest tightness  Difficulty swallowing  Throat tightness    If these symptoms occur, NOTIFY PROVIDER and the following treatment should be administered:    1. Epinephrine 1:1000 IM - 0.3 ml if > 66 lbs or more, 0.15 ml if 33 - 63 lbs, or 0.1 ml if <33 lbs   2. Diphenhydramine - give all intramuscular:     2 to <6 years (off-label use): 6.25 mg,    6 to <12 years: 12.5 to 25 mg;    ?12 years: 25-50 mg.  3.  Famotidine:  Adults 40 mg oral    Adolescents age 12 years and >88 lbs: 40 mg    Children and Adolescents ? 12years of age: Initial: 0.25 mg/kg/dose   every 12 hours (maximum daily dose: 40 mg/day)    Epi dose may me repeated in 5-15 minutes if adequate resolution of symptoms does not occur    Patient should be observed for at least one hour after final epi dose and must be seen by provider. Patients cannot drive themselves if they have received diphenhydramine.

## 2020-07-14 ENCOUNTER — OFFICE VISIT (OUTPATIENT)
Dept: PULMONOLOGY | Age: 65
End: 2020-07-14
Payer: MEDICARE

## 2020-07-14 VITALS
TEMPERATURE: 98 F | WEIGHT: 240 LBS | SYSTOLIC BLOOD PRESSURE: 122 MMHG | DIASTOLIC BLOOD PRESSURE: 78 MMHG | BODY MASS INDEX: 36.37 KG/M2 | HEIGHT: 68 IN | OXYGEN SATURATION: 93 % | HEART RATE: 79 BPM

## 2020-07-14 PROBLEM — J96.01 ACUTE RESPIRATORY FAILURE WITH HYPOXIA (HCC): Status: RESOLVED | Noted: 2018-08-13 | Resolved: 2020-07-14

## 2020-07-14 PROCEDURE — 1123F ACP DISCUSS/DSCN MKR DOCD: CPT | Performed by: INTERNAL MEDICINE

## 2020-07-14 PROCEDURE — 99214 OFFICE O/P EST MOD 30 MIN: CPT | Performed by: INTERNAL MEDICINE

## 2020-07-14 PROCEDURE — 3023F SPIROM DOC REV: CPT | Performed by: INTERNAL MEDICINE

## 2020-07-14 PROCEDURE — G8427 DOCREV CUR MEDS BY ELIG CLIN: HCPCS | Performed by: INTERNAL MEDICINE

## 2020-07-14 PROCEDURE — 94618 PULMONARY STRESS TESTING: CPT | Performed by: INTERNAL MEDICINE

## 2020-07-14 PROCEDURE — 3017F COLORECTAL CA SCREEN DOC REV: CPT | Performed by: INTERNAL MEDICINE

## 2020-07-14 PROCEDURE — G8926 SPIRO NO PERF OR DOC: HCPCS | Performed by: INTERNAL MEDICINE

## 2020-07-14 PROCEDURE — G8417 CALC BMI ABV UP PARAM F/U: HCPCS | Performed by: INTERNAL MEDICINE

## 2020-07-14 PROCEDURE — 4040F PNEUMOC VAC/ADMIN/RCVD: CPT | Performed by: INTERNAL MEDICINE

## 2020-07-14 PROCEDURE — G0296 VISIT TO DETERM LDCT ELIG: HCPCS | Performed by: INTERNAL MEDICINE

## 2020-07-14 PROCEDURE — 1036F TOBACCO NON-USER: CPT | Performed by: INTERNAL MEDICINE

## 2020-07-14 RX ORDER — MONTELUKAST SODIUM 10 MG/1
10 TABLET ORAL NIGHTLY
Qty: 30 TABLET | Refills: 11 | Status: SHIPPED | OUTPATIENT
Start: 2020-07-14 | End: 2021-07-19

## 2020-07-14 RX ORDER — BUDESONIDE AND FORMOTEROL FUMARATE DIHYDRATE 160; 4.5 UG/1; UG/1
2 AEROSOL RESPIRATORY (INHALATION) 2 TIMES DAILY
Qty: 1 INHALER | Refills: 11 | Status: SHIPPED | OUTPATIENT
Start: 2020-07-14 | End: 2021-05-10

## 2020-07-14 RX ORDER — FLUTICASONE PROPIONATE 50 MCG
1 SPRAY, SUSPENSION (ML) NASAL DAILY
Qty: 1 BOTTLE | Refills: 11 | Status: SHIPPED | OUTPATIENT
Start: 2020-07-14 | End: 2020-08-06

## 2020-07-14 NOTE — PROGRESS NOTES
Neck Circumference - 18.5    Mallampati - 2    Lung Nodule Screening     [x] Qualifies    [] Does not qualify   [] Declined    [] Completed ct lung 5/29/20

## 2020-07-14 NOTE — PATIENT INSTRUCTIONS
Recommendations/Plan:  -Continue patient on Symbicort 160/4.5mcg spray MDI, 2 puffs via inhalation BID. Refills given  -Continue Albuterol HFA 90mcg/Spray MDI, 2puffs  Q6Hprn. Refills given  -Continue patient on Singulair 10mg 1 tablet at bed time daily. Refills given  -Continue follow up and management by ISABELLA Stoner for his GERD.  -He was advised to continue to use 3LPM Oxygen via nasal cannula at rest, sleep and 4LPM during activity.  -He was advised to keep scheduled follow up with his Allergy and immunologist  for further evaluation of elevated serum IgE levels with positive allergy testing for multiple allergies. He is currently on immunotherapy with Ms. Richie CNP at [facility].  -Patient educated to update his pneumococcal vaccine with family physician and take influenza vaccine in coming season with out fail. Patient verbalizes understanding.   -Yony ROMMEL Borden educated about environmental safety precautions he need to practice to prevent exacerbation ofhis Asthma. Yony ROMMEL Borden verbalizes understanding.   -He needs no home O2 at rest. He needs 2 LPM of home O2 with exercise. He is currently using 3LPM of home O2 at night time.   -Will schedule nocturnal pulse ox study on room air to check for the continuation/discontinuation of patient current home O2 at night time.  -Schedule patient for Spirometry before clinic visit with Bronchodilator response in 1year.  -Schedule patient for low dose CT scan of chest with out IV contrast as recommended by the  U.S. Preventive Services Task Force and the NCCN for lung Cancer Screening in 1year. - Keep sheduled follow up with my clinic in 6months for clinical re evaluation and in 1year with spirometry before clinic visit along with low dose CT chest before clinic visit. Patient advised to make early appointment if needed. -- Patient and his wife were educated about my impression and plan. They verbalizes understanding. What is lung cancer screening?   Lung cancer screening is a way in which doctors check the lungs for early signs of cancer in people who have no symptoms of lung cancer. A low-dose CT scan uses much less radiation than a normal CT scan and shows a more detailed image of the lungs than a standard X-ray. The goal of lung cancer screening is to find cancer early, before it has a chance to grow, spread, or cause problems. One large study found that smokers who were screened with low-dose CT scans were less likely to die of lung cancer than those who were screened with standard X-ray. Below is a summary of the things you need to know regarding screening for lung cancer with low-dose computed tomography (LDCT). This is a screening program that involves routine annual screening with LDCT studies of the lung. The LDCTs are done using low-dose radiation that is not thought to increase your cancer risk. If you have other serious medical conditions (other cancers, congestive heart failure) that limit your life expectancy to less than 10 years, you should not undergo lung cancer screening with LDCT. The chance is 20%-60% that the LDCT result will show abnormalities. This would require additional testing which could include repeat imaging or even invasive procedures. Most (about 95%) of \"abnormal\" LDCT results are false in the sense that no lung cancer is ultimately found. Additionally, some (about 10%) of the cancers found would not affect your life expectancy, even if undetected and untreated. If you are still smoking, the single most important thing that you can do to reduce your risk of dying of lung cancer is to quit. For this screening to be covered by Medicare and most other insurers, strict criteria must be met. If you do not meet these criteria, but still wish to undergo LDCT testing, you will be required to sign a waiver indicating your willingness to pay for the scan.

## 2020-07-15 ENCOUNTER — NURSE ONLY (OUTPATIENT)
Dept: ALLERGY | Age: 65
End: 2020-07-15
Payer: MEDICARE

## 2020-07-15 VITALS
DIASTOLIC BLOOD PRESSURE: 88 MMHG | TEMPERATURE: 97.2 F | HEART RATE: 70 BPM | RESPIRATION RATE: 14 BRPM | SYSTOLIC BLOOD PRESSURE: 138 MMHG

## 2020-07-15 PROCEDURE — 95117 IMMUNOTHERAPY INJECTIONS: CPT | Performed by: NURSE PRACTITIONER

## 2020-07-28 ENCOUNTER — OFFICE VISIT (OUTPATIENT)
Dept: INTERNAL MEDICINE CLINIC | Age: 65
End: 2020-07-28
Payer: MEDICARE

## 2020-07-28 ENCOUNTER — TELEPHONE (OUTPATIENT)
Dept: ENT CLINIC | Age: 65
End: 2020-07-28

## 2020-07-28 VITALS — TEMPERATURE: 98 F | WEIGHT: 244 LBS | BODY MASS INDEX: 36.98 KG/M2 | HEIGHT: 68 IN

## 2020-07-28 PROCEDURE — 97802 MEDICAL NUTRITION INDIV IN: CPT | Performed by: DIETITIAN, REGISTERED

## 2020-07-28 NOTE — PROGRESS NOTES
[address] [place] [address]. [address] [place] [address] Robert., Tray. [facility]  [phone] (phone)  [phone] (fax)    Patient Name: Violet Reyes. Date of Birth: [birth year]. MRN: [id number]      Assessment: Patient is a 77 y.o. male seen for Initial MNT visit for Gastroparesis & Type 2 DB.    -Nutritionally relevant labs:   Lab Results   Component Value Date/Time    LABA1C 7.0 (A) 06/01/2020 10:04 AM    LABA1C 7.0 (A) 12/13/2019 09:03 AM    LABA1C 7.1 09/11/2019 08:57 AM    GLUCOSE 185 (A) 06/01/2020 10:04 AM    GLUCOSE 157 (H) 05/29/2020 10:08 AM    GLUCOSE 138 (H) 03/06/2020 01:30 PM    GLUCOSE 128 (H) 07/24/2017 01:00 PM    CHOL 125 05/29/2020 10:08 AM    CHOL 117 09/12/2019 08:10 AM    HDL 35 (L) 05/29/2020 10:08 AM    LDLCALC 54 09/12/2019 08:10 AM    TRIG 234 (H) 05/29/2020 10:08 AM     -Blood sugar trends: Does not check routinely and did not have meter with him today. Symptoms of gastroparesis - bloating,abd pain and tightness in chest  Feb cardiac stent and heart cath good after this. 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    -Food recall:   Breakfast: skips. Lunch: Leftovers OR Bologna sandwich with miracle whip. Dinner: Last night - ribeye steak, mushrooms and sweet corn. Snacks: 1/2 bowl of potato chips    -Main Beverages: [employer] - regular - 2 cans/day, water. Does not drink coffee. Use to be every Friday. 1-2x/mo - pizza from out.    -Impression of Dietary Intake: in general, an \"unhealthy\" diet, on average, 1-2 meals per day, high fat/ cholesterol. Current Outpatient Medications on File Prior to Visit   Medication Sig Dispense Refill    budesonide-formoterol (SYMBICORT) 160-4.5 MCG/ACT AERO Inhale 2 puffs into the lungs 2 times daily Rinse mouth after its use.  1 Inhaler 11    montelukast (SINGULAIR) 10 MG tablet Take 1 tablet by mouth nightly 30 tablet 11    VENTOLIN  (90 Base) MCG/ACT inhaler Inhale 2 puffs 8 tablet 5    testosterone cypionate (DEPOTESTOTERONE CYPIONATE) 200 MG/ML injection ADMINISTER 1 ML IN THE MUSCLE EVERY 14 DAYS 2 mL 5    hydrOXYzine (ATARAX) 10 MG tablet Take 10 mg by mouth nightly      betamethasone dipropionate (DIPROLENE) 0.05 % cream Apply topically 2 times daily Apply topically 2 times daily.  SYRINGE-NEEDLE, DISP, 3 ML 23G X 1-1/2\" 3 ML MISC Inject 1 Syringe into the muscle every 14 days (Patient not taking: Reported on 7/28/2020) 10 each 1     No current facility-administered medications on file prior to visit. Vitals from current and previous visits:  Temp 98 °F (36.7 °C)   Ht 5' 8\" (1.727 m)   Wt 244 lb (110.7 kg)   BMI 37.10 kg/m²     -Body mass index is 37.1 kg/m². 35-39.9 - Obesity Grade II.   -Weight goal: lose weight. Nutrition Diagnosis:   Altered GI function related to Lack of previous MNT/currently undergoing MNT as evidenced by Conditions associated with a diagnosis or treatment: gastroparesis and Type 2 DB. Intervention:  -Impression: Pt expressed that he has a supportive wife to help him with his diet.    -Instructed the patient on: gastroparesis guidelines and see instructions below.    -Handouts given for: Step 1-3 gastroparesis guidelines, Tip sheet and sample menus, oral nutrition supplement drink samples and coupons. Patient Instructions   1.)  Don't skip Breakfast - Eat 3 small meals and 2-3 small snacks. - for snacks can grab a Glucerna or other nutrition supplement drink    - Have other easy to grab foods, such as pudding cups, and applesauce cups    2.)  Choose foods recommended on your gastroparesis guidelines. - soft low fiber foods and liquids. 3.)  If your symptoms get worse go to Step 1 and follow this liquid diet for 2-3 days to help relieve your symptoms. Then advance as tolerated to step 2 and then to step 3 (most liberal of the 3 steps)    4.)  Bring a 1 week food log and your meter to next dietitian appt. Thanks. Check your BS each day at varying times of the day:  - Fasting BS or before a meal, BS goal:  90 - 130  - 2 hours after the start of a meal, BS goal:  100 - 150      -Nutrition prescription: 1600 - 1800 calories/day, 180 - 200 g carbs/day. Adj wt. 80 kg  Comprehension verified using teachback method. Monitoring/Evaluation:   -Followup visit: 8 weeks with dietitian.   -Receptiveness to education/goals: Agreeable.  -Evaluation of education: Indicates understanding.  -Readiness to change: contemplation - ambivalent about change eating more frequently than 1-2x/day. -Expected compliance: good. Thank you for your referral of this patient. Total time involved in direct patient education: 60 minutes for initial MNT visit.

## 2020-07-28 NOTE — TELEPHONE ENCOUNTER
[facility] is requesting a new order for Yony's US of thyroid which is scheduled for tomorrow 2020. The original order has .     Thanks

## 2020-07-29 ENCOUNTER — HOSPITAL ENCOUNTER (OUTPATIENT)
Dept: ULTRASOUND IMAGING | Age: 65
Discharge: HOME | End: 2020-07-29
Payer: MEDICARE

## 2020-07-29 PROCEDURE — 76536 US EXAM OF HEAD AND NECK: CPT

## 2020-08-03 ENCOUNTER — HOSPITAL ENCOUNTER (OUTPATIENT)
Dept: RESPIRATORY THERAPY | Age: 65
Discharge: HOME | End: 2020-08-03
Payer: MEDICARE

## 2020-08-03 NOTE — PROGRESS NOTES
Instructions were given for an overnight nocturnal pulse oximetry study. The assigned GE number of the pulse oximetry was [id number]. A log sheet was completed. Patient was instructed on documenting any events that occurred throughout the night on the log sheet. The procedure was explained to the learner(s). Patient understanding of the procedure was excellent. Patient does have a mean of transportation to bring back the study the next day. A patient task was placed in the patients chart for the  to download the nocturnal study and fax the results to the ordering provider for interpretation. The pulse oximetrys memory was cleared. Patient had no questions and was sent home with the pulse oximeter.

## 2020-08-05 RX ORDER — ROPINIROLE 1 MG/1
TABLET, FILM COATED ORAL
Qty: 270 TABLET | Refills: 0 | Status: SHIPPED | OUTPATIENT
Start: 2020-08-05 | End: 2020-09-09 | Stop reason: SDUPTHER

## 2020-08-05 NOTE — TELEPHONE ENCOUNTER
Date of last visit:  6/1/2020  Date of next visit:  9/9/2020    Requested Prescriptions     Pending Prescriptions Disp Refills    rOPINIRole (REQUIP) 1 MG tablet [Pharmacy Med Name: ROPINIROLE 1MG TABLETS] 270 tablet 0     Sig: TAKE 1 TABLET BY MOUTH THREE TIMES DAILY

## 2020-08-06 ENCOUNTER — NURSE ONLY (OUTPATIENT)
Dept: ALLERGY | Age: 65
End: 2020-08-06
Payer: MEDICARE

## 2020-08-06 VITALS
HEART RATE: 88 BPM | SYSTOLIC BLOOD PRESSURE: 132 MMHG | TEMPERATURE: 97.3 F | RESPIRATION RATE: 16 BRPM | DIASTOLIC BLOOD PRESSURE: 84 MMHG

## 2020-08-06 PROCEDURE — 95117 IMMUNOTHERAPY INJECTIONS: CPT | Performed by: NURSE PRACTITIONER

## 2020-08-07 ENCOUNTER — TELEPHONE (OUTPATIENT)
Dept: PULMONOLOGY | Age: 65
End: 2020-08-07

## 2020-08-10 RX ORDER — SERTRALINE HYDROCHLORIDE 100 MG/1
TABLET, FILM COATED ORAL
Qty: 180 TABLET | Refills: 0 | Status: SHIPPED | OUTPATIENT
Start: 2020-08-10 | End: 2020-09-09 | Stop reason: SDUPTHER

## 2020-08-10 NOTE — TELEPHONE ENCOUNTER
Date of last visit:  6/1/2020  Date of next visit:  9/9/2020    Requested Prescriptions     Pending Prescriptions Disp Refills    metFORMIN (GLUCOPHAGE) 500 MG tablet [Pharmacy Med Name: METFORMIN 500MG TABLETS] 360 tablet 0     Sig: TAKE 2 TABLETS BY MOUTH TWICE DAILY WITH MEALS    sertraline (ZOLOFT) 100 MG tablet [Pharmacy Med Name: SERTRALINE 100MG TABLETS] 180 tablet 0     Sig: TAKE 1 TABLET BY MOUTH TWICE DAILY

## 2020-08-13 ENCOUNTER — OFFICE VISIT (OUTPATIENT)
Dept: ALLERGY | Age: 65
End: 2020-08-13
Payer: MEDICARE

## 2020-08-13 VITALS
TEMPERATURE: 97.6 F | BODY MASS INDEX: 37.1 KG/M2 | SYSTOLIC BLOOD PRESSURE: 126 MMHG | RESPIRATION RATE: 16 BRPM | DIASTOLIC BLOOD PRESSURE: 84 MMHG | HEART RATE: 66 BPM | WEIGHT: 244 LBS

## 2020-08-13 PROCEDURE — G8417 CALC BMI ABV UP PARAM F/U: HCPCS | Performed by: NURSE PRACTITIONER

## 2020-08-13 PROCEDURE — G8427 DOCREV CUR MEDS BY ELIG CLIN: HCPCS | Performed by: NURSE PRACTITIONER

## 2020-08-13 PROCEDURE — 95117 IMMUNOTHERAPY INJECTIONS: CPT | Performed by: NURSE PRACTITIONER

## 2020-08-13 PROCEDURE — 3017F COLORECTAL CA SCREEN DOC REV: CPT | Performed by: NURSE PRACTITIONER

## 2020-08-13 PROCEDURE — 4040F PNEUMOC VAC/ADMIN/RCVD: CPT | Performed by: NURSE PRACTITIONER

## 2020-08-13 PROCEDURE — 99213 OFFICE O/P EST LOW 20 MIN: CPT | Performed by: NURSE PRACTITIONER

## 2020-08-13 PROCEDURE — 1036F TOBACCO NON-USER: CPT | Performed by: NURSE PRACTITIONER

## 2020-08-13 PROCEDURE — 1123F ACP DISCUSS/DSCN MKR DOCD: CPT | Performed by: NURSE PRACTITIONER

## 2020-08-13 RX ORDER — GABAPENTIN 300 MG/1
300 CAPSULE ORAL DAILY
COMMUNITY
Start: 2020-08-10 | End: 2020-09-09

## 2020-08-13 ASSESSMENT — ENCOUNTER SYMPTOMS
COUGH: 0
DIARRHEA: 0
NAUSEA: 0
CHOKING: 0
SHORTNESS OF BREATH: 0
EYE REDNESS: 1

## 2020-08-13 NOTE — PROGRESS NOTES
[email or web address]@    Allergy & Asthma   [address] [facility], [address]  Ph:   [phone]  Fax:[phone]    Provider:  Dr. Mansi Mohan:   Chief Complaint   Patient presents with    Follow-up     Patient is here for allergy management     Immunotherapy     Here for allergy shots           HISTORY OF PRESENT ILLNESS: ESTABLISHED PATIENT HERE FOR EVALUATION   47-year-old  male here today for follow-up on allergic rhinitis and allergy management. Patient states that he is breathing well. He reports allergy shots are not causing him any problems. He continues to receive allergy injections which he states have really help with his allergic rhinitis and his nasal congestion. He denies any nausea vomiting fever. He is now in his red vial which is a one-to-one concentration for his allergy injections. He denies any knots or complications at the allergy injection sites. Patient has had chronic allergies for several years. He reports they have remarkably improved related to allergy immunotherapy. Severity symptoms are now mild. Patient continues to take his antihistamines which also helps benefit him from his allergy injections. He is today or today to follow-up on his allergic rhinitis and allergy management        Review of Systems:  Review of Systems   HENT: Positive for congestion. Eyes: Positive for redness. Respiratory: Negative for cough, choking and shortness of breath. Cardiovascular: Negative for chest pain and leg swelling. Gastrointestinal: Negative for diarrhea and nausea. Allergic/Immunologic: Positive for environmental allergies. All other systems reviewed and are negative.         Past MedicalHistory:    Past Medical History:   Diagnosis Date    Acid reflux     Arthritis     Asthma     Chronic back pain     Class 2 severe obesity due to excess calories with serious comorbidity and body mass index (BMI) of 37.0 to 37.9 in Years: 42.00     Pack years: 84.00     Types: Cigarettes     Start date: 6/5/1971     Last attempt to quit: 3/3/2017     Years since quitting: 3.4    Smokeless tobacco: Never Used    Tobacco comment: pts uses just nicotine vap   Substance Use Topics    Alcohol use: No     Alcohol/week: 0.0 standard drinks        Allergies:  Patient has no known allergies. CurrentMedications:     Current Outpatient Medications:     gabapentin (NEURONTIN) 300 MG capsule, Take 300 mg by mouth daily. , Disp: , Rfl:     metFORMIN (GLUCOPHAGE) 500 MG tablet, TAKE 2 TABLETS BY MOUTH TWICE DAILY WITH MEALS, Disp: 360 tablet, Rfl: 0    sertraline (ZOLOFT) 100 MG tablet, TAKE 1 TABLET BY MOUTH TWICE DAILY, Disp: 180 tablet, Rfl: 0    fluticasone (FLONASE) 50 MCG/ACT nasal spray, SHAKE LIQUID AND USE 1 SPRAY IN EACH NOSTRIL DAILY, Disp: 16 g, Rfl: 11    rOPINIRole (REQUIP) 1 MG tablet, TAKE 1 TABLET BY MOUTH THREE TIMES DAILY, Disp: 270 tablet, Rfl: 0    budesonide-formoterol (SYMBICORT) 160-4.5 MCG/ACT AERO, Inhale 2 puffs into the lungs 2 times daily Rinse mouth after its use., Disp: 1 Inhaler, Rfl: 11    montelukast (SINGULAIR) 10 MG tablet, Take 1 tablet by mouth nightly, Disp: 30 tablet, Rfl: 11    VENTOLIN  (90 Base) MCG/ACT inhaler, Inhale 2 puffs into the lungs every 6 hours as needed for Wheezing, Disp: 1 Inhaler, Rfl: 8    atorvastatin (LIPITOR) 20 MG tablet, , Disp: , Rfl:     busPIRone (BUSPAR) 10 MG tablet, Take 1 tablet by mouth 3 times daily, Disp: 270 tablet, Rfl: 0    glimepiride (AMARYL) 4 MG tablet, TAKE 1 TABLET BY MOUTH TWICE DAILY, Disp: 180 tablet, Rfl: 0    blood glucose test strips (ONE TOUCH ULTRA TEST) strip, CHECK BLOOD SUGAR DAILY, Disp: 100 strip, Rfl: 0    pantoprazole (PROTONIX) 40 MG tablet, Take 40 mg by mouth 2 times daily , Disp: , Rfl:     ranolazine (RANEXA) 500 MG extended release tablet, Take 500 mg by mouth 2 times daily, Disp: , Rfl:     prasugrel (EFFIENT) 10 MG TABS, Take 10 mg by mouth daily, Disp: , Rfl:     hydrOXYzine (ATARAX) 10 MG tablet, Take 10 mg by mouth nightly, Disp: , Rfl:     betamethasone dipropionate (DIPROLENE) 0.05 % cream, Apply topically 2 times daily Apply topically 2 times daily. , Disp: , Rfl:     hydrochlorothiazide (HYDRODIURIL) 12.5 MG tablet, TK 1 T PO QD IN THE MORNING, Disp: , Rfl: 3    EPINEPHrine (EPIPEN 2-HUBER) 0.3 MG/0.3ML SOAJ injection, Inject one pen as directed STAT for allergic reaction, may disp generic NDC 61045-268-49, Disp: 6 each, Rfl: 99    ONETOUCH DELICA LANCETS FINE MISC, Check blood sugar twice daily Dx: E11.9, Disp: 200 each, Rfl: 1    losartan (COZAAR) 25 MG tablet, Take 1 tablet by mouth daily, Disp: 30 tablet, Rfl: 5    Misc. Devices (ACAPELLA) MISC, 1 Device by Does not apply route 4 times daily, Disp: 1 each, Rfl: 0    baclofen (LIORESAL) 10 MG tablet, take 1 tablet by mouth twice a day, Disp: , Rfl: 0    traZODone (DESYREL) 150 MG tablet, take 1/2 to 1 tablet by mouth once daily at bedtime if needed for pain SPASM, OR SLEEP, Disp: , Rfl: 0    HYDROcodone-acetaminophen (NORCO) 5-325 MG per tablet, Take 1 tablet by mouth every 6 hours as needed for Pain, Disp: , Rfl:     Omega-3 Fatty Acids (FISH OIL) 1000 MG CAPS, Take 1,000 mg by mouth daily. , Disp: , Rfl:     sildenafil (VIAGRA) 100 MG tablet, Take 1 tablet by mouth as needed. , Disp: 8 tablet, Rfl: 5      Physical Exam:      Vitals:    Vitals:    08/13/20 1037   BP: 126/84   Pulse: 66   Resp: 16   Temp: 97.6 °F (36.4 °C)       244 lb (110.7 kg)       Temp: 97.6 °F (36.4 °C) I @FLOWSTAT(6)@ IPulse: 66 I @FLOWSTAT(8)@ I BP: 126/84 I @DPFGOC(37)@; @KAIWOH(90)@ I Resp: 16 I @FLOWSTAT(9)@ I   I @FLOWSTAT(10)@ I   I   I   I Facility age limit for growth percentiles is 20 years. I     Facility age limit for growth percentiles is 20 years. Facility age limit for growth percentiles is 20 years. Facility age limit for growth percentiles is 20 years.   Facility age limit 34.3 05/29/2020    RDW 13.4 05/29/2020     05/29/2020          IgE   Date/Time Value Ref Range Status   01/17/2020 11:05  (H) <101 IU/mL Final     Comment:     [address], [address] [place] [address] [phone]      IgG   Date/Time Value Ref Range Status   01/17/2020 11:05  700 - 1600 mg/dL Final     Comment:     [address] [phone]     IgA   Date/Time Value Ref Range Status   01/17/2020 11:06  70 - 400 mg/dL Final     Comment:     [address] [phone]      IgM   Date/Time Value Ref Range Status   01/17/2020 11:05  40 - 230 mg/dL Final     Comment:     [address] [phone]       No results found for: CLAUDIA   No results found for: RF       No results found for this or any previous visit. No results found for this or any previous visit. PROCEDURES:        Skin Testing performed on: 06/10/2019    Assessment/Orders:    Diagnosis Orders   1. Non-seasonal allergic rhinitis due to pollen  CBC Auto Differential    IgA    IgE    IgG    IgM   2.  Elevated IgE level  CBC Auto Differential    IgA    IgE    IgG    IgM       Plan:  Follow Up:1 year      Consider repeat allergy testing after 06/19/2021  Labs next visit  Patient continues allergy injections weekly for 1 year and then will decrease down to every other week    Total time sent with patient 30 minutes with greater than 50% in patient education    (Please note that portions of this note may have been completed with a voice recognition program.  Efforts were made to edit the dictation but occasionally words are mis-transcribed.)         Signed:  SANTOSH Vanessa CNP  8/13/2020  11:14 AM

## 2020-08-20 ENCOUNTER — NURSE ONLY (OUTPATIENT)
Dept: ALLERGY | Age: 65
End: 2020-08-20
Payer: MEDICARE

## 2020-08-20 VITALS
SYSTOLIC BLOOD PRESSURE: 124 MMHG | HEART RATE: 70 BPM | RESPIRATION RATE: 16 BRPM | TEMPERATURE: 97.6 F | DIASTOLIC BLOOD PRESSURE: 84 MMHG

## 2020-08-20 PROCEDURE — 95117 IMMUNOTHERAPY INJECTIONS: CPT | Performed by: NURSE PRACTITIONER

## 2020-08-27 ENCOUNTER — NURSE ONLY (OUTPATIENT)
Dept: ALLERGY | Age: 65
End: 2020-08-27
Payer: MEDICARE

## 2020-08-27 VITALS
RESPIRATION RATE: 16 BRPM | TEMPERATURE: 98.1 F | DIASTOLIC BLOOD PRESSURE: 80 MMHG | SYSTOLIC BLOOD PRESSURE: 136 MMHG | HEART RATE: 70 BPM

## 2020-08-27 PROCEDURE — 95117 IMMUNOTHERAPY INJECTIONS: CPT | Performed by: NURSE PRACTITIONER

## 2020-09-02 ENCOUNTER — NURSE ONLY (OUTPATIENT)
Dept: ALLERGY | Age: 65
End: 2020-09-02
Payer: MEDICARE

## 2020-09-02 VITALS
HEART RATE: 72 BPM | SYSTOLIC BLOOD PRESSURE: 130 MMHG | DIASTOLIC BLOOD PRESSURE: 80 MMHG | RESPIRATION RATE: 16 BRPM | TEMPERATURE: 97.5 F

## 2020-09-02 PROCEDURE — 95117 IMMUNOTHERAPY INJECTIONS: CPT | Performed by: NURSE PRACTITIONER

## 2020-09-02 NOTE — PROGRESS NOTES
After consent obtained/verified, allergy injection given in back of R/L arm(s). Documentation of vial injection specific to arm(s) noted on Allergy Immunotherapy Administration Form. Patient waited 30 minutes for observation. Patient tolerated maintenance vials well at 0.50ml without adverse reaction. SHOT REACTION TREATMENT INSTRUCTIONS    During the 30 minute wait after an allergy injection the following symptoms should be reported:    Itching other than at the injection site  Hives or swelling other than at the injection site  Redness other than at the injection site  Difficulty breathing  Chest tightness  Difficulty swallowing  Throat tightness    If these symptoms occur, NOTIFY PROVIDER and the following treatment should be administered:    1. Epinephrine 1:1000 IM - 0.3 ml if > 66 lbs or more, 0.15 ml if 33 - 63 lbs, or 0.1 ml if <33 lbs   2. Diphenhydramine - give all intramuscular:     2 to <6 years (off-label use): 6.25 mg,    6 to <12 years: 12.5 to 25 mg;    ?12 years: 25-50 mg.  3.  Famotidine:  Adults 40 mg oral    Adolescents age 12 years and >88 lbs: 40 mg    Children and Adolescents ? 12years of age: Initial: 0.25 mg/kg/dose   every 12 hours (maximum daily dose: 40 mg/day)    Epi dose may me repeated in 5-15 minutes if adequate resolution of symptoms does not occur    Patient should be observed for at least one hour after final epi dose and must be seen by provider. Patients cannot drive themselves if they have received diphenhydramine.

## 2020-09-09 ENCOUNTER — OFFICE VISIT (OUTPATIENT)
Dept: FAMILY MEDICINE CLINIC | Age: 65
End: 2020-09-09

## 2020-09-09 VITALS
HEIGHT: 68 IN | DIASTOLIC BLOOD PRESSURE: 90 MMHG | TEMPERATURE: 97.1 F | SYSTOLIC BLOOD PRESSURE: 172 MMHG | WEIGHT: 243.38 LBS | HEART RATE: 76 BPM | BODY MASS INDEX: 36.89 KG/M2 | RESPIRATION RATE: 20 BRPM

## 2020-09-09 LAB
CHP ED QC CHECK: ABNORMAL
CREATININE URINE POCT: 100
GLUCOSE BLD-MCNC: 211 MG/DL
HBA1C MFR BLD: 8.7 %
MICROALBUMIN/CREAT 24H UR: 150 MG/G{CREAT}
MICROALBUMIN/CREAT UR-RTO: ABNORMAL

## 2020-09-09 PROCEDURE — 3017F COLORECTAL CA SCREEN DOC REV: CPT | Performed by: FAMILY MEDICINE

## 2020-09-09 PROCEDURE — 82044 UR ALBUMIN SEMIQUANTITATIVE: CPT | Performed by: FAMILY MEDICINE

## 2020-09-09 PROCEDURE — 1036F TOBACCO NON-USER: CPT | Performed by: FAMILY MEDICINE

## 2020-09-09 PROCEDURE — 99213 OFFICE O/P EST LOW 20 MIN: CPT | Performed by: FAMILY MEDICINE

## 2020-09-09 PROCEDURE — 83036 HEMOGLOBIN GLYCOSYLATED A1C: CPT | Performed by: FAMILY MEDICINE

## 2020-09-09 PROCEDURE — G8427 DOCREV CUR MEDS BY ELIG CLIN: HCPCS | Performed by: FAMILY MEDICINE

## 2020-09-09 PROCEDURE — 4040F PNEUMOC VAC/ADMIN/RCVD: CPT | Performed by: FAMILY MEDICINE

## 2020-09-09 PROCEDURE — 82962 GLUCOSE BLOOD TEST: CPT | Performed by: FAMILY MEDICINE

## 2020-09-09 PROCEDURE — 1123F ACP DISCUSS/DSCN MKR DOCD: CPT | Performed by: FAMILY MEDICINE

## 2020-09-09 PROCEDURE — G8417 CALC BMI ABV UP PARAM F/U: HCPCS | Performed by: FAMILY MEDICINE

## 2020-09-09 RX ORDER — GLIMEPIRIDE 4 MG/1
TABLET ORAL
Qty: 180 TABLET | Refills: 1 | Status: SHIPPED | OUTPATIENT
Start: 2020-09-09 | End: 2021-04-05

## 2020-09-09 RX ORDER — ROPINIROLE 1 MG/1
TABLET, FILM COATED ORAL
Qty: 270 TABLET | Refills: 1 | Status: SHIPPED | OUTPATIENT
Start: 2020-09-09 | End: 2020-11-02

## 2020-09-09 RX ORDER — LOSARTAN POTASSIUM 50 MG/1
50 TABLET ORAL DAILY
Qty: 90 TABLET | Refills: 1 | Status: SHIPPED | OUTPATIENT
Start: 2020-09-09 | End: 2021-01-22 | Stop reason: DRUGHIGH

## 2020-09-09 RX ORDER — BUSPIRONE HYDROCHLORIDE 10 MG/1
10 TABLET ORAL 3 TIMES DAILY
Qty: 270 TABLET | Refills: 1 | Status: SHIPPED | OUTPATIENT
Start: 2020-09-09 | End: 2020-10-07 | Stop reason: SDUPTHER

## 2020-09-09 RX ORDER — SERTRALINE HYDROCHLORIDE 100 MG/1
TABLET, FILM COATED ORAL
Qty: 180 TABLET | Refills: 1 | Status: SHIPPED | OUTPATIENT
Start: 2020-09-09 | End: 2021-05-05

## 2020-09-09 ASSESSMENT — ENCOUNTER SYMPTOMS
ABDOMINAL PAIN: 0
NAUSEA: 0
DIARRHEA: 0
BLOOD IN STOOL: 0
VOMITING: 0
CHEST TIGHTNESS: 0
BACK PAIN: 1
COUGH: 0
CONSTIPATION: 0
EYES NEGATIVE: 1
SHORTNESS OF BREATH: 1

## 2020-09-09 NOTE — PROGRESS NOTES
Years: 42.00     Pack years: 84.00     Types: Cigarettes     Start date: 6/5/1971     Last attempt to quit: 3/3/2017     Years since quitting: 3.5    Smokeless tobacco: Never Used    Tobacco comment: pts uses just nicotine vap   Substance Use Topics    Alcohol use: No     Alcohol/week: 0.0 standard drinks    Drug use: No       Past Medical History:   Diagnosis Date    Acid reflux     Arthritis     Asthma     Chronic back pain     Class 2 severe obesity due to excess calories with serious comorbidity and body mass index (BMI) of 37.0 to 37.9 in adult [facility]) 8/13/2018    Colon polyps 11/06    COPD (chronic obstructive pulmonary disease) ([facility] [address]     Depression     panic attacks    Diverticulosis     HTN (hypertension)     Hyperlipidemia     Kidney disorder     Panic attack     Pneumonia     Rash     Recurrent upper respiratory infection (URI)     Sleep apnea     Thumb amputation status 3/13/14    left tip of thumb amputated    Thyroid disease     Type II or unspecified type diabetes mellitus without mention of complication, not stated as uncontrolled        Past Surgical History:   Procedure Laterality Date    BACK SURGERY  2002    BACK SURGERY  08/11/2017    BICEPS TENDON REPAIR Right 08/16/2017    BRONCHOSCOPY      BRONCHOSCOPY N/A 4/5/2019    BRONCHOSCOPY performed by Opal Servin MD at [address] [place] [address]  4/5/2019    BRONCHOSCOPY ALVEOLAR LAVAGE performed by Opal Servin MD at [facility]  07/02 08/05    CARPAL TUNNEL RELEASE  2011    right hand    CHOLECYSTECTOMY  yrs ago    COLONOSCOPY  11/06 02/12 1/14    CORONARY ANGIOPLASTY WITH STENT PLACEMENT  02/2020    ENDOSCOPY, COLON, DIAGNOSTIC      EYE SURGERY      foreign body removal    HERNIA REPAIR  2004    Dr Yeyo Cruz  2008?     LARYNGOSCOPY  04/28/2016    Laryngoscopy Micro with Biopsy by Dr Samy Turner  01/07 Musculoskeletal: Normal range of motion and neck supple. Thyroid: No thyromegaly. Vascular: No JVD. Cardiovascular:      Rate and Rhythm: Normal rate and regular rhythm. Heart sounds: Normal heart sounds. No murmur. Pulmonary:      Effort: Pulmonary effort is normal. No respiratory distress. Breath sounds: Normal breath sounds. No wheezing, rhonchi or rales. Abdominal:      General: Bowel sounds are normal. There is no distension. Palpations: Abdomen is soft. There is no mass. Tenderness: There is no abdominal tenderness. There is no guarding or rebound. Musculoskeletal: Normal range of motion. Lymphadenopathy:      Cervical: No cervical adenopathy. Skin:     General: Skin is warm and dry. Findings: No rash. Neurological:      Mental Status: He is alert and oriented to person, place, and time. Psychiatric:         Behavior: Behavior normal.       :       Diagnosis Orders   1. Type 2 diabetes mellitus with complication, without long-term current use of insulin (HCC)  POCT glycosylated hemoglobin (Hb A1C)    POCT Glucose    POCT microalbumin   2. Essential hypertension     3. Hyperlipidemia, unspecified hyperlipidemia type     4. Gastroesophageal reflux disease, esophagitis presence not specified     5.  Class 2 severe obesity due to excess calories with serious comorbidity and body mass index (BMI) of 37.0 to 37.9 in adult [facility])         :      Current Outpatient Medications   Medication Sig Dispense Refill    rOPINIRole (REQUIP) 1 MG tablet TAKE 1 TABLET BY MOUTH THREE TIMES DAILY 270 tablet 1    busPIRone (BUSPAR) 10 MG tablet Take 1 tablet by mouth 3 times daily 270 tablet 1    sertraline (ZOLOFT) 100 MG tablet TAKE 1 TABLET BY MOUTH TWICE DAILY 180 tablet 1    glimepiride (AMARYL) 4 MG tablet TAKE 1 TABLET BY MOUTH TWICE DAILY 180 tablet 1    metFORMIN (GLUCOPHAGE) 500 MG tablet TAKE 2 TABLETS BY MOUTH TWICE DAILY WITH MEALS 360 tablet 1    losartan (COZAAR) 50 MG tablet Take 1 tablet by mouth daily 90 tablet 1    fluticasone (FLONASE) 50 MCG/ACT nasal spray SHAKE LIQUID AND USE 1 SPRAY IN EACH NOSTRIL DAILY 16 g 11    budesonide-formoterol (SYMBICORT) 160-4.5 MCG/ACT AERO Inhale 2 puffs into the lungs 2 times daily Rinse mouth after its use. 1 Inhaler 11    montelukast (SINGULAIR) 10 MG tablet Take 1 tablet by mouth nightly 30 tablet 11    VENTOLIN  (90 Base) MCG/ACT inhaler Inhale 2 puffs into the lungs every 6 hours as needed for Wheezing 1 Inhaler 8    atorvastatin (LIPITOR) 20 MG tablet       blood glucose test strips (ONE TOUCH ULTRA TEST) strip CHECK BLOOD SUGAR DAILY 100 strip 0    pantoprazole (PROTONIX) 40 MG tablet Take 40 mg by mouth 2 times daily       ranolazine (RANEXA) 500 MG extended release tablet Take 500 mg by mouth 2 times daily      prasugrel (EFFIENT) 10 MG TABS Take 10 mg by mouth daily      hydrOXYzine (ATARAX) 10 MG tablet Take 10 mg by mouth nightly      hydrochlorothiazide (HYDRODIURIL) 12.5 MG tablet TK 1 T PO QD IN THE MORNING  3    EPINEPHrine (EPIPEN 2-HUBER) 0.3 MG/0.3ML SOAJ injection Inject one pen as directed STAT for allergic reaction, may disp generic NDC 46893-791-70 6 each 99    ONETOUCH DELICA LANCETS FINE MISC Check blood sugar twice daily Dx: E11.9 200 each 1    Misc. Devices (ACAPELLA) MISC 1 Device by Does not apply route 4 times daily 1 each 0    baclofen (LIORESAL) 10 MG tablet take 1 tablet by mouth twice a day  0    traZODone (DESYREL) 150 MG tablet take 1/2 to 1 tablet by mouth once daily at bedtime if needed for pain SPASM, OR SLEEP  0    HYDROcodone-acetaminophen (NORCO) 5-325 MG per tablet Take 1 tablet by mouth every 6 hours as needed for Pain      Omega-3 Fatty Acids (FISH OIL) 1000 MG CAPS Take 1,000 mg by mouth daily.  sildenafil (VIAGRA) 100 MG tablet Take 1 tablet by mouth as needed. 8 tablet 5     No current facility-administered medications for this visit.       Orders Placed This Encounter   Procedures    POCT glycosylated hemoglobin (Hb A1C)    POCT Glucose    POCT microalbumin     Lab Results   Component Value Date    LABA1C 8.7 (A) 09/09/2020    LABA1C 7.0 (A) 06/01/2020    LABA1C 7.0 (A) 12/13/2019     Lab Results   Component Value Date    LABMICR 2.74 11/04/2016     Results for POC orders placed in visit on 09/09/20   POCT microalbumin   Result Value Ref Range    Microalb, Ur 150 (A)     Creatinine Ur POCT 100 (A)     Microalbumin Creatinine Ratio     POCT glycosylated hemoglobin (Hb A1C)   Result Value Ref Range    Hemoglobin A1C 8.7 (A) %   POCT Glucose   Result Value Ref Range    Glucose 211 (A) mg/dL    QC OK? Continue to monitor blood sugars 1 times a day. Keep log of blood sugars and bring with you to the next appointment. Monitor B/P once a day. Discussed use, benefit, and side effects of prescribed medications. All patient questions answered. Pt voiced understanding. Instructed to continue current medications, ADA diet and exercise. Patient agreed with treatment plan. Return in about 4 weeks (around 10/7/2020) for HTN.

## 2020-09-10 ENCOUNTER — NURSE ONLY (OUTPATIENT)
Dept: ALLERGY | Age: 65
End: 2020-09-10
Payer: MEDICARE

## 2020-09-10 VITALS
RESPIRATION RATE: 16 BRPM | SYSTOLIC BLOOD PRESSURE: 162 MMHG | HEART RATE: 80 BPM | TEMPERATURE: 97.3 F | DIASTOLIC BLOOD PRESSURE: 92 MMHG

## 2020-09-10 PROCEDURE — 95117 IMMUNOTHERAPY INJECTIONS: CPT | Performed by: NURSE PRACTITIONER

## 2020-09-14 ENCOUNTER — TELEPHONE (OUTPATIENT)
Dept: INTERNAL MEDICINE CLINIC | Age: 65
End: 2020-09-14

## 2020-09-14 NOTE — TELEPHONE ENCOUNTER
Per Pre-Service, patient called in and canceled appointment and stated he was no longer coming to this office. Please discharge.  Thank you

## 2020-09-17 ENCOUNTER — NURSE ONLY (OUTPATIENT)
Dept: ALLERGY | Age: 65
End: 2020-09-17
Payer: MEDICARE

## 2020-09-17 ENCOUNTER — OFFICE VISIT (OUTPATIENT)
Dept: ENT CLINIC | Age: 65
End: 2020-09-17
Payer: MEDICARE

## 2020-09-17 ENCOUNTER — HOSPITAL ENCOUNTER (OUTPATIENT)
Age: 65
Setting detail: SPECIMEN
Discharge: HOME | End: 2020-09-17
Payer: MEDICARE

## 2020-09-17 ENCOUNTER — TELEPHONE (OUTPATIENT)
Dept: ALLERGY | Age: 65
End: 2020-09-17

## 2020-09-17 VITALS
SYSTOLIC BLOOD PRESSURE: 132 MMHG | HEART RATE: 80 BPM | RESPIRATION RATE: 16 BRPM | WEIGHT: 241 LBS | BODY MASS INDEX: 36.53 KG/M2 | HEIGHT: 68 IN | DIASTOLIC BLOOD PRESSURE: 82 MMHG | TEMPERATURE: 98.2 F

## 2020-09-17 VITALS
SYSTOLIC BLOOD PRESSURE: 132 MMHG | TEMPERATURE: 98.2 F | DIASTOLIC BLOOD PRESSURE: 82 MMHG | RESPIRATION RATE: 16 BRPM | HEART RATE: 80 BPM

## 2020-09-17 PROCEDURE — 3017F COLORECTAL CA SCREEN DOC REV: CPT | Performed by: OTOLARYNGOLOGY

## 2020-09-17 PROCEDURE — 1123F ACP DISCUSS/DSCN MKR DOCD: CPT | Performed by: OTOLARYNGOLOGY

## 2020-09-17 PROCEDURE — 99213 OFFICE O/P EST LOW 20 MIN: CPT | Performed by: OTOLARYNGOLOGY

## 2020-09-17 PROCEDURE — 1036F TOBACCO NON-USER: CPT | Performed by: OTOLARYNGOLOGY

## 2020-09-17 PROCEDURE — G8417 CALC BMI ABV UP PARAM F/U: HCPCS | Performed by: OTOLARYNGOLOGY

## 2020-09-17 PROCEDURE — 95117 IMMUNOTHERAPY INJECTIONS: CPT | Performed by: NURSE PRACTITIONER

## 2020-09-17 PROCEDURE — 4040F PNEUMOC VAC/ADMIN/RCVD: CPT | Performed by: OTOLARYNGOLOGY

## 2020-09-17 PROCEDURE — G8427 DOCREV CUR MEDS BY ELIG CLIN: HCPCS | Performed by: OTOLARYNGOLOGY

## 2020-09-17 ASSESSMENT — ENCOUNTER SYMPTOMS
SHORTNESS OF BREATH: 0
DIARRHEA: 0
VOICE CHANGE: 0
STRIDOR: 0
CHEST TIGHTNESS: 0
NAUSEA: 0
COLOR CHANGE: 0
SINUS PRESSURE: 0
ABDOMINAL PAIN: 0
RHINORRHEA: 0
TROUBLE SWALLOWING: 0
APNEA: 0
SORE THROAT: 0
COUGH: 0
FACIAL SWELLING: 0
WHEEZING: 0
CHOKING: 0
VOMITING: 0

## 2020-09-17 NOTE — PROGRESS NOTES
[facility] PROFESSIONAL [facility] EAR, NOSE AND THROAT  [facility]  Dept: [phone]  Dept Fax: [phone]  Loc: [phone]    Justin Murdock is a 77 y.o. male who was referred byNo ref. provider found for:  Chief Complaint   Patient presents with    1 Year Follow Up     Patient is here for 1 year follow up, US thyroid and labs completed    . HPI:     Justin Murdock is a 77 y.o. male who presents today for 1 year follow up US thyroid and labs completed. Results reviewed with patient. US Thyroid:     A left-sided nodule is grossly stable. Based on the American Thyroid Association criteria, this has a low suspicion for malignancy.                   **This report has been created using voice recognition software.  It may contain minor errors which are inherent in voice recognition technology. **         Final report electronically signed by Dr. Uriah Hyde on 7/29/2020   2.2 in greatest diameter. He is breathing better, his allergies are getting better,  He is not on CPAP. He had 80 percent blockage in the main artery, each month it's getting better, he was taken off the oxygen. He is not having trouble breathing through his mouth. He is using the nasal spray and it keeps his nose open. His energy is getting better.         History:     No Known Allergies  Current Outpatient Medications   Medication Sig Dispense Refill    rOPINIRole (REQUIP) 1 MG tablet TAKE 1 TABLET BY MOUTH THREE TIMES DAILY 270 tablet 1    busPIRone (BUSPAR) 10 MG tablet Take 1 tablet by mouth 3 times daily 270 tablet 1    sertraline (ZOLOFT) 100 MG tablet TAKE 1 TABLET BY MOUTH TWICE DAILY 180 tablet 1    glimepiride (AMARYL) 4 MG tablet TAKE 1 TABLET BY MOUTH TWICE DAILY 180 tablet 1    metFORMIN (GLUCOPHAGE) 500 MG tablet TAKE 2 TABLETS BY MOUTH TWICE DAILY WITH MEALS 360 tablet 1    losartan (COZAAR) 50 MG tablet Take 1 tablet by mouth daily 90 tablet 1    fluticasone (FLONASE) 50 MCG/ACT nasal spray SHAKE LIQUID AND USE 1 SPRAY IN EACH NOSTRIL DAILY 16 g 11    budesonide-formoterol (SYMBICORT) 160-4.5 MCG/ACT AERO Inhale 2 puffs into the lungs 2 times daily Rinse mouth after its use. 1 Inhaler 11    montelukast (SINGULAIR) 10 MG tablet Take 1 tablet by mouth nightly 30 tablet 11    VENTOLIN  (90 Base) MCG/ACT inhaler Inhale 2 puffs into the lungs every 6 hours as needed for Wheezing 1 Inhaler 8    atorvastatin (LIPITOR) 20 MG tablet       blood glucose test strips (ONE TOUCH ULTRA TEST) strip CHECK BLOOD SUGAR DAILY 100 strip 0    pantoprazole (PROTONIX) 40 MG tablet Take 40 mg by mouth 2 times daily       ranolazine (RANEXA) 500 MG extended release tablet Take 500 mg by mouth 2 times daily      prasugrel (EFFIENT) 10 MG TABS Take 10 mg by mouth daily      hydrOXYzine (ATARAX) 10 MG tablet Take 10 mg by mouth nightly      hydrochlorothiazide (HYDRODIURIL) 12.5 MG tablet TK 1 T PO QD IN THE MORNING  3    EPINEPHrine (EPIPEN 2-HUBER) 0.3 MG/0.3ML SOAJ injection Inject one pen as directed STAT for allergic reaction, may disp generic NDC 33559-481-95 6 each 99    ONETOUCH DELICA LANCETS FINE MISC Check blood sugar twice daily Dx: E11.9 200 each 1    Misc. Devices (ACAPELLA) MISC 1 Device by Does not apply route 4 times daily 1 each 0    baclofen (LIORESAL) 10 MG tablet take 1 tablet by mouth twice a day  0    traZODone (DESYREL) 150 MG tablet take 1/2 to 1 tablet by mouth once daily at bedtime if needed for pain SPASM, OR SLEEP  0    HYDROcodone-acetaminophen (NORCO) 5-325 MG per tablet Take 1 tablet by mouth every 6 hours as needed for Pain      Omega-3 Fatty Acids (FISH OIL) 1000 MG CAPS Take 1,000 mg by mouth daily.  sildenafil (VIAGRA) 100 MG tablet Take 1 tablet by mouth as needed. 8 tablet 5     No current facility-administered medications for this visit.       Past Medical History:   Diagnosis Date    Acid reflux     Arthritis     Social History     Tobacco Use    Smoking status: Former Smoker     Packs/day: 2.00     Years: 42.00     Pack years: 84.00     Types: Cigarettes     Start date: 6/5/1971     Last attempt to quit: 3/3/2017     Years since quitting: 3.5    Smokeless tobacco: Never Used    Tobacco comment: pts uses just nicotine vap   Substance Use Topics    Alcohol use: No     Alcohol/week: 0.0 standard drinks       Subjective:       Review of Systems   Constitutional: Negative for activity change, appetite change, chills, diaphoresis, fatigue, fever and unexpected weight change. HENT: Negative for congestion, dental problem, ear discharge, ear pain, facial swelling, hearing loss, mouth sores, nosebleeds, postnasal drip, rhinorrhea, sinus pressure, sneezing, sore throat, tinnitus, trouble swallowing and voice change. Eyes: Negative for visual disturbance. Respiratory: Negative for apnea, cough, choking, chest tightness, shortness of breath, wheezing and stridor. Cardiovascular: Negative for chest pain, palpitations and leg swelling. Gastrointestinal: Negative for abdominal pain, diarrhea, nausea and vomiting. Endocrine: Negative for cold intolerance, heat intolerance, polydipsia and polyuria. Genitourinary: Negative for dysuria, enuresis and hematuria. Musculoskeletal: Negative for arthralgias, gait problem, neck pain and neck stiffness. Skin: Negative for color change and rash. Allergic/Immunologic: Negative for environmental allergies, food allergies and immunocompromised state. Neurological: Negative for dizziness, syncope, facial asymmetry, speech difficulty, light-headedness and headaches. Hematological: Negative for adenopathy. Does not bruise/bleed easily. Psychiatric/Behavioral: Negative for confusion and sleep disturbance. The patient is not nervous/anxious.         Objective:     /82 (Site: Left Upper Arm, Position: Sitting)   Pulse 80   Temp 98.2 °F (36.8 °C) (Infrared)   Resp 16 Ht 5' 8\" (1.727 m)   Wt 241 lb (109.3 kg)   BMI 36.64 kg/m²     Physical Exam  2+ Left septal deviation 3+right inferior turbinate hypertrophy. Data:  All of the past medical history, past surgical history, family history,social history, allergies and current medications were reviewed with the patient. Assessment & Plan   Diagnoses and all orders for this visit:     Diagnosis Orders   1. Left thyroid nodule  T3, Free    T4, Free    TSH without Reflex   2. Severe persistent asthma without complication     3. Nasal septal deviation     4. Hypertrophy of inferior nasal turbinate         The findings were explained and his questions were answered. Options were discussed including discussing with his family doctor to have an ultrasound done in 2022. He agreed. Return if symptoms worsen or fail to improve. I, Kamila Rajan CMA ([facility]), am scribing for, and in the presence of Dr. Kenyon Reese. Electronically signed by Sangeetha Carvajal CMA ([facility]) on 9/17/20 at 11:15 AM EDT. (Please note that portions of this note were completed with a voice recognition program. Efforts were made to edit the dictations butoccasionally words are mis-transcribed.)    I agree to the above documentation placed by my scribe. I have personally evaluated this patient. Additional findings are as noted. I reviewed the scribe's note and agree with the documented findings and plan of care. Any areas of disagreement are corrected. I agree with the chief complaint, past medical history, past surgical history, allergies, medications, social and family history as documented unless otherwise noted below.      Electronically signed by Reshma Clancy MD on 9/27/2020 at 3:04 PM

## 2020-09-17 NOTE — TELEPHONE ENCOUNTER
Patient is having an epidural on his back on 9/23/20 and he is wanting to know if he can get his allergy shot on 9/24/20.     Please advise [phone]

## 2020-09-18 LAB
T3 FREE: 3.39 PG/ML (ref 2.02–4.43)
THYROXINE, FREE: 0.99 NG/DL (ref 0.93–1.7)
TSH SERPL DL<=0.05 MIU/L-ACNC: 3.12 MIU/L (ref 0.3–5)

## 2020-09-24 ENCOUNTER — HOSPITAL ENCOUNTER (OUTPATIENT)
Dept: CT IMAGING | Age: 65
Discharge: HOME | End: 2020-09-24
Payer: MEDICARE

## 2020-09-24 ENCOUNTER — NURSE ONLY (OUTPATIENT)
Dept: ALLERGY | Age: 65
End: 2020-09-24
Payer: MEDICARE

## 2020-09-24 ENCOUNTER — NURSE ONLY (OUTPATIENT)
Dept: LAB | Age: 65
End: 2020-09-24

## 2020-09-24 VITALS
TEMPERATURE: 97.3 F | RESPIRATION RATE: 16 BRPM | HEART RATE: 70 BPM | DIASTOLIC BLOOD PRESSURE: 82 MMHG | SYSTOLIC BLOOD PRESSURE: 130 MMHG

## 2020-09-24 LAB
ALBUMIN SERPL-MCNC: 4.2 G/DL (ref 3.5–5.1)
ALP BLD-CCNC: 47 U/L (ref 38–126)
ALT SERPL-CCNC: 28 U/L (ref 11–66)
ANION GAP SERPL CALCULATED.3IONS-SCNC: 11 MEQ/L (ref 8–16)
AST SERPL-CCNC: 20 U/L (ref 5–40)
BASOPHILS # BLD: 0.4 %
BASOPHILS ABSOLUTE: 0 THOU/MM3 (ref 0–0.1)
BILIRUB SERPL-MCNC: 0.3 MG/DL (ref 0.3–1.2)
BILIRUBIN DIRECT: < 0.2 MG/DL (ref 0–0.3)
BUN BLDV-MCNC: 15 MG/DL (ref 7–22)
C-REACTIVE PROTEIN: 0.2 MG/DL (ref 0–1)
CALCIUM SERPL-MCNC: 9.6 MG/DL (ref 8.5–10.5)
CHLORIDE BLD-SCNC: 102 MEQ/L (ref 98–111)
CO2: 29 MEQ/L (ref 23–33)
CREAT SERPL-MCNC: 1 MG/DL (ref 0.4–1.2)
EOSINOPHIL # BLD: 0.8 %
EOSINOPHILS ABSOLUTE: 0.1 THOU/MM3 (ref 0–0.4)
ERYTHROCYTE [DISTWIDTH] IN BLOOD BY AUTOMATED COUNT: 14.2 % (ref 11.5–14.5)
ERYTHROCYTE [DISTWIDTH] IN BLOOD BY AUTOMATED COUNT: 49.8 FL (ref 35–45)
GFR SERPL CREATININE-BSD FRML MDRD: 74 ML/MIN/1.73M2
GLUCOSE BLD-MCNC: 179 MG/DL (ref 70–108)
HCT VFR BLD CALC: 48.2 % (ref 42–52)
HEMOGLOBIN: 16 GM/DL (ref 14–18)
IMMATURE GRANS (ABS): 0.09 THOU/MM3 (ref 0–0.07)
IMMATURE GRANULOCYTES: 0.8 %
LYMPHOCYTES # BLD: 17.3 %
LYMPHOCYTES ABSOLUTE: 1.8 THOU/MM3 (ref 1–4.8)
MCH RBC QN AUTO: 32 PG (ref 26–33)
MCHC RBC AUTO-ENTMCNC: 33.2 GM/DL (ref 32.2–35.5)
MCV RBC AUTO: 96.4 FL (ref 80–94)
MONOCYTES # BLD: 6.1 %
MONOCYTES ABSOLUTE: 0.6 THOU/MM3 (ref 0.4–1.3)
NUCLEATED RED BLOOD CELLS: 0 /100 WBC
PLATELET # BLD: 168 THOU/MM3 (ref 130–400)
PMV BLD AUTO: 12.2 FL (ref 9.4–12.4)
POC CREATININE WHOLE BLOOD: 1 MG/DL (ref 0.5–1.2)
POTASSIUM SERPL-SCNC: 4.8 MEQ/L (ref 3.5–5.2)
RBC # BLD: 5 MILL/MM3 (ref 4.7–6.1)
SEG NEUTROPHILS: 74.6 %
SEGMENTED NEUTROPHILS ABSOLUTE COUNT: 7.9 THOU/MM3 (ref 1.8–7.7)
SODIUM BLD-SCNC: 142 MEQ/L (ref 135–145)
TOTAL PROTEIN: 7.2 G/DL (ref 6.1–8)
WBC # BLD: 10.6 THOU/MM3 (ref 4.8–10.8)

## 2020-09-24 PROCEDURE — 95117 IMMUNOTHERAPY INJECTIONS: CPT | Performed by: NURSE PRACTITIONER

## 2020-09-24 PROCEDURE — 82565 ASSAY OF CREATININE: CPT

## 2020-09-24 PROCEDURE — 74177 CT ABD & PELVIS W/CONTRAST: CPT

## 2020-09-24 PROCEDURE — 6360000004 HC RX CONTRAST MEDICATION: Performed by: NURSE PRACTITIONER

## 2020-09-24 RX ADMIN — IOPAMIDOL 80 ML: 755 INJECTION, SOLUTION INTRAVENOUS at 10:57

## 2020-09-24 NOTE — PROGRESS NOTES
After consent obtained/verified, allergy injection given in back of R/L arm(s). Documentation of vial injection specific to arm(s) noted on Allergy Immunotherapy Administration Form. Patient signed waiver and declined to wait 30 minutes for observation. Patient tolerated Maintenance vials well at 0.50ml without adverse reaction. SHOT REACTION TREATMENT INSTRUCTIONS    During the 30 minute wait after an allergy injection the following symptoms should be reported:    Itching other than at the injection site  Hives or swelling other than at the injection site  Redness other than at the injection site  Difficulty breathing  Chest tightness  Difficulty swallowing  Throat tightness    If these symptoms occur, NOTIFY PROVIDER and the following treatment should be administered:    1. Epinephrine 1:1000 IM - 0.3 ml if > 66 lbs or more, 0.15 ml if 33 - 63 lbs, or 0.1 ml if <33 lbs   2. Diphenhydramine - give all intramuscular:     2 to <6 years (off-label use): 6.25 mg,    6 to <12 years: 12.5 to 25 mg;    ?12 years: 25-50 mg.  3.  Famotidine:  Adults 40 mg oral    Adolescents age 12 years and >88 lbs: 40 mg    Children and Adolescents ? 12years of age: Initial: 0.25 mg/kg/dose   every 12 hours (maximum daily dose: 40 mg/day)    Epi dose may me repeated in 5-15 minutes if adequate resolution of symptoms does not occur    Patient should be observed for at least one hour after final epi dose and must be seen by provider. Patients cannot drive themselves if they have received diphenhydramine.

## 2020-10-07 ENCOUNTER — OFFICE VISIT (OUTPATIENT)
Dept: FAMILY MEDICINE CLINIC | Age: 65
End: 2020-10-07

## 2020-10-07 VITALS
BODY MASS INDEX: 36.58 KG/M2 | SYSTOLIC BLOOD PRESSURE: 130 MMHG | RESPIRATION RATE: 16 BRPM | HEART RATE: 72 BPM | WEIGHT: 241.38 LBS | HEIGHT: 68 IN | TEMPERATURE: 96.9 F | DIASTOLIC BLOOD PRESSURE: 70 MMHG

## 2020-10-07 PROCEDURE — 3017F COLORECTAL CA SCREEN DOC REV: CPT | Performed by: FAMILY MEDICINE

## 2020-10-07 PROCEDURE — 4040F PNEUMOC VAC/ADMIN/RCVD: CPT | Performed by: FAMILY MEDICINE

## 2020-10-07 PROCEDURE — G8427 DOCREV CUR MEDS BY ELIG CLIN: HCPCS | Performed by: FAMILY MEDICINE

## 2020-10-07 PROCEDURE — G8417 CALC BMI ABV UP PARAM F/U: HCPCS | Performed by: FAMILY MEDICINE

## 2020-10-07 PROCEDURE — 99213 OFFICE O/P EST LOW 20 MIN: CPT | Performed by: FAMILY MEDICINE

## 2020-10-07 PROCEDURE — 1123F ACP DISCUSS/DSCN MKR DOCD: CPT | Performed by: FAMILY MEDICINE

## 2020-10-07 PROCEDURE — 1036F TOBACCO NON-USER: CPT | Performed by: FAMILY MEDICINE

## 2020-10-07 RX ORDER — BUSPIRONE HYDROCHLORIDE 10 MG/1
10 TABLET ORAL 3 TIMES DAILY
Qty: 270 TABLET | Refills: 1 | Status: SHIPPED | OUTPATIENT
Start: 2020-10-07 | End: 2021-05-19

## 2020-10-07 ASSESSMENT — ENCOUNTER SYMPTOMS
VOMITING: 0
SHORTNESS OF BREATH: 0
DIARRHEA: 0
CONSTIPATION: 0
COUGH: 0
ABDOMINAL PAIN: 0
BLOOD IN STOOL: 0
CHEST TIGHTNESS: 0
BACK PAIN: 1
NAUSEA: 0
EYES NEGATIVE: 1

## 2020-10-07 NOTE — PROGRESS NOTES
Date: 10/7/2020    Dolores Cobian is a 77 y.o. male who presents today for:  Chief Complaint   Patient presents with    Check-Up       HPI:     HPI    has a current medication list which includes the following prescription(s): buspirone, ropinirole, sertraline, glimepiride, metformin, losartan, fluticasone, budesonide-formoterol, montelukast, ventolin hfa, atorvastatin, one touch ultra test, pantoprazole, ranolazine, prasugrel, hydroxyzine, hydrochlorothiazide, epinephrine, onetouch delica lancets fine, acapella, baclofen, trazodone, hydrocodone-acetaminophen, fish oil, and sildenafil.     No Known Allergies    Social History     Tobacco Use    Smoking status: Former Smoker     Packs/day: 2.00     Years: 42.00     Pack years: 84.00     Types: Cigarettes     Start date: 6/5/1971     Last attempt to quit: 3/3/2017     Years since quitting: 3.6    Smokeless tobacco: Never Used    Tobacco comment: pts uses just nicotine vap   Substance Use Topics    Alcohol use: No     Alcohol/week: 0.0 standard drinks    Drug use: No       Past Medical History:   Diagnosis Date    Acid reflux     Arthritis     Asthma     Chronic back pain     Class 2 severe obesity due to excess calories with serious comorbidity and body mass index (BMI) of 37.0 to 37.9 in adult [facility]) 8/13/2018    Colon polyps 11/06    COPD (chronic obstructive pulmonary disease) ([facility] [address]     Depression     panic attacks    Diverticulosis     HTN (hypertension)     Hyperlipidemia     Kidney disorder     Panic attack     Pneumonia     Rash     Recurrent upper respiratory infection (URI)     Sleep apnea     Thumb amputation status 3/13/14    left tip of thumb amputated    Thyroid disease     Type II or unspecified type diabetes mellitus without mention of complication, not stated as uncontrolled        Past Surgical History:   Procedure Laterality Date    BACK SURGERY  2002    BACK SURGERY  08/11/2017    BICEPS TENDON REPAIR Right 08/16/2017    BRONCHOSCOPY      BRONCHOSCOPY N/A 4/5/2019    BRONCHOSCOPY performed by Justin Gupta MD at [address]  4/5/2019    BRONCHOSCOPY ALVEOLAR LAVAGE performed by Justin Gupta MD at [facility]   [address]  07/02 08/05    CARPAL TUNNEL RELEASE  2011    right hand    CHOLECYSTECTOMY  yrs ago    COLONOSCOPY  11/06 02/12 1/14    CORONARY ANGIOPLASTY WITH STENT PLACEMENT  02/2020    ENDOSCOPY, COLON, DIAGNOSTIC      EYE SURGERY      foreign body removal    HERNIA REPAIR  2004    Dr Bonifacio Luevano  2008?  LARYNGOSCOPY  04/28/2016    Laryngoscopy Micro with Biopsy by Dr Angelina Conde  01/07    uppp    ROTATOR CUFF REPAIR Left 5-20-15    SKIN BIOPSY      TONSILLECTOMY  1985    UPPER GASTROINTESTINAL ENDOSCOPY  1997    VEIN SURGERY Left September 2014    Dr. Ramin Andrade       Family History   Problem Relation Age of Onset    Cancer Mother     Breast Cancer Mother     Stroke Mother     Heart Disease Brother     Diabetes Brother     High Blood Pressure Brother     High Cholesterol Brother      Subjective:     Review of Systems   Constitutional: Negative for activity change, appetite change, diaphoresis and fever. HENT: Negative. Eyes: Negative. Respiratory: Negative for cough, chest tightness and shortness of breath. Cardiovascular: Negative for chest pain, palpitations and leg swelling. Gastrointestinal: Negative for abdominal pain, blood in stool, constipation, diarrhea, nausea and vomiting. Genitourinary: Negative. Musculoskeletal: Positive for arthralgias and back pain. Skin: Negative. Negative for rash. Neurological: Negative. Negative for dizziness, syncope, weakness, light-headedness and headaches.    Psychiatric/Behavioral: Negative.        :   /70 (Site: Right Upper Arm, Position: Sitting, Cuff Size: Large Adult)   Pulse 72   Temp 96.9 °F (36.1 °C) (Skin)   Resp 16 Ht 5' 8\" (1.727 m)   Wt 241 lb 6 oz (109.5 kg)   BMI 36.70 kg/m²   Wt Readings from Last 3 Encounters:   10/07/20 241 lb 6 oz (109.5 kg)   09/17/20 241 lb (109.3 kg)   09/09/20 243 lb 6 oz (110.4 kg)     Physical Exam  Vitals signs and nursing note reviewed. Constitutional:       General: He is not in acute distress. Appearance: He is well-developed. He is not diaphoretic. HENT:      Head: Normocephalic and atraumatic. Eyes:      General: No scleral icterus. Right eye: No discharge. Left eye: No discharge. Conjunctiva/sclera: Conjunctivae normal.      Pupils: Pupils are equal, round, and reactive to light. Neck:      Musculoskeletal: Normal range of motion and neck supple. Thyroid: No thyromegaly. Vascular: No JVD. Cardiovascular:      Rate and Rhythm: Normal rate and regular rhythm. Heart sounds: Normal heart sounds. No murmur. Pulmonary:      Effort: Pulmonary effort is normal. No respiratory distress. Breath sounds: Normal breath sounds. No wheezing, rhonchi or rales. Abdominal:      General: Bowel sounds are normal. There is no distension. Palpations: Abdomen is soft. There is no mass. Tenderness: There is no abdominal tenderness. There is no guarding or rebound. Lymphadenopathy:      Cervical: No cervical adenopathy. Skin:     General: Skin is warm and dry. Findings: No rash. Neurological:      Mental Status: He is alert and oriented to person, place, and time. Psychiatric:         Behavior: Behavior normal.       :       Diagnosis Orders   1. Essential hypertension     2. Type 2 diabetes mellitus with complication, without long-term current use of insulin ([facility] [address]     3. Hyperlipidemia, unspecified hyperlipidemia type     4. Chronic bilateral low back pain without sciatica     5.  Anxiety         :      Current Outpatient Medications   Medication Sig Dispense Refill    busPIRone (BUSPAR) 10 MG tablet Take 1 tablet by mouth 3 times daily 270 tablet 1    rOPINIRole (REQUIP) 1 MG tablet TAKE 1 TABLET BY MOUTH THREE TIMES DAILY 270 tablet 1    sertraline (ZOLOFT) 100 MG tablet TAKE 1 TABLET BY MOUTH TWICE DAILY 180 tablet 1    glimepiride (AMARYL) 4 MG tablet TAKE 1 TABLET BY MOUTH TWICE DAILY 180 tablet 1    metFORMIN (GLUCOPHAGE) 500 MG tablet TAKE 2 TABLETS BY MOUTH TWICE DAILY WITH MEALS 360 tablet 1    losartan (COZAAR) 50 MG tablet Take 1 tablet by mouth daily 90 tablet 1    fluticasone (FLONASE) 50 MCG/ACT nasal spray SHAKE LIQUID AND USE 1 SPRAY IN EACH NOSTRIL DAILY 16 g 11    budesonide-formoterol (SYMBICORT) 160-4.5 MCG/ACT AERO Inhale 2 puffs into the lungs 2 times daily Rinse mouth after its use. 1 Inhaler 11    montelukast (SINGULAIR) 10 MG tablet Take 1 tablet by mouth nightly 30 tablet 11    VENTOLIN  (90 Base) MCG/ACT inhaler Inhale 2 puffs into the lungs every 6 hours as needed for Wheezing 1 Inhaler 8    atorvastatin (LIPITOR) 20 MG tablet       blood glucose test strips (ONE TOUCH ULTRA TEST) strip CHECK BLOOD SUGAR DAILY 100 strip 0    pantoprazole (PROTONIX) 40 MG tablet Take 40 mg by mouth 2 times daily       ranolazine (RANEXA) 500 MG extended release tablet Take 500 mg by mouth 2 times daily      prasugrel (EFFIENT) 10 MG TABS Take 10 mg by mouth daily      hydrOXYzine (ATARAX) 10 MG tablet Take 10 mg by mouth nightly      hydrochlorothiazide (HYDRODIURIL) 12.5 MG tablet TK 1 T PO QD IN THE MORNING  3    EPINEPHrine (EPIPEN 2-HUBER) 0.3 MG/0.3ML SOAJ injection Inject one pen as directed STAT for allergic reaction, may disp generic NDC 57347-031-10 6 each 99    ONETOUCH DELICA LANCETS FINE MISC Check blood sugar twice daily Dx: E11.9 200 each 1    Misc.  Devices (ACAPELLA) MISC 1 Device by Does not apply route 4 times daily 1 each 0    baclofen (LIORESAL) 10 MG tablet take 1 tablet by mouth twice a day  0    traZODone (DESYREL) 150 MG tablet take 1/2 to 1 tablet by mouth once daily at bedtime if needed for pain SPASM, OR SLEEP  0    HYDROcodone-acetaminophen (NORCO) 5-325 MG per tablet Take 1 tablet by mouth every 6 hours as needed for Pain      Omega-3 Fatty Acids (FISH OIL) 1000 MG CAPS Take 1,000 mg by mouth daily.  sildenafil (VIAGRA) 100 MG tablet Take 1 tablet by mouth as needed. 8 tablet 5     No current facility-administered medications for this visit. No orders of the defined types were placed in this encounter. Lab Results   Component Value Date    LABA1C 8.7 (A) 09/09/2020    LABA1C 7.0 (A) 06/01/2020    LABA1C 7.0 (A) 12/13/2019     Lab Results   Component Value Date    LABMICR 2.74 11/04/2016     No results found for this visit on 10/07/20. Discussed use, benefit, and side effects of prescribed medications. All patient questions answered. Pt voiced understanding. Instructed to continue current medications, ADA diet and exercise. Patient agreed with treatment plan. Return in about 3 months (around 1/7/2021) for DM.

## 2020-10-08 ENCOUNTER — NURSE ONLY (OUTPATIENT)
Dept: ALLERGY | Age: 65
End: 2020-10-08
Payer: MEDICARE

## 2020-10-08 VITALS
DIASTOLIC BLOOD PRESSURE: 80 MMHG | HEART RATE: 76 BPM | RESPIRATION RATE: 16 BRPM | TEMPERATURE: 97.3 F | SYSTOLIC BLOOD PRESSURE: 128 MMHG

## 2020-10-08 PROCEDURE — 95117 IMMUNOTHERAPY INJECTIONS: CPT | Performed by: NURSE PRACTITIONER

## 2020-10-22 ENCOUNTER — NURSE ONLY (OUTPATIENT)
Dept: ALLERGY | Age: 65
End: 2020-10-22
Payer: MEDICARE

## 2020-10-22 VITALS
DIASTOLIC BLOOD PRESSURE: 88 MMHG | RESPIRATION RATE: 16 BRPM | HEART RATE: 70 BPM | SYSTOLIC BLOOD PRESSURE: 132 MMHG | TEMPERATURE: 98.1 F

## 2020-10-22 PROCEDURE — 95117 IMMUNOTHERAPY INJECTIONS: CPT | Performed by: NURSE PRACTITIONER

## 2020-11-02 RX ORDER — ROPINIROLE 1 MG/1
TABLET, FILM COATED ORAL
Qty: 270 TABLET | Refills: 1 | Status: SHIPPED | OUTPATIENT
Start: 2020-11-02 | End: 2021-05-05

## 2020-11-02 NOTE — TELEPHONE ENCOUNTER
Date of last visit:  10/7/2020  Date of next visit:  1/11/2021    Requested Prescriptions     Pending Prescriptions Disp Refills    rOPINIRole (REQUIP) 1 MG tablet [Pharmacy Med Name: ROPINIROLE 1MG TABLETS] 270 tablet 1     Sig: TAKE 1 TABLET BY MOUTH THREE TIMES DAILY

## 2020-11-05 ENCOUNTER — NURSE ONLY (OUTPATIENT)
Dept: ALLERGY | Age: 65
End: 2020-11-05
Payer: MEDICARE

## 2020-11-05 VITALS
SYSTOLIC BLOOD PRESSURE: 140 MMHG | RESPIRATION RATE: 24 BRPM | DIASTOLIC BLOOD PRESSURE: 70 MMHG | TEMPERATURE: 97 F | HEART RATE: 88 BPM

## 2020-11-05 PROCEDURE — 95117 IMMUNOTHERAPY INJECTIONS: CPT | Performed by: NURSE PRACTITIONER

## 2020-11-12 ENCOUNTER — NURSE ONLY (OUTPATIENT)
Dept: ALLERGY | Age: 65
End: 2020-11-12
Payer: MEDICARE

## 2020-11-12 VITALS
RESPIRATION RATE: 14 BRPM | TEMPERATURE: 97.2 F | SYSTOLIC BLOOD PRESSURE: 132 MMHG | HEART RATE: 80 BPM | DIASTOLIC BLOOD PRESSURE: 82 MMHG

## 2020-11-12 PROCEDURE — 95117 IMMUNOTHERAPY INJECTIONS: CPT | Performed by: NURSE PRACTITIONER

## 2020-11-19 ENCOUNTER — NURSE ONLY (OUTPATIENT)
Dept: ALLERGY | Age: 65
End: 2020-11-19
Payer: MEDICARE

## 2020-11-19 VITALS
HEART RATE: 80 BPM | DIASTOLIC BLOOD PRESSURE: 82 MMHG | SYSTOLIC BLOOD PRESSURE: 132 MMHG | TEMPERATURE: 98.1 F | RESPIRATION RATE: 14 BRPM

## 2020-11-19 PROCEDURE — 95117 IMMUNOTHERAPY INJECTIONS: CPT | Performed by: NURSE PRACTITIONER

## 2020-11-25 ENCOUNTER — NURSE ONLY (OUTPATIENT)
Dept: ALLERGY | Age: 65
End: 2020-11-25
Payer: MEDICARE

## 2020-11-25 VITALS
HEART RATE: 76 BPM | TEMPERATURE: 98 F | SYSTOLIC BLOOD PRESSURE: 132 MMHG | DIASTOLIC BLOOD PRESSURE: 82 MMHG | RESPIRATION RATE: 16 BRPM

## 2020-11-25 PROCEDURE — 95117 IMMUNOTHERAPY INJECTIONS: CPT | Performed by: NURSE PRACTITIONER

## 2020-11-25 NOTE — PROGRESS NOTES
After consent obtained/verified, allergy injection given in back of R/L arm(s). Documentation of vial injection specific to arm(s) noted on Allergy Immunotherapy Administration Form. Patient declined to wait 30 minutes for observation. SHOT REACTION TREATMENT INSTRUCTIONS    During the 30 minute wait after an allergy injection the following symptoms should be reported:    Itching other than at the injection site  Hives or swelling other than at the injection site  Redness other than at the injection site  Difficulty breathing  Chest tightness  Difficulty swallowing  Throat tightness    If these symptoms occur, NOTIFY PROVIDER and the following treatment should be administered:    1. Epinephrine 1:1000 IM - 0.3 ml if > 66 lbs or more, 0.15 ml if 33 - 63 lbs, or 0.1 ml if <33 lbs   2. Diphenhydramine - give all intramuscular:     2 to <6 years (off-label use): 6.25 mg,    6 to <12 years: 12.5 to 25 mg;    ?12 years: 25-50 mg.  3.  Famotidine:  Adults 40 mg oral    Adolescents age 12 years and >88 lbs: 40 mg    Children and Adolescents ? 12years of age: Initial: 0.25 mg/kg/dose   every 12 hours (maximum daily dose: 40 mg/day)    Epi dose may me repeated in 5-15 minutes if adequate resolution of symptoms does not occur    Patient should be observed for at least one hour after final epi dose and must be seen by provider. Patients cannot drive themselves if they have received diphenhydramine.

## 2020-12-03 ENCOUNTER — NURSE ONLY (OUTPATIENT)
Dept: ALLERGY | Age: 65
End: 2020-12-03
Payer: MEDICARE

## 2020-12-03 VITALS
TEMPERATURE: 97.2 F | DIASTOLIC BLOOD PRESSURE: 88 MMHG | HEART RATE: 80 BPM | RESPIRATION RATE: 14 BRPM | SYSTOLIC BLOOD PRESSURE: 124 MMHG

## 2020-12-03 PROCEDURE — 95117 IMMUNOTHERAPY INJECTIONS: CPT | Performed by: NURSE PRACTITIONER

## 2020-12-17 ENCOUNTER — NURSE ONLY (OUTPATIENT)
Dept: ALLERGY | Age: 65
End: 2020-12-17
Payer: MEDICARE

## 2020-12-17 VITALS
HEART RATE: 80 BPM | DIASTOLIC BLOOD PRESSURE: 82 MMHG | SYSTOLIC BLOOD PRESSURE: 128 MMHG | TEMPERATURE: 98.1 F | RESPIRATION RATE: 16 BRPM

## 2020-12-17 PROCEDURE — 95117 IMMUNOTHERAPY INJECTIONS: CPT | Performed by: NURSE PRACTITIONER

## 2020-12-17 NOTE — PROGRESS NOTES
After consent obtained/verified, allergy injection given in back of R/L arm(s). Documentation of vial injection specific to arm(s) noted on Allergy Immunotherapy Administration Form. Patient declined to wait 30 minutes for observation. Patient tolerated  Maintenance vials well at 0.50ml without adverse reaction. SHOT REACTION TREATMENT INSTRUCTIONS    During the 30 minute wait after an allergy injection the following symptoms should be reported:    Itching other than at the injection site  Hives or swelling other than at the injection site  Redness other than at the injection site  Difficulty breathing  Chest tightness  Difficulty swallowing  Throat tightness    If these symptoms occur, NOTIFY PROVIDER and the following treatment should be administered:    1. Epinephrine 1:1000 IM - 0.3 ml if > 66 lbs or more, 0.15 ml if 33 - 63 lbs, or 0.1 ml if <33 lbs   2. Diphenhydramine - give all intramuscular:     2 to <6 years (off-label use): 6.25 mg,    6 to <12 years: 12.5 to 25 mg;    ?12 years: 25-50 mg.  3.  Famotidine:  Adults 40 mg oral    Adolescents age 12 years and >88 lbs: 40 mg    Children and Adolescents ? 12years of age: Initial: 0.25 mg/kg/dose   every 12 hours (maximum daily dose: 40 mg/day)    Epi dose may me repeated in 5-15 minutes if adequate resolution of symptoms does not occur    Patient should be observed for at least one hour after final epi dose and must be seen by provider. Patients cannot drive themselves if they have received diphenhydramine.

## 2020-12-21 ENCOUNTER — NURSE ONLY (OUTPATIENT)
Dept: ALLERGY | Age: 65
End: 2020-12-21
Payer: MEDICARE

## 2020-12-21 VITALS
SYSTOLIC BLOOD PRESSURE: 136 MMHG | HEART RATE: 72 BPM | RESPIRATION RATE: 16 BRPM | DIASTOLIC BLOOD PRESSURE: 88 MMHG | TEMPERATURE: 97.2 F

## 2020-12-21 PROCEDURE — 95117 IMMUNOTHERAPY INJECTIONS: CPT | Performed by: NURSE PRACTITIONER

## 2020-12-29 ENCOUNTER — VIRTUAL VISIT (OUTPATIENT)
Dept: FAMILY MEDICINE CLINIC | Age: 65
End: 2020-12-29

## 2020-12-29 PROCEDURE — 4040F PNEUMOC VAC/ADMIN/RCVD: CPT | Performed by: FAMILY MEDICINE

## 2020-12-29 PROCEDURE — 3017F COLORECTAL CA SCREEN DOC REV: CPT | Performed by: FAMILY MEDICINE

## 2020-12-29 PROCEDURE — G8427 DOCREV CUR MEDS BY ELIG CLIN: HCPCS | Performed by: FAMILY MEDICINE

## 2020-12-29 PROCEDURE — 99213 OFFICE O/P EST LOW 20 MIN: CPT | Performed by: FAMILY MEDICINE

## 2020-12-29 PROCEDURE — 1036F TOBACCO NON-USER: CPT | Performed by: FAMILY MEDICINE

## 2020-12-29 PROCEDURE — G8417 CALC BMI ABV UP PARAM F/U: HCPCS | Performed by: FAMILY MEDICINE

## 2020-12-29 RX ORDER — BENZONATATE 200 MG/1
200 CAPSULE ORAL 3 TIMES DAILY PRN
Qty: 30 CAPSULE | Refills: 0 | Status: SHIPPED | OUTPATIENT
Start: 2020-12-29 | End: 2021-01-05

## 2020-12-29 RX ORDER — AMOXICILLIN AND CLAVULANATE POTASSIUM 875; 125 MG/1; MG/1
1 TABLET, FILM COATED ORAL 2 TIMES DAILY
Qty: 20 TABLET | Refills: 0 | Status: SHIPPED | OUTPATIENT
Start: 2020-12-29 | End: 2021-01-08

## 2020-12-29 ASSESSMENT — ENCOUNTER SYMPTOMS
ABDOMINAL PAIN: 0
BLOOD IN STOOL: 0
SHORTNESS OF BREATH: 0
EYE PAIN: 0
NAUSEA: 0
CONSTIPATION: 0
BACK PAIN: 0
TROUBLE SWALLOWING: 0
CHEST TIGHTNESS: 0
COUGH: 1
SORE THROAT: 0

## 2020-12-29 NOTE — PROGRESS NOTES
Subjective:      Patient ID: Cherise Varghese is a 77 y.o. male. Patient verified Video Chat was not encrypted, and agrees to the Video Exam in presence of nurse. Stop  Smoking     Cough  This is a new problem. The current episode started in the past 7 days (   3 to  4   days ). The problem has been gradually worsening. The cough is productive of sputum. Associated symptoms include nasal congestion. Pertinent negatives include no chest pain, ear pain, fever, headaches, postnasal drip, rash, sore throat or shortness of breath. Associated symptoms comments:   Green   mucous. Nothing aggravates the symptoms. The treatment provided mild relief. Past Medical History:   Diagnosis Date    Acid reflux     Arthritis     Asthma     Chronic back pain     Class 2 severe obesity due to excess calories with serious comorbidity and body mass index (BMI) of 37.0 to 37.9 in adult [facility]) 8/13/2018    Colon polyps 11/06    COPD (chronic obstructive pulmonary disease) (HCC)     Depression     panic attacks    Diverticulosis     HTN (hypertension)     Hyperlipidemia     Kidney disorder     Panic attack     Pneumonia     Rash     Recurrent upper respiratory infection (URI)     Sleep apnea     Thumb amputation status 3/13/14    left tip of thumb amputated    Thyroid disease     Type II or unspecified type diabetes mellitus without mention of complication, not stated as uncontrolled      Review of Systems   Constitutional: Negative for fatigue and fever. HENT: Negative for congestion, ear pain, postnasal drip, sore throat and trouble swallowing. Eyes: Negative for pain. Respiratory: Positive for cough. Negative for chest tightness and shortness of breath. Cardiovascular: Negative for chest pain, palpitations and leg swelling. Gastrointestinal: Negative for abdominal pain, blood in stool, constipation and nausea. Genitourinary: Negative for frequency and urgency.  losartan (COZAAR) 50 MG tablet Take 1 tablet by mouth daily 90 tablet 1    fluticasone (FLONASE) 50 MCG/ACT nasal spray SHAKE LIQUID AND USE 1 SPRAY IN EACH NOSTRIL DAILY 16 g 11    budesonide-formoterol (SYMBICORT) 160-4.5 MCG/ACT AERO Inhale 2 puffs into the lungs 2 times daily Rinse mouth after its use. 1 Inhaler 11    montelukast (SINGULAIR) 10 MG tablet Take 1 tablet by mouth nightly 30 tablet 11    VENTOLIN  (90 Base) MCG/ACT inhaler Inhale 2 puffs into the lungs every 6 hours as needed for Wheezing 1 Inhaler 8    atorvastatin (LIPITOR) 20 MG tablet       blood glucose test strips (ONE TOUCH ULTRA TEST) strip CHECK BLOOD SUGAR DAILY 100 strip 0    pantoprazole (PROTONIX) 40 MG tablet Take 40 mg by mouth 2 times daily       ranolazine (RANEXA) 500 MG extended release tablet Take 500 mg by mouth 2 times daily      prasugrel (EFFIENT) 10 MG TABS Take 10 mg by mouth daily      hydrOXYzine (ATARAX) 10 MG tablet Take 10 mg by mouth nightly      hydrochlorothiazide (HYDRODIURIL) 12.5 MG tablet TK 1 T PO QD IN THE MORNING  3    EPINEPHrine (EPIPEN 2-HUBER) 0.3 MG/0.3ML SOAJ injection Inject one pen as directed STAT for allergic reaction, may disp generic NDC 99754-537-72 6 each 99    ONETOUCH DELICA LANCETS FINE MISC Check blood sugar twice daily Dx: E11.9 200 each 1    Misc. Devices (ACAPELLA) MISC 1 Device by Does not apply route 4 times daily 1 each 0    baclofen (LIORESAL) 10 MG tablet take 1 tablet by mouth twice a day  0    traZODone (DESYREL) 150 MG tablet take 1/2 to 1 tablet by mouth once daily at bedtime if needed for pain SPASM, OR SLEEP  0    HYDROcodone-acetaminophen (NORCO) 5-325 MG per tablet Take 1 tablet by mouth every 6 hours as needed for Pain      Omega-3 Fatty Acids (FISH OIL) 1000 MG CAPS Take 1,000 mg by mouth daily.  sildenafil (VIAGRA) 100 MG tablet Take 1 tablet by mouth as needed. 8 tablet 5     No current facility-administered medications for this visit. Orders Placed This Encounter   Procedures    Covid-19, Antibody, Total     Standing Status:   Future     Standing Expiration Date:   12/29/2021     Scheduling Instructions:      CDC does not recommend using antibody testing to diagnose acute infection. It is recommended to use a direct viral detection test to diagnose acute infection. (COVID-19 [facility] [id number])            Antibody tests are not 100% accurate, and some false-positive results or false-negative results may occur. A positive result may not ensure immunity from reinfection. Per CDC, positive or negative results do not confirm whether a patient is able to spread the virus that causes COVID-19     Order Specific Question:   Symptomatic for COVID-19 as defined by CDC? Answer:   Yes     Order Specific Question:   Date of Symptom Onset     Answer:   12/27/2020     Order Specific Question:   Hospitalized for COVID-19? Answer:   No     Order Specific Question:   Admitted to ICU for COVID-19? Answer:   No     Order Specific Question:   Employed in healthcare setting? Answer:   No     Order Specific Question:   Resident in a congregate (group) care setting? Answer:   No     Order Specific Question:   Pregnant: Answer:   No     Order Specific Question:   Previously tested for COVID-19?      Answer:   Yes         covid  precautions    Video  15  mins      gabino Dumont MD

## 2020-12-29 NOTE — PROGRESS NOTES
Yony agreed to Video Chat/Exam in presence of Dr Per Tian  and myself. Verified who was present in room with Yony. Yony informed the e-mail address used to [facility] cannot be used to contact the Provider, if they are any questions or concerns they need to call the office directly. Yony stated understanding. Patient verified Video Chat was not encrypted, and agrees to the Video Exam in presence of nurse.

## 2020-12-30 ENCOUNTER — HOSPITAL ENCOUNTER (OUTPATIENT)
Age: 65
Setting detail: SPECIMEN
Discharge: HOME | End: 2020-12-30
Payer: MEDICARE

## 2020-12-30 PROCEDURE — U0003 INFECTIOUS AGENT DETECTION BY NUCLEIC ACID (DNA OR RNA); SEVERE ACUTE RESPIRATORY SYNDROME CORONAVIRUS 2 (SARS-COV-2) (CORONAVIRUS DISEASE [COVID-19]), AMPLIFIED PROBE TECHNIQUE, MAKING USE OF HIGH THROUGHPUT TECHNOLOGIES AS DESCRIBED BY CMS-2020-01-R: HCPCS

## 2021-01-01 LAB — SARS-COV-2: NOT DETECTED

## 2021-01-02 NOTE — PROGRESS NOTES
[facility]             Pulmonary medicine clinic follow up note. Patient: Danyel Tomlinson  : 1955      Lung Nodule Screening     [x]? Qualifies    []? Does not qualify   []? Declined    [x]? Completed    Chief complaint and [place]:  Danyel Tomlinson is a 77 y. o.oldmale came for follow up regarding his severe persistent Asthma. [facility]. He is receiving allergy injections along with immunotherapy for his allergies. He is clinically getting better with improvement in shortness of breath. On today's questioning:  He occasional cough with no expectoration. He denies hemoptysis. He denies fever or chills. He denies recent hospitalizations or emergency room visits. He is using his prescribed inhalers with good compliance. He is using rescue inhaler/nebs rarely. He denies recent loss of weight or appetite changes. He denies recent decline in functional status. He underwent stent placement by Dr. Sam Darden at [facility] in . He is using 2 LPM of home O2 with exercise 3LPM of home O2 at night time. How ever, he came to my clinic with out his home oxygen. He was evaluated by Dr. Nancy Yeung from ENT service for his thyroid nodule along with hypertrophied nasal turbinates along with abnormally looking vocal cords noted during bronch. He follows with Dr. Tiffanie Barnhart for his GERD. He is currently using:  -Symbicort 160/4.5mcg spray MDI, 2 puffs via inhalation BID. -Singulair 10mg 1 tablet at bed time daily. He  -Albuterol HFA 90mcg/Spray MDI, 2puffs  Q6Hprn  -Flonase 50mcg/INH 2sprays each nostril daily. He feels better with nebulizer meds compared to inhalers. He was initially referred from Dr. Shruthi Johnson for chronic cough.       Asthma control (Severity) questionnaire:  Asthma symptoms:  > 2 times per week -> No  Night time awakenings: > 2 times per month -> No  Use of short acting beta agonist for symptoms control (Other than pre exercise use) :> 2times per week  -> No  Interference with normal activity  -> none  Lung function: Fev1 >60% Predicted  -> Yes  Number of exacerbations per year: > 2 -> Yes    Social History: Occupation: He is retired now. He used to work as a  in the past.      Patient admits to history of tobacco smoking. He used to smoke 1 to 1.5 PPD for 44 Years. He quit smoking in March, 2017. He denies history of exposure to coal, foundry, [employer] [place], asbestos or significant farm dust exposure. His father was diagnosed with asbestos exposure. He used to move close to his father. Patient denies any recent travel. Patient denies history of exposure to birds, pigeons, or chickens in the past. The patient admits toto pet animals at home. Hecurrently had 2cats at home. He denies to history of recreational or IV drug use. Hedenies history of pulmonary embolism or DVT in the past.      Review of Systems:   General/Constitutional: Please see HPI section regarding weight, appetite, fever and chills. HENT: Negative. Eyes: Negative. Upper respiratory tract: No nasal stuffiness or post nasal drip. Lower respiratory tract/ lungs: Please see HPI section. Cardiovascular: No palpitations or chest pain. Gastrointestinal: No nausea or vomiting. Neurological: No focal neurologiacal weakness. Extremities: No edema. Musculoskeletal: No complaints. Genitourinary: No complaints. Hematological: Negative. Psychiatric/Behavioral: Negative. Skin: No itching.     Current Medications:    Past Medical History:   Diagnosis Date    Acid reflux     Arthritis     Asthma     Chronic back pain     Class 2 severe obesity due to excess calories with serious comorbidity and body mass index (BMI) of 37.0 to 37.9 in adult [facility]) 8/13/2018    Colon polyps 11/06    COPD ([facility]ronic obstructive pulmonary disease) ([facility]     Depression     panic attacks    Diverticulosis     HTN (hypertension)     Hyperlipidemia     Kidney disorder     Panic attack     Pneumonia     Rash     Recurrent upper respiratory infection (URI)     Sleep apnea     Thumb amputation status 3/13/14    left tip of thumb amputated    Thyroid disease     Type II or unspecified type diabetes mellitus without mention of complication, not stated as uncontrolled        Past Surgical History:   Procedure Laterality Date    BACK SURGERY  2002    BACK SURGERY  08/11/2017    BICEPS TENDON REPAIR Right 08/16/2017    BRONCHOSCOPY      BRONCHOSCOPY N/A 4/5/2019    BRONCHOSCOPY performed by Mike Castaneda MD at [facility]  4/5/2019    BRONCHOSCOPY ALVEOLAR LAVAGE performed by Mike Castaneda MD at [facility]  07/02 08/05    CARPAL TUNNEL RELEASE  2011    right hand    CHOLECYSTECTOMY  yrs ago    COLONOSCOPY  11/06 02/12 1/14    CORONARY ANGIOPLASTY WITH STENT PLACEMENT  02/2020    ENDOSCOPY, COLON, DIAGNOSTIC      EYE SURGERY      foreign body removal    HERNIA REPAIR  2004    Dr Du Orozco  2008?     LARYNGOSCOPY  04/28/2016    Laryngoscopy Micro with Biopsy by Dr Lee Houser  01/07    uppp    ROTATOR CUFF REPAIR Left 5-20-15    SKIN BIOPSY      TONSILLECTOMY  1985    UPPER GASTROINTESTINAL ENDOSCOPY  1997    VEIN SURGERY Left September 2014    Dr. Paulo Jo       No Known Allergies    Current Outpatient Medications   Medication Sig Dispense Refill    rOPINIRole (REQUIP) 1 MG tablet TAKE 1 TABLET BY MOUTH THREE TIMES DAILY 270 tablet 1    busPIRone (BUSPAR) 10 MG tablet Take 1 tablet by mouth 3 times daily 270 tablet 1    sertraline (ZOLOFT) 100 MG tablet TAKE 1 TABLET BY MOUTH TWICE DAILY 180 tablet 1    glimepiride (AMARYL) 4 MG tablet TAKE 1 TABLET BY MOUTH TWICE DAILY 180 tablet 1    metFORMIN needed. 8 tablet 5     No current facility-administered medications for this visit. Family History   Problem Relation Age of Onset    Cancer Mother     Breast Cancer Mother     Stroke Mother     Heart Disease Brother     Diabetes Brother     High Blood Pressure Brother     High Cholesterol Brother          Physical Exam     VITALS:    /82 (Site: Left Upper Arm, Position: Sitting, Cuff Size: Medium Adult)   Pulse 74   Temp 96.8 °F (36 °C)   Ht 5' 8\" (1.727 m)   Wt 249 lb 12.8 oz (113.3 kg)   SpO2 91% Comment: on RA  BMI 37.98 kg/m²   Nursing note and vitals reviewed. Constitutional: Patient appears well built and well nourished. No distress. Patient is oriented to person, place, and time. HENT:   Head: Normocephalic and atraumatic. Right Ear: External ear normal.   Left Ear: External ear normal.   Mouth/Throat: Oropharynx is clear and moist.  No oral thrush. Eyes: Conjunctivae are normal. Pupils are equal, round, and reactive to light. No scleral icterus. Neck: Neck supple. No JVD present. No tracheal deviation present. Cardiovascular: Normal rate, regular rhythm, normal heart sounds. No murmur heard. Pulmonary/Chest: Effort normal and breath sounds normal. No stridor. No respiratory distress. No wheezes. No rales. Patient exhibits no tenderness. Abdominal: Soft. Obese. Patient exhibits no distension. No tenderness. Musculoskeletal: Normal range of motion. Extremities: Patient exhibits no edema and no tenderness. Lymphadenopathy:  No cervical adenopathy. Neurological: Patient is alert and oriented to person, place, and time. Skin: Skin is warm and dry. Patient is not diaphoretic. Psychiatric: Patient  has a normal mood and affect. Patient behavior is normal.       Neck Circumference -   18.5 in  Mallampati - II    Diagnostic Data:  Radiological Data:  May 21, 2015  PROCEDURE: CTA CHEST WITH CONTRAST  IMPRESSION:  Examination is negative for pulmonary embolism. Dependent atelectasis bilaterally left greater than right    Pulmonary function tests: 2018                          MCCT test:3/28/16: Positive. CT neck:  Mar 28, 2016  PROCEDURE: CT SOFT TISSUE NECK W CONTRAST  IMPRESSION: 1. There is asymmetric soft tissue prominence demonstrated along the posterior aspect of the left vocal cord. This may represent focal scarring however, cannot exclude a mucosal lesion. Consider correlation with direct visualization. 2. Small soft tissue prominence at the base of the tongue as described above. This may represent retained mucous secretions. Again, this can be correlated clinically with direct visualization. 3. Nonspecific mildly prominent lymph nodes along the posterior cervical chain on the left. The largest of these measures approximately 1 cm in short axis dimension. These may be reactive in nature. However, recommend followup CT evaluation to document stability. 4. Heterogeneous low-attenuation nodule within left thyroid gland is incompletely characterized on the current examination and can be further characterized by thyroid ultrasound followup. 5. Moderate centrilobular emphysema is noted at the lung apices. HRCT of chest:  Feb 2, 2019   PROCEDURE: CT CHEST HIGH RESOLUTION   1. Moderate upper zone predominant centrilobular emphysema, most pronounced within the right upper lobe, is demonstrated. 2. Mild dependent opacities at the lung bases bilaterally are favored to represent mild atelectasis. 3.There are nonspecific ovoid soft tissue opacities demonstrated along the bilateral paraspinal regions on axial image 43 at the T9-10 level. These findings are nonspecific. Additional characterization could be obtained with a contrast enhanced thoracic spine MRI.    Ordering Provider: [address] [place] [address]    IMPRESSION:   Normal x-ray examination of the chest.            Respiratory cultures:  01/28/2019 11:44 AM   Narrative   Performed by:  [facility]   Source: Expectorated sputum       Site:           Current Antibiotics: not stated   Susceptibility     Escherichia coli (1)     Antibiotic Interpretation CAIT Status    cefTRIAXone Sensitive <=1 mcg/mL Final    gentamicin Sensitive <=1 mcg/mL Final    cefOXitin Sensitive <=4 mcg/mL Final    levofloxacin Sensitive <=0.12 mcg/mL Final    cefuroxime Intermediate   Final    tetracycline Sensitive <=1 mcg/mL Final    trimethoprim-sulfamethoxazole Sensitive <=20 mcg/mL       Bronchoscopy results:  Hospital performed: [facility]. Date of procedure: 4/5/2019  Procedure: Flexible diagnostic fiberoptic bronchoscopy without fluoroscopy. During procedure Bronch washings obtained  from bilateral lower lobes. Bronchioalveolar lavage was performed from right lower lobe posterior segment. Bronch washings:   Acid fast bacilli:  negative  Fungal cultures: Candida albicans   Routine cultures: Escherichia coli   Viral cultures:      negative  Legionella cultures:  negative  Cytology: No malignant cells seen. Multinucleated giant cells. Alveolar macrophages.   Susceptibility     Escherichia coli (1)     Antibiotic Interpretation CAIT Status    gentamicin Sensitive <=1 mcg/mL Final    tetracycline Sensitive <=1 mcg/mL Final    cefOXitin Sensitive <=4 mcg/mL Final    trimethoprim-sulfamethoxazole Sensitive <=20 mcg/mL Final    ciprofloxacin Sensitive =0.016 mcg/mL Final    cefuroxime Sensitive           BAL ( Broncho alveolar lavage):  Acid fast bacilli:   negative  Fungal cultures:  negative  Routine cultures: Escherichia coli   Susceptibility     Escherichia coli (1)     Antibiotic Interpretation CAIT Status    cefTRIAXone Sensitive <=1 mcg/mL Final    gentamicin Sensitive <=1 mcg/mL Final    cefOXitin Sensitive <=4 mcg/mL Final    tetracycline Sensitive <=1 mcg/mL Final    ciprofloxacin Sensitive =0.012 mcg/mL Final    trimethoprim-sulfamethoxazole Sensitive <=20 mcg/mL Final    cefuroxime Intermediate         Viral cultures:    POSITIVE for Adenovirus at day 3  Legionella cultures:  negative  Cytology:     No malignant cells seen. Multinucleated giant cells. Alveolar macrophages. 4/6/2019  9:29 AM - Hilario, Angel Cord Incoming Lab Results From Soft     Component Value Ref Range & Units Status Collected Lab   BAL Color COLORLESS   Final 04/05/2019  6:02 PM [facility]   BAL Character CLOUDY/MILKY   Final 04/05/2019  6:02 PM [facility]   BAL Collection Site RLL and LLL   Final 04/05/2019  6:02 PM [facility]   Total Volume Received BAL 30  ml Final 04/05/2019  6:02 PM [place] [address] Lab   Total Nucleated Cells   /cumm Final 04/05/2019  6:02 PM [facility] Lab   RBC   /cumm Final 04/05/2019  6:02 PM [facility] Lab   Macrophage/Monocyte BAL 74Low   86 - 100 % Final 04/05/2019  6:02 PM [place] [address] Lab   Lymphocytes, BAL 16High   10 - 15 % Final 04/05/2019  6:02 PM [place] [address] Lab   Segmented Neutrophils, BAL 2  0 - 3 % Final 04/05/2019  6:02 PM [facility] Lab   Few atypical cells, favor reactive. Ciliated/Epithelial Cells BAL 8High   0 - 5 % Final 04/05/2019  6:02 PM [facility]   Pathologist Review ALP   Final 04/05/2019  6:02 PM [address] [place] [address] Lab   Performed at [address], [address]       Abnormally looking vocal cords and management. ? Chronic laryngitis from ? GERD Vs other etiologies    Xolair labs:  4/23/2019  8:43 PM - Hilario, Angel Cord Incoming Lab Results From Soft     Component Value Ref Range & Units Status Collected Lab   Allergen Fungi/Mold A. Alternata Ige 0. 87High   <=0.34 kU/L Final 04/19/2019 12:00 PM [facility]   Allergen Box Elder < 0.10  <=0.34 kU/L Final 04/19/2019 12:00 PM [facility]   Cat Dander IgE 3. 08High   <=0.34 kU/L Final 04/19/2019 12:00 PM [facility]   ALLERGEN MOUNTAIN CEDAR < 0.10  <=0.34 kU/L Final 04/19/2019 12:00 PM [facility]   ALLERGEN COTTONWOOD IGE < 0.10 <=0.34 kU/L Final 04/19/2019 12:00 PM [facility]   Milk IgE < 0.10  <=0.34 kU/L Final 04/19/2019 12:00 PM [facility]   Performed by [place] [address], [address] [address] [phone]   www. Mary Figueroa MD - Lab. Director    Peanut IgE < 0.10  <=0.34 kU/L Final 04/19/2019 12:00 PM [facility]   ALLERGEN WEED, PIGWEED IGE < 0.10  <=0.34 kU/L Final 04/19/2019 12:00 PM [facility]   [place] Thistle IgE < 0.10  <=0.34 kU/L Final 04/19/2019 12:00 PM [facility]   ALLERGEN TRISTEN GRASS < 0.10  <=0.34 kU/L Final 04/19/2019 12:00 PM [facility]   Allergen, Fungi/Mold < 0.10  <=0.34 kU/L Final 04/19/2019 12:00 PM [facility]   Elm IgE < 0.10  <=0.34 kU/L Final 04/19/2019 12:00 PM [facility]   Allergen, Tree, Oak < 0.10  <=0.34 kU/L Final 04/19/2019 12:00 PM [facility]   ALLERGEN BIRCH IgE < 0.10  <=0.34 kU/L Final 04/19/2019 12:00 PM [facility]   Allergen Fungi/Mold A. Fumigatus IgE < 0.10  <=0.34 kU/L Final 04/19/2019 12:00 PM [facility]   Mite Dust Pteronyssinus IgE 6. 86High   <=0.34 kU/L Final 04/19/2019 12:00 PM [facility]   ALLERGEN D. FARINAE (DUST MITE) 6. 53High   <=0.34 kU/L Final 04/19/2019 12:00 PM [facility]   Bermuda Grass IgE < 0.10  <=0.34 kU/L Final 04/19/2019 12:00 PM [facility]   Allergen White Mukund < 0.10  <=0.34 kU/L Final 04/19/2019 12:00 PM [facility]   Allergen Fungi/Mold P. Notatum IgE < 0.10  <=0.34 kU/L Final 04/19/2019 12:00 PM [facility]   Common-Short Ragweed IgE 1. 01High   <=0.34 kU/L Final 04/19/2019 12:00 PM [facility]   Cockroach IgE 0.15  <=0.34 kU/L Final 04/19/2019 12:00 PM [facility]   Allergen Tree [place]on < 0.10  <=0.34 kU/L Final 04/19/2019 12:00 PM [facility]   New Harmony Tree IgE < 0.10  <=0.34 kU/L Final 04/19/2019 12:00 PM [facility]   Pecan Tree IgE < 0.10  <=0.34 kU/L Final 04/19/2019 12:00 PM [facility]   Mouse Epithelial < 0.10  <=0.34 kU/L Final 04/19/2019 12:00 PM [facility]   Allergen Fungi/Mold, M. racemosus IGE < 0.10  <=0.34 kU/L Final 04/19/2019 12:00 PM [facility]   Allergen White Vincent Tree, IGE < 0.10  <=0.34 kU/L Final 04/19/2019 12:00 PM [facility]   Dog Dander IgE 0. 69High   <=0.34 kU/L Final 04/19/2019 12:00 PM [facility]   Sheep Sorrel IgE < 0.10  <=0.34 kU/L Final 04/19/2019 12:00 PM [facility]   Immunoglobulin E 447High   <=214 kU/L Final 04/19/2019 12:00 PM [facility]   REFERENCE INTERVAL: Immunoglobulin E, Serum   Access complete set of age- and/or gender-specific reference   intervals for this test in the [facility] Laboratory Test Directory   ([email or web address]). Spirometry: 6/26/2020        His FEV1 decreased from previous spirometry done in the past.    Low dose CT chest:  May 29, 2020   NONCONTRAST SCREENING CT CHEST:   1. There are no suspicious masses or nodules within the lung fields. 2. There are no pathologically enlarged lymph nodes. 3. LUNGRADS ASSESSMENT VALUE: 2,         Six Minute Walk Test done on 7/14/20 at 8:42 AM EDT    Yony Borden 1955    Six minute walk test done in my office today by medical assistant Nohemi Alejandre   Oxygen saturation at rest on room air was (Percent): 90  Oxygen saturation on room air with exertion dropped to (Percent): 86      Time from beginning of the test to above desaturation: 2 minute 0 seconds. The six minute walk test was repeated with oxygen supplementation. Oxygen supplimentation was started with 2LPM via nasal cannula and titrated to 2 LPM via nasal cannula. At the end of the test his oxygen saturation remained at 90 % on 2 LPM with exertion. He is mobile in the home and requires oxygen as outlined above. He needs no home O2 at rest. He needs 2 LPM of home O2 with exercise. He is currently using 3LPM of home O2 at night time. He will be evaluated for nocturnal home O2 requirement- see separte order. Please see scanned six minute walk test document in media for details. DME Medical Necessity Documentation    Patient was seen in my clinic on 7/14/20 for the diagnosis COPD. I am prescribing oxygen because the diagnosis and testing requires the patient to have oxygen in the home. Condition will improve or be benefited by oxygen use. The patient is  able to perform good mobility in a home setting and therefore does require the use of a portable oxygen system. Nocturnal pulse oximetry study done on 3 August 2020:                    Assessment:  -Severe persistent bronchial asthma-  under control.  -Moderate COPD with hx of tobacco smoking- under control. -CAD S/p Stent placement. He follows with Dr. Jenn Brown  -hx of recurrent E coli infection in the lungs and MRSA infection of left side of face in the past- resolved  -Abnormal allergy test with elevated serum Ig E level- he is following with an Allergist.  He is currently receiving allergy shots along with immunotherapy from Ms. Daytoncelestino Plata, CNP  -Chronic cough due to uncontrolled asthma Vs allergic rhinitis- Improved  -Allergic rhinitis- on treatment with Flonase nasal spray- Improved  -Abnormal CT scan of neck with ? Lesion over vocal cord and cervical lymphadenopathy on left side S/p Microlaryngoscopy and biopsy by Dr. Errol Torres on 4/28/16. He follows with ENT. -Heterogeneous low-attenuation nodule within left thyroid gland is incompletely characterized on the current examination. Following with ENT. -Type II or unspecified type diabetes mellitus without mention of complication, not stated as uncontrolled ([facility] -Acid reflux on treatment with Nexium. He follows with Dr. Raheem Ashley  -Chronic tobacco smoking- He quit smoking several years back. -Depression. -Anxiety disorder.  -Chronic back pain.  -Blood pressure- under control.   -Sleep apnea. He used to be on CPAP in the past. He underwent surgery 4 to 5 years back. He never had a follow up sleep test. He refused go for sleep test or get treated,      Recommendations/Plan:  -Continue patient on Symbicort 160/4.5mcg spray MDI, 2 puffs via inhalation BID. Refills given  -Continue Albuterol HFA 90mcg/Spray MDI, 2puffs  Q6Hprn. Refills given  -Continue patient on Singulair 10mg 1 tablet at bed time daily.  Refills given -Continue follow up and management by ISABELLA Stoner for his GERD.  -He was advised to keep scheduled follow up with his Allergy and immunologist. He is currently on immunotherapy with Ms. Richie C[facility] at [facility].  -Patient educated to update his pneumococcal vaccine with family physician and take influenza vaccine in coming season with out fail. Patient verbalizes understanding.   -Yony Borden educated about environmental safety precautions he need to practice to prevent exacerbation ofhis Asthma. Yony Borden verbalizes understanding.   - He was informed as follows regarding Covid 19 vaccine-> Due to his underlying lung condition he should receive a COVID-19 vaccine at your earliest convenience and availability if he is willing to take the risk of adverse effects mentioned in the product information of the vaccine. All patients who were given COVID-19 vaccine are being monitored after administering the vaccination for development of side effects for at least a 15 minutes. Please contact the vaccination center for the availability of this service before going to the Center for vaccination. Please read the product information (PI) carefully which is going to be given to [facility] COVID-19 administering facility before getting vaccinated. Please read the vaccine ingredients. Please read the details given in side effects/adverse effects section and allergies section of the vaccine before getting vaccinated    Please feel free to call my office for further questions at a [phone].  -He needs no home O2 at rest. He needs 2 LPM of home O2 with exercise.   He do not need any oxygen supplementation at nighttime.  -Keep scheduled appointment for Spirometry before clinic visit with Bronchodilator response July 2021  -Keep scheduled appointment for low dose CT scan of chest with out IV contrast as recommended by the  U.S. Preventive Services Task Force and the NCCN for lung Cancer Screening in July  - Keep sheduled follow up with my clinic on 18 July 2021 with spirometry before clinic visit along with low dose CT chest before clinic visit. Patient advised to make early appointment if needed. -- Patient and his wife were educated about my impression and plan. They verbalizes understanding.        - He is following with Stephenie Munoz MD from [facility] Throat speciality for his Left thyroid nodule. -He was advised to practice anti-reflux measures such as raising the head of the bed, avoiding tight clothing or belts, avoiding eating late at night and not lying down shortly after mealtime and achieving weight loss are discussed. Avoid ASA, NSAID's, caffeine, peppermints, alcohol and tobacco. Patient should alert me if there are persistent symptoms, dysphagia, weight loss or GI bleeding.   -He was requested to go for polysomnogram in the sleep lab to further evaluate for obstructive sleep apnea. However at the end of discussion he refused to go for the sleep test at this time. He verbalizes understanding of consequences of his decision including non diagnosis of obstructive sleep apnea resulting in increased morbidity and mortality with cerebro and cardiovascular events including death. He verbalizes understanding. I had a discussion with patient regarding other avialable treatment options for his sleep disorder breathing including but not limited to Dental appliance placement with referral to a local dentist Vs other available surgical options including Uvulopalatopharyngoplasty, maxillomandibular ostomy, Inspire device placement and tracheostomy as last option. At the end of discussion, he decided not to go for any treatment. he verbalizes understanding.

## 2021-01-04 ENCOUNTER — TELEPHONE (OUTPATIENT)
Dept: FAMILY MEDICINE CLINIC | Age: 66
End: 2021-01-04

## 2021-01-04 NOTE — TELEPHONE ENCOUNTER
----- Message from Marlo Carvalho MD sent at 1/2/2021  3:19 PM EST -----  I did call and informed them the covid test was negative

## 2021-01-07 ENCOUNTER — NURSE ONLY (OUTPATIENT)
Dept: ALLERGY | Age: 66
End: 2021-01-07
Payer: MEDICARE

## 2021-01-07 VITALS
DIASTOLIC BLOOD PRESSURE: 74 MMHG | SYSTOLIC BLOOD PRESSURE: 124 MMHG | HEART RATE: 80 BPM | RESPIRATION RATE: 16 BRPM | TEMPERATURE: 97.7 F

## 2021-01-07 DIAGNOSIS — Z91.09 MITE ALLERGY: ICD-10-CM

## 2021-01-07 DIAGNOSIS — Z91.09 POLLEN ALLERGIES: ICD-10-CM

## 2021-01-07 DIAGNOSIS — J30.81 CAT ALLERGIES: ICD-10-CM

## 2021-01-07 DIAGNOSIS — J30.81 ALLERGY TO DOG DANDER: ICD-10-CM

## 2021-01-07 DIAGNOSIS — Z91.048 ALLERGY TO MOLD: ICD-10-CM

## 2021-01-07 DIAGNOSIS — Z51.6 ENCOUNTER FOR DESENSITIZATION TO ALLERGENS: Primary | ICD-10-CM

## 2021-01-07 DIAGNOSIS — J30.1 ALLERGY TO TREES: ICD-10-CM

## 2021-01-07 DIAGNOSIS — J30.89 ALLERGY TO DUST: ICD-10-CM

## 2021-01-07 PROCEDURE — 95117 IMMUNOTHERAPY INJECTIONS: CPT | Performed by: NURSE PRACTITIONER

## 2021-01-12 ENCOUNTER — OFFICE VISIT (OUTPATIENT)
Dept: PULMONOLOGY | Age: 66
End: 2021-01-12
Payer: MEDICARE

## 2021-01-12 VITALS
HEIGHT: 68 IN | WEIGHT: 249.8 LBS | BODY MASS INDEX: 37.86 KG/M2 | TEMPERATURE: 96.8 F | OXYGEN SATURATION: 91 % | HEART RATE: 74 BPM | SYSTOLIC BLOOD PRESSURE: 128 MMHG | DIASTOLIC BLOOD PRESSURE: 82 MMHG

## 2021-01-12 DIAGNOSIS — J45.50 SEVERE PERSISTENT ASTHMA WITHOUT COMPLICATION: Primary | ICD-10-CM

## 2021-01-12 PROCEDURE — 1123F ACP DISCUSS/DSCN MKR DOCD: CPT | Performed by: INTERNAL MEDICINE

## 2021-01-12 PROCEDURE — G8484 FLU IMMUNIZE NO ADMIN: HCPCS | Performed by: INTERNAL MEDICINE

## 2021-01-12 PROCEDURE — G8417 CALC BMI ABV UP PARAM F/U: HCPCS | Performed by: INTERNAL MEDICINE

## 2021-01-12 PROCEDURE — 4040F PNEUMOC VAC/ADMIN/RCVD: CPT | Performed by: INTERNAL MEDICINE

## 2021-01-12 PROCEDURE — 99214 OFFICE O/P EST MOD 30 MIN: CPT | Performed by: INTERNAL MEDICINE

## 2021-01-12 PROCEDURE — 3017F COLORECTAL CA SCREEN DOC REV: CPT | Performed by: INTERNAL MEDICINE

## 2021-01-12 PROCEDURE — G8427 DOCREV CUR MEDS BY ELIG CLIN: HCPCS | Performed by: INTERNAL MEDICINE

## 2021-01-12 PROCEDURE — 1036F TOBACCO NON-USER: CPT | Performed by: INTERNAL MEDICINE

## 2021-01-12 NOTE — PATIENT INSTRUCTIONS
Recommendations/Plan:  -Continue patient on Symbicort 160/4.5mcg spray MDI, 2 puffs via inhalation BID. Refills given  -Continue Albuterol HFA 90mcg/Spray MDI, 2puffs  Q6Hprn. Refills given  -Continue patient on Singulair 10mg 1 tablet at bed time daily. Refills given  -Continue follow up and management by ISABELLA Stoner for his GERD.  -He was advised to keep scheduled follow up with his Allergy and immunologist. He is currently on immunotherapy with Ms. Richie CNP at [facility].  -Patient educated to update his pneumococcal vaccine with family physician and take influenza vaccine in coming season with out fail. Patient verbalizes understanding.   -Yony Pascualkevin educated about environmental safety precautions he need to practice to prevent exacerbation ofhis Asthma. Yony Pascualkevin verbalizes understanding.   - He was informed as follows regarding Covid 19 vaccine-> Due to his underlying lung condition he should receive a COVID-19 vaccine at your earliest convenience and availability if he is willing to take the risk of adverse effects mentioned in the product information of the vaccine. All patients who were given COVID-19 vaccine are being monitored after administering the vaccination for development of side effects for at least a 15 minutes. Please contact the vaccination center for the availability of this service before going to the Center for vaccination. Please read the product information (PI) carefully which is going to be given to [facility] COVID-19 administering facility before getting vaccinated. Please read the vaccine ingredients. Please read the details given in side effects/adverse effects section and allergies section of the vaccine before getting vaccinated    Please feel free to call my office for further questions at a [phone].  -He needs no home O2 at rest. He needs 2 LPM of home O2 with exercise. He do not need any oxygen supplementation at nighttime. -Keep scheduled appointment for Spirometry before clinic visit with Bronchodilator response July 2021  -Keep scheduled appointment for low dose CT scan of chest with out IV contrast as recommended by the  U.S. Preventive Services Task Force and the NCCN for lung Cancer Screening in July  - Keep sheduled follow up with my clinic on 18 July 2021 with spirometry before clinic visit along with low dose CT chest before clinic visit. Patient advised to make early appointment if needed. -- Patient and his wife were educated about my impression and plan. They verbalizes understanding.

## 2021-01-12 NOTE — PROGRESS NOTES
Neck Circumference -   18.5  Mallampati - 2    Lung Nodule Screening     [x] Qualifies    [] Does not qualify   [] Declined    [x] Completed

## 2021-01-14 ENCOUNTER — NURSE ONLY (OUTPATIENT)
Dept: ALLERGY | Age: 66
End: 2021-01-14
Payer: MEDICARE

## 2021-01-14 VITALS
RESPIRATION RATE: 16 BRPM | DIASTOLIC BLOOD PRESSURE: 80 MMHG | SYSTOLIC BLOOD PRESSURE: 128 MMHG | HEART RATE: 70 BPM | TEMPERATURE: 97 F

## 2021-01-14 DIAGNOSIS — Z91.048 ALLERGY TO MOLD: ICD-10-CM

## 2021-01-14 DIAGNOSIS — Z91.09 MITE ALLERGY: ICD-10-CM

## 2021-01-14 DIAGNOSIS — J30.1 ALLERGY TO TREES: ICD-10-CM

## 2021-01-14 DIAGNOSIS — Z51.6 ENCOUNTER FOR DESENSITIZATION TO ALLERGENS: Primary | ICD-10-CM

## 2021-01-14 DIAGNOSIS — Z91.09 POLLEN ALLERGIES: ICD-10-CM

## 2021-01-14 DIAGNOSIS — J30.81 CAT ALLERGIES: ICD-10-CM

## 2021-01-14 DIAGNOSIS — J30.89 ALLERGY TO DUST: ICD-10-CM

## 2021-01-14 PROCEDURE — 95117 IMMUNOTHERAPY INJECTIONS: CPT | Performed by: NURSE PRACTITIONER

## 2021-01-22 ENCOUNTER — OFFICE VISIT (OUTPATIENT)
Dept: FAMILY MEDICINE CLINIC | Age: 66
End: 2021-01-22

## 2021-01-22 VITALS
BODY MASS INDEX: 37.59 KG/M2 | WEIGHT: 248 LBS | SYSTOLIC BLOOD PRESSURE: 184 MMHG | RESPIRATION RATE: 20 BRPM | HEIGHT: 68 IN | DIASTOLIC BLOOD PRESSURE: 84 MMHG | TEMPERATURE: 95.7 F | HEART RATE: 84 BPM

## 2021-01-22 DIAGNOSIS — Z12.5 SCREENING FOR PROSTATE CANCER: ICD-10-CM

## 2021-01-22 DIAGNOSIS — E66.01 CLASS 2 SEVERE OBESITY DUE TO EXCESS CALORIES WITH SERIOUS COMORBIDITY AND BODY MASS INDEX (BMI) OF 37.0 TO 37.9 IN ADULT (HCC): ICD-10-CM

## 2021-01-22 DIAGNOSIS — J44.9 MODERATE COPD (CHRONIC OBSTRUCTIVE PULMONARY DISEASE) (HCC): ICD-10-CM

## 2021-01-22 DIAGNOSIS — E11.8 TYPE 2 DIABETES MELLITUS WITH COMPLICATION, WITHOUT LONG-TERM CURRENT USE OF INSULIN (HCC): Primary | ICD-10-CM

## 2021-01-22 DIAGNOSIS — E78.5 HYPERLIPIDEMIA, UNSPECIFIED HYPERLIPIDEMIA TYPE: ICD-10-CM

## 2021-01-22 DIAGNOSIS — I10 ESSENTIAL HYPERTENSION: ICD-10-CM

## 2021-01-22 DIAGNOSIS — F41.9 ANXIETY: ICD-10-CM

## 2021-01-22 PROBLEM — Z87.891 EX-SMOKER: Status: ACTIVE | Noted: 2021-01-22

## 2021-01-22 PROBLEM — Z87.09 HISTORY OF ASTHMA: Status: ACTIVE | Noted: 2021-01-22

## 2021-01-22 LAB
CHP ED QC CHECK: ABNORMAL
GLUCOSE BLD-MCNC: 198 MG/DL
HBA1C MFR BLD: 7.3 %

## 2021-01-22 PROCEDURE — 1123F ACP DISCUSS/DSCN MKR DOCD: CPT | Performed by: FAMILY MEDICINE

## 2021-01-22 PROCEDURE — 3017F COLORECTAL CA SCREEN DOC REV: CPT | Performed by: FAMILY MEDICINE

## 2021-01-22 PROCEDURE — 83036 HEMOGLOBIN GLYCOSYLATED A1C: CPT | Performed by: FAMILY MEDICINE

## 2021-01-22 PROCEDURE — 99214 OFFICE O/P EST MOD 30 MIN: CPT | Performed by: FAMILY MEDICINE

## 2021-01-22 PROCEDURE — 4040F PNEUMOC VAC/ADMIN/RCVD: CPT | Performed by: FAMILY MEDICINE

## 2021-01-22 PROCEDURE — 1036F TOBACCO NON-USER: CPT | Performed by: FAMILY MEDICINE

## 2021-01-22 PROCEDURE — G8417 CALC BMI ABV UP PARAM F/U: HCPCS | Performed by: FAMILY MEDICINE

## 2021-01-22 PROCEDURE — 82962 GLUCOSE BLOOD TEST: CPT | Performed by: FAMILY MEDICINE

## 2021-01-22 PROCEDURE — G8427 DOCREV CUR MEDS BY ELIG CLIN: HCPCS | Performed by: FAMILY MEDICINE

## 2021-01-22 RX ORDER — LOSARTAN POTASSIUM 100 MG/1
100 TABLET ORAL DAILY
Qty: 90 TABLET | Refills: 1 | Status: SHIPPED | OUTPATIENT
Start: 2021-01-22 | End: 2021-07-26

## 2021-01-22 RX ORDER — BLOOD-GLUCOSE METER
1 EACH MISCELLANEOUS DAILY
Qty: 1 KIT | Refills: 0 | Status: SHIPPED | OUTPATIENT
Start: 2021-01-22 | End: 2022-02-18

## 2021-01-22 ASSESSMENT — ENCOUNTER SYMPTOMS
ABDOMINAL PAIN: 0
BACK PAIN: 1
BLOOD IN STOOL: 0
NAUSEA: 0
CHEST TIGHTNESS: 0
DIARRHEA: 0
CONSTIPATION: 0
EYES NEGATIVE: 1
VOMITING: 0
COUGH: 0
SHORTNESS OF BREATH: 1

## 2021-01-22 ASSESSMENT — PATIENT HEALTH QUESTIONNAIRE - PHQ9
SUM OF ALL RESPONSES TO PHQ9 QUESTIONS 1 & 2: 0
SUM OF ALL RESPONSES TO PHQ QUESTIONS 1-9: 0
1. LITTLE INTEREST OR PLEASURE IN DOING THINGS: 0
2. FEELING DOWN, DEPRESSED OR HOPELESS: 0

## 2021-01-22 NOTE — PROGRESS NOTES
Date: 1/22/2021    Boston Dior is a 77 y.o. male who presents today for:  Chief Complaint   Patient presents with    Check-Up    Hypertension     BP has been running high for about a wk, pt is concerned d/t not being able to get it down.  Diabetes       HPI:     Subjective:    Yony Borden is here for follow up of elevated cholesterol, elevated blood pressure, and diabetes. Compliance with treatment has been good. Patient denies muscle pain associated with his medications. He is not exercising and is not adherent to a low-salt diet. Blood pressure is not well controlled at home. He states that his B/P is running in the 180/ in the last week. Cardiac symptoms: dyspnea. Patient denies: irregular heart beat, lower extremity edema, palpitations and he states that when his wife checks his B/P if it is high he gets chest pain and it goes away after she checks it and he gets distracted, he states that he gets scared. Cardiovascular risk factors: advanced age (older than 54 for men, 72 for women), diabetes mellitus, dyslipidemia, hypertension, male gender, obesity (BMI >= 30 kg/m2), sedentary lifestyle and smoking/ tobacco exposure. Use of agents associated with hypertension: none. History of target organ damage: ASCVD. Current diabetic symptoms include: none. Patient denies foot ulcerations, polydipsia, polyuria and visual disturbances. Home sugars: he states that his sugars are running in the 100-150 . Last dilated eye exam need to have one.        has a current medication list which includes the following prescription(s): losartan, ropinirole, buspirone, sertraline, glimepiride, metformin, fluticasone, budesonide-formoterol, montelukast, ventolin hfa, atorvastatin, one touch ultra test, pantoprazole, ranolazine, prasugrel, hydrochlorothiazide, epinephrine, onetouch delica lancets fine, acapella, baclofen, trazodone, hydrocodone-acetaminophen, fish oil, sildenafil, and hydroxyzine.     No Known Allergies Social History     Tobacco Use    Smoking status: Former Smoker     Packs/day: 2.00     Years: 42.00     Pack years: 84.00     Types: Cigarettes     Start date: 6/5/1971     Quit date: 3/3/2017     Years since quitting: 3.8    Smokeless tobacco: Never Used    Tobacco comment: pts uses just nicotine vap   Substance Use Topics    Alcohol use: No     Alcohol/week: 0.0 standard drinks    Drug use: No       Past Medical History:   Diagnosis Date    Acid reflux     Arthritis     Asthma     Chronic back pain     Class 2 severe obesity due to excess calories with serious comorbidity and body mass index (BMI) of 37.0 to 37.9 in adult [facility]) 8/13/2018    Colon polyps 11/06    COPD (chronic obstructive pulmonary disease) ([facility] [address]     Depression     panic attacks    Diverticulosis     HTN (hypertension)     Hyperlipidemia     Kidney disorder     Panic attack     Pneumonia     Rash     Recurrent upper respiratory infection (URI)     Sleep apnea     Thumb amputation status 3/13/14    left tip of thumb amputated    Thyroid disease     Type II or unspecified type diabetes mellitus without mention of complication, not stated as uncontrolled        Past Surgical History:   Procedure Laterality Date    BACK SURGERY  2002    BACK SURGERY  08/11/2017    BICEPS TENDON REPAIR Right 08/16/2017    BRONCHOSCOPY      BRONCHOSCOPY N/A 4/5/2019    BRONCHOSCOPY performed by Merlin Cabot, MD at [address] [place] [address]  4/5/2019    BRONCHOSCOPY ALVEOLAR LAVAGE performed by Merlin Cabot, MD at [address] [place] [address]  07/02 08/05    CARPAL TUNNEL RELEASE  2011    right hand    CHOLECYSTECTOMY  yrs ago    COLONOSCOPY  11/06 02/12 1/14    CORONARY ANGIOPLASTY WITH STENT PLACEMENT  02/2020    ENDOSCOPY, COLON, DIAGNOSTIC      EYE SURGERY      foreign body removal    HERNIA REPAIR  2004    Dr Tate Hart  2008?     LARYNGOSCOPY  04/28/2016 Laryngoscopy Micro with Biopsy by Dr Miesha Donis  01/07    uppp    ROTATOR CUFF REPAIR Left 5-20-15    SKIN BIOPSY      TONSILLECTOMY  1985    UPPER GASTROINTESTINAL ENDOSCOPY  1997    VEIN SURGERY Left September 2014    Dr. Walter Koroma       Family History   Problem Relation Age of Onset    Cancer Mother     Breast Cancer Mother     Stroke Mother     Heart Disease Brother     Diabetes Brother     High Blood Pressure Brother     High Cholesterol Brother      Subjective:     Review of Systems   Constitutional: Negative for activity change, appetite change, diaphoresis and fever. HENT: Negative. Eyes: Negative. Respiratory: Positive for shortness of breath (at his baseline). Negative for cough and chest tightness. Cardiovascular: Positive for chest pain (when he gets anxious). Negative for palpitations and leg swelling. Gastrointestinal: Negative for abdominal pain, blood in stool, constipation, diarrhea, nausea and vomiting. Genitourinary: Negative. Musculoskeletal: Positive for arthralgias and back pain. Skin: Negative. Negative for rash. Neurological: Negative. Negative for dizziness, syncope, weakness, light-headedness and headaches. Psychiatric/Behavioral: The patient is nervous/anxious.        :   BP (!) 178/92 (Site: Right Upper Arm, Position: Sitting, Cuff Size: Large Adult)   Pulse 84   Temp 95.7 °F (35.4 °C) (Skin)   Resp 20   Ht 5' 8\" (1.727 m)   Wt 248 lb (112.5 kg)   BMI 37.71 kg/m²   Wt Readings from Last 3 Encounters:   01/22/21 248 lb (112.5 kg)   01/12/21 249 lb 12.8 oz (113.3 kg)   10/07/20 241 lb 6 oz (109.5 kg)     Physical Exam  Vitals signs and nursing note reviewed. Constitutional:       General: He is not in acute distress. Appearance: He is well-developed. He is not diaphoretic. HENT:      Head: Normocephalic and atraumatic. Eyes:      General: No scleral icterus. Right eye: No discharge.          Left eye: No discharge. Conjunctiva/sclera: Conjunctivae normal.      Pupils: Pupils are equal, round, and reactive to light. Neck:      Musculoskeletal: Normal range of motion and neck supple. Thyroid: No thyromegaly. Vascular: No JVD. Cardiovascular:      Rate and Rhythm: Normal rate and regular rhythm. Heart sounds: Normal heart sounds. No murmur. Pulmonary:      Effort: Pulmonary effort is normal. No respiratory distress. Breath sounds: Normal breath sounds. No wheezing, rhonchi or rales. Abdominal:      General: Bowel sounds are normal. There is no distension. Palpations: Abdomen is soft. There is no mass. Tenderness: There is no abdominal tenderness. There is no guarding or rebound. Musculoskeletal: Normal range of motion. Comments: Normal strength and range of motion of toes, feet, and ankles bilaterally. No joint deformity. No cyanosis or clubbing. 100% sensation at all test points. Able to feel monofilament at 0.4mm bilaterally. Dorsalis pedis pulses intact bilaterally. Capillary refill at the toes was less than 2 seconds. Light touch sensation intact bilaterally. Hair growth present on feet and toes bilaterally. No skin erythema, rub spots, blisters, or ulcers. No calluses or corns. Lymphadenopathy:      Cervical: No cervical adenopathy. Skin:     General: Skin is warm and dry. Findings: No rash. Neurological:      Mental Status: He is alert and oriented to person, place, and time. Psychiatric:         Behavior: Behavior normal.       :       Diagnosis Orders   1. Type 2 diabetes mellitus with complication, without long-term current use of insulin ([facility])  POCT glycosylated hemoglobin (Hb A1C)    POCT Glucose     DIABETES FOOT EXAM   2. Essential hypertension     3. Moderate COPD (chronic obstructive pulmonary disease) ([facility] [address]     4. Hyperlipidemia, unspecified hyperlipidemia type     5. Anxiety     6. Screening for prostate cancer  Psa screening   7.  Class 2 severe obesity due to excess calories with serious comorbidity and body mass index (BMI) of 37.0 to 37.9 in adult [facility])         :      Current Outpatient Medications   Medication Sig Dispense Refill    losartan (COZAAR) 100 MG tablet Take 1 tablet by mouth daily 90 tablet 1    rOPINIRole (REQUIP) 1 MG tablet TAKE 1 TABLET BY MOUTH THREE TIMES DAILY 270 tablet 1    busPIRone (BUSPAR) 10 MG tablet Take 1 tablet by mouth 3 times daily 270 tablet 1    sertraline (ZOLOFT) 100 MG tablet TAKE 1 TABLET BY MOUTH TWICE DAILY 180 tablet 1    glimepiride (AMARYL) 4 MG tablet TAKE 1 TABLET BY MOUTH TWICE DAILY 180 tablet 1    metFORMIN (GLUCOPHAGE) 500 MG tablet TAKE 2 TABLETS BY MOUTH TWICE DAILY WITH MEALS 360 tablet 1    fluticasone (FLONASE) 50 MCG/ACT nasal spray SHAKE LIQUID AND USE 1 SPRAY IN EACH NOSTRIL DAILY 16 g 11    budesonide-formoterol (SYMBICORT) 160-4.5 MCG/ACT AERO Inhale 2 puffs into the lungs 2 times daily Rinse mouth after its use. 1 Inhaler 11    montelukast (SINGULAIR) 10 MG tablet Take 1 tablet by mouth nightly 30 tablet 11    VENTOLIN  (90 Base) MCG/ACT inhaler Inhale 2 puffs into the lungs every 6 hours as needed for Wheezing 1 Inhaler 8    atorvastatin (LIPITOR) 20 MG tablet       blood glucose test strips (ONE TOUCH ULTRA TEST) strip CHECK BLOOD SUGAR DAILY 100 strip 0    pantoprazole (PROTONIX) 40 MG tablet Take 40 mg by mouth 2 times daily       ranolazine (RANEXA) 500 MG extended release tablet Take 500 mg by mouth 2 times daily      prasugrel (EFFIENT) 10 MG TABS Take 10 mg by mouth daily      hydrochlorothiazide (HYDRODIURIL) 12.5 MG tablet TK 1 T PO QD IN THE MORNING  3    EPINEPHrine (EPIPEN 2-HUBER) 0.3 MG/0.3ML SOAJ injection Inject one pen as directed STAT for allergic reaction, may disp generic NDC 00401-945-15 6 each 99    ONETOUCH DELICA LANCETS FINE MISC Check blood sugar twice daily Dx: E11.9 200 each 1    Misc.  Devices (ACAPELLA) MISC 1 Device by Does not apply route 4 times daily 1 each 0    baclofen (LIORESAL) 10 MG tablet take 1 tablet by mouth twice a day  0    traZODone (DESYREL) 150 MG tablet take 1/2 to 1 tablet by mouth once daily at bedtime if needed for pain SPASM, OR SLEEP  0    HYDROcodone-acetaminophen (NORCO) 5-325 MG per tablet Take 1 tablet by mouth every 6 hours as needed for Pain      Omega-3 Fatty Acids (FISH OIL) 1000 MG CAPS Take 1,000 mg by mouth daily.  sildenafil (VIAGRA) 100 MG tablet Take 1 tablet by mouth as needed. 8 tablet 5    hydrOXYzine (ATARAX) 10 MG tablet Take 10 mg by mouth nightly       No current facility-administered medications for this visit. Orders Placed This Encounter   Procedures    Psa screening     Standing Status:   Future     Standing Expiration Date:   1/22/2022    POCT glycosylated hemoglobin (Hb A1C)    POCT Glucose     DIABETES FOOT EXAM     Lab Results   Component Value Date    LABA1C 7.3 (A) 01/22/2021    LABA1C 8.7 (A) 09/09/2020    LABA1C 7.0 (A) 06/01/2020     Lab Results   Component Value Date    LABMICR 2.74 11/04/2016     Results for POC orders placed in visit on 01/22/21   POCT glycosylated hemoglobin (Hb A1C)   Result Value Ref Range    Hemoglobin A1C 7.3 (A) %   POCT Glucose   Result Value Ref Range    Glucose 198 (A) mg/dL    QC OK? Continue to monitor blood sugars 1 times a day. Keep log of blood sugars and bring with you to the next appointment. Discussed use, benefit, and side effects of prescribed medications. All patient questions answered. Pt voiced understanding. Instructed to continue current medications, ADA diet and exercise. Patient agreed with treatment plan. Return in about 4 weeks (around 2/19/2021).         Hepatic Function Panel (Order [id number])  1/5/2021  5:40 PM - Hilario, Lab In seven    Component Value Ref Range & Units Status Collected Lab   AST 16  15 - 41 IU/L Final 01/05/2021 11:58 AM [facility]   Alkaline Phosphatase 45  41 - 137 IU/L Final 01/05/2021 11:58 AM [facility]   Total Bilirubin 0.5  0.2 - 1.0 mg/dL Final 01/05/2021 11:58 AM [facility]   Bilirubin, Direct 0.1  0.1 - 0.2 mg/dL Final 01/05/2021 11:58 AM [facility]   Alb 4.6  3.5 - 5.0 gm/dL Final 01/05/2021 11:58  [address]   Total Protein 7.4  6.2 - 8.0 g/dL Final 01/05/2021 11:58  [facility]   ALT 22  10 - 40 IU/L Final 01/05/2021 11:58 AM [facility]     1/5/2021  5:40 PM - Hilario, Lab In [facility]    Component Value Ref Range & Units Status Collected Lab   LDL Direct 61  mg/dL Final 01/05/2021 11:58 AM [facility]   The [facility] Cholesterol Education Program (NCEP) has set   the following guidelines (reference values) for cholesterol,   LDL:       Desirable (optimal):     <100 mg/dL       Low Risk (near optimal): 100-129 mg/dL       Borderline high:         130-159 mg/dL       High:                    160-189 mg/dL       Very high:               >=190 mg/dL    Cholesterol 100  <200 mg/dL Final 01/05/2021 11:58 AM [facility]   Triglycerides 95  <150 mg/dL Final 01/05/2021 11:58 AM [facility]   HDL 37Low   mg/dL Final 01/05/2021 11:58 AM [facility] Cholesterol Education Program (NCEP) has set   the following guidelines (reference values) for cholesterol,   HDL:       Low:                      <40 mg/dL       Normal:                   40-60 mg/dL       High:                     >60 mg/dL    CHOLESTEROL/HDL RELATIVE RISK 2.7Low   4.0 - 5.0 Final 01/05/2021 11:58  [facility]   Direct-LDL / HDL Risk 1.6  <3.1 Final 01/05/2021 11:58 AM [facility]   VLDL 19  <39 mg/dL Final 01/05/2021 11:58  [facility], [id number] Performed By    [facility] Director Address Valid Date Range   [facility] Unknown Unknown 02/10/14 1010-Present     1/8/2021 12:15 PM - Hilario, Lab In seven    Component Value Ref Range & Units Status Collected Lab   Sodium 140  135 - 145 mEq/L Final 01/08/2021 10:07 AM [facility] Potassium 3.8  3.6 - 5.0 mEq/L Final 01/08/2021 10:07  [address]   Chloride 104  101 - 111 mEq/L Final 01/08/2021 10:07  [address]   CO2 31  21 - 32 mEq/L Final 01/08/2021 10:07  [address]   Anion Gap 5  4 - 12 Final 01/08/2021 10:07  [address]   Glucose 213High   70 - 110 mg/dL Final 01/08/2021 10:07  [address]   BUN 14  7 - 20 mg/dL Final 01/08/2021 10:07  [address]   CREATININE 0.92  0.60 - 1.30 mg/dL Final 01/08/2021 10:07  [address]   Creatinine Clearance >60   Final 01/08/2021 10:07  [address]   Chronic Kidney Disease stages by NKDF     Stage       eGFR     --------------------------      I           >90      II          60-89      III         30-59      IV          15-29      V           <15 or dialysis     AGE(years)       AVERAGE GFR      60-69           85 ml/min/1.73 square meters     Note:This result is normalized to 1.73 square meter          body surface area. Height and weight are not          factored.     Calcium 8.60Low   8.8 - 10.5 mg/dL Final 01/08/2021 10:07  [address]   Testing Performed By

## 2021-01-27 ENCOUNTER — TELEPHONE (OUTPATIENT)
Dept: FAMILY MEDICINE CLINIC | Age: 66
End: 2021-01-27

## 2021-01-27 NOTE — TELEPHONE ENCOUNTER
[facility] called and said that they have not received the returned form for pt's diabetic supplies. I re faxed forms to Petr at [phone].

## 2021-01-28 ENCOUNTER — NURSE ONLY (OUTPATIENT)
Dept: ALLERGY | Age: 66
End: 2021-01-28
Payer: MEDICARE

## 2021-01-28 VITALS
RESPIRATION RATE: 16 BRPM | SYSTOLIC BLOOD PRESSURE: 180 MMHG | TEMPERATURE: 97.2 F | DIASTOLIC BLOOD PRESSURE: 100 MMHG | HEART RATE: 88 BPM

## 2021-01-28 DIAGNOSIS — J30.81 CAT ALLERGIES: ICD-10-CM

## 2021-01-28 DIAGNOSIS — J30.81 ALLERGY TO DOG DANDER: ICD-10-CM

## 2021-01-28 DIAGNOSIS — J30.89 ALLERGY TO DUST: ICD-10-CM

## 2021-01-28 DIAGNOSIS — Z91.09 POLLEN ALLERGIES: ICD-10-CM

## 2021-01-28 DIAGNOSIS — Z91.09 MITE ALLERGY: ICD-10-CM

## 2021-01-28 DIAGNOSIS — J30.1 ALLERGY TO TREES: ICD-10-CM

## 2021-01-28 DIAGNOSIS — Z91.048 ALLERGY TO MOLD: ICD-10-CM

## 2021-01-28 DIAGNOSIS — Z51.6 ENCOUNTER FOR DESENSITIZATION TO ALLERGENS: Primary | ICD-10-CM

## 2021-01-28 PROCEDURE — 95117 IMMUNOTHERAPY INJECTIONS: CPT | Performed by: NURSE PRACTITIONER

## 2021-01-28 NOTE — PROGRESS NOTES
Patient's PCP is aware of his high blood pressure. The patient stated that he has been placed on two blood pressure medications to help lower the BP.

## 2021-01-29 ENCOUNTER — HOSPITAL ENCOUNTER (OUTPATIENT)
Age: 66
Discharge: HOME | End: 2021-01-29
Payer: MEDICARE

## 2021-01-29 DIAGNOSIS — Z12.5 SCREENING FOR PROSTATE CANCER: ICD-10-CM

## 2021-01-29 LAB — PROSTATE SPECIFIC ANTIGEN: 0.83 NG/ML (ref 0–1)

## 2021-01-29 PROCEDURE — 36415 COLL VENOUS BLD VENIPUNCTURE: CPT

## 2021-01-29 PROCEDURE — G0103 PSA SCREENING: HCPCS

## 2021-02-04 ENCOUNTER — NURSE ONLY (OUTPATIENT)
Dept: ALLERGY | Age: 66
End: 2021-02-04
Payer: MEDICARE

## 2021-02-04 VITALS
DIASTOLIC BLOOD PRESSURE: 88 MMHG | RESPIRATION RATE: 16 BRPM | HEART RATE: 80 BPM | TEMPERATURE: 97 F | SYSTOLIC BLOOD PRESSURE: 136 MMHG

## 2021-02-04 DIAGNOSIS — J30.89 ALLERGY TO DUST: ICD-10-CM

## 2021-02-04 DIAGNOSIS — Z91.09 MITE ALLERGY: ICD-10-CM

## 2021-02-04 DIAGNOSIS — J30.81 ALLERGY TO DOG DANDER: ICD-10-CM

## 2021-02-04 DIAGNOSIS — J30.81 CAT ALLERGIES: ICD-10-CM

## 2021-02-04 DIAGNOSIS — Z91.09 POLLEN ALLERGIES: ICD-10-CM

## 2021-02-04 DIAGNOSIS — Z91.048 ALLERGY TO MOLD: ICD-10-CM

## 2021-02-04 DIAGNOSIS — Z51.6 ENCOUNTER FOR DESENSITIZATION TO ALLERGENS: Primary | ICD-10-CM

## 2021-02-04 DIAGNOSIS — J30.1 ALLERGY TO TREES: ICD-10-CM

## 2021-02-04 PROCEDURE — 95117 IMMUNOTHERAPY INJECTIONS: CPT | Performed by: NURSE PRACTITIONER

## 2021-02-11 ENCOUNTER — NURSE ONLY (OUTPATIENT)
Dept: ALLERGY | Age: 66
End: 2021-02-11
Payer: MEDICARE

## 2021-02-11 VITALS
RESPIRATION RATE: 14 BRPM | DIASTOLIC BLOOD PRESSURE: 84 MMHG | SYSTOLIC BLOOD PRESSURE: 126 MMHG | HEART RATE: 80 BPM | TEMPERATURE: 97.3 F

## 2021-02-11 DIAGNOSIS — J30.89 ALLERGY TO DUST: ICD-10-CM

## 2021-02-11 DIAGNOSIS — J30.81 CAT ALLERGIES: ICD-10-CM

## 2021-02-11 DIAGNOSIS — Z91.048 ALLERGY TO MOLD: ICD-10-CM

## 2021-02-11 DIAGNOSIS — J30.81 ALLERGY TO DOG DANDER: ICD-10-CM

## 2021-02-11 DIAGNOSIS — Z91.09 MITE ALLERGY: ICD-10-CM

## 2021-02-11 DIAGNOSIS — Z51.6 ENCOUNTER FOR DESENSITIZATION TO ALLERGENS: Primary | ICD-10-CM

## 2021-02-11 DIAGNOSIS — Z91.09 POLLEN ALLERGIES: ICD-10-CM

## 2021-02-11 DIAGNOSIS — J30.1 ALLERGY TO TREES: ICD-10-CM

## 2021-02-11 PROCEDURE — 95117 IMMUNOTHERAPY INJECTIONS: CPT | Performed by: NURSE PRACTITIONER

## 2021-02-18 ENCOUNTER — NURSE ONLY (OUTPATIENT)
Dept: ALLERGY | Age: 66
End: 2021-02-18
Payer: MEDICARE

## 2021-02-18 VITALS — DIASTOLIC BLOOD PRESSURE: 82 MMHG | RESPIRATION RATE: 16 BRPM | TEMPERATURE: 97.7 F | SYSTOLIC BLOOD PRESSURE: 128 MMHG

## 2021-02-18 DIAGNOSIS — Z91.09 MITE ALLERGY: ICD-10-CM

## 2021-02-18 DIAGNOSIS — Z91.09 POLLEN ALLERGIES: ICD-10-CM

## 2021-02-18 DIAGNOSIS — J30.81 CAT ALLERGIES: ICD-10-CM

## 2021-02-18 DIAGNOSIS — Z91.048 ALLERGY TO MOLD: ICD-10-CM

## 2021-02-18 DIAGNOSIS — Z51.6 ENCOUNTER FOR DESENSITIZATION TO ALLERGENS: Primary | ICD-10-CM

## 2021-02-18 DIAGNOSIS — J30.89 ALLERGY TO DUST: ICD-10-CM

## 2021-02-18 DIAGNOSIS — J30.1 ALLERGY TO TREES: ICD-10-CM

## 2021-02-18 PROCEDURE — 95117 IMMUNOTHERAPY INJECTIONS: CPT | Performed by: NURSE PRACTITIONER

## 2021-02-24 ENCOUNTER — OFFICE VISIT (OUTPATIENT)
Dept: FAMILY MEDICINE CLINIC | Age: 66
End: 2021-02-24

## 2021-02-24 VITALS
TEMPERATURE: 96.9 F | RESPIRATION RATE: 20 BRPM | WEIGHT: 249.38 LBS | SYSTOLIC BLOOD PRESSURE: 132 MMHG | HEART RATE: 94 BPM | BODY MASS INDEX: 37.8 KG/M2 | DIASTOLIC BLOOD PRESSURE: 64 MMHG | HEIGHT: 68 IN

## 2021-02-24 DIAGNOSIS — I10 ESSENTIAL HYPERTENSION: Primary | ICD-10-CM

## 2021-02-24 PROCEDURE — 1123F ACP DISCUSS/DSCN MKR DOCD: CPT | Performed by: FAMILY MEDICINE

## 2021-02-24 PROCEDURE — 99213 OFFICE O/P EST LOW 20 MIN: CPT | Performed by: FAMILY MEDICINE

## 2021-02-24 PROCEDURE — 4040F PNEUMOC VAC/ADMIN/RCVD: CPT | Performed by: FAMILY MEDICINE

## 2021-02-24 PROCEDURE — G8427 DOCREV CUR MEDS BY ELIG CLIN: HCPCS | Performed by: FAMILY MEDICINE

## 2021-02-24 PROCEDURE — G8417 CALC BMI ABV UP PARAM F/U: HCPCS | Performed by: FAMILY MEDICINE

## 2021-02-24 PROCEDURE — 3017F COLORECTAL CA SCREEN DOC REV: CPT | Performed by: FAMILY MEDICINE

## 2021-02-24 PROCEDURE — 1036F TOBACCO NON-USER: CPT | Performed by: FAMILY MEDICINE

## 2021-02-24 RX ORDER — CLOPIDOGREL BISULFATE 75 MG/1
75 TABLET ORAL DAILY
Status: ON HOLD | COMMUNITY
End: 2022-02-25 | Stop reason: HOSPADM

## 2021-02-24 ASSESSMENT — ENCOUNTER SYMPTOMS
VOMITING: 0
DIARRHEA: 0
BACK PAIN: 1
NAUSEA: 0
CHEST TIGHTNESS: 0
SHORTNESS OF BREATH: 1
BLOOD IN STOOL: 0
EYES NEGATIVE: 1
COUGH: 0
ABDOMINAL PAIN: 0
CONSTIPATION: 0

## 2021-02-24 NOTE — PROGRESS NOTES
Date: 2/24/2021    Karen Almazan is a 77 y.o. male who presents today for:  Chief Complaint   Patient presents with    Check-Up    Hypertension he has been monitoring his B/P at home and it is running in the 110-140/70-80's and he states that he feels better. HPI:     HPI    has a current medication list which includes the following prescription(s): clopidogrel, losartan, one touch ultra 2, ropinirole, buspirone, sertraline, glimepiride, metformin, fluticasone, budesonide-formoterol, montelukast, ventolin hfa, atorvastatin, one touch ultra test, pantoprazole, ranolazine, hydroxyzine, hydrochlorothiazide, epinephrine, onetouch delica lancets fine, acapella, baclofen, trazodone, hydrocodone-acetaminophen, fish oil, and sildenafil.     No Known Allergies    Social History     Tobacco Use    Smoking status: Former Smoker     Packs/day: 2.00     Years: 42.00     Pack years: 84.00     Types: Cigarettes     Start date: 6/5/1971     Quit date: 3/3/2017     Years since quitting: 3.9    Smokeless tobacco: Never Used    Tobacco comment: pts uses just nicotine vap   Substance Use Topics    Alcohol use: No     Alcohol/week: 0.0 standard drinks    Drug use: No       Past Medical History:   Diagnosis Date    Acid reflux     Arthritis     Asthma     Chronic back pain     Class 2 severe obesity due to excess calories with serious comorbidity and body mass index (BMI) of 37.0 to 37.9 in adult [facility]) 8/13/2018    Colon polyps 11/06    COPD [facility]ronic obstructive [facility] disease) ([facility]     Depression     panic attacks    Diverticulosis     HTN (hypertension)     Hyperlipidemia     Kidney disorder     Panic attack     Pneumonia     Rash     Recurrent upper respiratory infection (URI)     Sleep apnea     Thumb amputation status 3/13/14    left tip of thumb amputated    Thyroid disease     Type II or unspecified type diabetes mellitus without mention of complication, not stated as uncontrolled Past Surgical History:   Procedure Laterality Date    BACK SURGERY  2002    BACK SURGERY  08/11/2017    BICEPS TENDON REPAIR Right 08/16/2017    BRONCHOSCOPY      BRONCHOSCOPY N/A 4/5/2019    BRONCHOSCOPY performed by Shanda Meigs, MD at [address]  4/5/2019    BRONCHOSCOPY ALVEOLAR LAVAGE performed by Shanda Meigs, MD at [facility]   [address]  07/02 08/05    CARPAL TUNNEL RELEASE  2011    right hand    CHOLECYSTECTOMY  yrs ago    COLONOSCOPY  11/06 02/12 1/14    CORONARY ANGIOPLASTY WITH STENT PLACEMENT  02/2020    ENDOSCOPY, COLON, DIAGNOSTIC      EYE SURGERY      foreign body removal    HERNIA REPAIR  2004    Dr Juan José Schaffer  2008?  LARYNGOSCOPY  04/28/2016    Laryngoscopy Micro with Biopsy by Dr Liliana Armstrong  01/07    uppp    ROTATOR CUFF REPAIR Left 5-20-15    SKIN BIOPSY      TONSILLECTOMY  1985    UPPER GASTROINTESTINAL ENDOSCOPY  1997    VEIN SURGERY Left September 2014    Dr. Juel Primrose       Family History   Problem Relation Age of Onset    Cancer Mother     Breast Cancer Mother     Stroke Mother     Heart Disease Brother     Diabetes Brother     High Blood Pressure Brother     High Cholesterol Brother      Subjective:     Review of Systems   Constitutional: Negative for activity change, appetite change, diaphoresis and fever. Eyes: Negative. Respiratory: Positive for shortness of breath ( at baseline). Negative for cough and chest tightness. He states that he was sick about 2 weeks ago and now he is back to his baseline   Cardiovascular: Negative for chest pain, palpitations and leg swelling. Gastrointestinal: Negative for abdominal pain, blood in stool, constipation, diarrhea, nausea and vomiting. Genitourinary: Negative. Musculoskeletal: Positive for arthralgias and back pain. Skin: Negative. Negative for rash. No prescriptions requested or ordered in this encounter     Current Outpatient Medications   Medication Sig Dispense Refill    clopidogrel (PLAVIX) 75 MG tablet Take 75 mg by mouth daily      losartan (COZAAR) 100 MG tablet Take 1 tablet by mouth daily 90 tablet 1    Blood Glucose Monitoring Suppl (ONE TOUCH ULTRA 2) w/Device KIT 1 kit by Does not apply route daily 1 kit 0    rOPINIRole (REQUIP) 1 MG tablet TAKE 1 TABLET BY MOUTH THREE TIMES DAILY 270 tablet 1    busPIRone (BUSPAR) 10 MG tablet Take 1 tablet by mouth 3 times daily 270 tablet 1    sertraline (ZOLOFT) 100 MG tablet TAKE 1 TABLET BY MOUTH TWICE DAILY 180 tablet 1    glimepiride (AMARYL) 4 MG tablet TAKE 1 TABLET BY MOUTH TWICE DAILY 180 tablet 1    metFORMIN (GLUCOPHAGE) 500 MG tablet TAKE 2 TABLETS BY MOUTH TWICE DAILY WITH MEALS 360 tablet 1    fluticasone (FLONASE) 50 MCG/ACT nasal spray SHAKE LIQUID AND USE 1 SPRAY IN EACH NOSTRIL DAILY 16 g 11    budesonide-formoterol (SYMBICORT) 160-4.5 MCG/ACT AERO Inhale 2 puffs into the lungs 2 times daily Rinse mouth after its use.  1 Inhaler 11    montelukast (SINGULAIR) 10 MG tablet Take 1 tablet by mouth nightly 30 tablet 11    VENTOLIN  (90 Base) MCG/ACT inhaler Inhale 2 puffs into the lungs every 6 hours as needed for Wheezing 1 Inhaler 8    atorvastatin (LIPITOR) 20 MG tablet       blood glucose test strips (ONE TOUCH ULTRA TEST) strip CHECK BLOOD SUGAR DAILY 100 strip 0    pantoprazole (PROTONIX) 40 MG tablet Take 40 mg by mouth 2 times daily       ranolazine (RANEXA) 500 MG extended release tablet Take 500 mg by mouth 2 times daily      hydrOXYzine (ATARAX) 10 MG tablet Take 10 mg by mouth nightly      hydrochlorothiazide (HYDRODIURIL) 12.5 MG tablet TK 1 T PO QD IN THE MORNING  3    EPINEPHrine (EPIPEN 2-HUBER) 0.3 MG/0.3ML SOAJ injection Inject one pen as directed STAT for allergic reaction, may disp generic NDC 38462-302-35 6 each 99  ONETOUCH DELICA LANCETS FINE MISC Check blood sugar twice daily Dx: E11.9 200 each 1    Misc. Devices (ACAPELLA) MISC 1 Device by Does not apply route 4 times daily 1 each 0    baclofen (LIORESAL) 10 MG tablet take 1 tablet by mouth twice a day  0    traZODone (DESYREL) 150 MG tablet take 1/2 to 1 tablet by mouth once daily at bedtime if needed for pain SPASM, OR SLEEP  0    HYDROcodone-acetaminophen (NORCO) 5-325 MG per tablet Take 1 tablet by mouth every 6 hours as needed for Pain      Omega-3 Fatty Acids (FISH OIL) 1000 MG CAPS Take 1,000 mg by mouth daily.  sildenafil (VIAGRA) 100 MG tablet Take 1 tablet by mouth as needed. 8 tablet 5     No current facility-administered medications for this visit. No orders of the defined types were placed in this encounter. Continue current medications. Return in about 3 months (around 5/24/2021) for DM. Discussed use, benefit, and side effects of prescribed medications. All patient questions answered. Pt voiced understanding. Instructed to continue current medications,diet and exercise. Patient agreed with treatment plan.

## 2021-03-04 ENCOUNTER — NURSE ONLY (OUTPATIENT)
Dept: ALLERGY | Age: 66
End: 2021-03-04
Payer: MEDICARE

## 2021-03-04 VITALS
SYSTOLIC BLOOD PRESSURE: 134 MMHG | RESPIRATION RATE: 16 BRPM | TEMPERATURE: 97.3 F | HEART RATE: 74 BPM | DIASTOLIC BLOOD PRESSURE: 82 MMHG

## 2021-03-04 DIAGNOSIS — J30.81 ALLERGY TO DOG DANDER: ICD-10-CM

## 2021-03-04 DIAGNOSIS — Z91.09 POLLEN ALLERGIES: ICD-10-CM

## 2021-03-04 DIAGNOSIS — J30.89 ALLERGY TO DUST: ICD-10-CM

## 2021-03-04 DIAGNOSIS — Z91.048 ALLERGY TO MOLD: ICD-10-CM

## 2021-03-04 DIAGNOSIS — Z51.6 ENCOUNTER FOR DESENSITIZATION TO ALLERGENS: Primary | ICD-10-CM

## 2021-03-04 DIAGNOSIS — J30.1 ALLERGY TO TREES: ICD-10-CM

## 2021-03-04 DIAGNOSIS — Z91.09 MITE ALLERGY: ICD-10-CM

## 2021-03-04 DIAGNOSIS — J30.81 CAT ALLERGIES: ICD-10-CM

## 2021-03-04 PROCEDURE — 95117 IMMUNOTHERAPY INJECTIONS: CPT | Performed by: NURSE PRACTITIONER

## 2021-03-08 ENCOUNTER — TELEPHONE (OUTPATIENT)
Dept: FAMILY MEDICINE CLINIC | Age: 66
End: 2021-03-08

## 2021-03-08 RX ORDER — AMOXICILLIN AND CLAVULANATE POTASSIUM 875; 125 MG/1; MG/1
1 TABLET, FILM COATED ORAL 2 TIMES DAILY
Qty: 20 TABLET | Refills: 0 | Status: SHIPPED | OUTPATIENT
Start: 2021-03-08 | End: 2021-03-18

## 2021-03-08 NOTE — TELEPHONE ENCOUNTER
Patient called with complaints of:    Cough: Yes   SOB: Yes   Runny Nose: Yes   Sore Throat: No   Headache: No   Body Aches: No   Fever: No   Fatigue:  Yes    - Exposure to the Flu: No   - Travel History: No   - Duration of Symptoms: 2 or 3 days ago    Greenish mucous. Said he was seen for similar symptoms recently by a Zoom call. Wondering if he could just have an antibiotic. Please send RX to: [facility]    Patient informed if symptoms do not improve and/or  worsen then patient needs to go to Urgent Care or ER.

## 2021-03-10 ENCOUNTER — NURSE ONLY (OUTPATIENT)
Dept: ALLERGY | Age: 66
End: 2021-03-10
Payer: MEDICARE

## 2021-03-10 VITALS
DIASTOLIC BLOOD PRESSURE: 72 MMHG | HEART RATE: 70 BPM | TEMPERATURE: 97.2 F | SYSTOLIC BLOOD PRESSURE: 124 MMHG | RESPIRATION RATE: 16 BRPM

## 2021-03-10 DIAGNOSIS — J30.81 ALLERGY TO DOG DANDER: ICD-10-CM

## 2021-03-10 DIAGNOSIS — Z91.09 MITE ALLERGY: ICD-10-CM

## 2021-03-10 DIAGNOSIS — Z51.6 ENCOUNTER FOR DESENSITIZATION TO ALLERGENS: Primary | ICD-10-CM

## 2021-03-10 DIAGNOSIS — J30.81 CAT ALLERGIES: ICD-10-CM

## 2021-03-10 DIAGNOSIS — Z91.09 POLLEN ALLERGIES: ICD-10-CM

## 2021-03-10 DIAGNOSIS — J30.89 ALLERGY TO DUST: ICD-10-CM

## 2021-03-10 DIAGNOSIS — Z91.048 ALLERGY TO MOLD: ICD-10-CM

## 2021-03-10 DIAGNOSIS — J30.1 ALLERGY TO TREES: ICD-10-CM

## 2021-03-10 PROCEDURE — 95117 IMMUNOTHERAPY INJECTIONS: CPT | Performed by: NURSE PRACTITIONER

## 2021-03-25 ENCOUNTER — NURSE ONLY (OUTPATIENT)
Dept: ALLERGY | Age: 66
End: 2021-03-25
Payer: MEDICARE

## 2021-03-25 VITALS
HEART RATE: 70 BPM | DIASTOLIC BLOOD PRESSURE: 84 MMHG | TEMPERATURE: 97.3 F | RESPIRATION RATE: 16 BRPM | SYSTOLIC BLOOD PRESSURE: 134 MMHG

## 2021-03-25 DIAGNOSIS — Z91.09 MITE ALLERGY: ICD-10-CM

## 2021-03-25 DIAGNOSIS — Z51.6 ENCOUNTER FOR DESENSITIZATION TO ALLERGENS: Primary | ICD-10-CM

## 2021-03-25 DIAGNOSIS — J30.81 CAT ALLERGIES: ICD-10-CM

## 2021-03-25 DIAGNOSIS — Z91.048 ALLERGY TO MOLD: ICD-10-CM

## 2021-03-25 DIAGNOSIS — J30.1 ALLERGY TO TREES: ICD-10-CM

## 2021-03-25 DIAGNOSIS — J30.81 ALLERGY TO DOG DANDER: ICD-10-CM

## 2021-03-25 DIAGNOSIS — Z91.09 POLLEN ALLERGIES: ICD-10-CM

## 2021-03-25 DIAGNOSIS — J30.89 ALLERGY TO DUST: ICD-10-CM

## 2021-03-25 PROCEDURE — 95117 IMMUNOTHERAPY INJECTIONS: CPT | Performed by: NURSE PRACTITIONER

## 2021-04-01 ENCOUNTER — NURSE ONLY (OUTPATIENT)
Dept: ALLERGY | Age: 66
End: 2021-04-01
Payer: MEDICARE

## 2021-04-01 VITALS
SYSTOLIC BLOOD PRESSURE: 136 MMHG | RESPIRATION RATE: 16 BRPM | DIASTOLIC BLOOD PRESSURE: 82 MMHG | HEART RATE: 80 BPM | TEMPERATURE: 97 F

## 2021-04-01 DIAGNOSIS — J30.81 ALLERGY TO DOG DANDER: ICD-10-CM

## 2021-04-01 DIAGNOSIS — Z91.09 POLLEN ALLERGIES: ICD-10-CM

## 2021-04-01 DIAGNOSIS — J30.89 ALLERGY TO DUST: ICD-10-CM

## 2021-04-01 DIAGNOSIS — Z91.048 ALLERGY TO MOLD: ICD-10-CM

## 2021-04-01 DIAGNOSIS — J30.81 CAT ALLERGIES: ICD-10-CM

## 2021-04-01 DIAGNOSIS — Z51.6 ENCOUNTER FOR DESENSITIZATION TO ALLERGENS: Primary | ICD-10-CM

## 2021-04-01 DIAGNOSIS — J30.1 ALLERGY TO TREES: ICD-10-CM

## 2021-04-01 DIAGNOSIS — Z91.09 MITE ALLERGY: ICD-10-CM

## 2021-04-01 PROCEDURE — 95117 IMMUNOTHERAPY INJECTIONS: CPT | Performed by: NURSE PRACTITIONER

## 2021-04-05 RX ORDER — GLIMEPIRIDE 4 MG/1
TABLET ORAL
Qty: 180 TABLET | Refills: 1 | Status: SHIPPED | OUTPATIENT
Start: 2021-04-05 | End: 2021-09-27

## 2021-04-09 DIAGNOSIS — J30.1 ALLERGY TO TREES: ICD-10-CM

## 2021-04-09 DIAGNOSIS — Z91.09 MITE ALLERGY: ICD-10-CM

## 2021-04-09 DIAGNOSIS — J30.81 ALLERGY TO DOG DANDER: ICD-10-CM

## 2021-04-09 DIAGNOSIS — J30.89 ALLERGY TO DUST: ICD-10-CM

## 2021-04-09 DIAGNOSIS — J30.9 ALLERGIC RHINITIS, UNSPECIFIED SEASONALITY, UNSPECIFIED TRIGGER: Primary | ICD-10-CM

## 2021-04-09 DIAGNOSIS — Z91.048 ALLERGY TO MOLD: ICD-10-CM

## 2021-04-09 PROCEDURE — 95165 ANTIGEN THERAPY SERVICES: CPT | Performed by: NURSE PRACTITIONER

## 2021-04-09 NOTE — PROGRESS NOTES
ALLERGY EXTRACT MAINTENANCE MIXING RED VIAL ONLY    Provider onsite during allergy extract preparation. TOTAL DOSES PREPARED = 40 OR 20  DOSES PER VIAL. TOTAL VIALS PREPARED = 2. Each vial 10 ml    An allergy extract maintenance solution was prepared in red vial according to the maintenance prescription. The refrigerator shelf-life for each red vial is 12 months or as indicated on the label. Patient vials were labeled with name, date-of-birth, vial number, concentration, and expiration. When injecting from a new maintenance vial, the first injections should be three doses below the previous maintenance dose. This is done because the refill may be slightly stronger than the vial that was just emptied. (Example: if the maintenance dose is 0.5, start at 0.35 and proceed to 0.4, 0.45, 0.5). The three build up injections may be given weekly until the maintenance dosage is reached again. Then every other week injections may be resumed. Patients on maintenance should be seen by the allergy provider every 6-12 months and as needed. The injection record and serum is reviewed and documented at every injection appointment. When down to the last 1/3 of the bottle, a maintenance refill will be prepared (pending patient is without reactions) for next refill. Patients requesting refills that receive injections at outside medical facilities must include the patient's demographic sheet, current insurance information and the injection records. Allow 2-3 weeks for delivery after the refill has been requested. PROTOCOL FOR LATE INJECTIONS  Days late determined from the due date of the injection    Shot Frequency 5-10 Days Late 11-16 Days Late 17-30 Days Late 31-60 Days Late 61+ Days Late   Twice Weekly Repeat last dose Reduce last dose . 2 Reduce last dose . 4 Dilute back 1 vial, start at .05 Patient must start over     Weekly Repeat last dose Reduce last dose . 2 Reduce last dose . 4 Dilute back 1 vial, start at .05 Patient must start over   Every 2 Weeks Repeat last dose Reduce last dose . 2. Reduce last dose . 4 Ask provider for instruction Ask provider for instruction   Every 3 Weeks Repeat last dose Reduce last dose . 2 Reduce last dose . 4 Ask provider for instruction Ask provider for instruction   Every 4 Weeks Repeat last dose Reduce last dose . 2 Reduce last dose . 4 Ask provider for instruction Ask provider for instruction

## 2021-04-14 ENCOUNTER — NURSE ONLY (OUTPATIENT)
Dept: ALLERGY | Age: 66
End: 2021-04-14
Payer: MEDICARE

## 2021-04-14 VITALS
DIASTOLIC BLOOD PRESSURE: 82 MMHG | SYSTOLIC BLOOD PRESSURE: 126 MMHG | HEART RATE: 80 BPM | RESPIRATION RATE: 16 BRPM | TEMPERATURE: 97.1 F

## 2021-04-14 DIAGNOSIS — Z91.09 MITE ALLERGY: ICD-10-CM

## 2021-04-14 DIAGNOSIS — Z51.6 ENCOUNTER FOR DESENSITIZATION TO ALLERGENS: Primary | ICD-10-CM

## 2021-04-14 DIAGNOSIS — J30.89 ALLERGY TO DUST: ICD-10-CM

## 2021-04-14 DIAGNOSIS — J30.1 ALLERGY TO TREES: ICD-10-CM

## 2021-04-14 DIAGNOSIS — J30.81 ALLERGY TO DOG DANDER: ICD-10-CM

## 2021-04-14 DIAGNOSIS — Z91.048 ALLERGY TO MOLD: ICD-10-CM

## 2021-04-14 DIAGNOSIS — Z91.09 POLLEN ALLERGIES: ICD-10-CM

## 2021-04-14 DIAGNOSIS — J30.81 CAT ALLERGIES: ICD-10-CM

## 2021-04-14 PROCEDURE — 95117 IMMUNOTHERAPY INJECTIONS: CPT | Performed by: NURSE PRACTITIONER

## 2021-04-14 NOTE — PROGRESS NOTES
After consent obtained/verified, allergy injection given in back of R/L arm(s). VIAL COLOR OF ALL VIALS TODAY IS Maintenance vials    ALLERGY INJECTION FROM VIAL A GIVEN LT  UPPER ARM IN THE AMOUNT OF 0.50 ML    ALLERGY INJECTION FROM VIAL B GIVEN RT UPPER ARM IN THE AMOUNT OF 0.50ML        Documentation of vial injection specific to arm(s) noted on Allergy Immunotherapy Administration Form. Patient waited 30 minutes for observation. Patient tolerated well without adverse reaction. SHOT REACTION TREATMENT INSTRUCTIONS    During the 30 minute wait after an allergy injection the following symptoms should be reported:    Itching other than at the injection site  Hives or swelling other than at the injection site  Redness other than at the injection site  Difficulty breathing  Chest tightness  Difficulty swallowing  Throat tightness    If these symptoms occur, NOTIFY PROVIDER and the following treatment should be administered:    1. Epinephrine/Auvi Q 1:1000 IM - 0.3 ml if > 66 lbs or more, 0.15 ml if 33 - 63 lbs, or 0.1 ml if <33 lbs     2. Diphenhydramine - give all intramuscular:     2 to <6 years (off-label use): 6.25 mg,    6 to <12 years: 12.5 to 25 mg;    ?12 years: 25-50 mg.    3.  Famotidine:  Adults 40 mg oral    Adolescents age 12 years and >88 lbs: 40 mg    Children and Adolescents ? 12years of age: Initial: 0.25 mg/kg/dose  every 12 hours (maximum daily dose: 40 mg/day)    Epi/Auvi Q dose may me repeated in 5-15 minutes if adequate resolution of symptoms does not occur    Patient should be observed for at least one hour after final Epi/Auvi Q dose and must be seen by provider. Patients cannot drive themselves if they have received diphenhydramine.

## 2021-04-21 ENCOUNTER — NURSE ONLY (OUTPATIENT)
Dept: ALLERGY | Age: 66
End: 2021-04-21
Payer: MEDICARE

## 2021-04-21 VITALS
TEMPERATURE: 97.2 F | HEART RATE: 70 BPM | RESPIRATION RATE: 16 BRPM | DIASTOLIC BLOOD PRESSURE: 78 MMHG | SYSTOLIC BLOOD PRESSURE: 126 MMHG

## 2021-04-21 DIAGNOSIS — J30.81 ALLERGY TO DOG DANDER: ICD-10-CM

## 2021-04-21 DIAGNOSIS — J30.89 ALLERGY TO DUST: ICD-10-CM

## 2021-04-21 DIAGNOSIS — Z91.048 ALLERGY TO MOLD: ICD-10-CM

## 2021-04-21 DIAGNOSIS — J30.1 ALLERGY TO TREES: ICD-10-CM

## 2021-04-21 DIAGNOSIS — Z51.6 ENCOUNTER FOR DESENSITIZATION TO ALLERGENS: Primary | ICD-10-CM

## 2021-04-21 DIAGNOSIS — J30.81 CAT ALLERGIES: ICD-10-CM

## 2021-04-21 DIAGNOSIS — Z91.09 POLLEN ALLERGIES: ICD-10-CM

## 2021-04-21 DIAGNOSIS — Z91.09 MITE ALLERGY: ICD-10-CM

## 2021-04-21 PROCEDURE — 95117 IMMUNOTHERAPY INJECTIONS: CPT | Performed by: NURSE PRACTITIONER

## 2021-04-21 NOTE — PROGRESS NOTES
After consent obtained/verified, allergy injection given in back of R/L arm(s). VIAL COLOR OF ALL VIALS TODAY IS Maintenance vials    ALLERGY INJECTION FROM VIAL A GIVEN RT UPPER ARM IN THE AMOUNT OF 0.50 ML    ALLERGY INJECTION FROM VIAL B GIVEN LT  UPPER ARM IN THE AMOUNT OF 0.50 ML        Documentation of vial injection specific to arm(s) noted on Allergy Immunotherapy Administration Form. Patient signed waiver and declined to wait 30 minutes for observation. Patient tolerated well without adverse reaction. SHOT REACTION TREATMENT INSTRUCTIONS    During the 30 minute wait after an allergy injection the following symptoms should be reported:    Itching other than at the injection site  Hives or swelling other than at the injection site  Redness other than at the injection site  Difficulty breathing  Chest tightness  Difficulty swallowing  Throat tightness    If these symptoms occur, NOTIFY PROVIDER and the following treatment should be administered:    1. Epinephrine/Auvi Q 1:1000 IM - 0.3 ml if > 66 lbs or more, 0.15 ml if 33 - 63 lbs, or 0.1 ml if <33 lbs     2. Diphenhydramine - give all intramuscular:     2 to <6 years (off-label use): 6.25 mg,    6 to <12 years: 12.5 to 25 mg;    ?12 years: 25-50 mg.    3.  Famotidine:  Adults 40 mg oral    Adolescents age 12 years and >88 lbs: 40 mg    Children and Adolescents ? 12years of age: Initial: 0.25 mg/kg/dose  every 12 hours (maximum daily dose: 40 mg/day)    Epi/Auvi Q dose may me repeated in 5-15 minutes if adequate resolution of symptoms does not occur    Patient should be observed for at least one hour after final Epi/Auvi Q dose and must be seen by provider. Patients cannot drive themselves if they have received diphenhydramine.

## 2021-04-28 ENCOUNTER — TELEPHONE (OUTPATIENT)
Dept: FAMILY MEDICINE CLINIC | Age: 66
End: 2021-04-28

## 2021-04-28 ENCOUNTER — OFFICE VISIT (OUTPATIENT)
Dept: FAMILY MEDICINE CLINIC | Age: 66
End: 2021-04-28

## 2021-04-28 ENCOUNTER — NURSE ONLY (OUTPATIENT)
Dept: ALLERGY | Age: 66
End: 2021-04-28
Payer: MEDICARE

## 2021-04-28 VITALS
RESPIRATION RATE: 16 BRPM | DIASTOLIC BLOOD PRESSURE: 84 MMHG | SYSTOLIC BLOOD PRESSURE: 128 MMHG | HEART RATE: 80 BPM | TEMPERATURE: 97.2 F

## 2021-04-28 VITALS
DIASTOLIC BLOOD PRESSURE: 82 MMHG | WEIGHT: 242.5 LBS | BODY MASS INDEX: 36.75 KG/M2 | SYSTOLIC BLOOD PRESSURE: 128 MMHG | HEART RATE: 70 BPM | TEMPERATURE: 97.2 F | RESPIRATION RATE: 18 BRPM | HEIGHT: 68 IN

## 2021-04-28 DIAGNOSIS — Z91.09 POLLEN ALLERGIES: ICD-10-CM

## 2021-04-28 DIAGNOSIS — Z91.048 ALLERGY TO MOLD: ICD-10-CM

## 2021-04-28 DIAGNOSIS — I10 ESSENTIAL HYPERTENSION: ICD-10-CM

## 2021-04-28 DIAGNOSIS — E11.8 TYPE 2 DIABETES MELLITUS WITH COMPLICATION, WITHOUT LONG-TERM CURRENT USE OF INSULIN (HCC): Primary | ICD-10-CM

## 2021-04-28 DIAGNOSIS — Z91.09 MITE ALLERGY: ICD-10-CM

## 2021-04-28 DIAGNOSIS — G47.33 OSA (OBSTRUCTIVE SLEEP APNEA): ICD-10-CM

## 2021-04-28 DIAGNOSIS — J30.1 ALLERGY TO TREES: ICD-10-CM

## 2021-04-28 DIAGNOSIS — J30.81 ALLERGY TO DOG DANDER: ICD-10-CM

## 2021-04-28 DIAGNOSIS — J30.89 ALLERGY TO DUST: ICD-10-CM

## 2021-04-28 DIAGNOSIS — Z51.6 ENCOUNTER FOR DESENSITIZATION TO ALLERGENS: Primary | ICD-10-CM

## 2021-04-28 DIAGNOSIS — E78.5 HYPERLIPIDEMIA, UNSPECIFIED HYPERLIPIDEMIA TYPE: ICD-10-CM

## 2021-04-28 DIAGNOSIS — J30.81 CAT ALLERGIES: ICD-10-CM

## 2021-04-28 PROBLEM — L72.3 SEBACEOUS CYST: Status: RESOLVED | Noted: 2018-01-16 | Resolved: 2021-04-28

## 2021-04-28 PROBLEM — J18.9 PNEUMONIA: Status: RESOLVED | Noted: 2019-04-08 | Resolved: 2021-04-28

## 2021-04-28 LAB
CHP ED QC CHECK: ABNORMAL
GLUCOSE BLD-MCNC: 162 MG/DL
HBA1C MFR BLD: 8.5 %

## 2021-04-28 PROCEDURE — G8427 DOCREV CUR MEDS BY ELIG CLIN: HCPCS | Performed by: FAMILY MEDICINE

## 2021-04-28 PROCEDURE — 95117 IMMUNOTHERAPY INJECTIONS: CPT | Performed by: NURSE PRACTITIONER

## 2021-04-28 PROCEDURE — 83036 HEMOGLOBIN GLYCOSYLATED A1C: CPT | Performed by: FAMILY MEDICINE

## 2021-04-28 PROCEDURE — 4040F PNEUMOC VAC/ADMIN/RCVD: CPT | Performed by: FAMILY MEDICINE

## 2021-04-28 PROCEDURE — 82962 GLUCOSE BLOOD TEST: CPT | Performed by: FAMILY MEDICINE

## 2021-04-28 PROCEDURE — 99213 OFFICE O/P EST LOW 20 MIN: CPT | Performed by: FAMILY MEDICINE

## 2021-04-28 PROCEDURE — 1123F ACP DISCUSS/DSCN MKR DOCD: CPT | Performed by: FAMILY MEDICINE

## 2021-04-28 PROCEDURE — 1036F TOBACCO NON-USER: CPT | Performed by: FAMILY MEDICINE

## 2021-04-28 PROCEDURE — 3017F COLORECTAL CA SCREEN DOC REV: CPT | Performed by: FAMILY MEDICINE

## 2021-04-28 PROCEDURE — G8417 CALC BMI ABV UP PARAM F/U: HCPCS | Performed by: FAMILY MEDICINE

## 2021-04-28 ASSESSMENT — ENCOUNTER SYMPTOMS
BLOOD IN STOOL: 0
EYES NEGATIVE: 1
CONSTIPATION: 0
BACK PAIN: 1
COUGH: 0
DIARRHEA: 0
SHORTNESS OF BREATH: 0
NAUSEA: 0
VOMITING: 0
CHEST TIGHTNESS: 0
ABDOMINAL PAIN: 0

## 2021-04-28 NOTE — PROGRESS NOTES
Date: 2021    Vesta Gar is a 77 y.o. male who presents today for:  Chief Complaint   Patient presents with    Diabetes       Current monitoring regimen: home blood tests - 1  Home blood sugar trends: AM -180's  Any episodes of hypoglycemia? No  Current exercise: No  Compliance with treatment: Good  Eye exam current (within one year): No he needs to make appt. Any history of foot problems? No  Last foot exam: 2021  Cardiovascular risk factors: advanced age (older than 54 for men, 72 for women), diabetes mellitus, dyslipidemia, hypertension, male gender, obesity (BMI >= 30 kg/m2) and sedentary lifestyle. Immunizations up to date: Flu Yes   Pneumonia Yes  Taking ASA: Yes   If not why? HPI:     HPI    has a current medication list which includes the following prescription(s): linagliptin, glimepiride, clopidogrel, losartan, one touch ultra 2, ropinirole, buspirone, sertraline, metformin, fluticasone, budesonide-formoterol, montelukast, ventolin hfa, atorvastatin, one touch ultra test, pantoprazole, ranolazine, hydroxyzine, hydrochlorothiazide, epinephrine, onetouch delica lancets fine, acapella, baclofen, trazodone, hydrocodone-acetaminophen, fish oil, and sildenafil.     No Known Allergies    Social History     Tobacco Use    Smoking status: Former Smoker     Packs/day: 2.00     Years: 42.00     Pack years: 84.00     Types: Cigarettes     Start date: 1971     Quit date: 3/3/2017     Years since quittin.1    Smokeless tobacco: Never Used    Tobacco comment: pts uses just nicotine vap   Substance Use Topics    Alcohol use: No     Alcohol/week: 0.0 standard drinks    Drug use: No       Past Medical History:   Diagnosis Date    Acid reflux     Arthritis     Asthma     Chronic back pain     Class 2 severe obesity due to excess calories with serious comorbidity and body mass index (BMI) of 37.0 to 37.9 in adult [facility]) 2018    Colon polyps     COPD (chronic obstructive [facility] disease) ([facility]     Depression     panic attacks    Diverticulosis     HTN (hypertension)     Hyperlipidemia     Kidney disorder     Panic attack     Pneumonia     Rash     Recurrent upper respiratory infection (URI)     Sleep apnea     Thumb amputation status 3/13/14    left tip of thumb amputated    Thyroid disease     Type II or unspecified type diabetes mellitus without mention of complication, not stated as uncontrolled        Past Surgical History:   Procedure Laterality Date    BACK SURGERY  2002    BACK SURGERY  08/11/2017    BICEPS TENDON REPAIR Right 08/16/2017    BRONCHOSCOPY      BRONCHOSCOPY N/A 4/5/2019    BRONCHOSCOPY performed by Bandar Farley MD at [address] [facility]  4/5/2019    BRONCHOSCOPY ALVEOLAR LAVAGE performed by Bandar Farley MD at [address] [facility]note 3[facility]  07/02 08/05    CARPAL TUNNEL RELEASE  2011    right hand    CHOLECYSTECTOMY  yrs ago    COLONOSCOPY  11/06 02/12 1/14    CORONARY ANGIOPLASTY WITH STENT PLACEMENT  02/2020    ENDOSCOPY, COLON, DIAGNOSTIC      EYE SURGERY      foreign body removal    HERNIA REPAIR  2004    Dr Rock Ace  2008?     LARYNGOSCOPY  04/28/2016    Laryngoscopy Micro with Biopsy by Dr Ange Gauthier  01/07    uppp    ROTATOR CUFF REPAIR Left 5-20-15    SKIN BIOPSY      TONSILLECTOMY  1985    UPPER GASTROINTESTINAL ENDOSCOPY  1997    VEIN SURGERY Left September 2014    Dr. Cortez Bridegroom       Family History   Problem Relation Age of Onset   Noy Marcos Cancer Mother     Breast Cancer Mother     Stroke Mother     Heart Disease Brother     Diabetes Brother     High Blood Pressure Brother     High Cholesterol Brother        /82 (Site: Right Upper Arm, Position: Sitting, Cuff Size: Medium Adult)   Pulse 70   Temp 97.2 °F (36.2 °C) (Skin)   Resp 18   Ht 5' 8\" (1.727 m)   Wt 242 lb 8 oz (110 kg)   BMI 36.87 kg/m²   Wt Readings from Last 3 Encounters:   04/28/21 242 lb 8 oz (110 kg)   02/24/21 249 lb 6 oz (113.1 kg)   01/22/21 248 lb (112.5 kg)       Subjective:      Review of Systems   Constitutional: Negative for activity change, appetite change, diaphoresis and fever. HENT: Negative. Eyes: Negative. Respiratory: Negative for cough, chest tightness and shortness of breath. Cardiovascular: Negative for chest pain, palpitations and leg swelling. Gastrointestinal: Negative for abdominal pain, blood in stool, constipation, diarrhea, nausea and vomiting. Genitourinary: Negative. Musculoskeletal: Positive for arthralgias and back pain. Skin: Negative. Negative for rash. Neurological: Negative. Negative for dizziness, syncope, weakness, light-headedness and headaches. Psychiatric/Behavioral: Negative. Objective:     Physical Exam  Vitals signs and nursing note reviewed. Constitutional:       General: He is not in acute distress. Appearance: He is well-developed. He is not diaphoretic. HENT:      Head: Normocephalic and atraumatic. Eyes:      General: No scleral icterus. Right eye: No discharge. Left eye: No discharge. Conjunctiva/sclera: Conjunctivae normal.      Pupils: Pupils are equal, round, and reactive to light. Neck:      Musculoskeletal: Normal range of motion and neck supple. Thyroid: No thyromegaly. Vascular: No JVD. Cardiovascular:      Rate and Rhythm: Normal rate and regular rhythm. Heart sounds: Normal heart sounds. No murmur. Pulmonary:      Effort: Pulmonary effort is normal. No respiratory distress. Breath sounds: Normal breath sounds. No wheezing, rhonchi or rales. Abdominal:      General: Bowel sounds are normal. There is no distension. Palpations: Abdomen is soft. There is no mass. Tenderness: There is no abdominal tenderness. There is no guarding or rebound. Musculoskeletal: Normal range of motion.    Lymphadenopathy:

## 2021-04-28 NOTE — TELEPHONE ENCOUNTER
Jose Aguero called stating Fayrene Shed is over $100 a month and pt cannot afford that.     Jose Aguero informed to check with insurance to see what is covered cheaper to replace and call us back with those names

## 2021-04-28 NOTE — PROGRESS NOTES
After consent obtained/verified, allergy injection given in back of R/L arm(s). VIAL COLOR OF ALL VIALS TODAY IS MAINTENANCE VIALS    ALLERGY INJECTION FROM VIAL A GIVEN LT  UPPER ARM IN THE AMOUNT OF 0.50 ML    ALLERGY INJECTION FROM VIAL B GIVEN RT UPPER ARM IN THE AMOUNT OF 0.50 ML        Documentation of vial injection specific to arm(s) noted on Allergy Immunotherapy Administration Form. Patient waited 30 minutes for observation. Patient tolerated well without adverse reaction. SHOT REACTION TREATMENT INSTRUCTIONS    During the 30 minute wait after an allergy injection the following symptoms should be reported:    Itching other than at the injection site  Hives or swelling other than at the injection site  Redness other than at the injection site  Difficulty breathing  Chest tightness  Difficulty swallowing  Throat tightness    If these symptoms occur, NOTIFY PROVIDER and the following treatment should be administered:    1. Epinephrine/Auvi Q 1:1000 IM - 0.3 ml if > 66 lbs or more, 0.15 ml if 33 - 63 lbs, or 0.1 ml if <33 lbs     2. Diphenhydramine - give all intramuscular:     2 to <6 years (off-label use): 6.25 mg,    6 to <12 years: 12.5 to 25 mg;    ?12 years: 25-50 mg.    3.  Famotidine:  Adults 40 mg oral    Adolescents age 12 years and >88 lbs: 40 mg    Children and Adolescents ? 12years of age: Initial: 0.25 mg/kg/dose  every 12 hours (maximum daily dose: 40 mg/day)    Epi/Auvi Q dose may me repeated in 5-15 minutes if adequate resolution of symptoms does not occur    Patient should be observed for at least one hour after final Epi/Auvi Q dose and must be seen by provider. Patients cannot drive themselves if they have received diphenhydramine.

## 2021-04-28 NOTE — TELEPHONE ENCOUNTER
Maritza George called back stating Insurance informed them the only replacement is Januvia. Pt checked with pharmacy and that RX is just as expensive. Maritza George ask if there is a definite cheaper RX pt can try?     Please call Maritza George once addressed

## 2021-05-05 RX ORDER — SERTRALINE HYDROCHLORIDE 100 MG/1
TABLET, FILM COATED ORAL
Qty: 180 TABLET | Refills: 1 | Status: SHIPPED | OUTPATIENT
Start: 2021-05-05 | End: 2021-07-28 | Stop reason: SDUPTHER

## 2021-05-05 RX ORDER — ROPINIROLE 1 MG/1
TABLET, FILM COATED ORAL
Qty: 270 TABLET | Refills: 1 | Status: SHIPPED | OUTPATIENT
Start: 2021-05-05 | End: 2021-11-03

## 2021-05-12 ENCOUNTER — NURSE ONLY (OUTPATIENT)
Dept: ALLERGY | Age: 66
End: 2021-05-12
Payer: MEDICARE

## 2021-05-12 ENCOUNTER — OFFICE VISIT (OUTPATIENT)
Dept: FAMILY MEDICINE CLINIC | Age: 66
End: 2021-05-12

## 2021-05-12 VITALS
RESPIRATION RATE: 16 BRPM | HEART RATE: 80 BPM | DIASTOLIC BLOOD PRESSURE: 82 MMHG | SYSTOLIC BLOOD PRESSURE: 130 MMHG | TEMPERATURE: 97.6 F

## 2021-05-12 VITALS
WEIGHT: 244.25 LBS | HEIGHT: 68 IN | RESPIRATION RATE: 16 BRPM | DIASTOLIC BLOOD PRESSURE: 78 MMHG | TEMPERATURE: 96.7 F | HEART RATE: 82 BPM | BODY MASS INDEX: 37.02 KG/M2 | SYSTOLIC BLOOD PRESSURE: 132 MMHG

## 2021-05-12 DIAGNOSIS — J30.81 CAT ALLERGIES: ICD-10-CM

## 2021-05-12 DIAGNOSIS — Z91.09 MITE ALLERGY: ICD-10-CM

## 2021-05-12 DIAGNOSIS — J30.89 ALLERGY TO DUST: ICD-10-CM

## 2021-05-12 DIAGNOSIS — Z91.048 ALLERGY TO MOLD: ICD-10-CM

## 2021-05-12 DIAGNOSIS — Z51.6 ENCOUNTER FOR DESENSITIZATION TO ALLERGENS: Primary | ICD-10-CM

## 2021-05-12 DIAGNOSIS — J30.1 ALLERGY TO TREES: ICD-10-CM

## 2021-05-12 DIAGNOSIS — Z91.09 POLLEN ALLERGIES: ICD-10-CM

## 2021-05-12 DIAGNOSIS — J30.81 ALLERGY TO DOG DANDER: ICD-10-CM

## 2021-05-12 DIAGNOSIS — M54.2 ACUTE NECK PAIN: Primary | ICD-10-CM

## 2021-05-12 PROCEDURE — 1036F TOBACCO NON-USER: CPT | Performed by: FAMILY MEDICINE

## 2021-05-12 PROCEDURE — G8427 DOCREV CUR MEDS BY ELIG CLIN: HCPCS | Performed by: FAMILY MEDICINE

## 2021-05-12 PROCEDURE — 3017F COLORECTAL CA SCREEN DOC REV: CPT | Performed by: FAMILY MEDICINE

## 2021-05-12 PROCEDURE — 95117 IMMUNOTHERAPY INJECTIONS: CPT | Performed by: NURSE PRACTITIONER

## 2021-05-12 PROCEDURE — 99213 OFFICE O/P EST LOW 20 MIN: CPT | Performed by: FAMILY MEDICINE

## 2021-05-12 PROCEDURE — 1123F ACP DISCUSS/DSCN MKR DOCD: CPT | Performed by: FAMILY MEDICINE

## 2021-05-12 PROCEDURE — 4040F PNEUMOC VAC/ADMIN/RCVD: CPT | Performed by: FAMILY MEDICINE

## 2021-05-12 PROCEDURE — G8417 CALC BMI ABV UP PARAM F/U: HCPCS | Performed by: FAMILY MEDICINE

## 2021-05-12 RX ORDER — PREDNISONE 10 MG/1
TABLET ORAL
Qty: 16 TABLET | Refills: 0 | Status: SHIPPED | OUTPATIENT
Start: 2021-05-12 | End: 2021-06-14

## 2021-05-12 ASSESSMENT — ENCOUNTER SYMPTOMS
CHEST TIGHTNESS: 0
EYES NEGATIVE: 1
COUGH: 0
SHORTNESS OF BREATH: 0
ABDOMINAL PAIN: 0
NAUSEA: 0
CONSTIPATION: 0
VOMITING: 0
DIARRHEA: 0
BLOOD IN STOOL: 0

## 2021-05-12 NOTE — PROGRESS NOTES
Date: 2021  Here with his wife  Filiberto Garduno is a 74 y.o. male who presents today for:  Chief Complaint   Patient presents with    Neck Pain     10 days       HPI:     Neck Pain   This is a new problem. Episode onset: 10 days ago. The problem occurs constantly. The problem has been unchanged. The pain is associated with nothing. The pain is present in the left side. The quality of the pain is described as cramping and stabbing. The pain is at a severity of 7/10. The pain is moderate. The symptoms are aggravated by twisting and position. The pain is same all the time. Pertinent negatives include no chest pain, fever, headaches or weakness. He has tried heat, ice, bed rest and muscle relaxants (BenGay, Icy Hot, Diclofenac cream. He is taking Baclofen at night) for the symptoms. The treatment provided mild relief. has a current medication list which includes the following prescription(s): prednisone, symbicort, sertraline, ropinirole, sitagliptin, glimepiride, clopidogrel, losartan, one touch ultra 2, buspirone, metformin, fluticasone, montelukast, ventolin hfa, atorvastatin, one touch ultra test, pantoprazole, ranolazine, hydroxyzine, hydrochlorothiazide, epinephrine, onetouch delica lancets fine, acapella, baclofen, trazodone, hydrocodone-acetaminophen, fish oil, and sildenafil.     No Known Allergies    Social History     Tobacco Use    Smoking status: Former Smoker     Packs/day: 2.00     Years: 42.00     Pack years: 84.00     Types: Cigarettes     Start date: 1971     Quit date: 3/3/2017     Years since quittin.1    Smokeless tobacco: Never Used    Tobacco comment: pts uses just nicotine vap   Substance Use Topics    Alcohol use: No     Alcohol/week: 0.0 standard drinks    Drug use: No       Past Medical History:   Diagnosis Date    Acid reflux     Arthritis     Asthma     Chronic back pain     Class 2 severe obesity due to excess calories with serious comorbidity and body mass index (BMI) of 37.0 to 37.9 in adult [facility]) 8/13/2018    Colon polyps 11/06    COPD (chronic obstructive pulmonary disease) (HCC)     Depression     panic attacks    Diverticulosis     HTN (hypertension)     Hyperlipidemia     Kidney disorder     Panic attack     Pneumonia     Rash     Recurrent upper respiratory infection (URI)     Sleep apnea     Thumb amputation status 3/13/14    left tip of thumb amputated    Thyroid disease     Type II or unspecified type diabetes mellitus without mention of complication, not stated as uncontrolled        Past Surgical History:   Procedure Laterality Date    BACK SURGERY  2002    BACK SURGERY  08/11/2017    BICEPS TENDON REPAIR Right 08/16/2017    BRONCHOSCOPY      BRONCHOSCOPY N/A 4/5/2019    BRONCHOSCOPY performed by Mica Groves MD at [address] [facility]  4/5/2019    BRONCHOSCOPY ALVEOLAR LAVAGE performed by Mica Groves MD at [facility]  07/02 08/05    CARPAL TUNNEL RELEASE  2011    right hand    CHOLECYSTECTOMY  yrs ago    COLONOSCOPY  11/06 02/12 1/14    CORONARY ANGIOPLASTY WITH STENT PLACEMENT  02/2020    ENDOSCOPY, COLON, DIAGNOSTIC      EYE SURGERY      foreign body removal    HERNIA REPAIR  2004    Dr Rey Hill  2008?  LARYNGOSCOPY  04/28/2016    Laryngoscopy Micro with Biopsy by Dr Valdez [facility]  01/07    [facility]    ROTATOR CUFF REPAIR Left 5-20-15    SKIN BIOPSY      TONSILLECTOMY  1985    UPPER GASTROINTESTINAL ENDOSCOPY  1997    VEIN SURGERY Left September 2014    Dr. Antonieta Anna       Family History   Problem Relation Age of Onset    Cancer Mother     Breast Cancer Mother     Stroke Mother     Heart Disease Brother     Diabetes Brother     High Blood Pressure Brother     High Cholesterol Brother      Subjective:     Review of Systems   Constitutional: Negative for activity change, appetite change, diaphoresis and fever. HENT: Negative. Eyes: Negative. Respiratory: Negative for cough, chest tightness and shortness of breath. Cardiovascular: Negative for chest pain, palpitations and leg swelling. Gastrointestinal: Negative for abdominal pain, blood in stool, constipation, diarrhea, nausea and vomiting. Genitourinary: Negative. Musculoskeletal: Positive for neck pain. Skin: Negative. Negative for rash. Neurological: Negative. Negative for dizziness, syncope, weakness, light-headedness and headaches. Psychiatric/Behavioral: Negative.        :   /78 (Site: Right Upper Arm, Position: Sitting, Cuff Size: Large Adult)   Pulse 82   Temp 96.7 °F (35.9 °C) (Skin)   Resp 16   Ht 5' 8\" (1.727 m)   Wt 244 lb 4 oz (110.8 kg)   BMI 37.14 kg/m²   Wt Readings from Last 3 Encounters:   05/12/21 244 lb 4 oz (110.8 kg)   04/28/21 242 lb 8 oz (110 kg)   02/24/21 249 lb 6 oz (113.1 kg)     Physical Exam  Vitals signs and nursing note reviewed. Constitutional:       General: He is not in acute distress. Appearance: He is well-developed. He is not diaphoretic. HENT:      Head: Normocephalic and atraumatic. Eyes:      General: No scleral icterus. Right eye: No discharge. Left eye: No discharge. Conjunctiva/sclera: Conjunctivae normal.      Pupils: Pupils are equal, round, and reactive to light. Neck:      Musculoskeletal: Neck supple. Decreased range of motion. No neck rigidity. Thyroid: No thyromegaly. Vascular: No JVD. Comments: Tenderness to palpation. Cardiovascular:      Rate and Rhythm: Normal rate and regular rhythm. Heart sounds: Normal heart sounds. No murmur. Pulmonary:      Effort: Pulmonary effort is normal. No respiratory distress. Breath sounds: Normal breath sounds. No wheezing, rhonchi or rales. Abdominal:      General: Bowel sounds are normal. There is no distension. Palpations: Abdomen is soft. There is no mass.       Tenderness: There is no abdominal tenderness. There is no guarding or rebound. Musculoskeletal:      Cervical back: He exhibits decreased range of motion, tenderness, pain and spasm. He exhibits no swelling, no edema, no deformity and no laceration. Lymphadenopathy:      Cervical: No cervical adenopathy. Skin:     General: Skin is warm and dry. Findings: No rash. Neurological:      Mental Status: He is alert and oriented to person, place, and time. Psychiatric:         Behavior: Behavior normal.       :       Diagnosis Orders   1. Acute neck pain  predniSONE (DELTASONE) 10 MG tablet       :      Requested Prescriptions     Signed Prescriptions Disp Refills    predniSONE (DELTASONE) 10 MG tablet 16 tablet 0     Si tablets 2 times a day for 2 days, then 1 Tablet 2 times a day for 2 days, then 1 tablet daily till gone     Current Outpatient Medications   Medication Sig Dispense Refill    predniSONE (DELTASONE) 10 MG tablet 2 tablets 2 times a day for 2 days, then 1 Tablet 2 times a day for 2 days, then 1 tablet daily till gone 16 tablet 0    SYMBICORT 160-4.5 MCG/ACT AERO INHALE 2 PUFFS INTO THE LUNGS TWICE DAILY.  RINSE MOUTH AFTER USE ITS USE 1 Inhaler 11    sertraline (ZOLOFT) 100 MG tablet TAKE 1 TABLET BY MOUTH TWICE DAILY 180 tablet 1    rOPINIRole (REQUIP) 1 MG tablet TAKE 1 TABLET BY MOUTH THREE TIMES DAILY 270 tablet 1    SITagliptin (JANUVIA) 50 MG tablet Take 1 tablet by mouth daily 90 tablet 1    glimepiride (AMARYL) 4 MG tablet TAKE 1 TABLET BY MOUTH TWICE DAILY 180 tablet 1    clopidogrel (PLAVIX) 75 MG tablet Take 75 mg by mouth daily      losartan (COZAAR) 100 MG tablet Take 1 tablet by mouth daily 90 tablet 1    Blood Glucose Monitoring Suppl (ONE TOUCH ULTRA 2) w/Device KIT 1 kit by Does not apply route daily 1 kit 0    busPIRone (BUSPAR) 10 MG tablet Take 1 tablet by mouth 3 times daily 270 tablet 1    metFORMIN (GLUCOPHAGE) 500 MG tablet TAKE 2 TABLETS BY MOUTH TWICE DAILY WITH MEALS 360 tablet 1    fluticasone (FLONASE) 50 MCG/ACT nasal spray SHAKE LIQUID AND USE 1 SPRAY IN EACH NOSTRIL DAILY 16 g 11    montelukast (SINGULAIR) 10 MG tablet Take 1 tablet by mouth nightly 30 tablet 11    VENTOLIN  (90 Base) MCG/ACT inhaler Inhale 2 puffs into the lungs every 6 hours as needed for Wheezing 1 Inhaler 8    atorvastatin (LIPITOR) 20 MG tablet       blood glucose test strips (ONE TOUCH ULTRA TEST) strip CHECK BLOOD SUGAR DAILY 100 strip 0    pantoprazole (PROTONIX) 40 MG tablet Take 40 mg by mouth 2 times daily       ranolazine (RANEXA) 500 MG extended release tablet Take 500 mg by mouth 2 times daily      hydrOXYzine (ATARAX) 10 MG tablet Take 10 mg by mouth nightly      hydrochlorothiazide (HYDRODIURIL) 12.5 MG tablet TK 1 T PO QD IN THE MORNING  3    EPINEPHrine (EPIPEN 2-HUBER) 0.3 MG/0.3ML SOAJ injection Inject one pen as directed STAT for allergic reaction, may disp generic NDC 20683-438-16 6 each 99    ONETOUCH DELICA LANCETS FINE MISC Check blood sugar twice daily Dx: E11.9 200 each 1    Misc. Devices (ACAPELLA) MISC 1 Device by Does not apply route 4 times daily 1 each 0    baclofen (LIORESAL) 10 MG tablet take 1 tablet by mouth twice a day  0    traZODone (DESYREL) 150 MG tablet take 1/2 to 1 tablet by mouth once daily at bedtime if needed for pain SPASM, OR SLEEP  0    HYDROcodone-acetaminophen (NORCO) 5-325 MG per tablet Take 1 tablet by mouth every 6 hours as needed for Pain      Omega-3 Fatty Acids (FISH OIL) 1000 MG CAPS Take 1,000 mg by mouth daily.  sildenafil (VIAGRA) 100 MG tablet Take 1 tablet by mouth as needed. 8 tablet 5     No current facility-administered medications for this visit. No orders of the defined types were placed in this encounter. Continue current medications. He has muscle relaxants at home, advised to take them . Return in about 3 months (around 8/12/2021), or if symptoms worsen or fail to improve.      Discussed use, benefit, and side effects of prescribed medications. All patient questions answered. Pt voiced understanding. Instructed to continue current medications,diet and exercise. Patient agreed with treatment plan.

## 2021-05-12 NOTE — PROGRESS NOTES
After consent obtained/verified, allergy injection given in back of R/L arm(s). VIAL COLOR OF ALL VIALS TODAY IS *MAINTENANCE VIALS    ALLERGY INJECTION FROM VIAL A GIVEN LT UPPER ARM IN THE AMOUNT  ML    ALLERGY INJECTION FROM VIAL B GIVEN RT UPPER ARM IN THE AMOUNT OF 0.35 ML      Documentation of vial injection specific to arm(s) noted on Allergy Immunotherapy Administration Form. Patient waited 30 minutes for observation. Patient tolerated well without adverse reaction. SHOT REACTION TREATMENT INSTRUCTIONS    During the 30 minute wait after an allergy injection the following symptoms should be reported:    Itching other than at the injection site  Hives or swelling other than at the injection site  Redness other than at the injection site  Difficulty breathing  Chest tightness  Difficulty swallowing  Throat tightness    If these symptoms occur, NOTIFY PROVIDER and the following treatment should be administered:    1. Epinephrine/Auvi Q 1:1000 IM - 0.3 ml if > 66 lbs or more, 0.15 ml if 33 - 63 lbs, or 0.1 ml if <33 lbs     2. Diphenhydramine - give all intramuscular:     2 to <6 years (off-label use): 6.25 mg,    6 to <12 years: 12.5 to 25 mg;    ?12 years: 25-50 mg.    3.  Famotidine:  Adults 40 mg oral    Adolescents age 12 years and >88 lbs: 40 mg    Children and Adolescents ? 12years of age: Initial: 0.25 mg/kg/dose  every 12 hours (maximum daily dose: 40 mg/day)    Epi/Auvi Q dose may me repeated in 5-15 minutes if adequate resolution of symptoms does not occur    Patient should be observed for at least one hour after final Epi/Auvi Q dose and must be seen by provider. Patients cannot drive themselves if they have received diphenhydramine.

## 2021-05-12 NOTE — TELEPHONE ENCOUNTER
Spoke to him today at [facility] and he states that he does not qualify for patient assistance. Also he states that his sugars are running good since last seen.

## 2021-05-12 NOTE — TELEPHONE ENCOUNTER
Pt was in office 5/12/21 and discussed the use of pt assistance. Pt stated he has tried to get pt assistance before and has not qualified.

## 2021-05-19 ENCOUNTER — NURSE ONLY (OUTPATIENT)
Dept: ALLERGY | Age: 66
End: 2021-05-19
Payer: MEDICARE

## 2021-05-19 VITALS
DIASTOLIC BLOOD PRESSURE: 82 MMHG | SYSTOLIC BLOOD PRESSURE: 136 MMHG | RESPIRATION RATE: 16 BRPM | HEART RATE: 72 BPM | TEMPERATURE: 98.1 F

## 2021-05-19 DIAGNOSIS — Z91.09 MITE ALLERGY: ICD-10-CM

## 2021-05-19 DIAGNOSIS — Z91.048 ALLERGY TO MOLD: Primary | ICD-10-CM

## 2021-05-19 DIAGNOSIS — J30.81 CAT ALLERGIES: ICD-10-CM

## 2021-05-19 DIAGNOSIS — Z51.6 ENCOUNTER FOR DESENSITIZATION TO ALLERGENS: ICD-10-CM

## 2021-05-19 PROCEDURE — 95117 IMMUNOTHERAPY INJECTIONS: CPT | Performed by: NURSE PRACTITIONER

## 2021-05-19 RX ORDER — BUSPIRONE HYDROCHLORIDE 10 MG/1
TABLET ORAL
Qty: 270 TABLET | Refills: 1 | Status: SHIPPED | OUTPATIENT
Start: 2021-05-19 | End: 2021-11-09

## 2021-05-19 NOTE — PROGRESS NOTES
After consent obtained/verified, allergy injection given in back of R/L arm(s). VIAL COLOR OF ALL VIALS TODAY IS Red    ALLERGY INJECTION FROM VIAL A GIVEN Right  UPPER ARM IN THE AMOUNT OF 0.40 ML    ALLERGY INJECTION FROM VIAL B GIVEN Left UPPER ARM IN THE AMOUNT OF 0.40 ML      Documentation of vial injection specific to arm(s) noted on Allergy Immunotherapy Administration Form. Patient declined to wait 30 minutes for observation. SHOT REACTION TREATMENT INSTRUCTIONS    During the 30 minute wait after an allergy injection the following symptoms should be reported:    Itching other than at the injection site  Hives or swelling other than at the injection site  Redness other than at the injection site  Difficulty breathing  Chest tightness  Difficulty swallowing  Throat tightness    If these symptoms occur, NOTIFY PROVIDER and the following treatment should be administered:    1. Epinephrine/Auvi Q 1:1000 IM - 0.3 ml if > 66 lbs or more, 0.15 ml if 33 - 63 lbs, or 0.1 ml if <33 lbs     2. Diphenhydramine - give all intramuscular:     2 to <6 years (off-label use): 6.25 mg,    6 to <12 years: 12.5 to 25 mg;    ?12 years: 25-50 mg.    3.  Famotidine:  Adults 40 mg oral    Adolescents age 12 years and >88 lbs: 40 mg    Children and Adolescents ? 12years of age: Initial: 0.25 mg/kg/dose  every 12 hours (maximum daily dose: 40 mg/day)    Epi/Auvi Q dose may me repeated in 5-15 minutes if adequate resolution of symptoms does not occur    Patient should be observed for at least one hour after final Epi/Auvi Q dose and must be seen by provider. Patients cannot drive themselves if they have received diphenhydramine.

## 2021-05-26 ENCOUNTER — NURSE ONLY (OUTPATIENT)
Dept: ALLERGY | Age: 66
End: 2021-05-26
Payer: MEDICARE

## 2021-05-26 VITALS
SYSTOLIC BLOOD PRESSURE: 128 MMHG | TEMPERATURE: 98.6 F | DIASTOLIC BLOOD PRESSURE: 78 MMHG | RESPIRATION RATE: 16 BRPM | HEART RATE: 72 BPM

## 2021-05-26 DIAGNOSIS — Z51.6 ENCOUNTER FOR DESENSITIZATION TO ALLERGENS: ICD-10-CM

## 2021-05-26 DIAGNOSIS — Z91.048 ALLERGY TO MOLD: Primary | ICD-10-CM

## 2021-05-26 DIAGNOSIS — J30.81 CAT ALLERGIES: ICD-10-CM

## 2021-05-26 DIAGNOSIS — Z91.09 MITE ALLERGY: ICD-10-CM

## 2021-05-26 PROCEDURE — 95117 IMMUNOTHERAPY INJECTIONS: CPT | Performed by: NURSE PRACTITIONER

## 2021-05-26 NOTE — PROGRESS NOTES
After consent obtained/verified, allergy injection given in back of R/L arm(s). VIAL COLOR OF ALL VIALS TODAY IS Red Maintenance    ALLERGY INJECTION FROM VIAL A GIVEN Left  UPPER ARM IN THE AMOUNT OF 0.45 ML    ALLERGY INJECTION FROM VIAL B GIVEN Right UPPER ARM IN THE AMOUNT OF 0.45 ML    Documentation of vial injection specific to arm(s) noted on Allergy Immunotherapy Administration Form. Patient waited 30 minutes for observation. Patient tolerated well without adverse reaction. SHOT REACTION TREATMENT INSTRUCTIONS    During the 30 minute wait after an allergy injection the following symptoms should be reported:    Itching other than at the injection site  Hives or swelling other than at the injection site  Redness other than at the injection site  Difficulty breathing  Chest tightness  Difficulty swallowing  Throat tightness    If these symptoms occur, NOTIFY PROVIDER and the following treatment should be administered:    1. Epinephrine/Auvi Q 1:1000 IM - 0.3 ml if > 66 lbs or more, 0.15 ml if 33 - 63 lbs, or 0.1 ml if <33 lbs     2. Diphenhydramine - give all intramuscular:     2 to <6 years (off-label use): 6.25 mg,    6 to <12 years: 12.5 to 25 mg;    ?12 years: 25-50 mg.    3.  Famotidine:  Adults 40 mg oral    Adolescents age 12 years and >88 lbs: 40 mg    Children and Adolescents ? 12years of age: Initial: 0.25 mg/kg/dose  every 12 hours (maximum daily dose: 40 mg/day)    Epi/Auvi Q dose may me repeated in 5-15 minutes if adequate resolution of symptoms does not occur    Patient should be observed for at least one hour after final Epi/Auvi Q dose and must be seen by provider. Patients cannot drive themselves if they have received diphenhydramine.

## 2021-06-02 ENCOUNTER — NURSE ONLY (OUTPATIENT)
Dept: ALLERGY | Age: 66
End: 2021-06-02
Payer: MEDICARE

## 2021-06-02 VITALS
DIASTOLIC BLOOD PRESSURE: 84 MMHG | RESPIRATION RATE: 14 BRPM | HEART RATE: 70 BPM | SYSTOLIC BLOOD PRESSURE: 128 MMHG | TEMPERATURE: 97.7 F

## 2021-06-02 DIAGNOSIS — Z91.048 ALLERGY TO MOLD: ICD-10-CM

## 2021-06-02 DIAGNOSIS — J30.89 ALLERGY TO DUST: ICD-10-CM

## 2021-06-02 DIAGNOSIS — J30.81 ALLERGY TO DOG DANDER: ICD-10-CM

## 2021-06-02 DIAGNOSIS — Z91.09 POLLEN ALLERGIES: ICD-10-CM

## 2021-06-02 DIAGNOSIS — J30.81 CAT ALLERGIES: ICD-10-CM

## 2021-06-02 DIAGNOSIS — Z91.09 MITE ALLERGY: ICD-10-CM

## 2021-06-02 DIAGNOSIS — Z51.6 ENCOUNTER FOR DESENSITIZATION TO ALLERGENS: Primary | ICD-10-CM

## 2021-06-02 PROCEDURE — 95117 IMMUNOTHERAPY INJECTIONS: CPT | Performed by: NURSE PRACTITIONER

## 2021-06-02 NOTE — PROGRESS NOTES
After consent obtained/verified, allergy injection given in back of R/L arm(s). VIAL COLOR OF ALL VIALS TODAY IS Maintenance vials    ALLERGY INJECTION FROM VIAL A GIVEN RT  UPPER ARM IN THE AMOUNT OF 0.50 ML    ALLERGY INJECTION FROM VIAL B GIVEN LT UPPER ARM IN THE AMOUNT OF 0.50 ML        Documentation of vial injection specific to arm(s) noted on Allergy Immunotherapy Administration Form. Patient waited 30 minutes for observation. Patient tolerated well without adverse reaction. SHOT REACTION TREATMENT INSTRUCTIONS    During the 30 minute wait after an allergy injection the following symptoms should be reported:    Itching other than at the injection site  Hives or swelling other than at the injection site  Redness other than at the injection site  Difficulty breathing  Chest tightness  Difficulty swallowing  Throat tightness    If these symptoms occur, NOTIFY PROVIDER and the following treatment should be administered:    1. Epinephrine/Auvi Q 1:1000 IM - 0.3 ml if > 66 lbs or more, 0.15 ml if 33 - 63 lbs, or 0.1 ml if <33 lbs     2. Diphenhydramine - give all intramuscular:     2 to <6 years (off-label use): 6.25 mg,    6 to <12 years: 12.5 to 25 mg;    ?12 years: 25-50 mg.    3.  Famotidine:  Adults 40 mg oral    Adolescents age 12 years and >88 lbs: 40 mg    Children and Adolescents ? 12years of age: Initial: 0.25 mg/kg/dose  every 12 hours (maximum daily dose: 40 mg/day)    Epi/Auvi Q dose may me repeated in 5-15 minutes if adequate resolution of symptoms does not occur    Patient should be observed for at least one hour after final Epi/Auvi Q dose and must be seen by provider. Patients cannot drive themselves if they have received diphenhydramine.

## 2021-06-07 ENCOUNTER — HOSPITAL ENCOUNTER (EMERGENCY)
Age: 66
Discharge: HOME | End: 2021-06-07
Payer: MEDICARE

## 2021-06-07 VITALS
RESPIRATION RATE: 19 BRPM | HEART RATE: 80 BPM | OXYGEN SATURATION: 92 % | SYSTOLIC BLOOD PRESSURE: 132 MMHG | DIASTOLIC BLOOD PRESSURE: 77 MMHG | TEMPERATURE: 97.8 F | BODY MASS INDEX: 36.64 KG/M2 | WEIGHT: 241 LBS

## 2021-06-07 DIAGNOSIS — B02.9 HERPES ZOSTER WITHOUT COMPLICATION: ICD-10-CM

## 2021-06-07 DIAGNOSIS — R21 RASH AND OTHER NONSPECIFIC SKIN ERUPTION: Primary | ICD-10-CM

## 2021-06-07 LAB
ANION GAP SERPL CALCULATED.3IONS-SCNC: 9 MEQ/L (ref 8–16)
BASOPHILS # BLD: 0.2 %
BASOPHILS ABSOLUTE: 0 THOU/MM3 (ref 0–0.1)
BUN BLDV-MCNC: 16 MG/DL (ref 7–22)
C-REACTIVE PROTEIN: < 0.3 MG/DL (ref 0–1)
CALCIUM SERPL-MCNC: 8.8 MG/DL (ref 8.5–10.5)
CHLORIDE BLD-SCNC: 102 MEQ/L (ref 98–111)
CO2: 29 MEQ/L (ref 23–33)
CREAT SERPL-MCNC: 0.9 MG/DL (ref 0.4–1.2)
EOSINOPHIL # BLD: 2.3 %
EOSINOPHILS ABSOLUTE: 0.1 THOU/MM3 (ref 0–0.4)
ERYTHROCYTE [DISTWIDTH] IN BLOOD BY AUTOMATED COUNT: 14.3 % (ref 11.5–14.5)
ERYTHROCYTE [DISTWIDTH] IN BLOOD BY AUTOMATED COUNT: 49.3 FL (ref 35–45)
GFR SERPL CREATININE-BSD FRML MDRD: 84 ML/MIN/1.73M2
GLUCOSE BLD-MCNC: 238 MG/DL (ref 70–108)
HCT VFR BLD CALC: 41.9 % (ref 42–52)
HEMOGLOBIN: 14 GM/DL (ref 14–18)
IMMATURE GRANS (ABS): 0.04 THOU/MM3 (ref 0–0.07)
IMMATURE GRANULOCYTES: 0.6 %
LYMPHOCYTES # BLD: 12 %
LYMPHOCYTES ABSOLUTE: 0.7 THOU/MM3 (ref 1–4.8)
MCH RBC QN AUTO: 31.4 PG (ref 26–33)
MCHC RBC AUTO-ENTMCNC: 33.4 GM/DL (ref 32.2–35.5)
MCV RBC AUTO: 93.9 FL (ref 80–94)
MONOCYTES # BLD: 7.9 %
MONOCYTES ABSOLUTE: 0.5 THOU/MM3 (ref 0.4–1.3)
NUCLEATED RED BLOOD CELLS: 0 /100 WBC
OSMOLALITY CALCULATION: 288.3 MOSMOL/KG (ref 275–300)
PLATELET # BLD: 145 THOU/MM3 (ref 130–400)
PMV BLD AUTO: 11 FL (ref 9.4–12.4)
POTASSIUM REFLEX MAGNESIUM: 4.3 MEQ/L (ref 3.5–5.2)
RBC # BLD: 4.46 MILL/MM3 (ref 4.7–6.1)
SEG NEUTROPHILS: 77 %
SEGMENTED NEUTROPHILS ABSOLUTE COUNT: 4.8 THOU/MM3 (ref 1.8–7.7)
SODIUM BLD-SCNC: 140 MEQ/L (ref 135–145)
WBC # BLD: 6.2 THOU/MM3 (ref 4.8–10.8)

## 2021-06-07 PROCEDURE — 6370000000 HC RX 637 (ALT 250 FOR IP): Performed by: NURSE PRACTITIONER

## 2021-06-07 PROCEDURE — 80048 BASIC METABOLIC PNL TOTAL CA: CPT

## 2021-06-07 PROCEDURE — 36415 COLL VENOUS BLD VENIPUNCTURE: CPT

## 2021-06-07 PROCEDURE — 86140 C-REACTIVE PROTEIN: CPT

## 2021-06-07 PROCEDURE — 85025 COMPLETE CBC W/AUTO DIFF WBC: CPT

## 2021-06-07 PROCEDURE — 99283 EMERGENCY DEPT VISIT LOW MDM: CPT

## 2021-06-07 RX ORDER — VALACYCLOVIR HYDROCHLORIDE 1 G/1
1000 TABLET, FILM COATED ORAL 3 TIMES DAILY
Qty: 21 TABLET | Refills: 0 | Status: SHIPPED | OUTPATIENT
Start: 2021-06-07 | End: 2021-06-14

## 2021-06-07 RX ORDER — ACYCLOVIR 800 MG/1
800 TABLET ORAL ONCE
Status: COMPLETED | OUTPATIENT
Start: 2021-06-07 | End: 2021-06-07

## 2021-06-07 RX ADMIN — ACYCLOVIR 800 MG: 800 TABLET ORAL at 10:18

## 2021-06-07 ASSESSMENT — PAIN DESCRIPTION - DESCRIPTORS: DESCRIPTORS: SHOOTING

## 2021-06-07 ASSESSMENT — PAIN DESCRIPTION - ONSET: ONSET: GRADUAL

## 2021-06-07 ASSESSMENT — PAIN DESCRIPTION - ORIENTATION: ORIENTATION: RIGHT

## 2021-06-07 ASSESSMENT — PAIN DESCRIPTION - PAIN TYPE: TYPE: ACUTE PAIN

## 2021-06-07 ASSESSMENT — PAIN DESCRIPTION - LOCATION: LOCATION: ABDOMEN

## 2021-06-07 ASSESSMENT — PAIN DESCRIPTION - PROGRESSION: CLINICAL_PROGRESSION: GRADUALLY WORSENING

## 2021-06-07 ASSESSMENT — ENCOUNTER SYMPTOMS
SHORTNESS OF BREATH: 1
BACK PAIN: 1
ABDOMINAL PAIN: 1

## 2021-06-07 ASSESSMENT — PAIN DESCRIPTION - FREQUENCY: FREQUENCY: CONTINUOUS

## 2021-06-07 ASSESSMENT — PAIN SCALES - GENERAL: PAINLEVEL_OUTOF10: 7

## 2021-06-07 NOTE — ED NOTES
Pt presents to the ER for abdominal pain going to his R side and a rash that started 2 days ago. Pt states that he has asthma and when it gets humid outside he has to wear 2L of O2 at home.      Sondra Tay  06/07/21 [id number]       Sondra Tay  06/07/21 [id number]

## 2021-06-07 NOTE — ED PROVIDER NOTES
[facility] [age] COMPLAINT       Chief Complaint   Patient presents with    Abdominal Pain    Rash       Nurses Notes reviewed and I agree except as noted in the HPI. HISTORY OF PRESENT ILLNESS    Lon E Claudell Dane is a 74 y.o. male who presents to the Emergency Department for the evaluation of rash and abdominal pain. Patient states that his abdominal pain is superficial, is dermal, believes that he has shingles. Has had rash for the past 2 weeks, states that he spoke with his PCP about this, was given dose of steroids without improvement. Has history of shingles and states that this feels similar to eruption that he had in his 25s. At that time he was given antivirals and symptoms resolved. He denies fever chills, denies headache, denies vision changes, denies neck pain. The HPI was provided by the patient. REVIEW OF SYSTEMS     Review of Systems   Constitutional: Negative for chills and fever. HENT: Negative for rhinorrhea and sore throat. Respiratory: Positive for shortness of breath. Negative for wheezing. Cardiovascular: Negative for chest pain and palpitations. Gastrointestinal: Positive for abdominal pain. Negative for nausea and vomiting. Musculoskeletal: Positive for back pain. Negative for neck pain and neck stiffness. Skin: Positive for rash.        PAST MEDICAL HISTORY    has a past medical history of Acid reflux, Arthritis, Asthma, Chronic back pain, Class 2 severe obesity due to excess calories with serious comorbidity and body mass index (BMI) of 37.0 to 37.9 in [facility]), Colon polyps, COPD (chronic obstructive pulmonary disease) ([facility] [address] Depression, Diverticulosis, HTN (hypertension), Hyperlipidemia, Kidney disorder, Panic attack, Pneumonia, Rash, Recurrent upper respiratory infection (URI), Sleep apnea, Thumb amputation status, Thyroid disease, and Type II or unspecified type diabetes mellitus without mention tablet by mouth as needed. , Disp-8 tablet, R-5             ALLERGIES     has No Known Allergies. FAMILY HISTORY     He indicated that his mother is . He indicated that his father is . He indicated that his sister is alive. He indicated that his brother is alive. family history includes Breast Cancer in his mother; Cancer in his mother; Diabetes in his brother; Heart Disease in his brother; High Blood Pressure in his brother; High Cholesterol in his brother; Stroke in his mother. SOCIAL HISTORY      reports that he quit smoking about 4 years ago. His smoking use included cigarettes. He started smoking about 50 years ago. He has a 84.00 pack-year smoking history. He has never used smokeless tobacco. He reports that he does not drink alcohol and does not use drugs. PHYSICAL EXAM     INITIAL VITALS:  weight is 241 lb (109.3 kg). His oral temperature is 97.8 °F (36.6 °C). His blood pressure is 132/77 and his pulse is 80. His respiration is 19 and oxygen saturation is 92%. Physical Exam  Vitals and nursing note reviewed. Constitutional:       General: He is awake. He is not in acute distress. Appearance: Normal appearance. He is well-developed. He is obese. He is not ill-appearing, toxic-appearing or diaphoretic. HENT:      Head: Normocephalic and atraumatic. Nose: Nose normal.      Mouth/Throat:      Mouth: Mucous membranes are moist.      Pharynx: Oropharynx is clear. Eyes:      Extraocular Movements: Extraocular movements intact. Pupils: Pupils are equal, round, and reactive to light. Cardiovascular:      Rate and Rhythm: Normal rate and regular rhythm. No extrasystoles are present. Pulses: Normal pulses. Heart sounds: Normal heart sounds, S1 normal and S2 normal. Heart sounds not distant. No murmur heard. No friction rub. No gallop.     Pulmonary:      Effort: Pulmonary effort is normal. No tachypnea, bradypnea, accessory muscle usage, prolonged expiration, respiratory distress or retractions. Breath sounds: Normal breath sounds. No stridor. No wheezing, rhonchi or rales. Chest:      Chest wall: No tenderness. Abdominal:      General: Abdomen is flat. Bowel sounds are normal. There is no distension. Palpations: Abdomen is soft. There is no shifting dullness, hepatomegaly, splenomegaly or mass. Tenderness: There is no abdominal tenderness. Hernia: No hernia is present. Musculoskeletal:         General: No swelling, tenderness, deformity or signs of injury. Normal range of motion. Cervical back: Normal range of motion and neck supple. No rigidity. No muscular tenderness. Right lower leg: No edema. Left lower leg: No edema. Skin:     General: Skin is warm and dry. Capillary Refill: Capillary refill takes less than 2 seconds. Coloration: Skin is not jaundiced or pale. Findings: Rash present. Rash is crusting and scaling. Neurological:      General: No focal deficit present. Mental Status: He is alert and oriented to person, place, and time. Mental status is at baseline. GCS: GCS eye subscore is 4. GCS verbal subscore is 5. GCS motor subscore is 6. Cranial Nerves: Cranial nerves are intact. Sensory: Sensation is intact. Psychiatric:         Mood and Affect: Mood normal.         Behavior: Behavior normal. Behavior is cooperative.           DIFFERENTIAL DIAGNOSIS:   Dermatitis, allergic reaction, shingles    DIAGNOSTIC RESULTS     EKG: All EKG's are interpreted by the Emergency Department Physician who either signs or Co-signs this chart in the absence of a cardiologist.    None    RADIOLOGY: non-plainfilm images(s) such as CT, Ultrasound and MRI are read by the radiologist.    No orders to display       LABS:     Labs Reviewed   CBC WITH AUTO DIFFERENTIAL - Abnormal; Notable for the following components:       Result Value    RBC 4.46 (*)     Hematocrit 41.9 (*)     RDW-SD 49.3 (*) Lymphocytes Absolute 0.7 (*)     All other components within normal limits   BASIC METABOLIC PANEL W/ REFLEX TO MG FOR LOW K - Abnormal; Notable for the following components:    Glucose 238 (*)     All other components within normal limits   GLOMERULAR FILTRATION RATE, ESTIMATED - Abnormal; Notable for the following components:    Est, Glom Filt Rate 84 (*)     All other components within normal limits   C-REACTIVE PROTEIN   ANION GAP   OSMOLALITY       EMERGENCY DEPARTMENT COURSE:   Vitals:    Vitals:    06/07/21 0830 06/07/21 0837 06/07/21 1017   BP: (!) 143/84  132/77   Pulse: 86  80   Resp: 18 22 19   Temp: 97.8 °F (36.6 °C)     TempSrc: Oral     SpO2: (!) 87% 90% 92%   Weight: 241 lb (109.3 kg)         8:47 AM EDT: The patient was seen and evaluated. MDM:  Patient seen and evaluated for painful rash across right side of his abdomen radiating around to his back. Rash does not cross midline, does not extend to his neck or face. He has been on steroids without improvement. Basic labs were ordered, no signs of infection, kidney function is within acceptable limits. He was given dose of acyclovir in the emergency department and 1 week course of valacyclovir. Recommended follow-up with PCP in 3 to 5 days for reevaluation of rash and to return to the emergency department for fever, chills, headaches, worsening rash. CRITICAL CARE:   None    CONSULTS:  None    PROCEDURES:  None    FINAL IMPRESSION      1. Rash and other nonspecific skin eruption    2.  Herpes zoster without complication          DISPOSITION/PLAN   Discharge    PATIENT REFERRED TO:  Luz Marina Noriega MD  [address]  [phone]    Schedule an appointment as soon as possible for a visit in 3 days  For reevaluation      DISCHARGE MEDICATIONS:  Discharge Medication List as of 6/7/2021  9:50 AM      START taking these medications    Details   valACYclovir (VALTREX) 1 g tablet Take 1 tablet by mouth 3 times daily

## 2021-06-08 ASSESSMENT — ENCOUNTER SYMPTOMS
VOMITING: 0
WHEEZING: 0
RHINORRHEA: 0
SORE THROAT: 0
NAUSEA: 0

## 2021-06-14 ENCOUNTER — OFFICE VISIT (OUTPATIENT)
Dept: FAMILY MEDICINE CLINIC | Age: 66
End: 2021-06-14

## 2021-06-14 VITALS
DIASTOLIC BLOOD PRESSURE: 54 MMHG | TEMPERATURE: 97.3 F | WEIGHT: 237.25 LBS | SYSTOLIC BLOOD PRESSURE: 114 MMHG | HEART RATE: 86 BPM | HEIGHT: 68 IN | BODY MASS INDEX: 35.96 KG/M2 | RESPIRATION RATE: 16 BRPM

## 2021-06-14 DIAGNOSIS — I10 ESSENTIAL HYPERTENSION: Primary | ICD-10-CM

## 2021-06-14 DIAGNOSIS — B02.9 HERPES ZOSTER WITHOUT COMPLICATION: ICD-10-CM

## 2021-06-14 DIAGNOSIS — B02.29 HZV (HERPES ZOSTER VIRUS) POST HERPETIC NEURALGIA: ICD-10-CM

## 2021-06-14 DIAGNOSIS — Z00.00 ROUTINE GENERAL MEDICAL EXAMINATION AT A HEALTH CARE FACILITY: ICD-10-CM

## 2021-06-14 PROCEDURE — G0439 PPPS, SUBSEQ VISIT: HCPCS | Performed by: FAMILY MEDICINE

## 2021-06-14 PROCEDURE — 99213 OFFICE O/P EST LOW 20 MIN: CPT | Performed by: FAMILY MEDICINE

## 2021-06-14 PROCEDURE — 4040F PNEUMOC VAC/ADMIN/RCVD: CPT | Performed by: FAMILY MEDICINE

## 2021-06-14 PROCEDURE — 3017F COLORECTAL CA SCREEN DOC REV: CPT | Performed by: FAMILY MEDICINE

## 2021-06-14 PROCEDURE — 1123F ACP DISCUSS/DSCN MKR DOCD: CPT | Performed by: FAMILY MEDICINE

## 2021-06-14 RX ORDER — GABAPENTIN 100 MG/1
100 CAPSULE ORAL 3 TIMES DAILY
Qty: 90 CAPSULE | Refills: 1 | Status: SHIPPED | OUTPATIENT
Start: 2021-06-14 | End: 2021-08-16

## 2021-06-14 SDOH — ECONOMIC STABILITY: FOOD INSECURITY: WITHIN THE PAST 12 MONTHS, YOU WORRIED THAT YOUR FOOD WOULD RUN OUT BEFORE YOU GOT MONEY TO BUY MORE.: NEVER TRUE

## 2021-06-14 SDOH — ECONOMIC STABILITY: FOOD INSECURITY: WITHIN THE PAST 12 MONTHS, THE FOOD YOU BOUGHT JUST DIDN'T LAST AND YOU DIDN'T HAVE MONEY TO GET MORE.: NEVER TRUE

## 2021-06-14 ASSESSMENT — PATIENT HEALTH QUESTIONNAIRE - PHQ9
SUM OF ALL RESPONSES TO PHQ9 QUESTIONS 1 & 2: 0
SUM OF ALL RESPONSES TO PHQ QUESTIONS 1-9: 0
2. FEELING DOWN, DEPRESSED OR HOPELESS: 0
SUM OF ALL RESPONSES TO PHQ QUESTIONS 1-9: 0
1. LITTLE INTEREST OR PLEASURE IN DOING THINGS: 0
SUM OF ALL RESPONSES TO PHQ QUESTIONS 1-9: 0

## 2021-06-14 ASSESSMENT — ENCOUNTER SYMPTOMS
NAUSEA: 0
BACK PAIN: 1
DIARRHEA: 0
ABDOMINAL PAIN: 0
EYES NEGATIVE: 1
SHORTNESS OF BREATH: 0
VOMITING: 0
COUGH: 0
CHEST TIGHTNESS: 0
CONSTIPATION: 0
BLOOD IN STOOL: 0

## 2021-06-14 ASSESSMENT — LIFESTYLE VARIABLES: HOW OFTEN DO YOU HAVE A DRINK CONTAINING ALCOHOL: 0

## 2021-06-14 ASSESSMENT — SOCIAL DETERMINANTS OF HEALTH (SDOH): HOW HARD IS IT FOR YOU TO PAY FOR THE VERY BASICS LIKE FOOD, HOUSING, MEDICAL CARE, AND HEATING?: NOT VERY HARD

## 2021-06-14 NOTE — PROGRESS NOTES
Medicare Annual Wellness Visit  Name: Regis Horner Date: 2021   MRN: [id number] Sex: Male   Age: 74 y.o. Ethnicity: Non-/Non    : 1955 Race: White      Andreas President is here for riskmethods and ED Follow-up    Here today with his wife. Screenings for behavioral, psychosocial and functional/safety risks, and cognitive dysfunction are all negative except as indicated below. These results, as well as other patient data from the [address] [facility], [place], operated by [facility] form, are documented in Flowsheets linked to this Encounter. No Known Allergies      Prior to Visit Medications    Medication Sig Taking? Authorizing Provider   gabapentin (NEURONTIN) 100 MG capsule Take 1 capsule by mouth 3 times daily for 61 days. Yes Yuniel Sanders MD   valACYclovir (VALTREX) 1 g tablet Take 1 tablet by mouth 3 times daily for 7 days Yes SANTOSH Gao - CNP   busPIRone (BUSPAR) 10 MG tablet TAKE 1 TABLET BY MOUTH THREE TIMES DAILY Yes Yuniel Sanders MD   metFORMIN (GLUCOPHAGE) 500 MG tablet TAKE 2 TABLETS BY MOUTH TWICE DAILY WITH MEALS Yes Yuniel Sanders MD   SYMBICORT 160-4.5 MCG/ACT AERO INHALE 2 PUFFS INTO THE LUNGS TWICE DAILY.  RINSE MOUTH AFTER USE ITS USE Yes Hamida Monday, MD   sertraline (ZOLOFT) 100 MG tablet TAKE 1 TABLET BY MOUTH TWICE DAILY Yes Yuniel Sanders MD   rOPINIRole (REQUIP) 1 MG tablet TAKE 1 TABLET BY MOUTH THREE TIMES DAILY Yes Yuniel Sanders MD   SITagliptin (JANUVIA) 50 MG tablet Take 1 tablet by mouth daily Yes Yuniel Sanders MD   glimepiride (AMARYL) 4 MG tablet TAKE 1 TABLET BY MOUTH TWICE DAILY Yes Yuniel Sanders MD   clopidogrel (PLAVIX) 75 MG tablet Take 75 mg by mouth daily Yes Historical Provider, MD   losartan (COZAAR) 100 MG tablet Take 1 tablet by mouth daily Yes Yuniel Sanders MD   Blood Glucose Monitoring Suppl (ONE TOUCH ULTRA 2) w/Device KIT 1 kit by Does not apply route daily Yes Yuniel Sanders MD   fluticasone (FLONASE) 50 MCG/ACT nasal spray SHAKE LIQUID AND USE 1 SPRAY IN EACH NOSTRIL DAILY Yes SANTOSH Calix CNP   montelukast (SINGULAIR) 10 MG tablet Take 1 tablet by mouth nightly Yes Jeanette Mallory MD   VENTOLIN  (90 Base) MCG/ACT inhaler Inhale 2 puffs into the lungs every 6 hours as needed for Wheezing Yes Jeanette Mallory MD   atorvastatin (LIPITOR) 20 MG tablet  Yes Historical Provider, MD   blood glucose test strips (ONE TOUCH ULTRA TEST) strip CHECK BLOOD SUGAR DAILY Yes Dana Tran MD   pantoprazole (PROTONIX) 40 MG tablet Take 40 mg by mouth 2 times daily  Yes Historical Provider, MD   ranolazine (RANEXA) 500 MG extended release tablet Take 500 mg by mouth 2 times daily Yes Historical Provider, MD   hydrOXYzine (ATARAX) 10 MG tablet Take 10 mg by mouth nightly Yes Historical Provider, MD   hydrochlorothiazide (HYDRODIURIL) 12.5 MG tablet TK 1 T PO QD IN THE MORNING Yes Historical Provider, MD   EPINEPHrine (EPIPEN 2-HUBER) 0.3 MG/0.3ML SOAJ injection Inject one pen as directed STAT for allergic reaction, may disp generic NDC 67859-744-49 Yes SANTOSH Calix CNP   ONETOUCH DELICA LANCETS FINE MISC Check blood sugar twice daily Dx: E11.9 Yes Dana Tran MD   Misc. Devices (ACAPELLA) MISC 1 Device by Does not apply route 4 times daily Yes SANTOSH You CNP   baclofen (LIORESAL) 10 MG tablet take 1 tablet by mouth twice a day Yes Historical Provider, MD   traZODone (DESYREL) 150 MG tablet take 1/2 to 1 tablet by mouth once daily at bedtime if needed for pain SPASM, OR SLEEP Yes Historical Provider, MD   HYDROcodone-acetaminophen (NORCO) 5-325 MG per tablet Take 1 tablet by mouth every 6 hours as needed for Pain Yes Historical Provider, MD   Omega-3 Fatty Acids (FISH OIL) 1000 MG CAPS Take 1,000 mg by mouth daily. Yes Historical Provider, MD   sildenafil (VIAGRA) 100 MG tablet Take 1 tablet by mouth as needed.  Yes Dana Tran MD         Past Medical History:   Diagnosis Date    lost any weight without trying in the past 3 months?: (!) Yes  Do you eat only one meal per day?: (!) Yes  Have you seen the dentist within the past year?: (!) No  Body mass index: (!) 36.07  Health Habits/Nutrition Interventions:  · Inadequate physical activity:  he states that he is active but no routine exercise  · Nutritional issues:  he eats one meal a day and some days he eats two  · Dental exam overdue:  he has dentures    Hearing/Vision:  No exam data present  Hearing/Vision  Do you or your family notice any trouble with your hearing that hasn't been managed with hearing aids?: No  Do you have difficulty driving, watching TV, or doing any of your daily activities because of your eyesight?: No  Have you had an eye exam within the past year?: (!) No  Hearing/Vision Interventions:  · Vision concerns:  he needs to make appt    Safety:  Safety  Do you have working smoke detectors?: (!) No  Have all throw rugs been removed or fastened?: Yes  Do you have non-slip mats or surfaces in all bathtubs/showers?: (!) No  Do all of your stairways have a railing or banister?: (!) No  Are your doorways, halls and stairs free of clutter?: Yes  Do you always fasten your seatbelt when you are in a car?: Yes  Safety Interventions:  · Home safety tips provided  · They are advised to get smoke detectors.   · They don't have anything slippery or no stairs in their house     Personalized Preventive Plan   Current Health Maintenance Status  Immunization History   Administered Date(s) Administered    COVID-19, [place], PF, 30mcg/0.3mL 02/24/2021, 03/17/2021    Influenza 10/22/2012    Influenza Virus Vaccine 11/01/2011, 11/08/2013, 10/08/2014, 10/09/2015    Influenza, MDCK Quadv, IM, PF (Flucelvax 4 yrs and older) 10/24/2017    Influenza, MDCK Quadv, with preserv IM (Flucelvax 4 yrs and older) 10/31/2019    Influenza, Quadv, IM, (6 mo and older Fluzone, Flulaval, Fluarix and 3 yrs and older Afluria) 09/14/2016, 10/29/2018    Influenza, Quadv, adjuvanted, 65 yrs +, IM, PF (Fluad) 09/11/2020    Pneumococcal Conjugate 13-valent ([id number]) 04/25/2017    Pneumococcal Polysaccharide ([id number]) 08/01/2018    Tdap (Boostrix, Adacel) 03/13/2014        Health Maintenance   Topic Date Due    Hepatitis C screen  Never done    Shingles Vaccine (1 of 2) Never done   [employer] Visit (AWV)  Never done    Diabetic retinal exam  01/16/2021    Low dose CT lung screening  05/29/2021    Diabetic microalbuminuria test  09/09/2021    Lipid screen  01/05/2022    Diabetic foot exam  01/22/2022    PSA counseling  01/29/2022    A1C test (Diabetic or Prediabetic)  04/28/2022    Potassium monitoring  06/07/2022    Creatinine monitoring  06/07/2022    Colon cancer screen colonoscopy  01/21/2024    DTaP/Tdap/Td vaccine (2 - Td or Tdap) 03/13/2024    Flu vaccine  Completed    Pneumococcal 65+ years Vaccine  Completed    COVID-19 Vaccine  Completed    AAA screen  Completed    Hepatitis A vaccine  Aged Out    Hib vaccine  Aged Out    Meningococcal (ACWY) vaccine  Aged Out     Recommendations for [employer] Due: see orders and patient instructions/AVS.  . Recommended screening schedule for the next 5-10 years is provided to the patient in written form: see Patient Instructions/AVS.    Yony was seen today for medicare awv and ed follow-up. Diagnoses and all orders for this visit:    Essential hypertension    Herpes zoster without complication  -     gabapentin (NEURONTIN) 100 MG capsule; Take 1 capsule by mouth 3 times daily for 61 days. HZV (herpes zoster virus) post herpetic neuralgia  -     gabapentin (NEURONTIN) 100 MG capsule; Take 1 capsule by mouth 3 times daily for 61 days.                  Date: 6/14/2021    Casi Robles is a 74 y.o. male who presents today for:  Chief Complaint   Patient presents with    Medicare [address] [facility] ED Follow-up he went to ER on 6/7/2021 because of shingles and he was given Valtrex for 7 days. He states that he is having a lot of pain       HPI:     HPI    has a current medication list which includes the following prescription(s): gabapentin, valacyclovir, buspirone, metformin, symbicort, sertraline, ropinirole, sitagliptin, glimepiride, clopidogrel, losartan, one touch ultra 2, fluticasone, montelukast, ventolin hfa, atorvastatin, one touch ultra test, pantoprazole, ranolazine, hydroxyzine, hydrochlorothiazide, epinephrine, onetouch delica lancets fine, acapella, baclofen, trazodone, hydrocodone-acetaminophen, fish oil, and sildenafil.     No Known Allergies    Social History     Tobacco Use    Smoking status: Former Smoker     Packs/day: 2.00     Years: 42.00     Pack years: 84.00     Types: Cigarettes     Start date: 1971     Quit date: 3/3/2017     Years since quittin.2    Smokeless tobacco: Never Used    Tobacco comment: pts uses just nicotine vap   Vaping Use    Vaping Use: Some days    Substances: Always   Substance Use Topics    Alcohol use: No     Alcohol/week: 0.0 standard drinks    Drug use: No       Past Medical History:   Diagnosis Date    Acid reflux     Arthritis     Asthma     Chronic back pain     Class 2 severe obesity due to excess calories with serious comorbidity and body mass index (BMI) of 37.0 to 37.9 in adult [facility]) 2018    Colon polyps     COPD (chronic obstructive pulmonary disease) (HCC)     Depression     panic attacks    Diverticulosis     HTN (hypertension)     Hyperlipidemia     Kidney disorder     Panic attack     Pneumonia     Rash     Recurrent upper respiratory infection (URI)     Sleep apnea     Thumb amputation status 3/13/14    left tip of thumb amputated    Thyroid disease     Type II or unspecified type diabetes mellitus without mention of complication, not stated as uncontrolled        Past Surgical History:   Procedure Laterality Date    BACK SURGERY      BACK SURGERY  2017    BICEPS TENDON REPAIR Right 08/16/2017    BRONCHOSCOPY      BRONCHOSCOPY N/A 4/5/2019    BRONCHOSCOPY performed by Nick Gar MD at [address]  4/5/2019    BRONCHOSCOPY ALVEOLAR LAVAGE performed by Nick Gar MD at [facility]   [address]  07/02 08/05    CARPAL TUNNEL RELEASE  2011    right hand    CHOLECYSTECTOMY  yrs ago    COLONOSCOPY  11/06 02/12 1/14    CORONARY ANGIOPLASTY WITH STENT PLACEMENT  02/2020    ENDOSCOPY, COLON, DIAGNOSTIC      EYE SURGERY      foreign body removal    HERNIA REPAIR  2004    Dr Jacek Massey  2008?  LARYNGOSCOPY  04/28/2016    Laryngoscopy Micro with Biopsy by Dr Melanie Draper  01/07    uppp    ROTATOR CUFF REPAIR Left 5-20-15    SKIN BIOPSY      TONSILLECTOMY  1985    UPPER GASTROINTESTINAL ENDOSCOPY  1997    VEIN SURGERY Left September 2014    Dr. Godfrey Ends       Family History   Problem Relation Age of Onset    Cancer Mother     Breast Cancer Mother     Stroke Mother     Heart Disease Brother     Diabetes Brother     High Blood Pressure Brother     High Cholesterol Brother      Subjective:     Review of Systems   Constitutional: Negative for activity change, appetite change, diaphoresis and fever. HENT: Negative. Eyes: Negative. Respiratory: Negative for cough, chest tightness and shortness of breath. Cardiovascular: Negative for chest pain, palpitations and leg swelling. Gastrointestinal: Negative for abdominal pain, blood in stool, constipation, diarrhea, nausea and vomiting. Genitourinary: Negative. Musculoskeletal: Positive for arthralgias, back pain and gait problem (he states that his balance is because he is walking different because of the pain. ). Skin: Positive for rash. Neurological: Negative for dizziness, syncope, weakness, light-headedness and headaches.    Psychiatric/Behavioral: Negative.        :   BP (!) 114/54 (Site: Right Upper Arm, Position: Sitting, Cuff Size: Large Adult)   Pulse 86   Temp 97.3 °F (36.3 °C) (Skin)   Resp 16   Ht 5' 8\" (1.727 m)   Wt 237 lb 4 oz (107.6 kg)   BMI 36.07 kg/m²   Wt Readings from Last 3 Encounters:   06/14/21 237 lb 4 oz (107.6 kg)   06/07/21 241 lb (109.3 kg)   05/12/21 244 lb 4 oz (110.8 kg)     Physical Exam  Vitals and nursing note reviewed. Constitutional:       General: He is not in acute distress. Appearance: He is well-developed. He is not diaphoretic. HENT:      Head: Normocephalic and atraumatic. Eyes:      General: No scleral icterus. Right eye: No discharge. Left eye: No discharge. Conjunctiva/sclera: Conjunctivae normal.      Pupils: Pupils are equal, round, and reactive to light. Neck:      Thyroid: No thyromegaly. Vascular: No JVD. Cardiovascular:      Rate and Rhythm: Normal rate and regular rhythm. Heart sounds: Normal heart sounds. No murmur heard. Pulmonary:      Effort: Pulmonary effort is normal. No respiratory distress. Breath sounds: Normal breath sounds. No wheezing, rhonchi or rales. Abdominal:      General: Bowel sounds are normal. There is no distension. Palpations: Abdomen is soft. There is no mass. Tenderness: There is no abdominal tenderness. There is no guarding or rebound. Musculoskeletal:      Cervical back: Normal range of motion and neck supple. Lymphadenopathy:      Cervical: No cervical adenopathy. Skin:     General: Skin is warm and dry. Findings: Rash present. Comments: He has shingles rash drying out   Neurological:      Mental Status: He is alert and oriented to person, place, and time. Psychiatric:         Behavior: Behavior normal.       :       Diagnosis Orders   1. Essential hypertension     2. Herpes zoster without complication  gabapentin (NEURONTIN) 100 MG capsule   3.  HZV (herpes zoster virus) post herpetic neuralgia  gabapentin (NEURONTIN) 100 MG capsule       : Requested Prescriptions     Signed Prescriptions Disp Refills    gabapentin (NEURONTIN) 100 MG capsule 90 capsule 1     Sig: Take 1 capsule by mouth 3 times daily for 61 days. Current Outpatient Medications   Medication Sig Dispense Refill    gabapentin (NEURONTIN) 100 MG capsule Take 1 capsule by mouth 3 times daily for 61 days. 90 capsule 1    valACYclovir (VALTREX) 1 g tablet Take 1 tablet by mouth 3 times daily for 7 days 21 tablet 0    busPIRone (BUSPAR) 10 MG tablet TAKE 1 TABLET BY MOUTH THREE TIMES DAILY 270 tablet 1    metFORMIN (GLUCOPHAGE) 500 MG tablet TAKE 2 TABLETS BY MOUTH TWICE DAILY WITH MEALS 360 tablet 1    SYMBICORT 160-4.5 MCG/ACT AERO INHALE 2 PUFFS INTO THE LUNGS TWICE DAILY.  RINSE MOUTH AFTER USE ITS USE 1 Inhaler 11    sertraline (ZOLOFT) 100 MG tablet TAKE 1 TABLET BY MOUTH TWICE DAILY 180 tablet 1    rOPINIRole (REQUIP) 1 MG tablet TAKE 1 TABLET BY MOUTH THREE TIMES DAILY 270 tablet 1    SITagliptin (JANUVIA) 50 MG tablet Take 1 tablet by mouth daily 90 tablet 1    glimepiride (AMARYL) 4 MG tablet TAKE 1 TABLET BY MOUTH TWICE DAILY 180 tablet 1    clopidogrel (PLAVIX) 75 MG tablet Take 75 mg by mouth daily      losartan (COZAAR) 100 MG tablet Take 1 tablet by mouth daily 90 tablet 1    Blood Glucose Monitoring Suppl (ONE TOUCH ULTRA 2) w/Device KIT 1 kit by Does not apply route daily 1 kit 0    fluticasone (FLONASE) 50 MCG/ACT nasal spray SHAKE LIQUID AND USE 1 SPRAY IN EACH NOSTRIL DAILY 16 g 11    montelukast (SINGULAIR) 10 MG tablet Take 1 tablet by mouth nightly 30 tablet 11    VENTOLIN  (90 Base) MCG/ACT inhaler Inhale 2 puffs into the lungs every 6 hours as needed for Wheezing 1 Inhaler 8    atorvastatin (LIPITOR) 20 MG tablet       blood glucose test strips (ONE TOUCH ULTRA TEST) strip CHECK BLOOD SUGAR DAILY 100 strip 0    pantoprazole (PROTONIX) 40 MG tablet Take 40 mg by mouth 2 times daily       ranolazine (RANEXA) 500 MG extended release tablet Take 500 mg by mouth 2 times daily      hydrOXYzine (ATARAX) 10 MG tablet Take 10 mg by mouth nightly      hydrochlorothiazide (HYDRODIURIL) 12.5 MG tablet TK 1 T PO QD IN THE MORNING  3    EPINEPHrine (EPIPEN 2-HUBER) 0.3 MG/0.3ML SOAJ injection Inject one pen as directed STAT for allergic reaction, may disp generic NDC 24972-559-01 6 each 99    ONETOUCH DELICA LANCETS FINE MISC Check blood sugar twice daily Dx: E11.9 200 each 1    Misc. Devices (ACAPELLA) MISC 1 Device by Does not apply route 4 times daily 1 each 0    baclofen (LIORESAL) 10 MG tablet take 1 tablet by mouth twice a day  0    traZODone (DESYREL) 150 MG tablet take 1/2 to 1 tablet by mouth once daily at bedtime if needed for pain SPASM, OR SLEEP  0    HYDROcodone-acetaminophen (NORCO) 5-325 MG per tablet Take 1 tablet by mouth every 6 hours as needed for Pain      Omega-3 Fatty Acids (FISH OIL) 1000 MG CAPS Take 1,000 mg by mouth daily.  sildenafil (VIAGRA) 100 MG tablet Take 1 tablet by mouth as needed. 8 tablet 5     No current facility-administered medications for this visit. No orders of the defined types were placed in this encounter. Continue current medications. No follow-ups on file. Discussed use, benefit, and side effects of prescribed medications. All patient questions answered. Pt voiced understanding. Instructed to continue current medications,diet and exercise. Patient agreed with treatment plan.

## 2021-06-16 ENCOUNTER — NURSE ONLY (OUTPATIENT)
Dept: ALLERGY | Age: 66
End: 2021-06-16
Payer: MEDICARE

## 2021-06-16 VITALS
SYSTOLIC BLOOD PRESSURE: 130 MMHG | TEMPERATURE: 97.8 F | HEART RATE: 68 BPM | DIASTOLIC BLOOD PRESSURE: 74 MMHG | RESPIRATION RATE: 16 BRPM

## 2021-06-16 DIAGNOSIS — J30.89 ALLERGY TO DUST: ICD-10-CM

## 2021-06-16 DIAGNOSIS — Z91.048 ALLERGY TO MOLD: Primary | ICD-10-CM

## 2021-06-16 DIAGNOSIS — Z51.6 ENCOUNTER FOR DESENSITIZATION TO ALLERGENS: ICD-10-CM

## 2021-06-16 PROCEDURE — 95117 IMMUNOTHERAPY INJECTIONS: CPT | Performed by: NURSE PRACTITIONER

## 2021-06-16 NOTE — PROGRESS NOTES
After consent obtained/verified, allergy injection given in back of R/L arm(s). VIAL COLOR OF ALL VIALS TODAY IS RED MAINTENANCE    ALLERGY INJECTION FROM VIAL A GIVEN Left  UPPER ARM IN THE AMOUNT OF 0.50 ML    ALLERGY INJECTION FROM VIAL B GIVEN Right UPPER ARM IN THE AMOUNT OF 0.50 ML    Documentation of vial injection specific to arm(s) noted on Allergy Immunotherapy Administration Form. Patient declined to wait 30 minutes for observation. SHOT REACTION TREATMENT INSTRUCTIONS    During the 30 minute wait after an allergy injection the following symptoms should be reported:    Itching other than at the injection site  Hives or swelling other than at the injection site  Redness other than at the injection site  Difficulty breathing  Chest tightness  Difficulty swallowing  Throat tightness    If these symptoms occur, NOTIFY PROVIDER and the following treatment should be administered:    1. Epinephrine/Auvi Q 1:1000 IM - 0.3 ml if > 66 lbs or more, 0.15 ml if 33 - 63 lbs, or 0.1 ml if <33 lbs     2. Diphenhydramine - give all intramuscular:     2 to <6 years (off-label use): 6.25 mg,    6 to <12 years: 12.5 to 25 mg;    ?12 years: 25-50 mg.    3.  Famotidine:  Adults 40 mg oral    Adolescents age 12 years and >88 lbs: 40 mg    Children and Adolescents ? 12years of age: Initial: 0.25 mg/kg/dose  every 12 hours (maximum daily dose: 40 mg/day)    Epi/Auvi Q dose may me repeated in 5-15 minutes if adequate resolution of symptoms does not occur    Patient should be observed for at least one hour after final Epi/Auvi Q dose and must be seen by provider. Patients cannot drive themselves if they have received diphenhydramine.

## 2021-06-19 NOTE — PROGRESS NOTES
referred from Dr. Woody Brody for chronic cough. Asthma control (Severity) questionnaire:  Asthma symptoms:  > 2 times per week -> No  Night time awakenings: > 2 times per month -> No  Use of short acting beta agonist for symptoms control (Other than pre exercise use) :> 2times per week  -> No  Interference with normal activity  -> none  Lung function: Fev1 >60% Predicted  -> Yes  Number of exacerbations per year: > 2 -> Yes    Social History: Occupation: He is retired now. He used to work as a  in the past.      Patient admits to history of tobacco smoking. He used to smoke 1 to 1.5 PPD for 44 Years. He quit smoking in March, 2017. He denies history of exposure to coal, foundry, [place], asbestos or significant farm dust exposure. His father was diagnosed with asbestos exposure. He used to move close to his father. Patient denies any recent travel. Patient denies history of exposure to birds, pigeons, or chickens in the past. The patient admits toto pet animals at home. Hecurrently had 2cats at home. He denies to history of recreational or IV drug use. Hedenies history of pulmonary embolism or DVT in the past.      Review of Systems:   General/Constitutional: he lost 9lbs of weight from the last visit. No fever or chills. HENT: Negative. Eyes: Negative. Upper respiratory tract: No nasal stuffiness or post nasal drip. Lower respiratory tract/ lungs: Occasional cough with no sputum production. He is having exertional dyspnea  Cardiovascular: No palpitations or chest pain. Gastrointestinal: No nausea or vomiting. Neurological: No focal neurologiacal weakness. Extremities: No edema. Musculoskeletal: No complaints. Genitourinary: No complaints. Hematological: Negative. Psychiatric/Behavioral: Negative. Skin: No itching.       Current Medications:    Past Medical History:   Diagnosis Date    Acid reflux     Arthritis     Asthma     Chronic back pain     Class 2 severe obesity due to excess calories with serious comorbidity and body mass index (BMI) of 37.0 to 37.9 in adult [facility]) 8/13/2018    Colon polyps 11/06    COPD (chronic obstructive pulmonary disease) (HCC)     Depression     panic attacks    Diverticulosis     HTN (hypertension)     Hyperlipidemia     Kidney disorder     Panic attack     Pneumonia     Rash     Recurrent upper respiratory infection (URI)     Sleep apnea     Thumb amputation status 3/13/14    left tip of thumb amputated    Thyroid disease     Type II or unspecified type diabetes mellitus without mention of complication, not stated as uncontrolled        Past Surgical History:   Procedure Laterality Date    BACK SURGERY  2002    BACK SURGERY  08/11/2017    BICEPS TENDON REPAIR Right 08/16/2017    BRONCHOSCOPY      BRONCHOSCOPY N/A 4/5/2019    BRONCHOSCOPY performed by Sherrill Arias MD at [address] [place] [address]  4/5/2019    BRONCHOSCOPY ALVEOLAR LAVAGE performed by Sherrill Arias MD at [facility]  07/02 08/05    CARPAL TUNNEL RELEASE  2011    right hand    CHOLECYSTECTOMY  yrs ago    COLONOSCOPY  11/06 02/12 1/14    CORONARY ANGIOPLASTY WITH STENT PLACEMENT  02/2020    ENDOSCOPY, COLON, DIAGNOSTIC      EYE SURGERY      foreign body removal    HERNIA REPAIR  2004    Dr Nyla Art  2008?  LARYNGOSCOPY  04/28/2016    Laryngoscopy Micro with Biopsy by Dr Etienne Nj  01/07    uppp    ROTATOR CUFF REPAIR Left 5-20-15    SKIN BIOPSY      TONSILLECTOMY  1985    UPPER GASTROINTESTINAL ENDOSCOPY  1997    VEIN SURGERY Left September 2014    Dr. Richard Geiger       No Known Allergies    Current Outpatient Medications   Medication Sig Dispense Refill    gabapentin (NEURONTIN) 100 MG capsule Take 1 capsule by mouth 3 times daily for 61 days.  90 capsule 1    busPIRone (BUSPAR) 10 MG tablet TAKE 1 TABLET BY MOUTH THREE TIMES DAILY 270 tablet 1    metFORMIN (GLUCOPHAGE) 500 MG tablet TAKE 2 TABLETS BY MOUTH TWICE DAILY WITH MEALS 360 tablet 1    SYMBICORT 160-4.5 MCG/ACT AERO INHALE 2 PUFFS INTO THE LUNGS TWICE DAILY. RINSE MOUTH AFTER USE ITS USE 1 Inhaler 11    sertraline (ZOLOFT) 100 MG tablet TAKE 1 TABLET BY MOUTH TWICE DAILY 180 tablet 1    rOPINIRole (REQUIP) 1 MG tablet TAKE 1 TABLET BY MOUTH THREE TIMES DAILY 270 tablet 1    SITagliptin (JANUVIA) 50 MG tablet Take 1 tablet by mouth daily 90 tablet 1    glimepiride (AMARYL) 4 MG tablet TAKE 1 TABLET BY MOUTH TWICE DAILY 180 tablet 1    clopidogrel (PLAVIX) 75 MG tablet Take 75 mg by mouth daily      losartan (COZAAR) 100 MG tablet Take 1 tablet by mouth daily 90 tablet 1    Blood Glucose Monitoring Suppl (ONE TOUCH ULTRA 2) w/Device KIT 1 kit by Does not apply route daily 1 kit 0    fluticasone (FLONASE) 50 MCG/ACT nasal spray SHAKE LIQUID AND USE 1 SPRAY IN EACH NOSTRIL DAILY 16 g 11    VENTOLIN  (90 Base) MCG/ACT inhaler Inhale 2 puffs into the lungs every 6 hours as needed for Wheezing 1 Inhaler 8    atorvastatin (LIPITOR) 20 MG tablet       blood glucose test strips (ONE TOUCH ULTRA TEST) strip CHECK BLOOD SUGAR DAILY 100 strip 0    pantoprazole (PROTONIX) 40 MG tablet Take 40 mg by mouth 2 times daily       ranolazine (RANEXA) 500 MG extended release tablet Take 500 mg by mouth 2 times daily      hydrOXYzine (ATARAX) 10 MG tablet Take 10 mg by mouth nightly      hydrochlorothiazide (HYDRODIURIL) 12.5 MG tablet TK 1 T PO QD IN THE MORNING  3    EPINEPHrine (EPIPEN 2-HUBER) 0.3 MG/0.3ML SOAJ injection Inject one pen as directed STAT for allergic reaction, may disp generic NDC 15457-634-05 6 each 99    ONETOUCH DELICA LANCETS FINE MISC Check blood sugar twice daily Dx: E11.9 200 each 1    Misc.  Devices (ACAPELLA) MISC 1 Device by Does not apply route 4 times daily 1 each 0    baclofen (LIORESAL) 10 MG tablet take 1 tablet by mouth twice a day  0    traZODone (DESYREL) 150 MG tablet take 1/2 to 1 tablet by mouth once daily at bedtime if needed for pain SPASM, OR SLEEP  0    HYDROcodone-acetaminophen (NORCO) 5-325 MG per tablet Take 1 tablet by mouth every 6 hours as needed for Pain      Omega-3 Fatty Acids (FISH OIL) 1000 MG CAPS Take 1,000 mg by mouth daily.  sildenafil (VIAGRA) 100 MG tablet Take 1 tablet by mouth as needed. 8 tablet 5    montelukast (SINGULAIR) 10 MG tablet Take 1 tablet by mouth nightly 30 tablet 11     No current facility-administered medications for this visit. Family History   Problem Relation Age of Onset    Cancer Mother     Breast Cancer Mother     Stroke Mother     Heart Disease Brother     Diabetes Brother     High Blood Pressure Brother     High Cholesterol Brother          Physical Exam     VITALS:    /76 (Site: Left Upper Arm, Position: Sitting, Cuff Size: Medium Adult)   Pulse 80   Temp 97.7 °F (36.5 °C) (Oral)   Ht 5' 8\" (1.727 m)   Wt 240 lb (108.9 kg)   SpO2 95% Comment: on room air at rest  BMI 36.49 kg/m²   Nursing note and vitals reviewed. Constitutional: Patient appears moderately built and moderately nourished. No distress. Patient is oriented to person, place, and time. HENT:   Head: Normocephalic and atraumatic. Right Ear: External ear normal.   Left Ear: External ear normal.   Mouth/Throat: Oropharynx is clear and moist.  No oral thrush. Eyes: Conjunctivae are normal. Pupils are equal, round, and reactive to light. No scleral icterus. Neck: Neck supple. No JVD present. No tracheal deviation present. Cardiovascular: Normal rate, regular rhythm, normal heart sounds. No murmur heard. Pulmonary/Chest: Effort normal and breath sounds normal. No stridor. No respiratory distress. No wheezes. No rales. Patient exhibits no tenderness. Abdominal: Soft. Patient exhibits no distension. No tenderness. Musculoskeletal: Normal range of motion.   Extremities: Patient exhibits no edema and no tenderness. Lymphadenopathy:  No cervical adenopathy. Neurological: Patient is alert and oriented to person, place, and time. Skin: Skin is warm and dry. Patient is not diaphoretic. Psychiatric: Patient  has a normal mood and affect. Patient behavior is normal.       Neck Circumference -   18.5 in  Mallampati - II    Diagnostic Data:    MCCT test:3/28/16: Positive. CT neck:  Mar 28, 2016    HRCT of chest:  Feb 2, 2019   PROCEDURE: CT CHEST HIGH RESOLUTION     Bronchoscopy results:  Hospital performed: [facility]. Date of procedure: 4/5/2019  Procedure: Flexible diagnostic fiberoptic bronchoscopy without fluoroscopy. During procedure Bronch washings obtained  from bilateral lower lobes. Bronchioalveolar lavage was performed from right lower lobe posterior segment. Bronch washings:   Acid fast bacilli:  negative  Fungal cultures: Candida albicans   Routine cultures: Escherichia coli   Viral cultures:      negative  Legionella cultures:  negative  Cytology: No malignant cells seen. Multinucleated giant cells. Alveolar macrophages. Susceptibility     Escherichia coli (1)     Antibiotic Interpretation CAIT Status    gentamicin Sensitive <=1 mcg/mL Final    tetracycline Sensitive <=1 mcg/mL Final    cefOXitin Sensitive <=4 mcg/mL Final    trimethoprim-sulfamethoxazole Sensitive <=20 mcg/mL Final    ciprofloxacin Sensitive =0.016 mcg/mL Final    cefuroxime Sensitive           [facility] labs:  4/23/2019  8:43 PM - Hilario, Kade Phillip Incoming Lab Results From Soft     Component Value Ref Range & Units Status Collected Lab   Allergen Fungi/Mold A. Alternata Ige 0. 87High   <=0.34 kU/L Final 04/19/2019 12:00 PM [facility]   Allergen Box Elder < 0.10  <=0.34 kU/L Final 04/19/2019 12:00 PM [facility]   Cat Dander IgE 3. 08High   <=0.34 kU/L Final 04/19/2019 12:00 PM [facility]   ALLERGEN MOUNTAIN CEDAR < 0.10  <=0.34 kU/L Final 04/19/2019 12:00 PM [facility]   ALLERGEN COTTONWOOD IGE < 0.10  <=0.34 kU/L Final 04/19/2019 12:00 PM [facility] Milk IgE < 0.10  <=0.34 kU/L Final 04/19/2019 12:00 PM [facility]   Performed [place] [address], [address] [phone]   www. Maranda Pemberton MD - Lab. Director    Peanut IgE < 0.10  <=0.34 kU/L Final 04/19/2019 12:00 PM [facility]   ALLERGEN WEED, PIGWEED IGE < 0.10  <=0.34 kU/L Final 04/19/2019 12:00 PM [facility]   [place] Thistle IgE < 0.10  <=0.34 kU/L Final 04/19/2019 12:00 PM [facility]   ALLERGEN TRISTEN GRASS < 0.10  <=0.34 kU/L Final 04/19/2019 12:00 PM [facility]   Allergen, Fungi/Mold < 0.10  <=0.34 kU/L Final 04/19/2019 12:00 PM [facility]   Elm IgE < 0.10  <=0.34 kU/L Final 04/19/2019 12:00 PM [facility]   Allergen, Tree, Oak < 0.10  <=0.34 kU/L Final 04/19/2019 12:00 PM [facility]   ALLERGEN BIRCH IgE < 0.10  <=0.34 kU/L Final 04/19/2019 12:00 PM [facility]   Allergen Fungi/Mold A. Fumigatus IgE < 0.10  <=0.34 kU/L Final 04/19/2019 12:00 PM [facility]   Mite Dust Pteronyssinus IgE 6. 86High   <=0.34 kU/L Final 04/19/2019 12:00 PM [facility]   ALLERGEN D. FARINAE (DUST MITE) 6. 53High   <=0.34 kU/L Final 04/19/2019 12:00 PM [facility]   B[place] Grass IgE < 0.10  <=0.34 kU/L Final 04/19/2019 12:00 PM [facility]   Allergen White [place] < 0.10  <=0.34 kU/L Final 04/19/2019 12:00 PM [facility]   Allergen Fungi/Mold P. Notatum IgE < 0.10  <=0.34 kU/L Final 04/19/2019 12:00 PM [facility]   Common-Short Ragweed IgE 1. 01High   <=0.34 kU/L Final 04/19/2019 12:00 PM [facility]   Cockroach IgE 0.15  <=0.34 kU/L Final 04/19/2019 12:00 PM [facility]   Allergen Tree [place] < 0.10  <=0.34 kU/L Final 04/19/2019 12:00 PM [facility]   [place] Tree IgE < 0.10  <=0.34 kU/L Final 04/19/2019 12:00 PM [facility]   Pecan Tree IgE < 0.10  <=0.34 kU/L Final 04/19/2019 12:00 PM [facility]   Mouse Epithelial < 0.10  <=0.34 kU/L Final 04/19/2019 12:00 PM [facility]   Allergen Fungi/Mold, M. racemosus IGE < 0.10  <=0.34 kU/L Final 04/19/2019 12:00 PM [facility]   Allergen [place] Tree, IGE < 0.10  <=0.34 kU/L Final 04/19/2019 12:00 PM [facility]   Dog Dander IgE 0. 69High   <=0.34 kU/L Final 04/19/2019 12:00 PM [facility]   [employer]ep portable oxygen system. Nocturnal pulse oximetry study done on 3 August 2020:            Spirometry: Spirometry performed on 14 July 2021    His spirometry was compared with the last year spirometry. His FEV1 is slightly lower than last year. Low-dose CT scan of chest without contrast:  Wed Jul 14, 2021  9:45 AM   rative NONCONTRAST SCREENING CT CHEST:   HISTORY: Personal history of tobacco use. 1. 5 mm noncalcified nodule in the right middle lobe, previously measuring 4 mm. No additional noncalcified pulmonary nodules. Lung-RADS Score: 2: Benign appearance or behavior (nodules with a very low likelihood of becoming a clinically active cancer due to size or lack of growth). Management: Continue annual screening with LDCT in 12 months. 2. Moderate emphysematous changes. Assessment:  -Severe persistent bronchial asthma-  under control.  -Moderate COPD with hx of tobacco smoking- under control.  -5 mm noncalcified nodule in the right middle lobe, previously measuring 4 mm-need follow-up due to his history of tobacco smoking in the past.  -CAD S/p Stent placement. He follows with Dr. Slade Eller. He is currently waiting for a follow-up with his cardiologist next month  -hx of recurrent E coli infection in the lungs and MRSA infection of left side of face in the past- resolved  -Abnormal allergy test with elevated serum Ig E level- he is following with an Allergist.  He is currently receiving allergy shots along with immunotherapy from Ms. Jerry Rojo, CNP  -Chronic cough due to uncontrolled asthma Vs allergic rhinitis- Improved  -Allergic rhinitis- on treatment with Flonase nasal spray- Improved  -Abnormal CT scan of neck with ? Lesion over vocal cord and cervical lymphadenopathy on left side S/p Microlaryngoscopy and biopsy by Dr. Augustine Alarcon on 4/28/16. He follows with ENT.    -Heterogeneous low-attenuation nodule within left thyroid gland is incompletely characterized on the current examination. Following with ENT. -Type II or unspecified type diabetes mellitus without mention of complication, not stated as uncontrolled ([facility] [address] -Acid reflux on treatment with Nexium. He follows with Dr. Elizabeth Hyde  -Chronic tobacco smoking- He quit smoking several years back. -Depression. -Anxiety disorder.  -Chronic back pain.  -Blood pressure- under control.   -Sleep apnea. He used to be on CPAP in the past. He underwent surgery 4 to 5 years back. He never had a follow up sleep test. He refused go for sleep test or get treated,      Recommendations/Plan:  -Continue patient on Symbicort 160/4.5mcg spray MDI, 2 puffs via inhalation BID. -Continue Albuterol HFA 90mcg/Spray MDI, 2puffs  Q6Hprn.  -Stop Singulair 10mg 1 tablet at bed time daily. Patient refused to continue Singulair due to his history of depression. He is currently taking antidepressant medications.  -Continue follow up and management by ISABELLA Stoner for his GERD.  -He was advised to keep scheduled follow up with his Allergy and immunologist. He is currently on immunotherapy with Ms. Richie CNP at [facility].  -Patient educated to update his pneumococcal vaccine with family physician and take influenza vaccine in coming season with out fail. Patient verbalizes understanding.   -Yony Pascualkevin educated about environmental safety precautions he need to practice to prevent exacerbation ofhis Asthma. Yony KEVIN Borden verbalizes understanding.   -He needs no home O2 at rest. He needs 2 LPM of home O2 with exercise. He do not need any oxygen supplementation at nighttime. He was advised to continue oxygen supplementation with good compliance.  -At the request of the patient he was given a prescription for portable oxygen concentrator to submit to his [employer].   He is interested in going for [facility].  -Schedule patient for Spirometry before clinic visit with Bronchodilator response in 1year from now.  -Scheduled patient for for low dose CT scan of chest with out IV contrast as recommended by the  U.S. Preventive Services Task Force and the NCCN for lung Cancer Screening in July next year.  -Schedule patient for follow up with my clinic in 1year with recommended test/s I.e low-dose CT scan of chest without contrast and spirometry before clinic visit. . Patient advised to make early appointment if needed. -- Patient and his wife were educated about my impression and plan. They verbalizes understanding.        - He is following with Jovita Munoz MD from [facility]ity for his Left thyroid nodule. -He was advised to practice anti-reflux measures such as raising the head of the bed, avoiding tight clothing or belts, avoiding eating late at night and not lying down shortly after mealtime and achieving weight loss are discussed. Avoid ASA, NSAID's, caffeine, peppermints, alcohol and tobacco. Patient should alert me if there are persistent symptoms, dysphagia, weight loss or GI bleeding.   -He was requested to go for polysomnogram in the sleep lab to further evaluate for obstructive sleep apnea. However at the end of discussion he refused to go for the sleep test at this time. He verbalizes understanding of consequences of his decision including non diagnosis of obstructive sleep apnea resulting in increased morbidity and mortality with cerebro and cardiovascular events including death. He verbalizes understanding. I had a discussion with patient regarding other avialable treatment options for his sleep disorder breathing including but not limited to Dental appliance placement with referral to a local dentist Vs other available surgical options including Uvulopalatopharyngoplasty, maxillomandibular ostomy, Inspire device placement and tracheostomy as last option. At the end of discussion, he decided not to go for any treatment.  he verbalizes understanding.    -I personally reviewed and updated the Past medical hx, Past surgical hx,Social hx, Family hx, Medications, Allergies in the discrete data section of the patient chart. I also reviewed pertaining labs and Pulmonary medicine,Sleep medicine related, Pathological, Microbiological and Radiological investigations.

## 2021-06-23 ENCOUNTER — NURSE ONLY (OUTPATIENT)
Dept: ALLERGY | Age: 66
End: 2021-06-23
Payer: MEDICARE

## 2021-06-23 VITALS
SYSTOLIC BLOOD PRESSURE: 128 MMHG | DIASTOLIC BLOOD PRESSURE: 82 MMHG | RESPIRATION RATE: 16 BRPM | TEMPERATURE: 97.7 F | HEART RATE: 72 BPM

## 2021-06-23 DIAGNOSIS — J30.89 ALLERGY TO DUST: ICD-10-CM

## 2021-06-23 DIAGNOSIS — Z91.048 ALLERGY TO MOLD: Primary | ICD-10-CM

## 2021-06-23 DIAGNOSIS — Z51.6 ENCOUNTER FOR DESENSITIZATION TO ALLERGENS: ICD-10-CM

## 2021-06-23 PROCEDURE — 95117 IMMUNOTHERAPY INJECTIONS: CPT | Performed by: NURSE PRACTITIONER

## 2021-06-23 NOTE — PROGRESS NOTES
After consent obtained/verified, allergy injection given in back of R/L arm(s). VIAL COLOR OF ALL VIALS TODAY IS RED MAINTENANCE    ALLERGY INJECTION FROM VIAL A GIVEN Right  UPPER ARM IN THE AMOUNT OF 0.50 ML    ALLERGY INJECTION FROM VIAL B GIVEN Left UPPER ARM IN THE AMOUNT OF 0.50 ML    Documentation of vial injection specific to arm(s) noted on Allergy Immunotherapy Administration Form. Patient declined to wait 30 minutes for observation. SHOT REACTION TREATMENT INSTRUCTIONS    During the 30 minute wait after an allergy injection the following symptoms should be reported:    Itching other than at the injection site  Hives or swelling other than at the injection site  Redness other than at the injection site  Difficulty breathing  Chest tightness  Difficulty swallowing  Throat tightness    If these symptoms occur, NOTIFY PROVIDER and the following treatment should be administered:    1. Epinephrine/Auvi Q 1:1000 IM - 0.3 ml if > 66 lbs or more, 0.15 ml if 33 - 63 lbs, or 0.1 ml if <33 lbs     2. Diphenhydramine - give all intramuscular:     2 to <6 years (off-label use): 6.25 mg,    6 to <12 years: 12.5 to 25 mg;    ?12 years: 25-50 mg.    3.  Famotidine:  Adults 40 mg oral    Adolescents age 12 years and >88 lbs: 40 mg    Children and Adolescents ? 12years of age: Initial: 0.25 mg/kg/dose  every 12 hours (maximum daily dose: 40 mg/day)    Epi/Auvi Q dose may me repeated in 5-15 minutes if adequate resolution of symptoms does not occur    Patient should be observed for at least one hour after final Epi/Auvi Q dose and must be seen by provider. Patients cannot drive themselves if they have received diphenhydramine.

## 2021-07-14 ENCOUNTER — NURSE ONLY (OUTPATIENT)
Dept: ALLERGY | Age: 66
End: 2021-07-14
Payer: MEDICARE

## 2021-07-14 ENCOUNTER — HOSPITAL ENCOUNTER (OUTPATIENT)
Dept: CT IMAGING | Age: 66
Discharge: HOME | End: 2021-07-14
Payer: MEDICARE

## 2021-07-14 ENCOUNTER — HOSPITAL ENCOUNTER (OUTPATIENT)
Dept: PULMONOLOGY | Age: 66
Discharge: HOME | End: 2021-07-14
Payer: MEDICARE

## 2021-07-14 VITALS
SYSTOLIC BLOOD PRESSURE: 136 MMHG | TEMPERATURE: 97.7 F | HEART RATE: 84 BPM | RESPIRATION RATE: 18 BRPM | DIASTOLIC BLOOD PRESSURE: 78 MMHG

## 2021-07-14 DIAGNOSIS — Z87.891 PERSONAL HISTORY OF TOBACCO USE: ICD-10-CM

## 2021-07-14 DIAGNOSIS — Z91.048 ALLERGY TO MOLD: ICD-10-CM

## 2021-07-14 DIAGNOSIS — Z87.891 PERSONAL HISTORY OF TOBACCO USE, PRESENTING HAZARDS TO HEALTH: ICD-10-CM

## 2021-07-14 DIAGNOSIS — J45.50 SEVERE PERSISTENT ASTHMA WITHOUT COMPLICATION: ICD-10-CM

## 2021-07-14 DIAGNOSIS — J44.9 MODERATE COPD (CHRONIC OBSTRUCTIVE PULMONARY DISEASE) (HCC): ICD-10-CM

## 2021-07-14 DIAGNOSIS — J30.89 ALLERGY TO DUST: ICD-10-CM

## 2021-07-14 DIAGNOSIS — Z51.6 ENCOUNTER FOR DESENSITIZATION TO ALLERGENS: Primary | ICD-10-CM

## 2021-07-14 PROCEDURE — 94060 EVALUATION OF WHEEZING: CPT

## 2021-07-14 PROCEDURE — 95117 IMMUNOTHERAPY INJECTIONS: CPT | Performed by: NURSE PRACTITIONER

## 2021-07-14 PROCEDURE — 71271 CT THORAX LUNG CANCER SCR C-: CPT

## 2021-07-14 NOTE — PROGRESS NOTES
Prescreening performed prior to testing. The following symptoms may indicate COVID-19 infection:        One of the following criteria:   Temperature taken, patient temperature was 97.3 F. Fever greater 100.0 F -no  Cough -  no  New onset shortness of breath -no  New onset difficulty breathing -no        And/or   Two or more of the following criteria:  Muscle aches -no  Headache -no  Sore throat -no  New onset loss of smell/taste -no  New onset diarrhea -no    Patient's screening was negative. PFT will be performed.

## 2021-07-14 NOTE — PROGRESS NOTES
After consent obtained/verified, allergy injection given in back of R/L arm(s). VIAL COLOR OF ALL VIALS TODAY IS MAINTENANCE RED    ALLERGY INJECTION FROM VIAL A GIVEN LEFT  UPPER ARM IN THE AMOUNT OF 0.30 ML    ALLERGY INJECTION FROM VIAL B GIVEN RIGHT UPPER ARM IN THE AMOUNT OF 0.30 ML        Documentation of vial injection specific to arm(s) noted on Allergy Immunotherapy Administration Form. Patient declined to wait 30 minutes for observation. SHOT REACTION TREATMENT INSTRUCTIONS    During the 30 minute wait after an allergy injection the following symptoms should be reported:    Itching other than at the injection site  Hives or swelling other than at the injection site  Redness other than at the injection site  Difficulty breathing  Chest tightness  Difficulty swallowing  Throat tightness    If these symptoms occur, NOTIFY PROVIDER and the following treatment should be administered:    1. Epinephrine/Auvi Q 1:1000 IM - 0.3 ml if > 66 lbs or more, 0.15 ml if 33 - 63 lbs, or 0.1 ml if <33 lbs     2. Diphenhydramine - give all intramuscular:     2 to <6 years (off-label use): 6.25 mg,    6 to <12 years: 12.5 to 25 mg;    ?12 years: 25-50 mg.    3.  Famotidine:  Adults 40 mg oral    Adolescents age 12 years and >88 lbs: 40 mg    Children and Adolescents ? 12years of age: Initial: 0.25 mg/kg/dose  every 12 hours (maximum daily dose: 40 mg/day)    Epi/Auvi Q dose may me repeated in 5-15 minutes if adequate resolution of symptoms does not occur    Patient should be observed for at least one hour after final Epi/Auvi Q dose and must be seen by provider. Patients cannot drive themselves if they have received diphenhydramine.

## 2021-07-19 ENCOUNTER — OFFICE VISIT (OUTPATIENT)
Dept: PULMONOLOGY | Age: 66
End: 2021-07-19
Payer: MEDICARE

## 2021-07-19 VITALS
HEART RATE: 80 BPM | WEIGHT: 240 LBS | BODY MASS INDEX: 36.37 KG/M2 | HEIGHT: 68 IN | OXYGEN SATURATION: 95 % | DIASTOLIC BLOOD PRESSURE: 76 MMHG | SYSTOLIC BLOOD PRESSURE: 138 MMHG | TEMPERATURE: 97.7 F

## 2021-07-19 DIAGNOSIS — J44.9 MODERATE COPD (CHRONIC OBSTRUCTIVE PULMONARY DISEASE) (HCC): ICD-10-CM

## 2021-07-19 DIAGNOSIS — Z87.891 PERSONAL HISTORY OF TOBACCO USE: ICD-10-CM

## 2021-07-19 DIAGNOSIS — J45.50 SEVERE PERSISTENT ASTHMA WITHOUT COMPLICATION: Primary | ICD-10-CM

## 2021-07-19 DIAGNOSIS — Z87.891 PERSONAL HISTORY OF TOBACCO USE, PRESENTING HAZARDS TO HEALTH: ICD-10-CM

## 2021-07-19 PROCEDURE — 1123F ACP DISCUSS/DSCN MKR DOCD: CPT | Performed by: INTERNAL MEDICINE

## 2021-07-19 PROCEDURE — 99214 OFFICE O/P EST MOD 30 MIN: CPT | Performed by: INTERNAL MEDICINE

## 2021-07-19 PROCEDURE — 3017F COLORECTAL CA SCREEN DOC REV: CPT | Performed by: INTERNAL MEDICINE

## 2021-07-19 PROCEDURE — G0296 VISIT TO DETERM LDCT ELIG: HCPCS | Performed by: INTERNAL MEDICINE

## 2021-07-19 PROCEDURE — G8417 CALC BMI ABV UP PARAM F/U: HCPCS | Performed by: INTERNAL MEDICINE

## 2021-07-19 PROCEDURE — 3023F SPIROM DOC REV: CPT | Performed by: INTERNAL MEDICINE

## 2021-07-19 PROCEDURE — 4040F PNEUMOC VAC/ADMIN/RCVD: CPT | Performed by: INTERNAL MEDICINE

## 2021-07-19 PROCEDURE — 1036F TOBACCO NON-USER: CPT | Performed by: INTERNAL MEDICINE

## 2021-07-19 PROCEDURE — G8427 DOCREV CUR MEDS BY ELIG CLIN: HCPCS | Performed by: INTERNAL MEDICINE

## 2021-07-19 PROCEDURE — G8926 SPIRO NO PERF OR DOC: HCPCS | Performed by: INTERNAL MEDICINE

## 2021-07-19 NOTE — PROGRESS NOTES
Neck Circumference -   18  Mallampati - 2    Lung Nodule Screening     [x] Qualifies    [] Does not qualify   [] Declined    [] Completed

## 2021-07-19 NOTE — PATIENT INSTRUCTIONS
Recommendations/Plan:  -Continue patient on Symbicort 160/4.5mcg spray MDI, 2 puffs via inhalation BID. -Continue Albuterol HFA 90mcg/Spray MDI, 2puffs  Q6Hprn.  -Stop Singulair 10mg 1 tablet at bed time daily. Patient refused to continue Singulair due to his history of depression. He is currently taking antidepressant medications.  -Continue follow up and management by ISABELLA Stoner for his GERD.  -He was advised to keep scheduled follow up with his Allergy and immunologist. He is currently on immunotherapy with Ms. Richie CNP at [facility].  -Patient educated to update his pneumococcal vaccine with family physician and take influenza vaccine in coming season with out fail. Patient verbalizes understanding.   -Yony Borden educated about environmental safety precautions he need to practice to prevent exacerbation ofhis Asthma. Yony Borden verbalizes understanding.   -He needs no home O2 at rest. He needs 2 LPM of home O2 with exercise. He do not need any oxygen supplementation at nighttime. He was advised to continue oxygen supplementation with good compliance.  -At the request of the patient he was given a prescription for portable oxygen concentrator to submit to his [employer]. He is interested in going for [employer].  -Schedule patient for Spirometry before clinic visit with Bronchodilator response in 1year from now.  -Scheduled patient for for low dose CT scan of chest with out IV contrast as recommended by the  U.S. Preventive Services Task Force and the NCCN for lung Cancer Screening in July next year.  -Schedule patient for follow up with my clinic in 1year with recommended test/s I.e low-dose CT scan of chest without contrast and spirometry before clinic visit. . Patient advised to make early appointment if needed. -- Patient and his wife were educated about my impression and plan. They verbalizes understanding. What is lung cancer screening? Lung cancer screening is a way in which doctors check the lungs for early signs of cancer in people who have no symptoms of lung cancer. A low-dose CT scan uses much less radiation than a normal CT scan and shows a more detailed image of the lungs than a standard X-ray. The goal of lung cancer screening is to find cancer early, before it has a chance to grow, spread, or cause problems. One large study found that smokers who were screened with low-dose CT scans were less likely to die of lung cancer than those who were screened with standard X-ray. Below is a summary of the things you need to know regarding screening for lung cancer with low-dose computed tomography (LDCT). This is a screening program that involves routine annual screening with LDCT studies of the lung. The LDCTs are done using low-dose radiation that is not thought to increase your cancer risk. If you have other serious medical conditions (other cancers, congestive heart failure) that limit your life expectancy to less than 10 years, you should not undergo lung cancer screening with LDCT. The chance is 20%-60% that the LDCT result will show abnormalities. This would require additional testing which could include repeat imaging or even invasive procedures. Most (about 95%) of \"abnormal\" LDCT results are false in the sense that no lung cancer is ultimately found. Additionally, some (about 10%) of the cancers found would not affect your life expectancy, even if undetected and untreated. If you are still smoking, the single most important thing that you can do to reduce your risk of dying of lung cancer is to quit. For this screening to be covered by Medicare and most other insurers, strict criteria must be met. If you do not meet these criteria, but still wish to undergo LDCT testing, you will be required to sign a waiver indicating your willingness to pay for the scan.

## 2021-07-20 DIAGNOSIS — J34.3 HYPERTROPHY OF INFERIOR NASAL TURBINATE: ICD-10-CM

## 2021-07-20 DIAGNOSIS — J30.9 ALLERGIC RHINITIS, UNSPECIFIED SEASONALITY, UNSPECIFIED TRIGGER: ICD-10-CM

## 2021-07-21 ENCOUNTER — NURSE ONLY (OUTPATIENT)
Dept: ALLERGY | Age: 66
End: 2021-07-21
Payer: MEDICARE

## 2021-07-21 VITALS
RESPIRATION RATE: 16 BRPM | TEMPERATURE: 97.3 F | HEART RATE: 72 BPM | SYSTOLIC BLOOD PRESSURE: 128 MMHG | DIASTOLIC BLOOD PRESSURE: 80 MMHG

## 2021-07-21 DIAGNOSIS — J30.89 ALLERGY TO DUST: ICD-10-CM

## 2021-07-21 DIAGNOSIS — Z91.048 ALLERGY TO MOLD: Primary | ICD-10-CM

## 2021-07-21 DIAGNOSIS — Z51.6 ENCOUNTER FOR DESENSITIZATION TO ALLERGENS: ICD-10-CM

## 2021-07-21 PROCEDURE — 95117 IMMUNOTHERAPY INJECTIONS: CPT | Performed by: NURSE PRACTITIONER

## 2021-07-21 RX ORDER — FLUTICASONE PROPIONATE 50 MCG
2 SPRAY, SUSPENSION (ML) NASAL DAILY
Qty: 16 G | Refills: 11 | Status: SHIPPED | OUTPATIENT
Start: 2021-07-21 | End: 2021-09-16

## 2021-07-21 NOTE — PROGRESS NOTES
After consent obtained/verified, allergy injection given in back of R/L arm(s). VIAL COLOR OF ALL VIALS TODAY IS RED MAINTENANCE    ALLERGY INJECTION FROM VIAL A GIVEN Right  UPPER ARM IN THE AMOUNT OF 0.35 ML    ALLERGY INJECTION FROM VIAL B GIVEN Left UPPER ARM IN THE AMOUNT OF 0.35 ML      Documentation of vial injection specific to arm(s) noted on Allergy Immunotherapy Administration Form. Patient declined to wait 30 minutes for observation. SHOT REACTION TREATMENT INSTRUCTIONS    During the 30 minute wait after an allergy injection the following symptoms should be reported:    Itching other than at the injection site  Hives or swelling other than at the injection site  Redness other than at the injection site  Difficulty breathing  Chest tightness  Difficulty swallowing  Throat tightness    If these symptoms occur, NOTIFY PROVIDER and the following treatment should be administered:    1. Epinephrine/Auvi Q 1:1000 IM - 0.3 ml if > 66 lbs or more, 0.15 ml if 33 - 63 lbs, or 0.1 ml if <33 lbs     2. Diphenhydramine - give all intramuscular:     2 to <6 years (off-label use): 6.25 mg,    6 to <12 years: 12.5 to 25 mg;    ?12 years: 25-50 mg.    3.  Famotidine:  Adults 40 mg oral    Adolescents age 12 years and >88 lbs: 40 mg    Children and Adolescents ? 12years of age: Initial: 0.25 mg/kg/dose  every 12 hours (maximum daily dose: 40 mg/day)    Epi/Auvi Q dose may me repeated in 5-15 minutes if adequate resolution of symptoms does not occur    Patient should be observed for at least one hour after final Epi/Auvi Q dose and must be seen by provider. Patients cannot drive themselves if they have received diphenhydramine.

## 2021-07-26 RX ORDER — LOSARTAN POTASSIUM 100 MG/1
100 TABLET ORAL DAILY
Qty: 90 TABLET | Refills: 1 | Status: SHIPPED | OUTPATIENT
Start: 2021-07-26 | End: 2021-07-28 | Stop reason: SDUPTHER

## 2021-07-26 NOTE — TELEPHONE ENCOUNTER
Date of last visit:  6/14/2021   Date of next visit:  7/28/2021    Requested Prescriptions     Pending Prescriptions Disp Refills    losartan (COZAAR) 100 MG tablet [Pharmacy Med Name: LOSARTAN 100MG TABLETS] 90 tablet 1     Sig: TAKE 1 TABLET BY MOUTH DAILY

## 2021-07-28 ENCOUNTER — NURSE ONLY (OUTPATIENT)
Dept: ALLERGY | Age: 66
End: 2021-07-28
Payer: MEDICARE

## 2021-07-28 ENCOUNTER — OFFICE VISIT (OUTPATIENT)
Dept: FAMILY MEDICINE CLINIC | Age: 66
End: 2021-07-28

## 2021-07-28 VITALS
RESPIRATION RATE: 16 BRPM | SYSTOLIC BLOOD PRESSURE: 136 MMHG | HEIGHT: 68 IN | BODY MASS INDEX: 36.13 KG/M2 | DIASTOLIC BLOOD PRESSURE: 82 MMHG | TEMPERATURE: 96.7 F | HEART RATE: 80 BPM | WEIGHT: 238.38 LBS

## 2021-07-28 VITALS
DIASTOLIC BLOOD PRESSURE: 80 MMHG | RESPIRATION RATE: 12 BRPM | SYSTOLIC BLOOD PRESSURE: 124 MMHG | HEART RATE: 72 BPM | TEMPERATURE: 97 F

## 2021-07-28 DIAGNOSIS — E29.1 TESTOSTERONE DEFICIENCY IN MALE: ICD-10-CM

## 2021-07-28 DIAGNOSIS — R35.0 BENIGN PROSTATIC HYPERPLASIA WITH URINARY FREQUENCY: ICD-10-CM

## 2021-07-28 DIAGNOSIS — E11.8 TYPE 2 DIABETES MELLITUS WITH COMPLICATION, WITHOUT LONG-TERM CURRENT USE OF INSULIN (HCC): Primary | ICD-10-CM

## 2021-07-28 DIAGNOSIS — E78.5 HYPERLIPIDEMIA, UNSPECIFIED HYPERLIPIDEMIA TYPE: ICD-10-CM

## 2021-07-28 DIAGNOSIS — Z91.048 ALLERGY TO MOLD: ICD-10-CM

## 2021-07-28 DIAGNOSIS — N40.1 BENIGN PROSTATIC HYPERPLASIA WITH URINARY FREQUENCY: ICD-10-CM

## 2021-07-28 DIAGNOSIS — J30.89 ALLERGY TO DUST: ICD-10-CM

## 2021-07-28 DIAGNOSIS — Z51.6 ENCOUNTER FOR DESENSITIZATION TO ALLERGENS: Primary | ICD-10-CM

## 2021-07-28 DIAGNOSIS — I10 ESSENTIAL HYPERTENSION: ICD-10-CM

## 2021-07-28 DIAGNOSIS — J44.9 MODERATE COPD (CHRONIC OBSTRUCTIVE PULMONARY DISEASE) (HCC): ICD-10-CM

## 2021-07-28 LAB
CHP ED QC CHECK: ABNORMAL
GLUCOSE BLD-MCNC: 203 MG/DL
HBA1C MFR BLD: 7.2 %

## 2021-07-28 PROCEDURE — 83036 HEMOGLOBIN GLYCOSYLATED A1C: CPT | Performed by: FAMILY MEDICINE

## 2021-07-28 PROCEDURE — 3017F COLORECTAL CA SCREEN DOC REV: CPT | Performed by: FAMILY MEDICINE

## 2021-07-28 PROCEDURE — 4040F PNEUMOC VAC/ADMIN/RCVD: CPT | Performed by: FAMILY MEDICINE

## 2021-07-28 PROCEDURE — 1123F ACP DISCUSS/DSCN MKR DOCD: CPT | Performed by: FAMILY MEDICINE

## 2021-07-28 PROCEDURE — G8427 DOCREV CUR MEDS BY ELIG CLIN: HCPCS | Performed by: FAMILY MEDICINE

## 2021-07-28 PROCEDURE — G8417 CALC BMI ABV UP PARAM F/U: HCPCS | Performed by: FAMILY MEDICINE

## 2021-07-28 PROCEDURE — 95117 IMMUNOTHERAPY INJECTIONS: CPT | Performed by: NURSE PRACTITIONER

## 2021-07-28 PROCEDURE — 1036F TOBACCO NON-USER: CPT | Performed by: FAMILY MEDICINE

## 2021-07-28 PROCEDURE — 99213 OFFICE O/P EST LOW 20 MIN: CPT | Performed by: FAMILY MEDICINE

## 2021-07-28 PROCEDURE — 82962 GLUCOSE BLOOD TEST: CPT | Performed by: FAMILY MEDICINE

## 2021-07-28 RX ORDER — TRAZODONE HYDROCHLORIDE 150 MG/1
TABLET ORAL
Qty: 90 TABLET | Refills: 0 | Status: SHIPPED | OUTPATIENT
Start: 2021-07-28 | End: 2021-10-22

## 2021-07-28 RX ORDER — SERTRALINE HYDROCHLORIDE 100 MG/1
TABLET, FILM COATED ORAL
Qty: 180 TABLET | Refills: 1 | Status: SHIPPED | OUTPATIENT
Start: 2021-07-28 | End: 2021-10-22

## 2021-07-28 RX ORDER — LOSARTAN POTASSIUM 100 MG/1
100 TABLET ORAL DAILY
Qty: 90 TABLET | Refills: 1 | Status: SHIPPED | OUTPATIENT
Start: 2021-07-28 | End: 2022-02-18 | Stop reason: DRUGHIGH

## 2021-07-28 ASSESSMENT — ENCOUNTER SYMPTOMS
NAUSEA: 0
VOMITING: 0
SHORTNESS OF BREATH: 0
DIARRHEA: 0
BLOOD IN STOOL: 0
CHEST TIGHTNESS: 0
ABDOMINAL PAIN: 0
BACK PAIN: 1
EYES NEGATIVE: 1
COUGH: 0
CONSTIPATION: 0

## 2021-07-28 NOTE — PROGRESS NOTES
Date: 2021  Here today with his wife. Felipe Cervantes is a 74 y.o. male who presents today for:  Chief Complaint   Patient presents with   [facility] Check-Up he has seen urologist in the past for his low testosterone. He stopped the testosterone supplements. Now also he is having problems with his urine stream and will like to go back to urology. He states that sometimes after he urinates he has to go back and he does have a little bit of dribbling of urine. No pain no fever or chills associated with it.  Diabetes    Hypertension stable    Hyperlipidemia need to get blood work. Current monitoring regimen: home blood tests - 1  Home blood sugar trends: AM BS in the 200's mostly 1 1/2 hours after meal PM -170's  Any episodes of hypoglycemia? No  Current exercise: he is active  Compliance with treatment: Good  Eye exam current (within one year): needs to make appt  Any history of foot problems? No  Last foot exam: 2021  Cardiovascular risk factors: advanced age (older than 54 for men, 72 for women), diabetes mellitus, dyslipidemia, hypertension, male gender, obesity (BMI >= 30 kg/m2) and sedentary lifestyle. Immunizations up to date: Flu Yes   Pneumonia Yes  Taking ASA: No      HPI:     HPI    has a current medication list which includes the following prescription(s): losartan, fluticasone, gabapentin, buspirone, metformin, symbicort, sertraline, ropinirole, sitagliptin, glimepiride, clopidogrel, one touch ultra 2, ventolin hfa, atorvastatin, one touch ultra test, pantoprazole, ranolazine, hydroxyzine, hydrochlorothiazide, epinephrine, onetouch delica lancets fine, acapella, baclofen, trazodone, hydrocodone-acetaminophen, fish oil, and sildenafil.     No Known Allergies    Social History     Tobacco Use    Smoking status: Former Smoker     Packs/day: 2.00     Years: 42.00     Pack years: 84.00     Types: Cigarettes     Start date: 1971     Quit date: 3/3/2017     Years since quittin.4    UPPER GASTROINTESTINAL ENDOSCOPY  1997    VEIN SURGERY Left September 2014    Dr. Promise Alarcon       Family History   Problem Relation Age of Onset    Cancer Mother     Breast Cancer Mother     Stroke Mother     Heart Disease Brother     Diabetes Brother     High Blood Pressure Brother     High Cholesterol Brother        /82 (Site: Right Upper Arm, Position: Sitting, Cuff Size: Large Adult)   Pulse 80   Temp 96.7 °F (35.9 °C) (Skin)   Resp 16   Ht 5' 8\" (1.727 m)   Wt 238 lb 6 oz (108.1 kg)   BMI 36.24 kg/m²   Wt Readings from Last 3 Encounters:   07/28/21 238 lb 6 oz (108.1 kg)   07/19/21 240 lb (108.9 kg)   06/14/21 237 lb 4 oz (107.6 kg)       Subjective:      Review of Systems   Constitutional: Negative for activity change, appetite change, diaphoresis and fever. HENT: Negative. Eyes: Negative. Respiratory: Negative for cough, chest tightness and shortness of breath. Cardiovascular: Negative for chest pain, palpitations and leg swelling. Gastrointestinal: Negative for abdominal pain, blood in stool, constipation, diarrhea, nausea and vomiting. Genitourinary: Positive for difficulty urinating and frequency. Negative for enuresis, flank pain and hematuria. Musculoskeletal: Positive for arthralgias and back pain. Skin: Negative. Negative for rash. Neurological: Negative. Negative for dizziness, syncope, weakness, light-headedness and headaches. Psychiatric/Behavioral: Negative. Objective:     Physical Exam  Vitals and nursing note reviewed. Constitutional:       General: He is not in acute distress. Appearance: He is well-developed. He is not diaphoretic. HENT:      Head: Normocephalic and atraumatic. Eyes:      General: No scleral icterus. Right eye: No discharge. Left eye: No discharge. Conjunctiva/sclera: Conjunctivae normal.      Pupils: Pupils are equal, round, and reactive to light. Neck:      Thyroid: No thyromegaly. Vascular: No JVD. Cardiovascular:      Rate and Rhythm: Normal rate and regular rhythm. Heart sounds: Normal heart sounds. No murmur heard. Pulmonary:      Effort: Pulmonary effort is normal. No respiratory distress. Breath sounds: Normal breath sounds. No wheezing, rhonchi or rales. Abdominal:      General: Bowel sounds are normal. There is no distension. Palpations: Abdomen is soft. There is no mass. Tenderness: There is no abdominal tenderness. There is no guarding or rebound. Musculoskeletal:         General: Normal range of motion. Cervical back: Normal range of motion and neck supple. Lymphadenopathy:      Cervical: No cervical adenopathy. Skin:     General: Skin is warm and dry. Findings: No rash. Neurological:      Mental Status: He is alert and oriented to person, place, and time. Psychiatric:         Behavior: Behavior normal.         Assessment:       Diagnosis Orders   1. Type 2 diabetes mellitus with complication, without long-term current use of insulin ([facility])  POCT glycosylated hemoglobin (Hb A1C)    POCT Glucose   2. Hyperlipidemia, unspecified hyperlipidemia type  Lipid Panel    AST    ALT   3. Moderate COPD (chronic obstructive pulmonary disease) ([facility] [address]  He follows with pulmonary and does not really uses his oxygen a whole lot only when he feels he needs it. 4. Essential hypertension     5. Testosterone deficiency in male  [address], Zeus Serrano MD, Urology, [facility] [place]   6. Benign prostatic hyperplasia with urinary frequency  Fransisco De Los Santos MD, Urology, [facility] [place]       Plan:      Current Outpatient Medications   Medication Sig Dispense Refill    losartan (COZAAR) 100 MG tablet TAKE 1 TABLET BY MOUTH DAILY 90 tablet 1    fluticasone (FLONASE) 50 MCG/ACT nasal spray 2 sprays by Nasal route daily 16 g 11    gabapentin (NEURONTIN) 100 MG capsule Take 1 capsule by mouth 3 times daily for 61 days.  90 capsule 1    busPIRone (BUSPAR) 10 MG tablet TAKE 1 TABLET BY MOUTH THREE TIMES DAILY 270 tablet 1    metFORMIN (GLUCOPHAGE) 500 MG tablet TAKE 2 TABLETS BY MOUTH TWICE DAILY WITH MEALS 360 tablet 1    SYMBICORT 160-4.5 MCG/ACT AERO INHALE 2 PUFFS INTO THE LUNGS TWICE DAILY. RINSE MOUTH AFTER USE ITS USE 1 Inhaler 11    sertraline (ZOLOFT) 100 MG tablet TAKE 1 TABLET BY MOUTH TWICE DAILY 180 tablet 1    rOPINIRole (REQUIP) 1 MG tablet TAKE 1 TABLET BY MOUTH THREE TIMES DAILY 270 tablet 1    SITagliptin (JANUVIA) 50 MG tablet Take 1 tablet by mouth daily 90 tablet 1    glimepiride (AMARYL) 4 MG tablet TAKE 1 TABLET BY MOUTH TWICE DAILY 180 tablet 1    clopidogrel (PLAVIX) 75 MG tablet Take 75 mg by mouth daily      Blood Glucose Monitoring Suppl (ONE TOUCH ULTRA 2) w/Device KIT 1 kit by Does not apply route daily 1 kit 0    VENTOLIN  (90 Base) MCG/ACT inhaler Inhale 2 puffs into the lungs every 6 hours as needed for Wheezing 1 Inhaler 8    atorvastatin (LIPITOR) 20 MG tablet       blood glucose test strips (ONE TOUCH ULTRA TEST) strip CHECK BLOOD SUGAR DAILY 100 strip 0    pantoprazole (PROTONIX) 40 MG tablet Take 40 mg by mouth 2 times daily       ranolazine (RANEXA) 500 MG extended release tablet Take 500 mg by mouth 2 times daily      hydrOXYzine (ATARAX) 10 MG tablet Take 10 mg by mouth nightly      hydrochlorothiazide (HYDRODIURIL) 12.5 MG tablet TK 1 T PO QD IN THE MORNING  3    EPINEPHrine (EPIPEN 2-HUBER) 0.3 MG/0.3ML SOAJ injection Inject one pen as directed STAT for allergic reaction, may disp generic NDC 98299-938-36 6 each 99    ONETOUCH DELICA LANCETS FINE MISC Check blood sugar twice daily Dx: E11.9 200 each 1    Misc.  Devices (ACAPELLA) MISC 1 Device by Does not apply route 4 times daily 1 each 0    baclofen (LIORESAL) 10 MG tablet take 1 tablet by mouth twice a day  0    traZODone (DESYREL) 150 MG tablet take 1/2 to 1 tablet by mouth once daily at bedtime if needed for pain SPASM, OR SLEEP  0    HYDROcodone-acetaminophen (NORCO) 5-325 MG per tablet Take 1 tablet by mouth every 6 hours as needed for Pain      Omega-3 Fatty Acids (FISH OIL) 1000 MG CAPS Take 1,000 mg by mouth daily.  sildenafil (VIAGRA) 100 MG tablet Take 1 tablet by mouth as needed. 8 tablet 5     No current facility-administered medications for this visit. Orders Placed This Encounter   Procedures    POCT glycosylated hemoglobin (Hb A1C)    POCT Glucose     Lab Results   Component Value Date    LABA1C 7.2 (A) 07/28/2021    LABA1C 8.5 (A) 04/28/2021    LABA1C 7.3 (A) 01/22/2021     Lab Results   Component Value Date    LABMICR 2.74 11/04/2016     Results for POC orders placed in visit on 07/28/21   POCT glycosylated hemoglobin (Hb A1C)   Result Value Ref Range    Hemoglobin A1C 7.2 (A) %   POCT Glucose   Result Value Ref Range    Glucose 203 (A) mg/dL    QC OK? Continue to monitor blood sugars 1 times a day. Keep log of blood sugars and bring with you to the next appointment. Discussed use, benefit, and side effects of prescribed medications. All patient questions answered. Pt voiced understanding. Instructed to continue current medications, ADA diet and exercise. Patient agreed with treatment plan. Return in about 4 months (around 11/28/2021) for DM.

## 2021-07-28 NOTE — PROGRESS NOTES
After consent obtained/verified, allergy injection given in back of R/L arm(s). VIAL COLOR OF ALL VIALS TODAY IS maintenance red    ALLERGY INJECTION FROM VIAL A GIVEN left  UPPER ARM IN THE AMOUNT OF 0.40 ML    ALLERGY INJECTION FROM VIAL B GIVEN right UPPER ARM IN THE AMOUNT OF 0.40 ML      Documentation of vial injection specific to arm(s) noted on Allergy Immunotherapy Administration Form. Patient declined to wait for 30 minutes              SHOT REACTION TREATMENT INSTRUCTIONS    During the 30 minute wait after an allergy injection the following symptoms should be reported:    Itching other than at the injection site  Hives or swelling other than at the injection site  Redness other than at the injection site  Difficulty breathing  Chest tightness  Difficulty swallowing  Throat tightness    If these symptoms occur, NOTIFY PROVIDER and the following treatment should be administered:    1. Epinephrine/Auvi Q 1:1000 IM - 0.3 ml if > 66 lbs or more, 0.15 ml if 33 - 63 lbs, or 0.1 ml if <33 lbs     2. Diphenhydramine - give all intramuscular:     2 to <6 years (off-label use): 6.25 mg,    6 to <12 years: 12.5 to 25 mg;    ?12 years: 25-50 mg.    3.  Famotidine:  Adults 40 mg oral    Adolescents age 12 years and >88 lbs: 40 mg    Children and Adolescents ? 12years of age: Initial: 0.25 mg/kg/dose  every 12 hours (maximum daily dose: 40 mg/day)    Epi/Auvi Q dose may me repeated in 5-15 minutes if adequate resolution of symptoms does not occur    Patient should be observed for at least one hour after final Epi/Auvi Q dose and must be seen by provider. Patients cannot drive themselves if they have received diphenhydramine.

## 2021-08-04 ENCOUNTER — NURSE ONLY (OUTPATIENT)
Dept: ALLERGY | Age: 66
End: 2021-08-04
Payer: MEDICARE

## 2021-08-04 VITALS
BODY MASS INDEX: 36.24 KG/M2 | RESPIRATION RATE: 13 BRPM | TEMPERATURE: 97.5 F | HEART RATE: 80 BPM | SYSTOLIC BLOOD PRESSURE: 138 MMHG | DIASTOLIC BLOOD PRESSURE: 70 MMHG | HEIGHT: 68 IN

## 2021-08-04 DIAGNOSIS — Z91.048 ALLERGY TO MOLD: ICD-10-CM

## 2021-08-04 DIAGNOSIS — J30.89 ALLERGY TO DUST: ICD-10-CM

## 2021-08-04 DIAGNOSIS — Z51.6 ENCOUNTER FOR DESENSITIZATION TO ALLERGENS: Primary | ICD-10-CM

## 2021-08-04 PROCEDURE — 95117 IMMUNOTHERAPY INJECTIONS: CPT | Performed by: NURSE PRACTITIONER

## 2021-08-04 NOTE — PROGRESS NOTES
After consent obtained/verified, allergy injection given in back of R/L arm(s). VIAL COLOR OF ALL VIALS TODAY IS MAINTENANCE RED     ALLERGY INJECTION FROM VIAL A GIVEN RIGHT  UPPER ARM IN THE AMOUNT OF 0.45 ML    ALLERGY INJECTION FROM VIAL B GIVEN LEFT UPPER ARM IN THE AMOUNT OF 0.45 ML      Documentation of vial injection specific to arm(s) noted on Allergy Immunotherapy Administration Form. Patient declined to wait for 30 minutes. SHOT REACTION TREATMENT INSTRUCTIONS    During the 30 minute wait after an allergy injection the following symptoms should be reported:    Itching other than at the injection site  Hives or swelling other than at the injection site  Redness other than at the injection site  Difficulty breathing  Chest tightness  Difficulty swallowing  Throat tightness    If these symptoms occur, NOTIFY PROVIDER and the following treatment should be administered:    1. Epinephrine/Auvi Q 1:1000 IM - 0.3 ml if > 66 lbs or more, 0.15 ml if 33 - 63 lbs, or 0.1 ml if <33 lbs     2. Diphenhydramine - give all intramuscular:     2 to <6 years (off-label use): 6.25 mg,    6 to <12 years: 12.5 to 25 mg;    ?12 years: 25-50 mg.    3.  Famotidine:  Adults 40 mg oral    Adolescents age 12 years and >88 lbs: 40 mg    Children and Adolescents ? 12years of age: Initial: 0.25 mg/kg/dose  every 12 hours (maximum daily dose: 40 mg/day)    Epi/Auvi Q dose may me repeated in 5-15 minutes if adequate resolution of symptoms does not occur    Patient should be observed for at least one hour after final Epi/Auvi Q dose and must be seen by provider. Patients cannot drive themselves if they have received diphenhydramine.

## 2021-08-11 ENCOUNTER — NURSE ONLY (OUTPATIENT)
Dept: ALLERGY | Age: 66
End: 2021-08-11
Payer: MEDICARE

## 2021-08-11 ENCOUNTER — NURSE ONLY (OUTPATIENT)
Dept: LAB | Age: 66
End: 2021-08-11

## 2021-08-11 VITALS
SYSTOLIC BLOOD PRESSURE: 124 MMHG | RESPIRATION RATE: 16 BRPM | TEMPERATURE: 98 F | HEART RATE: 82 BPM | DIASTOLIC BLOOD PRESSURE: 66 MMHG

## 2021-08-11 DIAGNOSIS — Z51.6 ENCOUNTER FOR DESENSITIZATION TO ALLERGENS: ICD-10-CM

## 2021-08-11 DIAGNOSIS — E78.5 HYPERLIPIDEMIA, UNSPECIFIED HYPERLIPIDEMIA TYPE: ICD-10-CM

## 2021-08-11 DIAGNOSIS — Z91.048 ALLERGY TO MOLD: ICD-10-CM

## 2021-08-11 DIAGNOSIS — R76.8 ELEVATED IGE LEVEL: ICD-10-CM

## 2021-08-11 DIAGNOSIS — J30.89 ALLERGY TO DUST: Primary | ICD-10-CM

## 2021-08-11 DIAGNOSIS — J30.1 NON-SEASONAL ALLERGIC RHINITIS DUE TO POLLEN: ICD-10-CM

## 2021-08-11 LAB
ALT SERPL-CCNC: 16 U/L (ref 11–66)
AST SERPL-CCNC: 14 U/L (ref 5–40)
BASOPHILS # BLD: 0.2 %
BASOPHILS ABSOLUTE: 0 THOU/MM3 (ref 0–0.1)
CHOLESTEROL, TOTAL: 111 MG/DL (ref 100–199)
EOSINOPHIL # BLD: 0.7 %
EOSINOPHILS ABSOLUTE: 0.1 THOU/MM3 (ref 0–0.4)
ERYTHROCYTE [DISTWIDTH] IN BLOOD BY AUTOMATED COUNT: 14.2 % (ref 11.5–14.5)
ERYTHROCYTE [DISTWIDTH] IN BLOOD BY AUTOMATED COUNT: 49.8 FL (ref 35–45)
HCT VFR BLD CALC: 48.4 % (ref 42–52)
HDLC SERPL-MCNC: 41 MG/DL
HEMOGLOBIN: 15.8 GM/DL (ref 14–18)
IMMATURE GRANS (ABS): 0.07 THOU/MM3 (ref 0–0.07)
IMMATURE GRANULOCYTES: 0.6 %
LDL CHOLESTEROL CALCULATED: 48 MG/DL
LYMPHOCYTES # BLD: 16.6 %
LYMPHOCYTES ABSOLUTE: 2.1 THOU/MM3 (ref 1–4.8)
MCH RBC QN AUTO: 30.9 PG (ref 26–33)
MCHC RBC AUTO-ENTMCNC: 32.6 GM/DL (ref 32.2–35.5)
MCV RBC AUTO: 94.7 FL (ref 80–94)
MONOCYTES # BLD: 5.5 %
MONOCYTES ABSOLUTE: 0.7 THOU/MM3 (ref 0.4–1.3)
NUCLEATED RED BLOOD CELLS: 0 /100 WBC
PLATELET # BLD: 194 THOU/MM3 (ref 130–400)
PMV BLD AUTO: 11.2 FL (ref 9.4–12.4)
RBC # BLD: 5.11 MILL/MM3 (ref 4.7–6.1)
SEG NEUTROPHILS: 76.4 %
SEGMENTED NEUTROPHILS ABSOLUTE COUNT: 9.7 THOU/MM3 (ref 1.8–7.7)
TRIGL SERPL-MCNC: 108 MG/DL (ref 0–199)
WBC # BLD: 12.7 THOU/MM3 (ref 4.8–10.8)

## 2021-08-11 PROCEDURE — 95117 IMMUNOTHERAPY INJECTIONS: CPT | Performed by: NURSE PRACTITIONER

## 2021-08-11 NOTE — PROGRESS NOTES
After consent obtained/verified, allergy injection given in back of R/L arm(s). VIAL COLOR OF ALL VIALS TODAY IS RED MAINTENANCE    ALLERGY INJECTION FROM VIAL A GIVEN Left  UPPER ARM IN THE AMOUNT OF 0.50 ML    ALLERGY INJECTION FROM VIAL B GIVEN Right UPPER ARM IN THE AMOUNT OF 0.50 ML    Documentation of vial injection specific to arm(s) noted on Allergy Immunotherapy Administration Form. Patient declined to wait for 30 minutes            SHOT REACTION TREATMENT INSTRUCTIONS    During the 30 minute wait after an allergy injection the following symptoms should be reported:    Itching other than at the injection site  Hives or swelling other than at the injection site  Redness other than at the injection site  Difficulty breathing  Chest tightness  Difficulty swallowing  Throat tightness    If these symptoms occur, NOTIFY PROVIDER and the following treatment should be administered:    1. Epinephrine/Auvi Q 1:1000 IM - 0.3 ml if > 66 lbs or more, 0.15 ml if 33 - 63 lbs, or 0.1 ml if <33 lbs     2. Diphenhydramine - give all intramuscular:     2 to <6 years (off-label use): 6.25 mg,    6 to <12 years: 12.5 to 25 mg;    ?12 years: 25-50 mg.    3.  Famotidine:  Adults 40 mg oral    Adolescents age 12 years and >88 lbs: 40 mg    Children and Adolescents ? 12years of age: Initial: 0.25 mg/kg/dose  every 12 hours (maximum daily dose: 40 mg/day)    Epi/Auvi Q dose may me repeated in 5-15 minutes if adequate resolution of symptoms does not occur    Patient should be observed for at least one hour after final Epi/Auvi Q dose and must be seen by provider. Patients cannot drive themselves if they have received diphenhydramine.

## 2021-08-12 LAB
IGA: 173 MG/DL (ref 70–400)
IGE: 620 IU/ML
IGG: 861 MG/DL (ref 700–1600)
IGM: 94 MG/DL (ref 40–230)

## 2021-08-16 ENCOUNTER — OFFICE VISIT (OUTPATIENT)
Dept: UROLOGY | Age: 66
End: 2021-08-16
Payer: MEDICARE

## 2021-08-16 ENCOUNTER — PROCEDURE VISIT (OUTPATIENT)
Dept: ALLERGY | Age: 66
End: 2021-08-16
Payer: MEDICARE

## 2021-08-16 ENCOUNTER — NURSE ONLY (OUTPATIENT)
Dept: LAB | Age: 66
End: 2021-08-16

## 2021-08-16 ENCOUNTER — NURSE ONLY (OUTPATIENT)
Dept: ALLERGY | Age: 66
End: 2021-08-16
Payer: MEDICARE

## 2021-08-16 VITALS — WEIGHT: 235 LBS | RESPIRATION RATE: 16 BRPM | HEIGHT: 68 IN | BODY MASS INDEX: 35.61 KG/M2

## 2021-08-16 VITALS
SYSTOLIC BLOOD PRESSURE: 134 MMHG | HEART RATE: 72 BPM | DIASTOLIC BLOOD PRESSURE: 82 MMHG | RESPIRATION RATE: 16 BRPM | TEMPERATURE: 97.3 F

## 2021-08-16 VITALS
HEIGHT: 68 IN | BODY MASS INDEX: 35.52 KG/M2 | TEMPERATURE: 97.3 F | RESPIRATION RATE: 16 BRPM | WEIGHT: 234.4 LBS | DIASTOLIC BLOOD PRESSURE: 82 MMHG | HEART RATE: 72 BPM | SYSTOLIC BLOOD PRESSURE: 134 MMHG

## 2021-08-16 DIAGNOSIS — Z91.09 MITE ALLERGY: ICD-10-CM

## 2021-08-16 DIAGNOSIS — N40.1 BENIGN PROSTATIC HYPERPLASIA WITH URINARY FREQUENCY: Primary | ICD-10-CM

## 2021-08-16 DIAGNOSIS — Z51.6 DESENSITIZATION TO ALLERGENS: ICD-10-CM

## 2021-08-16 DIAGNOSIS — E29.1 HYPOGONADISM IN MALE: ICD-10-CM

## 2021-08-16 DIAGNOSIS — J30.81 ALLERGY TO DOG DANDER: Primary | ICD-10-CM

## 2021-08-16 DIAGNOSIS — R76.8 ELEVATED IGE LEVEL: ICD-10-CM

## 2021-08-16 DIAGNOSIS — J30.81 CAT ALLERGIES: ICD-10-CM

## 2021-08-16 DIAGNOSIS — J30.1 NON-SEASONAL ALLERGIC RHINITIS DUE TO POLLEN: ICD-10-CM

## 2021-08-16 DIAGNOSIS — R35.0 BENIGN PROSTATIC HYPERPLASIA WITH URINARY FREQUENCY: Primary | ICD-10-CM

## 2021-08-16 DIAGNOSIS — J45.40 MODERATE PERSISTENT ASTHMA WITHOUT COMPLICATION: Primary | ICD-10-CM

## 2021-08-16 DIAGNOSIS — Z91.09 POLLEN ALLERGIES: ICD-10-CM

## 2021-08-16 DIAGNOSIS — J45.50 SEVERE PERSISTENT ASTHMA WITHOUT COMPLICATION: ICD-10-CM

## 2021-08-16 LAB
BILIRUBIN URINE: NEGATIVE
BLOOD URINE, POC: NEGATIVE
CHARACTER, URINE: CLEAR
COLOR, URINE: YELLOW
GLUCOSE URINE: NEGATIVE MG/DL
KETONES, URINE: NEGATIVE
LEUKOCYTE CLUMPS, URINE: NEGATIVE
NITRITE, URINE: NEGATIVE
PH, URINE: 5 (ref 5–9)
POST VOID RESIDUAL (PVR): 0 ML
PROTEIN, URINE: NEGATIVE MG/DL
SPECIFIC GRAVITY, URINE: 1.02 (ref 1–1.03)
UROBILINOGEN, URINE: 0.2 EU/DL (ref 0–1)

## 2021-08-16 PROCEDURE — 1036F TOBACCO NON-USER: CPT | Performed by: NURSE PRACTITIONER

## 2021-08-16 PROCEDURE — 1036F TOBACCO NON-USER: CPT | Performed by: UROLOGY

## 2021-08-16 PROCEDURE — G8417 CALC BMI ABV UP PARAM F/U: HCPCS | Performed by: NURSE PRACTITIONER

## 2021-08-16 PROCEDURE — 3017F COLORECTAL CA SCREEN DOC REV: CPT | Performed by: NURSE PRACTITIONER

## 2021-08-16 PROCEDURE — G8417 CALC BMI ABV UP PARAM F/U: HCPCS | Performed by: UROLOGY

## 2021-08-16 PROCEDURE — 4040F PNEUMOC VAC/ADMIN/RCVD: CPT | Performed by: NURSE PRACTITIONER

## 2021-08-16 PROCEDURE — 1123F ACP DISCUSS/DSCN MKR DOCD: CPT | Performed by: NURSE PRACTITIONER

## 2021-08-16 PROCEDURE — 3017F COLORECTAL CA SCREEN DOC REV: CPT | Performed by: UROLOGY

## 2021-08-16 PROCEDURE — G8427 DOCREV CUR MEDS BY ELIG CLIN: HCPCS | Performed by: UROLOGY

## 2021-08-16 PROCEDURE — 99214 OFFICE O/P EST MOD 30 MIN: CPT | Performed by: UROLOGY

## 2021-08-16 PROCEDURE — 81003 URINALYSIS AUTO W/O SCOPE: CPT | Performed by: UROLOGY

## 2021-08-16 PROCEDURE — 99214 OFFICE O/P EST MOD 30 MIN: CPT | Performed by: NURSE PRACTITIONER

## 2021-08-16 PROCEDURE — 95117 IMMUNOTHERAPY INJECTIONS: CPT | Performed by: NURSE PRACTITIONER

## 2021-08-16 PROCEDURE — 4040F PNEUMOC VAC/ADMIN/RCVD: CPT | Performed by: UROLOGY

## 2021-08-16 PROCEDURE — 1123F ACP DISCUSS/DSCN MKR DOCD: CPT | Performed by: UROLOGY

## 2021-08-16 PROCEDURE — G8428 CUR MEDS NOT DOCUMENT: HCPCS | Performed by: NURSE PRACTITIONER

## 2021-08-16 PROCEDURE — 51798 US URINE CAPACITY MEASURE: CPT | Performed by: UROLOGY

## 2021-08-16 RX ORDER — BUDESONIDE AND FORMOTEROL FUMARATE DIHYDRATE 160; 4.5 UG/1; UG/1
2 AEROSOL RESPIRATORY (INHALATION) 2 TIMES DAILY
COMMUNITY
End: 2021-08-16

## 2021-08-16 RX ORDER — DUPILUMAB 300 MG/2ML
300 INJECTION, SOLUTION SUBCUTANEOUS
Qty: 2 PEN | Refills: 5 | Status: SHIPPED | OUTPATIENT
Start: 2021-08-16 | End: 2021-12-21

## 2021-08-16 RX ORDER — CETIRIZINE HYDROCHLORIDE 10 MG/1
10 TABLET ORAL DAILY
Qty: 90 TABLET | Refills: 3 | Status: SHIPPED | OUTPATIENT
Start: 2021-08-16 | End: 2021-08-16

## 2021-08-16 RX ORDER — TAMSULOSIN HYDROCHLORIDE 0.4 MG/1
0.4 CAPSULE ORAL DAILY
Qty: 90 CAPSULE | Refills: 3 | Status: SHIPPED | OUTPATIENT
Start: 2021-08-16 | End: 2022-02-11

## 2021-08-16 RX ORDER — BUDESONIDE AND FORMOTEROL FUMARATE DIHYDRATE 160; 4.5 UG/1; UG/1
2 AEROSOL RESPIRATORY (INHALATION) 2 TIMES DAILY
Qty: 3 INHALER | Refills: 1 | Status: ON HOLD
Start: 2021-08-16 | End: 2022-03-25 | Stop reason: HOSPADM

## 2021-08-16 RX ORDER — ASPIRIN 81 MG/1
81 TABLET ORAL DAILY
Status: ON HOLD | COMMUNITY
End: 2022-02-25 | Stop reason: HOSPADM

## 2021-08-16 ASSESSMENT — ENCOUNTER SYMPTOMS
VOMITING: 0
FACIAL SWELLING: 0
COUGH: 0
VOICE CHANGE: 0
SINUS PRESSURE: 0
WHEEZING: 0
STRIDOR: 0
RHINORRHEA: 1
NAUSEA: 0
APNEA: 0
SHORTNESS OF BREATH: 1
ABDOMINAL PAIN: 0
DIARRHEA: 0
COLOR CHANGE: 0
TROUBLE SWALLOWING: 0
SORE THROAT: 0
CHOKING: 0
CHEST TIGHTNESS: 0

## 2021-08-16 NOTE — PROGRESS NOTES
After consent obtained/verified, allergy injection given in back of R/L arm(s). VIAL COLOR OF ALL VIALS TODAY IS RED MAINTENANCE    ALLERGY INJECTION FROM VIAL A GIVEN Right  UPPER ARM IN THE AMOUNT OF 0.50 ML    ALLERGY INJECTION FROM VIAL B GIVEN Left UPPER ARM IN THE AMOUNT OF 0.50 ML    Documentation of vial injection specific to arm(s) noted on Allergy Immunotherapy Administration Form. Patient declined to wait for 30 minutes            SHOT REACTION TREATMENT INSTRUCTIONS    During the 30 minute wait after an allergy injection the following symptoms should be reported:    Itching other than at the injection site  Hives or swelling other than at the injection site  Redness other than at the injection site  Difficulty breathing  Chest tightness  Difficulty swallowing  Throat tightness    If these symptoms occur, NOTIFY PROVIDER and the following treatment should be administered:    1. Epinephrine/Auvi Q 1:1000 IM - 0.3 ml if > 66 lbs or more, 0.15 ml if 33 - 63 lbs, or 0.1 ml if <33 lbs     2. Diphenhydramine - give all intramuscular:     2 to <6 years (off-label use): 6.25 mg,    6 to <12 years: 12.5 to 25 mg;    ?12 years: 25-50 mg.    3.  Famotidine:  Adults 40 mg oral    Adolescents age 12 years and >88 lbs: 40 mg    Children and Adolescents ? 12years of age: Initial: 0.25 mg/kg/dose  every 12 hours (maximum daily dose: 40 mg/day)    Epi/Auvi Q dose may me repeated in 5-15 minutes if adequate resolution of symptoms does not occur    Patient should be observed for at least one hour after final Epi/Auvi Q dose and must be seen by provider. Patients cannot drive themselves if they have received diphenhydramine.

## 2021-08-16 NOTE — PROGRESS NOTES
without the administration of intravenous contrast material. All CT scans at this facility use dose modulation, iterative reconstruction, and/or weight-based dosing when appropriate to reduce radiation dose to as low as reasonably achievable. FINDINGS: Lungs: 5 mm noncalcified nodule in the right middle lobe (series 3 image 316). This previously measured 4 mm. Unchanged calcified granuloma within the left lower lobe. No additional discrete pulmonary nodules are identified. There are again moderate centrilobular emphysematous changes within both lungs, with findings somewhat greater on the right lung. There is patchy atelectasis versus scarring in the right middle lobe and lingula. There is mild dependent atelectasis in bilateral lower lobes. There is no pleural effusion. There is no pneumothorax. Proximal tracheobronchial tree is patent. There are probable retained secretions within the trachea. Mediastinum and Lashell: There is no axillary or mediastinal lymphadenopathy. There is limited evaluation of the lashell on this noncontrast enhanced examination, without bulky hilar lymphadenopathy. There are calcified left hilar lymph nodes, relating to remote granulomatous disease. Heart size is normal. There is no pericardial effusion. Thoracic aorta is normal in caliber. There is calcified atherosclerotic plaque of the thoracic aorta. There are coronary artery calcifications, however the study is not  optimized for coronary artery evaluation. Upper Abdomen: There are multiple calcified granulomata in the partially visualized spleen. Remainder of the visualized upper abdomen is unremarkable. Bones: There are no destructive osseous lesions identified. Vertebral body heights are maintained. There are degenerative changes of the visualized spine. 1. 5 mm noncalcified nodule in the right middle lobe, previously measuring 4 mm. No additional noncalcified pulmonary nodules.  Lung-RADS Score: 2: Benign appearance or behavior (nodules with a very low likelihood of becoming a clinically active cancer due to size or lack of growth). Management: Continue annual screening with LDCT in 12 months. 2. Moderate emphysematous changes. LUNG RADS RECOMMENDATIONS: 1. Normal, continue annual screening 2. Benign appearance or behavior, continue annual screening 3.  6 month CT recommended 4A.  3 month CT recommended; may consider PET/CT 4B. Additional diagnostics and/or tissue sampling recommended 4X. Additional diagnostics and/or tissue sampling recommended  **This report has been created using voice recognition software. It may contain minor errors which are inherent in voice recognition technology. ** Final report electronically signed by Dr Charmayne Morita on 7/14/2021 9:45 AM      PAST MEDICAL, FAMILY AND SOCIAL HISTORY:  Past Medical History:   Diagnosis Date    Acid reflux     Arthritis     Asthma     Chronic back pain     Class 2 severe obesity due to excess calories with serious comorbidity and body mass index (BMI) of 37.0 to 37.9 in adult [facility]) 8/13/2018    Colon polyps 11/06    COPD (chronic obstructive pulmonary disease) (HCC)     Depression     panic attacks    Diverticulosis     HTN (hypertension)     Hyperlipidemia     Kidney disorder     Panic attack     Pneumonia     Rash     Recurrent upper respiratory infection (URI)     Sleep apnea     Thumb amputation status 3/13/14    left tip of thumb amputated    Thyroid disease     Type II or unspecified type diabetes mellitus without mention of complication, not stated as uncontrolled      Past Surgical History:   Procedure Laterality Date    BACK SURGERY  2002    BACK SURGERY  08/11/2017    BICEPS TENDON REPAIR Right 08/16/2017    BRONCHOSCOPY      BRONCHOSCOPY N/A 4/5/2019    BRONCHOSCOPY performed by Bernabe Staton MD at [address] [place] [address]  4/5/2019    BRONCHOSCOPY ALVEOLAR LAVAGE performed by Bernabe [facility]on MD at [facility]    CARDIAC CATHETERIZATION  07/02 08/05    CARPAL TUNNEL RELEASE  2011    right hand    CHOLECYSTECTOMY  yrs ago    COLONOSCOPY  11/06 02/12 1/14    CORONARY ANGIOPLASTY WITH STENT PLACEMENT  02/2020    ENDOSCOPY, COLON, DIAGNOSTIC      EYE SURGERY      foreign body removal    HERNIA REPAIR  2004    Dr Kimberley Harris  2008?     LARYNGOSCOPY  04/28/2016    Laryngoscopy Micro with Biopsy by Dr Denisse Watt  01/07    uppp    ROTATOR CUFF REPAIR Left 5-20-15    SKIN BIOPSY      TONSILLECTOMY  1985    UPPER GASTROINTESTINAL ENDOSCOPY  1997    VEIN SURGERY Left September 2014    Dr. Nitza Ibanez     Family History   Problem Relation Age of Onset    Cancer Mother     Breast Cancer Mother     Stroke Mother     Heart Disease Brother     Diabetes Brother     High Blood Pressure Brother     High Cholesterol Brother      Outpatient Medications Marked as Taking for the 8/16/21 encounter (Office Visit) with Jayesh Dhillon MD   Medication Sig Dispense Refill    budesonide-formoterol (SYMBICORT) 160-4.5 MCG/ACT AERO Inhale 2 puffs into the lungs 2 times daily 3 Inhaler 1    aspirin 81 MG EC tablet Take 81 mg by mouth daily      albuterol sulfate (PROAIR RESPICLICK) 890 (90 Base) MCG/ACT aerosol powder inhalation Inhale into the lungs every 4 hours as needed for Wheezing or Shortness of Breath      sertraline (ZOLOFT) 100 MG tablet TAKE 1 TABLET BY MOUTH TWICE DAILY 180 tablet 1    losartan (COZAAR) 100 MG tablet Take 1 tablet by mouth daily (Patient taking differently: Take 50 mg by mouth daily ) 90 tablet 1    traZODone (DESYREL) 150 MG tablet take 1/2 to 1 tablet by mouth once daily at bedtime if needed for pain SPASM, OR SLEEP 90 tablet 0    fluticasone (FLONASE) 50 MCG/ACT nasal spray 2 sprays by Nasal route daily 16 g 11    busPIRone (BUSPAR) 10 MG tablet TAKE 1 TABLET BY MOUTH THREE TIMES DAILY 270 tablet 1    metFORMIN (GLUCOPHAGE) 500 MG tablet TAKE 2 TABLETS BY MOUTH TWICE DAILY WITH MEALS 360 tablet 1    rOPINIRole (REQUIP) 1 MG tablet TAKE 1 TABLET BY MOUTH THREE TIMES DAILY 270 tablet 1    glimepiride (AMARYL) 4 MG tablet TAKE 1 TABLET BY MOUTH TWICE DAILY 180 tablet 1    clopidogrel (PLAVIX) 75 MG tablet Take 75 mg by mouth daily      Blood Glucose Monitoring Suppl (ONE TOUCH ULTRA 2) w/Device KIT 1 kit by Does not apply route daily 1 kit 0    VENTOLIN  (90 Base) MCG/ACT inhaler Inhale 2 puffs into the lungs every 6 hours as needed for Wheezing 1 Inhaler 8    atorvastatin (LIPITOR) 20 MG tablet       blood glucose test strips (ONE TOUCH ULTRA TEST) strip CHECK BLOOD SUGAR DAILY 100 strip 0    pantoprazole (PROTONIX) 40 MG tablet Take 40 mg by mouth 2 times daily       ranolazine (RANEXA) 500 MG extended release tablet Take 500 mg by mouth 2 times daily      hydrochlorothiazide (HYDRODIURIL) 12.5 MG tablet TK 1 T PO QD IN THE MORNING  3    EPINEPHrine (EPIPEN 2-HUBER) 0.3 MG/0.3ML SOAJ injection Inject one pen as directed STAT for allergic reaction, may disp generic NDC 58809-929-45 6 each 99    ONETOUCH DELICA LANCETS FINE MISC Check blood sugar twice daily Dx: E11.9 200 each 1    baclofen (LIORESAL) 10 MG tablet take 1 tablet by mouth twice a day  0    HYDROcodone-acetaminophen (NORCO) 5-325 MG per tablet Take 1 tablet by mouth every 6 hours as needed for Pain      Omega-3 Fatty Acids (FISH OIL) 1000 MG CAPS Take 1,000 mg by mouth daily.  sildenafil (VIAGRA) 100 MG tablet Take 1 tablet by mouth as needed. 8 tablet 5       Patient has no known allergies.   Social History     Tobacco Use   Smoking Status Former Smoker    Packs/day: 2.00    Years: 42.00    Pack years: 84.00    Types: Cigarettes    Start date: 1971   [employer] Quit date: 3/3/2017    Years since quittin.4   Smokeless Tobacco Never Used   Tobacco Comment    pts uses just nicotine vap      (If patient a smoker, smoking cessation counseling offered)   Social History Substance and Sexual Activity   Alcohol Use No    Alcohol/week: 0.0 standard drinks       REVIEW OF SYSTEMS:  Constitutional: negative  Eyes: negative  Respiratory: negative  Cardiovascular: negative  Gastrointestinal: negative  Genitourinary: see HPI  Musculoskeletal: negative  Skin: negative   Neurological: negative  Hematological/Lymphatic: negative  Psychological: negative        Physical Exam:    This a 74 y.o. male  Vitals:    08/16/21 0934   Resp: 16     Body mass index is 35.73 kg/m². Constitutional: Patient in no acute distress;         Assessment and Plan        1. Benign prostatic hyperplasia with urinary frequency    2. Hypogonadism in male               Plan:       Hypogonadism- no TRT due to SE profile  BPH- will start flomax    Follow up in six weeks for med check      Prescriptions Ordered:  No orders of the defined types were placed in this encounter.      Orders Placed:  Orders Placed This Encounter   Procedures    POCT Urinalysis No Micro (Auto)    poct post void residual     Bladder scan            FRANC Malone MD

## 2021-08-16 NOTE — PROGRESS NOTES
@[facility]@    Allergy & Asthma   [address] [facility], [address]  Ph:   [phone]  Fax:[phone]    Provider:  Dr. Colten Quiñones:   Chief Complaint   Patient presents with    Follow-up     Patient here for 1 year follow up for allergy management, allergy testing and lab review. HISTORY OF PRESENT ILLNESS: ESTABLISHED PATIENT HERE FOR EVALUATION   22-year-old  male here today for evaluation of asthma and allergies. Patient states that he is done well on his allergy injections for the last year he has been receiving them at least once a week. He denies any nausea, vomiting, fever. He continues to take his antihistamine cetirizine 10 mg at night. Patient has tried a second antihistamine Xyzal 5 mg for about 45 days but switch back to cetirizine 10 mg. He tried to take montelukast but was taken off this medication by the pulmonologist and therefore he has no longer on this medication. Patient states he does not know why he was taken off this medication as maybe he was having side effects of medication and and was told to stop the medication. Patient continues to experience shortness of breath and has difficulty with breathing. He does use his inhalers as directed. Patient states that he was spoken to once before about starting a biologic and he is now interested in possibly doing a biologic he is got a get some help with his breathing. Severity of his breathing is moderate to severe. He states that he is no longer experiencing relief with the inhalers that is growing increasing difficulty with breathing and his quality of life is diminished. Patient states he is having difficulty with ambulating and becoming short of breath. He is hopeful that perhaps starting on one of the Biologics may help him breathe better.         Review of Systems:  Review of Systems   Constitutional: Negative for activity change, appetite change, chills, diaphoresis, fatigue, fever and unexpected weight change. HENT: Positive for congestion and rhinorrhea. Negative for dental problem, ear discharge, ear pain, facial swelling, hearing loss, mouth sores, nosebleeds, sinus pressure, sneezing, sore throat, tinnitus, trouble swallowing and voice change. Eyes: Negative for visual disturbance. Respiratory: Positive for shortness of breath. Negative for apnea, cough, choking, chest tightness, wheezing and stridor. Cardiovascular: Negative for chest pain, palpitations and leg swelling. Gastrointestinal: Negative for abdominal pain, diarrhea, nausea and vomiting. Endocrine: Negative for cold intolerance, heat intolerance, polydipsia and polyuria. Genitourinary: Negative for difficulty urinating, discharge, dysuria, enuresis, hematuria, penile pain, penile swelling, scrotal swelling, testicular pain and urgency. Musculoskeletal: Negative for arthralgias, gait problem, neck pain and neck stiffness. Skin: Negative for color change, rash and wound. Allergic/Immunologic: Positive for environmental allergies. Negative for food allergies and immunocompromised state. Neurological: Negative for dizziness, seizures, syncope, facial asymmetry, speech difficulty, light-headedness and headaches. Hematological: Negative for adenopathy. Does not bruise/bleed easily. Psychiatric/Behavioral: Negative for confusion, sleep disturbance and suicidal ideas. The patient is not nervous/anxious. All other systems reviewed and are negative.         Past MedicalHistory:    Past Medical History:   Diagnosis Date    Acid reflux     Arthritis     Asthma     Chronic back pain     Class 2 severe obesity due to excess calories with serious comorbidity and body mass index (BMI) of 37.0 to 37.9 in adult [facility]) 8/13/2018    Colon polyps 11/06    COPD (chronic obstructive pulmonary disease) (HCC)     Depression     panic attacks    Diverticulosis     HTN (hypertension)     Hyperlipidemia     Kidney disorder     Panic attack     Pneumonia     Rash     Recurrent upper respiratory infection (URI)     Sleep apnea     Thumb amputation status 3/13/14    left tip of thumb amputated    Thyroid disease     Type II or unspecified type diabetes mellitus without mention of complication, not stated as uncontrolled        Past Surgical History:  Past Surgical History:   Procedure Laterality Date    BACK SURGERY  2002    BACK SURGERY  2017    BICEPS TENDON REPAIR Right 2017    BRONCHOSCOPY      BRONCHOSCOPY N/A 2019    BRONCHOSCOPY performed by Anthony Mills MD at [address] [place] [address]  2019    BRONCHOSCOPY ALVEOLAR LAVAGE performed by Anthony Mills MD at [address] [facility]      CARPAL TUNNEL RELEASE      right hand    CHOLECYSTECTOMY  yrs ago    COLONOSCOPY      CORONARY ANGIOPLASTY WITH STENT PLACEMENT  2020    ENDOSCOPY, COLON, DIAGNOSTIC      EYE SURGERY      foreign body removal    HERNIA REPAIR      Dr Clarence York  ?     LARYNGOSCOPY  2016    Laryngoscopy Micro with Biopsy by Dr Edwina Paz      uppp    ROTATOR CUFF REPAIR Left -15    SKIN BIOPSY      TONSILLECTOMY      UPPER GASTROINTESTINAL ENDOSCOPY      VEIN SURGERY Left 2014    Dr. Argenis Scherer       Family History:   Family History   Problem Relation Age of Onset    Cancer Mother     Breast Cancer Mother     Stroke Mother     Heart Disease Brother     Diabetes Brother     High Blood Pressure Brother     High Cholesterol Brother        Social History:   Social History     Tobacco Use    Smoking status: Former Smoker     Packs/day: 2.00     Years: 42.00     Pack years: 84.00     Types: Cigarettes     Start date: 1971     Quit date: 3/3/2017     Years since quittin.4    Smokeless tobacco: Never Used   Jorge Brizuela Tobacco comment: pts uses just nicotine vap   Substance Use Topics    Alcohol use: No     Alcohol/week: 0.0 standard drinks        Allergies:  Patient has no known allergies.     CurrentMedications:     Current Outpatient Medications:     budesonide-formoterol (SYMBICORT) 160-4.5 MCG/ACT AERO, Inhale 2 puffs into the lungs 2 times daily, Disp: 3 Inhaler, Rfl: 1    Dupilumab (DUPIXENT) 300 MG/2ML SOPN, Inject 300 mg into the skin every 14 days, Disp: 2 pen, Rfl: 5    sertraline (ZOLOFT) 100 MG tablet, TAKE 1 TABLET BY MOUTH TWICE DAILY, Disp: 180 tablet, Rfl: 1    losartan (COZAAR) 100 MG tablet, Take 1 tablet by mouth daily (Patient taking differently: Take 50 mg by mouth daily ), Disp: 90 tablet, Rfl: 1    traZODone (DESYREL) 150 MG tablet, take 1/2 to 1 tablet by mouth once daily at bedtime if needed for pain SPASM, OR SLEEP, Disp: 90 tablet, Rfl: 0    fluticasone (FLONASE) 50 MCG/ACT nasal spray, 2 sprays by Nasal route daily, Disp: 16 g, Rfl: 11    busPIRone (BUSPAR) 10 MG tablet, TAKE 1 TABLET BY MOUTH THREE TIMES DAILY, Disp: 270 tablet, Rfl: 1    metFORMIN (GLUCOPHAGE) 500 MG tablet, TAKE 2 TABLETS BY MOUTH TWICE DAILY WITH MEALS, Disp: 360 tablet, Rfl: 1    rOPINIRole (REQUIP) 1 MG tablet, TAKE 1 TABLET BY MOUTH THREE TIMES DAILY, Disp: 270 tablet, Rfl: 1    glimepiride (AMARYL) 4 MG tablet, TAKE 1 TABLET BY MOUTH TWICE DAILY, Disp: 180 tablet, Rfl: 1    clopidogrel (PLAVIX) 75 MG tablet, Take 75 mg by mouth daily, Disp: , Rfl:     Blood Glucose Monitoring Suppl (ONE TOUCH ULTRA 2) w/Device KIT, 1 kit by Does not apply route daily, Disp: 1 kit, Rfl: 0    VENTOLIN  (90 Base) MCG/ACT inhaler, Inhale 2 puffs into the lungs every 6 hours as needed for Wheezing, Disp: 1 Inhaler, Rfl: 8    atorvastatin (LIPITOR) 20 MG tablet, , Disp: , Rfl:     blood glucose test strips (ONE TOUCH ULTRA TEST) strip, CHECK BLOOD SUGAR DAILY, Disp: 100 strip, Rfl: 0    pantoprazole (PROTONIX) 40 MG tablet, Take 40 mg by mouth 2 times daily , Disp: , Rfl:     ranolazine (RANEXA) 500 MG extended release tablet, Take 500 mg by mouth 2 times daily, Disp: , Rfl:     hydrochlorothiazide (HYDRODIURIL) 12.5 MG tablet, TK 1 T PO QD IN THE MORNING, Disp: , Rfl: 3    EPINEPHrine (EPIPEN 2-HUBER) 0.3 MG/0.3ML SOAJ injection, Inject one pen as directed STAT for allergic reaction, may disp generic NDC 42645-774-52, Disp: 6 each, Rfl: 99    ONETOUCH DELICA LANCETS FINE MISC, Check blood sugar twice daily Dx: E11.9, Disp: 200 each, Rfl: 1    baclofen (LIORESAL) 10 MG tablet, take 1 tablet by mouth twice a day, Disp: , Rfl: 0    HYDROcodone-acetaminophen (NORCO) 5-325 MG per tablet, Take 1 tablet by mouth every 6 hours as needed for Pain, Disp: , Rfl:     Omega-3 Fatty Acids (FISH OIL) 1000 MG CAPS, Take 1,000 mg by mouth daily. , Disp: , Rfl:     sildenafil (VIAGRA) 100 MG tablet, Take 1 tablet by mouth as needed. , Disp: 8 tablet, Rfl: 5    aspirin 81 MG EC tablet, Take 81 mg by mouth daily, Disp: , Rfl:     albuterol sulfate (PROAIR RESPICLICK) 382 (90 Base) MCG/ACT aerosol powder inhalation, Inhale into the lungs every 4 hours as needed for Wheezing or Shortness of Breath, Disp: , Rfl:     tamsulosin (FLOMAX) 0.4 MG capsule, Take 1 capsule by mouth daily, Disp: 90 capsule, Rfl: 3      Physical Exam:      Vitals:    Vitals:    08/16/21 0807   BP: 134/82   Pulse: 72   Resp: 16   Temp: 97.3 °F (36.3 °C)       234 lb 6.4 oz (106.3 kg)       Temp: 97.3 °F (36.3 °C) I @FLOWSTAT(6)@ IPulse: 72 I @FLOWSTAT(8)@ I BP: 134/82 I @HUMZA(40)@; @CHIQUIB(66)@ I Resp: 16 I @FLOWSTAT(9)@ I   I @FLOWSTAT(10)@ I   I Height: 5' 8\" (172.7 cm) I   I Facility age limit for growth percentiles is 20 years. I     Facility age limit for growth percentiles is 20 years. Facility age limit for growth percentiles is 20 years. Facility age limit for growth percentiles is 20 years. Facility age limit for growth percentiles is 20 years.     Physical Exam:    Physical Exam  Vitals and nursing note reviewed. Constitutional:       Appearance: Normal appearance. He is well-developed. He is obese. HENT:      Head: Normocephalic and atraumatic. Right Ear: Tympanic membrane, ear canal and external ear normal.      Left Ear: Tympanic membrane, ear canal and external ear normal.      Nose: Rhinorrhea present. Mouth/Throat:      Mouth: Mucous membranes are moist.      Pharynx: No oropharyngeal exudate. Eyes:      General: No scleral icterus. Right eye: No discharge. Left eye: No discharge. Extraocular Movements: Extraocular movements intact. Conjunctiva/sclera: Conjunctivae normal.      Pupils: Pupils are equal, round, and reactive to light. Neck:      Thyroid: No thyromegaly. Cardiovascular:      Rate and Rhythm: Normal rate and regular rhythm. Pulses: Normal pulses. Heart sounds: Normal heart sounds. Pulmonary:      Effort: Pulmonary effort is normal. No respiratory distress. Breath sounds: Normal breath sounds. No wheezing or rales. Comments: Chronic shortness of breath r/t asthma  Abdominal:      Palpations: Abdomen is soft. Tenderness: There is no abdominal tenderness. Musculoskeletal:         General: No tenderness. Normal range of motion. Cervical back: Normal range of motion and neck supple. Skin:     General: Skin is warm and dry. Findings: No rash. Neurological:      General: No focal deficit present. Mental Status: He is alert and oriented to person, place, and time. Mental status is at baseline. Deep Tendon Reflexes: Reflexes are normal and symmetric. Psychiatric:         Mood and Affect: Mood normal.         Behavior: Behavior normal.         Thought Content:  Thought content normal.         Judgment: Judgment normal.             DATA:  6/7/2021  9:14 AM - Elissa Rich Incoming Lab Results From Soft    Component Value Ref Range & Units Status Collected Lab   WBC 6.2  4.8 - 10.8 thou/mm3 Final 06/07/2021  9:05 AM [facility]   RBC 4.46Low   4.70 - 6.10 mill/mm3 Final 06/07/2021  9:05 AM [facility]   Hemoglobin 14.0  14.0 - 18.0 gm/dl Final 06/07/2021  9:05 AM [facility]   Hematocrit 41.9Low   42.0 - 52.0 % Final 06/07/2021  9:05 AM [facility]   MCV 93.9  80.0 - 94.0 fL Final 06/07/2021  9:05 AM [facility]   MCH 31.4  26.0 - 33.0 pg Final 06/07/2021  9:05 AM [facility]   MCHC 33.4  32.2 - 35.5 gm/dl Final 06/07/2021  9:05 AM [facility]   RDW-CV 14.3  11.5 - 14.5 % Final 06/07/2021  9:05 AM [facility]   RDW-SD 49. 3High   35.0 - 45.0 fL Final 06/07/2021  9:05 AM [facility]   Platelets 843  654 - 400 thou/mm3 Final 06/07/2021  9:05 AM [facility]   MPV 11.0  9.4 - 12.4 fL Final 06/07/2021  9:05 AM [facility]   Seg Neutrophils 77.0  % Final 06/07/2021  9:05 AM [facility]   Lymphocytes 12.0  % Final 06/07/2021  9:05 AM [facility]   Monocytes 7.9  % Final 06/07/2021  9:05 AM [facility]   Eosinophils 2.3  % Final 06/07/2021  9:05 AM  [facility]   Basophils 0.2  % Final 06/07/2021  9:05 AM [facility]   Immature Granulocytes 0.6  % Final 06/07/2021  9:05 AM [facility]   Segs Absolute 4.8  1 - 7 thou/mm3 Final 06/07/2021  9:05 AM [facility]   Lymphocytes Absolute 0.7Low   1.0 - 4.8 thou/mm3 Final 06/07/2021  9:05 AM [facility] Lab   Monocytes Absolute 0.5  0.4 - 1.3 thou/mm3 Final 06/07/2021  9:05 AM [facility] Lab   Eosinophils Absolute 0.1  0.0 - 0.4 thou/mm3 Final 06/07/2021  9:05 AM [facility] Lab   Basophils Absolute 0.0  0.0 - 0.1 thou/mm3 Final 06/07/2021  9:05 AM [facility] Lab   Immature Grans (Abs) 0.04  0.00 - 0.07 thou/mm3 Final 06/07/2021  9:05 AM [facility] Lab   nRBC 0  /100 wbc Final 06/07/2021  9:05 AM [facility] Lab   Performed at [facility] [address], [address]   Testing Performed By    Roberta Jimenez Name Director Address Valid Date Range   [facility] [address]  [facility] [place] [address] 11/18/20 1455-Present   Lab and Collection    CBC Auto Differential - 6/7/2021  Result History    CBC Auto Differential on 6/7/2021   Result Information    Flag: AbnormalAbnormal   Status: Final result (Collected: 6/7/2021 09:05) Provider Status: Ordered   All Reviewers List    Sharda Roberto MD on 2/2/7462 15:77   Click to Print Result   View [facility] Info    CBC Auto Differential (Order #[id number]) on 6/7/21   Order Report    Order Details         IgE   Date/Time Value Ref Range Status   08/11/2021 10:02  (H) <101 IU/mL Final     Comment:     [address] [phone]      IgG   Date/Time Value Ref Range Status   08/11/2021 10:02  700 - 1600 mg/dL Final     Comment:     [address] [phone]     IgA   Date/Time Value Ref Range Status   08/11/2021 10:02  70 - 400 mg/dL Final     Comment:     [address] [phone]      IgM   Date/Time Value Ref Range Status   08/11/2021 10:01 AM 94 40 - 230 mg/dL Final     Comment:     [address] [phone]       No results found for: CLAUDIA   No results found for: RF       No results found for this or any previous visit. No results found for this or any previous visit. All skin medial labs have been reviewed including allergy labs which were ordered    THE PATIENT REQUIRES DUPIXENT 600 x FIRST DOSE THEN 300MG EVERY 2 WEEKS TO CONTROL THE FOLLOWING CONDITION. HAS TRIED PREVIOUS TREATMENTS LISTED BELOW AND HAS FAILED THERAPY.       EOSINOPHILIC ASTHMA (D71.46)  DATE OF ANTIHISTAMINE 1 NAME AND TRIAL PERIOD:XYZAL 2021 TO 2021  DATE OF ANTIHISTAMINE 2 DATE AND TRIAL PERIOD:CETIRIZINE 2021 TO PRESENT  DATE OF SINGULAIR TRIAL PERIOD:2021 TO 2021 DISCONTINUED BY  PER PATIENT  PREDNISONE BURST PACK TRIAL PERIODS DURING LAST YEAR DATE AND MONTH:2021 PREDNISONE BURST PACK 20 MG BID FOR 5 DAYS, 2021 PREDNISONE 20 MG BID FOR 5 DAYS  IGE LEVEL:620  SKIN TESTIN/10/2019  INHALED MEDICATIONS TRIED:    ALBUTEROL 2 PUFFS EVERY 4 HOURS AS NEEDED SINCE     SYMBICORT 160/4.5 - 2 PUFFS TWICE PER DAY 2021    QVAR 80MCG - 2 PUFF TWICE PER DAY      FLOVENT: 110 MCG 1 PUFF 2 TIMES PER DAY -    PULMICORT: 0.5 MG / 2 ML NEB SUSPENSION 1 AMPLE 2 TIMES PER DAY - 2019 - 2021  accolate 20 MG TABLE ONE TAB TWICE PER DAY - DISCONTINUED              PROCEDURES:      Skin Testing performed on:06/10/2019    Assessment/Orders:    Diagnosis Orders   1. Moderate persistent asthma without complication     2. Elevated IgE level  STRONGYLOIDES AB, IGG BY FROY    DISCONTINUED: cetirizine (ZYRTEC) 10 MG tablet   3. Pollen allergies  DISCONTINUED: cetirizine (ZYRTEC) 10 MG tablet   4. Mite allergy     5. Desensitization to allergens     6. Non-seasonal allergic rhinitis due to pollen  DISCONTINUED: cetirizine (ZYRTEC) 10 MG tablet   7. Severe persistent asthma without complication  Dupilumab (DUPIXENT) 300 MG/2ML SOPN       All diagnostic imaging  have been personally reviewed     Plan:  Follow Up:6 months      Patient to continue allergy shots and reduce the frequency to every other week. During episodes of where he is having increased rhinorrhea he may increase allergy shots up to once weekly      I did explain Dupixent and side effects. Patient has elected to do a trial of Dupixent.   First dose will be in the office on Wednesday 600 mg of Dupixent and then will reduce to 300 mg at home    Patient has been instructed in dupixent side effects including but not limited to anaphylaxis, myalgias, arthralgias, irritation of the eyes, and herpes. Instructed to report any side effects immediately to this office    Spent 30 minutes of face-to-face time with the patient with well more than half of the visit being dedicated to the discussion of the various symptom problems, provided education of medications and disease process, as well as discussion of a therapeutic plan for each. Face-to-face education time does not include any time that may have been spent for procedures.     (Please note that portions of this note may have been completed with a voice recognition program.  Efforts were made to edit the dictation but occasionally words are mis-transcribed.)         Signed:  SANTOSH Taylor CNP  8/16/2021  8:08 AM

## 2021-08-17 NOTE — TELEPHONE ENCOUNTER
Date of last visit:  7/28/2021   Date of next visit:  10/29/2021    Requested Prescriptions     Pending Prescriptions Disp Refills    metFORMIN (GLUCOPHAGE) 500 MG tablet [Pharmacy Med Name: METFORMIN 500MG TABLETS] 360 tablet 1     Sig: TAKE 2 TABLETS BY MOUTH TWICE DAILY WITH MEALS

## 2021-08-18 ENCOUNTER — NURSE ONLY (OUTPATIENT)
Dept: ALLERGY | Age: 66
End: 2021-08-18
Payer: MEDICARE

## 2021-08-18 VITALS
RESPIRATION RATE: 18 BRPM | TEMPERATURE: 97 F | DIASTOLIC BLOOD PRESSURE: 62 MMHG | HEART RATE: 88 BPM | SYSTOLIC BLOOD PRESSURE: 132 MMHG

## 2021-08-18 DIAGNOSIS — J45.50 SEVERE PERSISTENT ASTHMA WITHOUT COMPLICATION: Primary | ICD-10-CM

## 2021-08-18 PROCEDURE — 96372 THER/PROPH/DIAG INJ SC/IM: CPT | Performed by: NURSE PRACTITIONER

## 2021-08-18 NOTE — PROGRESS NOTES
Following obtaining written consent for Dupixent 600 mg SQ administered the following site: Abdomen, right Lower and Left Lower Quadrant. If the patient received Dupixent 600 mg this was given into separate 300 mg injections. 2 SAMPLES GIVEN  NDC # : 8517-0542-12  Lot #: [id number]  EXP: 10/31/2022      Recommended injections sites for Dupixent is into the upper arm, thigh, or abdomen. Patient waited and was observed for one hour following first Dupixent injection and then 30 minutes following each injection. Dupixent 600 mg SQ is given initially and then every 2 weeks patient receives 300 mg. .      Patient has tolerated injection well. No signs and symptoms of reaction noted in the office. Patient has EpiPen. Patient was instructed to report any problems questions or concerns to the office immediately. Patient understands adverse reactions may include reaction at the injection site, conjunctivitis, blepharitis, oral herpes, keratitis, eye pruritus, other herpes simplex virus infections and dry eyes.

## 2021-08-19 PROBLEM — J45.40 MODERATE PERSISTENT ASTHMA WITHOUT COMPLICATION: Status: ACTIVE | Noted: 2021-08-19

## 2021-08-20 LAB — STRONGYLOIDES ANTIBODY: NORMAL

## 2021-09-09 ENCOUNTER — NURSE ONLY (OUTPATIENT)
Dept: ALLERGY | Age: 66
End: 2021-09-09
Payer: MEDICARE

## 2021-09-09 VITALS
TEMPERATURE: 97.4 F | SYSTOLIC BLOOD PRESSURE: 134 MMHG | DIASTOLIC BLOOD PRESSURE: 70 MMHG | RESPIRATION RATE: 16 BRPM | OXYGEN SATURATION: 92 % | HEART RATE: 88 BPM

## 2021-09-09 DIAGNOSIS — Z51.6 ENCOUNTER FOR DESENSITIZATION TO POLLEN ALLERGEN: ICD-10-CM

## 2021-09-09 DIAGNOSIS — Z91.09 ENCOUNTER FOR DESENSITIZATION TO POLLEN ALLERGEN: ICD-10-CM

## 2021-09-09 DIAGNOSIS — J30.81 ALLERGY TO DOG DANDER: Primary | ICD-10-CM

## 2021-09-09 DIAGNOSIS — J30.81 CAT ALLERGIES: ICD-10-CM

## 2021-09-09 PROCEDURE — 95117 IMMUNOTHERAPY INJECTIONS: CPT | Performed by: NURSE PRACTITIONER

## 2021-09-10 ENCOUNTER — TELEPHONE (OUTPATIENT)
Dept: ALLERGY | Age: 66
End: 2021-09-10

## 2021-09-10 NOTE — TELEPHONE ENCOUNTER
Patient came in today for allergy injections and I had papers for him to sign for his Dupixent injections. Patient informed me he does not want to do the Dupixent injections at all. Patient stated he did not feel well for 3-4 days after receiving the first sample injections he was given on 08/18/21. He states he was very fatigued, had abdomen pain at injection site, headache, and his SOB was actually worse for a few days after the injection. Patient also states his out of pocket cost will be too much. I verbalized understanding and told patient I would cancel the Rx for him and inform Lexi Ocampo of his decision. I have also called [facility] can cancelled the Rx. Spoke to [facility] there.

## 2021-09-15 ENCOUNTER — NURSE ONLY (OUTPATIENT)
Dept: ALLERGY | Age: 66
End: 2021-09-15
Payer: MEDICARE

## 2021-09-15 VITALS
OXYGEN SATURATION: 91 % | DIASTOLIC BLOOD PRESSURE: 82 MMHG | RESPIRATION RATE: 16 BRPM | HEART RATE: 89 BPM | TEMPERATURE: 97.2 F | SYSTOLIC BLOOD PRESSURE: 128 MMHG

## 2021-09-15 DIAGNOSIS — J30.81 ALLERGY TO DOG DANDER: ICD-10-CM

## 2021-09-15 DIAGNOSIS — Z91.09 ENCOUNTER FOR DESENSITIZATION TO POLLEN ALLERGEN: ICD-10-CM

## 2021-09-15 DIAGNOSIS — Z51.6 ENCOUNTER FOR DESENSITIZATION TO POLLEN ALLERGEN: ICD-10-CM

## 2021-09-15 DIAGNOSIS — J30.81 CAT ALLERGIES: Primary | ICD-10-CM

## 2021-09-15 PROCEDURE — 95117 IMMUNOTHERAPY INJECTIONS: CPT | Performed by: NURSE PRACTITIONER

## 2021-09-16 DIAGNOSIS — J34.3 HYPERTROPHY OF INFERIOR NASAL TURBINATE: ICD-10-CM

## 2021-09-16 DIAGNOSIS — J30.9 ALLERGIC RHINITIS, UNSPECIFIED SEASONALITY, UNSPECIFIED TRIGGER: ICD-10-CM

## 2021-09-16 RX ORDER — FLUTICASONE PROPIONATE 50 MCG
SPRAY, SUSPENSION (ML) NASAL
Qty: 16 G | Refills: 11 | Status: SHIPPED | OUTPATIENT
Start: 2021-09-16 | End: 2022-08-08

## 2021-09-28 ENCOUNTER — OFFICE VISIT (OUTPATIENT)
Dept: UROLOGY | Age: 66
End: 2021-09-28
Payer: MEDICARE

## 2021-09-28 VITALS
SYSTOLIC BLOOD PRESSURE: 109 MMHG | WEIGHT: 237 LBS | DIASTOLIC BLOOD PRESSURE: 51 MMHG | HEIGHT: 68 IN | BODY MASS INDEX: 35.92 KG/M2

## 2021-09-28 DIAGNOSIS — E29.1 HYPOGONADISM IN MALE: ICD-10-CM

## 2021-09-28 DIAGNOSIS — N40.1 BENIGN PROSTATIC HYPERPLASIA WITH URINARY FREQUENCY: Primary | ICD-10-CM

## 2021-09-28 DIAGNOSIS — R35.0 BENIGN PROSTATIC HYPERPLASIA WITH URINARY FREQUENCY: Primary | ICD-10-CM

## 2021-09-28 PROCEDURE — 99214 OFFICE O/P EST MOD 30 MIN: CPT | Performed by: UROLOGY

## 2021-09-28 PROCEDURE — 1123F ACP DISCUSS/DSCN MKR DOCD: CPT | Performed by: UROLOGY

## 2021-09-28 PROCEDURE — 4040F PNEUMOC VAC/ADMIN/RCVD: CPT | Performed by: UROLOGY

## 2021-09-28 PROCEDURE — 3017F COLORECTAL CA SCREEN DOC REV: CPT | Performed by: UROLOGY

## 2021-09-28 PROCEDURE — G8417 CALC BMI ABV UP PARAM F/U: HCPCS | Performed by: UROLOGY

## 2021-09-28 PROCEDURE — 1036F TOBACCO NON-USER: CPT | Performed by: UROLOGY

## 2021-09-28 PROCEDURE — G8427 DOCREV CUR MEDS BY ELIG CLIN: HCPCS | Performed by: UROLOGY

## 2021-09-28 RX ORDER — OXYBUTYNIN CHLORIDE 10 MG/1
10 TABLET, EXTENDED RELEASE ORAL DAILY
Qty: 90 TABLET | Refills: 3 | Status: SHIPPED | OUTPATIENT
Start: 2021-09-28 | End: 2022-01-07

## 2021-09-28 NOTE — PROGRESS NOTES
FRANC Herron MD        [facility] [address] [facility] [phone]  Dept: [phone]  Dept Fax: [phone]  Loc: [address] [place] [address] Urology Office Note      Patient:  Carlos Enrique Morales  YOB: 1955    The patient is a 74 y.o. male who presents today for evaluation of the following problems:   Chief Complaint   Patient presents with    Follow-up    Benign Prostatic Hypertrophy    Hypogonadism        HISTORY OF PRESENT ILLNESS:     Hypogonadism  Polycythemia and chest pain- stopped injections  Pt not interested     BPH  Has had very minimal improvement with flomax  Urgency has improved a bit  Previously auass 24 severe       Requested/reviewed records from Didier Castaneda MD office and/or outside physician/EMR    (Patient's old records have been requested, reviewed and pertinent findings summarized in today's note.)    Procedures Today: N/A    Last several PSA's:  Lab Results   Component Value Date    PSA 0.83 01/29/2021    PSA 1.92 (H) 12/13/2019    PSA 1.10 (H) 06/14/2019       Last total testosterone:  Lab Results   Component Value Date    TESTOSTERONE 145 (L) 01/17/2020       Urinalysis today:  No results found for this visit on 09/28/21. Last BUN and creatinine:  Lab Results   Component Value Date    BUN 16 06/07/2021     Lab Results   Component Value Date    CREATININE 0.9 06/07/2021       Imaging Reviewed during this Office Visit:   Ranjit Valle MD independently reviewed the images and verified the radiology reports from:    [address] [place] [address] (Annual)    Result Date: 7/14/2021  NONCONTRAST SCREENING CT CHEST: HISTORY: Personal history of tobacco use. COMPARISON: CT chest May 29, 2020.  TECHNIQUE: Axial 1 mm images of the thorax and upper abdomen were obtained utilizing low dose screening CT protocol without the administration of intravenous contrast material. All CT scans at this facility use dose modulation, iterative reconstruction, and/or weight-based dosing when appropriate to reduce radiation dose to as low as reasonably achievable. FINDINGS: Lungs: 5 mm noncalcified nodule in the right middle lobe (series 3 image 316). This previously measured 4 mm. Unchanged calcified granuloma within the left lower lobe. No additional discrete pulmonary nodules are identified. There are again moderate centrilobular emphysematous changes within both lungs, with findings somewhat greater on the right lung. There is patchy atelectasis versus scarring in the right middle lobe and lingula. There is mild dependent atelectasis in bilateral lower lobes. There is no pleural effusion. There is no pneumothorax. Proximal tracheobronchial tree is patent. There are probable retained secretions within the trachea. Mediastinum and Lashell: There is no axillary or mediastinal lymphadenopathy. There is limited evaluation of the lashell on this noncontrast enhanced examination, without bulky hilar lymphadenopathy. There are calcified left hilar lymph nodes, relating to remote granulomatous disease. Heart size is normal. There is no pericardial effusion. Thoracic aorta is normal in caliber. There is calcified atherosclerotic plaque of the thoracic aorta. There are coronary artery calcifications, however the study is not  optimized for coronary artery evaluation. Upper Abdomen: There are multiple calcified granulomata in the partially visualized spleen. Remainder of the visualized upper abdomen is unremarkable. Bones: There are no destructive osseous lesions identified. Vertebral body heights are maintained. There are degenerative changes of the visualized spine. 1. 5 mm noncalcified nodule in the right middle lobe, previously measuring 4 mm. No additional noncalcified pulmonary nodules.  Lung-RADS Score: 2: Benign appearance or behavior (nodules with a very low likelihood of becoming a clinically active cancer due to size or lack of growth). Management: Continue annual screening with LDCT in 12 months. 2. Moderate emphysematous changes. LUNG RADS RECOMMENDATIONS: 1. Normal, continue annual screening 2. Benign appearance or behavior, continue annual screening 3.  6 month CT recommended 4A.  3 month CT recommended; may consider PET/CT 4B. Additional diagnostics and/or tissue sampling recommended 4X. Additional diagnostics and/or tissue sampling recommended  **This report has been created using voice recognition software. It may contain minor errors which are inherent in voice recognition technology. ** Final report electronically signed by Dr Randi Mckeon on 7/14/2021 9:45 AM      PAST MEDICAL, FAMILY AND SOCIAL HISTORY:  Past Medical History:   Diagnosis Date    Acid reflux     Arthritis     Asthma     Chronic back pain     Class 2 severe obesity due to excess calories with serious comorbidity and body mass index (BMI) of 37.0 to 37.9 in adult [facility]) 8/13/2018    Colon polyps 11/06    COPD (chronic obstructive pulmonary disease) (HCC)     Depression     panic attacks    Diverticulosis     HTN (hypertension)     Hyperlipidemia     Kidney disorder     Panic attack     Pneumonia     Rash     Recurrent upper respiratory infection (URI)     Sleep apnea     Thumb amputation status 3/13/14    left tip of thumb amputated    Thyroid disease     Type II or unspecified type diabetes mellitus without mention of complication, not stated as uncontrolled      Past Surgical History:   Procedure Laterality Date    BACK SURGERY  2002    BACK SURGERY  08/11/2017    BICEPS TENDON REPAIR Right 08/16/2017    BRONCHOSCOPY      BRONCHOSCOPY N/A 4/5/2019    BRONCHOSCOPY performed by David Mayen MD at [address] [place] [address]  4/5/2019    BRONCHOSCOPY ALVEOLAR LAVAGE performed by David Mayen MD at [address] [facility]  07/02 08/05    CARPAL TUNNEL RELEASE  2011    right hand    CHOLECYSTECTOMY  yrs ago    COLONOSCOPY  11/06 02/12 1/14    CORONARY ANGIOPLASTY WITH STENT PLACEMENT  02/2020    ENDOSCOPY, COLON, DIAGNOSTIC      EYE SURGERY      foreign body removal    HERNIA REPAIR  2004    Dr Page Forde  2008?     LARYNGOSCOPY  04/28/2016    Laryngoscopy Micro with Biopsy by Dr Nevin Thornton  01/07    uppp    ROTATOR CUFF REPAIR Left 5-20-15    SKIN BIOPSY      TONSILLECTOMY  1985    UPPER GASTROINTESTINAL ENDOSCOPY  1997    VEIN SURGERY Left September 2014    Dr. [facility]     Family History   Problem Relation Age of Onset    Cancer Mother     Breast Cancer Mother     Stroke Mother     Heart Disease Brother     Diabetes Brother     High Blood Pressure Brother     High Cholesterol Brother      Outpatient Medications Marked as Taking for the 9/28/21 encounter (Office Visit) with Claudia Villarreal MD   Medication Sig Dispense Refill    glimepiride (AMARYL) 4 MG tablet TAKE 1 TABLET BY MOUTH TWICE DAILY 180 tablet 0    fluticasone (FLONASE) 50 MCG/ACT nasal spray SHAKE LIQUID AND USE 1 SPRAY IN EACH NOSTRIL DAILY 16 g 11    VENTOLIN  (90 Base) MCG/ACT inhaler INHALE 2 PUFFS INTO THE LUNGS EVERY 6 HOURS AS NEEDED FOR WHEEZING 1 each 11    metFORMIN (GLUCOPHAGE) 500 MG tablet TAKE 2 TABLETS BY MOUTH TWICE DAILY WITH MEALS 360 tablet 1    budesonide-formoterol (SYMBICORT) 160-4.5 MCG/ACT AERO Inhale 2 puffs into the lungs 2 times daily 3 Inhaler 1    aspirin 81 MG EC tablet Take 81 mg by mouth daily      albuterol sulfate (PROAIR RESPICLICK) 084 (90 Base) MCG/ACT aerosol powder inhalation Inhale into the lungs every 4 hours as needed for Wheezing or Shortness of Breath      tamsulosin (FLOMAX) 0.4 MG capsule Take 1 capsule by mouth daily 90 capsule 3    Dupilumab (DUPIXENT) 300 MG/2ML SOPN Inject 300 mg into the skin every 14 days 2 pen 5    sertraline (ZOLOFT) 100 MG tablet TAKE 1 TABLET BY MOUTH TWICE DAILY 180 tablet 1  losartan (COZAAR) 100 MG tablet Take 1 tablet by mouth daily (Patient taking differently: Take 50 mg by mouth daily ) 90 tablet 1    traZODone (DESYREL) 150 MG tablet take 1/2 to 1 tablet by mouth once daily at bedtime if needed for pain SPASM, OR SLEEP 90 tablet 0    busPIRone (BUSPAR) 10 MG tablet TAKE 1 TABLET BY MOUTH THREE TIMES DAILY 270 tablet 1    rOPINIRole (REQUIP) 1 MG tablet TAKE 1 TABLET BY MOUTH THREE TIMES DAILY 270 tablet 1    clopidogrel (PLAVIX) 75 MG tablet Take 75 mg by mouth daily      Blood Glucose Monitoring Suppl (ONE TOUCH ULTRA 2) w/Device KIT 1 kit by Does not apply route daily 1 kit 0    atorvastatin (LIPITOR) 20 MG tablet       blood glucose test strips (ONE TOUCH ULTRA TEST) strip CHECK BLOOD SUGAR DAILY 100 strip 0    pantoprazole (PROTONIX) 40 MG tablet Take 40 mg by mouth 2 times daily       ranolazine (RANEXA) 500 MG extended release tablet Take 500 mg by mouth 2 times daily      hydrochlorothiazide (HYDRODIURIL) 12.5 MG tablet TK 1 T PO QD IN THE MORNING  3    EPINEPHrine (EPIPEN 2-HUBER) 0.3 MG/0.3ML SOAJ injection Inject one pen as directed STAT for allergic reaction, may disp generic NDC 73856-354-98 6 each 99    ONETOUCH DELICA LANCETS FINE MISC Check blood sugar twice daily Dx: E11.9 200 each 1    baclofen (LIORESAL) 10 MG tablet take 1 tablet by mouth twice a day  0    HYDROcodone-acetaminophen (NORCO) 5-325 MG per tablet Take 1 tablet by mouth every 6 hours as needed for Pain      Omega-3 Fatty Acids (FISH OIL) 1000 MG CAPS Take 1,000 mg by mouth daily.  sildenafil (VIAGRA) 100 MG tablet Take 1 tablet by mouth as needed. 8 tablet 5       Patient has no known allergies.   Social History     Tobacco Use   Smoking Status Former Smoker    Packs/day: 2.00    Years: 42.00    Pack years: 84.00    Types: Cigarettes    Start date: 1971   Libia Rosas Quit date: 3/3/2017    Years since quittin.5   Smokeless Tobacco Never Used   Tobacco Comment    pts uses just nicotine vap      (If patient a smoker, smoking cessation counseling offered)   Social History     Substance and Sexual Activity   Alcohol Use No    Alcohol/week: 0.0 standard drinks       REVIEW OF SYSTEMS:  Constitutional: negative  Eyes: negative  Respiratory: negative  Cardiovascular: negative  Gastrointestinal: negative  Genitourinary: see HPI  Musculoskeletal: negative  Skin: negative   Neurological: negative  Hematological/Lymphatic: negative  Psychological: negative        Physical Exam:    This a 74 y.o. male  Vitals:    09/28/21 1431   BP: (!) 109/51     Body mass index is 36.04 kg/m². Constitutional: Patient in no acute distress;         Assessment and Plan        1. Benign prostatic hyperplasia with urinary frequency    2. Hypogonadism in male               Plan:       Hypogonadism- no TRT due to SE profile  BPH- flomax not helpful enough. Not at treatment goal. Will start ditropan in addition to flomax      Follow up in three months for med cx    Prescriptions Ordered:  No orders of the defined types were placed in this encounter. Orders Placed:  No orders of the defined types were placed in this encounter.            Meghan Vasquez MD

## 2021-09-29 ENCOUNTER — NURSE ONLY (OUTPATIENT)
Dept: ALLERGY | Age: 66
End: 2021-09-29
Payer: MEDICARE

## 2021-09-29 VITALS
TEMPERATURE: 97 F | OXYGEN SATURATION: 92 % | HEART RATE: 76 BPM | SYSTOLIC BLOOD PRESSURE: 122 MMHG | RESPIRATION RATE: 20 BRPM | DIASTOLIC BLOOD PRESSURE: 76 MMHG

## 2021-09-29 DIAGNOSIS — J30.81 CAT ALLERGIES: ICD-10-CM

## 2021-09-29 DIAGNOSIS — Z91.09 ENCOUNTER FOR DESENSITIZATION TO POLLEN ALLERGEN: ICD-10-CM

## 2021-09-29 DIAGNOSIS — Z51.6 ENCOUNTER FOR DESENSITIZATION TO POLLEN ALLERGEN: ICD-10-CM

## 2021-09-29 DIAGNOSIS — J30.81 ALLERGY TO DOG DANDER: Primary | ICD-10-CM

## 2021-09-29 PROCEDURE — 95117 IMMUNOTHERAPY INJECTIONS: CPT | Performed by: NURSE PRACTITIONER

## 2021-09-29 RX ORDER — GLIMEPIRIDE 4 MG/1
TABLET ORAL
Qty: 180 TABLET | Refills: 0 | Status: SHIPPED | OUTPATIENT
Start: 2021-09-29 | End: 2021-12-24

## 2021-09-29 NOTE — TELEPHONE ENCOUNTER
Date of last visit:  7/28/2021  Date of next visit:  10/29/2021    Requested Prescriptions     Pending Prescriptions Disp Refills    glimepiride (AMARYL) 4 MG tablet [Pharmacy Med Name: GLIMEPIRIDE 4MG TABLETS] 180 tablet 0     Sig: TAKE 1 TABLET BY MOUTH TWICE DAILY

## 2021-10-13 ENCOUNTER — NURSE ONLY (OUTPATIENT)
Dept: ALLERGY | Age: 66
End: 2021-10-13
Payer: MEDICARE

## 2021-10-13 VITALS — DIASTOLIC BLOOD PRESSURE: 80 MMHG | SYSTOLIC BLOOD PRESSURE: 138 MMHG | TEMPERATURE: 97.1 F | HEART RATE: 71 BPM

## 2021-10-13 DIAGNOSIS — J30.81 ALLERGY TO DOG DANDER: ICD-10-CM

## 2021-10-13 DIAGNOSIS — Z91.09 ENCOUNTER FOR DESENSITIZATION TO POLLEN ALLERGEN: Primary | ICD-10-CM

## 2021-10-13 DIAGNOSIS — J30.81 CAT ALLERGIES: ICD-10-CM

## 2021-10-13 DIAGNOSIS — Z51.6 ENCOUNTER FOR DESENSITIZATION TO POLLEN ALLERGEN: Primary | ICD-10-CM

## 2021-10-13 PROCEDURE — 95117 IMMUNOTHERAPY INJECTIONS: CPT | Performed by: NURSE PRACTITIONER

## 2021-10-13 NOTE — PROGRESS NOTES
After consent obtained/verified, allergy injection given in back of R/L arm(s). VIAL COLOR OF ALL VIALS TODAY IS red    ALLERGY INJECTION FROM VIAL A GIVEN left  UPPER ARM IN THE AMOUNT OF 0.50 ML    ALLERGY INJECTION FROM VIAL B GIVEN right UPPER ARM IN THE AMOUNT OF 0.50 ML      Documentation of vial injection specific to arm(s) noted on Allergy Immunotherapy Administration Form. Patient declined to wait for 30 minutes      SHOT REACTION TREATMENT INSTRUCTIONS    During the 30 minute wait after an allergy injection the following symptoms should be reported:    Itching other than at the injection site  Hives or swelling other than at the injection site  Redness other than at the injection site  Difficulty breathing  Chest tightness  Difficulty swallowing  Throat tightness    If these symptoms occur, NOTIFY PROVIDER and the following treatment should be administered:    1. Epinephrine/Auvi Q 1:1000 IM - 0.3 ml if > 66 lbs or more, 0.15 ml if 33 - 63 lbs, or 0.1 ml if <33 lbs     2. Diphenhydramine - give all intramuscular:     2 to <6 years (off-label use): 6.25 mg,    6 to <12 years: 12.5 to 25 mg;    ?12 years: 25-50 mg.    3.  Famotidine:  Adults 40 mg oral    Adolescents age 12 years and >88 lbs: 40 mg    Children and Adolescents ? 12years of age: Initial: 0.25 mg/kg/dose  every 12 hours (maximum daily dose: 40 mg/day)    Epi/Auvi Q dose may me repeated in 5-15 minutes if adequate resolution of symptoms does not occur    Patient should be observed for at least one hour after final Epi/Auvi Q dose and must be seen by provider. Patients cannot drive themselves if they have received diphenhydramine.

## 2021-10-27 ENCOUNTER — NURSE ONLY (OUTPATIENT)
Dept: ALLERGY | Age: 66
End: 2021-10-27
Payer: MEDICARE

## 2021-10-27 VITALS — HEART RATE: 80 BPM | DIASTOLIC BLOOD PRESSURE: 80 MMHG | SYSTOLIC BLOOD PRESSURE: 120 MMHG | TEMPERATURE: 96.8 F

## 2021-10-27 DIAGNOSIS — J30.81 CAT ALLERGIES: ICD-10-CM

## 2021-10-27 DIAGNOSIS — Z91.09 ENCOUNTER FOR DESENSITIZATION TO POLLEN ALLERGEN: Primary | ICD-10-CM

## 2021-10-27 DIAGNOSIS — Z51.6 ENCOUNTER FOR DESENSITIZATION TO POLLEN ALLERGEN: Primary | ICD-10-CM

## 2021-10-27 DIAGNOSIS — J30.81 ALLERGY TO DOG DANDER: ICD-10-CM

## 2021-10-27 PROCEDURE — 95117 IMMUNOTHERAPY INJECTIONS: CPT | Performed by: NURSE PRACTITIONER

## 2021-10-27 NOTE — PROGRESS NOTES
After consent obtained/verified, allergy injection given in back of R/L arm(s). VIAL COLOR OF ALL VIALS TODAY IS RED    ALLERGY INJECTION FROM VIAL A GIVEN RIGHT  UPPER ARM IN THE AMOUNT OF 0.50 ML    ALLERGY INJECTION FROM VIAL B GIVEN LEFT UPPER ARM IN THE AMOUNT OF 0.50 ML          Documentation of vial injection specific to arm(s) noted on Allergy Immunotherapy Administration Form. Patient declined to wait for 30 minutes       SHOT REACTION TREATMENT INSTRUCTIONS    During the 30 minute wait after an allergy injection the following symptoms should be reported:    Itching other than at the injection site  Hives or swelling other than at the injection site  Redness other than at the injection site  Difficulty breathing  Chest tightness  Difficulty swallowing  Throat tightness    If these symptoms occur, NOTIFY PROVIDER and the following treatment should be administered:    1. Epinephrine/Auvi Q 1:1000 IM - 0.3 ml if > 66 lbs or more, 0.15 ml if 33 - 63 lbs, or 0.1 ml if <33 lbs     2. Diphenhydramine - give all intramuscular:     2 to <6 years (off-label use): 6.25 mg,    6 to <12 years: 12.5 to 25 mg;    ?12 years: 25-50 mg.    3.  Famotidine:  Adults 40 mg oral    Adolescents age 12 years and >88 lbs: 40 mg    Children and Adolescents ? 12years of age: Initial: 0.25 mg/kg/dose  every 12 hours (maximum daily dose: 40 mg/day)    Epi/Auvi Q dose may me repeated in 5-15 minutes if adequate resolution of symptoms does not occur    Patient should be observed for at least one hour after final Epi/Auvi Q dose and must be seen by provider. Patients cannot drive themselves if they have received diphenhydramine.

## 2021-10-29 ENCOUNTER — OFFICE VISIT (OUTPATIENT)
Dept: FAMILY MEDICINE CLINIC | Age: 66
End: 2021-10-29

## 2021-10-29 VITALS
WEIGHT: 242 LBS | DIASTOLIC BLOOD PRESSURE: 76 MMHG | BODY MASS INDEX: 36.68 KG/M2 | RESPIRATION RATE: 16 BRPM | SYSTOLIC BLOOD PRESSURE: 138 MMHG | TEMPERATURE: 96.8 F | HEIGHT: 68 IN | HEART RATE: 84 BPM

## 2021-10-29 DIAGNOSIS — E11.8 TYPE 2 DIABETES MELLITUS WITH COMPLICATION, WITHOUT LONG-TERM CURRENT USE OF INSULIN (HCC): ICD-10-CM

## 2021-10-29 DIAGNOSIS — E78.5 HYPERLIPIDEMIA, UNSPECIFIED HYPERLIPIDEMIA TYPE: ICD-10-CM

## 2021-10-29 DIAGNOSIS — I10 ESSENTIAL HYPERTENSION: ICD-10-CM

## 2021-10-29 DIAGNOSIS — J44.9 MODERATE COPD (CHRONIC OBSTRUCTIVE PULMONARY DISEASE) (HCC): ICD-10-CM

## 2021-10-29 DIAGNOSIS — Z23 NEED FOR INFLUENZA VACCINATION: ICD-10-CM

## 2021-10-29 LAB
CHP ED QC CHECK: ABNORMAL
CREATININE URINE POCT: 50
GLUCOSE BLD-MCNC: 183 MG/DL
HBA1C MFR BLD: 6.9 %
MICROALBUMIN/CREAT 24H UR: 80 MG/G{CREAT}
MICROALBUMIN/CREAT UR-RTO: ABNORMAL

## 2021-10-29 PROCEDURE — 99213 OFFICE O/P EST LOW 20 MIN: CPT | Performed by: FAMILY MEDICINE

## 2021-10-29 PROCEDURE — 3017F COLORECTAL CA SCREEN DOC REV: CPT | Performed by: FAMILY MEDICINE

## 2021-10-29 PROCEDURE — G8427 DOCREV CUR MEDS BY ELIG CLIN: HCPCS | Performed by: FAMILY MEDICINE

## 2021-10-29 PROCEDURE — 1036F TOBACCO NON-USER: CPT | Performed by: FAMILY MEDICINE

## 2021-10-29 PROCEDURE — 83036 HEMOGLOBIN GLYCOSYLATED A1C: CPT | Performed by: FAMILY MEDICINE

## 2021-10-29 PROCEDURE — G8417 CALC BMI ABV UP PARAM F/U: HCPCS | Performed by: FAMILY MEDICINE

## 2021-10-29 PROCEDURE — 4040F PNEUMOC VAC/ADMIN/RCVD: CPT | Performed by: FAMILY MEDICINE

## 2021-10-29 PROCEDURE — 82962 GLUCOSE BLOOD TEST: CPT | Performed by: FAMILY MEDICINE

## 2021-10-29 PROCEDURE — 90756 CCIIV4 VACC ABX FREE IM: CPT | Performed by: FAMILY MEDICINE

## 2021-10-29 PROCEDURE — 1123F ACP DISCUSS/DSCN MKR DOCD: CPT | Performed by: FAMILY MEDICINE

## 2021-10-29 PROCEDURE — G0008 ADMIN INFLUENZA VIRUS VAC: HCPCS | Performed by: FAMILY MEDICINE

## 2021-10-29 PROCEDURE — 82044 UR ALBUMIN SEMIQUANTITATIVE: CPT | Performed by: FAMILY MEDICINE

## 2021-10-29 ASSESSMENT — ENCOUNTER SYMPTOMS
VOMITING: 0
BLOOD IN STOOL: 0
CHEST TIGHTNESS: 0
BACK PAIN: 1
COUGH: 0
DIARRHEA: 0
SHORTNESS OF BREATH: 0
CONSTIPATION: 0
NAUSEA: 0
ABDOMINAL PAIN: 0
EYES NEGATIVE: 1

## 2021-10-29 NOTE — PROGRESS NOTES
Immunizations Administered     Name Date Dose Route    Influenza, [facility] 48 Quadv, with preserv IM (Flucelvax 2 yrs and older) 10/29/2021 0.5 mL Intramuscular    Site: Deltoid- Right    Lot: [id number]    NDC: 05358-846-36

## 2021-10-29 NOTE — PROGRESS NOTES
Date: 10/29/2021  Here with his wife. Cass Grant is a 74 y.o. male who presents today for:  Chief Complaint   Patient presents with    Check-Up    Diabetes       Current regimen: oral agent (dual therapy)  Current monitoring regimen: home blood tests - 1  Home blood sugar trends: AM -190's  Any episodes of hypoglycemia? No  Current exercise: he is active  Compliance with treatment: Good  Eye exam current (within one year): No he needs to make appt  Any history of foot problems? Yes  Last foot exam: 1/2021  Cardiovascular risk factors: advanced age (older than 54 for men, 72 for women), diabetes mellitus, dyslipidemia, hypertension, male gender and obesity (BMI >= 30 kg/m2). Immunizations up to date: Flu Yes   Pneumonia Yes  Taking ASA: Yes   If not why? HPI:     Hypertension  This is a chronic problem. The current episode started more than 1 year ago. The problem is unchanged. The problem is controlled. Pertinent negatives include no chest pain, headaches, palpitations, peripheral edema or shortness of breath. Risk factors for coronary artery disease include diabetes mellitus, dyslipidemia, obesity and male gender. Past treatments include angiotensin blockers and diuretics. The current treatment provides significant improvement. There are no compliance problems. Hyperlipidemia  This is a chronic problem. The current episode started more than 1 year ago. The problem is controlled. Recent lipid tests were reviewed and are normal. Pertinent negatives include no chest pain, focal sensory loss, focal weakness, leg pain, myalgias or shortness of breath. Current antihyperlipidemic treatment includes statins. The current treatment provides significant improvement of lipids. There are no compliance problems. Risk factors for coronary artery disease include diabetes mellitus, dyslipidemia, hypertension, male sex and obesity.        has a current medication list which includes the following prescription(s): trazodone, sertraline, glimepiride, oxybutynin, fluticasone, ventolin hfa, metformin, budesonide-formoterol, aspirin, albuterol sulfate, tamsulosin, dupixent, losartan, buspirone, ropinirole, clopidogrel, one touch ultra 2, atorvastatin, one touch ultra test, pantoprazole, ranolazine, hydrochlorothiazide, epinephrine, onetouch delica lancets fine, baclofen, hydrocodone-acetaminophen, fish oil, and sildenafil, and the following Facility-Administered Medications: dupilumab and dupilumab.     No Known Allergies    Social History     Tobacco Use    Smoking status: Former Smoker     Packs/day: 2.00     Years: 42.00     Pack years: 84.00     Types: Cigarettes     Start date: 1971     Quit date: 3/3/2017     Years since quittin.6    Smokeless tobacco: Never Used    Tobacco comment: pts uses just nicotine vap   Vaping Use    Vaping Use: Some days    Substances: Always   Substance Use Topics    Alcohol use: No     Alcohol/week: 0.0 standard drinks    Drug use: No       Past Medical History:   Diagnosis Date    Acid reflux     Arthritis     Asthma     Chronic back pain     Class 2 severe obesity due to excess calories with serious comorbidity and body mass index (BMI) of 37.0 to 37.9 in adult [facility]) 2018    Colon polyps     COPD (chronic obstructive pulmonary disease) (HCC)     Depression     panic attacks    Diverticulosis     HTN (hypertension)     Hyperlipidemia     Kidney disorder     Panic attack     Pneumonia     Rash     Recurrent upper respiratory infection (URI)     Sleep apnea     Thumb amputation status 3/13/14    left tip of thumb amputated    Thyroid disease     Type II or unspecified type diabetes mellitus without mention of complication, not stated as uncontrolled        Past Surgical History:   Procedure Laterality Date    BACK SURGERY  2002    BACK SURGERY  2017    BICEPS TENDON REPAIR Right 2017    BRONCHOSCOPY      BRONCHOSCOPY N/A 2019 BRONCHOSCOPY performed by Librado Narvaez MD at [address] [facility]    BRONCHOSCOPY  4/5/2019    BRONCHOSCOPY ALVEOLAR LAVAGE performed by Librado Narvaez MD at [facility]  07/02 08/05    CARPAL TUNNEL RELEASE  2011    right hand    CHOLECYSTECTOMY  yrs ago    COLONOSCOPY  11/06 02/12 1/14    CORONARY ANGIOPLASTY WITH STENT PLACEMENT  02/2020    ENDOSCOPY, COLON, DIAGNOSTIC      EYE SURGERY      foreign body removal    HERNIA REPAIR  2004    Dr Naomi East  2008?  LARYNGOSCOPY  04/28/2016    Laryngoscopy Micro with Biopsy by Dr Yuniel Mahajan  01/07    uppp    ROTATOR CUFF REPAIR Left 5-20-15    SKIN BIOPSY      TONSILLECTOMY  1985    UPPER GASTROINTESTINAL ENDOSCOPY  1997    VEIN SURGERY Left September 2014    Dr. Jeremy Juan       Family History   Problem Relation Age of Onset    Cancer Mother     Breast Cancer Mother     Stroke Mother     Heart Disease Brother     Diabetes Brother     High Blood Pressure Brother     High Cholesterol Brother        /74 (Site: Right Upper Arm, Position: Sitting, Cuff Size: Large Adult)   Pulse 84   Temp 96.8 °F (36 °C) (Skin)   Resp 16   Ht 5' 8\" (1.727 m)   Wt 242 lb (109.8 kg)   BMI 36.80 kg/m²   Wt Readings from Last 3 Encounters:   10/29/21 242 lb (109.8 kg)   09/28/21 237 lb (107.5 kg)   08/16/21 235 lb (106.6 kg)       Subjective:      Review of Systems   Constitutional: Negative for activity change, appetite change, diaphoresis and fever. HENT: Negative. Eyes: Negative. Respiratory: Negative for cough, chest tightness and shortness of breath. Cardiovascular: Negative for chest pain, palpitations and leg swelling. Gastrointestinal: Negative for abdominal pain, blood in stool, constipation, diarrhea, nausea and vomiting. Genitourinary: Negative. Musculoskeletal: Positive for arthralgias and back pain. Negative for myalgias.    Skin: Negative. Negative for rash. Neurological: Negative. Negative for dizziness, focal weakness, syncope, weakness, light-headedness and headaches. Psychiatric/Behavioral: Negative. Objective:     Physical Exam  Vitals and nursing note reviewed. Constitutional:       General: He is not in acute distress. Appearance: He is well-developed. He is not diaphoretic. HENT:      Head: Normocephalic and atraumatic. Eyes:      General: No scleral icterus. Right eye: No discharge. Left eye: No discharge. Conjunctiva/sclera: Conjunctivae normal.      Pupils: Pupils are equal, round, and reactive to light. Neck:      Thyroid: No thyromegaly. Vascular: No JVD. Cardiovascular:      Rate and Rhythm: Normal rate and regular rhythm. Heart sounds: Normal heart sounds. No murmur heard. Pulmonary:      Effort: Pulmonary effort is normal. No respiratory distress. Breath sounds: Normal breath sounds. No wheezing, rhonchi or rales. Abdominal:      General: Bowel sounds are normal. There is no distension. Palpations: Abdomen is soft. There is no mass. Tenderness: There is no abdominal tenderness. There is no guarding or rebound. Musculoskeletal:      Cervical back: Normal range of motion and neck supple. Lymphadenopathy:      Cervical: No cervical adenopathy. Skin:     General: Skin is warm and dry. Findings: No rash. Neurological:      Mental Status: He is alert and oriented to person, place, and time. Psychiatric:         Behavior: Behavior normal.         Assessment:       Diagnosis Orders   1. Type 2 diabetes mellitus with complication, without long-term current use of insulin (HCC)  POCT Glucose    POCT glycosylated hemoglobin (Hb A1C)    POCT microalbumin   2. Hyperlipidemia, unspecified hyperlipidemia type     3. Essential hypertension     4. Moderate COPD (chronic obstructive pulmonary disease) ([facility] [address]     5.  Need for influenza vaccination INFLUENZA, MDCK QUADV, 2 YRS AND OLDER, IM, MDV, 0.5ML (FLUCELVAX QUADV)       Plan:      Orders Placed:  Orders Placed This Encounter   Procedures    INFLUENZA, MDCK QUADV, 2 YRS AND OLDER, IM, MDV, 0.5ML (FLUCELVAX QUADV)    POCT Glucose    POCT glycosylated hemoglobin (Hb A1C)    POCT microalbumin     MedicationsPrescribed:  No orders of the defined types were placed in this encounter. Lab Results   Component Value Date    LABA1C 6.9 (A) 10/29/2021    LABA1C 7.2 (A) 07/28/2021    LABA1C 8.5 (A) 04/28/2021     Lab Results   Component Value Date    LABMICR 2.74 11/04/2016     Results for POC orders placed in visit on 10/29/21   POCT microalbumin   Result Value Ref Range    Microalb, Ur 80 (A)     Creatinine Ur POCT 50 (A)     Microalbumin Creatinine Ratio     POCT glycosylated hemoglobin (Hb A1C)   Result Value Ref Range    Hemoglobin A1C 6.9 (A) %   POCT Glucose   Result Value Ref Range    Glucose 183 (A) mg/dL    QC OK?          Lab Results   Component Value Date    BUN 16 06/07/2021     Lab Results   Component Value Date    CREATININE 0.9 06/07/2021     Lab Results   Component Value Date    CHOL 111 08/11/2021     Lab Results   Component Value Date    TRIG 108 08/11/2021     Lab Results   Component Value Date    HDL 41 08/11/2021     Lab Results   Component Value Date    LDLCALC 48 08/11/2021     Lab Results   Component Value Date    VLDL 19 01/05/2021     No results found for: CHOLHDLRATIO      Current Outpatient Medications   Medication Sig Dispense Refill    traZODone (DESYREL) 150 MG tablet TAKE 1/2 TO 1 TABLET BY MOUTH EVERY DAY AT BEDTIME AS NEEDED FOR PAIN SPASM OR SLEEP 90 tablet 1    sertraline (ZOLOFT) 100 MG tablet TAKE 1 TABLET BY MOUTH TWICE DAILY 180 tablet 1    glimepiride (AMARYL) 4 MG tablet TAKE 1 TABLET BY MOUTH TWICE DAILY 180 tablet 0    oxybutynin (DITROPAN XL) 10 MG extended release tablet Take 1 tablet by mouth daily 90 tablet 3    fluticasone (FLONASE) 50 MCG/ACT nasal spray SHAKE LIQUID AND USE 1 SPRAY IN EACH NOSTRIL DAILY 16 g 11    VENTOLIN  (90 Base) MCG/ACT inhaler INHALE 2 PUFFS INTO THE LUNGS EVERY 6 HOURS AS NEEDED FOR WHEEZING 1 each 11    metFORMIN (GLUCOPHAGE) 500 MG tablet TAKE 2 TABLETS BY MOUTH TWICE DAILY WITH MEALS 360 tablet 1    budesonide-formoterol (SYMBICORT) 160-4.5 MCG/ACT AERO Inhale 2 puffs into the lungs 2 times daily 3 Inhaler 1    aspirin 81 MG EC tablet Take 81 mg by mouth daily      albuterol sulfate (PROAIR RESPICLICK) 663 (90 Base) MCG/ACT aerosol powder inhalation Inhale into the lungs every 4 hours as needed for Wheezing or Shortness of Breath      tamsulosin (FLOMAX) 0.4 MG capsule Take 1 capsule by mouth daily 90 capsule 3    Dupilumab (DUPIXENT) 300 MG/2ML SOPN Inject 300 mg into the skin every 14 days 2 pen 5    losartan (COZAAR) 100 MG tablet Take 1 tablet by mouth daily (Patient taking differently: Take 50 mg by mouth daily ) 90 tablet 1    busPIRone (BUSPAR) 10 MG tablet TAKE 1 TABLET BY MOUTH THREE TIMES DAILY 270 tablet 1    rOPINIRole (REQUIP) 1 MG tablet TAKE 1 TABLET BY MOUTH THREE TIMES DAILY 270 tablet 1    clopidogrel (PLAVIX) 75 MG tablet Take 75 mg by mouth daily      Blood Glucose Monitoring Suppl (ONE TOUCH ULTRA 2) w/Device KIT 1 kit by Does not apply route daily 1 kit 0    atorvastatin (LIPITOR) 20 MG tablet       blood glucose test strips (ONE TOUCH ULTRA TEST) strip CHECK BLOOD SUGAR DAILY 100 strip 0    pantoprazole (PROTONIX) 40 MG tablet Take 40 mg by mouth 2 times daily       ranolazine (RANEXA) 500 MG extended release tablet Take 500 mg by mouth 2 times daily      hydrochlorothiazide (HYDRODIURIL) 12.5 MG tablet TK 1 T PO QD IN THE MORNING  3    EPINEPHrine (EPIPEN 2-HUBER) 0.3 MG/0.3ML SOAJ injection Inject one pen as directed STAT for allergic reaction, may disp generic NDC 07070-457-68 6 each 99    ONETOUCH DELICA LANCETS FINE MISC Check blood sugar twice daily Dx: E11.9 200 each 1    baclofen (LIORESAL) 10 MG tablet take 1 tablet by mouth twice a day  0    HYDROcodone-acetaminophen (NORCO) 5-325 MG per tablet Take 1 tablet by mouth every 6 hours as needed for Pain      Omega-3 Fatty Acids (FISH OIL) 1000 MG CAPS Take 1,000 mg by mouth daily.  sildenafil (VIAGRA) 100 MG tablet Take 1 tablet by mouth as needed. 8 tablet 5     Current Facility-Administered Medications   Medication Dose Route Frequency Provider Last Rate Last Admin    Dupilumab SOPN 300 mg  300 mg SubCUTAneous Q14 Days SANTOSH Xie - CNP   300 mg at 08/18/21 0930    Dupilumab SOPN 300 mg  300 mg SubCUTAneous Q14 Days SANTOSH Xie - CNP   300 mg at 08/18/21 0930     Orders Placed This Encounter   Procedures    INFLUENZA, MDCK QUADV, 2 YRS AND OLDER, IM, MDV, 0.5ML (FLUCELVAX QUADV)    POCT Glucose    POCT glycosylated hemoglobin (Hb A1C)    POCT microalbumin     Lab Results   Component Value Date    LABA1C 6.9 (A) 10/29/2021    LABA1C 7.2 (A) 07/28/2021    LABA1C 8.5 (A) 04/28/2021     Lab Results   Component Value Date    LABMICR 2.74 11/04/2016     Results for POC orders placed in visit on 10/29/21   POCT microalbumin   Result Value Ref Range    Microalb, Ur 80 (A)     Creatinine Ur POCT 50 (A)     Microalbumin Creatinine Ratio     POCT glycosylated hemoglobin (Hb A1C)   Result Value Ref Range    Hemoglobin A1C 6.9 (A) %   POCT Glucose   Result Value Ref Range    Glucose 183 (A) mg/dL    QC OK? Continue to monitor blood sugars 1 times a day. Keep log of blood sugars and bring with you to the next appointment. Discussed use, benefit, and side effects of prescribed medications. Allpatient questions answered. Pt voiced understanding. Instructed to continue currentmedications, diet and exercise. Patient agreed with treatment plan. Return in about 4 months (around 2/28/2022), or if symptoms worsen or fail to improve, for DM.

## 2021-11-02 NOTE — TELEPHONE ENCOUNTER
Date of last visit:  10/29/2021  Date of next visit:  2/4/2022    Requested Prescriptions     Pending Prescriptions Disp Refills    rOPINIRole (REQUIP) 1 MG tablet [Pharmacy Med Name: ROPINIROLE 1MG TABLETS] 270 tablet 1     Sig: TAKE 1 TABLET BY MOUTH THREE TIMES DAILY

## 2021-11-03 RX ORDER — ROPINIROLE 1 MG/1
TABLET, FILM COATED ORAL
Qty: 270 TABLET | Refills: 1 | Status: SHIPPED | OUTPATIENT
Start: 2021-11-03 | End: 2022-02-03

## 2021-11-09 RX ORDER — BUSPIRONE HYDROCHLORIDE 10 MG/1
TABLET ORAL
Qty: 270 TABLET | Refills: 1 | Status: SHIPPED | OUTPATIENT
Start: 2021-11-09 | End: 2022-05-12

## 2021-11-09 NOTE — TELEPHONE ENCOUNTER
Date of last visit:  10/29/2021   Date of next visit:  2/4/2022    Requested Prescriptions     Pending Prescriptions Disp Refills    busPIRone (BUSPAR) 10 MG tablet [Pharmacy Med Name: BUSPIRONE 10MG TABLETS] 270 tablet 1     Sig: TAKE 1 TABLET BY MOUTH THREE TIMES DAILY

## 2021-11-10 ENCOUNTER — NURSE ONLY (OUTPATIENT)
Dept: ALLERGY | Age: 66
End: 2021-11-10
Payer: MEDICARE

## 2021-11-10 VITALS — HEART RATE: 80 BPM | DIASTOLIC BLOOD PRESSURE: 80 MMHG | SYSTOLIC BLOOD PRESSURE: 136 MMHG | TEMPERATURE: 96.8 F

## 2021-11-10 DIAGNOSIS — J30.81 ALLERGY TO DOG DANDER: ICD-10-CM

## 2021-11-10 DIAGNOSIS — Z51.6 ENCOUNTER FOR DESENSITIZATION TO POLLEN ALLERGEN: Primary | ICD-10-CM

## 2021-11-10 DIAGNOSIS — J30.81 CAT ALLERGIES: ICD-10-CM

## 2021-11-10 DIAGNOSIS — Z91.09 ENCOUNTER FOR DESENSITIZATION TO POLLEN ALLERGEN: Primary | ICD-10-CM

## 2021-11-10 PROCEDURE — 95117 IMMUNOTHERAPY INJECTIONS: CPT | Performed by: NURSE PRACTITIONER

## 2021-11-10 NOTE — PROGRESS NOTES
After consent obtained/verified, allergy injection given in back of R/L arm(s). VIAL COLOR OF ALL VIALS TODAY IS MAINTENANCE RED    ALLERGY INJECTION FROM VIAL A GIVEN LEFT  UPPER ARM IN THE AMOUNT OF 0.50 ML    ALLERGY INJECTION FROM VIAL B GIVEN RIGHT UPPER ARM IN THE AMOUNT OF 0.50 ML          Documentation of vial injection specific to arm(s) noted on Allergy Immunotherapy Administration Form. Patient declined to wait for 30 minutes     SHOT REACTION TREATMENT INSTRUCTIONS    During the 30 minute wait after an allergy injection the following symptoms should be reported:    Itching other than at the injection site  Hives or swelling other than at the injection site  Redness other than at the injection site  Difficulty breathing  Chest tightness  Difficulty swallowing  Throat tightness    If these symptoms occur, NOTIFY PROVIDER and the following treatment should be administered:    1. Epinephrine/Auvi Q 1:1000 IM - 0.3 ml if > 66 lbs or more, 0.15 ml if 33 - 63 lbs, or 0.1 ml if <33 lbs     2. Diphenhydramine - give all intramuscular:     2 to <6 years (off-label use): 6.25 mg,    6 to <12 years: 12.5 to 25 mg;    ?12 years: 25-50 mg.    3.  Famotidine:  Adults 40 mg oral    Adolescents age 12 years and >88 lbs: 40 mg    Children and Adolescents ? 12years of age: Initial: 0.25 mg/kg/dose  every 12 hours (maximum daily dose: 40 mg/day)    Epi/Auvi Q dose may me repeated in 5-15 minutes if adequate resolution of symptoms does not occur    Patient should be observed for at least one hour after final Epi/Auvi Q dose and must be seen by provider. Patients cannot drive themselves if they have received diphenhydramine.

## 2021-11-23 ENCOUNTER — NURSE ONLY (OUTPATIENT)
Dept: ALLERGY | Age: 66
End: 2021-11-23
Payer: MEDICARE

## 2021-11-23 VITALS
HEART RATE: 77 BPM | TEMPERATURE: 97.8 F | SYSTOLIC BLOOD PRESSURE: 130 MMHG | DIASTOLIC BLOOD PRESSURE: 84 MMHG | RESPIRATION RATE: 20 BRPM | OXYGEN SATURATION: 90 %

## 2021-11-23 DIAGNOSIS — J30.81 ALLERGY TO DOG DANDER: ICD-10-CM

## 2021-11-23 DIAGNOSIS — J30.81 CAT ALLERGIES: Primary | ICD-10-CM

## 2021-11-23 DIAGNOSIS — Z91.09 ENCOUNTER FOR DESENSITIZATION TO POLLEN ALLERGEN: ICD-10-CM

## 2021-11-23 DIAGNOSIS — Z51.6 ENCOUNTER FOR DESENSITIZATION TO POLLEN ALLERGEN: ICD-10-CM

## 2021-11-23 PROCEDURE — 95117 IMMUNOTHERAPY INJECTIONS: CPT | Performed by: NURSE PRACTITIONER

## 2021-12-15 DIAGNOSIS — Z29.8 IMMUNOTHERAPY: ICD-10-CM

## 2021-12-15 DIAGNOSIS — J30.9 CHRONIC ALLERGIC RHINITIS: Primary | ICD-10-CM

## 2021-12-15 PROCEDURE — 95165 ANTIGEN THERAPY SERVICES: CPT | Performed by: NURSE PRACTITIONER

## 2021-12-15 NOTE — PROGRESS NOTES
ALLERGY EXTRACT MAINTENANCE MIXING RED VIAL ONLY    Provider onsite during allergy extract preparation. TOTAL VIALS=  2  TOTAL DOSES PER VIAL = 20  TOTAL DOSES PREPARED = 40       An allergy extract maintenance solution was prepared in red vial according to the maintenance prescription. The refrigerator shelf-life for each red vial is 12 months or as indicated on the label. Patient vials were labeled with name, date-of-birth, vial number, concentration, and expiration. When injecting from a new maintenance vial, the first injections should be three doses below the previous maintenance dose. This is done because the refill may be slightly stronger than the vial that was just emptied. (Example: if the maintenance dose is 0.5, start at 0.35 and proceed to 0.4, 0.45, 0.5). The three build up injections may be given weekly until the maintenance dosage is reached again. Then every other week injections may be resumed. Patients on maintenance should be seen by the allergy provider every 6-12 months and as needed. The injection record and serum is reviewed and documented at every injection appointment. When down to the last 1/3 of the bottle, a maintenance refill will be prepared (pending patient is without reactions) for next refill. Patients requesting refills that receive injections at outside medical facilities must include the patient's demographic sheet, current insurance information and the injection records. Allow 2-3 weeks for delivery after the refill has been requested. PROTOCOL FOR LATE INJECTIONS  Days late determined from the due date of the injection    Shot Frequency 5-10 Days Late 11-16 Days Late 17-30 Days Late 31-60 Days Late 61+ Days Late   Twice Weekly Repeat last dose Reduce last dose . 2 Reduce last dose . 4 Dilute back 1 vial, start at .05 Patient must start over     Weekly Repeat last dose Reduce last dose . 2 Reduce last dose . 4 Dilute back 1 vial, start at .05 Patient must start over   Every 2 Weeks Repeat last dose Reduce last dose . 2. Reduce last dose . 4 Ask provider for instruction Ask provider for instruction   Every 3 Weeks Repeat last dose Reduce last dose . 2 Reduce last dose . 4 Ask provider for instruction Ask provider for instruction   Every 4 Weeks Repeat last dose Reduce last dose . 2 Reduce last dose . 4 Ask provider for instruction Ask provider for instruction

## 2021-12-21 ENCOUNTER — OFFICE VISIT (OUTPATIENT)
Dept: UROLOGY | Age: 66
End: 2021-12-21
Payer: MEDICARE

## 2021-12-21 VITALS
SYSTOLIC BLOOD PRESSURE: 132 MMHG | WEIGHT: 241 LBS | DIASTOLIC BLOOD PRESSURE: 74 MMHG | BODY MASS INDEX: 36.53 KG/M2 | HEIGHT: 68 IN

## 2021-12-21 DIAGNOSIS — N52.9 ERECTILE DYSFUNCTION, UNSPECIFIED ERECTILE DYSFUNCTION TYPE: ICD-10-CM

## 2021-12-21 DIAGNOSIS — N40.1 BENIGN PROSTATIC HYPERPLASIA WITH URINARY FREQUENCY: Primary | ICD-10-CM

## 2021-12-21 DIAGNOSIS — E29.1 HYPOGONADISM IN MALE: ICD-10-CM

## 2021-12-21 DIAGNOSIS — R35.0 BENIGN PROSTATIC HYPERPLASIA WITH URINARY FREQUENCY: Primary | ICD-10-CM

## 2021-12-21 PROCEDURE — 1123F ACP DISCUSS/DSCN MKR DOCD: CPT | Performed by: UROLOGY

## 2021-12-21 PROCEDURE — G8428 CUR MEDS NOT DOCUMENT: HCPCS | Performed by: UROLOGY

## 2021-12-21 PROCEDURE — G8482 FLU IMMUNIZE ORDER/ADMIN: HCPCS | Performed by: UROLOGY

## 2021-12-21 PROCEDURE — 4040F PNEUMOC VAC/ADMIN/RCVD: CPT | Performed by: UROLOGY

## 2021-12-21 PROCEDURE — 3017F COLORECTAL CA SCREEN DOC REV: CPT | Performed by: UROLOGY

## 2021-12-21 PROCEDURE — 99214 OFFICE O/P EST MOD 30 MIN: CPT | Performed by: UROLOGY

## 2021-12-21 PROCEDURE — 1036F TOBACCO NON-USER: CPT | Performed by: UROLOGY

## 2021-12-21 PROCEDURE — G8417 CALC BMI ABV UP PARAM F/U: HCPCS | Performed by: UROLOGY

## 2021-12-21 RX ORDER — MONTELUKAST SODIUM 10 MG/1
10 TABLET ORAL NIGHTLY
COMMUNITY

## 2021-12-21 NOTE — PROGRESS NOTES
reconstruction, and/or weight-based dosing when appropriate to reduce radiation dose to as low as reasonably achievable. FINDINGS: Lungs: 5 mm noncalcified nodule in the right middle lobe (series 3 image 316). This previously measured 4 mm. Unchanged calcified granuloma within the left lower lobe. No additional discrete pulmonary nodules are identified. There are again moderate centrilobular emphysematous changes within both lungs, with findings somewhat greater on the right lung. There is patchy atelectasis versus scarring in the right middle lobe and lingula. There is mild dependent atelectasis in bilateral lower lobes. There is no pleural effusion. There is no pneumothorax. Proximal tracheobronchial tree is patent. There are probable retained secretions within the trachea. Mediastinum and Lashell: There is no axillary or mediastinal lymphadenopathy. There is limited evaluation of the lashell on this noncontrast enhanced examination, without bulky hilar lymphadenopathy. There are calcified left hilar lymph nodes, relating to remote granulomatous disease. Heart size is normal. There is no pericardial effusion. Thoracic aorta is normal in caliber. There is calcified atherosclerotic plaque of the thoracic aorta. There are coronary artery calcifications, however the study is not  optimized for coronary artery evaluation. Upper Abdomen: There are multiple calcified granulomata in the partially visualized spleen. Remainder of the visualized upper abdomen is unremarkable. Bones: There are no destructive osseous lesions identified. Vertebral body heights are maintained. There are degenerative changes of the visualized spine. 1. 5 mm noncalcified nodule in the right middle lobe, previously measuring 4 mm. No additional noncalcified pulmonary nodules. Lung-RADS Score: 2: Benign appearance or behavior (nodules with a very low likelihood of becoming a clinically active cancer due to size or lack of growth).  Management: Continue annual screening with LDCT in 12 months. 2. Moderate emphysematous changes. LUNG RADS RECOMMENDATIONS: 1. Normal, continue annual screening 2. Benign appearance or behavior, continue annual screening 3.  6 month CT recommended 4A.  3 month CT recommended; may consider PET/CT 4B. Additional diagnostics and/or tissue sampling recommended 4X. Additional diagnostics and/or tissue sampling recommended  **This report has been created using voice recognition software. It may contain minor errors which are inherent in voice recognition technology. ** Final report electronically signed by Dr Jesus Manuel Bullock on 7/14/2021 9:45 AM      PAST MEDICAL, FAMILY AND SOCIAL HISTORY:  Past Medical History:   Diagnosis Date    Acid reflux     Arthritis     Asthma     Chronic back pain     Class 2 severe obesity due to excess calories with serious comorbidity and body mass index (BMI) of 37.0 to 37.9 in adult [facility]) 8/13/2018    Colon polyps 11/06    COPD (chronic obstructive pulmonary disease) (HCC)     Depression     panic attacks    Diverticulosis     HTN (hypertension)     Hyperlipidemia     Kidney disorder     Panic attack     Pneumonia     Rash     Recurrent upper respiratory infection (URI)     Sleep apnea     Thumb amputation status 3/13/14    left tip of thumb amputated    Thyroid disease     Type II or unspecified type diabetes mellitus without mention of complication, not stated as uncontrolled      Past Surgical History:   Procedure Laterality Date    BACK SURGERY  2002    BACK SURGERY  08/11/2017    BICEPS TENDON REPAIR Right 08/16/2017    BRONCHOSCOPY      BRONCHOSCOPY N/A 4/5/2019    BRONCHOSCOPY performed by Refugio Miramontes MD at [address] [place] [address]  4/5/2019    BRONCHOSCOPY ALVEOLAR LAVAGE performed by Refugio Miramontes MD at [facility]  07/02 08/05    CARPAL TUNNEL RELEASE  2011    right hand    CHOLECYSTECTOMY  yrs ago    COLONOSCOPY 11/06 02/12 1/14    CORONARY ANGIOPLASTY WITH STENT PLACEMENT  02/2020    ENDOSCOPY, COLON, DIAGNOSTIC      EYE SURGERY      foreign body removal    HERNIA REPAIR  2004    Dr Vale Ayala  2008?     LARYNGOSCOPY  04/28/2016    Laryngoscopy Micro with Biopsy by Dr Murtaza Nava  01/07    uppp    ROTATOR CUFF REPAIR Left 5-20-15    SKIN BIOPSY      TONSILLECTOMY  1985    UPPER GASTROINTESTINAL ENDOSCOPY  1997    VEIN SURGERY Left September 2014    Dr. Chidi Currie     Family History   Problem Relation Age of Onset    Cancer Mother     Breast Cancer Mother     Stroke Mother     Heart Disease Brother     Diabetes Brother     High Blood Pressure Brother     High Cholesterol Brother      Outpatient Medications Marked as Taking for the 12/21/21 encounter (Office Visit) with Hernando Whittaker MD   Medication Sig Dispense Refill    montelukast (SINGULAIR) 10 MG tablet Take 10 mg by mouth nightly      busPIRone (BUSPAR) 10 MG tablet TAKE 1 TABLET BY MOUTH THREE TIMES DAILY 270 tablet 1    rOPINIRole (REQUIP) 1 MG tablet TAKE 1 TABLET BY MOUTH THREE TIMES DAILY 270 tablet 1    traZODone (DESYREL) 150 MG tablet TAKE 1/2 TO 1 TABLET BY MOUTH EVERY DAY AT BEDTIME AS NEEDED FOR PAIN SPASM OR SLEEP 90 tablet 1    sertraline (ZOLOFT) 100 MG tablet TAKE 1 TABLET BY MOUTH TWICE DAILY 180 tablet 1    glimepiride (AMARYL) 4 MG tablet TAKE 1 TABLET BY MOUTH TWICE DAILY 180 tablet 0    oxybutynin (DITROPAN XL) 10 MG extended release tablet Take 1 tablet by mouth daily 90 tablet 3    fluticasone (FLONASE) 50 MCG/ACT nasal spray SHAKE LIQUID AND USE 1 SPRAY IN EACH NOSTRIL DAILY 16 g 11    VENTOLIN  (90 Base) MCG/ACT inhaler INHALE 2 PUFFS INTO THE LUNGS EVERY 6 HOURS AS NEEDED FOR WHEEZING 1 each 11    metFORMIN (GLUCOPHAGE) 500 MG tablet TAKE 2 TABLETS BY MOUTH TWICE DAILY WITH MEALS 360 tablet 1    budesonide-formoterol (SYMBICORT) 160-4.5 MCG/ACT AERO Inhale 2 puffs into the lungs 2 times daily 3 Inhaler 1    aspirin 81 MG EC tablet Take 81 mg by mouth daily      albuterol sulfate (PROAIR RESPICLICK) 732 (90 Base) MCG/ACT aerosol powder inhalation Inhale into the lungs every 4 hours as needed for Wheezing or Shortness of Breath      losartan (COZAAR) 100 MG tablet Take 1 tablet by mouth daily (Patient taking differently: Take 50 mg by mouth daily ) 90 tablet 1    clopidogrel (PLAVIX) 75 MG tablet Take 75 mg by mouth daily      Blood Glucose Monitoring Suppl (ONE TOUCH ULTRA 2) w/Device KIT 1 kit by Does not apply route daily 1 kit 0    atorvastatin (LIPITOR) 20 MG tablet       blood glucose test strips (ONE TOUCH ULTRA TEST) strip CHECK BLOOD SUGAR DAILY 100 strip 0    pantoprazole (PROTONIX) 40 MG tablet Take 40 mg by mouth 2 times daily       ranolazine (RANEXA) 500 MG extended release tablet Take 500 mg by mouth 2 times daily      hydrochlorothiazide (HYDRODIURIL) 12.5 MG tablet TK 1 T PO QD IN THE MORNING  3    EPINEPHrine (EPIPEN 2-HUBER) 0.3 MG/0.3ML SOAJ injection Inject one pen as directed STAT for allergic reaction, may disp generic NDC 65254-521-28 6 each 99    ONETOUCH DELICA LANCETS FINE MISC Check blood sugar twice daily Dx: E11.9 200 each 1    baclofen (LIORESAL) 10 MG tablet take 1 tablet by mouth twice a day  0    HYDROcodone-acetaminophen (NORCO) 5-325 MG per tablet Take 1 tablet by mouth every 6 hours as needed for Pain      Omega-3 Fatty Acids (FISH OIL) 1000 MG CAPS Take 1,000 mg by mouth daily.  sildenafil (VIAGRA) 100 MG tablet Take 1 tablet by mouth as needed. 8 tablet 5       Patient has no known allergies.   Social History     Tobacco Use   Smoking Status Former Smoker    Packs/day: 2.00    Years: 42.00    Pack years: 84.00    Types: Cigarettes    Start date: 1971   [employer] Quit date: 3/3/2017    Years since quittin.8   Smokeless Tobacco Never Used   Tobacco Comment    pts uses just nicotine vap      (If patient a smoker,

## 2021-12-22 ENCOUNTER — NURSE ONLY (OUTPATIENT)
Dept: ALLERGY | Age: 66
End: 2021-12-22
Payer: MEDICARE

## 2021-12-22 VITALS — SYSTOLIC BLOOD PRESSURE: 136 MMHG | DIASTOLIC BLOOD PRESSURE: 80 MMHG | TEMPERATURE: 96.8 F | HEART RATE: 80 BPM

## 2021-12-22 DIAGNOSIS — Z91.09 ENCOUNTER FOR DESENSITIZATION TO POLLEN ALLERGEN: Primary | ICD-10-CM

## 2021-12-22 DIAGNOSIS — Z51.6 ENCOUNTER FOR DESENSITIZATION TO POLLEN ALLERGEN: Primary | ICD-10-CM

## 2021-12-22 DIAGNOSIS — J30.81 CAT ALLERGIES: ICD-10-CM

## 2021-12-22 DIAGNOSIS — J30.81 ALLERGY TO DOG DANDER: ICD-10-CM

## 2021-12-22 PROCEDURE — 95117 IMMUNOTHERAPY INJECTIONS: CPT | Performed by: NURSE PRACTITIONER

## 2021-12-24 RX ORDER — GLIMEPIRIDE 4 MG/1
TABLET ORAL
Qty: 180 TABLET | Refills: 0 | Status: SHIPPED | OUTPATIENT
Start: 2021-12-24

## 2022-01-05 ENCOUNTER — NURSE ONLY (OUTPATIENT)
Dept: ALLERGY | Age: 67
End: 2022-01-05
Payer: MEDICARE

## 2022-01-05 VITALS
DIASTOLIC BLOOD PRESSURE: 84 MMHG | SYSTOLIC BLOOD PRESSURE: 126 MMHG | HEART RATE: 78 BPM | TEMPERATURE: 98.1 F | OXYGEN SATURATION: 86 % | RESPIRATION RATE: 20 BRPM

## 2022-01-05 DIAGNOSIS — J30.81 CAT ALLERGIES: Primary | ICD-10-CM

## 2022-01-05 DIAGNOSIS — Z91.09 ENCOUNTER FOR DESENSITIZATION TO POLLEN ALLERGEN: ICD-10-CM

## 2022-01-05 DIAGNOSIS — J30.81 ALLERGY TO DOG DANDER: ICD-10-CM

## 2022-01-05 DIAGNOSIS — Z51.6 ENCOUNTER FOR DESENSITIZATION TO POLLEN ALLERGEN: ICD-10-CM

## 2022-01-05 PROCEDURE — 95117 IMMUNOTHERAPY INJECTIONS: CPT | Performed by: NURSE PRACTITIONER

## 2022-01-05 NOTE — PROGRESS NOTES
After consent obtained/verified, allergy injection given in back of R/L arm(s). VIAL COLOR OF ALL VIALS TODAY IS RED MAINTENANCE    ALLERGY INJECTION FROM VIAL A GIVEN right  UPPER ARM IN THE AMOUNT OF 0.50 ML    ALLERGY INJECTION FROM VIAL B GIVEN left upper ARM IN THE AMOUNT OF 0.50 ML      Documentation of vial injection specific to arm(s) noted on Allergy Immunotherapy Administration Form. Patient waited 30 minutes for observation. No      Patient tolerated well without adverse reaction WHILE IN OFFICE    SHOT REACTION TREATMENT INSTRUCTIONS    During the 30 minute wait after an allergy injection the following symptoms should be reported:    Itching other than at the injection site  Hives or swelling other than at the injection site  Redness other than at the injection site  Difficulty breathing  Chest tightness  Difficulty swallowing  Throat tightness    If these symptoms occur, NOTIFY PROVIDER and the following treatment should be administered:    1. Epinephrine/Auvi Q 1:1000 IM - 0.3 ml if > 66 lbs or more, 0.15 ml if 33 - 63 lbs, or 0.1 ml if <33 lbs     2. Diphenhydramine - give all intramuscular:     2 to <6 years (off-label use): 6.25 mg,    6 to <12 years: 12.5 to 25 mg;    ?12 years: 25-50 mg.    3.  Famotidine:  Adults 40 mg oral    Adolescents age 12 years and >88 lbs: 40 mg    Children and Adolescents ? 12years of age: Initial: 0.25 mg/kg/dose  every 12 hours (maximum daily dose: 40 mg/day)    Epi/Auvi Q dose may me repeated in 5-15 minutes if adequate resolution of symptoms does not occur    Patient should be observed for at least one hour after final Epi/Auvi Q dose and must be seen by provider. Patients cannot drive themselves if they have received diphenhydramine.

## 2022-01-07 ENCOUNTER — PROCEDURE VISIT (OUTPATIENT)
Dept: UROLOGY | Age: 67
End: 2022-01-07
Payer: MEDICARE

## 2022-01-07 VITALS
WEIGHT: 241 LBS | SYSTOLIC BLOOD PRESSURE: 122 MMHG | BODY MASS INDEX: 36.53 KG/M2 | DIASTOLIC BLOOD PRESSURE: 80 MMHG | HEIGHT: 68 IN

## 2022-01-07 DIAGNOSIS — N40.1 BENIGN PROSTATIC HYPERPLASIA WITH URINARY FREQUENCY: Primary | ICD-10-CM

## 2022-01-07 DIAGNOSIS — E66.01 SEVERE OBESITY (BMI 35.0-35.9 WITH COMORBIDITY) (HCC): ICD-10-CM

## 2022-01-07 DIAGNOSIS — N52.9 ERECTILE DYSFUNCTION, UNSPECIFIED ERECTILE DYSFUNCTION TYPE: ICD-10-CM

## 2022-01-07 DIAGNOSIS — E29.1 HYPOGONADISM IN MALE: ICD-10-CM

## 2022-01-07 DIAGNOSIS — R35.0 BENIGN PROSTATIC HYPERPLASIA WITH URINARY FREQUENCY: Primary | ICD-10-CM

## 2022-01-07 PROCEDURE — 52000 CYSTOURETHROSCOPY: CPT | Performed by: UROLOGY

## 2022-01-07 RX ORDER — CIPROFLOXACIN 500 MG/1
500 TABLET, FILM COATED ORAL 2 TIMES DAILY
Qty: 6 TABLET | Refills: 0 | Status: SHIPPED | OUTPATIENT
Start: 2022-01-07 | End: 2022-01-10

## 2022-01-07 NOTE — PROGRESS NOTES
Cystoscopy Operative Note    Patient:  Cristopher Franklin  MRN: [id number]  YOB: 1955    Date: 01/07/22  Surgeon: Cristal Galdamez MD  Anesthesia: Urethral 2% Xylocaine   Indications: worsening BPH  Position: Supine    Findings:   The patient was prepped and draped in the usual sterile fashion. The flexible cystoscope was advanced through the urethra and into the bladder. The bladder was thoroughly inspected and the following was noted:    Residual Urine: significant  Urethra: No abnormalities of the urethra are noted. Prostate: +++ large lateral lobes and mild median lboe  Bladder: No tumors or CIS noted. No bladder diverticulum. moderate trabeculation noted. Ureters: Clear efflux from both ureters. Orifices with normal configuration and location. The cystoscope was removed. The patient tolerated the procedure well. discussed outlet surgery.  Discussed retrograde ejaculation  Cysto greenlight (50)

## 2022-01-11 ENCOUNTER — TELEPHONE (OUTPATIENT)
Dept: UROLOGY | Age: 67
End: 2022-01-11

## 2022-01-11 DIAGNOSIS — Z01.818 PRE-OP TESTING: ICD-10-CM

## 2022-01-11 DIAGNOSIS — N40.1 BENIGN PROSTATIC HYPERPLASIA WITH URINARY FREQUENCY: Primary | ICD-10-CM

## 2022-01-11 DIAGNOSIS — R35.0 BENIGN PROSTATIC HYPERPLASIA WITH URINARY FREQUENCY: Primary | ICD-10-CM

## 2022-01-11 NOTE — TELEPHONE ENCOUNTER
CARDIAC CLEARANCE FORM    Clearance From  Dr. Honey Solis   Appointment Date   Time       Yony Borden  1955  Surgeon:  Dr. Kelly Cruz    Procedure:  Cystoscopy, Greenlight Photovaporization of the Prostate  Date:  2/25/22  Facility: [facility]    I.  MEDICAL HISTORY  DM CAD PVD CVA DVT/PE MI CHFMalignant Hyperthemia HTN Tobacco/ETOH Sleep Apnea GERD Hyperlipidemia Renal Insufficiency COPD/Asthma Bleeding Disorder Pacemaker/AICD  II. CURRENT MEDICATIONS: Attach list or complete     Pt is on following meds that need special instructions for surgery:  Anticoagulants Heart Meds ASA Insulin Oral anti-diabetics NSAIDS Diuretics     K replacements  III. ALLERGIES:   IV.  FUNCTIONAL CAPACITY  >4 METS (CAN VACUUM/HOUSEWORK,CLIMB FLIGHT STAIRS WITHOUT DYSPNEA)  <4METS (FLIGHT OF STEPS CAUSES DYSPNEA/CARDIAC SYMPTOMS)   Stress Test Recommended:    Stress tests or Cardiac Cath in last 5 years:  Yes (attach report)  No   Results: WNL   ABN  Any Change in Cardiac symptoms: Yes  NO  Comments:    Revascularization in last 5 years: Yes  NO  CABG: Yes  No   Comments:     Stents:  Date: ________  Any change in cardiac symptoms  Yes  NO  Comments:   V.  REVIEW OF SYSTEMS:  (Pertinent positive or negative)    VI. PHYSICIAL EXAM  HEENT:1.  Dentations   Good Poor          HT:_______ WT:______      2. Neck Pathology: Rheumatoid DDD C-spine  BP:______ P:________  PULMONARY:  CARDIAC  ABDOMEN  EXTREMITIES  OTHER  VII.   Testing Ordered by Surgeon  Reviewed by Clearance Physician  Test      Result  Plan,if Abnormal  ___CBS     WNL  ABN____________________  ___BMP/BUN/CR    WNL  ABN____________________  ___K+      WNL  ABN____________________  ___UA      WNL  ABN____________________  ___CXR     WNL  ABN____________________  ___EKG     WNL  ABN____________________  ___MRSA     WNL  ABN____________________  VIII.  ___Acceptable risk for surgery ___Risk Unacceptable-Communication to Follow Comments:_____________________________________________________  ________________________________________________________________________  Physician ___________________________  Date:_______________  Physician Printed Name:  _________________________    Joann Joiner  [phone]

## 2022-01-11 NOTE — TELEPHONE ENCOUNTER
SURGERY [address] [place] [address] [address] [place]Z [place] [address], [address]      Phone *[phone] *[phone]   Surgical Scheduling Direct Line Phone *[phone] Fax *[phone]      Cassidy Yuan 1955 male    [address] [facility]   Marital Status:          Home Phone: [phone]      Cell Phone:    Telephone Information:   Mobile [phone]          Surgeon: Dr. Diana Ceballos Surgery Date: 2/25/22   Time: 1:00pm    Procedure: Cystoscopy, Greenlight Photovaporization of the Prostate    Diagnosis: BPH     Important Medical History:  In [facility]    Special Inst/Equip: TORSTEN Stewart Hermilo Kimberlee # [id number]    CPT Codes:    51035  Latex Allergy: No     Cardiac Device:  No    Anesthesia:  General          Admission Type:  Same Day                        Admit Prior to Day of Surgery: No    Case Location:  Main OR            Preadmission Testing:  Phone Call          PAT Date and Time:______________________________________________________    PAT Confirmation #: ______________________________________________________    Post Op Visit: ___________________________________________________________    Need Preop Cardiac Clearance: Yes    Does Patient have Cardiologist/physician?      Dr. Kanwal Diaz    Surgery Confirmation #: __________________________________________________    Ileene Beams: ________________________   Date: __________________________     [employer] Name: Medicare

## 2022-01-11 NOTE — TELEPHONE ENCOUNTER
DO NOT TAKE ASPIRIN, PLAVIX, FISH OIL, COUMADIN, IBUPROFEN, MOTRIN-LIKE DRUGS AND ANY MULTIVITAMINS OR OVER THE COUNTER SUPPLEMENTS 5 DAYS PRIOR TO SURGERY. Yony Borden 1955 Diagnosis:     Surgical Physician: Dr. Anderson Seat have been scheduled for the procedure marked below:      Surgery: Cystoscopy, Greenlight Photovaporization of the Prostate         Date: 2/25/22     Anesthesia: Anesthesiologist (General/Spinal)     Place of Service: [address] [place] [address] [address] Same Day Surgery         Arrive to same day surgery by:  11:00am  (Surgery time is subject to change)      INSTRUCTIONS AS MARKED BELOW:    1.  DO NOT eat or drink anything after midnight before surgery. 2.  We prefer you shower or bathe with an antibacterial soap (Dial) the morning of surgery. 3.  Please ensure to have a  with you to transport you home. 4.  Please bring a current medication list, photo ID and insurance card(s) with you  5. Okay to take Tylenol  6. If you take Glucophage, Metformin or Janumet, hold 48-hours prior to surgery. Hold Glimepiride the morning of surgery. 7.  Take blood pressure or heart medication as directed, if taken in the morning take with a small sip of water  8. The office will call you in 1-2 days after your procedure to schedule a follow up. DATE SENSITIVE TESTING *WALK IN *NO APPOINTMENT      DO THE PRE OP URINE CULTURE AND FASTING LABS ON 2/11/22. ORDERS INCLUDED.         Date: 1/11/2022

## 2022-01-27 ENCOUNTER — PREP FOR PROCEDURE (OUTPATIENT)
Dept: UROLOGY | Age: 67
End: 2022-01-27

## 2022-01-27 RX ORDER — SODIUM CHLORIDE 9 MG/ML
INJECTION, SOLUTION INTRAVENOUS CONTINUOUS
Status: CANCELLED | OUTPATIENT
Start: 2022-02-25

## 2022-02-02 ENCOUNTER — NURSE ONLY (OUTPATIENT)
Dept: ALLERGY | Age: 67
End: 2022-02-02
Payer: MEDICARE

## 2022-02-02 ENCOUNTER — TELEPHONE (OUTPATIENT)
Dept: UROLOGY | Age: 67
End: 2022-02-02

## 2022-02-02 VITALS
TEMPERATURE: 98.1 F | OXYGEN SATURATION: 90 % | DIASTOLIC BLOOD PRESSURE: 82 MMHG | RESPIRATION RATE: 16 BRPM | SYSTOLIC BLOOD PRESSURE: 128 MMHG | HEART RATE: 79 BPM

## 2022-02-02 DIAGNOSIS — Z91.09 ENCOUNTER FOR DESENSITIZATION TO POLLEN ALLERGEN: ICD-10-CM

## 2022-02-02 DIAGNOSIS — Z51.6 ENCOUNTER FOR DESENSITIZATION TO POLLEN ALLERGEN: ICD-10-CM

## 2022-02-02 DIAGNOSIS — J30.81 CAT ALLERGIES: Primary | ICD-10-CM

## 2022-02-02 DIAGNOSIS — J30.81 ALLERGY TO DOG DANDER: ICD-10-CM

## 2022-02-02 PROCEDURE — 95117 IMMUNOTHERAPY INJECTIONS: CPT | Performed by: NURSE PRACTITIONER

## 2022-02-02 NOTE — PROGRESS NOTES
After consent obtained/verified, allergy injection given in back of R/L arm(s). VIAL COLOR OF ALL VIALS TODAY IS red    ALLERGY INJECTION FROM VIAL A GIVEN left  UPPER ARM IN THE AMOUNT OF 0.35 ML    ALLERGY INJECTION FROM VIAL B GIVEN right upper ARM IN THE AMOUNT OF 0.35 ML        Documentation of vial injection specific to arm(s) noted on Allergy Immunotherapy Administration Form. Patient waited 30 minutes for observation. No      Patient tolerated well without adverse reaction WHILE IN OFFICE    SHOT REACTION TREATMENT INSTRUCTIONS    During the 30 minute wait after an allergy injection the following symptoms should be reported:    Itching other than at the injection site  Hives or swelling other than at the injection site  Redness other than at the injection site  Difficulty breathing  Chest tightness  Difficulty swallowing  Throat tightness    If these symptoms occur, NOTIFY PROVIDER and the following treatment should be administered:    1. Epinephrine/Auvi Q 1:1000 IM - 0.3 ml if > 66 lbs or more, 0.15 ml if 33 - 63 lbs, or 0.1 ml if <33 lbs     2. Diphenhydramine - give all intramuscular:     2 to <6 years (off-label use): 6.25 mg,    6 to <12 years: 12.5 to 25 mg;    ?12 years: 25-50 mg.    3.  Famotidine:  Adults 40 mg oral    Adolescents age 12 years and >88 lbs: 40 mg    Children and Adolescents ? 12years of age: Initial: 0.25 mg/kg/dose  every 12 hours (maximum daily dose: 40 mg/day)    Epi/Auvi Q dose may me repeated in 5-15 minutes if adequate resolution of symptoms does not occur    Patient should be observed for at least one hour after final Epi/Auvi Q dose and must be seen by provider. Patients cannot drive themselves if they have received diphenhydramine.

## 2022-02-02 NOTE — TELEPHONE ENCOUNTER
Patient notified of new surgery arrival time. Patient is to be at [address] [place] [address] Same day surgery by  7:00 am   on  2/25/22    Patient reminded to have nothing to eat or drink after midnight. Patient voiced understanding.       Layne notified of time change
14-Aug-2018

## 2022-02-04 RX ORDER — ROPINIROLE 1 MG/1
TABLET, FILM COATED ORAL
Qty: 90 TABLET | Refills: 0 | Status: SHIPPED | OUTPATIENT
Start: 2022-02-04

## 2022-02-04 NOTE — TELEPHONE ENCOUNTER
Date of last visit:  10/29/2021  Date of next visit:  2/11/2022    Requested Prescriptions     Pending Prescriptions Disp Refills    rOPINIRole (REQUIP) 1 MG tablet [Pharmacy Med Name: ROPINIROLE 1MG TABLETS] 90 tablet 0     Sig: TAKE 1 TABLET BY MOUTH THREE TIMES DAILY

## 2022-02-11 ENCOUNTER — OFFICE VISIT (OUTPATIENT)
Dept: FAMILY MEDICINE CLINIC | Age: 67
End: 2022-02-11

## 2022-02-11 ENCOUNTER — HOSPITAL ENCOUNTER (OUTPATIENT)
Age: 67
Discharge: HOME | End: 2022-02-11
Payer: MEDICARE

## 2022-02-11 VITALS
DIASTOLIC BLOOD PRESSURE: 78 MMHG | WEIGHT: 239.5 LBS | TEMPERATURE: 96.4 F | SYSTOLIC BLOOD PRESSURE: 130 MMHG | HEART RATE: 80 BPM | BODY MASS INDEX: 36.3 KG/M2 | RESPIRATION RATE: 16 BRPM | HEIGHT: 68 IN

## 2022-02-11 DIAGNOSIS — E78.5 HYPERLIPIDEMIA, UNSPECIFIED HYPERLIPIDEMIA TYPE: ICD-10-CM

## 2022-02-11 DIAGNOSIS — J44.9 MODERATE COPD (CHRONIC OBSTRUCTIVE PULMONARY DISEASE) (HCC): ICD-10-CM

## 2022-02-11 DIAGNOSIS — Z01.818 PRE-OP TESTING: ICD-10-CM

## 2022-02-11 DIAGNOSIS — N40.1 BENIGN PROSTATIC HYPERPLASIA WITH URINARY FREQUENCY: ICD-10-CM

## 2022-02-11 DIAGNOSIS — R35.0 BENIGN PROSTATIC HYPERPLASIA WITH URINARY FREQUENCY: ICD-10-CM

## 2022-02-11 DIAGNOSIS — K21.9 GASTROESOPHAGEAL REFLUX DISEASE, UNSPECIFIED WHETHER ESOPHAGITIS PRESENT: ICD-10-CM

## 2022-02-11 DIAGNOSIS — I10 ESSENTIAL HYPERTENSION: ICD-10-CM

## 2022-02-11 DIAGNOSIS — E11.8 TYPE 2 DIABETES MELLITUS WITH COMPLICATION, WITHOUT LONG-TERM CURRENT USE OF INSULIN (HCC): Primary | ICD-10-CM

## 2022-02-11 PROBLEM — J45.40 MODERATE PERSISTENT ASTHMA WITHOUT COMPLICATION: Status: RESOLVED | Noted: 2021-08-19 | Resolved: 2022-02-11

## 2022-02-11 PROBLEM — Z87.09 HISTORY OF ASTHMA: Status: RESOLVED | Noted: 2021-01-22 | Resolved: 2022-02-11

## 2022-02-11 LAB
ALT SERPL-CCNC: 18 U/L (ref 11–66)
ANION GAP SERPL CALCULATED.3IONS-SCNC: 13 MEQ/L (ref 8–16)
AST SERPL-CCNC: 16 U/L (ref 5–40)
BASOPHILS # BLD: 0.4 %
BASOPHILS ABSOLUTE: 0 THOU/MM3 (ref 0–0.1)
BUN BLDV-MCNC: 16 MG/DL (ref 7–22)
CALCIUM SERPL-MCNC: 9.1 MG/DL (ref 8.5–10.5)
CHLORIDE BLD-SCNC: 105 MEQ/L (ref 98–111)
CHOLESTEROL, TOTAL: 119 MG/DL (ref 100–199)
CHP ED QC CHECK: ABNORMAL
CO2: 26 MEQ/L (ref 23–33)
CREAT SERPL-MCNC: 1 MG/DL (ref 0.4–1.2)
EOSINOPHIL # BLD: 1.5 %
EOSINOPHILS ABSOLUTE: 0.1 THOU/MM3 (ref 0–0.4)
ERYTHROCYTE [DISTWIDTH] IN BLOOD BY AUTOMATED COUNT: 14.5 % (ref 11.5–14.5)
ERYTHROCYTE [DISTWIDTH] IN BLOOD BY AUTOMATED COUNT: 47.1 FL (ref 35–45)
GFR SERPL CREATININE-BSD FRML MDRD: 74 ML/MIN/1.73M2
GLUCOSE BLD-MCNC: 133 MG/DL
GLUCOSE BLD-MCNC: 137 MG/DL (ref 70–108)
HBA1C MFR BLD: 7 %
HCT VFR BLD CALC: 44.6 % (ref 42–52)
HDLC SERPL-MCNC: 40 MG/DL
HEMOGLOBIN: 15.2 GM/DL (ref 14–18)
IMMATURE GRANS (ABS): 0.05 THOU/MM3 (ref 0–0.07)
IMMATURE GRANULOCYTES: 0.5 %
LDL CHOLESTEROL CALCULATED: 56 MG/DL
LYMPHOCYTES # BLD: 17 %
LYMPHOCYTES ABSOLUTE: 1.7 THOU/MM3 (ref 1–4.8)
MCH RBC QN AUTO: 30.8 PG (ref 26–33)
MCHC RBC AUTO-ENTMCNC: 34.1 GM/DL (ref 32.2–35.5)
MCV RBC AUTO: 90.3 FL (ref 80–94)
MONOCYTES # BLD: 5.8 %
MONOCYTES ABSOLUTE: 0.6 THOU/MM3 (ref 0.4–1.3)
NUCLEATED RED BLOOD CELLS: 0 /100 WBC
PLATELET # BLD: 169 THOU/MM3 (ref 130–400)
PMV BLD AUTO: 11.4 FL (ref 9.4–12.4)
POTASSIUM SERPL-SCNC: 3.8 MEQ/L (ref 3.5–5.2)
RBC # BLD: 4.94 MILL/MM3 (ref 4.7–6.1)
SEG NEUTROPHILS: 74.8 %
SEGMENTED NEUTROPHILS ABSOLUTE COUNT: 7.4 THOU/MM3 (ref 1.8–7.7)
SODIUM BLD-SCNC: 144 MEQ/L (ref 135–145)
TRIGL SERPL-MCNC: 115 MG/DL (ref 0–199)
WBC # BLD: 9.9 THOU/MM3 (ref 4.8–10.8)

## 2022-02-11 PROCEDURE — 1123F ACP DISCUSS/DSCN MKR DOCD: CPT | Performed by: FAMILY MEDICINE

## 2022-02-11 PROCEDURE — 36415 COLL VENOUS BLD VENIPUNCTURE: CPT

## 2022-02-11 PROCEDURE — 83036 HEMOGLOBIN GLYCOSYLATED A1C: CPT | Performed by: FAMILY MEDICINE

## 2022-02-11 PROCEDURE — 87086 URINE CULTURE/COLONY COUNT: CPT

## 2022-02-11 PROCEDURE — 3017F COLORECTAL CA SCREEN DOC REV: CPT | Performed by: FAMILY MEDICINE

## 2022-02-11 PROCEDURE — G8417 CALC BMI ABV UP PARAM F/U: HCPCS | Performed by: FAMILY MEDICINE

## 2022-02-11 PROCEDURE — 82962 GLUCOSE BLOOD TEST: CPT | Performed by: FAMILY MEDICINE

## 2022-02-11 PROCEDURE — 80061 LIPID PANEL: CPT

## 2022-02-11 PROCEDURE — G8427 DOCREV CUR MEDS BY ELIG CLIN: HCPCS | Performed by: FAMILY MEDICINE

## 2022-02-11 PROCEDURE — 80048 BASIC METABOLIC PNL TOTAL CA: CPT

## 2022-02-11 PROCEDURE — 84450 TRANSFERASE (AST) (SGOT): CPT

## 2022-02-11 PROCEDURE — 1036F TOBACCO NON-USER: CPT | Performed by: FAMILY MEDICINE

## 2022-02-11 PROCEDURE — 84460 ALANINE AMINO (ALT) (SGPT): CPT

## 2022-02-11 PROCEDURE — 99213 OFFICE O/P EST LOW 20 MIN: CPT | Performed by: FAMILY MEDICINE

## 2022-02-11 PROCEDURE — 85025 COMPLETE CBC W/AUTO DIFF WBC: CPT

## 2022-02-11 ASSESSMENT — ENCOUNTER SYMPTOMS
DIARRHEA: 0
CHEST TIGHTNESS: 0
CONSTIPATION: 0
BLURRED VISION: 0
COUGH: 0
ABDOMINAL PAIN: 0
VOMITING: 0
NAUSEA: 0
BLOOD IN STOOL: 0
EYES NEGATIVE: 1
SHORTNESS OF BREATH: 0

## 2022-02-11 NOTE — PROGRESS NOTES
Date: 2/11/2022    Maia Otero is a 74 y.o. male who presents today for:  Chief Complaint   Patient presents with    Diabetes    Hypertension stable    Hyperlipidemia needs blood work. Current regimen: oral agent (dual therapy)  Current monitoring regimen: home blood tests - 1  Home blood sugar trends: AM -150's  Any episodes of hypoglycemia? No  Current exercise: he is active and he is using a stationary bike. Compliance with treatment: Good  Eye exam current (within one year): Yes  Any history of foot problems? No  Last foot exam: 2/11/2022  Cardiovascular risk factors: advanced age (older than 54 for men, 72 for women), diabetes mellitus, dyslipidemia, hypertension, male gender and obesity (BMI >= 30 kg/m2). Immunizations up to date: Flu Yes   Pneumonia Yes  Taking ASA: Yes   If not why? HPI:     Hypertension  This is a chronic problem. The current episode started more than 1 year ago. The problem is unchanged. The problem is controlled. Pertinent negatives include no blurred vision, chest pain, headaches, palpitations, peripheral edema, PND or shortness of breath. Risk factors for coronary artery disease include diabetes mellitus, dyslipidemia, male gender, obesity and sedentary lifestyle. Past treatments include angiotensin blockers and diuretics. The current treatment provides significant improvement. Compliance problems include exercise. Hypertensive end-organ damage includes CAD/MI. Hyperlipidemia  This is a chronic problem. The current episode started more than 1 year ago. The problem is controlled. Recent lipid tests were reviewed and are normal. Pertinent negatives include no chest pain, focal sensory loss, focal weakness, leg pain, myalgias or shortness of breath. Current antihyperlipidemic treatment includes statins. The current treatment provides significant improvement of lipids.  Risk factors for coronary artery disease include diabetes mellitus, dyslipidemia, male sex, hypertension and obesity. has a current medication list which includes the following prescription(s): ropinirole, ALLERGEN EXTRACT, trazodone, glimepiride, montelukast, buspirone, sertraline, fluticasone, ventolin hfa, metformin, budesonide-formoterol, aspirin, albuterol sulfate, losartan, clopidogrel, one touch ultra 2, atorvastatin, one touch ultra test, pantoprazole, ranolazine, hydrochlorothiazide, epinephrine, onetouch delica lancets fine, baclofen, hydrocodone-acetaminophen, fish oil, and sildenafil, and the following Facility-Administered Medications: dupilumab and dupilumab.     No Known Allergies    Social History     Tobacco Use    Smoking status: Former Smoker     Packs/day: 2.00     Years: 42.00     Pack years: 84.00     Types: Cigarettes     Start date: 1971     Quit date: 3/3/2017     Years since quittin.9    Smokeless tobacco: Never Used    Tobacco comment: pts uses just nicotine vap   Vaping Use    Vaping Use: Some days    Substances: Always   Substance Use Topics    Alcohol use: No     Alcohol/week: 0.0 standard drinks    Drug use: No       Past Medical History:   Diagnosis Date    Acid reflux     Arthritis     Asthma     Chronic back pain     Class 2 severe obesity due to excess calories with serious comorbidity and body mass index (BMI) of 37.0 to 37.9 in adult [facility]) 2018    Colon polyps     COPD (chronic obstructive pulmonary disease) ([facility] [address]     Depression     panic attacks    Diverticulosis     HTN (hypertension)     Hyperlipidemia     Kidney disorder     Panic attack     Pneumonia     Rash     Recurrent upper respiratory infection (URI)     Sleep apnea     Thumb amputation status 3/13/14    left tip of thumb amputated    Thyroid disease     Type II or unspecified type diabetes mellitus without mention of complication, not stated as uncontrolled        Past Surgical History:   Procedure Laterality Date    BACK SURGERY      BACK SURGERY 08/11/2017    BICEPS TENDON REPAIR Right 08/16/2017    BRONCHOSCOPY      BRONCHOSCOPY N/A 4/5/2019    BRONCHOSCOPY performed by Bridgette Govea MD at [address]  4/5/2019    BRONCHOSCOPY ALVEOLAR LAVAGE performed by Bridgette Govea MD at [facility]   [address]  07/02 08/05    CARPAL TUNNEL RELEASE  2011    right hand    CHOLECYSTECTOMY  yrs ago    COLONOSCOPY  11/06 02/12 1/14    CORONARY ANGIOPLASTY WITH STENT PLACEMENT  02/2020    ENDOSCOPY, COLON, DIAGNOSTIC      EYE SURGERY      foreign body removal    HERNIA REPAIR  2004    Dr Trenda Koyanagi  2008?  LARYNGOSCOPY  04/28/2016    Laryngoscopy Micro with Biopsy by Dr Zina Hobbs  01/07    uppp    ROTATOR CUFF REPAIR Left 5-20-15    SKIN BIOPSY      TONSILLECTOMY  1985    UPPER GASTROINTESTINAL ENDOSCOPY  1997    VEIN SURGERY Left September 2014    Dr. Swapnil Gonzalez       Family History   Problem Relation Age of Onset    Cancer Mother     Breast Cancer Mother     Stroke Mother     Heart Disease Brother     Diabetes Brother     High Blood Pressure Brother     High Cholesterol Brother        /78 (Site: Right Upper Arm, Position: Sitting, Cuff Size: Large Adult)   Pulse 80   Temp 96.4 °F (35.8 °C) (Skin)   Resp 16   Ht 5' 8\" (1.727 m)   Wt 239 lb 8 oz (108.6 kg)   BMI 36.42 kg/m²   Wt Readings from Last 3 Encounters:   02/11/22 239 lb 8 oz (108.6 kg)   01/07/22 241 lb (109.3 kg)   12/21/21 241 lb (109.3 kg)       Subjective:      Review of Systems   Constitutional: Negative for activity change, appetite change, diaphoresis and fever. HENT: Negative. Eyes: Negative. Negative for blurred vision. Respiratory: Negative for cough, chest tightness and shortness of breath. Cardiovascular: Negative for chest pain, palpitations, leg swelling and PND.    Gastrointestinal: Negative for abdominal pain, blood in stool, constipation, diarrhea, nausea and vomiting. Genitourinary: Positive for difficulty urinating. He is having problems with his stream and dribbling. He is seen urology for it. Musculoskeletal: Negative. Negative for myalgias. Skin: Negative. Negative for rash. Neurological: Negative. Negative for dizziness, focal weakness, syncope, weakness, light-headedness and headaches. Psychiatric/Behavioral: Negative. Objective:     Physical Exam  Vitals and nursing note reviewed. Constitutional:       General: He is not in acute distress. Appearance: He is well-developed. He is not diaphoretic. HENT:      Head: Normocephalic and atraumatic. Eyes:      General: No scleral icterus. Right eye: No discharge. Left eye: No discharge. Conjunctiva/sclera: Conjunctivae normal.      Pupils: Pupils are equal, round, and reactive to light. Neck:      Thyroid: No thyromegaly. Vascular: No JVD. Cardiovascular:      Rate and Rhythm: Normal rate and regular rhythm. Pulses:           Dorsalis pedis pulses are 2+ on the right side and 2+ on the left side. Posterior tibial pulses are 2+ on the right side and 2+ on the left side. Heart sounds: Normal heart sounds. No murmur heard. Pulmonary:      Effort: Pulmonary effort is normal. No respiratory distress. Breath sounds: Normal breath sounds. No wheezing, rhonchi or rales. Abdominal:      General: Bowel sounds are normal. There is no distension. Palpations: Abdomen is soft. There is no mass. Tenderness: There is no abdominal tenderness. There is no guarding or rebound. Musculoskeletal:         General: Normal range of motion. Cervical back: Normal range of motion and neck supple. Right foot: Normal range of motion. No deformity or bunion. Left foot: Normal range of motion. No deformity or bunion. Feet:      Right foot:      Protective Sensation: 10 sites tested. 10 sites sensed.       Skin integrity: Dry skin present. Toenail Condition: Right toenails are normal.      Left foot:      Protective Sensation: 10 sites tested. 10 sites sensed. Skin integrity: Dry skin present. Toenail Condition: Left toenails are normal.   Lymphadenopathy:      Cervical: No cervical adenopathy. Skin:     General: Skin is warm and dry. Findings: No rash. Neurological:      Mental Status: He is alert and oriented to person, place, and time. Psychiatric:         Behavior: Behavior normal.         Assessment:       Diagnosis Orders   1. Type 2 diabetes mellitus with complication, without long-term current use of insulin ([facility])  POCT Glucose    POCT glycosylated hemoglobin (Hb A1C)     DIABETES FOOT EXAM   2. Essential hypertension     3. Moderate COPD (chronic obstructive pulmonary disease) ([facility])     4. Hyperlipidemia, unspecified hyperlipidemia type  Lipid Panel    AST    ALT   5. Gastroesophageal reflux disease, unspecified whether esophagitis present         Plan:      Orders Placed:  Orders Placed This Encounter   Procedures    Lipid Panel     Standing Status:   Future     Standing Expiration Date:   2/11/2023     Order Specific Question:   Is Patient Fasting?/# of Hours     Answer:   yes 12 hours    AST     Standing Status:   Future     Standing Expiration Date:   2/11/2023    ALT     Standing Status:   Future     Standing Expiration Date:   2/11/2023    POCT Glucose    POCT glycosylated hemoglobin (Hb A1C)     DIABETES FOOT EXAM     MedicationsPrescribed:  No orders of the defined types were placed in this encounter.         Lab Results   Component Value Date    LABA1C 7.0 (A) 02/11/2022    LABA1C 6.9 (A) 10/29/2021    LABA1C 7.2 (A) 07/28/2021     Lab Results   Component Value Date    LABMICR 2.74 11/04/2016     Results for POC orders placed in visit on 02/11/22   POCT glycosylated hemoglobin (Hb A1C)   Result Value Ref Range    Hemoglobin A1C 7.0 (A) %   POCT Glucose   Result Value Ref Range    Glucose 133 (A) mg/dL    QC OK?          Lab Results   Component Value Date    BUN 16 02/11/2022     Lab Results   Component Value Date    CREATININE 1.0 02/11/2022     Lab Results   Component Value Date    CHOL 111 08/11/2021     Lab Results   Component Value Date    TRIG 108 08/11/2021     Lab Results   Component Value Date    HDL 41 08/11/2021     Lab Results   Component Value Date    LDLCALC 48 08/11/2021     Lab Results   Component Value Date    VLDL 19 01/05/2021     No results found for: CHOLHDLRATIO      Current Outpatient Medications   Medication Sig Dispense Refill    rOPINIRole (REQUIP) 1 MG tablet TAKE 1 TABLET BY MOUTH THREE TIMES DAILY 90 tablet 0    ALLERGEN EXTRACT once      traZODone (DESYREL) 150 MG tablet TAKE 1/2 TO 1 TABLET BY MOUTH EVERY DAY AT BEDTIME AS NEEDED FOR SLEEP 90 tablet 0    glimepiride (AMARYL) 4 MG tablet TAKE 1 TABLET BY MOUTH TWICE DAILY 180 tablet 0    montelukast (SINGULAIR) 10 MG tablet Take 10 mg by mouth nightly      busPIRone (BUSPAR) 10 MG tablet TAKE 1 TABLET BY MOUTH THREE TIMES DAILY 270 tablet 1    sertraline (ZOLOFT) 100 MG tablet TAKE 1 TABLET BY MOUTH TWICE DAILY 180 tablet 1    fluticasone (FLONASE) 50 MCG/ACT nasal spray SHAKE LIQUID AND USE 1 SPRAY IN EACH NOSTRIL DAILY 16 g 11    VENTOLIN  (90 Base) MCG/ACT inhaler INHALE 2 PUFFS INTO THE LUNGS EVERY 6 HOURS AS NEEDED FOR WHEEZING 1 each 11    metFORMIN (GLUCOPHAGE) 500 MG tablet TAKE 2 TABLETS BY MOUTH TWICE DAILY WITH MEALS 360 tablet 1    budesonide-formoterol (SYMBICORT) 160-4.5 MCG/ACT AERO Inhale 2 puffs into the lungs 2 times daily 3 Inhaler 1    aspirin 81 MG EC tablet Take 81 mg by mouth daily      albuterol sulfate (PROAIR RESPICLICK) 710 (90 Base) MCG/ACT aerosol powder inhalation Inhale into the lungs every 4 hours as needed for Wheezing or Shortness of Breath      losartan (COZAAR) 100 MG tablet Take 1 tablet by mouth daily (Patient taking differently: Take 50 mg by mouth daily ) 90 tablet 1    clopidogrel (PLAVIX) 75 MG tablet Take 75 mg by mouth daily      Blood Glucose Monitoring Suppl (ONE TOUCH ULTRA 2) w/Device KIT 1 kit by Does not apply route daily 1 kit 0    atorvastatin (LIPITOR) 20 MG tablet       blood glucose test strips (ONE TOUCH ULTRA TEST) strip CHECK BLOOD SUGAR DAILY 100 strip 0    pantoprazole (PROTONIX) 40 MG tablet Take 40 mg by mouth 2 times daily       ranolazine (RANEXA) 500 MG extended release tablet Take 500 mg by mouth 2 times daily      hydrochlorothiazide (HYDRODIURIL) 12.5 MG tablet TK 1 T PO QD IN THE MORNING  3    EPINEPHrine (EPIPEN 2-HUBER) 0.3 MG/0.3ML SOAJ injection Inject one pen as directed STAT for allergic reaction, may disp generic NDC 34529-827-12 6 each 99    ONETOUCH DELICA LANCETS FINE MISC Check blood sugar twice daily Dx: E11.9 200 each 1    baclofen (LIORESAL) 10 MG tablet take 1 tablet by mouth twice a day  0    HYDROcodone-acetaminophen (NORCO) 5-325 MG per tablet Take 1 tablet by mouth every 6 hours as needed for Pain      Omega-3 Fatty Acids (FISH OIL) 1000 MG CAPS Take 1,000 mg by mouth daily.  sildenafil (VIAGRA) 100 MG tablet Take 1 tablet by mouth as needed.  8 tablet 5                                       Orders Placed This Encounter   Procedures    Lipid Panel     Standing Status:   Future     Standing Expiration Date:   2/11/2023     Order Specific Question:   Is Patient Fasting?/# of Hours     Answer:   yes 12 hours    AST     Standing Status:   Future     Standing Expiration Date:   2/11/2023    ALT     Standing Status:   Future     Standing Expiration Date:   2/11/2023    POCT Glucose    POCT glycosylated hemoglobin (Hb A1C)     DIABETES FOOT EXAM     Lab Results   Component Value Date    LABA1C 7.0 (A) 02/11/2022    LABA1C 6.9 (A) 10/29/2021    LABA1C 7.2 (A) 07/28/2021     Lab Results   Component Value Date    LABMICR 2.74 11/04/2016     Results for POC orders placed in visit on 02/11/22 POCT glycosylated hemoglobin (Hb A1C)   Result Value Ref Range    Hemoglobin A1C 7.0 (A) %   POCT Glucose   Result Value Ref Range    Glucose 133 (A) mg/dL    QC OK? Continue to monitor blood sugars 1 times a day. Keep log of blood sugars and bring with you to the next appointment. Discussed use, benefit, and side effects of prescribed medications. Allpatient questions answered. Pt voiced understanding. Instructed to continue currentmedications, diet and exercise. Patient agreed with treatment plan. Return in about 4 months (around 6/11/2022), or if symptoms worsen or fail to improve, for DM.

## 2022-02-13 LAB
ORGANISM: ABNORMAL
URINE CULTURE, ROUTINE: ABNORMAL

## 2022-02-13 RX ORDER — CIPROFLOXACIN 500 MG/1
500 TABLET, FILM COATED ORAL 2 TIMES DAILY
Qty: 6 TABLET | Refills: 0 | Status: SHIPPED | OUTPATIENT
Start: 2022-02-13 | End: 2022-03-03 | Stop reason: ALTCHOICE

## 2022-02-14 ENCOUNTER — TELEPHONE (OUTPATIENT)
Dept: UROLOGY | Age: 67
End: 2022-02-14

## 2022-02-14 NOTE — TELEPHONE ENCOUNTER
lvm   Start cipro  three days before surgery. Sent to pharmacy  Follow directions on the bottle. Any questions call the office.

## 2022-02-16 ENCOUNTER — NURSE ONLY (OUTPATIENT)
Dept: ALLERGY | Age: 67
End: 2022-02-16
Payer: MEDICARE

## 2022-02-16 VITALS — HEART RATE: 80 BPM | SYSTOLIC BLOOD PRESSURE: 124 MMHG | DIASTOLIC BLOOD PRESSURE: 70 MMHG | TEMPERATURE: 97.1 F

## 2022-02-16 DIAGNOSIS — J30.81 CAT ALLERGIES: ICD-10-CM

## 2022-02-16 DIAGNOSIS — Z51.6 ENCOUNTER FOR DESENSITIZATION TO POLLEN ALLERGEN: Primary | ICD-10-CM

## 2022-02-16 DIAGNOSIS — Z91.09 ENCOUNTER FOR DESENSITIZATION TO POLLEN ALLERGEN: Primary | ICD-10-CM

## 2022-02-16 DIAGNOSIS — J30.81 ALLERGY TO DOG DANDER: ICD-10-CM

## 2022-02-16 PROCEDURE — 95117 IMMUNOTHERAPY INJECTIONS: CPT | Performed by: NURSE PRACTITIONER

## 2022-02-17 NOTE — TELEPHONE ENCOUNTER
Date of last visit:  2/11/2022  Date of next visit:  6/13/2022    Requested Prescriptions     Pending Prescriptions Disp Refills    metFORMIN (GLUCOPHAGE) 500 MG tablet [Pharmacy Med Name: METFORMIN 500MG TABLETS] 360 tablet 1     Sig: TAKE 2 TABLETS BY MOUTH TWICE DAILY WITH MEALS

## 2022-02-18 RX ORDER — LOSARTAN POTASSIUM 50 MG/1
50 TABLET ORAL DAILY
Status: ON HOLD | COMMUNITY
End: 2022-03-25 | Stop reason: SDUPTHER

## 2022-02-18 NOTE — PROGRESS NOTES
Following instructions given to patient, who states understanding:    NPO after midnight  [employer] and 's license  Wear comfortable clean clothing  Do not bring jewelry   Shower night before and morning of surgery with a liquid antibacterial soap  Bring medications in original bottles  Follow all instructions given by your physician   needed at discharge  Call [phone] for any questions  Report to SDS on 2nd floor  If you would become ill prior to surgery, please call the surgeon  May have a visitor with you, we request that you limit to 2 visitors in pre-op area  Please bring and wear mask

## 2022-02-25 ENCOUNTER — ANESTHESIA (OUTPATIENT)
Dept: OPERATING ROOM | Age: 67
DRG: 713 | End: 2022-02-25
Payer: MEDICARE

## 2022-02-25 ENCOUNTER — APPOINTMENT (OUTPATIENT)
Dept: GENERAL RADIOLOGY | Age: 67
DRG: 713 | End: 2022-02-25
Attending: UROLOGY
Payer: MEDICARE

## 2022-02-25 ENCOUNTER — HOSPITAL ENCOUNTER (INPATIENT)
Age: 67
LOS: 1 days | Discharge: HOME | DRG: 713 | End: 2022-02-26
Attending: UROLOGY | Admitting: UROLOGY
Payer: MEDICARE

## 2022-02-25 ENCOUNTER — ANESTHESIA EVENT (OUTPATIENT)
Dept: OPERATING ROOM | Age: 67
DRG: 713 | End: 2022-02-25
Payer: MEDICARE

## 2022-02-25 VITALS — DIASTOLIC BLOOD PRESSURE: 78 MMHG | OXYGEN SATURATION: 92 % | SYSTOLIC BLOOD PRESSURE: 141 MMHG

## 2022-02-25 DIAGNOSIS — G89.18 POSTOPERATIVE PAIN: Primary | ICD-10-CM

## 2022-02-25 PROBLEM — J44.9 COPD (CHRONIC OBSTRUCTIVE PULMONARY DISEASE) (HCC): Status: ACTIVE | Noted: 2022-02-25

## 2022-02-25 PROBLEM — J95.89 RESPIRATORY DISTRESS FOLLOWING SURGERY: Status: ACTIVE | Noted: 2022-02-25

## 2022-02-25 PROBLEM — R06.03 RESPIRATORY DISTRESS FOLLOWING SURGERY: Status: ACTIVE | Noted: 2022-02-25

## 2022-02-25 LAB
ANION GAP SERPL CALCULATED.3IONS-SCNC: 13 MEQ/L (ref 8–16)
BASE EXCESS (CALCULATED): -2.9 MMOL/L (ref -2.5–2.5)
BUN BLDV-MCNC: 13 MG/DL (ref 7–22)
CALCIUM SERPL-MCNC: 8 MG/DL (ref 8.5–10.5)
CHLORIDE BLD-SCNC: 105 MEQ/L (ref 98–111)
CO2: 23 MEQ/L (ref 23–33)
COLLECTED BY:: ABNORMAL
CREAT SERPL-MCNC: 1.1 MG/DL (ref 0.4–1.2)
ERYTHROCYTE [DISTWIDTH] IN BLOOD BY AUTOMATED COUNT: 15.2 % (ref 11.5–14.5)
ERYTHROCYTE [DISTWIDTH] IN BLOOD BY AUTOMATED COUNT: 50.5 FL (ref 35–45)
GFR SERPL CREATININE-BSD FRML MDRD: 66 ML/MIN/1.73M2
GLUCOSE BLD-MCNC: 187 MG/DL (ref 70–108)
GLUCOSE BLD-MCNC: 194 MG/DL (ref 70–108)
GLUCOSE BLD-MCNC: 221 MG/DL (ref 70–108)
GLUCOSE BLD-MCNC: 228 MG/DL (ref 70–108)
HCO3: 23 MMOL/L (ref 23–28)
HCT VFR BLD CALC: 43.9 % (ref 42–52)
HEMOGLOBIN: 14.7 GM/DL (ref 14–18)
INR BLD: 1.14 (ref 0.85–1.13)
LACTIC ACID: 2.2 MMOL/L (ref 0.5–2)
MCH RBC QN AUTO: 30.8 PG (ref 26–33)
MCHC RBC AUTO-ENTMCNC: 33.5 GM/DL (ref 32.2–35.5)
MCV RBC AUTO: 92 FL (ref 80–94)
O2 SATURATION: 91 %
PCO2: 43 MMHG (ref 35–45)
PH BLOOD GAS: 7.33 (ref 7.35–7.45)
PLATELET # BLD: 168 THOU/MM3 (ref 130–400)
PMV BLD AUTO: 10.9 FL (ref 9.4–12.4)
PO2: 65 MMHG (ref 71–104)
POTASSIUM SERPL-SCNC: 4.2 MEQ/L (ref 3.5–5.2)
PROCALCITONIN: 0.07 NG/ML (ref 0.01–0.09)
RBC # BLD: 4.77 MILL/MM3 (ref 4.7–6.1)
SODIUM BLD-SCNC: 141 MEQ/L (ref 135–145)
SOURCE, BLOOD GAS: ABNORMAL
WBC # BLD: 13.1 THOU/MM3 (ref 4.8–10.8)

## 2022-02-25 PROCEDURE — 6360000002 HC RX W HCPCS: Performed by: UROLOGY

## 2022-02-25 PROCEDURE — 2580000003 HC RX 258: Performed by: UROLOGY

## 2022-02-25 PROCEDURE — 2500000003 HC RX 250 WO HCPCS: Performed by: NURSE ANESTHETIST, CERTIFIED REGISTERED

## 2022-02-25 PROCEDURE — 3700000001 HC ADD 15 MINUTES (ANESTHESIA): Performed by: UROLOGY

## 2022-02-25 PROCEDURE — 85027 COMPLETE CBC AUTOMATED: CPT

## 2022-02-25 PROCEDURE — 6370000000 HC RX 637 (ALT 250 FOR IP): Performed by: UROLOGY

## 2022-02-25 PROCEDURE — 3700000000 HC ANESTHESIA ATTENDED CARE: Performed by: UROLOGY

## 2022-02-25 PROCEDURE — 7100000001 HC PACU RECOVERY - ADDTL 15 MIN: Performed by: UROLOGY

## 2022-02-25 PROCEDURE — 82948 REAGENT STRIP/BLOOD GLUCOSE: CPT

## 2022-02-25 PROCEDURE — 99223 1ST HOSP IP/OBS HIGH 75: CPT | Performed by: PHYSICIAN ASSISTANT

## 2022-02-25 PROCEDURE — 94760 N-INVAS EAR/PLS OXIMETRY 1: CPT

## 2022-02-25 PROCEDURE — 84145 PROCALCITONIN (PCT): CPT

## 2022-02-25 PROCEDURE — 6360000002 HC RX W HCPCS: Performed by: ANESTHESIOLOGY

## 2022-02-25 PROCEDURE — 6370000000 HC RX 637 (ALT 250 FOR IP): Performed by: PHYSICIAN ASSISTANT

## 2022-02-25 PROCEDURE — 85610 PROTHROMBIN TIME: CPT

## 2022-02-25 PROCEDURE — 0TJB8ZZ INSPECTION OF BLADDER, VIA NATURAL OR ARTIFICIAL OPENING ENDOSCOPIC: ICD-10-PCS | Performed by: UROLOGY

## 2022-02-25 PROCEDURE — 0V508ZZ DESTRUCTION OF PROSTATE, VIA NATURAL OR ARTIFICIAL OPENING ENDOSCOPIC: ICD-10-PCS | Performed by: UROLOGY

## 2022-02-25 PROCEDURE — 2709999900 HC NON-CHARGEABLE SUPPLY: Performed by: UROLOGY

## 2022-02-25 PROCEDURE — 2720000010 HC SURG SUPPLY STERILE: Performed by: UROLOGY

## 2022-02-25 PROCEDURE — 93005 ELECTROCARDIOGRAM TRACING: CPT | Performed by: PHYSICIAN ASSISTANT

## 2022-02-25 PROCEDURE — 2500000003 HC RX 250 WO HCPCS

## 2022-02-25 PROCEDURE — 80048 BASIC METABOLIC PNL TOTAL CA: CPT

## 2022-02-25 PROCEDURE — 71045 X-RAY EXAM CHEST 1 VIEW: CPT

## 2022-02-25 PROCEDURE — 83605 ASSAY OF LACTIC ACID: CPT

## 2022-02-25 PROCEDURE — 6360000002 HC RX W HCPCS

## 2022-02-25 PROCEDURE — 2060000000 HC ICU INTERMEDIATE R&B

## 2022-02-25 PROCEDURE — 3600000004 HC SURGERY LEVEL 4 BASE: Performed by: UROLOGY

## 2022-02-25 PROCEDURE — 3600000014 HC SURGERY LEVEL 4 ADDTL 15MIN: Performed by: UROLOGY

## 2022-02-25 PROCEDURE — 94660 CPAP INITIATION&MGMT: CPT

## 2022-02-25 PROCEDURE — 2700000000 HC OXYGEN THERAPY PER DAY

## 2022-02-25 PROCEDURE — 82803 BLOOD GASES ANY COMBINATION: CPT

## 2022-02-25 PROCEDURE — 6370000000 HC RX 637 (ALT 250 FOR IP): Performed by: ANESTHESIOLOGY

## 2022-02-25 PROCEDURE — 7100000000 HC PACU RECOVERY - FIRST 15 MIN: Performed by: UROLOGY

## 2022-02-25 PROCEDURE — 6360000002 HC RX W HCPCS: Performed by: NURSE ANESTHETIST, CERTIFIED REGISTERED

## 2022-02-25 PROCEDURE — 36415 COLL VENOUS BLD VENIPUNCTURE: CPT

## 2022-02-25 RX ORDER — IPRATROPIUM BROMIDE AND ALBUTEROL SULFATE 2.5; .5 MG/3ML; MG/3ML
1 SOLUTION RESPIRATORY (INHALATION) ONCE
Status: COMPLETED | OUTPATIENT
Start: 2022-02-25 | End: 2022-02-25

## 2022-02-25 RX ORDER — ONDANSETRON 2 MG/ML
INJECTION INTRAMUSCULAR; INTRAVENOUS PRN
Status: DISCONTINUED | OUTPATIENT
Start: 2022-02-25 | End: 2022-02-25 | Stop reason: SDUPTHER

## 2022-02-25 RX ORDER — DIPHENHYDRAMINE HYDROCHLORIDE 50 MG/ML
12.5 INJECTION INTRAMUSCULAR; INTRAVENOUS
Status: DISCONTINUED | OUTPATIENT
Start: 2022-02-25 | End: 2022-02-25 | Stop reason: HOSPADM

## 2022-02-25 RX ORDER — SERTRALINE HYDROCHLORIDE 100 MG/1
100 TABLET, FILM COATED ORAL 2 TIMES DAILY
Status: DISCONTINUED | OUTPATIENT
Start: 2022-02-25 | End: 2022-02-26 | Stop reason: HOSPADM

## 2022-02-25 RX ORDER — ONDANSETRON 2 MG/ML
4 INJECTION INTRAMUSCULAR; INTRAVENOUS
Status: DISCONTINUED | OUTPATIENT
Start: 2022-02-25 | End: 2022-02-25 | Stop reason: HOSPADM

## 2022-02-25 RX ORDER — ACETAMINOPHEN 325 MG/1
650 TABLET ORAL EVERY 4 HOURS PRN
Status: DISCONTINUED | OUTPATIENT
Start: 2022-02-25 | End: 2022-02-26 | Stop reason: HOSPADM

## 2022-02-25 RX ORDER — MORPHINE SULFATE 4 MG/ML
4 INJECTION, SOLUTION INTRAMUSCULAR; INTRAVENOUS
Status: DISCONTINUED | OUTPATIENT
Start: 2022-02-25 | End: 2022-02-26 | Stop reason: HOSPADM

## 2022-02-25 RX ORDER — MORPHINE SULFATE 2 MG/ML
2 INJECTION, SOLUTION INTRAMUSCULAR; INTRAVENOUS EVERY 5 MIN PRN
Status: DISCONTINUED | OUTPATIENT
Start: 2022-02-25 | End: 2022-02-25 | Stop reason: HOSPADM

## 2022-02-25 RX ORDER — ONDANSETRON 4 MG/1
4 TABLET, ORALLY DISINTEGRATING ORAL EVERY 8 HOURS PRN
Status: DISCONTINUED | OUTPATIENT
Start: 2022-02-25 | End: 2022-02-26 | Stop reason: HOSPADM

## 2022-02-25 RX ORDER — LORAZEPAM 2 MG/ML
0.25 INJECTION INTRAMUSCULAR
Status: DISCONTINUED | OUTPATIENT
Start: 2022-02-25 | End: 2022-02-25 | Stop reason: HOSPADM

## 2022-02-25 RX ORDER — ALBUTEROL SULFATE 2.5 MG/3ML
SOLUTION RESPIRATORY (INHALATION)
Status: COMPLETED
Start: 2022-02-25 | End: 2022-02-25

## 2022-02-25 RX ORDER — SODIUM CHLORIDE 0.9 % (FLUSH) 0.9 %
5-40 SYRINGE (ML) INJECTION PRN
Status: DISCONTINUED | OUTPATIENT
Start: 2022-02-25 | End: 2022-02-25 | Stop reason: HOSPADM

## 2022-02-25 RX ORDER — LORAZEPAM 2 MG/ML
INJECTION INTRAMUSCULAR
Status: COMPLETED
Start: 2022-02-25 | End: 2022-02-25

## 2022-02-25 RX ORDER — TRAZODONE HYDROCHLORIDE 100 MG/1
150 TABLET ORAL NIGHTLY PRN
Status: DISCONTINUED | OUTPATIENT
Start: 2022-02-25 | End: 2022-02-26 | Stop reason: HOSPADM

## 2022-02-25 RX ORDER — MORPHINE SULFATE 2 MG/ML
2 INJECTION, SOLUTION INTRAMUSCULAR; INTRAVENOUS
Status: DISCONTINUED | OUTPATIENT
Start: 2022-02-25 | End: 2022-02-26 | Stop reason: HOSPADM

## 2022-02-25 RX ORDER — LIDOCAINE HCL/PF 100 MG/5ML
SYRINGE (ML) INJECTION PRN
Status: DISCONTINUED | OUTPATIENT
Start: 2022-02-25 | End: 2022-02-25 | Stop reason: SDUPTHER

## 2022-02-25 RX ORDER — ROCURONIUM BROMIDE 10 MG/ML
INJECTION, SOLUTION INTRAVENOUS PRN
Status: DISCONTINUED | OUTPATIENT
Start: 2022-02-25 | End: 2022-02-25 | Stop reason: SDUPTHER

## 2022-02-25 RX ORDER — CHLORAL HYDRATE 500 MG
1000 CAPSULE ORAL DAILY
Status: DISCONTINUED | OUTPATIENT
Start: 2022-02-25 | End: 2022-02-25

## 2022-02-25 RX ORDER — PROPOFOL 10 MG/ML
INJECTION, EMULSION INTRAVENOUS PRN
Status: DISCONTINUED | OUTPATIENT
Start: 2022-02-25 | End: 2022-02-25 | Stop reason: SDUPTHER

## 2022-02-25 RX ORDER — MIDAZOLAM HYDROCHLORIDE 1 MG/ML
INJECTION INTRAMUSCULAR; INTRAVENOUS PRN
Status: DISCONTINUED | OUTPATIENT
Start: 2022-02-25 | End: 2022-02-25 | Stop reason: SDUPTHER

## 2022-02-25 RX ORDER — ALBUTEROL SULFATE 90 UG/1
2 AEROSOL, METERED RESPIRATORY (INHALATION) EVERY 6 HOURS PRN
Status: DISCONTINUED | OUTPATIENT
Start: 2022-02-25 | End: 2022-02-26 | Stop reason: HOSPADM

## 2022-02-25 RX ORDER — METOPROLOL TARTRATE 5 MG/5ML
INJECTION INTRAVENOUS
Status: COMPLETED
Start: 2022-02-25 | End: 2022-02-25

## 2022-02-25 RX ORDER — ONDANSETRON 2 MG/ML
4 INJECTION INTRAMUSCULAR; INTRAVENOUS EVERY 6 HOURS PRN
Status: DISCONTINUED | OUTPATIENT
Start: 2022-02-25 | End: 2022-02-26 | Stop reason: HOSPADM

## 2022-02-25 RX ORDER — ATORVASTATIN CALCIUM 20 MG/1
20 TABLET, FILM COATED ORAL DAILY
Status: DISCONTINUED | OUTPATIENT
Start: 2022-02-25 | End: 2022-02-26 | Stop reason: HOSPADM

## 2022-02-25 RX ORDER — DEXTROSE MONOHYDRATE 100 MG/ML
250 INJECTION, SOLUTION INTRAVENOUS PRN
Status: DISCONTINUED | OUTPATIENT
Start: 2022-02-25 | End: 2022-02-26 | Stop reason: HOSPADM

## 2022-02-25 RX ORDER — BACLOFEN 10 MG/1
10 TABLET ORAL 2 TIMES DAILY PRN
Status: DISCONTINUED | OUTPATIENT
Start: 2022-02-25 | End: 2022-02-26 | Stop reason: HOSPADM

## 2022-02-25 RX ORDER — POLYETHYLENE GLYCOL 3350 17 G/17G
17 POWDER, FOR SOLUTION ORAL DAILY PRN
Status: DISCONTINUED | OUTPATIENT
Start: 2022-02-25 | End: 2022-02-26 | Stop reason: HOSPADM

## 2022-02-25 RX ORDER — MONTELUKAST SODIUM 10 MG/1
10 TABLET ORAL NIGHTLY
Status: DISCONTINUED | OUTPATIENT
Start: 2022-02-25 | End: 2022-02-26 | Stop reason: HOSPADM

## 2022-02-25 RX ORDER — RANOLAZINE 500 MG/1
500 TABLET, EXTENDED RELEASE ORAL 2 TIMES DAILY
Status: DISCONTINUED | OUTPATIENT
Start: 2022-02-25 | End: 2022-02-26 | Stop reason: HOSPADM

## 2022-02-25 RX ORDER — MORPHINE SULFATE 2 MG/ML
INJECTION, SOLUTION INTRAMUSCULAR; INTRAVENOUS
Status: COMPLETED
Start: 2022-02-25 | End: 2022-02-25

## 2022-02-25 RX ORDER — DOCUSATE SODIUM 100 MG/1
100 CAPSULE, LIQUID FILLED ORAL 2 TIMES DAILY
Qty: 28 CAPSULE | Refills: 0 | Status: SHIPPED | OUTPATIENT
Start: 2022-02-25 | End: 2022-03-11

## 2022-02-25 RX ORDER — SODIUM CHLORIDE 0.9 % (FLUSH) 0.9 %
5-40 SYRINGE (ML) INJECTION EVERY 12 HOURS SCHEDULED
Status: DISCONTINUED | OUTPATIENT
Start: 2022-02-25 | End: 2022-02-26 | Stop reason: HOSPADM

## 2022-02-25 RX ORDER — PHENAZOPYRIDINE HYDROCHLORIDE 100 MG/1
100 TABLET, FILM COATED ORAL EVERY 6 HOURS PRN
Status: DISCONTINUED | OUTPATIENT
Start: 2022-02-25 | End: 2022-02-26 | Stop reason: HOSPADM

## 2022-02-25 RX ORDER — DEXTROSE MONOHYDRATE 100 MG/ML
125 INJECTION, SOLUTION INTRAVENOUS PRN
Status: DISCONTINUED | OUTPATIENT
Start: 2022-02-25 | End: 2022-02-26 | Stop reason: HOSPADM

## 2022-02-25 RX ORDER — BUSPIRONE HYDROCHLORIDE 10 MG/1
10 TABLET ORAL 3 TIMES DAILY
Status: DISCONTINUED | OUTPATIENT
Start: 2022-02-25 | End: 2022-02-26 | Stop reason: HOSPADM

## 2022-02-25 RX ORDER — LORAZEPAM 2 MG/ML
0.25 INJECTION INTRAMUSCULAR ONCE
Status: COMPLETED | OUTPATIENT
Start: 2022-02-25 | End: 2022-02-25

## 2022-02-25 RX ORDER — NICOTINE POLACRILEX 4 MG
15 LOZENGE BUCCAL PRN
Status: DISCONTINUED | OUTPATIENT
Start: 2022-02-25 | End: 2022-02-26 | Stop reason: HOSPADM

## 2022-02-25 RX ORDER — CEFAZOLIN SODIUM 2 G/100ML
2000 INJECTION, SOLUTION INTRAVENOUS
Status: COMPLETED | OUTPATIENT
Start: 2022-02-25 | End: 2022-02-25

## 2022-02-25 RX ORDER — SODIUM CHLORIDE 9 MG/ML
INJECTION, SOLUTION INTRAVENOUS CONTINUOUS
Status: DISCONTINUED | OUTPATIENT
Start: 2022-02-25 | End: 2022-02-26 | Stop reason: HOSPADM

## 2022-02-25 RX ORDER — HYDROCODONE BITARTRATE AND ACETAMINOPHEN 5; 325 MG/1; MG/1
1 TABLET ORAL EVERY 4 HOURS PRN
Status: DISCONTINUED | OUTPATIENT
Start: 2022-02-25 | End: 2022-02-26 | Stop reason: HOSPADM

## 2022-02-25 RX ORDER — PANTOPRAZOLE SODIUM 40 MG/1
40 TABLET, DELAYED RELEASE ORAL 2 TIMES DAILY
Status: DISCONTINUED | OUTPATIENT
Start: 2022-02-25 | End: 2022-02-26 | Stop reason: HOSPADM

## 2022-02-25 RX ORDER — SODIUM CHLORIDE 0.9 % (FLUSH) 0.9 %
5-40 SYRINGE (ML) INJECTION EVERY 12 HOURS SCHEDULED
Status: DISCONTINUED | OUTPATIENT
Start: 2022-02-25 | End: 2022-02-25 | Stop reason: HOSPADM

## 2022-02-25 RX ORDER — HYDROCODONE BITARTRATE AND ACETAMINOPHEN 5; 325 MG/1; MG/1
1 TABLET ORAL EVERY 6 HOURS PRN
Qty: 12 TABLET | Refills: 0 | Status: SHIPPED | OUTPATIENT
Start: 2022-02-25 | End: 2022-03-02

## 2022-02-25 RX ORDER — SODIUM CHLORIDE 9 MG/ML
25 INJECTION, SOLUTION INTRAVENOUS PRN
Status: DISCONTINUED | OUTPATIENT
Start: 2022-02-25 | End: 2022-02-25 | Stop reason: HOSPADM

## 2022-02-25 RX ORDER — FENTANYL CITRATE 50 UG/ML
INJECTION, SOLUTION INTRAMUSCULAR; INTRAVENOUS PRN
Status: DISCONTINUED | OUTPATIENT
Start: 2022-02-25 | End: 2022-02-25 | Stop reason: SDUPTHER

## 2022-02-25 RX ORDER — ROPINIROLE 1 MG/1
1 TABLET, FILM COATED ORAL 3 TIMES DAILY
Status: DISCONTINUED | OUTPATIENT
Start: 2022-02-25 | End: 2022-02-26 | Stop reason: HOSPADM

## 2022-02-25 RX ORDER — FENTANYL CITRATE 50 UG/ML
50 INJECTION, SOLUTION INTRAMUSCULAR; INTRAVENOUS EVERY 5 MIN PRN
Status: DISCONTINUED | OUTPATIENT
Start: 2022-02-25 | End: 2022-02-25 | Stop reason: HOSPADM

## 2022-02-25 RX ORDER — CEPHALEXIN 500 MG/1
500 CAPSULE ORAL 3 TIMES DAILY
Qty: 15 CAPSULE | Refills: 0 | Status: SHIPPED | OUTPATIENT
Start: 2022-02-25 | End: 2022-03-02

## 2022-02-25 RX ORDER — BUDESONIDE AND FORMOTEROL FUMARATE DIHYDRATE 160; 4.5 UG/1; UG/1
2 AEROSOL RESPIRATORY (INHALATION) 2 TIMES DAILY
Status: DISCONTINUED | OUTPATIENT
Start: 2022-02-25 | End: 2022-02-26 | Stop reason: HOSPADM

## 2022-02-25 RX ORDER — SODIUM CHLORIDE 0.9 % (FLUSH) 0.9 %
5-40 SYRINGE (ML) INJECTION PRN
Status: DISCONTINUED | OUTPATIENT
Start: 2022-02-25 | End: 2022-02-26 | Stop reason: HOSPADM

## 2022-02-25 RX ORDER — SODIUM CHLORIDE 9 MG/ML
25 INJECTION, SOLUTION INTRAVENOUS PRN
Status: DISCONTINUED | OUTPATIENT
Start: 2022-02-25 | End: 2022-02-26 | Stop reason: HOSPADM

## 2022-02-25 RX ORDER — METOPROLOL TARTRATE 5 MG/5ML
5 INJECTION INTRAVENOUS ONCE
Status: COMPLETED | OUTPATIENT
Start: 2022-02-25 | End: 2022-02-25

## 2022-02-25 RX ORDER — EPHEDRINE SULFATE/0.9% NACL/PF 50 MG/5 ML
SYRINGE (ML) INTRAVENOUS PRN
Status: DISCONTINUED | OUTPATIENT
Start: 2022-02-25 | End: 2022-02-25 | Stop reason: SDUPTHER

## 2022-02-25 RX ORDER — DEXAMETHASONE SODIUM PHOSPHATE 10 MG/ML
INJECTION, EMULSION INTRAMUSCULAR; INTRAVENOUS PRN
Status: DISCONTINUED | OUTPATIENT
Start: 2022-02-25 | End: 2022-02-25 | Stop reason: SDUPTHER

## 2022-02-25 RX ORDER — MEPERIDINE HYDROCHLORIDE 25 MG/ML
12.5 INJECTION INTRAMUSCULAR; INTRAVENOUS; SUBCUTANEOUS EVERY 5 MIN PRN
Status: DISCONTINUED | OUTPATIENT
Start: 2022-02-25 | End: 2022-02-25 | Stop reason: HOSPADM

## 2022-02-25 RX ORDER — DEXTROSE MONOHYDRATE 25 G/50ML
12.5 INJECTION, SOLUTION INTRAVENOUS PRN
Status: DISCONTINUED | OUTPATIENT
Start: 2022-02-25 | End: 2022-02-25 | Stop reason: RX

## 2022-02-25 RX ORDER — DEXTROSE MONOHYDRATE 50 MG/ML
100 INJECTION, SOLUTION INTRAVENOUS PRN
Status: DISCONTINUED | OUTPATIENT
Start: 2022-02-25 | End: 2022-02-26 | Stop reason: HOSPADM

## 2022-02-25 RX ADMIN — LORAZEPAM 0.25 MG: 2 INJECTION INTRAMUSCULAR; INTRAVENOUS at 11:15

## 2022-02-25 RX ADMIN — MORPHINE SULFATE 2 MG: 2 INJECTION, SOLUTION INTRAMUSCULAR; INTRAVENOUS at 16:32

## 2022-02-25 RX ADMIN — Medication 100 MG: at 09:20

## 2022-02-25 RX ADMIN — ENOXAPARIN SODIUM 40 MG: 100 INJECTION SUBCUTANEOUS at 17:13

## 2022-02-25 RX ADMIN — MORPHINE SULFATE 2 MG: 2 INJECTION, SOLUTION INTRAMUSCULAR; INTRAVENOUS at 12:45

## 2022-02-25 RX ADMIN — SERTRALINE 100 MG: 100 TABLET, FILM COATED ORAL at 22:42

## 2022-02-25 RX ADMIN — Medication 10 MG: at 10:15

## 2022-02-25 RX ADMIN — PHENAZOPYRIDINE HYDROCHLORIDE 100 MG: 100 TABLET ORAL at 22:42

## 2022-02-25 RX ADMIN — MIDAZOLAM 2 MG: 1 INJECTION INTRAMUSCULAR; INTRAVENOUS at 09:14

## 2022-02-25 RX ADMIN — LORAZEPAM 0.25 MG: 2 INJECTION INTRAMUSCULAR; INTRAVENOUS at 12:05

## 2022-02-25 RX ADMIN — BUDESONIDE AND FORMOTEROL FUMARATE DIHYDRATE 2 PUFF: 160; 4.5 AEROSOL RESPIRATORY (INHALATION) at 17:52

## 2022-02-25 RX ADMIN — ATORVASTATIN CALCIUM 20 MG: 20 TABLET, FILM COATED ORAL at 21:40

## 2022-02-25 RX ADMIN — CEFTRIAXONE SODIUM 1000 MG: 1 INJECTION, POWDER, FOR SOLUTION INTRAMUSCULAR; INTRAVENOUS at 17:17

## 2022-02-25 RX ADMIN — CEFAZOLIN SODIUM 2000 MG: 2 INJECTION, SOLUTION INTRAVENOUS at 09:17

## 2022-02-25 RX ADMIN — SODIUM CHLORIDE: 9 INJECTION, SOLUTION INTRAVENOUS at 23:30

## 2022-02-25 RX ADMIN — MORPHINE SULFATE 2 MG: 2 INJECTION, SOLUTION INTRAMUSCULAR; INTRAVENOUS at 12:50

## 2022-02-25 RX ADMIN — TRAZODONE HYDROCHLORIDE 150 MG: 100 TABLET ORAL at 22:46

## 2022-02-25 RX ADMIN — PROPOFOL 170 MG: 10 INJECTION, EMULSION INTRAVENOUS at 09:20

## 2022-02-25 RX ADMIN — IPRATROPIUM BROMIDE AND ALBUTEROL SULFATE 1 AMPULE: .5; 3 SOLUTION RESPIRATORY (INHALATION) at 11:15

## 2022-02-25 RX ADMIN — HYDROCODONE BITARTRATE AND ACETAMINOPHEN 1 TABLET: 5; 325 TABLET ORAL at 21:39

## 2022-02-25 RX ADMIN — LORAZEPAM 0.25 MG: 2 INJECTION INTRAMUSCULAR; INTRAVENOUS at 12:20

## 2022-02-25 RX ADMIN — MONTELUKAST SODIUM 10 MG: 10 TABLET ORAL at 22:42

## 2022-02-25 RX ADMIN — METOPROLOL TARTRATE 1 MG: 5 INJECTION INTRAVENOUS at 10:51

## 2022-02-25 RX ADMIN — ROPINIROLE HYDROCHLORIDE 1 MG: 1 TABLET, FILM COATED ORAL at 17:13

## 2022-02-25 RX ADMIN — BUSPIRONE HYDROCHLORIDE 10 MG: 10 TABLET ORAL at 17:12

## 2022-02-25 RX ADMIN — ROCURONIUM BROMIDE 10 MG: 50 INJECTION, SOLUTION INTRAVENOUS at 09:35

## 2022-02-25 RX ADMIN — FENTANYL CITRATE 50 MCG: 50 INJECTION, SOLUTION INTRAMUSCULAR; INTRAVENOUS at 10:18

## 2022-02-25 RX ADMIN — SODIUM CHLORIDE, PRESERVATIVE FREE 10 ML: 5 INJECTION INTRAVENOUS at 22:45

## 2022-02-25 RX ADMIN — FENTANYL CITRATE 50 MCG: 50 INJECTION, SOLUTION INTRAMUSCULAR; INTRAVENOUS at 09:20

## 2022-02-25 RX ADMIN — LORAZEPAM 0.25 MG: 2 INJECTION INTRAMUSCULAR at 10:50

## 2022-02-25 RX ADMIN — LORAZEPAM 0.25 MG: 2 INJECTION INTRAMUSCULAR; INTRAVENOUS at 10:50

## 2022-02-25 RX ADMIN — DEXAMETHASONE SODIUM PHOSPHATE 10 MG: 10 INJECTION, EMULSION INTRAMUSCULAR; INTRAVENOUS at 09:26

## 2022-02-25 RX ADMIN — ALBUTEROL SULFATE 2.5 MG: 2.5 SOLUTION RESPIRATORY (INHALATION) at 10:54

## 2022-02-25 RX ADMIN — FENTANYL CITRATE 50 MCG: 50 INJECTION, SOLUTION INTRAMUSCULAR; INTRAVENOUS at 09:34

## 2022-02-25 RX ADMIN — INSULIN LISPRO 4 UNITS: 100 INJECTION, SOLUTION INTRAVENOUS; SUBCUTANEOUS at 17:13

## 2022-02-25 RX ADMIN — MORPHINE SULFATE 2 MG: 2 INJECTION, SOLUTION INTRAMUSCULAR; INTRAVENOUS at 19:47

## 2022-02-25 RX ADMIN — PANTOPRAZOLE SODIUM 40 MG: 40 TABLET, DELAYED RELEASE ORAL at 17:12

## 2022-02-25 RX ADMIN — ONDANSETRON 4 MG: 2 INJECTION INTRAMUSCULAR; INTRAVENOUS at 09:26

## 2022-02-25 RX ADMIN — SODIUM CHLORIDE: 9 INJECTION, SOLUTION INTRAVENOUS at 07:50

## 2022-02-25 ASSESSMENT — PULMONARY FUNCTION TESTS
PIF_VALUE: 18
PIF_VALUE: 0
PIF_VALUE: 18
PIF_VALUE: 15
PIF_VALUE: 18
PIF_VALUE: 14
PIF_VALUE: 18
PIF_VALUE: 18
PIF_VALUE: 19
PIF_VALUE: 17
PIF_VALUE: 5
PIF_VALUE: 17
PIF_VALUE: 18
PIF_VALUE: 19
PIF_VALUE: 16
PIF_VALUE: 18
PIF_VALUE: 16
PIF_VALUE: 0
PIF_VALUE: 18
PIF_VALUE: 17
PIF_VALUE: 0
PIF_VALUE: 18
PIF_VALUE: 15
PIF_VALUE: 16
PIF_VALUE: 12
PIF_VALUE: 17
PIF_VALUE: 21
PIF_VALUE: 15
PIF_VALUE: 18
PIF_VALUE: 15
PIF_VALUE: 1
PIF_VALUE: 15
PIF_VALUE: 13
PIF_VALUE: 16
PIF_VALUE: 19
PIF_VALUE: 18
PIF_VALUE: 19
PIF_VALUE: 18
PIF_VALUE: 17
PIF_VALUE: 13
PIF_VALUE: 17
PIF_VALUE: 16
PIF_VALUE: 4
PIF_VALUE: 17
PIF_VALUE: 1
PIF_VALUE: 18
PIF_VALUE: 18
PIF_VALUE: 17
PIF_VALUE: 16
PIF_VALUE: 11
PIF_VALUE: 11
PIF_VALUE: 16
PIF_VALUE: 17
PIF_VALUE: 4
PIF_VALUE: 19
PIF_VALUE: 19
PIF_VALUE: 18
PIF_VALUE: 10
PIF_VALUE: 13
PIF_VALUE: 5
PIF_VALUE: 17
PIF_VALUE: 17
PIF_VALUE: 7
PIF_VALUE: 18
PIF_VALUE: 1
PIF_VALUE: 17
PIF_VALUE: 18
PIF_VALUE: 17
PIF_VALUE: 15
PIF_VALUE: 19
PIF_VALUE: 19
PIF_VALUE: 18
PIF_VALUE: 17

## 2022-02-25 ASSESSMENT — PAIN - FUNCTIONAL ASSESSMENT: PAIN_FUNCTIONAL_ASSESSMENT: 0-10

## 2022-02-25 ASSESSMENT — PAIN DESCRIPTION - LOCATION
LOCATION: OTHER (COMMENT)
LOCATION: OTHER (COMMENT)

## 2022-02-25 ASSESSMENT — PAIN DESCRIPTION - DESCRIPTORS: DESCRIPTORS: BURNING

## 2022-02-25 ASSESSMENT — PAIN DESCRIPTION - PAIN TYPE
TYPE: ACUTE PAIN
TYPE: ACUTE PAIN

## 2022-02-25 ASSESSMENT — PAIN SCALES - GENERAL
PAINLEVEL_OUTOF10: 8
PAINLEVEL_OUTOF10: 7
PAINLEVEL_OUTOF10: 10
PAINLEVEL_OUTOF10: 6
PAINLEVEL_OUTOF10: 8

## 2022-02-25 NOTE — ANESTHESIA POSTPROCEDURE EVALUATION
Department of Anesthesiology  Postprocedure Note    Patient: Katlyn Pablo  MRN: [id number]  YOB: 1955  Date of evaluation: 2/25/2022  Time:  2:36 PM     Procedure Summary     Date: 02/25/22 Room / Location: [facility] [place]    Anesthesia Start: 0914 Anesthesia Stop: 1033    Procedure: [address] (N/A ) Diagnosis: (BPH)    Surgeons: Sejal Stock MD Responsible Provider: Carmella Walters MD    Anesthesia Type: general ASA Status: 3          Anesthesia Type: general    Karie Phase I: Karie Score: 3    Karie Phase II:      Last vitals: Reviewed and per EMR flowsheets.        Anesthesia Post Evaluation    Complications: no  Cardiovascular status: hemodynamically stable  Respiratory status: BIPAP

## 2022-02-25 NOTE — CONSULTS
Hospitalist Consult Note      Patient:  Yunier Lay    Unit/Bed:4A-17/017-A  YOB: 1955  MRN: [id number]   Acct: [de-identified]   PCP: Chriss Cárdenas MD  Code Status: Full Code  Date of Admission: 2/25/2022  Date of Service: Pt seen/examined in consultation on 2/25/2022      Chief Complaint:  BPH  Reason for consult:  Postop respiratory distress    Assessment and Plan:    1. BPH with outlet obstruction: s/p cystoscopy, greenlight photo vaporization of prostate with Dr. Irlanda Michaels 2/25. Management per primary    2. Acute on chronic hypoxic respiratory failure: Patient required BiPAP out of the OR, now on 6L. He wears 3L NC at baseline. Likely postop respiratory insufficiency. Will check CXR, EKG, ABG, BMP, lactic, CBC. IS. Wean O2 as tolerated    3. COPD:  No wheezing on exam.  Baseline oxygen is 3 L. Resume home Symbicort, singulair, albuterol prn. Wean O2 to baseline. 4. NIDDMII: SSI, accu-checks. 5. Essential HTN: home losartan-hctz were not resumed on admission for planned procedure. Hydralazine prn. Resume home meds tomorrow if Cr is okay. 6. Anxiety: Resume home meds. History Of Present Illness:    Yony E Peale.[age] y.o. male who we are asked to see/evaluate by Leno Jolley MD for medical management of postop respiratory distress. Per report patient required BiPAP following surgery. When seen he was on 6 L nasal cannula and satting appropriately. The patient states that he has not had any shortness of breath worse than usual.  Patient reports a history of asthma and states that he wears 3 L nasal cannula at home. He reports catheter discomfort. Otherwise no acute complaints. He denies fever/chills, shortness of breath, chest pain, abdominal pain, N/V/D. Work-up ordered. Hospitalist will continue to follow. Review of systems:   Pertinent positives as noted in the HPI. All other systems reviewed and negative.     Past Medical History:        Diagnosis Date    Acid reflux  Arthritis     Asthma     CAD (coronary artery disease)     Chronic back pain     Class 2 severe obesity due to excess calories with serious comorbidity and body mass index (BMI) of 37.0 to 37.9 in adult [facility]) 8/13/2018    Colon polyps 11/06    COPD (chronic obstructive pulmonary disease) ([facility] [address]     Depression     panic attacks    Diverticulosis     HTN (hypertension)     Hyperlipidemia     Kidney disorder     Panic attack     Pneumonia     Rash     Recurrent upper respiratory infection (URI)     Thumb amputation status 3/13/14    left tip of thumb amputated    Thyroid disease     Type II or unspecified type diabetes mellitus without mention of complication, not stated as uncontrolled        Past Surgical History:        Procedure Laterality Date    BACK SURGERY  2002    BACK SURGERY  08/11/2017    BICEPS TENDON REPAIR Right 08/16/2017    BRONCHOSCOPY      BRONCHOSCOPY N/A 4/5/2019    BRONCHOSCOPY performed by Adriana Richardson MD at [facility]  4/5/2019    BRONCHOSCOPY ALVEOLAR LAVAGE performed by Adriana Richardson MD at [facility]  07/02 08/05    CARPAL TUNNEL RELEASE  2011    right hand    CHOLECYSTECTOMY  yrs ago    COLONOSCOPY  11/06 02/12 1/14    CORONARY ANGIOPLASTY WITH STENT PLACEMENT  02/2020    ENDOSCOPY, COLON, DIAGNOSTIC      EYE SURGERY      foreign body removal    HERNIA REPAIR  2004    Dr Bobetta Opitz  2008?  LARYNGOSCOPY  04/28/2016    Laryngoscopy Micro with Biopsy by Dr Sridevi Bass  01/07    uppp    ROTATOR CUFF REPAIR Left 5-20-15    SKIN BIOPSY      TONSILLECTOMY  1985    UPPER GASTROINTESTINAL ENDOSCOPY  1997    UVULOPALATOPHARYGOPLASTY      VEIN SURGERY Left September 2014    Dr. Chante Moreno Medications:   No current facility-administered medications on file prior to encounter.      Current Outpatient Medications on File Prior to Encounter   Medication Sig Dispense Refill    losartan (COZAAR) 50 MG tablet Take 50 mg by mouth daily      glimepiride (AMARYL) 4 MG tablet TAKE 1 TABLET BY MOUTH TWICE DAILY 180 tablet 0    montelukast (SINGULAIR) 10 MG tablet Take 10 mg by mouth nightly      busPIRone (BUSPAR) 10 MG tablet TAKE 1 TABLET BY MOUTH THREE TIMES DAILY 270 tablet 1    sertraline (ZOLOFT) 100 MG tablet TAKE 1 TABLET BY MOUTH TWICE DAILY 180 tablet 1    fluticasone (FLONASE) 50 MCG/ACT nasal spray SHAKE LIQUID AND USE 1 SPRAY IN EACH NOSTRIL DAILY 16 g 11    VENTOLIN  (90 Base) MCG/ACT inhaler INHALE 2 PUFFS INTO THE LUNGS EVERY 6 HOURS AS NEEDED FOR WHEEZING 1 each 11    budesonide-formoterol (SYMBICORT) 160-4.5 MCG/ACT AERO Inhale 2 puffs into the lungs 2 times daily 3 Inhaler 1    albuterol sulfate (PROAIR RESPICLICK) 671 (90 Base) MCG/ACT aerosol powder inhalation Inhale into the lungs every 4 hours as needed for Wheezing or Shortness of Breath      atorvastatin (LIPITOR) 20 MG tablet daily       pantoprazole (PROTONIX) 40 MG tablet Take 40 mg by mouth 2 times daily       ranolazine (RANEXA) 500 MG extended release tablet Take 500 mg by mouth 2 times daily      hydrochlorothiazide (HYDRODIURIL) 12.5 MG tablet TK 1 T PO QD IN THE MORNING  3    baclofen (LIORESAL) 10 MG tablet 2 times daily as needed   0    Omega-3 Fatty Acids (FISH OIL) 1000 MG CAPS Take 1,000 mg by mouth daily.  EPINEPHrine (EPIPEN 2-HUBER) 0.3 MG/0.3ML SOAJ injection Inject one pen as directed STAT for allergic reaction, may disp generic NDC 95223-559-52 6 each 99       Allergies:    Patient has no known allergies. Social History:    reports that he quit smoking about 4 years ago. His smoking use included cigarettes. He started smoking about 50 years ago. He has a 84.00 pack-year smoking history. He has never used smokeless tobacco. He reports that he does not drink alcohol and does not use drugs.     Family History:       Problem Relation Age of Onset    Cancer Mother     Breast Cancer Mother     Stroke Mother     Heart Disease Brother     Diabetes Brother     High Blood Pressure Brother     High Cholesterol Brother        Diet:  ADULT DIET; Regular; 3 carb choices (45 gm/meal)      PHYSICAL EXAM:  BP (!) 155/71   Pulse 105   Temp 98 °F (36.7 °C) (Oral)   Resp 19   Ht 5' 8\" (1.727 m)   Wt 237 lb 9.6 oz (107.8 kg)   SpO2 91%   BMI 36.13 kg/m²   General appearance:  No apparent distress, appears stated age and cooperative. HEENT: Normal cephalic, atraumatic without obvious deformity. Pupils equal, round, and reactive to light. Extra ocular muscles intact. Conjunctivae/corneas clear. Neck: Supple, with full range of motion. No jugular venous distention. Trachea midline. Respiratory:  Normal respiratory effort. Diminished bilaterally. Cardiovascular: Regular rate and rhythm with normal S1/S2 without murmurs, rubs or gallops. Abdomen: Soft, non-tender, non-distended with normal bowel sounds. Musculoskeletal:  No clubbing, cyanosis or edema bilaterally. Skin: Skin color, texture, turgor normal.  No rashes or lesions. Neurologic:  Neurovascularly intact without any focal sensory/motor deficits. Cranial nerves: II-XII intact, grossly non-focal.  Psychiatric: Alert and oriented, thought content appropriate, normal insight  Capillary Refill: Brisk,< 3 seconds   Peripheral Pulses: +2 palpable, equal bilaterally     Labs:   Recent Labs     02/25/22  1541   WBC 13.1*   HGB 14.7   HCT 43.9        No results for input(s): NA, K, CL, CO2, BUN, CREATININE, CALCIUM, PHOS in the last 72 hours. Invalid input(s): MAGNES  No results for input(s): AST, ALT, BILIDIR, BILITOT, ALKPHOS in the last 72 hours. Recent Labs     02/25/22  0740   INR 1.14*     No results for input(s): Kajal Mckeon in the last 72 hours.     Urinalysis:    Lab Results   Component Value Date    NITRU Negative 08/16/2021    WBCUA 0-5 07/24/2017    RBCUA 0-2 07/24/2017    BLOODU Negative 08/16/2021    BLOODU NEGATIVE 12/20/2013    GLUCOSEU Negative 08/16/2021       Radiology:   XR CHEST PORTABLE   Final Result   No acute abnormality identified. **This report has been created using voice recognition software. It may contain minor errors which are inherent in voice recognition technology. **      Final report electronically signed by Dr. Bin De La Garza MD on 2/25/2022 3:56 PM        No results found. EKG: No prior ECG      Thank you for allowing us to participate in this patient's care. Please do not hesitate to call us directly with further questions.      Electronically signed by Gogo Ordonez PA-C on 2/25/2022 at 4:19 PM

## 2022-02-25 NOTE — H&P
Quynh Ayala MD  History and Physical    Patient:  Kimberly Stevens  MRN: [id number]  YOB: 1955    HISTORY OF PRESENT ILLNESS:     The patient is a 74 y.o. male who presents with bph. Here for procedure. Patient's old records, notes and chart reviewed and summarized above. Quynh Ayala MD independently reviewed the images and verified the radiology reports from:    No results found.       Past Medical History:    Past Medical History:   Diagnosis Date    Acid reflux     Arthritis     Asthma     CAD (coronary artery disease)     Chronic back pain     Class 2 severe obesity due to excess calories with serious comorbidity and body mass index (BMI) of 37.0 to 37.9 in adult [facility]) 8/13/2018    Colon polyps 11/06    COPD (chronic obstructive pulmonary disease) (HCC)     Depression     panic attacks    Diverticulosis     HTN (hypertension)     Hyperlipidemia     Kidney disorder     Panic attack     Pneumonia     Rash     Recurrent upper respiratory infection (URI)     Thumb amputation status 3/13/14    left tip of thumb amputated    Thyroid disease     Type II or unspecified type diabetes mellitus without mention of complication, not stated as uncontrolled        Past Surgical History:    Past Surgical History:   Procedure Laterality Date    BACK SURGERY  2002    BACK SURGERY  08/11/2017    BICEPS TENDON REPAIR Right 08/16/2017    BRONCHOSCOPY      BRONCHOSCOPY N/A 4/5/2019    [facility]    BRONCHOSCOPY  4/5/2019    BRONCHOSCOPY ALVEOLAR LAVAGE performed by Erma Vázquez MD at [address] [place] [address]  07/02 08/05    CARPAL TUNNEL RELEASE  2011    right hand    CHOLECYSTECTOMY  yrs ago    COLONOSCOPY  11/06 02/12 1/14    CORONARY ANGIOPLASTY WITH STENT PLACEMENT  02/2020    ENDOSCOPY, COLON, DIAGNOSTIC      EYE SURGERY      foreign body removal    HERNIA REPAIR  2004     Sof[facility]-Umbilical    KIDNEY STONE SURGERY  2008?  LARYNGOSCOPY  04/28/2016    Laryngoscopy Micro with Biopsy by Dr Scotty Wood  01/07    uppp    ROTATOR CUFF REPAIR Left 5-20-15    SKIN BIOPSY      TONSILLECTOMY  1985    UPPER GASTROINTESTINAL ENDOSCOPY  1997    UVULOPALATOPHARYGOPLASTY      VEIN SURGERY Left September 2014    Dr. Luci Ramirez       Medications Prior to Admission:    Prior to Admission medications    Medication Sig Start Date End Date Taking?  Authorizing Provider   losartan (COZAAR) 50 MG tablet Take 50 mg by mouth daily   Yes Historical Provider, MD   metFORMIN (GLUCOPHAGE) 500 MG tablet TAKE 2 TABLETS BY MOUTH TWICE DAILY WITH MEALS 2/17/22  Yes Delmy Martinez MD   ciprofloxacin (CIPRO) 500 MG tablet Take 1 tablet by mouth 2 times daily Start taking medication three days before surgery 2/13/22  Yes Willy Zepeda MD   rOPINIRole (REQUIP) 1 MG tablet TAKE 1 TABLET BY MOUTH THREE TIMES DAILY 2/4/22  Yes Weldon Bamberger, MD   traZODone (DESYREL) 150 MG tablet TAKE 1/2 TO 1 TABLET BY MOUTH EVERY DAY AT BEDTIME AS NEEDED FOR SLEEP 1/26/22  Yes Solo Samson MD   glimepiride (AMARYL) 4 MG tablet TAKE 1 TABLET BY MOUTH TWICE DAILY 12/24/21  Yes Weldon Bamberger, MD   montelukast (SINGULAIR) 10 MG tablet Take 10 mg by mouth nightly   Yes Historical Provider, MD   busPIRone (BUSPAR) 10 MG tablet TAKE 1 TABLET BY MOUTH THREE TIMES DAILY 11/9/21  Yes Solo Samson MD   sertraline (ZOLOFT) 100 MG tablet TAKE 1 TABLET BY MOUTH TWICE DAILY 10/22/21  Yes Delmy Martinez MD   fluticasone (FLONASE) 50 MCG/ACT nasal spray SHAKE LIQUID AND USE 1 SPRAY IN EACH NOSTRIL DAILY 9/16/21  Yes Steinberg Pipes, APRN - CNP   VENTOLIN  (90 Base) MCG/ACT inhaler INHALE 2 PUFFS INTO THE LUNGS EVERY 6 HOURS AS NEEDED FOR WHEEZING 9/8/21  Yes Jon Oswald MD   budesonide-formoterol (SYMBICORT) 160-4.5 MCG/ACT AERO Inhale 2 puffs into the lungs 2 times daily 8/16/21  Yes Bevtoft Asuncion Kyle APRN - CNP   aspirin 81 MG EC tablet Take 81 mg by mouth daily   Yes Historical Provider, MD   albuterol sulfate (PROAIR RESPICLICK) 671 (90 Base) MCG/ACT aerosol powder inhalation Inhale into the lungs every 4 hours as needed for Wheezing or Shortness of Breath   Yes Historical Provider, MD   clopidogrel (PLAVIX) 75 MG tablet Take 75 mg by mouth daily   Yes Historical Provider, MD   atorvastatin (LIPITOR) 20 MG tablet daily  20  Yes Historical Provider, MD   pantoprazole (PROTONIX) 40 MG tablet Take 40 mg by mouth 2 times daily    Yes Historical Provider, MD   ranolazine (RANEXA) 500 MG extended release tablet Take 500 mg by mouth 2 times daily   Yes Historical Provider, MD   hydrochlorothiazide (HYDRODIURIL) 12.5 MG tablet TK 1 T PO QD IN THE MORNING 19  Yes Historical Provider, MD   EPINEPHrine (EPIPEN 2-HUBER) 0.3 MG/0.3ML SOAJ injection Inject one pen as directed STAT for allergic reaction, may disp [facility] [id number]  Yes Jarett Valles APRN - CNP   baclofen (LIORESAL) 10 MG tablet 2 times daily as needed  18  Yes Historical Provider, MD   HYDROcodone-acetaminophen (NORCO) 5-325 MG per tablet Take 1 tablet by mouth every 6 hours as needed for Pain   Yes Historical Provider, MD   Omega-3 Fatty Acids (FISH OIL) 1000 MG CAPS Take 1,000 mg by mouth daily. Yes Historical Provider, MD       Allergies:  Patient has no known allergies.     Social History:    Social History     Socioeconomic History    Marital status:      Spouse name: Not on file    Number of children: Not on file    Years of education: Not on file    Highest education level: Not on file   Occupational History    Not on file   Tobacco Use    Smoking status: Former Smoker     Packs/day: 2.00     Years: 42.00     Pack years: 84.00     Types: Cigarettes     Start date: 1971     Quit date: 3/3/2017     Years since quittin.9    Smokeless tobacco: Never Used    Tobacco comment: pts uses just nicotine vap   Vaping Use    Vaping Use: Former    Substances: Always   Substance and Sexual Activity    Alcohol use: No     Alcohol/week: 0.0 standard drinks    Drug use: No    Sexual activity: Yes     Partners: Female   Other Topics Concern    Not on file   Social History Narrative    Not on file     Social Determinants of Health     Financial Resource Strain: Low Risk     Difficulty of Paying Living Expenses: Not very hard   Food Insecurity: No Food Insecurity    Worried About Running Out of Food in the Last Year: Never true    [place] of Food in the Last Year: Never true   Transportation Needs:     Lack of Transportation (Medical): Not on file    Lack of Transportation (Non-Medical):  Not on file   Physical Activity:     Days of Exercise per Week: Not on file    Minutes of Exercise per Session: Not on file   Stress:     Feeling of Stress : Not on file   Social Connections:     Frequency of Communication with Friends and Family: Not on file    Frequency of Social Gatherings with Friends and Family: Not on file    Attends Christian Services: Not on file    Active Member of [address] [place] [address] or Organizations: Not on file    Attends Club or Organization Meetings: Not on file    Marital Status: Not on file   Intimate Partner Violence:     Fear of Current or Ex-Partner: Not on file    Emotionally Abused: Not on file    Physically Abused: Not on file    Sexually Abused: Not on file   Housing Stability:     Unable to Pay for Housing in the Last Year: Not on file    Number of [place] in the Last Year: Not on file    Unstable Housing in the Last Year: Not on file       Family History:    Family History   Problem Relation Age of Onset    Cancer Mother     Breast Cancer Mother     Stroke Mother     Heart Disease Brother     Diabetes Brother     High Blood Pressure Brother     High Cholesterol Brother        REVIEW OF SYSTEMS:  Constitutional: negative  Eyes: negative  Respiratory: negative  Cardiovascular: negative  Gastrointestinal: negative  Genitourinary: no acute issues  Musculoskeletal: negative  Skin: negative   Neurological: negative  Hematological/Lymphatic: negative  Psychological: negative    Physical Exam:      No data found. Constitutional: Patient in no acute distress; Neuro: alert and oriented to person place and time. Psych: Mood and affect normal.  Skin: Normal  Lungs: Respiratory effort normal, CTA  Cardiovascular:  Normal peripheral pulses; no murmur. Normal rhythm  Abdomen: Soft, non-tender, non-distended with no CVA, flank pain, hepatosplenomegaly or hernia. Kidneys normal.  Bladder non-tender and not distended. LABS:   No results for input(s): WBC, HGB, HCT, MCV, PLT in the last 72 hours. No results for input(s): NA, K, CL, CO2, PHOS, BUN, CREATININE, CA in the last 72 hours. Lab Results   Component Value Date    PSA 0.83 01/29/2021    PSA 1.92 (H) 12/13/2019    PSA 1.10 (H) 06/14/2019         Urinalysis: No results for input(s): COLORU, PHUR, LABCAST, WBCUA, RBCUA, MUCUS, TRICHOMONAS, YEAST, BACTERIA, CLARITYU, SPECGRAV, LEUKOCYTESUR, UROBILINOGEN, BILIRUBINUR, BLOODU in the last 72 hours.     Invalid input(s): NITRATE, GLUCOSEUKETONESUAMORPHOUS     -----------------------------------------------------------------      Assessment and Plan     Impression:    Patient Active Problem List   Diagnosis    Diabetes ([facility] [address]    Hyperlipidemia    Gastroesophageal reflux disease    PARRISH (obstructive sleep apnea)    Chronic back pain    DDD (degenerative disc disease), cervical    Asthma    Rectus diastasis    S/P rotator cuff surgery    Lower urinary tract symptoms (LUTS)    Hypogonadism in male   [place] Low testosterone    Combined arterial insufficiency and corporo-venous occlusive erectile dysfunction    Left thyroid nodule    Moderate COPD (chronic obstructive pulmonary disease) ([facility])    Class 2 severe obesity due to excess calories with serious comorbidity and body mass index (BMI) of 37.0 to 37.9 in adult [facility])    Vocal cord disease    Erythrocytosis    History of anxiety disorder    Personal history of tobacco use, presenting hazards to health    Hypertensive disorder       Plan:     Consent obtained; feliciano valera in OR today    Karon Fenton MD

## 2022-02-25 NOTE — ANESTHESIA PRE PROCEDURE
Department of Anesthesiology  Preprocedure Note       Name:  Semaj Silav   Age:  74 y.o.  :  1955                                          MRN:  [id number]         Date:  2022      Surgeon: Manoj Brooke):  Per Blue MD    Procedure: Procedure(s):  CYSTOSCOPY GREENLIGHT PHOTOVAPORIZATION OF THE PROSTATE    Medications prior to admission:   Prior to Admission medications    Medication Sig Start Date End Date Taking?  Authorizing Provider   losartan (COZAAR) 50 MG tablet Take 50 mg by mouth daily   Yes Historical Provider, MD   metFORMIN (GLUCOPHAGE) 500 MG tablet TAKE 2 TABLETS BY MOUTH TWICE DAILY WITH MEALS 22  Yes Maykel Fowler MD   ciprofloxacin (CIPRO) 500 MG tablet Take 1 tablet by mouth 2 times daily Start taking medication three days before surgery 22  Yes Per Blue MD   rOPINIRole (REQUIP) 1 MG tablet TAKE 1 TABLET BY MOUTH THREE TIMES DAILY 22  Yes Judy Velasquez MD   traZODone (DESYREL) 150 MG tablet TAKE 1/2 TO 1 TABLET BY MOUTH EVERY DAY AT BEDTIME AS NEEDED FOR SLEEP 22  Yes Elle Wilcox MD   glimepiride (AMARYL) 4 MG tablet TAKE 1 TABLET BY MOUTH TWICE DAILY 21  Yes Judy Velasquez MD   montelukast (SINGULAIR) 10 MG tablet Take 10 mg by mouth nightly   Yes Historical Provider, MD   busPIRone (BUSPAR) 10 MG tablet TAKE 1 TABLET BY MOUTH THREE TIMES DAILY 21  Yes Elle Wilcox MD   sertraline (ZOLOFT) 100 MG tablet TAKE 1 TABLET BY MOUTH TWICE DAILY 10/22/21  Yes Maykel Fowler MD   fluticasone (FLONASE) 50 MCG/ACT nasal spray SHAKE LIQUID AND USE 1 SPRAY IN EACH NOSTRIL DAILY 21  Yes SANTOSH Dupree CNP   VENTOLIN  (90 Base) MCG/ACT inhaler INHALE 2 PUFFS INTO THE LUNGS EVERY 6 HOURS AS NEEDED FOR WHEEZING 21  Yes Luis Alfredo Morrison MD   budesonide-formoterol (SYMBICORT) 160-4.5 MCG/ACT AERO Inhale 2 puffs into the lungs 2 times daily 21  Yes Exie Heads, APRN - CNP   albuterol sulfate ([facility]) 708 (90 Base) MCG/ACT aerosol powder inhalation Inhale into the lungs every 4 hours as needed for Wheezing or Shortness of Breath   Yes Historical Provider, MD   clopidogrel (PLAVIX) 75 MG tablet Take 75 mg by mouth daily   Yes Historical Provider, MD   atorvastatin (LIPITOR) 20 MG tablet daily  5/31/20  Yes Historical Provider, MD   pantoprazole (PROTONIX) 40 MG tablet Take 40 mg by mouth 2 times daily    Yes Historical Provider, MD   ranolazine (RANEXA) 500 MG extended release tablet Take 500 mg by mouth 2 times daily   Yes Historical Provider, MD   hydrochlorothiazide (HYDRODIURIL) 12.5 MG tablet TK 1 T PO QD IN THE MORNING 6/20/19  Yes Historical Provider, MD   baclofen (LIORESAL) 10 MG tablet 2 times daily as needed  4/5/18  Yes Historical Provider, MD   HYDROcodone-acetaminophen (NORCO) 5-325 MG per tablet Take 1 tablet by mouth every 6 hours as needed for Pain   Yes Historical Provider, MD   Omega-3 Fatty Acids (FISH OIL) 1000 MG CAPS Take 1,000 mg by mouth daily.      Yes Historical Provider, MD   aspirin 81 MG EC tablet Take 81 mg by mouth daily    Historical Provider, MD   EPINEPHrine (EPIPEN 2-HUBER) 0.3 MG/0.3ML SOAJ injection Inject one pen as directed STAT for allergic reaction, may disp [facility] [id number] 6/24/19   Yamilka Bridges APRN - CNP       Current medications:    Current Facility-Administered Medications   Medication Dose Route Frequency Provider Last Rate Last Admin    0.9 % sodium chloride infusion   IntraVENous Continuous Kanwal Mendez  mL/hr at 02/25/22 0750 New Bag at 02/25/22 0750    ceFAZolin (ANCEF) 2000 mg in dextrose 4 % 100 mL IVPB (premix)  2,000 mg IntraVENous 30 Min Pre-Op Kanwal Mendez MD           Allergies:  No Known Allergies    Problem List:    Patient Active Problem List   Diagnosis Code    Diabetes ([facility] [address] E11.9    Hyperlipidemia E78.5    Gastroesophageal reflux disease K21.9    PARRISH (obstructive sleep apnea) G47.33    Chronic back pain M54.9, G89.29    DDD (degenerative disc disease), cervical M50.30    Asthma J45.909    Rectus diastasis M62.08    S/P rotator cuff surgery Z98.890    Lower urinary tract symptoms (LUTS) R39.9    Hypogonadism in male E29.1    Low testosterone R79.89    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    Left thyroid nodule E04.1    Moderate COPD (chronic obstructive pulmonary disease) (HCC) J44.9    Class 2 severe obesity due to excess calories with serious comorbidity and body mass index (BMI) of 37.0 to 37.9 in adult ([address] E66.01, Z68.37    Vocal cord disease J38.3    Erythrocytosis D75.1    History of anxiety disorder Z86.59    Personal history of tobacco use, presenting hazards to health Z87.891    Hypertensive disorder I10       Past Medical History:        Diagnosis Date    Acid reflux     Arthritis     Asthma     CAD (coronary artery disease)     Chronic back pain     Class 2 severe obesity due to excess calories with serious comorbidity and body mass index (BMI) of 37.0 to 37.9 in adult [facility]) 8/13/2018    Colon polyps 11/06    COPD (chronic obstructive pulmonary disease) ([facility]     Depression     panic attacks    Diverticulosis     HTN (hypertension)     Hyperlipidemia     Kidney disorder     Panic attack     Pneumonia     Rash     Recurrent upper respiratory infection (URI)     Thumb amputation status 3/13/14    left tip of thumb amputated    Thyroid disease     Type II or unspecified type diabetes mellitus without mention of complication, not stated as uncontrolled        Past Surgical History:        Procedure Laterality Date    BACK SURGERY  2002    BACK SURGERY  08/11/2017    BICEPS TENDON REPAIR Right 08/16/2017    BRONCHOSCOPY      BRONCHOSCOPY N/A 4/5/2019    BRONCHOSCOPY performed by Jersey Lott MD [facility]    BRONCHOSCOPY  4/5/2019    BRONCHOSCOPY ALVEOLAR LAVAGE performed by Jersey Lott MD at [address] [place] [address] CATHETERIZATION      CARPAL TUNNEL RELEASE      right hand    CHOLECYSTECTOMY  yrs ago    COLONOSCOPY      CORONARY ANGIOPLASTY WITH STENT PLACEMENT  2020    ENDOSCOPY, COLON, DIAGNOSTIC      EYE SURGERY      foreign body removal    HERNIA REPAIR      Dr Doug Holguin  ?     LARYNGOSCOPY  2016    Laryngoscopy Micro with Biopsy by Dr Natacha Li      uppp    ROTATOR CUFF REPAIR Left 15    SKIN BIOPSY      TONSILLECTOMY      UPPER GASTROINTESTINAL ENDOSCOPY      UVULOPALATOPHARYGOPLASTY      VEIN SURGERY Left 2014    Dr. Una Santiago       Social History:    Social History     Tobacco Use    Smoking status: Former Smoker     Packs/day: 2.00     Years: 42.00     Pack years: 84.00     Types: Cigarettes     Start date: 1971     Quit date: 3/3/2017     Years since quittin.9    Smokeless tobacco: Never Used    Tobacco comment: pts uses just nicotine vap   Substance Use Topics    Alcohol use: No     Alcohol/week: 0.0 standard drinks                                Counseling given: Not Answered  Comment: pts uses just nicotine vap      Vital Signs (Current):   Vitals:    22 1434 22 0714   BP:  (!) 153/74   Pulse:  76   Resp:  20   Temp:  98 °F (36.7 °C)   TempSrc:  Tympanic   SpO2:  92%   Weight: 240 lb (108.9 kg) 237 lb 9.6 oz (107.8 kg)   Height: 5' 8\" (1.727 m)                                               BP Readings from Last 3 Encounters:   22 (!) 153/74   22 124/70   22 130/78       NPO Status: Time of last liquid consumption: 0600                        Time of last solid consumption: 1800                        Date of last liquid consumption: 22                        Date of last solid food consumption: 22    BMI:   Wt Readings from Last 3 Encounters:   22 237 lb 9.6 oz (107.8 kg)   22 239 lb 8 oz (108.6 kg)   22 241 lb (109.3 kg)     Body mass index is 36.13 kg/m². CBC:   Lab Results   Component Value Date    WBC 9.9 02/11/2022    RBC 4.94 02/11/2022    RBC 4.87 03/01/2012    HGB 15.2 02/11/2022    HCT 44.6 02/11/2022    MCV 90.3 02/11/2022    RDW 14.4 02/07/2022     02/11/2022       CMP:   Lab Results   Component Value Date     02/11/2022    K 3.8 02/11/2022    K 4.3 06/07/2021     02/11/2022    CO2 26 02/11/2022    BUN 16 02/11/2022    CREATININE 1.0 02/11/2022    GFRAA >60 02/07/2020    AGRATIO 1.8 05/29/2020    LABGLOM 74 02/11/2022    GLUCOSE 133 02/11/2022    GLUCOSE 139 02/07/2022    PROT 7.4 01/05/2021    CALCIUM 9.1 02/11/2022    BILITOT 0.5 01/05/2021    ALKPHOS 45 01/05/2021    AST 16 02/11/2022    ALT 18 02/11/2022       POC Tests:   Recent Labs     02/25/22  0753   POCGLU 187*       Coags:   Lab Results   Component Value Date    PROTIME 11.5 07/24/2017    INR 1.14 02/25/2022    APTT 35.6 04/08/2019       HCG (If Applicable): No results found for: PREGTESTUR, PREGSERUM, HCG, HCGQUANT     ABGs: No results found for: PHART, PO2ART, FVP0PGP, TJN8SJX, BEART, W0TECJRQ     Type & Screen (If Applicable):  No results found for: LABABO, LABRH    Drug/Infectious Status (If Applicable):  No results found for: HIV, HEPCAB    COVID-19 Screening (If Applicable):   Lab Results   Component Value Date    COVID19 Not Detected 12/30/2020           Anesthesia Evaluation    Airway: Mallampati: II        Dental:          Pulmonary:   (+) COPD:  recent URI:  sleep apnea:  asthma:                            Cardiovascular:    (+) hypertension:, CAD:,                   Neuro/Psych:   (+) neuromuscular disease:,             GI/Hepatic/Renal:   (+) GERD:,           Endo/Other:    (+) Diabetes, . Abdominal:             Vascular: Other Findings:             Anesthesia Plan      general     ASA 3       Induction: intravenous. Anesthetic plan and risks discussed with patient.       Plan discussed with CRNA.                   Axel Soares MD   2/25/2022

## 2022-02-25 NOTE — PROGRESS NOTES
[address] [facility],[address]   Herbal and Nutritional Product Restrictions      The following herbal, alternative, and/or nutritional/dietary supplement product(s) has been discontinued per [facility] approved policy:    Fish oil 6356 mg caps daily (medication name/dose/frequency)    Please reorder upon discharge if appropriate.     Thank you,  Kathy Flaherty, [facility]  2/25/2022 2:56 PM

## 2022-02-25 NOTE — PROGRESS NOTES
1030 Non reactive on arrival to PACU , with labored spontaneous resp , LMA and O2 to LMA , pt has Exp wheeze T/O  1042 starting to react LMA removed , pt combative and trying to get out of bed , SPO2 dropped into the 70\"s and BP elevated  1045 Dr April Lopes to bedside orders given  1050 Ativan 0.25 mg IV given and Duo neb aerosol Tx started  1051 Lopressor 1 mg IV given   1055 pt starting to relax , no longer trying to get out of bed Aerosol Tx done , Exp wheeze remains but Audible gone SPO2 slowly going up   1115 Dr April Lopes back to bedside updated on pt  , orders given , 2nd Duoneb aerosol TX started and ativan 0.25 mg IV given   1120 aerosol Tx complete , Exp wheeze remians and resp labored  1130 Dr April Lopes back to bedside updated ,, orders given  [phone] Pt placed on Bipap   1153 Dr Myra French at bedside updated on pt orders given , Message sent to family via [employer] in [employer]  1505 pt states he is starting to get anxious , medicated with Ativan 0.25 mg IV  1520 pt states he is getting warm , fan set up  [phone] visiting with staff denies any needs , message sent to family via [employer]  1243 pt states burning pain in penis a # 8 medicated with Morphine 2 mg IV  1250 no change , medicated with Morphine 2 mg IV  1315 pt moved to a more private spot to bring family back , pt states burning in penis a little better pt able to rest on and off  1330 eyes closed resp easy   1345 Pt wife to bedside , given update on pt and plan of care

## 2022-02-25 NOTE — OP NOTE
[address] [place] [address]. [place]    DATE: 2/25/2022  Patient:  Romayne Rosette  MRN: [id number]  YOB: 1955    SURGEON: Paul Gage MD.    ASSISTANT: none    PREOPERATIVE DIAGNOSIS: BPH with outlet obstruction    POSTOPERATIVE DIAGNOSIS: BPH with outlet obstruction    PROCEDURE PERFORMED:  Cystoscopy, Greenlight Photovaporization of Prostate,        ANESTHESIA: General    COMPLICATIONS: none    OR BLOOD LOSS:  Minimal    FLUIDS: Cystalloids per Anesthesia    SPECIMENS:  * No specimens in log *  none    DRAINS: [address] 3 way urethral mcgraw    INDICATIONS FOR PROCEDURE:  The patient is a 74 y.o. male who presents today with BPH here for CYSTOSCOPY GREENLIGHT PHOTOVAPORIZATION OF THE PROSTATE. After risks, benefits and alternatives of the procedure were discussed with the patient, the patient elected to proceed. DETAILS OF PROCEDURE:  After informed consent was obtained in the preoperative area, the patient was taken back to the operating room and transferred to the operating table in supine position. EPC cuffs were placed. The machine was turned on. Anesthesia was induced and antibiotics were given. The patient was placed in modified dorsal lithotomy position and sterilely prepped and draped in a standard fashion. A timeout occurred. Two patient identifiers were used. The 25F rigid scope with the visual obturator was carefully advanced through the urethra. .  Once within the bladder the visual obturator was exchanged for the resection bridge. the ureteral orifices were very close to the bladder neck    The prostate was surveyed. the lateral lobes were noted to be significantly obstructing. . There was no median lobe present. Enucleation of the median lobe was not performed. The vaporization was started proximal with a power setting of 80W.  Both the left and right lateral lobes were vaporized in their entirety down to the level of the surgical capsule up to a power of 180 helm. With this complete, the apical dissection was carried out delicately. All of the obstructing adenoma was vaporized. Exquisite care was taken to ensure the integrity of the urinary sphincter. Once completed, the sphincter was evaluated and found to be clear of the resection bed, and completely intact. The anterior adenoma was the last tissue to be addressed. The scope was rotated for ease of resection. One last evaluation of the prostatic urethra demonstrated complete vaporization of the gland to the surgical capsule circumferentially. The scope was advanced into the bladder. The ureteral orifices were re-surveyed and noted to be in pristine condition, free of laser scatter. The cystoscope was then removed, and a 22F 3-way Jones catheter was placed into the bladder. When urine returned, the balloon was instilled with sterile water. The catheter was attached to gentle bladder irrigation using 0.9% normal saline. The catheter was fixed to the leg using a Angel strap. The patient was then awakened and discharged back to the PACU in good and stable condition. Follow-Up: The bladder will be irrigated in the PACU.  As long as the urine remains clear, the irrigation port will be plugged and the patient will be discharged home with plans to follow for catheter removal.

## 2022-02-26 VITALS
OXYGEN SATURATION: 90 % | RESPIRATION RATE: 18 BRPM | DIASTOLIC BLOOD PRESSURE: 70 MMHG | BODY MASS INDEX: 36.01 KG/M2 | HEIGHT: 68 IN | TEMPERATURE: 97.7 F | HEART RATE: 81 BPM | SYSTOLIC BLOOD PRESSURE: 162 MMHG | WEIGHT: 237.6 LBS

## 2022-02-26 LAB
ANION GAP SERPL CALCULATED.3IONS-SCNC: 9 MEQ/L (ref 8–16)
BASOPHILS # BLD: 0.2 %
BASOPHILS ABSOLUTE: 0 THOU/MM3 (ref 0–0.1)
BUN BLDV-MCNC: 16 MG/DL (ref 7–22)
CALCIUM SERPL-MCNC: 7.5 MG/DL (ref 8.5–10.5)
CHLORIDE BLD-SCNC: 105 MEQ/L (ref 98–111)
CO2: 24 MEQ/L (ref 23–33)
CREAT SERPL-MCNC: 1.2 MG/DL (ref 0.4–1.2)
EKG Q-T INTERVAL: 414 MS
EKG QRS DURATION: 114 MS
EKG QTC CALCULATION (BAZETT): 492 MS
EKG R AXIS: 110 DEGREES
EKG T AXIS: 57 DEGREES
EKG VENTRICULAR RATE: 85 BPM
EOSINOPHIL # BLD: 0 %
EOSINOPHILS ABSOLUTE: 0 THOU/MM3 (ref 0–0.4)
ERYTHROCYTE [DISTWIDTH] IN BLOOD BY AUTOMATED COUNT: 15.3 % (ref 11.5–14.5)
ERYTHROCYTE [DISTWIDTH] IN BLOOD BY AUTOMATED COUNT: 52.2 FL (ref 35–45)
GFR SERPL CREATININE-BSD FRML MDRD: 60 ML/MIN/1.73M2
GLUCOSE BLD-MCNC: 172 MG/DL (ref 70–108)
GLUCOSE BLD-MCNC: 213 MG/DL (ref 70–108)
HCT VFR BLD CALC: 40.4 % (ref 42–52)
HEMOGLOBIN: 13.3 GM/DL (ref 14–18)
IMMATURE GRANS (ABS): 0.1 THOU/MM3 (ref 0–0.07)
IMMATURE GRANULOCYTES: 0.7 %
LYMPHOCYTES # BLD: 6.9 %
LYMPHOCYTES ABSOLUTE: 0.9 THOU/MM3 (ref 1–4.8)
MCH RBC QN AUTO: 30.8 PG (ref 26–33)
MCHC RBC AUTO-ENTMCNC: 32.9 GM/DL (ref 32.2–35.5)
MCV RBC AUTO: 93.5 FL (ref 80–94)
MONOCYTES # BLD: 6.3 %
MONOCYTES ABSOLUTE: 0.9 THOU/MM3 (ref 0.4–1.3)
NUCLEATED RED BLOOD CELLS: 0 /100 WBC
PLATELET # BLD: 141 THOU/MM3 (ref 130–400)
PMV BLD AUTO: 11.3 FL (ref 9.4–12.4)
POTASSIUM REFLEX MAGNESIUM: 4.2 MEQ/L (ref 3.5–5.2)
RBC # BLD: 4.32 MILL/MM3 (ref 4.7–6.1)
SEG NEUTROPHILS: 85.9 %
SEGMENTED NEUTROPHILS ABSOLUTE COUNT: 11.8 THOU/MM3 (ref 1.8–7.7)
SODIUM BLD-SCNC: 138 MEQ/L (ref 135–145)
WBC # BLD: 13.7 THOU/MM3 (ref 4.8–10.8)

## 2022-02-26 PROCEDURE — 6370000000 HC RX 637 (ALT 250 FOR IP): Performed by: UROLOGY

## 2022-02-26 PROCEDURE — 80048 BASIC METABOLIC PNL TOTAL CA: CPT

## 2022-02-26 PROCEDURE — 99233 SBSQ HOSP IP/OBS HIGH 50: CPT | Performed by: HOSPITALIST

## 2022-02-26 PROCEDURE — 94760 N-INVAS EAR/PLS OXIMETRY 1: CPT

## 2022-02-26 PROCEDURE — 94640 AIRWAY INHALATION TREATMENT: CPT

## 2022-02-26 PROCEDURE — 36415 COLL VENOUS BLD VENIPUNCTURE: CPT

## 2022-02-26 PROCEDURE — 6360000002 HC RX W HCPCS: Performed by: UROLOGY

## 2022-02-26 PROCEDURE — 85025 COMPLETE CBC W/AUTO DIFF WBC: CPT

## 2022-02-26 PROCEDURE — 82948 REAGENT STRIP/BLOOD GLUCOSE: CPT

## 2022-02-26 PROCEDURE — 93010 ELECTROCARDIOGRAM REPORT: CPT | Performed by: INTERNAL MEDICINE

## 2022-02-26 PROCEDURE — 2700000000 HC OXYGEN THERAPY PER DAY

## 2022-02-26 RX ADMIN — HYDROCODONE BITARTRATE AND ACETAMINOPHEN 1 TABLET: 5; 325 TABLET ORAL at 05:43

## 2022-02-26 RX ADMIN — PANTOPRAZOLE SODIUM 40 MG: 40 TABLET, DELAYED RELEASE ORAL at 09:17

## 2022-02-26 RX ADMIN — RANOLAZINE 500 MG: 500 TABLET, FILM COATED, EXTENDED RELEASE ORAL at 09:17

## 2022-02-26 RX ADMIN — BUSPIRONE HYDROCHLORIDE 10 MG: 10 TABLET ORAL at 09:17

## 2022-02-26 RX ADMIN — SERTRALINE 100 MG: 100 TABLET, FILM COATED ORAL at 09:17

## 2022-02-26 RX ADMIN — HYDROCODONE BITARTRATE AND ACETAMINOPHEN 1 TABLET: 5; 325 TABLET ORAL at 01:29

## 2022-02-26 RX ADMIN — PHENAZOPYRIDINE HYDROCHLORIDE 100 MG: 100 TABLET ORAL at 04:31

## 2022-02-26 RX ADMIN — ENOXAPARIN SODIUM 40 MG: 100 INJECTION SUBCUTANEOUS at 09:17

## 2022-02-26 RX ADMIN — BUDESONIDE AND FORMOTEROL FUMARATE DIHYDRATE 2 PUFF: 160; 4.5 AEROSOL RESPIRATORY (INHALATION) at 07:57

## 2022-02-26 RX ADMIN — ROPINIROLE HYDROCHLORIDE 1 MG: 1 TABLET, FILM COATED ORAL at 09:17

## 2022-02-26 ASSESSMENT — PAIN DESCRIPTION - DESCRIPTORS
DESCRIPTORS: BURNING

## 2022-02-26 ASSESSMENT — PAIN DESCRIPTION - PAIN TYPE
TYPE: ACUTE PAIN

## 2022-02-26 ASSESSMENT — PAIN SCALES - GENERAL
PAINLEVEL_OUTOF10: 2
PAINLEVEL_OUTOF10: 2
PAINLEVEL_OUTOF10: 4
PAINLEVEL_OUTOF10: 3

## 2022-02-26 ASSESSMENT — PAIN DESCRIPTION - LOCATION
LOCATION: OTHER (COMMENT)

## 2022-02-26 NOTE — CARE COORDINATION
2/26/22, 1:34 PM EST    Patient goals/plan/ treatment preferences discussed by  and . Patient goals/plan/ treatment preferences reviewed with patient/ family. Patient/ family verbalize understanding of discharge plan and are in agreement with goal/plan/treatment preferences. Understanding was demonstrated using the teach back method. AVS provided by RN at time of discharge, which includes all necessary medical information pertaining to the patients current course of illness, treatment, post-discharge goals of care, and treatment preferences. IMM Letter  IMM Letter given to Patient/Family/Significant other/Guardian/POA/by[de-identified]   IMM Letter date given[de-identified] 02/26/22  IMM Letter time given[de-identified] 1330       Planning discharge home today. Discussed IMM with pt, verbalized understanding and signed form. Pt verbalized understanding and gave permission for possible discharge within 4 hours of receiving IMM. Stated \"I need to get out of here\". No needs.

## 2022-02-26 NOTE — PROGRESS NOTES
Hospitalist Progress Note      Patient:  Romayne Rosette    Unit/Bed:4A-17/017-A  YOB: 1955  MRN: [id number]   Acct: [de-identified]   PCP: David Arango MD  Date of Admission: 2/25/2022    Assessment/Plan:    BPH with outlet obstruction: s/p cystoscopy, greenlight photo vaporization of prostate with on 2/25. Management per primary     Acute on chronic hypoxic respiratory failure: Patient required BiPAP out of the OR, back to 3lpm NC at baseline. Likely postop respiratory insufficiency. W/u non-contributory. Recommend early mobilization and pulmonary recruiting w/ IS/Acapella     Hx of COPD w/o exacerbation; back on home inhaler regimen. NIDDMII: BS fairly controlled. Ok to resume home regimen at discharge. Pt and PCP to monitor BS and adjust regimen accordingly. CKD Stage III: stable. CCM     Essential HTN: home losartan-hctz were not resumed on admission for planned procedure. Hydralazine prn. Resume home meds and monitor     Anxiety: Resume home meds. Obesity w/ BMI 36.13  Hx of PARRISH: s/p laser resection; not on CPAP    Chief Complaint: Post-op hypoxia    Initial H and P:-    Pt admitted for elective urologic procedure; developed hypoxia post-op for which was admitted under urology w/ IM consulting. Subjective (past 24 hours):   Feeling well. Denies any SOB/CP/palpitation/N/V/fever/chills/presyncope/cough/sputum. Jones removed and able to urinate w/ dysuria    Past medical history, family history, social history and allergies reviewed again and is unchanged since admission. ROS (All review of systems completed. Pertinent positives noted.  Otherwise All other systems reviewed and negative.)     Medications:  Reviewed    Infusion Medications    sodium chloride 100 mL/hr at 02/25/22 0750    sodium chloride 75 mL/hr at 02/25/22 2330    sodium chloride      dextrose      dextrose      Or    dextrose       Scheduled Medications atorvastatin  20 mg Oral Daily    budesonide-formoterol  2 puff Inhalation BID    busPIRone  10 mg Oral TID    sertraline  100 mg Oral BID    rOPINIRole  1 mg Oral TID    ranolazine  500 mg Oral BID    pantoprazole  40 mg Oral BID    montelukast  10 mg Oral Nightly    [Held by provider] metFORMIN  500 mg Oral BID     sodium chloride flush  5-40 mL IntraVENous 2 times per day    enoxaparin  40 mg SubCUTAneous Daily    cefTRIAXone (ROCEPHIN) IV  1,000 mg IntraVENous Q24H    insulin lispro  0-12 Units SubCUTAneous TID     insulin lispro  0-6 Units SubCUTAneous Nightly     PRN Meds: baclofen, albuterol sulfate HFA, traZODone, sodium chloride flush, sodium chloride, acetaminophen, ondansetron **OR** ondansetron, polyethylene glycol, morphine **OR** morphine, glucose, glucagon (rDNA), dextrose, dextrose **OR** dextrose, HYDROcodone 5 mg - acetaminophen, phenazopyridine      Intake/Output Summary (Last 24 hours) at 2/26/2022 0742  Last data filed at 2/26/2022 0737  Gross per 24 hour   Intake 800 ml   Output -4050 ml   Net 4850 ml       Diet:  ADULT DIET; Regular; 3 carb choices (45 gm/meal)    Exam:  BP (!) 108/50   Pulse 73   Temp 97.7 °F (36.5 °C) (Oral)   Resp 16   Ht 5' 8\" (1.727 m)   Wt 237 lb 9.6 oz (107.8 kg)   SpO2 93%   BMI 36.13 kg/m²   General appearance: Obese, No apparent distress, appears stated age and cooperative. HEENT: Pupils equal, round, and reactive to light. Conjunctivae/corneas clear. Neck: Supple, with full range of motion. No jugular venous distention. Trachea midline. Respiratory:  Normal respiratory effort. Clear to auscultation, bilaterally without Rales/Wheezes/Rhonchi. Cardiovascular: Regular rate and rhythm with normal S1/S2 without murmurs, rubs or gallops. Abdomen: Soft, non-tender, non-distended with normal bowel sounds. Musculoskeletal: passive and active ROM x 4 extremities. Skin: Skin color, texture, turgor normal.  No rashes or lesions.   Neurologic:  Neurovascularly intact without any focal sensory/motor deficits. Cranial nerves: II-XII intact, grossly non-focal.  Psychiatric: Alert and oriented, thought content appropriate, normal insight  Capillary Refill: Brisk,< 3 seconds   Peripheral Pulses: +2 palpable, equal bilaterally     Labs:   Recent Labs     02/25/22  1541 02/26/22  0508   WBC 13.1* 13.7*   HGB 14.7 13.3*   HCT 43.9 40.4*    141     Recent Labs     02/25/22  1541 02/26/22  0508    138   K 4.2 4.2    105   CO2 23 24   BUN 13 16   CREATININE 1.1 1.2   CALCIUM 8.0* 7.5*     No results for input(s): AST, ALT, BILIDIR, BILITOT, ALKPHOS in the last 72 hours. Recent Labs     02/25/22  0740   INR 1.14*     No results for input(s): Ellene Lazier in the last 72 hours. Microbiology:    Blood culture #1: No results found for: [facility]    Blood culture #2:No results found for: BLOODCULT2    Organism:  Lab Results   Component Value Date    ORG Growth of Contaminants 02/11/2022         Lab Results   Component Value Date    LABGRAM  04/05/2019     Rare segmented neutrophils observed. Rare epithelial cells observed. Few gram positive cocci in pairs and chains. MRSA culture only:No results found for: [facility]    Urine culture:   Lab Results   Component Value Date    LABURIN  02/11/2022     No growth-preliminary Growth of Contaminants. The mixture of organisms present are not a common cause of urinary tract infections and probably represent skin hollie or distal urethral hollie. LABURIN 50 10/29/2021       Respiratory culture: No results found for: CULTRESP    Aerobic and Anaerobic :  No results found for: LABAERO  No results found for: LABANAE    Urinalysis:      Lab Results   Component Value Date    NITRU Negative 08/16/2021    WBCUA 0-5 07/24/2017    RBCUA 0-2 07/24/2017    BLOODU Negative 08/16/2021    BLOODU NEGATIVE 12/20/2013    GLUCOSEU Negative 08/16/2021       Radiology:  XR CHEST PORTABLE   Final Result   No acute abnormality identified. **This report has been created using voice recognition software. It may contain minor errors which are inherent in voice recognition technology. **      Final report electronically signed by Dr. Mike Villalba MD on 2/25/2022 3:56 PM        No results found. With RN in room, patient and wife were updated about and agreed upon the treatment plan, all the questions and concerns were addressed.     Electronically signed by Thor Valle MD on 2/26/2022 at 7:42 AM

## 2022-02-27 NOTE — PROGRESS NOTES
Physician Progress Note      Gretchen Kim  CSN #:                  [id number]  :                       1955  ADMIT DATE:       2022 6:57 AM  DISCH DATE:        2022 1:44 PM  RESPONDING  PROVIDER #:        Liane Murrieta MD          QUERY TEXT:    Hospitalist,    Patient admitted with BPH with outlet obstruction and underwent greenlight   photovaporization of prostate on . On , Hospitalist note states,   \"Acute on chronic hypoxic respiratory failure: Patient required BiPAP out of   the OR, now on 6L. He wears 3L NC at baseline. Likely postop respiratory   insufficiency. \"  If possible, please respond below and document in the   progress notes and discharge summary if you are evaluating and /or treating   any of the following: The medical record reflects the following:  Risk Factors: COPD, asthma, PARRISH, BMI 39, former smoker, advanced age  Clinical Indicators: On , Hospitalist note states, \"Acute on chronic   hypoxic respiratory failure: Patient required BiPAP out of the OR, now on 6L. He wears 3L NC at baseline. Likely postop respiratory insufficiency\"; R   20-40s  Treatment: BiPAP/NC, ABGs, CXR, albuterol, Symbicort, Duonebs, Singulair    Thank you! Regis Alvarez, RN, BSN, RHIT, CCDS  Clinical   Options provided:  -- Acute postoperative pulmonary insufficiency  -- Acute on chronic respiratory failure unrelated to surgery and due to,   please specify. -- Acute on chronic respiratory failure due to the surgery  -- Other - I will add my own diagnosis  -- Disagree - Not applicable / Not valid  -- Disagree - Clinically unable to determine / Unknown  -- Refer to Clinical Documentation Reviewer    PROVIDER RESPONSE TEXT:    This patient has acute postoperative pulmonary insufficiency.     Query created by: Dolly Bee on 2022 10:33 AM      Electronically signed by:  Liane Murrieta MD 2022 8:25 AM

## 2022-03-01 ENCOUNTER — TELEPHONE (OUTPATIENT)
Dept: FAMILY MEDICINE CLINIC | Age: 67
End: 2022-03-01

## 2022-03-01 RX ORDER — LEVOFLOXACIN 500 MG/1
500 TABLET, FILM COATED ORAL DAILY
Qty: 10 TABLET | Refills: 0 | Status: SHIPPED | OUTPATIENT
Start: 2022-03-01 | End: 2022-03-11

## 2022-03-01 NOTE — TELEPHONE ENCOUNTER
I spoke with Robin Torres and she was concerned if the Keflex was strong enough. He has gotten worse since he got home. He is taking the Keflex.

## 2022-03-01 NOTE — TELEPHONE ENCOUNTER
Pt's wife called to req an atb for possible pneumonia. Pt is on cephALEXin (KEFLEX) 500 MG capsule per DR. Wendy Wylie.     [place] [id number] #[id number] - [place], [facility] [address]

## 2022-03-03 ENCOUNTER — TELEPHONE (OUTPATIENT)
Dept: FAMILY MEDICINE CLINIC | Age: 67
End: 2022-03-03

## 2022-03-03 ENCOUNTER — OFFICE VISIT (OUTPATIENT)
Dept: FAMILY MEDICINE CLINIC | Age: 67
End: 2022-03-03

## 2022-03-03 ENCOUNTER — HOSPITAL ENCOUNTER (OUTPATIENT)
Dept: GENERAL RADIOLOGY | Age: 67
Discharge: HOME | End: 2022-03-03
Payer: MEDICARE

## 2022-03-03 ENCOUNTER — HOSPITAL ENCOUNTER (OUTPATIENT)
Age: 67
Discharge: HOME | End: 2022-03-03
Payer: MEDICARE

## 2022-03-03 VITALS
RESPIRATION RATE: 21 BRPM | DIASTOLIC BLOOD PRESSURE: 70 MMHG | HEART RATE: 72 BPM | BODY MASS INDEX: 35.22 KG/M2 | SYSTOLIC BLOOD PRESSURE: 128 MMHG | HEIGHT: 68 IN | WEIGHT: 232.38 LBS | TEMPERATURE: 96.6 F | OXYGEN SATURATION: 93 %

## 2022-03-03 DIAGNOSIS — J44.1 COPD WITH ACUTE EXACERBATION (HCC): ICD-10-CM

## 2022-03-03 DIAGNOSIS — Z90.79 S/P TURP (STATUS POST TRANSURETHRAL RESECTION OF PROSTATE): ICD-10-CM

## 2022-03-03 DIAGNOSIS — R06.02 SOB (SHORTNESS OF BREATH): ICD-10-CM

## 2022-03-03 DIAGNOSIS — J44.1 COPD WITH ACUTE EXACERBATION (HCC): Primary | ICD-10-CM

## 2022-03-03 LAB
ANION GAP SERPL CALCULATED.3IONS-SCNC: 13 MEQ/L (ref 8–16)
BASOPHILS # BLD: 0.6 %
BASOPHILS ABSOLUTE: 0.1 THOU/MM3 (ref 0–0.1)
BUN BLDV-MCNC: 16 MG/DL (ref 7–22)
CALCIUM SERPL-MCNC: 9.1 MG/DL (ref 8.5–10.5)
CHLORIDE BLD-SCNC: 102 MEQ/L (ref 98–111)
CO2: 27 MEQ/L (ref 23–33)
CREAT SERPL-MCNC: 1.1 MG/DL (ref 0.4–1.2)
EOSINOPHIL # BLD: 2.5 %
EOSINOPHILS ABSOLUTE: 0.2 THOU/MM3 (ref 0–0.4)
ERYTHROCYTE [DISTWIDTH] IN BLOOD BY AUTOMATED COUNT: 14.7 % (ref 11.5–14.5)
ERYTHROCYTE [DISTWIDTH] IN BLOOD BY AUTOMATED COUNT: 49.1 FL (ref 35–45)
GFR SERPL CREATININE-BSD FRML MDRD: 66 ML/MIN/1.73M2
GLUCOSE BLD-MCNC: 126 MG/DL (ref 70–108)
HCT VFR BLD CALC: 43.1 % (ref 42–52)
HEMOGLOBIN: 14.4 GM/DL (ref 14–18)
IMMATURE GRANS (ABS): 0.12 THOU/MM3 (ref 0–0.07)
IMMATURE GRANULOCYTES: 1.4 %
INFLUENZA A: NOT DETECTED
INFLUENZA B: NOT DETECTED
LYMPHOCYTES # BLD: 10.4 %
LYMPHOCYTES ABSOLUTE: 0.9 THOU/MM3 (ref 1–4.8)
MCH RBC QN AUTO: 30.4 PG (ref 26–33)
MCHC RBC AUTO-ENTMCNC: 33.4 GM/DL (ref 32.2–35.5)
MCV RBC AUTO: 91.1 FL (ref 80–94)
MONOCYTES # BLD: 7.9 %
MONOCYTES ABSOLUTE: 0.7 THOU/MM3 (ref 0.4–1.3)
NUCLEATED RED BLOOD CELLS: 0 /100 WBC
PLATELET # BLD: 169 THOU/MM3 (ref 130–400)
PMV BLD AUTO: 11.1 FL (ref 9.4–12.4)
POTASSIUM SERPL-SCNC: 4 MEQ/L (ref 3.5–5.2)
PRO-BNP: 2092 PG/ML (ref 0–900)
RBC # BLD: 4.73 MILL/MM3 (ref 4.7–6.1)
SARS-COV-2 RNA, RT PCR: NOT DETECTED
SEG NEUTROPHILS: 77.2 %
SEGMENTED NEUTROPHILS ABSOLUTE COUNT: 6.9 THOU/MM3 (ref 1.8–7.7)
SODIUM BLD-SCNC: 142 MEQ/L (ref 135–145)
WBC # BLD: 8.9 THOU/MM3 (ref 4.8–10.8)

## 2022-03-03 PROCEDURE — 36415 COLL VENOUS BLD VENIPUNCTURE: CPT

## 2022-03-03 PROCEDURE — 83880 ASSAY OF NATRIURETIC PEPTIDE: CPT

## 2022-03-03 PROCEDURE — 3017F COLORECTAL CA SCREEN DOC REV: CPT | Performed by: EMERGENCY MEDICINE

## 2022-03-03 PROCEDURE — G8417 CALC BMI ABV UP PARAM F/U: HCPCS | Performed by: EMERGENCY MEDICINE

## 2022-03-03 PROCEDURE — 87636 SARSCOV2 & INF A&B AMP PRB: CPT

## 2022-03-03 PROCEDURE — 1123F ACP DISCUSS/DSCN MKR DOCD: CPT | Performed by: EMERGENCY MEDICINE

## 2022-03-03 PROCEDURE — 80048 BASIC METABOLIC PNL TOTAL CA: CPT

## 2022-03-03 PROCEDURE — G8427 DOCREV CUR MEDS BY ELIG CLIN: HCPCS | Performed by: EMERGENCY MEDICINE

## 2022-03-03 PROCEDURE — 85025 COMPLETE CBC W/AUTO DIFF WBC: CPT

## 2022-03-03 PROCEDURE — 99214 OFFICE O/P EST MOD 30 MIN: CPT | Performed by: EMERGENCY MEDICINE

## 2022-03-03 PROCEDURE — 1036F TOBACCO NON-USER: CPT | Performed by: EMERGENCY MEDICINE

## 2022-03-03 PROCEDURE — 71046 X-RAY EXAM CHEST 2 VIEWS: CPT

## 2022-03-03 RX ORDER — PREDNISONE 10 MG/1
TABLET ORAL
Qty: 21 TABLET | Refills: 0 | Status: SHIPPED | OUTPATIENT
Start: 2022-03-03 | End: 2022-03-03 | Stop reason: SDUPTHER

## 2022-03-03 RX ORDER — IPRATROPIUM BROMIDE AND ALBUTEROL SULFATE 2.5; .5 MG/3ML; MG/3ML
1 SOLUTION RESPIRATORY (INHALATION) EVERY 6 HOURS PRN
Qty: 30 ML | Refills: 1 | Status: SHIPPED | OUTPATIENT
Start: 2022-03-03 | End: 2022-09-09 | Stop reason: ALTCHOICE

## 2022-03-03 RX ORDER — CLOPIDOGREL BISULFATE 75 MG/1
75 TABLET ORAL DAILY
Status: ON HOLD | COMMUNITY
Start: 2022-03-02 | End: 2022-05-19 | Stop reason: SDUPTHER

## 2022-03-03 RX ORDER — PREDNISONE 10 MG/1
TABLET ORAL
Qty: 21 TABLET | Refills: 0 | Status: SHIPPED | OUTPATIENT
Start: 2022-03-03 | End: 2022-03-22

## 2022-03-03 RX ORDER — FUROSEMIDE 20 MG/1
20 TABLET ORAL DAILY
Qty: 15 TABLET | Refills: 0 | Status: SHIPPED | OUTPATIENT
Start: 2022-03-03 | End: 2022-03-10 | Stop reason: SDUPTHER

## 2022-03-03 ASSESSMENT — ENCOUNTER SYMPTOMS
TROUBLE SWALLOWING: 0
NAUSEA: 0
WHEEZING: 0
ABDOMINAL PAIN: 0
RHINORRHEA: 0
COUGH: 1
VOICE CHANGE: 0
DIARRHEA: 0
VOMITING: 0
CONSTIPATION: 0
CHEST TIGHTNESS: 0
SHORTNESS OF BREATH: 1
SORE THROAT: 0
SINUS PRESSURE: 0
BACK PAIN: 0

## 2022-03-03 NOTE — TELEPHONE ENCOUNTER
----- Message from Isaac Anderson MD sent at 3/3/2022  4:18 PM EST -----  Please call patient, chest x-ray showed no pneumonia

## 2022-03-03 NOTE — PROGRESS NOTES
Visit Date: 3/3/2022    Subjective:    Ross Saul is a 74 y.o.male who presents today for:  Chief Complaint   Patient presents with    Post-Op Check    Shortness of Breath    Cough         HPI:     HPI     Had TURP 2/25/22 and sent home with Keflex. Patient developed coughing x 2 days  , Patient having rattling chest wheezing shortness of breath especially with activities, get lightheaded at times however no fever no chest pain no nausea vomiting.     CurrentHome Medications:  Current Outpatient Medications   Medication Sig Dispense Refill    clopidogrel (PLAVIX) 75 MG tablet       ipratropium-albuterol (DUONEB) 0.5-2.5 (3) MG/3ML SOLN nebulizer solution Inhale 3 mLs into the lungs every 6 hours as needed for Shortness of Breath 30 mL 1    predniSONE (DELTASONE) 10 MG tablet 2 tablets 2 times a day for 3 days then 1  Tablet 2 times a day for 3 days, then 1 tablet daily till gone 21 tablet 0    levoFLOXacin (LEVAQUIN) 500 MG tablet Take 1 tablet by mouth daily for 10 days 10 tablet 0    docusate sodium (COLACE) 100 MG capsule Take 1 capsule by mouth 2 times daily for 14 days 28 capsule 0    OXYGEN Inhale 3 L into the lungs as needed      losartan (COZAAR) 50 MG tablet Take 50 mg by mouth daily      metFORMIN (GLUCOPHAGE) 500 MG tablet TAKE 2 TABLETS BY MOUTH TWICE DAILY WITH MEALS 360 tablet 1    rOPINIRole (REQUIP) 1 MG tablet TAKE 1 TABLET BY MOUTH THREE TIMES DAILY 90 tablet 0    traZODone (DESYREL) 150 MG tablet TAKE 1/2 TO 1 TABLET BY MOUTH EVERY DAY AT BEDTIME AS NEEDED FOR SLEEP 90 tablet 0    glimepiride (AMARYL) 4 MG tablet TAKE 1 TABLET BY MOUTH TWICE DAILY 180 tablet 0    montelukast (SINGULAIR) 10 MG tablet Take 10 mg by mouth nightly      busPIRone (BUSPAR) 10 MG tablet TAKE 1 TABLET BY MOUTH THREE TIMES DAILY 270 tablet 1    sertraline (ZOLOFT) 100 MG tablet TAKE 1 TABLET BY MOUTH TWICE DAILY 180 tablet 1    fluticasone (FLONASE) 50 MCG/ACT nasal spray SHAKE LIQUID AND USE 1 SPRAY IN EACH NOSTRIL DAILY 16 g 11    VENTOLIN  (90 Base) MCG/ACT inhaler INHALE 2 PUFFS INTO THE LUNGS EVERY 6 HOURS AS NEEDED FOR WHEEZING 1 each 11    budesonide-formoterol (SYMBICORT) 160-4.5 MCG/ACT AERO Inhale 2 puffs into the lungs 2 times daily 3 Inhaler 1    albuterol sulfate (PROAIR RESPICLICK) 438 (90 Base) MCG/ACT aerosol powder inhalation Inhale into the lungs every 4 hours as needed for Wheezing or Shortness of Breath      atorvastatin (LIPITOR) 20 MG tablet daily       pantoprazole (PROTONIX) 40 MG tablet Take 40 mg by mouth 2 times daily       ranolazine (RANEXA) 500 MG extended release tablet Take 500 mg by mouth 2 times daily      hydrochlorothiazide (HYDRODIURIL) 12.5 MG tablet TK 1 T PO QD IN THE MORNING  3    EPINEPHrine (EPIPEN 2-HUBER) 0.3 MG/0.3ML SOAJ injection Inject one pen as directed STAT for allergic reaction, may disp generic NDC 44088-881-64 6 each 99    baclofen (LIORESAL) 10 MG tablet 2 times daily as needed   0    Omega-3 Fatty Acids (FISH OIL) 1000 MG CAPS Take 1,000 mg by mouth daily. No current facility-administered medications for this visit. Subjective:      Review of Systems   Constitutional: Negative for appetite change, chills, diaphoresis, fatigue and fever. HENT: Negative for congestion, ear discharge, ear pain, postnasal drip, rhinorrhea, sinus pressure, sore throat, trouble swallowing and voice change. Respiratory: Positive for cough and shortness of breath. Negative for chest tightness and wheezing. Cardiovascular: Negative for chest pain, palpitations and leg swelling. Gastrointestinal: Negative for abdominal pain, constipation, diarrhea, nausea and vomiting. Musculoskeletal: Negative for arthralgias, back pain, joint swelling, myalgias, neck pain and neck stiffness. Skin: Negative for rash. Neurological: Negative for dizziness, syncope, weakness, light-headedness, numbness and headaches.        Objective:     /70   Pulse 72 Temp 96.6 °F (35.9 °C) (Skin)   Resp 21   Ht 5' 8\" (1.727 m)   Wt 232 lb 6 oz (105.4 kg)   SpO2 93%   BMI 35.33 kg/m²   BP Readings from Last 3 Encounters:   03/03/22 128/70   02/26/22 (!) 162/70   02/25/22 (!) 141/78     Wt Readings from Last 3 Encounters:   03/03/22 232 lb 6 oz (105.4 kg)   02/25/22 237 lb 9.6 oz (107.8 kg)   02/11/22 239 lb 8 oz (108.6 kg)       Physical Exam  Vitals reviewed. Constitutional:       Appearance: He is well-developed. HENT:      Head: Normocephalic and atraumatic. Right Ear: External ear normal.      Left Ear: External ear normal.      Nose: Nose normal.   Eyes:      General: No scleral icterus. Conjunctiva/sclera: Conjunctivae normal.      Pupils: Pupils are equal, round, and reactive to light. Neck:      Thyroid: No thyromegaly. Vascular: No JVD. Cardiovascular:      Rate and Rhythm: Normal rate and regular rhythm. Heart sounds: No murmur heard. No friction rub. Pulmonary:      Effort: Pulmonary effort is normal.      Breath sounds: Decreased breath sounds and rhonchi present. No rales. Chest:      Chest wall: No tenderness. Abdominal:      General: Bowel sounds are normal.      Palpations: Abdomen is soft. There is no mass. Tenderness: There is no abdominal tenderness. Musculoskeletal:      Cervical back: Normal range of motion and neck supple. Lymphadenopathy:      Cervical: No cervical adenopathy. Skin:     Findings: No rash. Neurological:      Mental Status: He is alert and oriented to person, place, and time. Psychiatric:         Behavior: Behavior is cooperative. Assessment:         Diagnosis Orders   1. COPD with acute exacerbation (HCC)  XR CHEST (2 VW)    COVID-19    Brain Natriuretic Peptide    Basic Metabolic Panel    CBC with Auto Differential   2. S/P TURP (status post transurethral resection of prostate)  XR CHEST (2 VW)   3.  SOB (shortness of breath)  Brain Natriuretic Peptide    Basic Metabolic Panel CBC with Auto Differential       Plan:      Medications Prescribed:  Orders Placed This Encounter   Medications    DISCONTD: predniSONE (DELTASONE) 10 MG tablet     Si tablets 2 times a day for 3 days then 1  Tablet 2 times a day for 3 days, then 1 tablet daily till gone     Dispense:  21 tablet     Refill:  0    ipratropium-albuterol (DUONEB) 0.5-2.5 (3) MG/3ML SOLN nebulizer solution     Sig: Inhale 3 mLs into the lungs every 6 hours as needed for Shortness of Breath     Dispense:  30 mL     Refill:  1    predniSONE (DELTASONE) 10 MG tablet     Si tablets 2 times a day for 3 days then 1  Tablet 2 times a day for 3 days, then 1 tablet daily till gone     Dispense:  21 tablet     Refill:  0     Orders Placed:  Orders Placed This Encounter   Procedures    XR CHEST (2 VW)     Standing Status:   Future     Standing Expiration Date:   3/3/2023    COVID-19     Standing Status:   Future     Standing Expiration Date:   3/3/2023     Scheduling Instructions:      1) Due to current limited availability of the COVID-19 test, tests will be prioritized based on responses to questions above. Testing may be delayed due to volume. 2) Print and instruct patient to adhere to CDC home isolation program. (Link Above)              3) Set up or refer patient for a monitoring program.              4) Have patient sign up for and leverage Lock[employer] (if not previously done). Order Specific Question:   Is this test for diagnosis or screening? Answer:   Diagnosis of ill patient     Order Specific Question:   Symptomatic for COVID-19 as defined by CDC? Answer:   Yes     Order Specific Question:   Date of Symptom Onset     Answer:   3/1/2022     Order Specific Question:   Hospitalized for COVID-19? Answer:   No     Order Specific Question:   Admitted to ICU for COVID-19? Answer:   No     Order Specific Question:   Employed in healthcare setting?      Answer:   No     Order Specific Question: Resident in a congregate (group) care setting? Answer:   No     Order Specific Question:   Pregnant: Answer:   No     Order Specific Question:   Previously tested for COVID-19? Answer: Yes    Brain Natriuretic Peptide     Standing Status:   Future     Standing Expiration Date:   3/3/2023    Basic Metabolic Panel     Standing Status:   Future     Standing Expiration Date:   3/3/2023    CBC with Auto Differential     Standing Status:   Future     Standing Expiration Date:   3/3/2023        Return in about 6 days (around 3/9/2022). Discussed use, benefit, and side effects of prescribedmedications. All patient questions answered. Pt voiced understanding. Instructedto continue current medications, diet and exercise. Patient agreed with treatmentplan.

## 2022-03-03 NOTE — TELEPHONE ENCOUNTER
----- Message from Breana Paula MD sent at 3/3/2022  4:24 PM EST -----  Please call patient his Covid and flu test are negative, complete blood count, comprehensive profile are within normal limits, patient had an elevated BNP due to congestion from congestive heart failure, but the chest x-ray showed normal x-ray.    Please call the patient if she is taking water pill

## 2022-03-08 ENCOUNTER — SCHEDULED TELEPHONE ENCOUNTER (OUTPATIENT)
Dept: UROLOGY | Age: 67
End: 2022-03-08

## 2022-03-08 DIAGNOSIS — N40.1 BENIGN PROSTATIC HYPERPLASIA WITH LOWER URINARY TRACT SYMPTOMS, SYMPTOM DETAILS UNSPECIFIED: ICD-10-CM

## 2022-03-08 PROCEDURE — 99024 POSTOP FOLLOW-UP VISIT: CPT | Performed by: UROLOGY

## 2022-03-08 NOTE — TELEPHONE ENCOUNTER
Durene Holstein is a 74 y.o. male evaluated via telephone on 3/8/2022. Consent:  He and/or health care decision maker is aware that that he may receive a bill for this telephone service, which includes applicable co-pays, depending on his insurance coverage, and has provided verbal consent to proceed. Documentation:  I communicated with the patient and/or health care decision maker about bph. Details of this discussion including any medical advice provided:     S/p greenlight  Irritative symptoms  Hematuria  Will send abx   Follow up in 2-3 weeks in office with pvr        I affirm this is a Patient Initiated Episode with a Patient who has not had a related appointment within my department in the past 7 days or scheduled within the next 24 hours. Patient identification was verified at the start of the visit: Yes    Total Time: minutes: 5-10 minutes    Yony Borden was evaluated through a synchronous (real-time) audio encounter. The patient was located at home in a state where the provider was licensed to provide care.     Note: not billable if this call serves to triage the patient into an appointment for the relevant concern      Mariel Calzada MD

## 2022-03-10 ENCOUNTER — TELEPHONE (OUTPATIENT)
Dept: UROLOGY | Age: 67
End: 2022-03-10

## 2022-03-10 ENCOUNTER — OFFICE VISIT (OUTPATIENT)
Dept: FAMILY MEDICINE CLINIC | Age: 67
End: 2022-03-10

## 2022-03-10 VITALS
BODY MASS INDEX: 34.1 KG/M2 | HEART RATE: 72 BPM | SYSTOLIC BLOOD PRESSURE: 124 MMHG | RESPIRATION RATE: 24 BRPM | DIASTOLIC BLOOD PRESSURE: 68 MMHG | HEIGHT: 68 IN | TEMPERATURE: 97.8 F | WEIGHT: 225 LBS

## 2022-03-10 DIAGNOSIS — I50.23 ACUTE ON CHRONIC SYSTOLIC HEART FAILURE (HCC): ICD-10-CM

## 2022-03-10 DIAGNOSIS — I50.9 CHRONIC CONGESTIVE HEART FAILURE, UNSPECIFIED HEART FAILURE TYPE (HCC): ICD-10-CM

## 2022-03-10 DIAGNOSIS — J44.9 MODERATE COPD (CHRONIC OBSTRUCTIVE PULMONARY DISEASE) (HCC): ICD-10-CM

## 2022-03-10 DIAGNOSIS — J44.1 COPD WITH ACUTE EXACERBATION (HCC): Primary | ICD-10-CM

## 2022-03-10 PROCEDURE — G8417 CALC BMI ABV UP PARAM F/U: HCPCS | Performed by: EMERGENCY MEDICINE

## 2022-03-10 PROCEDURE — G8427 DOCREV CUR MEDS BY ELIG CLIN: HCPCS | Performed by: EMERGENCY MEDICINE

## 2022-03-10 PROCEDURE — 1036F TOBACCO NON-USER: CPT | Performed by: EMERGENCY MEDICINE

## 2022-03-10 PROCEDURE — 1123F ACP DISCUSS/DSCN MKR DOCD: CPT | Performed by: EMERGENCY MEDICINE

## 2022-03-10 PROCEDURE — 3017F COLORECTAL CA SCREEN DOC REV: CPT | Performed by: EMERGENCY MEDICINE

## 2022-03-10 PROCEDURE — 99214 OFFICE O/P EST MOD 30 MIN: CPT | Performed by: EMERGENCY MEDICINE

## 2022-03-10 RX ORDER — CEFUROXIME AXETIL 500 MG/1
500 TABLET ORAL 2 TIMES DAILY
Qty: 20 TABLET | Refills: 0 | Status: SHIPPED | OUTPATIENT
Start: 2022-03-10 | End: 2022-03-20

## 2022-03-10 RX ORDER — FUROSEMIDE 20 MG/1
20 TABLET ORAL DAILY
Qty: 30 TABLET | Refills: 0 | Status: ON HOLD | OUTPATIENT
Start: 2022-03-10 | End: 2022-03-25 | Stop reason: HOSPADM

## 2022-03-10 RX ORDER — CEFTRIAXONE 500 MG/1
1000 INJECTION, POWDER, FOR SOLUTION INTRAMUSCULAR; INTRAVENOUS ONCE
Status: COMPLETED | OUTPATIENT
Start: 2022-03-10 | End: 2022-03-10

## 2022-03-10 RX ADMIN — CEFTRIAXONE 1000 MG: 500 INJECTION, POWDER, FOR SOLUTION INTRAMUSCULAR; INTRAVENOUS at 12:52

## 2022-03-10 ASSESSMENT — ENCOUNTER SYMPTOMS
RHINORRHEA: 0
NAUSEA: 0
CONSTIPATION: 0
BACK PAIN: 0
VOICE CHANGE: 0
SHORTNESS OF BREATH: 1
VOMITING: 0
CHEST TIGHTNESS: 0
DIARRHEA: 0
COUGH: 1
ABDOMINAL PAIN: 0
SINUS PRESSURE: 0
TROUBLE SWALLOWING: 0
WHEEZING: 0
SORE THROAT: 0

## 2022-03-10 NOTE — PROGRESS NOTES
Have you seen any other physician or provider since your last visit no    Have you had any other diagnostic tests since your last visit? yes - CXR, Blood work    Have you changed or stopped any medications since your last visit including any over-the-counter medicines, vitamins, or herbal medicines? no     Are you taking all your prescribed medications? Yes    If NO, why?             BP Readings from Last 2 Encounters:   03/10/22 124/68   03/03/22 128/70     Hemoglobin A1C (%)   Date Value   02/11/2022 7.0 (A)     Microalbumin, Random Urine (mg/dl)   Date Value   11/04/2016 2.74     LDL Calculated (mg/dL)   Date Value   02/11/2022 56              Tobacco use:  Patient  reports that he quit smoking about 5 years ago. His smoking use included cigarettes. He started smoking about 50 years ago. He has a 84.00 pack-year smoking history. He has never used smokeless tobacco.    If Smoker - Cessation materials given? NA    Is Daily aspirin on medication list?:  No    Diabetic retinal exam done? Yes   If yes, document in Health Maintenance. Monofilament placed on counter? Yes    Shoes and socks removed? Yes    BP taken with correct size cuff? Yes   Repeated if > 140/90 NA     Is patient taking any medications for diabetes    Yes   If yes, see medication list    Is the patient reporting any side effects of diabetic medications? No    Microalbumin performed if applicable? NA      Is patient taking any over the counter medications    No   If yes, see medication list      Patient Self-Management Goal for Chronic Condition  Goal: I will schedule the recommended follow up visit when leaving the office today, and agree to keep the appointment or to reschedule when I call to cancel.   Barriers to success: none  Plan for overcoming my barriers: N/A     Confidence: 10/10  Date goal set: 3/10/22  Date goal attained: \

## 2022-03-10 NOTE — TELEPHONE ENCOUNTER
Patient thought you were going to order more antibiotics after the phone visit. Do I need to call something in. NKA    Message sent to Dr Wilda Saldivar via perfect serve.

## 2022-03-10 NOTE — PROGRESS NOTES
Date: 3/10/2022    :    Semaj Silva is a 74 y.o.male who presents today for:  Chief Complaint   Patient presents with    COPD     6 day ck         HPI:     HPI     Still not feeling well , still coughing think yellow phlem. Finishing up Levaquin, and prednisone. Patient also taking Symbicort and albuterol.   He has been on diuretics before by Dr Bruce Vaca his cardiologist but patient stopped it as he urinated himself in the mall    BS still 140- 190      CurrentHome Medications:  Current Outpatient Medications   Medication Sig Dispense Refill    cefUROXime (CEFTIN) 500 MG tablet Take 1 tablet by mouth 2 times daily for 10 days 20 tablet 0    furosemide (LASIX) 20 MG tablet Take 1 tablet by mouth daily 30 tablet 0    Budeson-Glycopyrrol-Formoterol 160-9-4.8 MCG/ACT AERO 2 puff twice a day 2 each 0    clopidogrel (PLAVIX) 75 MG tablet       ipratropium-albuterol (DUONEB) 0.5-2.5 (3) MG/3ML SOLN nebulizer solution Inhale 3 mLs into the lungs every 6 hours as needed for Shortness of Breath 30 mL 1    predniSONE (DELTASONE) 10 MG tablet 2 tablets 2 times a day for 3 days then 1  Tablet 2 times a day for 3 days, then 1 tablet daily till gone 21 tablet 0    levoFLOXacin (LEVAQUIN) 500 MG tablet Take 1 tablet by mouth daily for 10 days 10 tablet 0    docusate sodium (COLACE) 100 MG capsule Take 1 capsule by mouth 2 times daily for 14 days 28 capsule 0    OXYGEN Inhale 3 L into the lungs as needed      losartan (COZAAR) 50 MG tablet Take 50 mg by mouth daily      metFORMIN (GLUCOPHAGE) 500 MG tablet TAKE 2 TABLETS BY MOUTH TWICE DAILY WITH MEALS 360 tablet 1    rOPINIRole (REQUIP) 1 MG tablet TAKE 1 TABLET BY MOUTH THREE TIMES DAILY 90 tablet 0    traZODone (DESYREL) 150 MG tablet TAKE 1/2 TO 1 TABLET BY MOUTH EVERY DAY AT BEDTIME AS NEEDED FOR SLEEP 90 tablet 0    glimepiride (AMARYL) 4 MG tablet TAKE 1 TABLET BY MOUTH TWICE DAILY 180 tablet 0    montelukast (SINGULAIR) 10 MG tablet Take 10 mg by mouth nightly      busPIRone (BUSPAR) 10 MG tablet TAKE 1 TABLET BY MOUTH THREE TIMES DAILY 270 tablet 1    sertraline (ZOLOFT) 100 MG tablet TAKE 1 TABLET BY MOUTH TWICE DAILY 180 tablet 1    fluticasone (FLONASE) 50 MCG/ACT nasal spray SHAKE LIQUID AND USE 1 SPRAY IN EACH NOSTRIL DAILY 16 g 11    VENTOLIN  (90 Base) MCG/ACT inhaler INHALE 2 PUFFS INTO THE LUNGS EVERY 6 HOURS AS NEEDED FOR WHEEZING 1 each 11    budesonide-formoterol (SYMBICORT) 160-4.5 MCG/ACT AERO Inhale 2 puffs into the lungs 2 times daily 3 Inhaler 1    albuterol sulfate (PROAIR RESPICLICK) 793 (90 Base) MCG/ACT aerosol powder inhalation Inhale into the lungs every 4 hours as needed for Wheezing or Shortness of Breath      atorvastatin (LIPITOR) 20 MG tablet daily       pantoprazole (PROTONIX) 40 MG tablet Take 40 mg by mouth 2 times daily       ranolazine (RANEXA) 500 MG extended release tablet Take 500 mg by mouth 2 times daily      EPINEPHrine (EPIPEN 2-HUBER) 0.3 MG/0.3ML SOAJ injection Inject one pen as directed STAT for allergic reaction, may disp generic NDC 33845-147-18 6 each 99    baclofen (LIORESAL) 10 MG tablet 2 times daily as needed   0    Omega-3 Fatty Acids (FISH OIL) 1000 MG CAPS Take 1,000 mg by mouth daily. No current facility-administered medications for this visit. Subjective:      Review of Systems   Constitutional: Negative for appetite change, chills, diaphoresis, fatigue and fever. HENT: Negative for congestion, ear discharge, ear pain, postnasal drip, rhinorrhea, sinus pressure, sore throat, trouble swallowing and voice change. Respiratory: Positive for cough and shortness of breath. Negative for chest tightness and wheezing. Cardiovascular: Negative for chest pain, palpitations and leg swelling. Gastrointestinal: Negative for abdominal pain, constipation, diarrhea, nausea and vomiting.    Musculoskeletal: Negative for arthralgias, back pain, joint swelling, myalgias, neck pain and neck stiffness. Skin: Negative for rash. Neurological: Negative for dizziness, syncope, weakness, light-headedness, numbness and headaches. Objective:     /68 (Site: Right Upper Arm, Position: Sitting, Cuff Size: Large Adult)   Pulse 72   Temp 97.8 °F (36.6 °C)   Resp 24   Ht 5' 8\" (1.727 m)   Wt 225 lb (102.1 kg)   BMI 34.21 kg/m²   BP Readings from Last 3 Encounters:   03/10/22 124/68   03/03/22 128/70   02/26/22 (!) 162/70     Wt Readings from Last 3 Encounters:   03/10/22 225 lb (102.1 kg)   03/03/22 232 lb 6 oz (105.4 kg)   02/25/22 237 lb 9.6 oz (107.8 kg)       Physical Exam  Vitals reviewed. Constitutional:       Appearance: He is well-developed. HENT:      Head: Normocephalic and atraumatic. Right Ear: External ear normal.      Left Ear: External ear normal.      Nose: Nose normal.   Eyes:      General: No scleral icterus. Conjunctiva/sclera: Conjunctivae normal.      Pupils: Pupils are equal, round, and reactive to light. Neck:      Thyroid: No thyromegaly. Vascular: No JVD. Cardiovascular:      Rate and Rhythm: Normal rate and regular rhythm. Heart sounds: No murmur heard. No friction rub. Pulmonary:      Effort: Pulmonary effort is normal.      Breath sounds: Normal breath sounds. No wheezing or rales. Chest:      Chest wall: No tenderness. Abdominal:      General: Bowel sounds are normal.      Palpations: Abdomen is soft. There is no mass. Tenderness: There is no abdominal tenderness. Musculoskeletal:      Cervical back: Normal range of motion and neck supple. Lymphadenopathy:      Cervical: No cervical adenopathy. Skin:     Findings: No rash. Neurological:      Mental Status: He is alert and oriented to person, place, and time. Psychiatric:         Behavior: Behavior is cooperative. Assessment:         Diagnosis Orders   1. COPD with acute exacerbation ([facility] [address]     2.  Moderate COPD (chronic obstructive pulmonary disease) ([facility] [address] 3. Chronic congestive heart failure, unspecified heart failure type ([facility] [address]     4. Acute on chronic systolic heart failure (HCC)  furosemide (LASIX) 20 MG tablet    Magnesium    Basic Metabolic Panel       :      Medications Prescribed:  Orders Placed This Encounter   Medications    cefUROXime (CEFTIN) 500 MG tablet     Sig: Take 1 tablet by mouth 2 times daily for 10 days     Dispense:  20 tablet     Refill:  0    cefTRIAXone (ROCEPHIN) injection 1,000 mg     Order Specific Question:   Antimicrobial Indications     Answer:   COPD Exacerbation    furosemide (LASIX) 20 MG tablet     Sig: Take 1 tablet by mouth daily     Dispense:  30 tablet     Refill:  0    Budeson-Glycopyrrol-Formoterol 160-9-4.8 MCG/ACT AERO     Si puff twice a day     Dispense:  2 each     Refill:  0     Orders Placed:  Orders Placed This Encounter   Procedures    Magnesium     Standing Status:   Future     Standing Expiration Date:   3/10/2023    Basic Metabolic Panel     Standing Status:   Future     Standing Expiration Date:   3/10/2023       Lab Results   Component Value Date    LABA1C 7.0 (A) 2022    LABA1C 6.9 (A) 10/29/2021    LABA1C 7.2 (A) 2021     Lab Results   Component Value Date    GLUF 147 (H) 2020    LABMICR 2.74 2016    LDLCALC 56 2022    CREATININE 1.1 2022       Continue blood sugar check 1 times a day. Results of Laboratory tests taken 3/3 22 were reviewed with the patient. Results were w/in  acceptable range except for the BNP of . Patient was given samples of Breztri 2 puffs twice a day in place of his Symbicort. Patient was encouraged to follow-up with Francois Mcwilliams as a scheduled on 3/22/2022. Discussed about congestive heart failure including electrolyte and potassium disorder    Return in about 4 weeks (around 2022) for Dr Amaris Rosario. Discussed use, benefit, and side effects of prescribedmedications. All patient questions answered. Pt voiced understanding. Instructedto continue current medications, diet and exercise. Patient agreed with treatmentplan.

## 2022-03-10 NOTE — PROGRESS NOTES
After obtaining consent, and per orders of Dr. Alfredito Madrid injection of Rocephin 1,000mg  given in Right upper quad. gluteus by Albaro Pereira MA. Patient instructed to remain in clinic for 20 minutes afterwards, and to report any adverse reaction to me immediately.

## 2022-03-11 RX ORDER — CIPROFLOXACIN 500 MG/1
500 TABLET, FILM COATED ORAL 2 TIMES DAILY
Qty: 10 TABLET | Refills: 0 | Status: SHIPPED | OUTPATIENT
Start: 2022-03-11 | End: 2022-03-16

## 2022-03-11 NOTE — TELEPHONE ENCOUNTER
Patient stated he seen the PCP and was already started on antibiotics. He no longer needs the cipro. Dr Rola Cordobave aware and cipro cancelled at [employer]. Candace Snyder voiced understanding.

## 2022-03-14 ENCOUNTER — TELEPHONE (OUTPATIENT)
Dept: UROLOGY | Age: 67
End: 2022-03-14

## 2022-03-14 DIAGNOSIS — R31.0 GROSS HEMATURIA: Primary | ICD-10-CM

## 2022-03-14 NOTE — TELEPHONE ENCOUNTER
Sammi Diaz on [employer] advised to give the urine specimen to the lab and to hold the plavix for 72 hours if ok with cardiology. She will call Dr Vale Marroquin to check if plavix may be held and voiced understanding if unable to urinate to call the office or be evaluated in the ER/urgenct care.

## 2022-03-14 NOTE — TELEPHONE ENCOUNTER
Patient c/o hematuria that started 3 days ago that is cherry red. He denies clots or fever. C/o chills and burning at the end of the stream and urgency. He is on ceftin for respiratory infection. He underwent Cystoscopy, Greenlight Photovaporization of Prostate on 02/25/2022 with Dr Marco Joseph. He is taking plavix for heart stent. He has a follow up on 04/08/2022. Please advise.  Thank you

## 2022-03-14 NOTE — TELEPHONE ENCOUNTER
Check Ux  Increase fluids  Assume he cant hold Plavix, but hold for 72 hrs if able    If unable to void, will need to be seen in office or ER for mcgraw and clot evacuation

## 2022-03-16 ENCOUNTER — NURSE ONLY (OUTPATIENT)
Dept: ALLERGY | Age: 67
End: 2022-03-16
Payer: MEDICARE

## 2022-03-16 ENCOUNTER — HOSPITAL ENCOUNTER (OUTPATIENT)
Age: 67
Discharge: HOME | End: 2022-03-16
Payer: MEDICARE

## 2022-03-16 VITALS
OXYGEN SATURATION: 97 % | HEART RATE: 92 BPM | SYSTOLIC BLOOD PRESSURE: 122 MMHG | RESPIRATION RATE: 16 BRPM | DIASTOLIC BLOOD PRESSURE: 78 MMHG | TEMPERATURE: 97.3 F

## 2022-03-16 DIAGNOSIS — Z51.6 ENCOUNTER FOR DESENSITIZATION TO POLLEN ALLERGEN: ICD-10-CM

## 2022-03-16 DIAGNOSIS — Z91.09 ENCOUNTER FOR DESENSITIZATION TO POLLEN ALLERGEN: ICD-10-CM

## 2022-03-16 DIAGNOSIS — J30.81 ALLERGY TO DOG DANDER: Primary | ICD-10-CM

## 2022-03-16 DIAGNOSIS — J30.81 CAT ALLERGIES: ICD-10-CM

## 2022-03-16 DIAGNOSIS — I50.23 ACUTE ON CHRONIC SYSTOLIC HEART FAILURE (HCC): ICD-10-CM

## 2022-03-16 DIAGNOSIS — R31.0 GROSS HEMATURIA: ICD-10-CM

## 2022-03-16 LAB
ALT SERPL-CCNC: 29 U/L (ref 11–66)
ANION GAP SERPL CALCULATED.3IONS-SCNC: 12 MEQ/L (ref 8–16)
AST SERPL-CCNC: 21 U/L (ref 5–40)
BUN BLDV-MCNC: 36 MG/DL (ref 7–22)
CALCIUM SERPL-MCNC: 9.7 MG/DL (ref 8.5–10.5)
CHLORIDE BLD-SCNC: 105 MEQ/L (ref 98–111)
CHOLESTEROL, TOTAL: 114 MG/DL (ref 100–199)
CO2: 21 MEQ/L (ref 23–33)
CREAT SERPL-MCNC: 1.7 MG/DL (ref 0.4–1.2)
GFR SERPL CREATININE-BSD FRML MDRD: 40 ML/MIN/1.73M2
GLUCOSE BLD-MCNC: 199 MG/DL (ref 70–108)
HDLC SERPL-MCNC: 37 MG/DL
LDL CHOLESTEROL CALCULATED: 44 MG/DL
MAGNESIUM: 1.4 MG/DL (ref 1.6–2.4)
POTASSIUM SERPL-SCNC: 5.8 MEQ/L (ref 3.5–5.2)
SODIUM BLD-SCNC: 138 MEQ/L (ref 135–145)
TRIGL SERPL-MCNC: 166 MG/DL (ref 0–199)

## 2022-03-16 PROCEDURE — 80048 BASIC METABOLIC PNL TOTAL CA: CPT

## 2022-03-16 PROCEDURE — 36415 COLL VENOUS BLD VENIPUNCTURE: CPT

## 2022-03-16 PROCEDURE — 83735 ASSAY OF MAGNESIUM: CPT

## 2022-03-16 PROCEDURE — 87077 CULTURE AEROBIC IDENTIFY: CPT

## 2022-03-16 PROCEDURE — 84460 ALANINE AMINO (ALT) (SGPT): CPT

## 2022-03-16 PROCEDURE — 84450 TRANSFERASE (AST) (SGOT): CPT

## 2022-03-16 PROCEDURE — 87086 URINE CULTURE/COLONY COUNT: CPT

## 2022-03-16 PROCEDURE — 95117 IMMUNOTHERAPY INJECTIONS: CPT | Performed by: NURSE PRACTITIONER

## 2022-03-16 PROCEDURE — 80061 LIPID PANEL: CPT

## 2022-03-16 PROCEDURE — 87186 SC STD MICRODIL/AGAR DIL: CPT

## 2022-03-16 NOTE — PROGRESS NOTES
After consent obtained/verified, allergy injection given in back of R/L arm(s). VIAL COLOR OF ALL VIALS TODAY IS RED MAINTENANCE    ALLERGY INJECTION FROM VIAL A GIVEN left  UPPER ARM IN THE AMOUNT OF 0.35 ML    ALLERGY INJECTION FROM VIAL B GIVEN right upper ARM IN THE AMOUNT OF 0.35 ML      Documentation of vial injection specific to arm(s) noted on Allergy Immunotherapy Administration Form. Patient waited 30 minutes for observation. No      Patient tolerated well without adverse reaction WHILE IN OFFICE    SHOT REACTION TREATMENT INSTRUCTIONS    During the 30 minute wait after an allergy injection the following symptoms should be reported:    Itching other than at the injection site  Hives or swelling other than at the injection site  Redness other than at the injection site  Difficulty breathing  Chest tightness  Difficulty swallowing  Throat tightness    If these symptoms occur, NOTIFY PROVIDER and the following treatment should be administered:    1. Epinephrine/Auvi Q 1:1000 IM - 0.3 ml if > 66 lbs or more, 0.15 ml if 33 - 63 lbs, or 0.1 ml if <33 lbs     2. Diphenhydramine - give all intramuscular:     2 to <6 years (off-label use): 6.25 mg,    6 to <12 years: 12.5 to 25 mg;    ?12 years: 25-50 mg.    3.  Famotidine:  Adults 40 mg oral    Adolescents age 12 years and >88 lbs: 40 mg    Children and Adolescents ? 12years of age: Initial: 0.25 mg/kg/dose  every 12 hours (maximum daily dose: 40 mg/day)    Epi/Auvi Q dose may me repeated in 5-15 minutes if adequate resolution of symptoms does not occur    Patient should be observed for at least one hour after final Epi/Auvi Q dose and must be seen by provider. Patients cannot drive themselves if they have received diphenhydramine.

## 2022-03-17 ENCOUNTER — TELEPHONE (OUTPATIENT)
Dept: FAMILY MEDICINE CLINIC | Age: 67
End: 2022-03-17

## 2022-03-17 DIAGNOSIS — R89.9 ABNORMAL LABORATORY TEST: Primary | ICD-10-CM

## 2022-03-17 NOTE — TELEPHONE ENCOUNTER
----- Message from Kinjal Hills MD sent at 3/16/2022  6:40 PM EDT -----  Please let him know that his potassium was high and his kidney function is decreased.    Please tel him to increase his fluid intake and hold his Metformin and we will repeat a bmp on Friday early AM

## 2022-03-18 ENCOUNTER — TELEPHONE (OUTPATIENT)
Dept: FAMILY MEDICINE CLINIC | Age: 67
End: 2022-03-18

## 2022-03-18 ENCOUNTER — HOSPITAL ENCOUNTER (OUTPATIENT)
Age: 67
Discharge: HOME | End: 2022-03-18
Payer: MEDICARE

## 2022-03-18 DIAGNOSIS — R89.9 ABNORMAL LABORATORY TEST: ICD-10-CM

## 2022-03-18 LAB
ANION GAP SERPL CALCULATED.3IONS-SCNC: 14 MEQ/L (ref 8–16)
BUN BLDV-MCNC: 23 MG/DL (ref 7–22)
CALCIUM SERPL-MCNC: 9.5 MG/DL (ref 8.5–10.5)
CHLORIDE BLD-SCNC: 106 MEQ/L (ref 98–111)
CO2: 23 MEQ/L (ref 23–33)
CREAT SERPL-MCNC: 1.3 MG/DL (ref 0.4–1.2)
GFR SERPL CREATININE-BSD FRML MDRD: 55 ML/MIN/1.73M2
GLUCOSE BLD-MCNC: 172 MG/DL (ref 70–108)
POTASSIUM SERPL-SCNC: 5.9 MEQ/L (ref 3.5–5.2)
SODIUM BLD-SCNC: 143 MEQ/L (ref 135–145)

## 2022-03-18 PROCEDURE — 80048 BASIC METABOLIC PNL TOTAL CA: CPT

## 2022-03-18 PROCEDURE — 36415 COLL VENOUS BLD VENIPUNCTURE: CPT

## 2022-03-18 NOTE — TELEPHONE ENCOUNTER
Kenisha Gamez at [facility] lab stated the sensitivities are in process. He still has hematuria that is watermelon to cherry red. He occasionally passes clots. C/o burning with urination and has incontinence. He sees Dr Nick Pinto on Tuesday and will check then if the plavix may be held. He is taking the ceftin and has 7 doses left. He denies fever. C/o occasional nausea and his balance is off.

## 2022-03-18 NOTE — TELEPHONE ENCOUNTER
Pt said that he use to eat a banana every morning but all he eats now is chicken noodle soup. Sometimes fried eggs but nothing really tastes good to him. Dr Solomon pennington called and told him he has a internal infection. They told him to finish the ATB and they will call him back. He drinks four 16 oz bottles of water a day. He said that he felt like he was dying. He has nausea and decreased appetite.

## 2022-03-18 NOTE — TELEPHONE ENCOUNTER
Please let him know that I want him to go to Er to be seen as he is not feeling better and he has been on antibiotics for few days now.

## 2022-03-18 NOTE — TELEPHONE ENCOUNTER
----- Message from Gwyn Garcia MD sent at 3/18/2022  4:01 PM EDT -----  Please verify if he is taking any potassium supplements or any MV?

## 2022-03-18 NOTE — TELEPHONE ENCOUNTER
How are symptoms?   Has mild infection in Ua  Can we get sensitivities  Ceftin might cover bacteria present

## 2022-03-19 LAB
ORGANISM: ABNORMAL
URINE CULTURE, ROUTINE: ABNORMAL

## 2022-03-21 RX ORDER — DOXYCYCLINE HYCLATE 100 MG
100 TABLET ORAL 2 TIMES DAILY
Qty: 10 TABLET | Refills: 0 | Status: ON HOLD | OUTPATIENT
Start: 2022-03-21 | End: 2022-03-25 | Stop reason: HOSPADM

## 2022-03-21 NOTE — TELEPHONE ENCOUNTER
I hope he is doing better since he did not to go to the hospital.   I noticed that urology changed his antibiotic today. Please tell him to make sure he does start the antibiotic.

## 2022-03-21 NOTE — TELEPHONE ENCOUNTER
Becki Salcido advised of the urine results and doxycycline was sent to the pharmacy. She voiced understanding.

## 2022-03-21 NOTE — TELEPHONE ENCOUNTER
Robin Torres called and said that the pt did not go to the ER as he wanted to see his heart Dr first as he is at [facility]. He was afraid of going in and then not being able to see him if he does go in to the hospital and admitted.  He will see his hert Dr tomorrow and then go to the hospital.

## 2022-03-22 ENCOUNTER — APPOINTMENT (OUTPATIENT)
Dept: GENERAL RADIOLOGY | Age: 67
DRG: 863 | End: 2022-03-22
Payer: MEDICARE

## 2022-03-22 ENCOUNTER — HOSPITAL ENCOUNTER (INPATIENT)
Age: 67
LOS: 3 days | Discharge: HOME | DRG: 863 | End: 2022-03-25
Attending: EMERGENCY MEDICINE | Admitting: EMERGENCY MEDICINE
Payer: MEDICARE

## 2022-03-22 DIAGNOSIS — N41.0 ACUTE PROSTATITIS: Primary | ICD-10-CM

## 2022-03-22 PROBLEM — J95.89 RESPIRATORY DISTRESS FOLLOWING SURGERY: Status: RESOLVED | Noted: 2022-02-25 | Resolved: 2022-03-22

## 2022-03-22 PROBLEM — Z90.79 S/P TRANSURETHRAL RESECTION OF PROSTATE: Status: ACTIVE | Noted: 2022-02-25

## 2022-03-22 PROBLEM — Z90.79 S/P TURP (STATUS POST TRANSURETHRAL RESECTION OF PROSTATE): Status: ACTIVE | Noted: 2022-03-22

## 2022-03-22 PROBLEM — N39.0 UTI (URINARY TRACT INFECTION): Status: ACTIVE | Noted: 2022-03-22

## 2022-03-22 PROBLEM — R06.03 RESPIRATORY DISTRESS FOLLOWING SURGERY: Status: RESOLVED | Noted: 2022-02-25 | Resolved: 2022-03-22

## 2022-03-22 PROBLEM — Z99.81 DEPENDENCE ON SUPPLEMENTAL OXYGEN: Status: ACTIVE | Noted: 2022-03-22

## 2022-03-22 PROBLEM — Z90.79 S/P TURP (STATUS POST TRANSURETHRAL RESECTION OF PROSTATE): Status: RESOLVED | Noted: 2022-03-22 | Resolved: 2022-03-22

## 2022-03-22 PROBLEM — I95.9 HYPOTENSION: Status: ACTIVE | Noted: 2022-03-22

## 2022-03-22 LAB
ANION GAP SERPL CALCULATED.3IONS-SCNC: 15 MEQ/L (ref 8–16)
BACTERIA: ABNORMAL /HPF
BACTERIA: ABNORMAL /HPF
BASOPHILS # BLD: 0.3 %
BASOPHILS ABSOLUTE: 0 THOU/MM3 (ref 0–0.1)
BILIRUBIN URINE: NEGATIVE
BILIRUBIN URINE: NEGATIVE
BLOOD, URINE: ABNORMAL
BLOOD, URINE: ABNORMAL
BUN BLDV-MCNC: 21 MG/DL (ref 7–22)
CALCIUM SERPL-MCNC: 9.3 MG/DL (ref 8.5–10.5)
CASTS 2: ABNORMAL /LPF
CASTS 2: PRESENT /LPF
CASTS UA: ABNORMAL /LPF
CASTS UA: ABNORMAL /LPF
CHARACTER, URINE: ABNORMAL
CHARACTER, URINE: ABNORMAL
CHLORIDE BLD-SCNC: 103 MEQ/L (ref 98–111)
CO2: 21 MEQ/L (ref 23–33)
COLOR: ABNORMAL
COLOR: YELLOW
CREAT SERPL-MCNC: 1.4 MG/DL (ref 0.4–1.2)
CRYSTALS, UA: ABNORMAL
CRYSTALS, UA: ABNORMAL
D-DIMER QUANTITATIVE: 404 NG/ML FEU (ref 0–500)
EKG ATRIAL RATE: 83 BPM
EKG P AXIS: 9 DEGREES
EKG P-R INTERVAL: 180 MS
EKG Q-T INTERVAL: 382 MS
EKG QRS DURATION: 120 MS
EKG QTC CALCULATION (BAZETT): 448 MS
EKG R AXIS: -137 DEGREES
EKG T AXIS: 35 DEGREES
EKG VENTRICULAR RATE: 83 BPM
EOSINOPHIL # BLD: 1.5 %
EOSINOPHILS ABSOLUTE: 0.2 THOU/MM3 (ref 0–0.4)
EPITHELIAL CELLS, UA: ABNORMAL /HPF
EPITHELIAL CELLS, UA: ABNORMAL /HPF
ERYTHROCYTE [DISTWIDTH] IN BLOOD BY AUTOMATED COUNT: 14.4 % (ref 11.5–14.5)
ERYTHROCYTE [DISTWIDTH] IN BLOOD BY AUTOMATED COUNT: 48.9 FL (ref 35–45)
GFR SERPL CREATININE-BSD FRML MDRD: 50 ML/MIN/1.73M2
GLUCOSE BLD-MCNC: 160 MG/DL (ref 70–108)
GLUCOSE BLD-MCNC: 169 MG/DL (ref 70–108)
GLUCOSE URINE: NEGATIVE MG/DL
GLUCOSE URINE: NEGATIVE MG/DL
HCT VFR BLD CALC: 49.8 % (ref 42–52)
HEMOGLOBIN: 16.2 GM/DL (ref 14–18)
IMMATURE GRANS (ABS): 0.06 THOU/MM3 (ref 0–0.07)
IMMATURE GRANULOCYTES: 0.5 %
KETONES, URINE: NEGATIVE
KETONES, URINE: NEGATIVE
LACTIC ACID: 1.6 MMOL/L (ref 0.5–2)
LEUKOCYTE ESTERASE, URINE: ABNORMAL
LEUKOCYTE ESTERASE, URINE: ABNORMAL
LYMPHOCYTES # BLD: 17.4 %
LYMPHOCYTES ABSOLUTE: 2 THOU/MM3 (ref 1–4.8)
MAGNESIUM: 1.2 MG/DL (ref 1.6–2.4)
MCH RBC QN AUTO: 30 PG (ref 26–33)
MCHC RBC AUTO-ENTMCNC: 32.5 GM/DL (ref 32.2–35.5)
MCV RBC AUTO: 92.2 FL (ref 80–94)
MISCELLANEOUS 2: ABNORMAL
MISCELLANEOUS 2: ABNORMAL
MONOCYTES # BLD: 5.4 %
MONOCYTES ABSOLUTE: 0.6 THOU/MM3 (ref 0.4–1.3)
NITRITE, URINE: NEGATIVE
NITRITE, URINE: NEGATIVE
NUCLEATED RED BLOOD CELLS: 0 /100 WBC
OSMOLALITY CALCULATION: 284.4 MOSMOL/KG (ref 275–300)
PH UA: 5 (ref 5–9)
PH UA: 5 (ref 5–9)
PLATELET # BLD: 138 THOU/MM3 (ref 130–400)
PMV BLD AUTO: 12.7 FL (ref 9.4–12.4)
POTASSIUM REFLEX MAGNESIUM: 4.8 MEQ/L (ref 3.5–5.2)
PRO-BNP: 138.9 PG/ML (ref 0–900)
PROCALCITONIN: 0.1 NG/ML (ref 0.01–0.09)
PROTEIN UA: ABNORMAL
PROTEIN UA: NEGATIVE
RBC # BLD: 5.4 MILL/MM3 (ref 4.7–6.1)
RBC URINE: ABNORMAL /HPF
RBC URINE: ABNORMAL /HPF
RENAL EPITHELIAL, UA: ABNORMAL
RENAL EPITHELIAL, UA: ABNORMAL
SEG NEUTROPHILS: 74.9 %
SEGMENTED NEUTROPHILS ABSOLUTE COUNT: 8.6 THOU/MM3 (ref 1.8–7.7)
SODIUM BLD-SCNC: 139 MEQ/L (ref 135–145)
SPECIFIC GRAVITY, URINE: 1.02 (ref 1–1.03)
SPECIFIC GRAVITY, URINE: 1.02 (ref 1–1.03)
TROPONIN T: < 0.01 NG/ML
UROBILINOGEN, URINE: 0.2 EU/DL (ref 0–1)
UROBILINOGEN, URINE: 0.2 EU/DL (ref 0–1)
WBC # BLD: 11.5 THOU/MM3 (ref 4.8–10.8)
WBC UA: ABNORMAL /HPF
WBC UA: ABNORMAL /HPF
YEAST: ABNORMAL
YEAST: ABNORMAL

## 2022-03-22 PROCEDURE — 85379 FIBRIN DEGRADATION QUANT: CPT

## 2022-03-22 PROCEDURE — 84145 PROCALCITONIN (PCT): CPT

## 2022-03-22 PROCEDURE — 2580000003 HC RX 258: Performed by: EMERGENCY MEDICINE

## 2022-03-22 PROCEDURE — 2140000000 HC CCU INTERMEDIATE R&B

## 2022-03-22 PROCEDURE — 71046 X-RAY EXAM CHEST 2 VIEWS: CPT

## 2022-03-22 PROCEDURE — 83735 ASSAY OF MAGNESIUM: CPT

## 2022-03-22 PROCEDURE — 83880 ASSAY OF NATRIURETIC PEPTIDE: CPT

## 2022-03-22 PROCEDURE — 6360000002 HC RX W HCPCS: Performed by: EMERGENCY MEDICINE

## 2022-03-22 PROCEDURE — 36415 COLL VENOUS BLD VENIPUNCTURE: CPT

## 2022-03-22 PROCEDURE — 2700000000 HC OXYGEN THERAPY PER DAY

## 2022-03-22 PROCEDURE — 84484 ASSAY OF TROPONIN QUANT: CPT

## 2022-03-22 PROCEDURE — 94640 AIRWAY INHALATION TREATMENT: CPT

## 2022-03-22 PROCEDURE — 6370000000 HC RX 637 (ALT 250 FOR IP): Performed by: EMERGENCY MEDICINE

## 2022-03-22 PROCEDURE — 2500000003 HC RX 250 WO HCPCS: Performed by: EMERGENCY MEDICINE

## 2022-03-22 PROCEDURE — 87086 URINE CULTURE/COLONY COUNT: CPT

## 2022-03-22 PROCEDURE — 85025 COMPLETE CBC W/AUTO DIFF WBC: CPT

## 2022-03-22 PROCEDURE — 87040 BLOOD CULTURE FOR BACTERIA: CPT

## 2022-03-22 PROCEDURE — 83605 ASSAY OF LACTIC ACID: CPT

## 2022-03-22 PROCEDURE — 80048 BASIC METABOLIC PNL TOTAL CA: CPT

## 2022-03-22 PROCEDURE — 93005 ELECTROCARDIOGRAM TRACING: CPT | Performed by: EMERGENCY MEDICINE

## 2022-03-22 PROCEDURE — 94760 N-INVAS EAR/PLS OXIMETRY 1: CPT

## 2022-03-22 PROCEDURE — 82948 REAGENT STRIP/BLOOD GLUCOSE: CPT

## 2022-03-22 PROCEDURE — 81001 URINALYSIS AUTO W/SCOPE: CPT

## 2022-03-22 PROCEDURE — 99283 EMERGENCY DEPT VISIT LOW MDM: CPT

## 2022-03-22 RX ORDER — BUSPIRONE HYDROCHLORIDE 10 MG/1
10 TABLET ORAL 3 TIMES DAILY
Status: DISCONTINUED | OUTPATIENT
Start: 2022-03-22 | End: 2022-03-25 | Stop reason: HOSPADM

## 2022-03-22 RX ORDER — POLYETHYLENE GLYCOL 3350 17 G/17G
17 POWDER, FOR SOLUTION ORAL DAILY PRN
Status: DISCONTINUED | OUTPATIENT
Start: 2022-03-22 | End: 2022-03-25 | Stop reason: HOSPADM

## 2022-03-22 RX ORDER — SODIUM CHLORIDE 9 MG/ML
INJECTION, SOLUTION INTRAVENOUS CONTINUOUS
Status: DISCONTINUED | OUTPATIENT
Start: 2022-03-22 | End: 2022-03-25

## 2022-03-22 RX ORDER — ALBUTEROL SULFATE 2.5 MG/3ML
2.5 SOLUTION RESPIRATORY (INHALATION) EVERY 6 HOURS PRN
Status: DISCONTINUED | OUTPATIENT
Start: 2022-03-22 | End: 2022-03-25 | Stop reason: HOSPADM

## 2022-03-22 RX ORDER — BUDESONIDE AND FORMOTEROL FUMARATE DIHYDRATE 160; 4.5 UG/1; UG/1
2 AEROSOL RESPIRATORY (INHALATION) 2 TIMES DAILY
Status: DISCONTINUED | OUTPATIENT
Start: 2022-03-22 | End: 2022-03-25 | Stop reason: HOSPADM

## 2022-03-22 RX ORDER — DEXTROSE MONOHYDRATE 25 G/50ML
12.5 INJECTION, SOLUTION INTRAVENOUS PRN
Status: DISCONTINUED | OUTPATIENT
Start: 2022-03-22 | End: 2022-03-22 | Stop reason: CLARIF

## 2022-03-22 RX ORDER — SODIUM CHLORIDE 0.9 % (FLUSH) 0.9 %
5-40 SYRINGE (ML) INJECTION PRN
Status: DISCONTINUED | OUTPATIENT
Start: 2022-03-22 | End: 2022-03-25 | Stop reason: HOSPADM

## 2022-03-22 RX ORDER — ONDANSETRON 4 MG/1
4 TABLET, ORALLY DISINTEGRATING ORAL EVERY 8 HOURS PRN
Status: DISCONTINUED | OUTPATIENT
Start: 2022-03-22 | End: 2022-03-25 | Stop reason: HOSPADM

## 2022-03-22 RX ORDER — CETIRIZINE HYDROCHLORIDE 10 MG/1
TABLET ORAL
COMMUNITY
Start: 2022-03-09

## 2022-03-22 RX ORDER — MAGNESIUM SULFATE IN WATER 40 MG/ML
2000 INJECTION, SOLUTION INTRAVENOUS PRN
Status: DISCONTINUED | OUTPATIENT
Start: 2022-03-22 | End: 2022-03-25 | Stop reason: HOSPADM

## 2022-03-22 RX ORDER — IPRATROPIUM BROMIDE AND ALBUTEROL SULFATE 2.5; .5 MG/3ML; MG/3ML
1 SOLUTION RESPIRATORY (INHALATION) EVERY 6 HOURS PRN
Status: DISCONTINUED | OUTPATIENT
Start: 2022-03-22 | End: 2022-03-22 | Stop reason: CLARIF

## 2022-03-22 RX ORDER — DEXTROSE MONOHYDRATE 50 MG/ML
100 INJECTION, SOLUTION INTRAVENOUS PRN
Status: DISCONTINUED | OUTPATIENT
Start: 2022-03-22 | End: 2022-03-25 | Stop reason: HOSPADM

## 2022-03-22 RX ORDER — SODIUM CHLORIDE 0.9 % (FLUSH) 0.9 %
5-40 SYRINGE (ML) INJECTION EVERY 12 HOURS SCHEDULED
Status: DISCONTINUED | OUTPATIENT
Start: 2022-03-22 | End: 2022-03-25 | Stop reason: HOSPADM

## 2022-03-22 RX ORDER — FLUTICASONE PROPIONATE 50 MCG
1 SPRAY, SUSPENSION (ML) NASAL DAILY
Status: DISCONTINUED | OUTPATIENT
Start: 2022-03-23 | End: 2022-03-25 | Stop reason: HOSPADM

## 2022-03-22 RX ORDER — ATORVASTATIN CALCIUM 20 MG/1
20 TABLET, FILM COATED ORAL DAILY
Status: DISCONTINUED | OUTPATIENT
Start: 2022-03-22 | End: 2022-03-25 | Stop reason: HOSPADM

## 2022-03-22 RX ORDER — 0.9 % SODIUM CHLORIDE 0.9 %
1000 INTRAVENOUS SOLUTION INTRAVENOUS ONCE
Status: COMPLETED | OUTPATIENT
Start: 2022-03-22 | End: 2022-03-22

## 2022-03-22 RX ORDER — PANTOPRAZOLE SODIUM 40 MG/1
40 TABLET, DELAYED RELEASE ORAL 2 TIMES DAILY
Status: DISCONTINUED | OUTPATIENT
Start: 2022-03-22 | End: 2022-03-25 | Stop reason: HOSPADM

## 2022-03-22 RX ORDER — ACETAMINOPHEN 650 MG/1
650 SUPPOSITORY RECTAL EVERY 6 HOURS PRN
Status: DISCONTINUED | OUTPATIENT
Start: 2022-03-22 | End: 2022-03-25 | Stop reason: HOSPADM

## 2022-03-22 RX ORDER — ACETAMINOPHEN 325 MG/1
650 TABLET ORAL EVERY 6 HOURS PRN
Status: DISCONTINUED | OUTPATIENT
Start: 2022-03-22 | End: 2022-03-25 | Stop reason: HOSPADM

## 2022-03-22 RX ORDER — ONDANSETRON 2 MG/ML
4 INJECTION INTRAMUSCULAR; INTRAVENOUS EVERY 6 HOURS PRN
Status: DISCONTINUED | OUTPATIENT
Start: 2022-03-22 | End: 2022-03-25 | Stop reason: HOSPADM

## 2022-03-22 RX ORDER — MONTELUKAST SODIUM 10 MG/1
10 TABLET ORAL NIGHTLY
Status: DISCONTINUED | OUTPATIENT
Start: 2022-03-22 | End: 2022-03-25 | Stop reason: HOSPADM

## 2022-03-22 RX ORDER — NICOTINE POLACRILEX 4 MG
15 LOZENGE BUCCAL PRN
Status: DISCONTINUED | OUTPATIENT
Start: 2022-03-22 | End: 2022-03-22 | Stop reason: CLARIF

## 2022-03-22 RX ORDER — LOSARTAN POTASSIUM 50 MG/1
50 TABLET ORAL DAILY
Status: DISCONTINUED | OUTPATIENT
Start: 2022-03-22 | End: 2022-03-25 | Stop reason: HOSPADM

## 2022-03-22 RX ORDER — CLOPIDOGREL BISULFATE 75 MG/1
75 TABLET ORAL DAILY
Status: DISCONTINUED | OUTPATIENT
Start: 2022-03-23 | End: 2022-03-25 | Stop reason: HOSPADM

## 2022-03-22 RX ORDER — RANOLAZINE 500 MG/1
500 TABLET, EXTENDED RELEASE ORAL 2 TIMES DAILY
Status: DISCONTINUED | OUTPATIENT
Start: 2022-03-22 | End: 2022-03-25 | Stop reason: HOSPADM

## 2022-03-22 RX ORDER — SERTRALINE HYDROCHLORIDE 100 MG/1
100 TABLET, FILM COATED ORAL 2 TIMES DAILY
Status: DISCONTINUED | OUTPATIENT
Start: 2022-03-22 | End: 2022-03-25 | Stop reason: HOSPADM

## 2022-03-22 RX ORDER — ROPINIROLE 1 MG/1
1 TABLET, FILM COATED ORAL 3 TIMES DAILY
Status: DISCONTINUED | OUTPATIENT
Start: 2022-03-22 | End: 2022-03-25 | Stop reason: HOSPADM

## 2022-03-22 RX ORDER — SODIUM CHLORIDE 9 MG/ML
25 INJECTION, SOLUTION INTRAVENOUS PRN
Status: DISCONTINUED | OUTPATIENT
Start: 2022-03-22 | End: 2022-03-25 | Stop reason: HOSPADM

## 2022-03-22 RX ORDER — GLIPIZIDE 10 MG/1
10 TABLET ORAL
Status: DISCONTINUED | OUTPATIENT
Start: 2022-03-23 | End: 2022-03-25 | Stop reason: HOSPADM

## 2022-03-22 RX ADMIN — METFORMIN HYDROCHLORIDE 500 MG: 500 TABLET ORAL at 20:04

## 2022-03-22 RX ADMIN — ATORVASTATIN CALCIUM 20 MG: 20 TABLET, FILM COATED ORAL at 20:04

## 2022-03-22 RX ADMIN — RANOLAZINE 500 MG: 500 TABLET, FILM COATED, EXTENDED RELEASE ORAL at 20:04

## 2022-03-22 RX ADMIN — PANTOPRAZOLE SODIUM 40 MG: 40 TABLET, DELAYED RELEASE ORAL at 20:04

## 2022-03-22 RX ADMIN — SODIUM CHLORIDE: 9 INJECTION, SOLUTION INTRAVENOUS at 19:31

## 2022-03-22 RX ADMIN — ROPINIROLE HYDROCHLORIDE 1 MG: 1 TABLET, FILM COATED ORAL at 20:04

## 2022-03-22 RX ADMIN — SERTRALINE 100 MG: 100 TABLET, FILM COATED ORAL at 20:04

## 2022-03-22 RX ADMIN — SODIUM CHLORIDE 1000 ML: 9 INJECTION, SOLUTION INTRAVENOUS at 18:01

## 2022-03-22 RX ADMIN — BUSPIRONE HYDROCHLORIDE 10 MG: 10 TABLET ORAL at 20:04

## 2022-03-22 RX ADMIN — MONTELUKAST SODIUM 10 MG: 10 TABLET ORAL at 20:04

## 2022-03-22 RX ADMIN — CEFTRIAXONE 1000 MG: 10 INJECTION, POWDER, FOR SOLUTION INTRAVENOUS at 18:02

## 2022-03-22 RX ADMIN — ENOXAPARIN SODIUM 40 MG: 100 INJECTION SUBCUTANEOUS at 20:03

## 2022-03-22 RX ADMIN — BUDESONIDE AND FORMOTEROL FUMARATE DIHYDRATE 2 PUFF: 160; 4.5 AEROSOL RESPIRATORY (INHALATION) at 22:03

## 2022-03-22 ASSESSMENT — PAIN SCALES - GENERAL
PAINLEVEL_OUTOF10: 0

## 2022-03-22 NOTE — PROGRESS NOTES
Patient arrived per cart to 3B. Heart monitor applied and vitals taken. Admission paperwork completed. Explained to patient that [facility] is not responsible for any lost or stolen items. Patient verbalized understanding. Oriented to room and use of call light and bed controls. Patient denies pain or needs. No signs of distress noted. Bed locked & in low position, side-rails up x2. Call light in reach. Reminded patient to call nurse if any needs arise. 2 person skin assessment performed by this nurse and Azalea Celaya RN.

## 2022-03-22 NOTE — ED NOTES
Assumed care of pt at this  time, pt resting in bed in stable condition, denies any CP or SOB, reports felling a little lightheaded. Pt denies any needs, spouse at bedside.      Sundeep Stephens RN  03/22/22 [id number]

## 2022-03-22 NOTE — ED NOTES
ED to inpatient nurses report    Chief Complaint   Patient presents with    Hypotension      Present to ED from home  LOC: alert and orientated to name, place, date  Vital signs   Vitals:    03/22/22 1139 03/22/22 1336 03/22/22 1451 03/22/22 1456   BP: 127/71 (!) 115/102 92/66 98/61   Pulse: 83 79 76 77   Resp: 20  18 18   Temp: 98.7 °F (37.1 °C)      TempSrc: Oral      SpO2: 93%  90% 92%   Weight: 225 lb (102.1 kg)         Oxygen Baseline 2L NC during the day    Current needs required 2L NC Bipap/Cpap No  LDAs:   Peripheral IV 03/22/22 Left Forearm (Active)   Site Assessment Clean;Dry; Intact 03/22/22 1146   Line Status Blood return noted; Flushed;Specimen collected 03/22/22 1146   Dressing Status Clean;Dry; Intact 03/22/22 1146   Dressing Intervention New 03/22/22 1146     Mobility: Independent  Pending ED orders: Rocephin, NS bolus  Present condition: stable    Electronically signed by Casi Brunson RN on 3/22/2022 at 4:16 PM     Casi Brunson RN  03/22/22 9527

## 2022-03-22 NOTE — ED NOTES
Pt reports feeling OK at this time, resting in bed, denies any needs.      Young Vale RN  03/22/22 3882

## 2022-03-22 NOTE — H&P
Family Medicine Admit Notes/Coverage    Today's Date: 3/22/22  [id number]  Medical Record # [id number]  CSN/Account # [de-identified]      Mr. Tiana Beasley admitted on 3/22/2022    Patient is admitted due to   Chief Complaint   Patient presents with    Hypotension     Hypotensive x 4 days 84/53 at his cardiologist Dr Harley Tran at [facility]  + dysuria and hematuria since had TURP 2/25/22    Physical Exam:  Patient Vitals for the past 24 hrs:   BP Temp Temp src Pulse Resp SpO2 Height Weight   03/22/22 1625 (!) 132/91 98.4 °F (36.9 °C) Oral 87 18 96 % 5' 8\" (1.727 m) 227 lb 12.8 oz (103.3 kg)   03/22/22 1456 98/61 -- -- 77 18 92 % -- --   03/22/22 1451 92/66 -- -- 76 18 90 % -- --   03/22/22 1336 (!) 115/102 -- -- 79 -- -- -- --   03/22/22 1139 127/71 98.7 °F (37.1 °C) Oral 83 20 93 % -- 225 lb (102.1 kg)       General Appearance:  Alert, cooperative, no distress, appears stated age   [de-identified]:  Neck:      Chest/Lungs:  Heart: Decrease BS otherwise CTA  RRR   Abdomen:  Back: Globular soft  + tender low lumbar   Extremities:  Neurological Exam: No edema  WNL       Assessment:     Active Hospital Problems    Diagnosis Date Noted    Hypotension [I95.9] 03/22/2022     Priority: High    UTI (urinary tract infection) [N39.0] 03/22/2022     Priority: High    S/P TURP (status post transurethral resection of prostate) [Z90.79] 03/22/2022     Priority: High    Dependence on supplemental oxygen [Z99.81] 03/22/2022     Priority: Medium    COPD (chronic obstructive pulmonary disease) ([facility] [address] [J44.9] 02/25/2022     Priority: Medium    Hypertensive disorder [I10] 01/22/2021     Priority: Medium    Diabetes ([facility] [address] [E11.9]      Priority: Medium    Hyperlipidemia [E78.5]      Priority: Medium    Gastroesophageal reflux disease [K21.9]      Priority: Medium    PARRISH (obstructive sleep apnea) [G47.33]      Priority: Medium    Asthma [J45.909]      Priority: Medium    Class 2 severe obesity due to excess calories with serious comorbidity and body mass index (BMI) of 37.0 to 37.9 in adult [facility]) [E66.01, Z68.37] 08/13/2018    Moderate COPD (chronic obstructive pulmonary disease) (HCC) [J44.9]        Plan:     History and Physical been dictated    Discussed plans with the nursing staff  Rocephin.  IVF, Urine culture  Resume home meds including inhaler and nebulizer  Consult urology, ID  D-dimer ECHO                                                                                       Blake Gustafson M.D.

## 2022-03-22 NOTE — ED NOTES
Pt reports felling lightheaded at his time, O2 Saturation 89%-90%, pt placed on 2 L NC at this time. Pt denies any CP or SOB at this time. Pt instructed to keep both side rails up and call for help when getting out of bed.      Al oYrk RN  03/22/22 200 Josemanuel Nieves RN  03/22/22 5132

## 2022-03-22 NOTE — ED PROVIDER NOTES
[address]       Chief Complaint   Patient presents with    Hypotension       Nurses Notes reviewed and I agree except as noted in the HPI. HISTORY OF PRESENT ILLNESS    Yony Moss is a 74 y.o. male who presents with complaint of low blood pressure, patient was following up with his cardiologist who noted that his systolic blood pressures was in the 80s. Patient states that he is status post photo vaporization of his prostate, done on the 25th of last month. States that he has had hematuria, and frequent UTIs since the procedure. Patient states that he was told by his urologist that he is septic, he therefore, came to the ED for evaluation. Onset: Acute on subacute  Duration: Intermittent for the past 4 weeks  Timing: Intermittent  Location of Pain: Hematuria with dysuria  Intesity/severity: Mild to moderate  Modifying Factors:   Relieved by;  Previous Episodes; Tx Before arrival: None  REVIEW OF SYSTEMS      Review of Systems   Constitutional: Negative for fever, chills, diaphoresis and fatigue. HENT: Negative for congestion, drooling, facial swelling and sore throat. Eyes: Negative for photophobia, pain and discharge. Respiratory: Negative for cough, shortness of breath, wheezing and stridor. Cardiovascular: Negative for chest pain, palpitations and leg swelling. Gastrointestinal: Negative for abdominal pain, blood in stool and abdominal distention. Genitourinary: Positive for dysuria, urgency, hematuria and difficulty urinating. Musculoskeletal: Negative for gait problem, neck pain and neck stiffness. Skin; No rash, No itching  Neurological: Negative for seizures, weakness and numbness. Hematological: Negative for adenopathy. Does not bruise/bleed easily.      PAST MEDICAL HISTORY    has a past medical history of Acid reflux, Arthritis, Asthma, CAD (coronary artery disease), Chronic back pain, Class 2 severe obesity due to excess calories with serious comorbidity and body mass index (BMI) of 37.0 to 37.9 in adult [facility]), Colon polyps, COPD (chronic obstructive pulmonary disease) ([facility] [address] Depression, Diverticulosis, HTN (hypertension), Hyperlipidemia, Kidney disorder, Panic attack, Pneumonia, Rash, Recurrent upper respiratory infection (URI), Thumb amputation status, Thyroid disease, and Type II or unspecified type diabetes mellitus without mention of complication, not stated as uncontrolled. SURGICAL HISTORY      has a past surgical history that includes Cholecystectomy (yrs ago); back surgery (2002); hernia repair (2004); Carpal tunnel release (2011); Kidney stone surgery (2008?); Tonsillectomy (1985); Cardiac catheterization (07/02 08/05); Nasal septum surgery (01/07); Colonoscopy (11/06 02/12 1/14); Upper gastrointestinal endoscopy (1997); Vein Surgery (Left, September 2014); Rotator cuff repair (Left, 5-20-15); eye surgery; laryngoscopy (04/28/2016); back surgery (08/11/2017); Biceps tendon repair (Right, 08/16/2017); bronchoscopy; bronchoscopy (N/A, 4/5/2019); bronchoscopy (4/5/2019); Endoscopy, colon, diagnostic; skin biopsy; Coronary angioplasty with stent (02/2020); Uvulopalatopharygoplasty; and TURP (N/A, 2/25/2022).     CURRENT MEDICATIONS       Current Discharge Medication List      CONTINUE these medications which have NOT CHANGED    Details   cetirizine (ZYRTEC) 10 MG tablet TAKE 1 TABLET BY MOUTH DAILY      doxycycline hyclate (VIBRA-TABS) 100 MG tablet Take 1 tablet by mouth 2 times daily for 5 days  Qty: 10 tablet, Refills: 0      furosemide (LASIX) 20 MG tablet Take 1 tablet by mouth daily  Qty: 30 tablet, Refills: 0    Associated Diagnoses: Acute on chronic systolic heart failure (HCC)      Budeson-Glycopyrrol-Formoterol 160-9-4.8 MCG/ACT AERO 2 puff twice a day  Qty: 2 each, Refills: 0      clopidogrel (PLAVIX) 75 MG tablet Take 75 mg by mouth daily       ipratropium-albuterol (DUONEB) 0.5-2.5 (3) MG/3ML SOLN nebulizer solution Inhale 3 mLs into the lungs every 6 hours as needed for Shortness of Breath  Qty: 30 mL, Refills: 1      OXYGEN Inhale 3 L into the lungs as needed      losartan (COZAAR) 50 MG tablet Take 50 mg by mouth daily      metFORMIN (GLUCOPHAGE) 500 MG tablet TAKE 2 TABLETS BY MOUTH TWICE DAILY WITH MEALS  Qty: 360 tablet, Refills: 1    Comments: ZERO refills remain on this prescription. Your patient is requesting advance approval of refills for this medication to PREVENT ANY MISSED DOSES      rOPINIRole (REQUIP) 1 MG tablet TAKE 1 TABLET BY MOUTH THREE TIMES DAILY  Qty: 90 tablet, Refills: 0    Comments: ZERO refills remain on this prescription. Your patient is requesting advance approval of refills for this medication to [address]      traZODone (DESYREL) 150 MG tablet TAKE 1/2 TO 1 TABLET BY MOUTH EVERY DAY AT BEDTIME AS NEEDED FOR SLEEP  Qty: 90 tablet, Refills: 0    Comments: ZERO refills remain on this prescription. Your patient is requesting advance approval of refills for this medication to [address]      glimepiride (AMARYL) 4 MG tablet TAKE 1 TABLET BY MOUTH TWICE DAILY  Qty: 180 tablet, Refills: 0      montelukast (SINGULAIR) 10 MG tablet Take 10 mg by mouth nightly      busPIRone (BUSPAR) 10 MG tablet TAKE 1 TABLET BY MOUTH THREE TIMES DAILY  Qty: 270 tablet, Refills: 1      sertraline (ZOLOFT) 100 MG tablet TAKE 1 TABLET BY MOUTH TWICE DAILY  Qty: 180 tablet, Refills: 1      fluticasone (FLONASE) 50 MCG/ACT nasal spray SHAKE LIQUID AND USE 1 SPRAY IN EACH NOSTRIL DAILY  Qty: 16 g, Refills: 11    Associated Diagnoses: Hypertrophy of inferior nasal turbinate;  Allergic rhinitis, unspecified seasonality, unspecified trigger      VENTOLIN  (90 Base) MCG/ACT inhaler INHALE 2 PUFFS INTO THE LUNGS EVERY 6 HOURS AS NEEDED FOR WHEEZING  Qty: 1 each, Refills: 11    Associated Diagnoses: Severe persistent asthma without complication      budesonide-formoterol (SYMBICORT) 160-4.5 MCG/ACT AERO Inhale 2 puffs into the lungs 2 times daily  Qty: 3 Inhaler, Refills: 1      albuterol sulfate (PROAIR RESPICLICK) 252 (90 Base) MCG/ACT aerosol powder inhalation Inhale into the lungs every 4 hours as needed for Wheezing or Shortness of Breath      atorvastatin (LIPITOR) 20 MG tablet daily       pantoprazole (PROTONIX) 40 MG tablet Take 40 mg by mouth 2 times daily       ranolazine (RANEXA) 500 MG extended release tablet Take 500 mg by mouth 2 times daily      EPINEPHrine (EPIPEN 2-HUBER) 0.3 MG/0.3ML SOAJ injection Inject one pen as directed STAT for allergic reaction, may disp generic NDC 75619-633-01  Qty: 6 each, Refills: 99    Associated Diagnoses: Prophylactic immunotherapy      baclofen (LIORESAL) 10 MG tablet 2 times daily as needed   Refills: 0      Omega-3 Fatty Acids (FISH OIL) 1000 MG CAPS Take 1,000 mg by mouth daily. ALLERGIES     has No Known Allergies. FAMILY HISTORY     He indicated that his mother is . He indicated that his father is . He indicated that his sister is alive. He indicated that his brother is alive. family history includes Breast Cancer in his mother; Cancer in his mother; Diabetes in his brother; Heart Disease in his brother; High Blood Pressure in his brother; High Cholesterol in his brother; Stroke in his mother. SOCIAL HISTORY      reports that he quit smoking about 5 years ago. His smoking use included cigarettes. He started smoking about 50 years ago. He has a 84.00 pack-year smoking history. He has never used smokeless tobacco. He reports that he does not drink alcohol and does not use drugs. PHYSICAL EXAM     INITIAL VITALS:  height is 5' 8\" (1.727 m) and weight is 227 lb 12.8 oz (103.3 kg). His temperature is 97.7 °F (36.5 °C). His blood pressure is 111/68 and his pulse is 70. His respiration is 16 and oxygen saturation is 94%. Physical Exam   Constitutional:  well-developed and well-nourished.    HENT: Head: Normocephalic, atraumatic, Bilateral external ears normal, Oropharynx mosit, No oral exudates, Nose normal.   Eyes: PERRL, EOMI, Conjunctiva normal, No discharge. No scleral icterus  Neck: Normal range of motion, No tenderness, Supple  Cardiovascular: Normal rate, regular rhythm, S1 normal and S2 normal.  Exam reveals no gallop. Pulmonary/Chest: Effort normal and breath sounds normal. No accessory muscle usage or stridor. No respiratory distress. no wheezes. has no rales. exhibits no tenderness. Abdominal: Soft. Bowel sounds are normal.  exhibits no distension. There is no tenderness. There is no rebound and no guarding. Extremities: No edema, no tenderness, no cyanosis, no clubbing. Musculoskeletal: Good range of motion in major joints is observed. No major deformities noted. Neurological: Alert and oriented ×3, normal motor function, normal sensory function, no focal deficits. GCS 15  Skin: Skin is warm, dry and intact. No rash noted. No erythema. Psychiatric: Affect normal, judgment normal, mood normal.  DIFFERENTIAL DIAGNOSIS:       DIAGNOSTIC RESULTS     EKG: All EKG's are interpreted by the Emergency Department Physician who either signs or Co-signs this chart in the absence of a cardiologist.      RADIOLOGY: non-plain film images(s) such as CT, Ultrasound and MRI are read by the radiologist.  Plain radiographic images are visualized and preliminarily interpreted by the emergency physician unless otherwise stated below.       LABS:   Labs Reviewed   CBC WITH AUTO DIFFERENTIAL - Abnormal; Notable for the following components:       Result Value    WBC 11.5 (*)     RDW-SD 48.9 (*)     MPV 12.7 (*)     Segs Absolute 8.6 (*)     All other components within normal limits   BASIC METABOLIC PANEL W/ REFLEX TO MG FOR LOW K - Abnormal; Notable for the following components:    CO2 21 (*)     Glucose 169 (*)     CREATININE 1.4 (*)     All other components within normal limits   PROCALCITONIN - Abnormal; Notable for the following components:    Procalcitonin 0.10 (*)     All other components within normal limits   GLOMERULAR FILTRATION RATE, ESTIMATED - Abnormal; Notable for the following components:    Est, Glom Filt Rate 50 (*)     All other components within normal limits   URINE WITH REFLEXED MICRO - Abnormal; Notable for the following components:    Blood, Urine LARGE (*)     Protein, UA TRACE (*)     Leukocyte Esterase, Urine MODERATE (*)     Color, UA DK YELLOW (*)     Character, Urine CLOUDY (*)     All other components within normal limits   MAGNESIUM - Abnormal; Notable for the following components:    Magnesium 1.2 (*)     All other components within normal limits   BASIC METABOLIC PANEL - Abnormal; Notable for the following components:    Glucose 167 (*)     CREATININE 1.3 (*)     Calcium 8.1 (*)     All other components within normal limits   CBC - Abnormal; Notable for the following components:    RBC 4.22 (*)     Hemoglobin 12.9 (*)     Hematocrit 39.4 (*)     RDW-SD 47.8 (*)     Platelets 90 (*)     MPV 12.5 (*)     All other components within normal limits   URINE WITH REFLEXED MICRO - Abnormal; Notable for the following components:    Blood, Urine LARGE (*)     Leukocyte Esterase, Urine LARGE (*)     Character, Urine CLOUDY (*)     All other components within normal limits   GLOMERULAR FILTRATION RATE, ESTIMATED - Abnormal; Notable for the following components:    Est, Glom Filt Rate 55 (*)     All other components within normal limits   POCT GLUCOSE - Abnormal; Notable for the following components:    POC Glucose 160 (*)     All other components within normal limits   CULTURE, REFLEXED, URINE   CULTURE, BLOOD 1   CULTURE, BLOOD 2   CULTURE, REFLEXED, URINE   BRAIN NATRIURETIC PEPTIDE   TROPONIN   LACTIC ACID   ANION GAP   OSMOLALITY   D-DIMER, QUANTITATIVE   ANION GAP   SCAN OF BLOOD SMEAR   POCT GLUCOSE   POCT GLUCOSE   POCT GLUCOSE   POCT GLUCOSE   POCT GLUCOSE   POCT GLUCOSE   POCT GLUCOSE       EMERGENCY DEPARTMENT COURSE:   Vitals:    Vitals:    03/22/22 2013 03/22/22 2203 03/23/22 0024 03/23/22 0425   BP: 119/61  117/64 111/68   Pulse: 88  77 70   Resp: 18 16 16 16   Temp: 97.9 °F (36.6 °C)  97.6 °F (36.4 °C) 97.7 °F (36.5 °C)   TempSrc: Oral  Oral    SpO2: 93% 90% 93% 94%   Weight:       Height:             CRITICAL CARE:     CONSULTS:  None    PROCEDURES:  None    FINAL IMPRESSION      1. Acute prostatitis          DISPOSITION/PLAN   Admitted    PATIENT REFERRED TO:  No follow-up provider specified.     DISCHARGE MEDICATIONS:  Current Discharge Medication List          (Please note that portions of this note were completed with a voice recognition program.  Efforts were made to edit the dictations but occasionally words are mis-transcribed.)    DO Stacy Lino DO  03/23/22 0725

## 2022-03-23 PROBLEM — N40.0 HYPERPLASIA OF PROSTATE WITHOUT LOWER URINARY TRACT SYMPTOMS (LUTS): Status: RESOLVED | Noted: 2022-03-23 | Resolved: 2022-03-23

## 2022-03-23 PROBLEM — N40.0 HYPERPLASIA OF PROSTATE WITHOUT LOWER URINARY TRACT SYMPTOMS (LUTS): Status: ACTIVE | Noted: 2022-03-23

## 2022-03-23 LAB
ANION GAP SERPL CALCULATED.3IONS-SCNC: 9 MEQ/L (ref 8–16)
BUN BLDV-MCNC: 18 MG/DL (ref 7–22)
CALCIUM SERPL-MCNC: 8.1 MG/DL (ref 8.5–10.5)
CHLORIDE BLD-SCNC: 109 MEQ/L (ref 98–111)
CO2: 23 MEQ/L (ref 23–33)
CREAT SERPL-MCNC: 1.3 MG/DL (ref 0.4–1.2)
ERYTHROCYTE [DISTWIDTH] IN BLOOD BY AUTOMATED COUNT: 14.1 % (ref 11.5–14.5)
ERYTHROCYTE [DISTWIDTH] IN BLOOD BY AUTOMATED COUNT: 47.8 FL (ref 35–45)
GFR SERPL CREATININE-BSD FRML MDRD: 55 ML/MIN/1.73M2
GLUCOSE BLD-MCNC: 105 MG/DL (ref 70–108)
GLUCOSE BLD-MCNC: 105 MG/DL (ref 70–108)
GLUCOSE BLD-MCNC: 123 MG/DL (ref 70–108)
GLUCOSE BLD-MCNC: 137 MG/DL (ref 70–108)
GLUCOSE BLD-MCNC: 167 MG/DL (ref 70–108)
HCT VFR BLD CALC: 39.4 % (ref 42–52)
HEMOGLOBIN: 12.9 GM/DL (ref 14–18)
LV EF: 55 %
LVEF MODALITY: NORMAL
MCH RBC QN AUTO: 30.6 PG (ref 26–33)
MCHC RBC AUTO-ENTMCNC: 32.7 GM/DL (ref 32.2–35.5)
MCV RBC AUTO: 93.4 FL (ref 80–94)
PLATELET # BLD: 90 THOU/MM3 (ref 130–400)
PMV BLD AUTO: 12.5 FL (ref 9.4–12.4)
POTASSIUM SERPL-SCNC: 4.7 MEQ/L (ref 3.5–5.2)
RBC # BLD: 4.22 MILL/MM3 (ref 4.7–6.1)
SCAN OF BLOOD SMEAR: NORMAL
SODIUM BLD-SCNC: 141 MEQ/L (ref 135–145)
WBC # BLD: 7.2 THOU/MM3 (ref 4.8–10.8)

## 2022-03-23 PROCEDURE — 94760 N-INVAS EAR/PLS OXIMETRY 1: CPT

## 2022-03-23 PROCEDURE — 80048 BASIC METABOLIC PNL TOTAL CA: CPT

## 2022-03-23 PROCEDURE — 99024 POSTOP FOLLOW-UP VISIT: CPT | Performed by: UROLOGY

## 2022-03-23 PROCEDURE — 2140000000 HC CCU INTERMEDIATE R&B

## 2022-03-23 PROCEDURE — 82948 REAGENT STRIP/BLOOD GLUCOSE: CPT

## 2022-03-23 PROCEDURE — 6370000000 HC RX 637 (ALT 250 FOR IP): Performed by: FAMILY MEDICINE

## 2022-03-23 PROCEDURE — 6370000000 HC RX 637 (ALT 250 FOR IP): Performed by: PHYSICIAN ASSISTANT

## 2022-03-23 PROCEDURE — 2580000003 HC RX 258: Performed by: EMERGENCY MEDICINE

## 2022-03-23 PROCEDURE — 93306 TTE W/DOPPLER COMPLETE: CPT

## 2022-03-23 PROCEDURE — 6360000002 HC RX W HCPCS: Performed by: EMERGENCY MEDICINE

## 2022-03-23 PROCEDURE — 94640 AIRWAY INHALATION TREATMENT: CPT

## 2022-03-23 PROCEDURE — 2500000003 HC RX 250 WO HCPCS: Performed by: EMERGENCY MEDICINE

## 2022-03-23 PROCEDURE — 2700000000 HC OXYGEN THERAPY PER DAY

## 2022-03-23 PROCEDURE — 51798 US URINE CAPACITY MEASURE: CPT

## 2022-03-23 PROCEDURE — 36415 COLL VENOUS BLD VENIPUNCTURE: CPT

## 2022-03-23 PROCEDURE — 85027 COMPLETE CBC AUTOMATED: CPT

## 2022-03-23 PROCEDURE — 6360000002 HC RX W HCPCS: Performed by: INTERNAL MEDICINE

## 2022-03-23 PROCEDURE — 6370000000 HC RX 637 (ALT 250 FOR IP): Performed by: EMERGENCY MEDICINE

## 2022-03-23 RX ORDER — LINEZOLID 2 MG/ML
600 INJECTION, SOLUTION INTRAVENOUS EVERY 12 HOURS
Status: DISCONTINUED | OUTPATIENT
Start: 2022-03-23 | End: 2022-03-25 | Stop reason: HOSPADM

## 2022-03-23 RX ORDER — CYCLOBENZAPRINE HCL 10 MG
5 TABLET ORAL 3 TIMES DAILY PRN
Status: DISCONTINUED | OUTPATIENT
Start: 2022-03-23 | End: 2022-03-25 | Stop reason: HOSPADM

## 2022-03-23 RX ORDER — TRAZODONE HYDROCHLORIDE 50 MG/1
50 TABLET ORAL NIGHTLY
Status: DISCONTINUED | OUTPATIENT
Start: 2022-03-23 | End: 2022-03-25 | Stop reason: HOSPADM

## 2022-03-23 RX ADMIN — CLOPIDOGREL BISULFATE 75 MG: 75 TABLET ORAL at 11:10

## 2022-03-23 RX ADMIN — ROPINIROLE HYDROCHLORIDE 1 MG: 1 TABLET, FILM COATED ORAL at 15:38

## 2022-03-23 RX ADMIN — ENOXAPARIN SODIUM 40 MG: 100 INJECTION SUBCUTANEOUS at 20:13

## 2022-03-23 RX ADMIN — RANOLAZINE 500 MG: 500 TABLET, FILM COATED, EXTENDED RELEASE ORAL at 11:09

## 2022-03-23 RX ADMIN — SERTRALINE 100 MG: 100 TABLET, FILM COATED ORAL at 20:13

## 2022-03-23 RX ADMIN — TRAZODONE HYDROCHLORIDE 50 MG: 50 TABLET ORAL at 21:29

## 2022-03-23 RX ADMIN — FLUTICASONE PROPIONATE 1 SPRAY: 50 SPRAY, METERED NASAL at 11:10

## 2022-03-23 RX ADMIN — CYCLOBENZAPRINE 5 MG: 10 TABLET, FILM COATED ORAL at 06:37

## 2022-03-23 RX ADMIN — ROPINIROLE HYDROCHLORIDE 1 MG: 1 TABLET, FILM COATED ORAL at 11:09

## 2022-03-23 RX ADMIN — BUSPIRONE HYDROCHLORIDE 10 MG: 10 TABLET ORAL at 11:10

## 2022-03-23 RX ADMIN — METFORMIN HYDROCHLORIDE 500 MG: 500 TABLET ORAL at 11:09

## 2022-03-23 RX ADMIN — PANTOPRAZOLE SODIUM 40 MG: 40 TABLET, DELAYED RELEASE ORAL at 11:09

## 2022-03-23 RX ADMIN — GLIPIZIDE 10 MG: 10 TABLET ORAL at 06:37

## 2022-03-23 RX ADMIN — CEFTRIAXONE 1000 MG: 10 INJECTION, POWDER, FOR SOLUTION INTRAVENOUS at 06:37

## 2022-03-23 RX ADMIN — BUDESONIDE AND FORMOTEROL FUMARATE DIHYDRATE 2 PUFF: 160; 4.5 AEROSOL RESPIRATORY (INHALATION) at 17:36

## 2022-03-23 RX ADMIN — ATORVASTATIN CALCIUM 20 MG: 20 TABLET, FILM COATED ORAL at 11:09

## 2022-03-23 RX ADMIN — LINEZOLID 600 MG: 600 INJECTION, SOLUTION INTRAVENOUS at 15:37

## 2022-03-23 RX ADMIN — PANTOPRAZOLE SODIUM 40 MG: 40 TABLET, DELAYED RELEASE ORAL at 17:27

## 2022-03-23 RX ADMIN — SODIUM CHLORIDE: 9 INJECTION, SOLUTION INTRAVENOUS at 20:21

## 2022-03-23 RX ADMIN — SERTRALINE 100 MG: 100 TABLET, FILM COATED ORAL at 11:08

## 2022-03-23 RX ADMIN — MONTELUKAST SODIUM 10 MG: 10 TABLET ORAL at 20:13

## 2022-03-23 RX ADMIN — ROPINIROLE HYDROCHLORIDE 1 MG: 1 TABLET, FILM COATED ORAL at 20:13

## 2022-03-23 RX ADMIN — SODIUM CHLORIDE, PRESERVATIVE FREE 10 ML: 5 INJECTION INTRAVENOUS at 15:24

## 2022-03-23 RX ADMIN — BUSPIRONE HYDROCHLORIDE 10 MG: 10 TABLET ORAL at 20:13

## 2022-03-23 RX ADMIN — RANOLAZINE 500 MG: 500 TABLET, FILM COATED, EXTENDED RELEASE ORAL at 20:13

## 2022-03-23 RX ADMIN — BUDESONIDE AND FORMOTEROL FUMARATE DIHYDRATE 2 PUFF: 160; 4.5 AEROSOL RESPIRATORY (INHALATION) at 09:00

## 2022-03-23 RX ADMIN — METFORMIN HYDROCHLORIDE 500 MG: 500 TABLET ORAL at 17:27

## 2022-03-23 RX ADMIN — BUSPIRONE HYDROCHLORIDE 10 MG: 10 TABLET ORAL at 15:37

## 2022-03-23 ASSESSMENT — PAIN SCALES - GENERAL
PAINLEVEL_OUTOF10: 0
PAINLEVEL_OUTOF10: 2
PAINLEVEL_OUTOF10: 2
PAINLEVEL_OUTOF10: 0
PAINLEVEL_OUTOF10: 0
PAINLEVEL_OUTOF10: 3
PAINLEVEL_OUTOF10: 4
PAINLEVEL_OUTOF10: 0

## 2022-03-23 ASSESSMENT — PAIN DESCRIPTION - FREQUENCY
FREQUENCY: INTERMITTENT

## 2022-03-23 ASSESSMENT — PAIN - FUNCTIONAL ASSESSMENT
PAIN_FUNCTIONAL_ASSESSMENT: ACTIVITIES ARE NOT PREVENTED

## 2022-03-23 ASSESSMENT — PAIN DESCRIPTION - DESCRIPTORS
DESCRIPTORS: ACHING

## 2022-03-23 ASSESSMENT — PAIN DESCRIPTION - ONSET
ONSET: SUDDEN
ONSET: GRADUAL

## 2022-03-23 ASSESSMENT — PAIN DESCRIPTION - PAIN TYPE
TYPE: ACUTE PAIN
TYPE: ACUTE PAIN
TYPE: CHRONIC PAIN
TYPE: CHRONIC PAIN

## 2022-03-23 ASSESSMENT — PAIN DESCRIPTION - LOCATION
LOCATION: PENIS
LOCATION: BACK;LEG
LOCATION: BACK
LOCATION: BACK

## 2022-03-23 ASSESSMENT — PAIN DESCRIPTION - ORIENTATION
ORIENTATION: MID;LOWER
ORIENTATION: LOWER;MID
ORIENTATION: LOWER;MID

## 2022-03-23 NOTE — CARE COORDINATION
03/23/22 1436   Readmission Assessment   Number of Days since last admission? 8-30 days   Previous Disposition Home with Family   Who is being Interviewed Patient;Caregiver   What was the patient's/caregiver's perception as to why they think they needed to return back to the hospital? Other (Comment)  (UTI s/p TURP)   Did you visit your Primary Care Physician after you left the hospital, before you returned this time? Yes   Did you see a specialist, such as Cardiac, Pulmonary, Orthopedic Physician, etc. after you left the hospital? Yes   Who advised the patient to return to the hospital? Physician;Self-referral;Caregiver   Does the patient report anything that got in the way of taking their medications? No   In our efforts to provide the best possible care to you and others like you, can you think of anything that we could have done to help you after you left the hospital the first time, so that you might not have needed to return so soon? Other (Comment)  (\"Nothing, just one of those things that happen\". )

## 2022-03-23 NOTE — H&P
[address] [place] [address]                              HISTORY AND PHYSICAL    PATIENT NAME: Lorena Diggs                        :        1955  MED REC NO:   [id number]                           ROOM:       0034  ACCOUNT NO:   [de-identified]                           ADMIT DATE: 2022  PROVIDER:     Guadalupe Chroman Leonce Closs, M.D.    [address]:  The patient is a 67-year-old male, presented to the  emergency room because of hypotension for the past four days. HISTORY OF PRESENT ILLNESS:  The patient is a 67-year-old male who is  known to me through the office being a patient of Dr. Eusebia Stovall. For the  past four days, they have been checking the blood pressures at home,  were on the low side, mostly in the 80s and 90s. The patient had  bladder outlet obstruction and underwent GreenLight photo vaporization  of his prostate on 2022 by Dr. Wilda Saldivar. Patient was sent home with   Keflex. The patient since then is not feeling well and is short of breath. The patient was seen by me on 2022 in the office because of coughing   with shortness of breath with rattling of the chest with wheezing, shortness   of breath increased with activity, having lightheadedness at times. The patient  had a blood workup showing a BNP of 2092, X-rays were taken including influenza and  SARS-COVID testing, were negative. Urinalysis likewise was negative at  that time. Patient was placed on Levaquin for 10 days and Lasix 20 mg twice a day. The patient had a followup in the office on 03/10/2022 and  he was little better. The patient's Symbicort was changed to  [facility] for optimal inhaler, patient was given Ceftin 500 twice daily. The patient had a followup subsequently with urology services and urinalysis   was taken at that time on 2022 showing enterococcus.   The patient,however,   did not have an improvement and remained to have shortness of breath. The patient states that he had a history of COPD and asthma and uses oxygen, uses  about 2 liters nasal cannula during the day as he gets more activity; he  gets short of breath, however, at night, he does not use. Patient has been   using oxygenfor the past three years. The patient was advised by Dr. Aleah Raphael last week for admission; however, the patient has declined. The  patient's family, the wife has been checking his pressure for the past  four days and blood pressures were on the low side in the 80s and 90s. The patient went for a follow-up today with Dr. Marbin Morrow, the  patient's cardiologist in [facility], and blood pressure in the  office was 84/53 for which the patient was sent to the emergency room. The patient did not have any chest pain. No fever; however, he admits  to have some dysuria and hematuria. The patient states that since I  have seen him on 03/03/2022, he has lost 16 pounds total from the Lasix. The patient was advised by Dr. Marbin Morrow to come here to the emergency  room and was thence evaluated, but in the emergency room, the patient  was noted to have a UTI. REVIEW OF SYSTEMS:  Denies any headache. No dizziness. Positive  lightheadedness. Positive shortness of breath, fatigue. No chest pain. Positive dysuria, frequency, hematuria. Positive back pain; however,  did not have any severe pain. No chest pain. No diarrhea or  constipation. The rest of the review of systems negative. PAST MEDICAL HISTORY:  The patient had history of COPD, diabetes, acid  reflux, asthma, coronary artery disease, morbid obesity, depression,  hypertension, hyperlipidemia, panic attacks, thyroid disorder. PAST SURGICAL HISTORY:  The patient had back surgeries, cholecystectomy,  coronary stent, hernia, rotator cuff surgery, tonsillectomy, recent  GreenLight photo vaporization of the prostate, uvuloplasty, brain  surgery.     FAMILY HISTORY:  Significant for diabetes, high cholesterol,  throat/breast cancer, heart disease. SOCIAL HISTORY:  The patient is , former smoker, quit in 2017. The patient is  and sexually active. MEDICATIONS:  See nursing notes. ALLERGIES:  NONE. PHYSICAL EXAMINATION:  GENERAL:  When I had seen the patient, the patient was seen on the  floor. Alert, active, cooperative. The patient is not ill looking,  conversant, comfortable at 2 liters nasal cannula. VITAL SIGNS:  Blood pressure is 132/91, respirations 18, pulse 87,  temperature is 98.4. HEENT:  Head:  Normocephalic. Tympanic membranes normal.  Oral mucosa  is moist.  LUNGS:  Decreased breath sounds. Otherwise, clear to auscultation. HEART:  Regular rate and rhythm. ABDOMEN:  Globular, soft, depressible. Nontender. No  hepatosplenomegaly. Normal bowel sounds. BACK:  Showed no CVA tenderness. There is tenderness in the lower  lumbar spine. EXTREMITIES:  No edema. Full pulses. NEUROLOGIC:  Motor, sensory, cranial nerves are all intact. ASSESSMENT:  1. Hypotension. 2.  UTI. 3.  Status post transurethral GreenLight photo vaporization of the  prostate. 4.  Diabetes type 2.  5.  Hyperlipidemia. 6.  GERD. 7.  Obstructive sleep apnea. 8.  Asthma. 9.  Hypertension. 10.  COPD. 11.  Supplemental oxygen dependent. 12.  Hypogonadism. PLAN:  The patient will be started on antibiotic, Rocephin, and we will  cover also the urine that is showing enterococcus which was collected on  03/16/2022. Resume home medications including inhalers. Consult Infectious   Disease, urology services. The patient will be hydrated. I requested an echocardiogram  and D-dimer. Lovenox and SCDs for DVT prophylaxis. I consulted also  Occupational and Physical Therapy at this time. The patient will be  followed up accordingly and further orders will be based on clinical  course and laboratory and x-ray findings. RALPH Saul M.D.    D: 03/22/2022 56:99:30       T: 03/22/2022 21:10:46     [email or web address]  Job#: [id number]     Doc#: [id number]    CC:

## 2022-03-23 NOTE — PROGRESS NOTES
Patient awake in bed. Patient is alert and oriented x4. PEERL. Hand grasp strong and equal bilaterally. No pain, numbness or tingling in upper extremities. Skin turgor and capillary refill less than 3 seconds. Chest movement symmetrical. Lung sounds are clear and diminished. Abdomen soft, round, and none tender. Bowel sounds active in all four quadrants. Pedal push and pull strong and equal bilaterally. No pain numbness or tingling in lower extremities. Patient denies any needs at this time. Call light with in reach. Bed side table in reach.  Francia Nett RS/NS

## 2022-03-23 NOTE — CARE COORDINATION
3/23/22, 8:03 AM EDT  DISCHARGE PLANNING EVALUATION:    Yony Borden       Admitted: 3/22/2022/ Juliet 90 day: 1   Location: [address] [place] [address] Reason for admit: Hypotension [I95.9]   PMH:  has a past medical history of Acid reflux, Arthritis, Asthma, CAD (coronary artery disease), Chronic back pain, Class 2 severe obesity due to excess calories with serious comorbidity and body mass index (BMI) of 37.0 to 37.9 in adult [facility]), Colon polyps, COPD (chronic obstructive pulmonary disease) ([facility] Depression, Diverticulosis, HTN (hypertension), Hyperlipidemia, Kidney disorder, Panic attack, Pneumonia, Rash, Recurrent upper respiratory infection (URI), Thumb amputation status, Thyroid disease, and Type II or unspecified type diabetes mellitus without mention of complication, not stated as uncontrolled. Procedure:   3/22 CXR: Cardiomegaly with no acute intrathoracic process. 3/23 Echo: to be done. Barriers to Discharge:  Admitted through ED with hypotension. Was at his Cardiologist and found to be hypotensive. UA: large blood, large leukocytes, few bacteria. IVF. Rocephin iv q12hr, Lovenox. Consulted Urology and ID. O2 on at 3L/nc. Sats 93-96%. BP this am 132/74. PCP: Solo Samson MD  Readmission Risk Score: 11.5 ( )%    Patient Goals/Plan/Treatment Preferences: Spoke with Yony and his wife. They live at home together with their 2 cats (that weigh 25# each). Pt is independent and drives. Pt has home O2 from [facility], wears 2L prn. Denies other DME or HH. Pt is current with pain clinic. Verified pt's PCP and insurance. Transportation/Food Security/Housekeeping Addressed:  No issues identified.

## 2022-03-23 NOTE — PROGRESS NOTES
Family Medicine Progress Notes/Coverage    Today's Date: 3/23/22  3B-34/034-A  Medical Record # [id number]  CSN/Account # [de-identified]      Mr. Rasheeda Nichole admitted on 3/22/2022        Subjective / Interval History :     Still a lot of post void dysuria, frequency and urgency  + low back pain  Reviewed notes, consults, laboratory and radiology results,    Objective:       Physical Exam:  Patient Vitals for the past 24 hrs:   BP Temp Temp src Pulse Resp SpO2 Height Weight   03/23/22 1015 130/72 -- -- 76 -- -- -- --   03/23/22 0900 -- -- -- -- 18 94 % -- --   03/23/22 0800 132/74 97.9 °F (36.6 °C) Oral 78 18 96 % -- --   03/23/22 0425 111/68 97.7 °F (36.5 °C) -- 70 16 94 % -- --   03/23/22 0024 117/64 97.6 °F (36.4 °C) Oral 77 16 93 % -- --   03/22/22 2203 -- -- -- -- 16 90 % -- --   03/22/22 2013 119/61 97.9 °F (36.6 °C) Oral 88 18 93 % -- --   03/22/22 1625 (!) 132/91 98.4 °F (36.9 °C) Oral 87 18 96 % 5' 8\" (1.727 m) 227 lb 12.8 oz (103.3 kg)   03/22/22 1456 98/61 -- -- 77 18 92 % -- --   03/22/22 1451 92/66 -- -- 76 18 90 % -- --   03/22/22 1336 (!) 115/102 -- -- 79 -- -- -- --   03/22/22 1139 127/71 98.7 °F (37.1 °C) Oral 83 20 93 % -- 225 lb (102.1 kg)       General Appearance:  Alert, cooperative, no distress, appears stated age   HEENT:  Neck:      Chest/Lungs:  Heart: CTA  RRR   Abdomen:  Back: Globular soft  No CVA tenderness   Extremities:  Neurological Exam: No edema  WNL       Assessment:     Admitting Diagnosis:    Hypotension [I95.9]    Active Hospital Problems    Diagnosis Date Noted    Hypotension [I95.9] 03/22/2022     Priority: High    UTI (urinary tract infection) [N39.0] 03/22/2022     Priority: High    S/P transurethral Photo Vaporization of Prostate [Z90.79] 02/25/2022     Priority: High    Hyperplasia of prostate without lower urinary tract symptoms (LUTS) [N40.0] 03/23/2022     Priority: Medium    Dependence on supplemental oxygen [Z99.81] 03/22/2022     Priority: Medium    COPD (chronic obstructive pulmonary disease) ([facility] [J44.9] 02/25/2022     Priority: Medium    Hypertensive disorder [I10] 01/22/2021     Priority: Medium    Class 2 severe obesity due to excess calories with serious comorbidity and body mass index (BMI) of 37.0 to 37.9 in adult [facility]) [E66.01, Z68.37] 08/13/2018     Priority: Medium    Moderate COPD (chronic obstructive pulmonary disease) ([facility] [facility] [J44.9]      Priority: Medium    Hypogonadism in male [E29.1] 02/12/2016     Priority: Medium    Diabetes ([facility] [E11.9]      Priority: Medium    Hyperlipidemia [E78.5]      Priority: Medium    Gastroesophageal reflux disease [K21.9]      Priority: Medium    PARRISH (obstructive sleep apnea) [G47.33]      Priority: Medium    Asthma [J45.909]      Priority: Medium       Plan:     Discussed plans with the nursing staff  Urology consult pending  ID consult pending  Do post void residual urine  Hold Cozaar due to hypotension    Medications, Laboratories and Imaging results:    Scheduled Meds:   atorvastatin  20 mg Oral Daily    busPIRone  10 mg Oral TID    clopidogrel  75 mg Oral Daily    fluticasone  1 spray Each Nostril Daily    glipiZIDE  10 mg Oral QAM AC    losartan  50 mg Oral Daily    metFORMIN  500 mg Oral BID WC    montelukast  10 mg Oral Nightly    pantoprazole  40 mg Oral BID    ranolazine  500 mg Oral BID    rOPINIRole  1 mg Oral TID    sertraline  100 mg Oral BID    sodium chloride flush  5-40 mL IntraVENous 2 times per day    enoxaparin  40 mg SubCUTAneous Q24H    cefTRIAXone (ROCEPHIN) IV  1,000 mg IntraVENous Q12H    budesonide-formoterol  2 puff Inhalation BID    tiotropium  2 puff Inhalation Daily     Continuous Infusions:   sodium chloride      sodium chloride 100 mL/hr at 03/22/22 1931    dextrose       PRN Meds:cyclobenzaprine, sodium chloride flush, sodium chloride, ondansetron **OR** ondansetron, polyethylene glycol, acetaminophen **OR** acetaminophen, glucagon (rDNA), dextrose, magnesium sulfate, albuterol, glucose, dextrose bolus (hypoglycemia) **OR** dextrose bolus (hypoglycemia)    Imaging:    Lab Review :    Lab Results   Component Value Date    WBC 7.2 03/23/2022    HGB 12.9 (L) 03/23/2022    HCT 39.4 (L) 03/23/2022    MCV 93.4 03/23/2022    PLT 90 (L) 03/23/2022     Lab Results   Component Value Date    CREATININE 1.3 (H) 03/23/2022    BUN 18 03/23/2022     03/23/2022    K 4.7 03/23/2022     03/23/2022    CO2 23 03/23/2022     Lab Results   Component Value Date    CKTOTAL 202 (H) 12/20/2013    CKMB 1.0 12/20/2013    TROPONINI <0.006 12/20/2013     Lab Results   Component Value Date    ALT 29 03/16/2022    AST 21 03/16/2022    ALKPHOS 45 01/05/2021    BILITOT 0.5 01/05/2021     Lab Results   Component Value Date    LIPASE 21.1 04/08/2019         Electronically signed by Petar Decker MD on 3/23/22 at 11:12 AM KAREN Haddad M.D.

## 2022-03-23 NOTE — CONSULTS
[address] [address]  Dept: [phone]  Loc: [phone]  Visit Date: 3/22/2022    Urology Consult Note    Reason for Consult:  UTI s/p TURP, hypotension  Requesting Physician:  Paige Alva    History Obtained From:  Patient, EMR, nurse    Chief Complaint: hypotension    HISTORY OF PRESENT ILLNESS:                The patient is a 74 y.o. male with significant past medical history of see below who presented with hypotension for previous 4 says, sent in from cardiology office. Also with dysuria and hematuria since greenlight PVP 2/25/22. Previous UTI,was on ceftin, changed to doxy 3/21.        Past Medical History:        Diagnosis Date    Acid reflux     Arthritis     Asthma     CAD (coronary artery disease)     Chronic back pain     Class 2 severe obesity due to excess calories with serious comorbidity and body mass index (BMI) of 37.0 to 37.9 in adult [facility]) 8/13/2018    Colon polyps 11/06    COPD (chronic obstructive pulmonary disease) (HCC)     Depression     panic attacks    Diverticulosis     HTN (hypertension)     Hyperlipidemia     Kidney disorder     Panic attack     Pneumonia     Rash     Recurrent upper respiratory infection (URI)     Thumb amputation status 3/13/14    left tip of thumb amputated    Thyroid disease     Type II or unspecified type diabetes mellitus without mention of complication, not stated as uncontrolled      Past Surgical History:        Procedure Laterality Date    BACK SURGERY  2002    BACK SURGERY  08/11/2017    BICEPS TENDON REPAIR Right 08/16/2017    BRONCHOSCOPY      BRONCHOSCOPY N/A 4/5/2019    BRONCHOSCOPY performed by David Alanis MD at [place] [address]  4/5/2019    BRONCHOSCOPY ALVEOLAR LAVAGE performed by David Alanis MD at [address] [place] [address]  07/02 08/05    CARPAL TUNNEL RELEASE  2011    right hand    CHOLECYSTECTOMY  yrs ago    COLONOSCOPY      CORONARY ANGIOPLASTY WITH STENT PLACEMENT  2020    ENDOSCOPY, COLON, DIAGNOSTIC      EYE SURGERY      foreign body removal    HERNIA REPAIR      Dr Graeme Marie  ?  LARYNGOSCOPY  2016    Laryngoscopy Micro with Biopsy by Dr Felix Schmidt      uppp    ROTATOR CUFF REPAIR Left 15    SKIN BIOPSY      TONSILLECTOMY  1985    TURP N/A 2022    CYSTOSCOPY GREENLIGHT PHOTOVAPORIZATION OF THE PROSTATE performed by See Pena MD at [place] [address] Left 2014    Dr. Darren Nuñez     Allergies:  Patient has no known allergies. Social History:  Social History     Socioeconomic History    Marital status:      Spouse name: Britni Tenorio Number of children: 10    Years of education: Not on file    Highest education level: Not on file   Occupational History    Not on file   Tobacco Use    Smoking status: Former Smoker     Packs/day: 2.00     Years: 42.00     Pack years: 84.00     Types: Cigarettes     Start date: 1971     Quit date: 3/3/2017     Years since quittin.0    Smokeless tobacco: Never Used    Tobacco comment: pts uses just nicotine vap   Vaping Use    Vaping Use: Former    Substances: Nicotine   Substance and Sexual Activity    Alcohol use: No     Alcohol/week: 0.0 standard drinks    Drug use: No    Sexual activity: Yes     Partners: Female   Other Topics Concern    Not on file   Social History Narrative    Not on file     Social Determinants of Health     Financial Resource Strain: Low Risk     Difficulty of Paying Living Expenses: Not very hard   Food Insecurity: No Food Insecurity    Worried About Running Out of Food in the Last Year: Never true    [place] of Food in the Last Year: Never true   Transportation Needs:     Lack of Transportation (Medical):  Not on file    Lack of Transportation (Non-Medical): Not on file   Physical Activity:     Days of Exercise per Week: Not on file    Minutes of Exercise per Session: Not on file   Stress:     Feeling of Stress : Not on file   Social Connections:     Frequency of Communication with Friends and Family: Not on file    Frequency of Social Gatherings with Friends and Family: Not on file    Attends Zoroastrian Services: Not on file    Active Member of [address] [place] [address] [email or web address] or Organizations: Not on file    Attends Club or Organization Meetings: Not on file    Marital Status: Not on file   Intimate Partner Violence:     Fear of Current or Ex-Partner: Not on file    Emotionally Abused: Not on file    Physically Abused: Not on file    Sexually Abused: Not on file   Housing Stability:     Unable to Pay for Housing in the Last Year: Not on file    Number of [place] in the Last Year: Not on file    Unstable Housing in the Last Year: Not on file     Family History:       Problem Relation Age of Onset    Cancer Mother     Breast Cancer Mother     Stroke Mother     Heart Disease Brother     Diabetes Brother     High Blood Pressure Brother     High Cholesterol Brother        ROS:  Constitutional: Negative for chills, fatigue, fever, or weight loss. Eyes: Denies reported visual changes. ENT: Denies headache, difficulty swallowing, earache, and nosebleeds. Cardiovascular: Negative for chest pain, palpitations, tachycardia or edema. Respiratory: +cough, +sob  GI:The patient denies abdominal or flank pain, anorexia, nausea or vomiting. : see HPI. Musculoskeletal: Patient denies low back pain or painful or reduced range of ROM. Neurological: The patient denies any symptoms of neurological impairment or TIA. Psychiatric: Denies anxiety or depression. Skin: Denies rash or lesions. All remaining ROS negative.     PHYSICAL EXAM:  VITALS:  /72   Pulse 76   Temp 97.9 °F (36.6 °C) (Oral)   Resp 18   Ht 5' 8\" (1.727 m) Wt 227 lb 12.8 oz (103.3 kg)   SpO2 94%   BMI 34.64 kg/m² . Nursing note and vitals reviewed. Constitutional: Alert and oriented times x3, no acute distress, and cooperative to examination with appropriate mood and affect. HEENT:   Head:         Normocephalic and atraumatic. Mouth/Throat:          Mucous membranes are normal.   Eyes:         EOM are normal. No scleral icterus. Nose:    The external appearance of the nose is normal  Ears: The ears appear normal to external inspection. Cardiovascular:       Normal rate, regular rhythm. Pulmonary/Chest:  Normal respiratory rate and rhthym. No use of accessory muscles. Lungs clear bilaterally. Abdominal:          Soft. No tenderness. Active bowel sounds             Genitalia:    Voiding spontaneously   Musculoskeletal:    Normal range of motion. He exhibits no edema or tenderness of lower extremities. Extremities:    No cyanosis, clubbing, or edema present. Neurological:    Alert and oriented.      DATA:  CBC:   Lab Results   Component Value Date    WBC 7.2 03/23/2022    RBC 4.22 03/23/2022    RBC 4.87 03/01/2012    HGB 12.9 03/23/2022    HCT 39.4 03/23/2022    MCV 93.4 03/23/2022    MCH 30.6 03/23/2022    MCHC 32.7 03/23/2022    RDW 14.4 02/07/2022    PLT 90 03/23/2022    MPV 12.5 03/23/2022     BMP:    Lab Results   Component Value Date     03/23/2022    K 4.7 03/23/2022    K 4.8 03/22/2022     03/23/2022    CO2 23 03/23/2022    BUN 18 03/23/2022    CREATININE 1.3 03/23/2022    CALCIUM 8.1 03/23/2022    GFRAA >60 02/07/2020    LABGLOM 55 03/23/2022    GLUCOSE 167 03/23/2022    GLUCOSE 139 02/07/2022     BUN/Creatinine:    Lab Results   Component Value Date    BUN 18 03/23/2022    CREATININE 1.3 03/23/2022     Magnesium:    Lab Results   Component Value Date    MG 1.2 03/22/2022     Phosphorus:    Lab Results   Component Value Date    PHOS 2.8 10/06/2012     PT/INR:    Lab Results   Component Value Date    PROTIME 11.5 07/24/2017 INR 1.14 02/25/2022     U/A:    Lab Results   Component Value Date    NITRITE neg 05/17/2016    COLORU YELLOW 03/22/2022    PHUR 5.0 03/22/2022    WBCUA 50-75 03/22/2022    WBCUA 0-5 07/24/2017    RBCUA 15-20 03/22/2022    MUCUS Present 07/24/2017    YEAST NONE SEEN 03/22/2022    BACTERIA NONE SEEN 03/22/2022    LEUKOCYTESUR LARGE 03/22/2022    UROBILINOGEN 0.2 03/22/2022    BILIRUBINUR NEGATIVE 03/22/2022    BLOODU LARGE 03/22/2022    GLUCOSEU NEGATIVE 03/22/2022       IMPRESSION/Plan:    Hematuria - resolved. Likely from infection  UTI - started on Doxyclycline 2 days prior, did not start antibiotic. Previous Ux on 3/16 with enterococcus, was on ceftin per primary. Having dysuria.   ID on board, will change abx to Vyvox  Hypotension - resolved    Antibiotics per ID  Urology signing off, please contact us with any questions or concerns  Reviewed with Dr Carlos Shelley  Has [address] 4/8/22    Thank you for including us in the care of [address] [place] [address], APRN - CNP, APRN  03/23/22 1:29 PM  Urology

## 2022-03-23 NOTE — PROGRESS NOTES
Patient awake in bed talking to wife. Patient is alert and oriented x4. PEERL. Hand grasp strong and equal bilaterally. No pain, numbness or tingling in upper extremities. Skin turgor and capillary refill less than 3 seconds. Chest movement symmetrical. Lung sounds are clear and diminished. Abdomen soft, round, and none tender. Bowel sounds active in all four quadrants. Pedal push and pull strong and equal bilaterally. No pain numbness or tingling in lower extremities. Patient states pain is at a 2 at the tip of penis. Patient repositioned. Patient states there is pain when he urinates. Patient denies any needs at this time. Call light with in reach. Bed side table in reach.  Allan Gannon RS/NS

## 2022-03-23 NOTE — PROGRESS NOTES
Patient awake in bed talking to wife. Patient denies needs at this time. Bed in low position. Call light in reach.  Zofia Lemus RS/NS

## 2022-03-23 NOTE — PROCEDURES
Bladder scan was completed by J leopold at 1315. The residual amount of 59 was resulted to "[employer]".

## 2022-03-23 NOTE — PROGRESS NOTES
S[address]  OCCUPATIONAL THERAPY MISSED TREATMENT NOTE  BHAVYA CCU-STEPDOWN 3B  3B-34/034-A      Date: 3/23/2022  Patient Name: Maia Otero        CSN: [id number]   : 1955  (74 y.o.)  Gender: male                REASON FOR MISSED TREATMENT: Echo being completed on OT arrival. Will check back as time allows.

## 2022-03-23 NOTE — CONSULTS
Hx and P/E :Infectious D. Patient - Cassidy Yuan,  Age - 74 y.o.    - 1955      Room Number - 3B-34/034-A   N -  [id number]   [facility] # - [de-identified]  Date of Admission -  3/22/2022 11:31 AM  Patient's PCP: Anaya Jenkins MD     Requesting Physician: Candie Alvarez MD    REASON FOR CONSULTATION:     UTI s/p TURP, Hypotension    HISTORY OF PRESENT ILLNESS     This is a very pleasant 74 y.o. male who was admitted to the hospital directly from his Cardiologists office due to persisting hypotensive pressures and urinary symptoms. His medical history is significant for obstructive BPH s/p TURP 2022, CAD, COPD, chronic hypoxemic respiratory failure, HTN, NIDDM2. He was treated with Keflex post-op. Since his TURP his was seen in his PCP office in the beginning of this month for URI symptoms and was placed on Levaquin for 10 days. On first post-op follow up with Urology on 3/16/2022 a urine culture was obtained which grew penicillin and flouroquinolone resistant Enterococcus faecalis. He was treated with multiple antibiotics over the span of this past month that include Ceftin, Ciprofloxacin and is currently on Doxycycline. Urinalysis has shown no bacteria with WBC , positive for blood. Patient himself states that he had no urinary symptoms prior to his TURP. After the TURP is when he started having dysuria and hematuria which occurred each time he voided. He states that the hematuria has resolved however since the last 2 days. He has been hypotensive at home and was at his Cardiologists office prior to admission where his BP was around 80/60 mmHg. His Cardiologist thus instructed him to come to the hospital here. We were consulted to help manage antibiotics.     PAST MEDICAL AND SURGICAL HISTORY       Past Medical History:   Diagnosis Date    Acid reflux     Arthritis     Asthma     CAD (coronary artery disease)     Chronic back pain     Class 2 severe obesity due to excess calories with serious comorbidity and body mass index (BMI) of 37.0 to 37.9 in adult [facility]) 8/13/2018    Colon polyps 11/06    COPD (chronic obstructive pulmonary disease) (HCC)     Depression     panic attacks    Diverticulosis     HTN (hypertension)     Hyperlipidemia     Kidney disorder     Panic attack     Pneumonia     Rash     Recurrent upper respiratory infection (URI)     Thumb amputation status 3/13/14    left tip of thumb amputated    Thyroid disease     Type II or unspecified type diabetes mellitus without mention of complication, not stated as uncontrolled        Past Surgical History:   Procedure Laterality Date    BACK SURGERY  2002    BACK SURGERY  08/11/2017    BICEPS TENDON REPAIR Right 08/16/2017    BRONCHOSCOPY      BRONCHOSCOPY N/A 4/5/2019    BRONCHOSCOPY performed by Juanita Liao MD at [address] [place] [address]  4/5/2019    BRONCHOSCOPY ALVEOLAR LAVAGE performed by Juanita Liao MD at [facility]  07/02 08/05    CARPAL TUNNEL RELEASE  2011    right hand    CHOLECYSTECTOMY  yrs ago    COLONOSCOPY  11/06 02/12 1/14    CORONARY ANGIOPLASTY WITH STENT PLACEMENT  02/2020    ENDOSCOPY, COLON, DIAGNOSTIC      EYE SURGERY      foreign body removal    HERNIA REPAIR  2004    Dr Rowan Mohan  2008?     LARYNGOSCOPY  04/28/2016    Laryngoscopy Micro with Biopsy by Dr Zeus Celis  01/07    uppp    ROTATOR CUFF REPAIR Left 5-20-15    SKIN BIOPSY      TONSILLECTOMY  1985    TURP N/A 2/25/2022    CYSTOSCOPY GREENLIGHT PHOTOVAPORIZATION OF THE PROSTATE performed by Doni Avery MD at [facility] Left September 2014    Dr. Jose Vivas:       Scheduled Meds:   linezolid  600 mg IntraVENous Q12H    atorvastatin  20 mg Oral Daily    busPIRone  10 mg Oral TID    clopidogrel  75 mg Oral Daily    fluticasone  1 spray Each Nostril Daily    glipiZIDE  10 mg Oral QAM AC    [Held by provider] losartan  50 mg Oral Daily    metFORMIN  500 mg Oral BID WC    montelukast  10 mg Oral Nightly    pantoprazole  40 mg Oral BID    ranolazine  500 mg Oral BID    rOPINIRole  1 mg Oral TID    sertraline  100 mg Oral BID    sodium chloride flush  5-40 mL IntraVENous 2 times per day    enoxaparin  40 mg SubCUTAneous Q24H    cefTRIAXone (ROCEPHIN) IV  1,000 mg IntraVENous Q12H    budesonide-formoterol  2 puff Inhalation BID    tiotropium  2 puff Inhalation Daily     Continuous Infusions:   sodium chloride      sodium chloride 100 mL/hr at 03/22/22 1931    dextrose       PRN Meds:cyclobenzaprine, sodium chloride flush, sodium chloride, ondansetron **OR** ondansetron, polyethylene glycol, acetaminophen **OR** acetaminophen, glucagon (rDNA), dextrose, magnesium sulfate, albuterol, glucose, dextrose bolus (hypoglycemia) **OR** dextrose bolus (hypoglycemia)  Allergies:   ALLERGIES: Patient has no known allergies. SOCIAL HISTORY:     TOBACCO:   reports that he quit smoking about 5 years ago. His smoking use included cigarettes. He started smoking about 50 years ago. He has a 84.00 pack-year smoking history. He has never used smokeless tobacco.     ETOH:   reports no history of alcohol use. FAMILY HISTORY:         Problem Relation Age of Onset    Cancer Mother     Breast Cancer Mother     Stroke Mother     Heart Disease Brother     Diabetes Brother     High Blood Pressure Brother     High Cholesterol Brother      REVIEW OF SYSTEMS:     Constitutional: no fever, no night sweats, no fatigue. Head: no head ache , no head injury, no migranes. Eye: no blurring of vision, no double vision.   Ears: no hearing difficulty, no tinnitus  Mouth/throat: no ulceration, dental caries , dysphagia  Lungs: occasional coughing and shortness of breath after recent PARRISH surgery  CVS: no palpitation, no chest pain  GI: no abdominal pain, no nausea , no vomiting, no constipation  PILLO: reports dysuria and hematuria that resolved 2 days ago  Musculoskeletal: no joint pain, swelling , stiffness,  Endocrine: no polyuria, polydipsia, no cold or heat intolerance  Hematology: no anemia, no easy brusing or bleeding, no hx of clotting disorder  Dermatology: no skin rash, no eczema, no pruritis,  Psychiatry: no depression, no anxiety,no panic attacks, no suicide ideation    PHYSICAL EXAM:     /72   Pulse 76   Temp 97.9 °F (36.6 °C) (Oral)   Resp 18   Ht 5' 8\" (1.727 m)   Wt 227 lb 12.8 oz (103.3 kg)   SpO2 94%   BMI 34.64 kg/m²   General:  Awake, alert, not in distress. Non ill appearing. Cooperative. Bedside urinal with approximately 200 cc of nonbloody nonfoamy urine  HEENT: pale conjunctiva, unicteric sclera, moist oral mucosa. Chest:  Diminished air entry bilaterally in all fields. Some inspiratory wheezing at lung bases   Cardiovascular:  RRR ,S1S2, no murmur or gallop. Abdomen:  Soft, non tender to palpation. Obese abdomen  Extremities: +1 pitting edema b/l in LE  Skin:  Warm and dry. CNS: No focal deficits. Hard of hearing. LABS:     CBC:   Recent Labs     03/22/22  1145 03/23/22  0409   WBC 11.5* 7.2   HGB 16.2 12.9*    90*     BMP:    Recent Labs     03/22/22  1145 03/23/22  0409    141   K 4.8 4.7    109   CO2 21* 23   BUN 21 18   CREATININE 1.4* 1.3*   GLUCOSE 169* 167*     Calcium:  Recent Labs     03/23/22  0409   CALCIUM 8.1*     Ionized Calcium:No results for input(s): IONCA in the last 72 hours. Magnesium:  Recent Labs     03/22/22  1145   MG 1.2*     Phosphorus:No results for input(s): PHOS in the last 72 hours. BNP:No results for input(s): BNP in the last 72 hours. Glucose:  Recent Labs     03/22/22  2109 03/23/22  0739 03/23/22  1227   POCGLU 160* 105 123*     HgbA1C: No results for input(s): LABA1C in the last 72 hours.   INR: No results for input(s): INR in the last 72 hours. Hepatic: No results for input(s): ALKPHOS, ALT, AST, PROT, BILITOT, BILIDIR, LABALBU in the last 72 hours. Amylase and Lipase:  Recent Labs     03/22/22  1255   LACTA 1.6     Lactic Acid:   Recent Labs     03/22/22  1255   LACTA 1.6     Troponin: No results for input(s): CKTOTAL, CKMB, TROPONINI in the last 72 hours. BNP: No results for input(s): BNP in the last 72 hours. Lipids: No results for input(s): CHOL, TRIG, HDL, LDL, LDLCALC in the last 72 hours. ABGs: No results found for: PHART, PO2ART, QSJ1CQZ, PUD4JME, BEART    Cultures:      CXR:       UA:   Recent Labs     03/22/22  2115   PHUR 5.0   COLORU YELLOW   PROTEINU NEGATIVE   BLOODU LARGE*   RBCUA 15-20   WBCUA 50-75   BACTERIA NONE SEEN   NITRU NEGATIVE   GLUCOSEU NEGATIVE   BILIRUBINUR NEGATIVE   UROBILINOGEN 0.2   KETUA NEGATIVE     Imaging:    XR CHEST (2 VW)    Result Date: 3/22/2022  Cardiomegaly with no acute intrathoracic process. **This report has been created using voice recognition software. It may contain minor errors which are inherent in voice recognition technology. ** Final report electronically signed by Dr Melva Vasquez on 3/22/2022 4:24 PM    Micro:   Lab Results   Component Value Date    BC No growth-preliminary  03/22/2022       Problem list of patient     Patient Active Problem List   Diagnosis    Diabetes ([address]    Hyperlipidemia    Gastroesophageal reflux disease    PARRISH (obstructive sleep apnea)    Chronic back pain    DDD (degenerative disc disease), cervical    Asthma    Lower urinary tract symptoms (LUTS)    Hypogonadism in male    Low testosterone    Combined arterial insufficiency and corporo-venous occlusive erectile dysfunction    Left thyroid nodule    Moderate COPD (chronic obstructive pulmonary disease) (HCC)    Class 2 severe obesity due to excess calories with serious comorbidity and body mass index (BMI) of 37.0 to 37.9 in [facility])    Vocal cord disease    Erythrocytosis    History of anxiety disorder    Personal history of tobacco use, presenting hazards to health    Hypertensive disorder    COPD (chronic obstructive pulmonary disease) (HCC)    Hypotension    UTI (urinary tract infection)    Dependence on supplemental oxygen    S/P transurethral Photo Vaporization of Prostate     ASSESSMENT AND PLAN   Recent urine cx + for Group D Enterococcus - Penicillin and Flouroquinolone resistant   Persistent urinary sx of dysuria and hematuria s/p TURP 2/25/2022. He has persistent dysuria since he had the TUPR. He wasplaced on zyvox due to recent hx of enterococcus. Ambulatory hypotension  CAD   COPD  NIDDM2  Will follow final cx report    Plan  Will discontinue Rocephin and switch to Zyvox. Will tailor to final culture results. Possible bacterial spreading/seeding s/p TURP - blood cx pending   Afebrile, leukocytosis resolved. BP stable. Will continue to follow.     Case and plan discussed with Dr. Nonda Shone, MD 3/23/2022 1:28 PM

## 2022-03-24 LAB
ADENOVIRUS F 40 41 PCR: NOT DETECTED
ASTROVIRUS PCR: NOT DETECTED
CAMPYLOBACTER PCR: NOT DETECTED
CLOSTRIDIUM DIFFICILE, PCR: NOT DETECTED
CRYPTOSPORIDIUM PCR: NOT DETECTED
CYCLOSPORA CAYETANENSIS PCR: NOT DETECTED
E COLI 0157 PCR: NORMAL
E COLI ENTEROAGGREGATIVE PCR: NOT DETECTED
E COLI ENTEROPATHOGENIC PCR: NOT DETECTED
E COLI ENTEROTOXIGENIC PCR: NOT DETECTED
E COLI SHIGA LIKE TOXIN PCR: NOT DETECTED
E COLI SHIGELLA/ENTEROINVASIVE PCR: NOT DETECTED
E HISTOLYTICA GI FILM ARRAY: NOT DETECTED
GIARDIA LAMBLIA PCR: NOT DETECTED
GLUCOSE BLD-MCNC: 105 MG/DL (ref 70–108)
GLUCOSE BLD-MCNC: 107 MG/DL (ref 70–108)
GLUCOSE BLD-MCNC: 193 MG/DL (ref 70–108)
NOROVIRUS GI GII PCR: NOT DETECTED
PLESIOMONAS SHIGELLOIDES PCR: NOT DETECTED
ROTAVIRUS A PCR: NOT DETECTED
SALMONELLA PCR: NOT DETECTED
SAPOVIRUS PCR: NOT DETECTED
URINE CULTURE REFLEX: NORMAL
URINE CULTURE REFLEX: NORMAL
VIBRIO CHOLERAE PCR: NOT DETECTED
VIBRIO PCR: NOT DETECTED
YERSINIA ENTEROCOLITICA PCR: NOT DETECTED

## 2022-03-24 PROCEDURE — 6360000002 HC RX W HCPCS: Performed by: INTERNAL MEDICINE

## 2022-03-24 PROCEDURE — 97165 OT EVAL LOW COMPLEX 30 MIN: CPT

## 2022-03-24 PROCEDURE — 2700000000 HC OXYGEN THERAPY PER DAY

## 2022-03-24 PROCEDURE — 2580000003 HC RX 258: Performed by: EMERGENCY MEDICINE

## 2022-03-24 PROCEDURE — 94640 AIRWAY INHALATION TREATMENT: CPT

## 2022-03-24 PROCEDURE — 6360000002 HC RX W HCPCS: Performed by: EMERGENCY MEDICINE

## 2022-03-24 PROCEDURE — 0097U HC GI PTHGN MULT REV TRANS & AMP PRB TECH 22 TRGT: CPT

## 2022-03-24 PROCEDURE — 97535 SELF CARE MNGMENT TRAINING: CPT

## 2022-03-24 PROCEDURE — 6370000000 HC RX 637 (ALT 250 FOR IP): Performed by: PHYSICIAN ASSISTANT

## 2022-03-24 PROCEDURE — 94760 N-INVAS EAR/PLS OXIMETRY 1: CPT

## 2022-03-24 PROCEDURE — 6370000000 HC RX 637 (ALT 250 FOR IP): Performed by: EMERGENCY MEDICINE

## 2022-03-24 PROCEDURE — 97116 GAIT TRAINING THERAPY: CPT

## 2022-03-24 PROCEDURE — 97162 PT EVAL MOD COMPLEX 30 MIN: CPT

## 2022-03-24 PROCEDURE — 2140000000 HC CCU INTERMEDIATE R&B

## 2022-03-24 PROCEDURE — 82948 REAGENT STRIP/BLOOD GLUCOSE: CPT

## 2022-03-24 RX ADMIN — SERTRALINE 100 MG: 100 TABLET, FILM COATED ORAL at 08:34

## 2022-03-24 RX ADMIN — FLUTICASONE PROPIONATE 1 SPRAY: 50 SPRAY, METERED NASAL at 08:37

## 2022-03-24 RX ADMIN — SODIUM CHLORIDE, PRESERVATIVE FREE 10 ML: 5 INJECTION INTRAVENOUS at 20:42

## 2022-03-24 RX ADMIN — RANOLAZINE 500 MG: 500 TABLET, FILM COATED, EXTENDED RELEASE ORAL at 08:35

## 2022-03-24 RX ADMIN — ROPINIROLE HYDROCHLORIDE 1 MG: 1 TABLET, FILM COATED ORAL at 14:12

## 2022-03-24 RX ADMIN — CLOPIDOGREL BISULFATE 75 MG: 75 TABLET ORAL at 08:38

## 2022-03-24 RX ADMIN — TRAZODONE HYDROCHLORIDE 50 MG: 50 TABLET ORAL at 20:42

## 2022-03-24 RX ADMIN — BUSPIRONE HYDROCHLORIDE 10 MG: 10 TABLET ORAL at 20:42

## 2022-03-24 RX ADMIN — BUSPIRONE HYDROCHLORIDE 10 MG: 10 TABLET ORAL at 08:35

## 2022-03-24 RX ADMIN — SODIUM CHLORIDE: 9 INJECTION, SOLUTION INTRAVENOUS at 18:36

## 2022-03-24 RX ADMIN — PANTOPRAZOLE SODIUM 40 MG: 40 TABLET, DELAYED RELEASE ORAL at 08:38

## 2022-03-24 RX ADMIN — LINEZOLID 600 MG: 600 INJECTION, SOLUTION INTRAVENOUS at 14:13

## 2022-03-24 RX ADMIN — METFORMIN HYDROCHLORIDE 500 MG: 500 TABLET ORAL at 08:34

## 2022-03-24 RX ADMIN — METFORMIN HYDROCHLORIDE 500 MG: 500 TABLET ORAL at 17:39

## 2022-03-24 RX ADMIN — ENOXAPARIN SODIUM 40 MG: 100 INJECTION SUBCUTANEOUS at 20:42

## 2022-03-24 RX ADMIN — RANOLAZINE 500 MG: 500 TABLET, FILM COATED, EXTENDED RELEASE ORAL at 20:41

## 2022-03-24 RX ADMIN — LINEZOLID 600 MG: 600 INJECTION, SOLUTION INTRAVENOUS at 02:55

## 2022-03-24 RX ADMIN — TIOTROPIUM BROMIDE INHALATION SPRAY 2 PUFF: 3.12 SPRAY, METERED RESPIRATORY (INHALATION) at 07:48

## 2022-03-24 RX ADMIN — MONTELUKAST SODIUM 10 MG: 10 TABLET ORAL at 20:42

## 2022-03-24 RX ADMIN — BUDESONIDE AND FORMOTEROL FUMARATE DIHYDRATE 2 PUFF: 160; 4.5 AEROSOL RESPIRATORY (INHALATION) at 07:50

## 2022-03-24 RX ADMIN — SERTRALINE 100 MG: 100 TABLET, FILM COATED ORAL at 20:42

## 2022-03-24 RX ADMIN — ROPINIROLE HYDROCHLORIDE 1 MG: 1 TABLET, FILM COATED ORAL at 20:42

## 2022-03-24 RX ADMIN — ATORVASTATIN CALCIUM 20 MG: 20 TABLET, FILM COATED ORAL at 08:35

## 2022-03-24 RX ADMIN — PANTOPRAZOLE SODIUM 40 MG: 40 TABLET, DELAYED RELEASE ORAL at 15:44

## 2022-03-24 RX ADMIN — BUDESONIDE AND FORMOTEROL FUMARATE DIHYDRATE 2 PUFF: 160; 4.5 AEROSOL RESPIRATORY (INHALATION) at 20:11

## 2022-03-24 RX ADMIN — ROPINIROLE HYDROCHLORIDE 1 MG: 1 TABLET, FILM COATED ORAL at 08:34

## 2022-03-24 RX ADMIN — BUSPIRONE HYDROCHLORIDE 10 MG: 10 TABLET ORAL at 14:12

## 2022-03-24 RX ADMIN — GLIPIZIDE 10 MG: 10 TABLET ORAL at 08:34

## 2022-03-24 ASSESSMENT — PAIN DESCRIPTION - DESCRIPTORS: DESCRIPTORS: ACHING

## 2022-03-24 ASSESSMENT — PAIN SCALES - GENERAL
PAINLEVEL_OUTOF10: 3
PAINLEVEL_OUTOF10: 0
PAINLEVEL_OUTOF10: 3

## 2022-03-24 ASSESSMENT — PAIN DESCRIPTION - FREQUENCY: FREQUENCY: INTERMITTENT

## 2022-03-24 ASSESSMENT — PAIN DESCRIPTION - LOCATION: LOCATION: BACK

## 2022-03-24 ASSESSMENT — PAIN DESCRIPTION - ORIENTATION: ORIENTATION: LOWER

## 2022-03-24 ASSESSMENT — PAIN DESCRIPTION - PROGRESSION: CLINICAL_PROGRESSION: NOT CHANGED

## 2022-03-24 ASSESSMENT — PAIN DESCRIPTION - ONSET: ONSET: ON-GOING

## 2022-03-24 ASSESSMENT — PAIN - FUNCTIONAL ASSESSMENT: PAIN_FUNCTIONAL_ASSESSMENT: ACTIVITIES ARE NOT PREVENTED

## 2022-03-24 ASSESSMENT — PAIN DESCRIPTION - PAIN TYPE: TYPE: ACUTE PAIN

## 2022-03-24 NOTE — PLAN OF CARE
Problem: Falls - Risk of:  Goal: Will remain free from falls  Description: Will remain free from falls  Outcome: Met This Shift  Note: Patient up per self in room, gait steady. Problem: Falls - Risk of:  Goal: Absence of physical injury  Description: Absence of physical injury  Outcome: Met This Shift     Problem: Skin Integrity:  Goal: Will show no infection signs and symptoms  Description: Will show no infection signs and symptoms  Outcome: Met This Shift  Note: Continue skin assessment. No signs of breakdown. Afebrile today. Problem: Skin Integrity:  Goal: Absence of new skin breakdown  Description: Absence of new skin breakdown  Outcome: Met This Shift     Problem: Pain:  Goal: Pain level will decrease  Description: Pain level will decrease  Outcome: Met This Shift  Note: Patient denies pain today. Problem: Pain:  Goal: Control of acute pain  Description: Control of acute pain  Outcome: Met This Shift  Note: Denies pain today. Problem: Pain:  Goal: Control of chronic pain  Description: Control of chronic pain  Outcome: Met This Shift     Problem: Cardiac:  Goal: Ability to maintain an adequate cardiac output will improve  Description: Ability to maintain an adequate cardiac output will improve  Outcome: Met This Shift  Note: Sinus rhythm per telemetry, continue to monitor VS, telemetry. VS stable. Problem: Cardiac:  Goal: Hemodynamic stability will improve  Description: Hemodynamic stability will improve  Outcome: Met This Shift  Note: Sinus rhythm per telemetry, continue to monitor VS, telemetry. VS stable. Problem: Respiratory:  Goal: Respiratory status will improve  Description: Respiratory status will improve  Outcome: Met This Shift  Note: Continues on 2L nasal canula, home dose oxygen is 2-3L as needed.       Problem: Sensory:  Goal: General experience of comfort will improve  Description: General experience of comfort will improve  Outcome: Met This Shift     Problem: Discharge Planning:  Goal: Discharged to appropriate level of care  Description: Discharged to appropriate level of care  Outcome: Ongoing  Note: Continue discharge planning. Patient from home with spouse, planning return home at discharge. Problem: Fluid Volume:  Goal: Ability to achieve and maintain adequate urine output will improve  Description: Ability to achieve and maintain adequate urine output will improve  Outcome: Ongoing  Note: Continue to monitor urine output. Continue IV fluids NS at 100ml/hr. Patient up to restroom per self, urination unmeasured at times. Problem: Urinary Elimination:  Goal: Signs and symptoms of infection will decrease  Description: Signs and symptoms of infection will decrease  Outcome: Ongoing  Note: Frequent urination continues at times. Urinal at bedside. Problem: Urinary Elimination:  Goal: Ability to reestablish a normal urinary elimination pattern will improve - after catheter removal  Description: Ability to reestablish a normal urinary elimination pattern will improve  Outcome: Ongoing     Problem: Urinary Elimination:  Goal: Complications related to the disease process, condition or treatment will be avoided or minimized  Description: Complications related to the disease process, condition or treatment will be avoided or minimized  Outcome: Ongoing  Note: Afebrile today . continue IV antibiotics Zyvox. Problem: Impaired respiratory status  Goal: Lung sounds clear or within normal limits for patient  3/24/2022 1633 by Augusta Warner RN  Outcome: Ongoing  Note: Patient on nasal canula 2L, weaned from 3L today. Patient wears home oxygen at 2-3L prn at home. Care plan reviewed with patient and family. Patient and family verbalize understanding of the plan of care and contribute to goal setting.

## 2022-03-24 NOTE — PLAN OF CARE
Problem: Falls - Risk of:  Goal: Will remain free from falls  Description: Will remain free from falls  Outcome: Ongoing  Note: Bed locked & in low position, call light in reach, side-rails up x2, bed/chair alarm utilized, non-slip socks on when ambulating, reminded patient to use call light to call for assistance. Problem: Skin Integrity:  Goal: Absence of new skin breakdown  Description: Absence of new skin breakdown  Outcome: Ongoing  Note: Ongoing assessment & interventions provided throughout shift. Encouraging/assisting patient to turn as needed. No signs of skin breakdown       Problem: Pain:  Goal: Pain level will decrease  Description: Pain level will decrease  Outcome: Ongoing  Note: Ongoing assessment & interventions provided throughout shift. Reminded patient to report any pain, pressure, or shortness of breath to the nurse. Patient denies pain so far this shift       Problem: Discharge Planning:  Goal: Discharged to appropriate level of care  Description: Discharged to appropriate level of care  Outcome: Ongoing  Note: Case management and social work following for discharge needs. Patient plans to return home with wife at discharge. Problem: Cardiac:  Goal: Ability to maintain an adequate cardiac output will improve  Description: Ability to maintain an adequate cardiac output will improve  Outcome: Ongoing  Note: Ongoing assessment & interventions provided throughout shift. Patient on continuous telemetry monitoring, heart tones, vitals & pulses checked at least 3 times per shift & PRN. Vitals within acceptable limits. Patient with normal saline at 100 mL/hr and blood pressure has improved       Problem: Fluid Volume:  Goal: Ability to achieve and maintain adequate urine output will improve  Description: Ability to achieve and maintain adequate urine output will improve  Outcome: Ongoing  Note: Patient able to void.  Bladder scan performed yesterday and showed 59 mL     Problem: Respiratory:  Goal: Respiratory status will improve  Description: Respiratory status will improve  Outcome: Ongoing  Note: Patient on 3L which is his baseline at home as needed. Problem: Urinary Elimination:  Goal: Signs and symptoms of infection will decrease  Description: Signs and symptoms of infection will decrease  Outcome: Ongoing  Note: Patient receiving Zyvox IV q12. States symptoms are improving     Problem: Urinary Elimination:  Goal: Ability to reestablish a normal urinary elimination pattern will improve - after catheter removal  Description: Ability to reestablish a normal urinary elimination pattern will improve  Outcome: Ongoing     Problem: Skin Integrity:  Goal: Will show no infection signs and symptoms  Description: Will show no infection signs and symptoms      Care plan reviewed with patient. Patient verbalizes understanding of the plan of care and contribute to goal setting.

## 2022-03-24 NOTE — PROGRESS NOTES
Progress note: Infectious diseases    Patient - Rona Nolan,  Age - 74 y.o.    - 1955      Room Number - 3B-34/034-A   MRN -  [id number]   Acct # - [de-identified]  Date of Admission -  3/22/2022 11:31 AM    SUBJECTIVE:   No fever, blood and urine cx is negative  OBJECTIVE   VITALS    height is 5' 8\" (1.727 m) and weight is 227 lb 12.8 oz (103.3 kg). His oral temperature is 97.6 °F (36.4 °C). His blood pressure is 117/68 and his pulse is 64. His respiration is 20 and oxygen saturation is 96%.        Wt Readings from Last 3 Encounters:   22 227 lb 12.8 oz (103.3 kg)   03/10/22 225 lb (102.1 kg)   22 232 lb 6 oz (105.4 kg)       I/O (24 Hours)    Intake/Output Summary (Last 24 hours) at 3/24/2022 0947  Last data filed at 3/24/2022 1718  Gross per 24 hour   Intake 2239.13 ml   Output 2000 ml   Net 239.13 ml       General Appearance  Awake, alert, oriented,  not  In acute distress  HEENT - normocephalic, atraumatic, pink conjunctiva,  anicteric sclera  Neck - Supple, no mass  Lungs -  Bilateral good air entry, no rhonchi, no wheeze  Cardiovascular - Heart sounds are normal.     Abdomen - obese  Neurologic -oriented  Skin - No bruising or bleeding  Extremities - No edema, no cyanosis, clubbing     MEDICATIONS:      linezolid  600 mg IntraVENous Q12H    traZODone  50 mg Oral Nightly    atorvastatin  20 mg Oral Daily    busPIRone  10 mg Oral TID    clopidogrel  75 mg Oral Daily    fluticasone  1 spray Each Nostril Daily    glipiZIDE  10 mg Oral QAM AC    [Held by provider] losartan  50 mg Oral Daily    metFORMIN  500 mg Oral BID WC    montelukast  10 mg Oral Nightly    pantoprazole  40 mg Oral BID    ranolazine  500 mg Oral BID    rOPINIRole  1 mg Oral TID    sertraline  100 mg Oral BID    sodium chloride flush  5-40 mL IntraVENous 2 times per day    enoxaparin  40 mg SubCUTAneous Q24H    budesonide-formoterol  2 puff Inhalation BID    tiotropium  2 puff Inhalation Daily      sodium chloride      sodium chloride 100 mL/hr at 03/23/22 2021    dextrose       cyclobenzaprine, sodium chloride flush, sodium chloride, ondansetron **OR** ondansetron, polyethylene glycol, acetaminophen **OR** acetaminophen, glucagon (rDNA), dextrose, magnesium sulfate, albuterol, glucose, dextrose bolus (hypoglycemia) **OR** dextrose bolus (hypoglycemia)      LABS:     CBC:   Recent Labs     03/22/22  1145 03/23/22  0409   WBC 11.5* 7.2   HGB 16.2 12.9*    90*     BMP:    Recent Labs     03/22/22  1145 03/23/22  0409    141   K 4.8 4.7    109   CO2 21* 23   BUN 21 18   CREATININE 1.4* 1.3*   GLUCOSE 169* 167*     Calcium:  Recent Labs     03/23/22  0409   CALCIUM 8.1*     Ionized Calcium:No results for input(s): IONCA in the last 72 hours. Magnesium:  Recent Labs     03/22/22  1145   MG 1.2*     Phosphorus:No results for input(s): PHOS in the last 72 hours. BNP:No results for input(s): BNP in the last 72 hours.   Glucose:  Recent Labs     03/23/22  1656 03/23/22  1959 03/24/22  0808   POCGLU 137* 105 105      Recent Labs     03/22/22  1255   LACTA 1.6     Lactic Acid:   Recent Labs     03/22/22  1255   LACTA 1.6        CULTURES:   UA:   Recent Labs     03/22/22  2115   PHUR 5.0   COLORU YELLOW   PROTEINU NEGATIVE   BLOODU LARGE*   RBCUA 15-20   WBCUA 50-75   BACTERIA NONE SEEN   NITRU NEGATIVE   GLUCOSEU NEGATIVE   BILIRUBINUR NEGATIVE   UROBILINOGEN 0.2   KETUA NEGATIVE     Micro:   Lab Results   Component Value Date    BC No growth-preliminary  03/22/2022        Problem list of patient:     Patient Active Problem List   Diagnosis Code    Diabetes ([facility] [address] E11.9    Hyperlipidemia E78.5    Gastroesophageal reflux disease K21.9    PARRISH (obstructive sleep apnea) G47.33    Chronic back pain M54.9, G89.29    DDD (degenerative disc disease), cervical M50.30    Asthma J45.909    Lower urinary tract symptoms (LUTS) R39.9    Hypogonadism in male E29.1    Low testosterone R79.89    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    Left thyroid nodule E04.1    Moderate COPD (chronic obstructive pulmonary disease) ([facility]) J44.9    Class 2 severe obesity due to excess calories with serious comorbidity and body mass index (BMI) of 37.0 to 37.9 in adult ([facility] E66.01, Z68.37    Vocal cord disease J38.3    Erythrocytosis D75.1    History of anxiety disorder Z86.59    Personal history of tobacco use, presenting hazards to health Z87.891    Hypertensive disorder I10    COPD (chronic obstructive pulmonary disease) ([facility] J44.9    Hypotension I95.9    UTI (urinary tract infection) N39.0    Dependence on supplemental oxygen Z99.81    S/P transurethral Photo Vaporization of Prostate Z90.79         ASSESSMENT/PLAN   UTI; culture remains negative.  Has lower urinary symptoms  Hx of TURP  DM  Will plan for oral antibiotic on discharge    Jaron Evans MD, MD, FACP 3/24/2022 9:47 AM

## 2022-03-24 NOTE — CARE COORDINATION
Collaborative Discharge Planning    Chris Edwards  :  1955  MRN:  [id number]    ADMIT DATE:  3/22/2022      Discharge Planning Discharge Planning  Living Arrangements: Spouse/Significant Other  Support Systems: Spouse/Significant Other,Children,Family Members  Potential Assistance Needed: N/A  Type of Home Care Services: None  Patient expects to be discharged to[de-identified] House  Expected Discharge Date: 22     [facility] Notes /Social Work Whiteboard Notes  /Social Work Whiteboard: 3/24 CM: Plans home with wife. Has home O2 thru [facility]. Current with pain clinic. Procedure   3/22 CXR: Cardiomegaly with no acute intrathoracic process. 3/23 Echo: EF 55%. Discharge Plan Home with family    Discharge Milestones and Delays: Administering IV medications and Clinical status     Family MD and ID following. Urology signed off. Blood cultures are pending x2. Zyvox iv q12hr. Per ID, need to wait until blood cultures are final prior to stopping antibiotics.          SIGNED:  Isha Barnett RN   3/24/2022, 1:32 PM

## 2022-03-24 NOTE — PROGRESS NOTES
[facility] 58  INPATIENT OCCUPATIONAL THERAPY  [facility] CCU-STEPDOWN 3B  EVALUATION    Time:   Time In: 64  Time Out: 0901  Timed Code Treatment Minutes: 8 Minutes  Minutes: 18          Date: 3/24/2022  Patient Name: Kimberly Stevens,   Gender: male      MRN: [id number]  : 1955  (74 y.o.)  Referring Practitioner: Kwame Sanchez MD  Diagnosis: Hypotension  Additional Pertinent Hx: per chart review; Kimberly Stevens is a 74 y.o. male who presents with complaint of low blood pressure, patient was following up with his cardiologist who noted that his systolic blood pressures was in the 80s. Patient states that he is status post photo vaporization of his prostate, done on the  of last month. States that he has had hematuria, and frequent UTIs since the procedure. Patient states that he was told by his urologist that he is septic,    Restrictions/Precautions:  Restrictions/Precautions: Fall Risk,General Precautions    Subjective  Chart Reviewed: Yes,Orders,Progress Notes,History and Physical  Patient assessed for rehabilitation services?: Yes  Family / Caregiver Present: No    Subjective: RN approved session; patient supine in bed and agreeable to eval. A & O x 4.states he wants to wait for his wife to go for a walk or complete his ADLs.     Pain:  Pain Assessment  Patient Currently in Pain: Denies    Vitals: Vitals not assessed per clinical judgement, see nursing flowsheet    Social/Functional History:  Lives With: Spouse  Type of Home: House  Home Layout: One level  Home Access: Stairs to enter without rails  Entrance Stairs - Number of Steps: 1  Home Equipment: Rolling walker   Bathroom Shower/Tub: Tub/Shower unit  Bathroom Toilet: Handicap height  Bathroom Accessibility: Accessible       ADL Assistance: Independent  Ambulation Assistance: Independent  Transfer Assistance: Independent    Active : Yes  Occupation: Retired,Part time employment  [address]: enjoys [place] and 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 Additional Comments: did not use an AD prior to admit. VISION:WFL    HEARING:  WFL    COGNITION: WFL    RANGE OF MOTION:  Right Upper Extremity: WFL  Left Upper Extremity:  WFL    STRENGTH:  Right Upper Extremity: WFL  Left Upper Extremity:  WFL    SENSATION:   WFL    ADL:   Lower Extremity Dressing: Independent. doff/don socks seated EOB  Toilet Transfer: Stand By Assistance. with no AD; cues for pacing self. BALANCE:  Sitting Balance:  Independent. seated EOB  Standing Balance: Stand By Assistance. with no AD    BED MOBILITY:  Supine to Sit: Modified Independent with use of bedrails. Patient wanted stay seated on EOB upon OT arrival. Did not want to sit in recliner due to expecting his wife arriving soon. TRANSFERS:  Sit to Stand:  Stand By Assistance. cues for hand placement     FUNCTIONAL MOBILITY:  Assistive Device: None  Assist Level:  Stand By Assistance. Distance: To and from bathroom           Activity Tolerance:  Patient tolerance of  treatment: good. No c/o pain, fatigue or SOB. Did not demo any LOB. Assessment:  Assessment: patient would benefit from continued, skilled OT to increase activity tolerance, ease and (I) with ADLs and functional transfers to safely transition to prior living enviornment, decrease caregiver burden and increase occupational performance. Performance deficits / Impairments: Decreased ADL status,Decreased endurance  Prognosis: Good  REQUIRES OT FOLLOW UP: Yes  Decision Making: Low Complexity    Treatment Initiated: Treatment and education initiated within context of evaluation. Evaluation time included review of current medical information, gathering information related to past medical, social and functional history, completion of standardized testing, formal and informal observation of tasks, assessment of data and development of plan of care and goals.   Treatment time included skilled education and facilitation of tasks to increase safety and independence with ADL's for improved functional independence and quality of life. Discharge Recommendations:  Continue to assess pending progress    Patient Education:  OT Education: [employer]/ [facility] [place],Transfer Training  Patient Education: importance of increasing activity    Equipment Recommendations:  Equipment Needed: No    Plan:  Times per week: 3x  Times per day: Daily  Current Treatment Recommendations: Neuromuscular Re-education,Endurance Training,Patient/Caregiver Education & Training,Self-Care / ADL,Safety Education & Training. See long-term goal time frame for expected duration of plan of care. If no long-term goals established, a short length of stay is anticipated. Goals:  Patient goals : return home at [facility]  Short term goals  Time Frame for Short term goals: by discharge  Short term goal 1: patient will tolerate 6 min functional standing with 2 hand release with MOD I to increase activity tolerance for self care routine. Short term goal 2: patient will functionally ambulate house hold distances with MOD I with 0-1 verbal cues for safety and sequencing. Short term goal 3: patient will complete ADL routine with MOD I. Following session, patient left in safe position with all fall risk precautions in place.

## 2022-03-24 NOTE — PROGRESS NOTES
[facility] [address]  INPATIENT PHYSICAL THERAPY  EVALUATION  [facility]B-34/034-A    Time In: 2847  Time Out: 5274  Timed Code Treatment Minutes: 8 Minutes  Minutes: 16          Date: 3/24/2022  Patient Name: Vesta Gar,  Gender:  male        MRN: [id number]  : 1955  (74 y.o.)      Referring Practitioner: Radha Mcgregor MD  Diagnosis: Hypotension  Additional Pertinent Hx: Vesta Gar is a 74 y.o. male who presents with complaint of low blood pressure, patient was following up with his cardiologist who noted that his systolic blood pressures was in the 80s. Patient states that he is status post photo vaporization of his prostate, done on the  of last month. States that he has had hematuria, and frequent UTIs since the procedure. Patient states that he was told by his urologist that he is septic,     Restrictions/Precautions:  Restrictions/Precautions: Fall Risk,General Precautions    Subjective:  Chart Reviewed: Yes  Patient assessed for rehabilitation services?: Yes  Family / Caregiver Present: Yes (wife)  Subjective: RN approved session.  Pt pleasant and agreeable to therapy    General:  Overall Orientation Status: Within Functional Limits  Follows Commands: Within Functional Limits    Vision: Impaired  Vision Exceptions: Wears glasses for reading    Hearing: Within functional limits         Pain: 0/10: denies pain     Vitals: Oxygen: 3L NC     Social/Functional History:    Lives With: Spouse  Type of Home: House  Home Layout: One level  Home Access: Stairs to enter without rails  Entrance Stairs - Number of Steps: 1  Home Equipment: Rolling walker     Bathroom Shower/Tub: Tub/Shower unit  Bathroom Toilet: Handicap height  Bathroom Accessibility: Accessible       ADL Assistance: Independent     Ambulation Assistance: Independent  Transfer Assistance: Independent    Active : Yes  Occupation: Retired,Part time employment  Leisure & Hobbies: enjoys [employer] and 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 Additional Comments: did not use an AD prior to admit. OBJECTIVE:  Range of Motion:  Bilateral Lower Extremity: WFL    Strength:  Bilateral Lower Extremity: WFL    Balance:  Static Sitting Balance:  Modified Independent  Static Standing Balance: Supervision    Bed Mobility:  Supine to Sit: Modified Independent  Sit to Supine: Modified Independent     Transfers:  Sit to Stand: Supervision  Stand to Sit:Supervision    Ambulation:  Supervision  Distance: 150'   Surface: Level Tile  Device:No Device  Gait Deviations:  Decreased Step Length Bilaterally and Decreased Gait Speed    Functional Outcome Measures: Completed  AM-PAC Inpatient Mobility Raw Score : 22  AM-PAC Inpatient T-Scale Score : 53.28    ASSESSMENT:  Activity Tolerance:  Patient tolerance of  treatment: good. Treatment Initiated: Treatment and education initiated within context of evaluation. Evaluation time included review of current medical information, gathering information related to past medical, social and functional history, completion of standardized testing, formal and informal observation of tasks, assessment of data and development of plan of care and goals. Treatment time included skilled education and facilitation of tasks to increase safety and independence with functional mobility for improved independence and quality of life. Assessment: Body structures, Functions, Activity limitations: Decreased functional mobility ,Decreased endurance,Decreased posture  Assessment: Pt demonstrates a decrease in baseline by way of bed mobility, transfers and ambulation secondary to decreased activity tolerance, strength, fatigue, and balance deficits. Pt will benefit from skilled PT services throughout admission and beyond hospital discharge for improvements in functional mobility and in order to decrease fall risk and return pt to PLOF.    Prognosis: Good    REQUIRES PT FOLLOW UP: Yes    Discharge Recommendations:  Discharge Recommendations: Home with assist PRN    Patient Education:  PT Education: [facility],Functional Mobility Training    Equipment Recommendations:  Equipment Needed: No    Plan:  Times per week: 3-5x GM  Times per day: Daily  Current Treatment Recommendations: Strengthening,Stair training,Gait Training    Goals:  Patient goals : none stated  Short term goals  Time Frame for Short term goals: by discharge  Short term goal 1: bed mobility with HOB flat, no rails, mod I for increased functional ind  Short term goal 2: sit<>Stand from various surfaces with LRD mod I for safe transfers  Short term goal 3: ambulate 200' with LRD mod I for safe household distances  Long term goals  Time Frame for Long term goals : NA d/t short ELOS    Following session, patient left in safe position with all fall risk precautions in place.     Ashley Rodriguez PT, DPT

## 2022-03-24 NOTE — PROGRESS NOTES
Family Medicine Progress Notes/Coverage    Today's Date: 3/24/22  3B-34/034-A  Medical Record # [id number]  CSN/Account # [de-identified]      Mr. Marielle Arellano admitted on 3/22/2022    Thank you Dr Yogi Mcnair and MARITA Dyer of seeing Mr. Lance Herter / Interval History :     SOB is at baseline  Slept okay  Still with post void dysuria  Residual urine yesterday 59 ml  BP is no longer hypotensive ( Losartan is on hold)  Now on Zivox  Reviewed notes, consults, laboratory and radiology results,    Objective:       Physical Exam:  Patient Vitals for the past 24 hrs:   BP Temp Temp src Pulse Resp SpO2   03/24/22 0340 (!) 142/71 97.6 °F (36.4 °C) Oral 68 18 95 %   03/23/22 2353 (!) 118/58 97.6 °F (36.4 °C) Oral 70 18 95 %   03/23/22 2000 137/76 98.2 °F (36.8 °C) Oral 79 18 95 %   03/23/22 1246 138/68 98.3 °F (36.8 °C) Oral 79 18 96 %   03/23/22 1015 130/72 -- -- 76 -- --   03/23/22 0900 -- -- -- -- 18 94 %   03/23/22 0800 132/74 97.9 °F (36.6 °C) Oral 78 18 96 %       General Appearance:  Alert, cooperative, no distress, appears stated age   HEENT:  Neck:      Chest/Lungs:  Heart: CTA  RRR   Abdomen:  Back: Globular soft   Extremities:  Neurological Exam: No edema  WNL       Assessment:     Admitting Diagnosis:    Hypotension [I95.9]    Active Hospital Problems    Diagnosis Date Noted    Hypotension [I95.9] 03/22/2022     Priority: High    UTI (urinary tract infection) [N39.0] 03/22/2022     Priority: High    S/P transurethral Photo Vaporization of Prostate [Z90.79] 02/25/2022     Priority: High    Dependence on supplemental oxygen [Z99.81] 03/22/2022     Priority: Medium    COPD (chronic obstructive pulmonary disease) ([facility] [address] [J44.9] 02/25/2022     Priority: Medium    Hypertensive disorder [I10] 01/22/2021     Priority: Medium    Class 2 severe obesity due to excess calories with serious comorbidity and body mass index (BMI) of 37.0 to 37.9 in adult [facility]) [E66.01, Z68.37] 08/13/2018     Priority: Medium    Moderate COPD (chronic obstructive pulmonary disease) [facility] [J44.9]      Priority: Medium    Hypogonadism in male [E29.1] 02/12/2016     Priority: Medium    Lower urinary tract symptoms (LUTS) [R39.9] 02/12/2016     Priority: Medium    Diabetes ([facility] [facility] [E11.9]      Priority: Medium    Hyperlipidemia [E78.5]      Priority: Medium    Gastroesophageal reflux disease [K21.9]      Priority: Medium    PARRISH (obstructive sleep apnea) [G47.33]      Priority: Medium    Asthma [J45.909]      Priority: Medium       Plan:     Discussed plans with the nursing staff  Increase activity, ambulate in the hallway    Medications, Laboratories and Imaging results:    Scheduled Meds:   linezolid  600 mg IntraVENous Q12H    traZODone  50 mg Oral Nightly    atorvastatin  20 mg Oral Daily    busPIRone  10 mg Oral TID    clopidogrel  75 mg Oral Daily    fluticasone  1 spray Each Nostril Daily    glipiZIDE  10 mg Oral QAM AC    [Held by provider] losartan  50 mg Oral Daily    metFORMIN  500 mg Oral BID WC    montelukast  10 mg Oral Nightly    pantoprazole  40 mg Oral BID    ranolazine  500 mg Oral BID    rOPINIRole  1 mg Oral TID    sertraline  100 mg Oral BID    sodium chloride flush  5-40 mL IntraVENous 2 times per day    enoxaparin  40 mg SubCUTAneous Q24H    budesonide-formoterol  2 puff Inhalation BID    tiotropium  2 puff Inhalation Daily     Continuous Infusions:   sodium chloride      sodium chloride 100 mL/hr at 03/23/22 2021    dextrose       PRN Meds:cyclobenzaprine, sodium chloride flush, sodium chloride, ondansetron **OR** ondansetron, polyethylene glycol, acetaminophen **OR** acetaminophen, glucagon (rDNA), dextrose, magnesium sulfate, albuterol, glucose, dextrose bolus (hypoglycemia) **OR** dextrose bolus (hypoglycemia)    Imaging:    ECHO Heart 3/23/22    Summary   Ejection fraction is visually estimated at 55%.    Overall left ventricular function is normal.      Lab Review :    Lab Results   Component Value Date    WBC 7.2 03/23/2022    HGB 12.9 (L) 03/23/2022    HCT 39.4 (L) 03/23/2022    MCV 93.4 03/23/2022    PLT 90 (L) 03/23/2022     Lab Results   Component Value Date    CREATININE 1.3 (H) 03/23/2022    BUN 18 03/23/2022     03/23/2022    K 4.7 03/23/2022     03/23/2022    CO2 23 03/23/2022     Lab Results   Component Value Date    CKTOTAL 202 (H) 12/20/2013    CKMB 1.0 12/20/2013    TROPONINI <0.006 12/20/2013     Lab Results   Component Value Date    ALT 29 03/16/2022    AST 21 03/16/2022    ALKPHOS 45 01/05/2021    BILITOT 0.5 01/05/2021     Lab Results   Component Value Date    LIPASE 21.1 04/08/2019         Electronically signed by Rebeca Diane MD on 3/24/22 at 7:34 AM KAREN Gustafson M.D.

## 2022-03-25 VITALS
WEIGHT: 227.8 LBS | DIASTOLIC BLOOD PRESSURE: 80 MMHG | SYSTOLIC BLOOD PRESSURE: 162 MMHG | HEART RATE: 71 BPM | RESPIRATION RATE: 18 BRPM | BODY MASS INDEX: 34.53 KG/M2 | OXYGEN SATURATION: 93 % | TEMPERATURE: 97.5 F | HEIGHT: 68 IN

## 2022-03-25 LAB
ALBUMIN SERPL-MCNC: 4 G/DL (ref 3.5–5.1)
ALP BLD-CCNC: 46 U/L (ref 38–126)
ALT SERPL-CCNC: 19 U/L (ref 11–66)
ANION GAP SERPL CALCULATED.3IONS-SCNC: 10 MEQ/L (ref 8–16)
AST SERPL-CCNC: 18 U/L (ref 5–40)
BILIRUB SERPL-MCNC: 0.2 MG/DL (ref 0.3–1.2)
BUN BLDV-MCNC: 8 MG/DL (ref 7–22)
CALCIUM SERPL-MCNC: 7.9 MG/DL (ref 8.5–10.5)
CHLORIDE BLD-SCNC: 105 MEQ/L (ref 98–111)
CO2: 25 MEQ/L (ref 23–33)
CREAT SERPL-MCNC: 1.3 MG/DL (ref 0.4–1.2)
ERYTHROCYTE [DISTWIDTH] IN BLOOD BY AUTOMATED COUNT: 14.5 % (ref 11.5–14.5)
ERYTHROCYTE [DISTWIDTH] IN BLOOD BY AUTOMATED COUNT: 49.4 FL (ref 35–45)
GFR SERPL CREATININE-BSD FRML MDRD: 55 ML/MIN/1.73M2
GLUCOSE BLD-MCNC: 108 MG/DL (ref 70–108)
GLUCOSE BLD-MCNC: 141 MG/DL (ref 70–108)
GLUCOSE BLD-MCNC: 99 MG/DL (ref 70–108)
HCT VFR BLD CALC: 43.4 % (ref 42–52)
HEMOGLOBIN: 13.9 GM/DL (ref 14–18)
MCH RBC QN AUTO: 29.8 PG (ref 26–33)
MCHC RBC AUTO-ENTMCNC: 32 GM/DL (ref 32.2–35.5)
MCV RBC AUTO: 92.9 FL (ref 80–94)
PLATELET # BLD: 89 THOU/MM3 (ref 130–400)
PMV BLD AUTO: 12.2 FL (ref 9.4–12.4)
POTASSIUM SERPL-SCNC: 4.5 MEQ/L (ref 3.5–5.2)
RBC # BLD: 4.67 MILL/MM3 (ref 4.7–6.1)
SODIUM BLD-SCNC: 140 MEQ/L (ref 135–145)
TOTAL PROTEIN: 6.6 G/DL (ref 6.1–8)
WBC # BLD: 6.6 THOU/MM3 (ref 4.8–10.8)

## 2022-03-25 PROCEDURE — 36415 COLL VENOUS BLD VENIPUNCTURE: CPT

## 2022-03-25 PROCEDURE — 82948 REAGENT STRIP/BLOOD GLUCOSE: CPT

## 2022-03-25 PROCEDURE — 85027 COMPLETE CBC AUTOMATED: CPT

## 2022-03-25 PROCEDURE — 6370000000 HC RX 637 (ALT 250 FOR IP): Performed by: EMERGENCY MEDICINE

## 2022-03-25 PROCEDURE — 2580000003 HC RX 258: Performed by: EMERGENCY MEDICINE

## 2022-03-25 PROCEDURE — 80053 COMPREHEN METABOLIC PANEL: CPT

## 2022-03-25 PROCEDURE — 6360000002 HC RX W HCPCS: Performed by: INTERNAL MEDICINE

## 2022-03-25 PROCEDURE — 94640 AIRWAY INHALATION TREATMENT: CPT

## 2022-03-25 PROCEDURE — 94760 N-INVAS EAR/PLS OXIMETRY 1: CPT

## 2022-03-25 RX ORDER — TETRACYCLINE HYDROCHLORIDE 500 MG/1
500 CAPSULE ORAL 4 TIMES DAILY
Qty: 112 CAPSULE | Refills: 0 | Status: SHIPPED | OUTPATIENT
Start: 2022-03-25 | End: 2022-04-26 | Stop reason: ALTCHOICE

## 2022-03-25 RX ORDER — LOSARTAN POTASSIUM 50 MG/1
25 TABLET ORAL DAILY
Qty: 30 TABLET | Refills: 3 | Status: SHIPPED | OUTPATIENT
Start: 2022-03-25

## 2022-03-25 RX ADMIN — SODIUM CHLORIDE, PRESERVATIVE FREE 10 ML: 5 INJECTION INTRAVENOUS at 09:02

## 2022-03-25 RX ADMIN — LINEZOLID 600 MG: 600 INJECTION, SOLUTION INTRAVENOUS at 04:14

## 2022-03-25 RX ADMIN — RANOLAZINE 500 MG: 500 TABLET, FILM COATED, EXTENDED RELEASE ORAL at 09:01

## 2022-03-25 RX ADMIN — GLIPIZIDE 10 MG: 10 TABLET ORAL at 09:01

## 2022-03-25 RX ADMIN — ATORVASTATIN CALCIUM 20 MG: 20 TABLET, FILM COATED ORAL at 09:01

## 2022-03-25 RX ADMIN — ROPINIROLE HYDROCHLORIDE 1 MG: 1 TABLET, FILM COATED ORAL at 09:01

## 2022-03-25 RX ADMIN — TIOTROPIUM BROMIDE INHALATION SPRAY 2 PUFF: 3.12 SPRAY, METERED RESPIRATORY (INHALATION) at 08:45

## 2022-03-25 RX ADMIN — CLOPIDOGREL BISULFATE 75 MG: 75 TABLET ORAL at 09:01

## 2022-03-25 RX ADMIN — BUSPIRONE HYDROCHLORIDE 10 MG: 10 TABLET ORAL at 09:01

## 2022-03-25 RX ADMIN — METFORMIN HYDROCHLORIDE 500 MG: 500 TABLET ORAL at 09:01

## 2022-03-25 RX ADMIN — PANTOPRAZOLE SODIUM 40 MG: 40 TABLET, DELAYED RELEASE ORAL at 09:01

## 2022-03-25 RX ADMIN — BUDESONIDE AND FORMOTEROL FUMARATE DIHYDRATE 2 PUFF: 160; 4.5 AEROSOL RESPIRATORY (INHALATION) at 08:44

## 2022-03-25 RX ADMIN — LINEZOLID 600 MG: 600 INJECTION, SOLUTION INTRAVENOUS at 13:20

## 2022-03-25 RX ADMIN — SERTRALINE 100 MG: 100 TABLET, FILM COATED ORAL at 09:01

## 2022-03-25 RX ADMIN — SODIUM CHLORIDE: 9 INJECTION, SOLUTION INTRAVENOUS at 06:53

## 2022-03-25 RX ADMIN — FLUTICASONE PROPIONATE 1 SPRAY: 50 SPRAY, METERED NASAL at 09:01

## 2022-03-25 ASSESSMENT — PAIN SCALES - GENERAL
PAINLEVEL_OUTOF10: 0

## 2022-03-25 NOTE — PROGRESS NOTES
Family Medicine Progress Notes/Coverage    Today's Date: 3/25/22  3B-34/034-A  Medical Record # [id number]  CSN/Account # [de-identified]      Mr. Jeanmarie Klinefelter admitted on 3/22/2022        Subjective / Interval History :     Dysuria is much better  SOB stable with 2 L NC and inhalers  No abdominal pain  Reviewed notes, consults, laboratory and radiology results,    Objective:       Physical Exam:  Patient Vitals for the past 24 hrs:   BP Temp Temp src Pulse Resp SpO2   03/25/22 0853 (!) 145/73 97.7 °F (36.5 °C) Oral 65 20 94 %   03/25/22 0848 -- -- -- -- 18 93 %   03/25/22 0400 (!) 154/71 97.9 °F (36.6 °C) Oral 66 20 93 %   03/24/22 2330 121/71 98.3 °F (36.8 °C) Oral 78 18 92 %   03/24/22 2030 134/64 98.3 °F (36.8 °C) Oral 73 18 93 %   03/24/22 2011 -- -- -- -- 20 92 %   03/24/22 1601 130/69 97.5 °F (36.4 °C) Oral 71 18 93 %   03/24/22 1137 (!) 143/71 97.6 °F (36.4 °C) Oral 73 18 93 %       General Appearance:  Alert, cooperative, no distress, appears stated age   HEENT:  Neck:      Chest/Lungs:  Heart: CTA  RRR   Abdomen:  Back: Globular soft non tender   Extremities:  Neurological Exam: No edema  WNL       Assessment:     Admitting Diagnosis:    Hypotension [I95.9]    Active Hospital Problems    Diagnosis Date Noted    Hypotension [I95.9] 03/22/2022     Priority: High    UTI (urinary tract infection) [N39.0] 03/22/2022     Priority: High    S/P transurethral Photo Vaporization of Prostate [Z90.79] 02/25/2022     Priority: High    Dependence on supplemental oxygen [Z99.81] 03/22/2022     Priority: Medium    COPD (chronic obstructive pulmonary disease) ([facility] [J44.9] 02/25/2022     Priority: Medium    Hypertensive disorder [I10] 01/22/2021     Priority: Medium    Class 2 severe obesity due to excess calories with serious comorbidity and body mass index (BMI) of 37.0 to 37.9 in adult ([facility] [E66.01, H36.40] 08/13/2018     Priority: Medium    Moderate COPD (chronic obstructive pulmonary disease) ([facility] [J44.9]      Priority: Medium    Hypogonadism in male [E29.1] 02/12/2016     Priority: Medium    Lower urinary tract symptoms (LUTS) [R39.9] 02/12/2016     Priority: Medium    Diabetes ([facility] [E11.9]      Priority: Medium    Hyperlipidemia [E78.5]      Priority: Medium    Gastroesophageal reflux disease [K21.9]      Priority: Medium    PARRISH (obstructive sleep apnea) [G47.33]      Priority: Medium    Asthma [J45.909]      Priority: Medium       Plan:     Discussed plans with the nursing staff  Possible D/C this afternoon if okay with Dr Fany Henderson      10:25 discussed with Dr Grayland Runner , may go home after the Zyvox dose this afternoon.  D/C with Tetracycline 500 QID x 4 weeks    Medications, Laboratories and Imaging results:    Scheduled Meds:   linezolid  600 mg IntraVENous Q12H    traZODone  50 mg Oral Nightly    atorvastatin  20 mg Oral Daily    busPIRone  10 mg Oral TID    clopidogrel  75 mg Oral Daily    fluticasone  1 spray Each Nostril Daily    glipiZIDE  10 mg Oral QAM AC    losartan  50 mg Oral Daily    metFORMIN  500 mg Oral BID WC    montelukast  10 mg Oral Nightly    pantoprazole  40 mg Oral BID    ranolazine  500 mg Oral BID    rOPINIRole  1 mg Oral TID    sertraline  100 mg Oral BID    sodium chloride flush  5-40 mL IntraVENous 2 times per day    enoxaparin  40 mg SubCUTAneous Q24H    budesonide-formoterol  2 puff Inhalation BID    tiotropium  2 puff Inhalation Daily     Continuous Infusions:   sodium chloride      dextrose       PRN Meds:cyclobenzaprine, sodium chloride flush, sodium chloride, ondansetron **OR** ondansetron, polyethylene glycol, acetaminophen **OR** acetaminophen, glucagon (rDNA), dextrose, magnesium sulfate, albuterol, glucose, dextrose bolus (hypoglycemia) **OR** dextrose bolus (hypoglycemia)    Imaging:    Lab Review :    Lab Results Component Value Date    WBC 7.2 03/23/2022    HGB 12.9 (L) 03/23/2022    HCT 39.4 (L) 03/23/2022    MCV 93.4 03/23/2022    PLT 90 (L) 03/23/2022     Lab Results   Component Value Date    CREATININE 1.3 (H) 03/23/2022    BUN 18 03/23/2022     03/23/2022    K 4.7 03/23/2022     03/23/2022    CO2 23 03/23/2022     Lab Results   Component Value Date    CKTOTAL 202 (H) 12/20/2013    CKMB 1.0 12/20/2013    TROPONINI <0.006 12/20/2013     Lab Results   Component Value Date    ALT 29 03/16/2022    AST 21 03/16/2022    ALKPHOS 45 01/05/2021    BILITOT 0.5 01/05/2021     Lab Results   Component Value Date    LIPASE 21.1 04/08/2019         Electronically signed by Everardo Quesada MD on 3/25/22 at 10:12 AM KAREN Ortiz M.D.

## 2022-03-25 NOTE — CARE COORDINATION
3/25/22, 11:05 AM EDT    Patient goals/plan/ treatment preferences discussed by  and . Patient goals/plan/ treatment preferences reviewed with patient/ family. Patient/ family verbalize understanding of discharge plan and are in agreement with goal/plan/treatment preferences. Understanding was demonstrated using the teach back method. AVS provided by RN at time of discharge, which includes all necessary medical information pertaining to the patients current course of illness, treatment, post-discharge goals of care, and treatment preferences. IMM Letter  IMM Letter given to Patient/Family/Significant other/Guardian/POA/by[de-identified] Matheus Whitehead RN Case Manager  IMM Letter date given[de-identified] 03/25/22  IMM Letter time given[de-identified] 2024       Planning discharge home later today on 4 weeks of po antibiotics. To discharge after afternoon dose of iv Zyvox. Spoke with pt and wife, deny any discharge needs.

## 2022-03-25 NOTE — PLAN OF CARE
Problem: Falls - Risk of:  Goal: Will remain free from falls  Description: Will remain free from falls  3/25/2022 1226 by Saeed Gonzalez  Outcome: Ongoing  Note: Patient encouraged to call out to staff for assistance ambulating. Side rails up x2. Fall risk band worn and sign posted. Problem: Falls - Risk of:  Goal: Absence of physical injury  Description: Absence of physical injury  3/25/2022 1226 by Saeed Gonzalez  Outcome: Ongoing     Problem: Skin Integrity:  Goal: Will show no infection signs and symptoms  Description: Will show no infection signs and symptoms  3/25/2022 1226 by Saeed Gonzalez  Outcome: Ongoing     Problem: Skin Integrity:  Goal: Absence of new skin breakdown  Description: Absence of new skin breakdown  3/25/2022 1226 by Saeed Gonzalez  Outcome: Ongoing  Note: Patient encouraged to shift weight regularly. Pillow support utilized as necessary. Problem: Pain:  Goal: Pain level will decrease  Description: Pain level will decrease  3/25/2022 1226 by Saeed Gonzalez  Outcome: Ongoing  Note: Pain level reassessed regularly. Encouraged patient to call out to staff when in pain. Problem: Pain:  Goal: Control of acute pain  Description: Control of acute pain  3/25/2022 1226 by Saeed Gonzalez  Outcome: Ongoing     Problem: Pain:  Goal: Control of chronic pain  Description: Control of chronic pain  3/25/2022 1226 by Saeed Gonzalez  Outcome: Ongoing     Problem: Discharge Planning:  Goal: Discharged to appropriate level of care  Description: Discharged to appropriate level of care  3/25/2022 1226 by Saeed Gonzalez  Outcome: Ongoing  Note: Patient works with  and  to identify needs for successful discharge.       Problem: Cardiac:  Goal: Ability to maintain an adequate cardiac output will improve  Description: Ability to maintain an adequate cardiac output will improve  3/25/2022 1226 by Saeed Gonzalez  Outcome: Ongoing     Problem: Cardiac:  Goal: Hemodynamic stability will improve  Description: Hemodynamic stability will improve  3/25/2022 1226 by Fredy Lopez  Outcome: Ongoing  Note: Vitals assessed regularly. Nurse provides cluster care as able. Problem: Fluid Volume:  Goal: Ability to achieve and maintain adequate urine output will improve  Description: Ability to achieve and maintain adequate urine output will improve  3/25/2022 1226 by Fredy Lopez  Outcome: Ongoing  Note: Patient encouraged to drink fluids frequently. Patient encouraged to call out for any assistance with ambulating to bathroom. Problem: Respiratory:  Goal: Respiratory status will improve  Description: Respiratory status will improve  3/25/2022 1226 by Fredy Lopez  Outcome: Ongoing  Note: Breath sounds and breathing pattern assessed regularly. Patient encouraged to call out if short of breath. Problem: Sensory:  Goal: General experience of comfort will improve  Description: General experience of comfort will improve  3/25/2022 1226 by Fredy Lopez  Outcome: Ongoing  Note: Patient assessed for pain. Encouraged to call out if in discomfort. Problem: Urinary Elimination:  Goal: Signs and symptoms of infection will decrease  Description: Signs and symptoms of infection will decrease  3/25/2022 1226 by Fredy Lopez  Outcome: Ongoing  Note: Urine assessed for transparency, odor, and color.       Problem: Urinary Elimination:  Goal: Ability to reestablish a normal urinary elimination pattern will improve - after catheter removal  Description: Ability to reestablish a normal urinary elimination pattern will improve  3/25/2022 1226 by Fredy Lopez  Outcome: Ongoing     Problem: Urinary Elimination:  Goal: Complications related to the disease process, condition or treatment will be avoided or minimized  Description: Complications related to the disease process, condition or treatment will be avoided or minimized  3/25/2022 1226 by Fredy Lopez  Outcome: Ongoing     Problem: Impaired respiratory status  Goal: Lung sounds clear or within normal limits for patient  3/25/2022 1226 by Nohemi Naidu  Outcome: Ongoing  Note: Lung sounds assessed regularly for any changes. Currently lungs sound clear but diminished. Care plan reviewed with patient. Patient verbalizes understanding and participates in goal setting.

## 2022-03-25 NOTE — PLAN OF CARE
Problem: Falls - Risk of:  Goal: Will remain free from falls  Description: Will remain free from falls  3/25/2022 0345 by Candelario Deleon RN  Outcome: Met This Shift  Note: Bed locked & in low position, call light in reach, side-rails up x2, bed/chair alarm utilized, non-slip socks on when ambulating, reminded patient to use call light to call for assistance. 3/24/2022 1633 by Mary Ann Valerio RN  Outcome: Met This Shift  Note: Patient up per self in room, gait steady. Goal: Absence of physical injury  Description: Absence of physical injury  3/25/2022 0345 by Candelario Deleon RN  Outcome: Met This Shift  3/24/2022 1633 by Mary Ann Valerio RN  Outcome: Met This Shift     Problem: Skin Integrity:  Goal: Will show no infection signs and symptoms  Description: Will show no infection signs and symptoms  3/25/2022 0345 by Candelario Deleon RN  Outcome: Ongoing  3/24/2022 1633 by Mary Ann Valerio RN  Outcome: Met This Shift  Note: Continue skin assessment. No signs of breakdown. Afebrile today. Goal: Absence of new skin breakdown  Description: Absence of new skin breakdown  3/25/2022 0345 by Candelario Deleon RN  Outcome: Ongoing  3/24/2022 1633 by Mary Ann Valerio RN  Outcome: Met This Shift     Problem: Pain:  Description: Pain management should include both nonpharmacologic and pharmacologic interventions. Goal: Pain level will decrease  Description: Pain level will decrease  3/25/2022 0345 by Candelario Deleon RN  Outcome: Ongoing  3/24/2022 1633 by Mary Ann Valerio RN  Outcome: Met This Shift  Note: Patient denies pain today. Goal: Control of acute pain  Description: Control of acute pain  3/25/2022 0345 by Candelario Deleon RN  Outcome: Ongoing  3/24/2022 1633 by Mary Ann Valerio RN  Outcome: Met This Shift  Note: Denies pain today.    Goal: Control of chronic pain  Description: Control of chronic pain  3/25/2022 0345 by Candelario Deleon RN  Outcome: Ongoing  3/24/2022 1633 by Mary Ann Valerio RN  Outcome: Met This Shift     Problem: Discharge Planning:  Goal: Discharged to appropriate level of care  Description: Discharged to appropriate level of care  3/25/2022 0345 by Tree Rosario RN  Outcome: Ongoing  3/24/2022 1633 by Ericka Ferguson RN  Outcome: Ongoing  Note: Continue discharge planning. Patient from home with spouse, planning return home at discharge. Problem: Cardiac:  Goal: Ability to maintain an adequate cardiac output will improve  Description: Ability to maintain an adequate cardiac output will improve  3/25/2022 0345 by Tree Rosario RN  Outcome: Ongoing  3/24/2022 1633 by Ericka Ferguson RN  Outcome: Met This Shift  Note: Sinus rhythm per telemetry, continue to monitor VS, telemetry. VS stable. Goal: Hemodynamic stability will improve  Description: Hemodynamic stability will improve  3/25/2022 0345 by Tree Rosario RN  Outcome: Ongoing  3/24/2022 1633 by Ericka Fergusno RN  Outcome: Met This Shift  Note: Sinus rhythm per telemetry, continue to monitor VS, telemetry. VS stable. Problem: Fluid Volume:  Goal: Ability to achieve and maintain adequate urine output will improve  Description: Ability to achieve and maintain adequate urine output will improve  3/25/2022 0345 by Tree Rosario RN  Outcome: Ongoing  3/24/2022 1633 by Ericka Ferguson RN  Outcome: Ongoing  Note: Continue to monitor urine output. Continue IV fluids NS at 100ml/hr. Patient up to restroom per self, urination unmeasured at times. Problem: Respiratory:  Goal: Respiratory status will improve  Description: Respiratory status will improve  3/25/2022 0345 by Tree Rosario RN  Outcome: Ongoing  3/24/2022 1633 by Ericka Ferguson RN  Outcome: Met This Shift  Note: Continues on 2L nasal canula, home dose oxygen is 2-3L as needed.       Problem: Sensory:  Goal: General experience of comfort will improve  Description: General experience of comfort will improve  3/25/2022 0345 by Tree Rosario RN  Outcome: Ongoing  3/24/2022 1633 by Augusta Warner RN  Outcome: Met This Shift     Problem: Urinary Elimination:  Goal: Signs and symptoms of infection will decrease  Description: Signs and symptoms of infection will decrease  3/25/2022 0345 by Angela Solis RN  Outcome: Ongoing  3/24/2022 1633 by Augusta Warner RN  Outcome: Ongoing  Note: Frequent urination continues at times. Urinal at bedside. Goal: Ability to reestablish a normal urinary elimination pattern will improve - after catheter removal  Description: Ability to reestablish a normal urinary elimination pattern will improve  3/25/2022 0345 by Angela Solis RN  Outcome: Ongoing  3/24/2022 1633 by Augusta Warner RN  Outcome: Ongoing  Goal: Complications related to the disease process, condition or treatment will be avoided or minimized  Description: Complications related to the disease process, condition or treatment will be avoided or minimized  3/25/2022 0345 by Angela Solis RN  Outcome: Ongoing  3/24/2022 1633 by Augusta Warner RN  Outcome: Ongoing  Note: Afebrile today . Problem: Impaired respiratory status  Goal: Lung sounds clear or within normal limits for patient  3/25/2022 0345 by Angela Solis RN  Outcome: Ongoing  3/24/2022 2015 by Sorin Elkins RCP  Outcome: Met This Shift  Note:  Patient lung sounds are considered normal for their current lung condition. No signs of distress noted. Current treatment regimen appropriate     3/24/2022 1633 by Augusta Warner RN  Outcome: Ongoing  Note: Patient on nasal canula 2L, weaned from 3L today. Patient wears home oxygen at 2-3L prn at home.

## 2022-03-25 NOTE — PLAN OF CARE
Problem: Impaired respiratory status  Goal: Lung sounds clear or within normal limits for patient  3/24/2022 2015 by Zenaida Schulte RCP  Outcome: Met This Shift  Note:  Patient lung sounds are considered normal for their current lung condition. No signs of distress noted.  Current treatment regimen appropriate

## 2022-03-28 ENCOUNTER — CARE COORDINATION (OUTPATIENT)
Dept: CASE MANAGEMENT | Age: 67
End: 2022-03-28

## 2022-03-28 ENCOUNTER — TELEPHONE (OUTPATIENT)
Dept: FAMILY MEDICINE CLINIC | Age: 67
End: 2022-03-28

## 2022-03-28 DIAGNOSIS — I95.9 HYPOTENSION, UNSPECIFIED HYPOTENSION TYPE: Primary | ICD-10-CM

## 2022-03-28 LAB
BLOOD CULTURE, ROUTINE: NORMAL
BLOOD CULTURE, ROUTINE: NORMAL

## 2022-03-28 PROCEDURE — 1111F DSCHRG MED/CURRENT MED MERGE: CPT | Performed by: PHYSICAL MEDICINE & REHABILITATION

## 2022-03-28 NOTE — TELEPHONE ENCOUNTER
[facility] called stating that the Tetracycline needs a PA. Doxicycline is preferred but was told to DC due to it not working.     Please call [facility]

## 2022-03-28 NOTE — CARE COORDINATION
[facility] Transitions Initial Follow Up Call    Call within 2 business days of discharge: Yes    Patient: Kena Bahena Patient : 1955   MRN: [id number]  Reason for Admission: Hypotension/Acute prostatitis  Discharge Date: 3/25/22 RARS: Readmission Risk Score: 11.1 ( )      Last Discharge [facility]       Complaint Diagnosis Description Type Department Provider    3/22/22 Hypotension Acute prostatitis ED to Hosp-Admission (Discharged) (ADMITTED) Dodson Najjar, MD; Claudette Eisenberg. .. Spoke with wife, Margaret, said Kohli  is not doing the greatest but is ok. He is starting to get wheezy. Is using Duoneb txs and is starting to cough up thick tan colored phlegm. Encouraged use of IS/Acapella every hr to help exercise his lungs which also may make him cough up phlegm. Does have some SOB/GUTIERREZ often d/t COPD, using home O2 @ 2L. Monitoring his BP and are staying up. Reviewed medications, was given 5 days samples of tetracycline d/t needing prior auth which PCP office is aware of. His BS have been stable, 103 this morning. Will see PCP this week and is aware of other upcoming appts. Denies other needs. No other questions or concerns at this time. Will continue to follow.     Non-face-to-face services provided:  Scheduled appointment with PCP-   Scheduled appointment with Abdifatah Jarvis 23   Obtained and reviewed discharge summary and/or continuity of care documents    Care Transitions 24 Hour Call    Schedule Follow Up Appointment with PCP: Completed  Do you have any ongoing symptoms?: No  Do you have a copy of your discharge instructions?: Yes  Do you have all of your prescriptions and are they filled?: Yes  Have you been contacted by a [facility] Pharmacist?: No  Have you scheduled your follow up appointment?: Yes  How are you going to get to your appointment?: Car - family or friend to transport  Were you discharged with any Home Care or Post Acute Services: No  Patient DME: [employer]  Do you have support at home?: Partner/Spouse/SO  Do you feel like you have everything you need to keep you well at home?: Yes  Are you an active caregiver in your home?: No  Care Transitions Interventions     Transitions of Care Initial Call        Challenges to be reviewed by the provider   Additional needs identified to be addressed with provider: No  none             Method of communication with provider : none      Advance Care Planning:   Does patient have an Advance Directive: patient declined education. Was this a readmission? No  Patient stated reason for admission: low BP  Patients top risk factors for readmission: lack of knowledge about disease  medical condition-hypotension, CAD, DM, COPD  polypharmacy    Care Transition Nurse (CTN) contacted the family by telephone to perform post hospital discharge assessment. Verified name and  with family as identifiers. Provided introduction to self, and explanation of the CTN role. CTN reviewed discharge instructions, medical action plan and red flags with family who verbalized understanding. Family given an opportunity to ask questions and does not have any further questions or concerns at this time. Were discharge instructions available to patient? Yes. Reviewed appropriate site of care based on symptoms and resources available to patient including: PCP  Specialist. The family agrees to contact the PCP office for questions related to their healthcare. Medication reconciliation was performed with family, who verbalizes understanding of administration of home medications. Advised obtaining a 90-day supply of all daily and as-needed medications. Covid Risk Education     Educated patient about risk for severe COVID-19 due to risk factors according to CDC guidelines. CTN reviewed discharge instructions, medical action plan and red flag symptoms with the family who verbalized understanding. Discussed COVID vaccination status: Yes. Education provided on COVID-19 vaccination as appropriate. Discussed exposure protocols and quarantine with CDC Guidelines. Family was given an opportunity to verbalize any questions and concerns and agrees to contact CTN or health care provider for questions related to their healthcare. Reviewed and educated family on any new and changed medications related to discharge diagnosis. Was patient discharged with a pulse oximeter? No     CTN provided contact information. Plan for follow-up call in 5-7 days based on severity of symptoms and risk factors.   Plan for next call: symptom management-BP, COPD, DM      Follow Up  Future Appointments   Date Time Provider Doug Shah   3/30/2022  8:45 AM SCHEDULE, [address] [place] [facility] - Elio Hoof   4/1/2022  8:40 AM Bing Padilla MD [facility]   4/8/2022  8:15 AM SANTOSH Hall - CNP [facility] [place] [facility] - Elio Hoof   4/11/2022 10:50 AM Bing Padilla MD [facility]   4/27/2022  8:45 AM SCHEDULE, [address] [place] [facility] - Elio Hoof   6/13/2022  9:00 AM Bing Padilla MD [employer]   7/15/2022  8:40 AM STR CT IMAGING RM1  [facility] OUT EXP STR Radiolog   7/15/2022  9:00 AM STR PULMONARY FUNCTION ROOM 1 STRZ PFT Elio Ho[facility]   7/19/2022  8:00 [facility] Hurst [facility]

## 2022-03-29 NOTE — TELEPHONE ENCOUNTER
A prior Auth was done in Cover My Meds for the tetracycline. In Cover My Meds it said that a decision will be made in 3-7 days.

## 2022-03-29 NOTE — TELEPHONE ENCOUNTER
I called patient he is able to get the tetracycline out [facility] and it is much cheaper. Patient has some shortness of breath but better when using his oxygen. Patient thinks is coming from the medication. Denies any rashes no swelling no problem with swallowing . patient had appointment with Dr. Josemanuel Vincent 4/1/2022. Patient was advised that if the medication give him more short of breath he has to stop it and call us.

## 2022-03-29 NOTE — PROGRESS NOTES
Physician Progress Note      Luis Angel Barrera  CSN #:                  [id number]  :                       1955  ADMIT DATE:       3/22/2022 11:31 AM  DISCH DATE:        3/25/2022 3:59 PM  RESPONDING  PROVIDER #:        Alexsandra Bartlett MD          QUERY TEXT:    Pt admitted with UTI. Pt noted to have TURP with laser vaporization on  . If possible, please document in the progress notes and discharge summary if   you are evaluating and/or treating any of the following: The medical record reflects the following:  Risk Factors: Elderly  Clinical Indicators: pt had TURP with laser vaporization on , with UTI   now, culture negative  Treatment: ID and urology consulted, IV ATB's, lab monitoring    Thank You! Wallace Cobb RN, CRCR  RN Clinical   (H) [phone] (F) [phone]  Options provided:  -- UTI as a complication of TURP with laser vaporization on   -- UTI not a complication of TURP with laser vaporization on   -- Other - I will add my own diagnosis  -- Disagree - Not applicable / Not valid  -- Disagree - Clinically unable to determine / Unknown  -- Refer to Clinical Documentation Reviewer    PROVIDER RESPONSE TEXT:    UTI is a complication of TURP with laser vaporization on .     Query created by: Marlin Carrasquillo on 3/29/2022 6:53 AM      Electronically signed by:  Alexsandra Bartlett MD 3/29/2022 8:08 AM

## 2022-03-31 RX ORDER — NYSTATIN 100000 U/G
CREAM TOPICAL
Qty: 30 G | Refills: 0 | Status: SHIPPED | OUTPATIENT
Start: 2022-03-31 | End: 2022-09-13

## 2022-03-31 NOTE — TELEPHONE ENCOUNTER
Patient able to get the antibiotic at [facility] which is much cheaper.     3/31/22 Wife tols me that his penis is retracted and that the prepuce is swollen, will start him on nystatin cream

## 2022-04-01 ENCOUNTER — OFFICE VISIT (OUTPATIENT)
Dept: FAMILY MEDICINE CLINIC | Age: 67
End: 2022-04-01

## 2022-04-01 VITALS
DIASTOLIC BLOOD PRESSURE: 82 MMHG | SYSTOLIC BLOOD PRESSURE: 136 MMHG | TEMPERATURE: 96.5 F | BODY MASS INDEX: 35.16 KG/M2 | WEIGHT: 232 LBS | HEART RATE: 80 BPM | HEIGHT: 68 IN | RESPIRATION RATE: 20 BRPM

## 2022-04-01 DIAGNOSIS — I10 ESSENTIAL HYPERTENSION: ICD-10-CM

## 2022-04-01 DIAGNOSIS — B37.9 YEAST INFECTION: ICD-10-CM

## 2022-04-01 DIAGNOSIS — J44.9 CHRONIC OBSTRUCTIVE PULMONARY DISEASE, UNSPECIFIED COPD TYPE (HCC): Primary | ICD-10-CM

## 2022-04-01 DIAGNOSIS — E78.5 HYPERLIPIDEMIA, UNSPECIFIED HYPERLIPIDEMIA TYPE: ICD-10-CM

## 2022-04-01 DIAGNOSIS — R11.0 NAUSEA: ICD-10-CM

## 2022-04-01 DIAGNOSIS — E11.8 TYPE 2 DIABETES MELLITUS WITH COMPLICATION, WITHOUT LONG-TERM CURRENT USE OF INSULIN (HCC): ICD-10-CM

## 2022-04-01 PROCEDURE — 99495 TRANSJ CARE MGMT MOD F2F 14D: CPT | Performed by: FAMILY MEDICINE

## 2022-04-01 RX ORDER — HYDROCODONE BITARTRATE AND ACETAMINOPHEN 5; 325 MG/1; MG/1
TABLET ORAL
COMMUNITY
Start: 2022-03-28

## 2022-04-01 RX ORDER — ONDANSETRON 4 MG/1
4 TABLET, FILM COATED ORAL EVERY 8 HOURS PRN
Qty: 30 TABLET | Refills: 1 | Status: SHIPPED | OUTPATIENT
Start: 2022-04-01 | End: 2022-09-13

## 2022-04-01 ASSESSMENT — ENCOUNTER SYMPTOMS
COUGH: 0
DIARRHEA: 0
VOMITING: 0
EYES NEGATIVE: 1
CONSTIPATION: 0
CHEST TIGHTNESS: 0
ABDOMINAL PAIN: 0
SHORTNESS OF BREATH: 0
BLOOD IN STOOL: 0
NAUSEA: 0

## 2022-04-01 NOTE — PROGRESS NOTES
Post-Discharge Transitional Care Follow Up      Yony Borden   YOB: 1955    Date of Office Visit:  4/1/2022  Date of Hospital Admission: 3/22/22  Date of Hospital Discharge: 3/25/22  Readmission Risk Score (high >=14%. Medium >=10%):Readmission Risk Score: 11.1 ( )    Here with his wife    Care management risk score Rising risk (score 2-5) and Complex Care (Scores >=6): 12     Non face to face  following discharge, date last encounter closed (first attempt may have been earlier): 3/28/2022  1:10 PM     Call initiated 2 business days of discharge: Yes     Chronic obstructive pulmonary disease, unspecified COPD type ([facility] [address]  Essential hypertension  -     Basic Metabolic Panel; Future  Type 2 diabetes mellitus with complication, without long-term current use of insulin (HCC)  Hyperlipidemia, unspecified hyperlipidemia type  Nausea  -     ondansetron (ZOFRAN) 4 MG tablet; Take 1 tablet by mouth every 8 hours as needed for Nausea or Vomiting, Disp-30 tablet, R-1Normal  Yeast infection      Medical Decision Making: moderate complexity  Return in about 3 months (around 7/1/2022), or if symptoms worsen or fail to improve. On this date 4/1/2022 I have spent over 25 minutes reviewing previous notes, test results and face to face with the patient discussing the diagnosis and importance of compliance with the treatment plan as well as documenting on the day of the visit. Subjective:   HPI    Inpatient course: Discharge summary reviewed- see chart. Interval history/Current status: He was hospitalized with generalized weakness, low blood pressure, problems with urination and SOB. He was found to have a UTI and he is on antibiotics now. He states that the antibiotics make him nauseous, give him stomach upset and he just does not feel well. He was seen by ID, urology and has follow up with them . He states that about 2-3 days ago his foreskin and glands penis was swollen.  He spoke to Dr Mare Correa and a Rx was done. He states tat it is a little better now. He is able to retract the foreskin. Patient Active Problem List   Diagnosis    Diabetes ([facility] [address]    Hyperlipidemia    Gastroesophageal reflux disease    PARRISH (obstructive sleep apnea)    Chronic back pain    DDD (degenerative disc disease), cervical    Asthma    Lower urinary tract symptoms (LUTS)    Hypogonadism in male   [place] Low testosterone    Combined arterial insufficiency and corporo-venous occlusive erectile dysfunction    Left thyroid nodule    Moderate COPD (chronic obstructive pulmonary disease) ([facility])    Class 2 severe obesity due to excess calories with serious comorbidity and body mass index (BMI) of 37.0 to 37.9 in adult [facility])    Vocal cord disease    Erythrocytosis    History of anxiety disorder    Personal history of tobacco use, presenting hazards to health    Hypertensive disorder    COPD (chronic obstructive pulmonary disease) ([facility] [address]    Hypotension    UTI (urinary tract infection)    Dependence on supplemental oxygen    S/P transurethral Photo Vaporization of Prostate       Medications listed as ordered at the time of discharge from hospital     Medication List          Accurate as of April 1, 2022  4:16 PM. If you have any questions, ask your nurse or doctor. START taking these medications    ondansetron 4 MG tablet  Commonly known as: ZOFRAN  Take 1 tablet by mouth every 8 hours as needed for Nausea or Vomiting  Started by: Artis Gómez MD        CHANGE how you take these medications    glimepiride 4 MG tablet  Commonly known as: AMARYL  TAKE 1 TABLET BY MOUTH TWICE DAILY  What changed: See the new instructions.         CONTINUE taking these medications    atorvastatin 20 MG tablet  Commonly known as: LIPITOR     baclofen 10 MG tablet  Commonly known as: LIORESAL     Budeson-Glycopyrrol-Formoterol 160-9-4.8 MCG/ACT Aero  2 puff twice a day     busPIRone 10 MG tablet  Commonly known as: BUSPAR  TAKE 1 TABLET BY MOUTH THREE TIMES DAILY     cetirizine 10 MG tablet  Commonly known as: ZYRTEC     clopidogrel 75 MG tablet  Commonly known as: PLAVIX     EPINEPHrine 0.3 MG/0.3ML Soaj injection  Commonly known as: EpiPen 2-Wilfredo  Inject one pen as directed STAT for allergic reaction, may disp generic NDC 84005-352-62     fish oil 1000 MG Caps     fluticasone 50 MCG/ACT nasal spray  Commonly known as: FLONASE  SHAKE LIQUID AND USE 1 SPRAY IN EACH NOSTRIL DAILY     HYDROcodone-acetaminophen 5-325 MG per tablet  Commonly known as: NORCO     ipratropium-albuterol 0.5-2.5 (3) MG/3ML Soln nebulizer solution  Commonly known as: DUONEB  Inhale 3 mLs into the lungs every 6 hours as needed for Shortness of Breath     losartan 50 MG tablet  Commonly known as: COZAAR  Take 0.5 tablets by mouth daily     metFORMIN 500 MG tablet  Commonly known as: GLUCOPHAGE  TAKE 2 TABLETS BY MOUTH TWICE DAILY WITH MEALS     montelukast 10 MG tablet  Commonly known as: SINGULAIR     nystatin 310569 UNIT/GM cream  Commonly known as: MYCOSTATIN  Apply topically 2 times daily after cleaning it with soap and water air dry     OXYGEN     Protonix 40 MG tablet  Generic drug: pantoprazole     Ranexa 500 MG extended release tablet  Generic drug: ranolazine     rOPINIRole 1 MG tablet  Commonly known as: REQUIP  TAKE 1 TABLET BY MOUTH THREE TIMES DAILY     sertraline 100 MG tablet  Commonly known as: ZOLOFT  TAKE 1 TABLET BY MOUTH TWICE DAILY     tetracycline 500 MG capsule  Commonly known as: ACHROMYCIN;SUMYCIN  Take 1 capsule by mouth 4 times daily     traZODone 150 MG tablet  Commonly known as: DESYREL  TAKE 1/2 TO 1 TABLET BY MOUTH EVERY DAY AT BEDTIME AS NEEDED FOR SLEEP     Ventolin  (90 Base) MCG/ACT inhaler  Generic drug: albuterol sulfate HFA  INHALE 2 PUFFS INTO THE LUNGS EVERY 6 HOURS AS NEEDED FOR WHEEZING           Where to Get Your Medications      These medications were sent to [id number][id number] - [place], [place] - [address] [place] [id number]    Phone: [phone]   · ondansetron 4 MG tablet          Medications marked \"taking\" at this time  Outpatient Medications Marked as Taking for the 4/1/22 encounter (Office Visit) with Dayna Vergara MD   Medication Sig Dispense Refill    HYDROcodone-acetaminophen ([employer]) 5-325 MG per tablet TAKE 1 TABLET BY MOUTH THREE TIMES DAILY AS NEEDED FOR PAIN      ondansetron (ZOFRAN) 4 MG tablet Take 1 tablet by mouth every 8 hours as needed for Nausea or Vomiting 30 tablet 1    nystatin (MYCOSTATIN) 805939 UNIT/GM cream Apply topically 2 times daily after cleaning it with soap and water air dry 30 g 0    losartan (COZAAR) 50 MG tablet Take 0.5 tablets by mouth daily 30 tablet 3    tetracycline (ACHROMYCIN;SUMYCIN) 500 MG capsule Take 1 capsule by mouth 4 times daily 112 capsule 0    cetirizine (ZYRTEC) 10 MG tablet TAKE 1 TABLET BY MOUTH DAILY      Budeson-Glycopyrrol-Formoterol 160-9-4.8 MCG/ACT AERO 2 puff twice a day 2 each 0    clopidogrel (PLAVIX) 75 MG tablet Take 75 mg by mouth daily       ipratropium-albuterol (DUONEB) 0.5-2.5 (3) MG/3ML SOLN nebulizer solution Inhale 3 mLs into the lungs every 6 hours as needed for Shortness of Breath 30 mL 1    OXYGEN Inhale 3 L into the lungs as needed      metFORMIN (GLUCOPHAGE) 500 MG tablet TAKE 2 TABLETS BY MOUTH TWICE DAILY WITH MEALS 360 tablet 1    rOPINIRole (REQUIP) 1 MG tablet TAKE 1 TABLET BY MOUTH THREE TIMES DAILY 90 tablet 0    traZODone (DESYREL) 150 MG tablet TAKE 1/2 TO 1 TABLET BY MOUTH EVERY DAY AT BEDTIME AS NEEDED FOR SLEEP 90 tablet 0    glimepiride (AMARYL) 4 MG tablet TAKE 1 TABLET BY MOUTH TWICE DAILY (Patient taking differently: Take 4 mg by mouth Daily with supper TAKE 1 TABLET BY MOUTH) 180 tablet 0    montelukast (SINGULAIR) 10 MG tablet Take 10 mg by mouth nightly      busPIRone (BUSPAR) 10 MG tablet TAKE 1 TABLET BY MOUTH THREE TIMES DAILY 270 tablet 1    sertraline (ZOLOFT) 100 MG tablet TAKE 1 TABLET BY MOUTH TWICE DAILY 180 tablet 1    fluticasone (FLONASE) 50 MCG/ACT nasal spray SHAKE LIQUID AND USE 1 SPRAY IN EACH NOSTRIL DAILY 16 g 11    VENTOLIN  (90 Base) MCG/ACT inhaler INHALE 2 PUFFS INTO THE LUNGS EVERY 6 HOURS AS NEEDED FOR WHEEZING 1 each 11    atorvastatin (LIPITOR) 20 MG tablet 20 mg daily       pantoprazole (PROTONIX) 40 MG tablet Take 40 mg by mouth 2 times daily       ranolazine (RANEXA) 500 MG extended release tablet Take 500 mg by mouth 2 times daily      EPINEPHrine (EPIPEN 2-HUBER) 0.3 MG/0.3ML SOAJ injection Inject one pen as directed STAT for allergic reaction, may disp generic NDC 71983-504-81 6 each 99    baclofen (LIORESAL) 10 MG tablet 2 times daily as needed   0    Omega-3 Fatty Acids (FISH OIL) 1000 MG CAPS Take 1,000 mg by mouth daily. Medications patient taking as of now reconciled against medications ordered at time of hospital discharge: Yes    Review of Systems   Constitutional: Negative for activity change, appetite change, diaphoresis and fever. HENT: Negative. Eyes: Negative. Respiratory: Negative for cough, chest tightness and shortness of breath. Cardiovascular: Negative for chest pain, palpitations and leg swelling. Gastrointestinal: Negative for abdominal pain, blood in stool, constipation, diarrhea, nausea and vomiting. Genitourinary: Positive for penile swelling. Musculoskeletal: Negative. Skin: Negative. Negative for rash. Neurological: Negative. Negative for dizziness, syncope, weakness, light-headedness and headaches. Psychiatric/Behavioral: Negative. Objective:    /82 (Site: Right Upper Arm, Position: Sitting, Cuff Size: Medium Adult)   Pulse 80   Temp 96.5 °F (35.8 °C)   Resp 20   Ht 5' 8\" (1.727 m)   Wt 232 lb (105.2 kg)   BMI 35.28 kg/m²   Physical Exam  Vitals and nursing note reviewed. Exam conducted with a chaperone present.    Constitutional: General: He is not in acute distress. Appearance: He is well-developed. He is not diaphoretic. HENT:      Head: Normocephalic and atraumatic. Eyes:      General: No scleral icterus. Right eye: No discharge. Left eye: No discharge. Conjunctiva/sclera: Conjunctivae normal.      Pupils: Pupils are equal, round, and reactive to light. Neck:      Thyroid: No thyromegaly. Vascular: No JVD. Cardiovascular:      Rate and Rhythm: Normal rate and regular rhythm. Heart sounds: Normal heart sounds. No murmur heard. Pulmonary:      Effort: Pulmonary effort is normal. No respiratory distress. Breath sounds: Normal breath sounds. No wheezing, rhonchi or rales. Abdominal:      General: Bowel sounds are normal. There is no distension. Palpations: Abdomen is soft. There is no mass. Tenderness: There is no abdominal tenderness. There is no guarding or rebound. Genitourinary:     Pubic Area: No rash. Penis: Uncircumcised. Erythema present. Comments: He does have mild whitish cheese like drainage  Able to retract foreskin without much difficulty. Musculoskeletal:         General: Normal range of motion. Cervical back: Normal range of motion and neck supple. Lymphadenopathy:      Cervical: No cervical adenopathy. Skin:     General: Skin is warm and dry. Findings: No rash. Neurological:      Mental Status: He is alert and oriented to person, place, and time. Psychiatric:         Behavior: Behavior normal.         An electronic signature was used to authenticate this note.   --Kashif Baez MD

## 2022-04-06 ENCOUNTER — CARE COORDINATION (OUTPATIENT)
Dept: CASE MANAGEMENT | Age: 67
End: 2022-04-06

## 2022-04-06 ENCOUNTER — HOSPITAL ENCOUNTER (OUTPATIENT)
Age: 67
Discharge: HOME | End: 2022-04-06
Payer: MEDICARE

## 2022-04-06 DIAGNOSIS — I10 ESSENTIAL HYPERTENSION: ICD-10-CM

## 2022-04-06 LAB
ANION GAP SERPL CALCULATED.3IONS-SCNC: 16 MEQ/L (ref 8–16)
BUN BLDV-MCNC: 18 MG/DL (ref 7–22)
CALCIUM SERPL-MCNC: 8.7 MG/DL (ref 8.5–10.5)
CHLORIDE BLD-SCNC: 100 MEQ/L (ref 98–111)
CO2: 27 MEQ/L (ref 23–33)
CREAT SERPL-MCNC: 1.2 MG/DL (ref 0.4–1.2)
GFR SERPL CREATININE-BSD FRML MDRD: 60 ML/MIN/1.73M2
GLUCOSE BLD-MCNC: 94 MG/DL (ref 70–108)
POTASSIUM SERPL-SCNC: 4.1 MEQ/L (ref 3.5–5.2)
SODIUM BLD-SCNC: 143 MEQ/L (ref 135–145)

## 2022-04-06 PROCEDURE — 36415 COLL VENOUS BLD VENIPUNCTURE: CPT

## 2022-04-06 PROCEDURE — 80048 BASIC METABOLIC PNL TOTAL CA: CPT

## 2022-04-06 NOTE — CARE COORDINATION
Care Transitions Outreach Attempt-1st attempt    Call within 2 business days of discharge: Yes   Attempted to reach patient for subsequent transitional call.  left to return call to [phone]. Patient: Kasandra Cobb Patient : 1955 MRN: [id number]    Last Discharge [facility]       Complaint Diagnosis Description Type Department Provider    3/22/22 Hypotension Acute prostatitis ED to Hosp-Admission (Discharged) (ADMITTED) Desiree Sharif MD; Neda Caro. ..             Noted following upcoming appointments from discharge chart review:   [facility] follow up appointment(s):   Future Appointments   Date Time Provider Doug Shah   2022  8:15 AM Sara Booker, APRN - [address] - [place]   2022  8:45 AM SCHEDULE,  Uziel Jimenez ALLERGY [facility] N SRPX ALLER P - [place]   2022  9:30 AM Mesha Jack MD [facility]   7/15/2022  8:40 AM STR CT IMAGING RM1  OP [facility] STRZ OUT EXP STR Radiolog   7/15/2022  9:00 AM STR PULMONARY FUNCTION ROOM 1 STRZ PFT [place]   2022  8:00 AM Chan Chase MD [address] [place] [address] follow up appointment(s): na

## 2022-04-08 ENCOUNTER — CARE COORDINATION (OUTPATIENT)
Dept: CASE MANAGEMENT | Age: 67
End: 2022-04-08

## 2022-04-08 NOTE — CARE COORDINATION
Care Transitions Outreach Attempt-2nd attempt    Call within 2 business days of discharge: Yes   Attempted to reach patient for subsequent transitional call. VM left to return call to [phone]. If no return call, CTN will sign off-2nd attempt. Patient: Cherise Varghese Patient : 1955 MRN: [id number]    Last Discharge [facility]       Complaint Diagnosis Description Type Department Provider    3/22/22 Hypotension Acute prostatitis ED to Hosp-Admission (Discharged) (ADMITTED) Joey Moe MD; Shemar Vila. ..               Noted following upcoming appointments from discharge chart review:   [facility] follow up appointment(s):   Future Appointments   Date Time Provider Doug Yanezisti   2022  8:45 AM Mi [place]  [facility]   2022  8:45 AM SCHEDULE,  Uziel Jimenez ALLERGY LAB N SRPX ALLER [facility] [place]   2022  9:30 AM Dayna Vergara MD [facility] [address]   7/15/2022  8:40 AM STR CT IMAGING RM1  [facility] STRZ OUT EXP STR Radiolog   7/15/2022  9:00 AM STR PULMONARY FUNCTION ROOM 1 STR[place] PFT [place] [facility]   2022  8:00 AM Estefani Ashley  [facility] [address]     [facility] follow up appointment(s): na

## 2022-04-09 NOTE — DISCHARGE SUMMARY
Patient Identification  Vesta Gar is a 74 y.o. male. :  1955  Admit Date:  2022    Discharge date: 2022                                  Disposition: home    Discharge Diagnoses:   Patient Active Problem List   Diagnosis    Diabetes ([facility]    Hyperlipidemia    Gastroesophageal reflux disease    PARRISH (obstructive sleep apnea)    Chronic back pain    DDD (degenerative disc disease), cervical    Asthma    Lower urinary tract symptoms (LUTS)    Hypogonadism in male   Bethena Lobstein Low testosterone    Combined arterial insufficiency and corporo-venous occlusive erectile dysfunction    Left thyroid nodule    Moderate COPD (chronic obstructive pulmonary disease) ([facility])    Class 2 severe obesity due to excess calories with serious comorbidity and body mass index (BMI) of 37.0 to 37.9 in adult [facility])    Vocal cord disease    Erythrocytosis    History of anxiety disorder    Personal history of tobacco use, presenting hazards to health    Hypertensive disorder    COPD (chronic obstructive pulmonary disease) ([facility]    Hypotension    UTI (urinary tract infection)    Dependence on supplemental oxygen    S/P transurethral Photo Vaporization of Prostate       Consults: Hospitalist    Surgery: Greenlight laser prostate 2022    Patient Instructions: Activity: no heavy lifting, pushing, pulling for 6 weeks, no driving while on analgesics. Diet: As tolerated  Follow-up with Dr. Janell Argueta in 1 week. Hospital course: Patient underwent greenlight laser of the prostate with no complications. Post-operatively he developed some respiratory insufficiency. Patient is on 3 L oxygen at home. Because of these pulmonary issues noted in recovery room, hospital service was consulted. Patient's pulmonary status returned to his baseline prior to discharge. On day of discharge patient is tolerating diet, adequate urine output per Jones, pain is minimal, ambulating well with assistance, having flatus and BM.

## 2022-04-21 PROBLEM — N39.0 UTI (URINARY TRACT INFECTION): Status: RESOLVED | Noted: 2022-03-22 | Resolved: 2022-04-21

## 2022-04-21 NOTE — DISCHARGE SUMMARY
[address] [place] [address]                               DISCHARGE SUMMARY    PATIENT NAME: Ryann Ryder                        :        1955  MED REC NO:   [id number]                           ROOM:       [id number]  ACCOUNT NO:   [de-identified]                           ADMIT DATE: 2022  PROVIDER:     Abigail Nelson. Tacos Cantu M.D.            Addie Coad: 2022    FINAL DIAGNOSES:  1.  UTI as a complication of TURP with photovaporization of the  prostate. 2.  Hypotension. 3.  COPD. 4.  Dependent on supplemental oxygen. 5.  History of hypertensive disorder. 6.  Morbid obesity. 7.  Moderate COPD. 8.  Hypogonadism. 9.  Lower urinary tract symptoms. 10.  Diabetes type 2.  11.  Hyperlipidemia. 12.  GERD. 13.  Obstructive sleep apnea. 14.  Asthma. DISCHARGE HOME MEDICATIONS:  Include,  1. Cozaar 50 mg one-half tablet daily. 2.  Tetracycline 500 mg one capsule four times a day. 3.  Zyrtec 10 mg one tablet daily. 4.  Budesonide/glycopyrrolate/formoterol inhaler two puffs twice a day. 5.  Plavix 75 mg one tablet daily. 6.  DuoNeb nebulizer 3 mL every six hours as needed for shortness of  breath. 7.  Oxygen 3 liters nasal cannula all the time. 8.  Metformin 500 mg two tablets twice a day. 9.  Requip 1 mg one tablet three times a day. 10.  Trazodone 150 mg one-half tablet at bedtime as needed for sleep. 11.  Amaryl 4 mg one tablet twice a day. 12.  Singulair 10 mg one tablet daily. 13. BuSpar 10 mg one table three times a day. 14.  Zoloft 100 mg one tablet twice day. 15.  Flonase nasal spray two puffs each nostril daily. 16.  Ventolin inhaler two puffs every six hours for wheezing. 17.  Lipitor 20 mg one tablet daily. 18.  Protonix 40 mg twice a day. 19.  Ranexa 500 mg twice a day. 20.  Baclofen 10 mg twice a day as needed for back pain and spasms. 21.  Omega fish oil 1000 mg daily.   22.  EpiPen as needed for allergic reaction. CONSULTANTS ON THIS HOSPITALIZATION:  Dr. Yaw Lopez MD, from  Infectious Disease; Urology Services, [facility] with Dr. Mckenzie Easley, Urology Services. Occupational and Physical Therapy including  Respiratory Therapies had been consulted and had seen the patient. DIET:  Will be American diabetic heart diet. ACTIVITY:  As tolerated. FOLLOWUP:  With Dr. Lucas Ritchie in the next 7 to 14 days. Followup with Dr. Jr Wen in the next 7 to 10 days. [facility] 14 [facility]. HISTORY OF PRESENT ILLNESS:  The patient is a 69-year-old male presented  to the emergency room with hypotension followup with four days. The  patient is known to me being the patient of Dr. Jr Wen the patient's  primary care provider. The patient states that he has been checking his  blood pressure at home they were in the low side mostly on the 80s and  90s. The patient had a history of bladder outlet obstruction and  underwent GreenLight photovaporization and TURP of his prostate done on  02/25/2022 by Dr. Pako Hollis of Radiology. The patient was sent home with  Keflex. The patient since then has not been feeling well, he is short  of breath, he was seen in the office on 03/02/2022 for coughing and  shortness of breath, rattling chest and wheezing, shortness of breath  with activity and having light headedness. The patient had a workup as  an outpatient, had a BNP of 2092 and an x-ray taken. The patient's  influenza and SARS-CoV were both negative, was placed on Levaquin for 10  days and Lasix 20 mg twice a day. The patient had a followup in the  office at 03/10/2022, and feels a little better. Symbicort was changed  to [facility] for optimal inhaler, was given Ceftin 500 mg twice a day and  followup with Urology services. He had a followup with Dr. Pako Hollis on  03/10/2022 showing enterococcus in the urine.   The patient did not have  any improvement and remained with shortness of breath and the patient  had to crank up more on the oxygen from his baseline. The patient was  seen by Dr. Luci Bucio last week. He was advised for admission; however;  the patient declined. The wife has been checking his pressure for a  past four days and blood pressure were under low side 80s to 90s. He  had a followup with the cardiology services, Dr. Lencho Cm at [facility].  Blood pressure in Dr. Lowell Scanlon office was 84/53,  for which the patient sent to the emergency room. The patient denies  chest pain. No fever, no chills. He admits to have dysuria and  hematuria and pain in the bladder area. The patient was then admitted. The patient's workup in the emergency room noted to have a urinary tract  infection. PHYSICAL EXAMINATION:  GENERAL:  The patient was alert, active, cooperative. He is not ill  looking, conversant, comfortable at 2 liters nasal canula. Blood  pressure 132/91, respirations 18, pulse 87, temperature is 98.4. HEENT:  Head normocephalic, atraumatic. Tympanic membranes normal.    Oral mucosa is moist.  LUNGS:  Clear to auscultation. No rales. HEART:  Regular rate and rhythm. ABDOMEN:  Globular, soft, depressible, nontender. No  hepatosplenomegaly. Normal bowel sounds. BACK:  No CVA tenderness, there is no tenderness on the lumbar spine. EXTREMITIES:  No edema. NEUROLOGICAL:  Neurological examination is within normal limits. HOSPITAL COURSE:  The patient was admitted, was hydrated slowly, had  Rocephin to cover the infection enterococcus it was collected  previously. Resume home medications including inhaler and oxygen. A  consult was done through Infectious Disease, Dr. Essie Marquez, and  Urology Services [facility], who had been seeing the  patient. The patient had an echocardiogram and start Lovenox and SCDs  for DVT prophylaxis. The patient was also seen by Occupational and  Physical Therapy.   The patient during his stay has been complaining of  lot of postvoid dysuria, frequency and urgency and low back pain. The  patient was attended by the Occupational and Physical Therapy who has  not been helpful with the patient's rehabilitation. The patient also  seen by Dr. Ethan Singh, Infectious Disease physician. The patient during  his course has gradually been improving, but slow. The patient's  dysuria gradually improved, shortness of breath has been stable at 2  liters nasal cannula inhaler. The patient stayed in the hospital for  three days. During his course, the patient's dysuria and lower  abdominal pain gradually improved and feeling better and subsequently  discharged in stable condition on 03/25/2022. RALPH Candelario M.D.    D: 04/20/2022 15:36:33       T: 04/21/2022 3:53:14     [email or web address]  Job#: [id number]     Doc#: [id number]    CC:

## 2022-04-26 ENCOUNTER — OFFICE VISIT (OUTPATIENT)
Dept: UROLOGY | Age: 67
End: 2022-04-26

## 2022-04-26 VITALS — WEIGHT: 220 LBS | BODY MASS INDEX: 33.34 KG/M2 | HEIGHT: 68 IN

## 2022-04-26 DIAGNOSIS — N40.1 BENIGN PROSTATIC HYPERPLASIA WITH LOWER URINARY TRACT SYMPTOMS, SYMPTOM DETAILS UNSPECIFIED: ICD-10-CM

## 2022-04-26 DIAGNOSIS — R31.0 GROSS HEMATURIA: Primary | ICD-10-CM

## 2022-04-26 PROCEDURE — 99024 POSTOP FOLLOW-UP VISIT: CPT | Performed by: UROLOGY

## 2022-04-26 NOTE — PROGRESS NOTES
S/p PVP  Was admitted with UTI for 5 days  Hematuria, intermittent incontinence  Intermittent stream  Hold on abx    Needs cystoscopy as pt has had persistent issues

## 2022-05-04 ENCOUNTER — NURSE ONLY (OUTPATIENT)
Dept: ALLERGY | Age: 67
End: 2022-05-04
Payer: MEDICARE

## 2022-05-04 VITALS
RESPIRATION RATE: 16 BRPM | HEART RATE: 74 BPM | TEMPERATURE: 97 F | DIASTOLIC BLOOD PRESSURE: 70 MMHG | OXYGEN SATURATION: 95 % | SYSTOLIC BLOOD PRESSURE: 118 MMHG

## 2022-05-04 DIAGNOSIS — J30.81 ALLERGY TO DOG DANDER: Primary | ICD-10-CM

## 2022-05-04 DIAGNOSIS — Z51.6 ENCOUNTER FOR DESENSITIZATION TO POLLEN ALLERGEN: ICD-10-CM

## 2022-05-04 DIAGNOSIS — J30.81 CAT ALLERGIES: ICD-10-CM

## 2022-05-04 DIAGNOSIS — Z91.09 ENCOUNTER FOR DESENSITIZATION TO POLLEN ALLERGEN: ICD-10-CM

## 2022-05-04 PROCEDURE — 95117 IMMUNOTHERAPY INJECTIONS: CPT | Performed by: NURSE PRACTITIONER

## 2022-05-04 NOTE — PROGRESS NOTES
After consent obtained/verified, allergy injection given in back of R/L arm(s). VIAL COLOR OF ALL VIALS TODAY IS red    ALLERGY INJECTION FROM VIAL A GIVEN right  UPPER ARM IN THE AMOUNT OF 0.35 ML    ALLERGY INJECTION FROM VIAL B GIVEN left upper ARM IN THE AMOUNT OF 0.35 ML          Documentation of vial injection specific to arm(s) noted on Allergy Immunotherapy Administration Form. Patient waited 30 minutes for observation. No      Patient tolerated well without adverse reaction WHILE IN OFFICE    SHOT REACTION TREATMENT INSTRUCTIONS    During the 30 minute wait after an allergy injection the following symptoms should be reported:    Itching other than at the injection site  Hives or swelling other than at the injection site  Redness other than at the injection site  Difficulty breathing  Chest tightness  Difficulty swallowing  Throat tightness    If these symptoms occur, NOTIFY PROVIDER and the following treatment should be administered:    1. Epinephrine/Auvi Q 1:1000 IM - 0.3 ml if > 66 lbs or more, 0.15 ml if 33 - 63 lbs, or 0.1 ml if <33 lbs     2. Diphenhydramine - give all intramuscular:     2 to <6 years (off-label use): 6.25 mg,    6 to <12 years: 12.5 to 25 mg;    ?12 years: 25-50 mg.    3.  Famotidine:  Adults 40 mg oral    Adolescents age 12 years and >88 lbs: 40 mg    Children and Adolescents ? 12years of age: Initial: 0.25 mg/kg/dose  every 12 hours (maximum daily dose: 40 mg/day)    Epi/Auvi Q dose may me repeated in 5-15 minutes if adequate resolution of symptoms does not occur    Patient should be observed for at least one hour after final Epi/Auvi Q dose and must be seen by provider. Patients cannot drive themselves if they have received diphenhydramine.

## 2022-05-12 ENCOUNTER — PROCEDURE VISIT (OUTPATIENT)
Dept: UROLOGY | Age: 67
End: 2022-05-12
Payer: MEDICARE

## 2022-05-12 ENCOUNTER — PREP FOR PROCEDURE (OUTPATIENT)
Dept: UROLOGY | Age: 67
End: 2022-05-12

## 2022-05-12 ENCOUNTER — TELEPHONE (OUTPATIENT)
Dept: UROLOGY | Age: 67
End: 2022-05-12

## 2022-05-12 VITALS
HEIGHT: 68 IN | BODY MASS INDEX: 33.84 KG/M2 | WEIGHT: 223.3 LBS | SYSTOLIC BLOOD PRESSURE: 114 MMHG | DIASTOLIC BLOOD PRESSURE: 70 MMHG

## 2022-05-12 DIAGNOSIS — R31.0 GROSS HEMATURIA: Primary | ICD-10-CM

## 2022-05-12 DIAGNOSIS — N40.1 BENIGN PROSTATIC HYPERPLASIA WITH LOWER URINARY TRACT SYMPTOMS, SYMPTOM DETAILS UNSPECIFIED: ICD-10-CM

## 2022-05-12 LAB
BILIRUBIN URINE: ABNORMAL
BLOOD URINE, POC: ABNORMAL
CHARACTER, URINE: CLEAR
COLOR, URINE: YELLOW
GLUCOSE URINE: NEGATIVE MG/DL
KETONES, URINE: ABNORMAL
LEUKOCYTE CLUMPS, URINE: ABNORMAL
NITRITE, URINE: NEGATIVE
PH, URINE: 5.5 (ref 5–9)
PROTEIN, URINE: 100 MG/DL
SPECIFIC GRAVITY, URINE: >= 1.03 (ref 1–1.03)
UROBILINOGEN, URINE: 0.2 EU/DL (ref 0–1)

## 2022-05-12 PROCEDURE — 1123F ACP DISCUSS/DSCN MKR DOCD: CPT | Performed by: UROLOGY

## 2022-05-12 PROCEDURE — G8427 DOCREV CUR MEDS BY ELIG CLIN: HCPCS | Performed by: UROLOGY

## 2022-05-12 PROCEDURE — 81003 URINALYSIS AUTO W/O SCOPE: CPT | Performed by: UROLOGY

## 2022-05-12 PROCEDURE — G8417 CALC BMI ABV UP PARAM F/U: HCPCS | Performed by: UROLOGY

## 2022-05-12 PROCEDURE — 3017F COLORECTAL CA SCREEN DOC REV: CPT | Performed by: UROLOGY

## 2022-05-12 PROCEDURE — 52000 CYSTOURETHROSCOPY: CPT | Performed by: UROLOGY

## 2022-05-12 PROCEDURE — 4040F PNEUMOC VAC/ADMIN/RCVD: CPT | Performed by: UROLOGY

## 2022-05-12 PROCEDURE — 1036F TOBACCO NON-USER: CPT | Performed by: UROLOGY

## 2022-05-12 RX ORDER — SODIUM CHLORIDE 9 MG/ML
INJECTION, SOLUTION INTRAVENOUS CONTINUOUS
Status: CANCELLED | OUTPATIENT
Start: 2022-05-18

## 2022-05-12 RX ORDER — BUSPIRONE HYDROCHLORIDE 10 MG/1
TABLET ORAL
Qty: 270 TABLET | Refills: 1 | Status: SHIPPED | OUTPATIENT
Start: 2022-05-12

## 2022-05-12 NOTE — TELEPHONE ENCOUNTER
CARDIAC CLEARANCE FORM    Clearance From  Dr. Arnol Cardenas          Appointment Date   Time        Yony Borden                 1955                      Surgeon:  Dr. Ramya Leonardo     Procedure:  Cystoscopy, Transurethral Resection of Prostate, Cystolitholapaxy           Date: 5/18/22                      Facility: [facility]. MEDICAL HISTORY  DM   CAD     PVD     CVA     DVT/PE           MI        CHFMalignant Hyperthemia   HTN     Tobacco/ETOH Sleep Apnea GERD  Hyperlipidemia            Renal Insufficiency   COPD/Asthma            Bleeding Disorder       Pacemaker/AICD  II. CURRENT MEDICATIONS: Attach list or complete     Pt is on following meds that need special instructions for surgery:  Anticoagulants   Heart Meds  ASA     Insulin  Oral anti-diabetics       NSAIDS           Diuretics     K replacements  III. ALLERGIES:   IV.  FUNCTIONAL CAPACITY  >4 METS (CAN VACUUM/HOUSEWORK,CLIMB FLIGHT STAIRS WITHOUT DYSPNEA)  <4METS (FLIGHT OF STEPS CAUSES DYSPNEA/CARDIAC SYMPTOMS)   Stress Test Recommended:    Stress tests or Cardiac Cath in last 5 years:  Yes (attach report)  No        Results: WNL   ABN  Any Change in Cardiac symptoms: Yes  NO  Comments:    Revascularization in last 5 years: Yes  NO  CABG: Yes  No   Comments:     Stents:  Date: ________  Any change in cardiac symptoms  Yes  NO  Comments:   V.  REVIEW OF SYSTEMS:  (Pertinent positive or negative)     VI. PHYSICIAL EXAM  HEENT:1.  Dentations   Good          Poor                             HT:_______ WT:______                 2.  Neck Pathology: Rheumatoid DDD    C-spine  BP:______ P:________  PULMONARY:  CARDIAC  ABDOMEN  EXTREMITIES  OTHER  VII.   Testing Ordered by Surgeon               Reviewed by Clearance Physician  Test                                                                 Result             Plan,if Abnormal  ___CBS                                                          WNL ABN____________________  ___BMP/BUN/CR                                           WNL                ABN____________________  ___K+                                                              WNL                ABN____________________  ___UA                                                             WNL                ABN____________________  ___CXR                                                          WNL                ABN____________________  ___EKG                                                          WNL                ABN____________________  ___MRSA                                                       WNL                ABN____________________  VIII.  ___Acceptable risk for surgery          ___Risk Unacceptable-Communication to Follow  Comments:_____________________________________________________  ________________________________________________________________________  Physician ___________________________                       Date:_______________  Physician Printed Name:  _________________________                          Casimiro Piles  [phone]

## 2022-05-12 NOTE — TELEPHONE ENCOUNTER
Patient scheduled for surgery with Dr Merna Arthur on 5/18/22. Surgery consent on arrival. Urine culture done in office today. Dr Jimmy Zarate to clear. Surgery instructions given to the patient.

## 2022-05-12 NOTE — TELEPHONE ENCOUNTER
Date of last visit:  4/1/2022  Date of next visit:  7/11/2022    Requested Prescriptions     Pending Prescriptions Disp Refills    busPIRone (BUSPAR) 10 MG tablet [Pharmacy Med Name: BUSPIRONE 10MG TABLETS] 270 tablet 1     Sig: TAKE 1 TABLET BY MOUTH THREE TIMES DAILY

## 2022-05-12 NOTE — PROGRESS NOTES
Cystoscopy    Operative Note    Patient:  Baldemar Prescott  MRN: [id number]  YOB: 1955    Date: 05/12/22  Surgeon: Willy Rodriguez MD  Anesthesia: Urojet Local  Indications:     S/p PVP  Was admitted with UTI for 5 days  Hematuria, intermittent incontinence  Intermittent stream  Hold on abx    Needs cystoscopy as pt has had persistent issues        Position: Supine  EBL: 0 ml    Findings:   The patient was prepped and draped in the usual sterile fashion. The flexible cystoscope was advanced through the urethra and into the bladder. The bladder was thoroughly inspected and the following was noted:    Residual Urine: moderate. urine cloudy and concerning for infection  Urethra: No abnormalities of the urethra are noted. Urethral dilation was not performed. Prostate: open prostatic urethra with no obvious obstruction, intravesical extension of prostate not present. There was a previous TURP defect. Dystrophic calcifications  Bladder: no tumor noted . Moderate trabeculation noted. no bladder diverticulum. Ureters: Orifices with normal configuration and location. The cystoscope was removed. The patient tolerated the procedure well.   Cysto, TURP, cystolithalopaxy (30)

## 2022-05-12 NOTE — TELEPHONE ENCOUNTER
SURGERY [address] [facility] [address] [place], [address]                            Phone *[phone] *[phone]              Surgical Scheduling Direct Line Phone *[phone] Fax *[phone]        Cherise Varghese     1955          male     [address] [facility] [address] [facility]                Marital Status:                                                      Home Phone: [phone]      Cell Phone:    Telephone Information:   Mobile [phone]                                            Surgeon: Dr. Aura Batista        Surgery Date: 5/18/22                    Time: 1:00 pm     Procedure: Cystoscopy, Transurethral resection of the Prostate, Cystolithlapaxy     Diagnosis: BPH, Bladder Stone     Important Medical History:  In [facility]     Special Inst/Equip:                                                 CPT Codes:  09188, 56936,83049  Latex Allergy: No     Cardiac Device:  No     Anesthesia:    General                            Admission Type:  Same Day                        Admit Prior to Day of Surgery: No     Case Location:  Main OR            Preadmission Testing:  Phone Call                     PAT Date and Time:______________________________________________________     PAT Confirmation #: ______________________________________________________     Post Op Visit: ___________________________________________________________     Need Preop Cardiac Clearance: Yes     Does Patient have Cardiologist/physician?      Dr. Malick Cadet     Surgery Confirmation #: __________________________________________________     : ________________________   Date: __________________________      Insurance Company Name: [employer]

## 2022-05-12 NOTE — TELEPHONE ENCOUNTER
DO NOT TAKE ASPIRIN,  FISH OIL, COUMADIN, IBUPROFEN, MOTRIN-LIKE DRUGS AND ANY MULTIVITAMINS OR OVER THE COUNTER SUPPLEMENTS 14 DAYS PRIOR TO SURGERY. HOLD THE PLAVIX 5  DAYS PRIOR    MUST HAVE AN ADULT OVER THE AGE OF 18 WITH YOU AT THE TIME OF THE DISCHARGE AND WITH YOU AT HOME AFTER THE PROCEDURE FOR 24 HOURS          Yony Borden 1955 Diagnosis:     Surgical Physician: Dr. Janessa Cooper have been scheduled for the procedure marked below:      Surgery: Cystoscopy, Transurethral Resection of Prostate, Cystolitholapaxy         Date: 5/18/22     Anesthesia: Anesthesiologist (General/Spinal)     Place of Service: [address] [place] [address] Second Floor Same Day Surgery         Arrive to same day surgery by:  11:00 am  (Surgery time is subject to change)      INSTRUCTIONS AS MARKED BELOW:    1.  DO NOT eat or drink anything after midnight before surgery. 2.  We prefer you shower or bathe with an antibacterial soap (Dial) the morning of surgery. 3  Please bring a current medication list, photo ID and insurance card(s) with you  4. Okay to take Tylenol  5. If you take Glucophage, Metformin or Janumet, hold 48-hours prior to surgery. Hold the Glimepiride the morning of surgery. 6  Take blood pressure or heart medication as directed, if taken in the morning take with a small sip of water  7. Plan to spend the overnight at the hospital the day of surgery  8. The office will call you in 1-2 days after your procedure to schedule a follow up.               Date: 5/12/2022

## 2022-05-13 NOTE — FLOWSHEET NOTE
Follow all instructions given by your physician    NPO after midnight   Sips of water am of surgery with allowed medications  Bring insurance info and 's license  Wear comfortable clean clothing  No jewelry or contact lenses to be worn day of surgery  No glue on dentures morning of surgery;you will be asked to remove them for surgery. Case for glasses. Shower night before and morning of surgery with a liquid antibacterial soap, dry with fresh clean towel; no lotions, creams or powder. Clean sheets and pillow case on bed night before surgery  Bring medications in original bottles     needed at discharge and someone over 18 to stay with you for 24 hours overnight (surgery may be cancelled if you don't have this)  Report to [facility] on 2nd floor  If you would become ill prior to surgery, please call the surgeon  May have a visitor with you, we request that you limit to 2 visitors in pre-op area  Please bring and wear mask  Call [phone] for any questions  Covid questionnaire Complete; Patient negative for symptoms or exposure. See documentation.

## 2022-05-14 LAB
ORGANISM: ABNORMAL
URINE CULTURE, ROUTINE: ABNORMAL

## 2022-05-18 ENCOUNTER — TELEPHONE (OUTPATIENT)
Dept: UROLOGY | Age: 67
End: 2022-05-18

## 2022-05-18 ENCOUNTER — HOSPITAL ENCOUNTER (OUTPATIENT)
Age: 67
Discharge: HOME | End: 2022-05-19
Attending: UROLOGY | Admitting: UROLOGY
Payer: MEDICARE

## 2022-05-18 ENCOUNTER — ANESTHESIA EVENT (OUTPATIENT)
Dept: OPERATING ROOM | Age: 67
End: 2022-05-18
Payer: MEDICARE

## 2022-05-18 ENCOUNTER — ANESTHESIA (OUTPATIENT)
Dept: OPERATING ROOM | Age: 67
End: 2022-05-18
Payer: MEDICARE

## 2022-05-18 DIAGNOSIS — G89.18 POSTOPERATIVE PAIN: Primary | ICD-10-CM

## 2022-05-18 PROBLEM — Z87.438 HISTORY OF BPH: Status: ACTIVE | Noted: 2022-05-18

## 2022-05-18 LAB
GLUCOSE BLD-MCNC: 117 MG/DL (ref 70–108)
INR BLD: 1.02 (ref 0.85–1.13)

## 2022-05-18 PROCEDURE — 6360000002 HC RX W HCPCS

## 2022-05-18 PROCEDURE — 7100000010 HC PHASE II RECOVERY - FIRST 15 MIN: Performed by: UROLOGY

## 2022-05-18 PROCEDURE — 6370000000 HC RX 637 (ALT 250 FOR IP): Performed by: UROLOGY

## 2022-05-18 PROCEDURE — 2500000003 HC RX 250 WO HCPCS: Performed by: NURSE ANESTHETIST, CERTIFIED REGISTERED

## 2022-05-18 PROCEDURE — 3600000003 HC SURGERY LEVEL 3 BASE: Performed by: UROLOGY

## 2022-05-18 PROCEDURE — 2720000010 HC SURG SUPPLY STERILE: Performed by: UROLOGY

## 2022-05-18 PROCEDURE — 6360000002 HC RX W HCPCS: Performed by: UROLOGY

## 2022-05-18 PROCEDURE — 2709999900 HC NON-CHARGEABLE SUPPLY: Performed by: UROLOGY

## 2022-05-18 PROCEDURE — 7100000001 HC PACU RECOVERY - ADDTL 15 MIN: Performed by: UROLOGY

## 2022-05-18 PROCEDURE — 2580000003 HC RX 258: Performed by: UROLOGY

## 2022-05-18 PROCEDURE — 7100000000 HC PACU RECOVERY - FIRST 15 MIN: Performed by: UROLOGY

## 2022-05-18 PROCEDURE — 6360000002 HC RX W HCPCS: Performed by: NURSE ANESTHETIST, CERTIFIED REGISTERED

## 2022-05-18 PROCEDURE — 36415 COLL VENOUS BLD VENIPUNCTURE: CPT

## 2022-05-18 PROCEDURE — 6360000002 HC RX W HCPCS: Performed by: ANESTHESIOLOGY

## 2022-05-18 PROCEDURE — 3700000000 HC ANESTHESIA ATTENDED CARE: Performed by: UROLOGY

## 2022-05-18 PROCEDURE — 85610 PROTHROMBIN TIME: CPT

## 2022-05-18 PROCEDURE — 3600000013 HC SURGERY LEVEL 3 ADDTL 15MIN: Performed by: UROLOGY

## 2022-05-18 PROCEDURE — 88305 TISSUE EXAM BY PATHOLOGIST: CPT

## 2022-05-18 PROCEDURE — 3700000001 HC ADD 15 MINUTES (ANESTHESIA): Performed by: UROLOGY

## 2022-05-18 PROCEDURE — 82948 REAGENT STRIP/BLOOD GLUCOSE: CPT

## 2022-05-18 PROCEDURE — 6370000000 HC RX 637 (ALT 250 FOR IP): Performed by: NURSE ANESTHETIST, CERTIFIED REGISTERED

## 2022-05-18 PROCEDURE — 7100000011 HC PHASE II RECOVERY - ADDTL 15 MIN: Performed by: UROLOGY

## 2022-05-18 RX ORDER — ROCURONIUM BROMIDE 10 MG/ML
INJECTION, SOLUTION INTRAVENOUS PRN
Status: DISCONTINUED | OUTPATIENT
Start: 2022-05-18 | End: 2022-05-18 | Stop reason: SDUPTHER

## 2022-05-18 RX ORDER — SODIUM CHLORIDE 9 MG/ML
INJECTION, SOLUTION INTRAVENOUS CONTINUOUS
Status: DISCONTINUED | OUTPATIENT
Start: 2022-05-18 | End: 2022-05-19 | Stop reason: HOSPADM

## 2022-05-18 RX ORDER — MORPHINE SULFATE 2 MG/ML
2 INJECTION, SOLUTION INTRAMUSCULAR; INTRAVENOUS
Status: DISCONTINUED | OUTPATIENT
Start: 2022-05-18 | End: 2022-05-19 | Stop reason: HOSPADM

## 2022-05-18 RX ORDER — FENTANYL CITRATE 50 UG/ML
INJECTION, SOLUTION INTRAMUSCULAR; INTRAVENOUS
Status: COMPLETED
Start: 2022-05-18 | End: 2022-05-18

## 2022-05-18 RX ORDER — DEXAMETHASONE SODIUM PHOSPHATE 10 MG/ML
INJECTION, EMULSION INTRAMUSCULAR; INTRAVENOUS PRN
Status: DISCONTINUED | OUTPATIENT
Start: 2022-05-18 | End: 2022-05-18 | Stop reason: SDUPTHER

## 2022-05-18 RX ORDER — HYDROCODONE BITARTRATE AND ACETAMINOPHEN 5; 325 MG/1; MG/1
1 TABLET ORAL EVERY 6 HOURS PRN
Status: DISCONTINUED | OUTPATIENT
Start: 2022-05-18 | End: 2022-05-19 | Stop reason: HOSPADM

## 2022-05-18 RX ORDER — HYDROCODONE BITARTRATE AND ACETAMINOPHEN 5; 325 MG/1; MG/1
1 TABLET ORAL ONCE AS NEEDED
Status: COMPLETED | OUTPATIENT
Start: 2022-05-18 | End: 2022-05-18

## 2022-05-18 RX ORDER — MIDAZOLAM HYDROCHLORIDE 1 MG/ML
INJECTION INTRAMUSCULAR; INTRAVENOUS PRN
Status: DISCONTINUED | OUTPATIENT
Start: 2022-05-18 | End: 2022-05-18 | Stop reason: SDUPTHER

## 2022-05-18 RX ORDER — PROPOFOL 10 MG/ML
INJECTION, EMULSION INTRAVENOUS PRN
Status: DISCONTINUED | OUTPATIENT
Start: 2022-05-18 | End: 2022-05-18 | Stop reason: SDUPTHER

## 2022-05-18 RX ORDER — ATROPA BELLADONNA AND OPIUM 16.2; 3 MG/1; MG/1
SUPPOSITORY RECTAL PRN
Status: DISCONTINUED | OUTPATIENT
Start: 2022-05-18 | End: 2022-05-18 | Stop reason: SDUPTHER

## 2022-05-18 RX ORDER — FENTANYL CITRATE 50 UG/ML
25 INJECTION, SOLUTION INTRAMUSCULAR; INTRAVENOUS EVERY 5 MIN PRN
Status: DISCONTINUED | OUTPATIENT
Start: 2022-05-18 | End: 2022-05-18 | Stop reason: HOSPADM

## 2022-05-18 RX ORDER — CIPROFLOXACIN 500 MG/1
500 TABLET, FILM COATED ORAL 2 TIMES DAILY
Qty: 10 TABLET | Refills: 0 | Status: SHIPPED | OUTPATIENT
Start: 2022-05-18 | End: 2022-05-23

## 2022-05-18 RX ORDER — DOCUSATE SODIUM 100 MG/1
100 CAPSULE, LIQUID FILLED ORAL 2 TIMES DAILY
Qty: 28 CAPSULE | Refills: 0 | Status: SHIPPED | OUTPATIENT
Start: 2022-05-18 | End: 2022-06-01

## 2022-05-18 RX ORDER — SUCCINYLCHOLINE/SOD CL,ISO/PF 200MG/10ML
SYRINGE (ML) INTRAVENOUS PRN
Status: DISCONTINUED | OUTPATIENT
Start: 2022-05-18 | End: 2022-05-18 | Stop reason: SDUPTHER

## 2022-05-18 RX ORDER — HYDROCODONE BITARTRATE AND ACETAMINOPHEN 5; 325 MG/1; MG/1
1 TABLET ORAL EVERY 6 HOURS PRN
Qty: 18 TABLET | Refills: 0 | Status: SHIPPED | OUTPATIENT
Start: 2022-05-18 | End: 2022-05-23

## 2022-05-18 RX ORDER — FENTANYL CITRATE 50 UG/ML
INJECTION, SOLUTION INTRAMUSCULAR; INTRAVENOUS PRN
Status: DISCONTINUED | OUTPATIENT
Start: 2022-05-18 | End: 2022-05-18 | Stop reason: SDUPTHER

## 2022-05-18 RX ORDER — ONDANSETRON 2 MG/ML
INJECTION INTRAMUSCULAR; INTRAVENOUS PRN
Status: DISCONTINUED | OUTPATIENT
Start: 2022-05-18 | End: 2022-05-18 | Stop reason: SDUPTHER

## 2022-05-18 RX ADMIN — Medication 80 MG: at 12:16

## 2022-05-18 RX ADMIN — MORPHINE SULFATE 2 MG: 2 INJECTION, SOLUTION INTRAMUSCULAR; INTRAVENOUS at 21:00

## 2022-05-18 RX ADMIN — FENTANYL CITRATE 100 MCG: 50 INJECTION, SOLUTION INTRAMUSCULAR; INTRAVENOUS at 12:16

## 2022-05-18 RX ADMIN — FENTANYL CITRATE 25 MCG: 50 INJECTION, SOLUTION INTRAMUSCULAR; INTRAVENOUS at 13:38

## 2022-05-18 RX ADMIN — SODIUM CHLORIDE: 9 INJECTION, SOLUTION INTRAVENOUS at 11:13

## 2022-05-18 RX ADMIN — MORPHINE SULFATE 2 MG: 2 INJECTION, SOLUTION INTRAMUSCULAR; INTRAVENOUS at 23:00

## 2022-05-18 RX ADMIN — DEXAMETHASONE SODIUM PHOSPHATE 10 MG: 10 INJECTION, EMULSION INTRAMUSCULAR; INTRAVENOUS at 12:21

## 2022-05-18 RX ADMIN — HYDROCODONE BITARTRATE AND ACETAMINOPHEN 1 TABLET: 5; 325 TABLET ORAL at 15:02

## 2022-05-18 RX ADMIN — PROPOFOL 160 MG: 10 INJECTION, EMULSION INTRAVENOUS at 12:16

## 2022-05-18 RX ADMIN — ATROPA BELLADONNA AND OPIUM 30 MG: 16.2; 3 SUPPOSITORY RECTAL at 12:52

## 2022-05-18 RX ADMIN — ONDANSETRON 4 MG: 2 INJECTION INTRAMUSCULAR; INTRAVENOUS at 12:37

## 2022-05-18 RX ADMIN — ROCURONIUM BROMIDE 5 MG: 10 INJECTION INTRAVENOUS at 12:16

## 2022-05-18 RX ADMIN — Medication 2000 MG: at 12:22

## 2022-05-18 RX ADMIN — FENTANYL CITRATE 25 MCG: 50 INJECTION, SOLUTION INTRAMUSCULAR; INTRAVENOUS at 13:30

## 2022-05-18 RX ADMIN — MORPHINE SULFATE 2 MG: 2 INJECTION, SOLUTION INTRAMUSCULAR; INTRAVENOUS at 18:47

## 2022-05-18 RX ADMIN — Medication 140 MG: at 12:16

## 2022-05-18 RX ADMIN — MIDAZOLAM 2 MG: 1 INJECTION INTRAMUSCULAR; INTRAVENOUS at 12:12

## 2022-05-18 RX ADMIN — SODIUM CHLORIDE: 9 INJECTION, SOLUTION INTRAVENOUS at 20:53

## 2022-05-18 ASSESSMENT — PAIN DESCRIPTION - LOCATION
LOCATION: PENIS

## 2022-05-18 ASSESSMENT — PAIN SCALES - GENERAL
PAINLEVEL_OUTOF10: 8
PAINLEVEL_OUTOF10: 6
PAINLEVEL_OUTOF10: 3
PAINLEVEL_OUTOF10: 5
PAINLEVEL_OUTOF10: 6
PAINLEVEL_OUTOF10: 9
PAINLEVEL_OUTOF10: 6
PAINLEVEL_OUTOF10: 0
PAINLEVEL_OUTOF10: 5

## 2022-05-18 ASSESSMENT — PAIN - FUNCTIONAL ASSESSMENT: PAIN_FUNCTIONAL_ASSESSMENT: ACTIVITIES ARE NOT PREVENTED

## 2022-05-18 ASSESSMENT — PAIN DESCRIPTION - DESCRIPTORS: DESCRIPTORS: BURNING

## 2022-05-18 ASSESSMENT — LIFESTYLE VARIABLES: HOW OFTEN DO YOU HAVE A DRINK CONTAINING ALCOHOL: NEVER

## 2022-05-18 NOTE — FLOWSHEET NOTE
Admitted SDS. Fall risk band applied. SCDs applied. Permission ok to write pt first name last initial on whiteboard.  Family member present, wife An Hughes

## 2022-05-18 NOTE — TELEPHONE ENCOUNTER
Received message from Dr Ming Leong stating Yes cancel.  Mary at [facility] voiced understanding to cancel the norco.

## 2022-05-18 NOTE — TELEPHONE ENCOUNTER
Mary at [address] stated patient just received 90 tabs of norco on 05/04/2022 from pain management. Do you want the prescription sent today cancelled? Message sent to Dr Bernhard Oppenheim via perfect serve.

## 2022-05-18 NOTE — PROGRESS NOTES
1304 Pt arrives to recovery responsive to verbal stimulation. Pt respirations are labored on 10 L nasal canula in pt mouth. Pt VSS. Pt denies any pain at this time   1315 pt decreased to 8 L nasal canula. Pt respirations are less labored at this time. VSS. Pt states his pain is a 6/10  1330 Pt medicated for 6/10 pain with fentanyl. Pt respirations are unlabored on 4 L nasal canula   1338 Pt states his pain is now a 5/10. Pt medicated with fentanyl for pain. 1348 Pt states his pain is now a 4/10 and tolerable. Pt respirations are unlabored on 4 L nasal canula in mouth.  Pt VSS  1358 Pt meets criteria for discharge from recovery

## 2022-05-18 NOTE — H&P
Tasha Lorenzo MD  History and Physical    Patient:  Prakash Bah  MRN: [id number]  YOB: 1955    HISTORY OF PRESENT ILLNESS:     The patient is a 69 y.o. male who presents with bladder stones. Here for procedure. Patient's old records, notes and chart reviewed and summarized above. Tasha Lorenzo MD independently reviewed the images and verified the radiology reports from:    No results found.       Past Medical History:    Past Medical History:   Diagnosis Date    Acid reflux     Arthritis     Asthma     CAD (coronary artery disease)     Chronic back pain     Class 2 severe obesity due to excess calories with serious comorbidity and body mass index (BMI) of 37.0 to 37.9 in adult [facility]) 8/13/2018    Colon polyps 11/06    COPD (chronic obstructive pulmonary disease) (HCC)     Depression     panic attacks    Diverticulosis     HTN (hypertension)     Hyperlipidemia     Kidney disorder     Panic attack     Pneumonia     Rash     Recurrent upper respiratory infection (URI)     Thumb amputation status 3/13/14    left tip of thumb amputated    Thyroid disease     Type II or unspecified type diabetes mellitus without mention of complication, not stated as uncontrolled        Past Surgical History:    Past Surgical History:   Procedure Laterality Date    BACK SURGERY  2002    BACK SURGERY  08/11/2017    BICEPS TENDON REPAIR Right 08/16/2017    BRONCHOSCOPY      BRONCHOSCOPY N/A 4/5/2019    BRONCHOSCOPY performed by Juan Ramon Jimenes MD at [facility]    BRONCHOSCOPY  4/5/2019    BRONCHOSCOPY ALVEOLAR LAVAGE performed by Juan Ramon Jimenes MD at [address] [place] [address]  07/02 08/05    CARPAL TUNNEL RELEASE  2011    right hand    CHOLECYSTECTOMY  yrs ago    COLONOSCOPY  11/06 02/12 1/14    CORONARY ANGIOPLASTY WITH STENT PLACEMENT  02/2020    ENDOSCOPY, COLON, DIAGNOSTIC      EYE SURGERY      foreign body removal    HERNIA REPAIR  2004     Sofía-Umbilical    KIDNEY STONE SURGERY  2008?  LARYNGOSCOPY  04/28/2016    Laryngoscopy Micro with Biopsy by Dr Dolores Clement  01/07    uppp    ROTATOR CUFF REPAIR Left 5-20-15    SKIN BIOPSY      TONSILLECTOMY  1985    TURP N/A 2/25/2022    CYSTOSCOPY GREENLIGHT PHOTOVAPORIZATION OF THE PROSTATE performed by rBandy Michel MD at [facility] Left September 2014    Dr. Ja Wall       Medications Prior to Admission:    Prior to Admission medications    Medication Sig Start Date End Date Taking?  Authorizing Provider   busPIRone (BUSPAR) 10 MG tablet TAKE 1 TABLET BY MOUTH THREE TIMES DAILY  Patient taking differently: Take 10 mg by mouth 3 times daily Only takes 2 times 5/12/22   Tommie Hines MD   ALLERGEN EXTRACT every 14 days    SANTOS[facility] - MARITA   HYDROcodone-acetaminophen (NORCO) 5-325 MG per tablet TAKE 1 TABLET BY MOUTH THREE TIMES DAILY AS NEEDED FOR PAIN 3/28/22   Historical Provider, MD   ondansetron (ZOFRAN) 4 MG tablet Take 1 tablet by mouth every 8 hours as needed for Nausea or Vomiting  Patient not taking: Reported on 5/13/2022 4/1/22   Delon Morales MD   nystatin (MYCOSTATIN) 179899 UNIT/GM cream Apply topically 2 times daily after cleaning it with soap and water air dry  Patient not taking: Reported on 5/13/2022 3/31/22   Tommie Hines MD   losartan (COZAAR) 50 MG tablet Take 0.5 tablets by mouth daily 3/25/22   Tommie Hines MD   cetirizine (ZYRTEC) 10 MG tablet TAKE 1 TABLET BY MOUTH DAILY 3/9/22   Historical Provider, MD   Budeson-Glycopyrrol-Formoterol 160-9-4.8 MCG/ACT AERO 2 puff twice a day 3/10/22   Tommie Hines MD   clopidogrel (PLAVIX) 75 MG tablet Take 75 mg by mouth daily  3/2/22   Historical Provider, MD   ipratropium-albuterol (DUONEB) 0.5-2.5 (3) MG/3ML SOLN nebulizer solution Inhale 3 mLs into the lungs every 6 hours as needed for Shortness of History     Socioeconomic History    Marital status:      Spouse name: Zeyad Aldridge Number of children: 10    Years of education: Not on file    Highest education level: Not on file   Occupational History    Not on file   Tobacco Use    Smoking status: Former Smoker     Packs/day: 2.00     Years: 42.00     Pack years: 84.00     Types: Cigarettes     Start date: 1971     Quit date: 3/3/2017     Years since quittin.2    Smokeless tobacco: Never Used    Tobacco comment: pts uses just nicotine vap   Vaping Use    Vaping Use: Former    Substances: Nicotine   Substance and Sexual Activity    Alcohol use: No     Alcohol/week: 0.0 standard drinks    Drug use: No    Sexual activity: Yes     Partners: Female   Other Topics Concern    Not on file   Social History Narrative    Not on file     Social Determinants of Health     Financial Resource Strain: Low Risk     Difficulty of Paying Living Expenses: Not very hard   Food Insecurity: No Food Insecurity    Worried About Running Out of Food in the Last Year: Never true    [place] of Food in the Last Year: Never true   Transportation Needs:     Lack of Transportation (Medical): Not on file    Lack of Transportation (Non-Medical):  Not on file   Physical Activity:     Days of Exercise per Week: Not on file    Minutes of Exercise per Session: Not on file   Stress:     Feeling of Stress : Not on file   Social Connections:     Frequency of Communication with Friends and Family: Not on file    Frequency of Social Gatherings with Friends and Family: Not on file    Attends Evangelical Services: Not on file    Active Member of Clubs or Organizations: Not on file    Attends Club or Organization Meetings: Not on file    Marital Status: Not on file   Intimate Partner Violence:     Fear of Current or Ex-Partner: Not on file    Emotionally Abused: Not on file    Physically Abused: Not on file    Sexually Abused: Not on file   Housing Stability:     Unable to Pay for Housing in the Last Year: Not on file    Number of Places Lived in the Last Year: Not on file    Unstable Housing in the Last Year: Not on file       Family History:    Family History   Problem Relation Age of Onset    Cancer Mother     Breast Cancer Mother     Stroke Mother     Heart Disease Brother     Diabetes Brother     High Blood Pressure Brother     High Cholesterol Brother        REVIEW OF SYSTEMS:  Constitutional: negative  Eyes: negative  Respiratory: negative  Cardiovascular: negative  Gastrointestinal: negative  Genitourinary: no acute issues  Musculoskeletal: negative  Skin: negative   Neurological: negative  Hematological/Lymphatic: negative  Psychological: negative    Physical Exam:      No data found. Constitutional: Patient in no acute distress; Neuro: alert and oriented to person place and time. Psych: Mood and affect normal.  Skin: Normal  Lungs: Respiratory effort normal, CTA  Cardiovascular:  Normal peripheral pulses; no murmur. Normal rhythm  Abdomen: Soft, non-tender, non-distended with no CVA, flank pain, hepatosplenomegaly or hernia. Kidneys normal.  Bladder non-tender and not distended. LABS:   No results for input(s): WBC, HGB, HCT, MCV, PLT in the last 72 hours. No results for input(s): NA, K, CL, CO2, PHOS, BUN, CREATININE, CA in the last 72 hours. Lab Results   Component Value Date    PSA 0.83 01/29/2021    PSA 1.92 (H) 12/13/2019    PSA 1.10 (H) 06/14/2019         Urinalysis: No results for input(s): COLORU, PHUR, LABCAST, WBCUA, RBCUA, MUCUS, TRICHOMONAS, YEAST, BACTERIA, CLARITYU, SPECGRAV, LEUKOCYTESUR, UROBILINOGEN, BILIRUBINUR, BLOODU in the last 72 hours.     Invalid input(s): NITRATE, GLUCOSEUKETONESUAMORPHOUS     -----------------------------------------------------------------      Assessment and Plan     Impression:    Patient Active Problem List   Diagnosis    Diabetes ([facility] [address]    Hyperlipidemia    Gastroesophageal reflux disease    PARRISH (obstructive sleep apnea)    Chronic back pain    DDD (degenerative disc disease), cervical    Asthma    Lower urinary tract symptoms (LUTS)    Hypogonadism in male   [place] Low testosterone    Combined arterial insufficiency and corporo-venous occlusive erectile dysfunction    Left thyroid nodule    Moderate COPD (chronic obstructive pulmonary disease) ([facility])    Class 2 severe obesity due to excess calories with serious comorbidity and body mass index (BMI) of 37.0 to 37.9 in adult [facility])    Vocal cord disease    Erythrocytosis    History of anxiety disorder    Personal history of tobacco use, presenting hazards to health    Hypertensive disorder    COPD (chronic obstructive pulmonary disease) ([facility] [address]    Hypotension    Dependence on supplemental oxygen    S/P transurethral Photo Vaporization of Prostate       Plan:     Consent obtained; cysto turp dystrophic calcifications in OR today    Dayna Rolle MD  6:34 AM 5/18/2022

## 2022-05-18 NOTE — ANESTHESIA POSTPROCEDURE EVALUATION
Department of Anesthesiology  Postprocedure Note    Patient: Brown Ashley  MRN: [id number]  YOB: 1955  Date of evaluation: 5/18/2022  Time:  6:16 PM     Procedure Summary     Date: 05/18/22 Room / Location: [address] [place] [address] Shadia York    Anesthesia Start: 0945 Anesthesia Stop: 8265    Procedure: Cystoscopy Transurethral Resection of the Prostate evacuation of bladder stones (N/A ) Diagnosis: (BPH Bladder stone)    Surgeons: Marysol Cooper MD Responsible Provider: Chad Leiva MD    Anesthesia Type: general ASA Status: 3          Anesthesia Type: No value filed. Karie Phase I: Karie Score: 9    Karie Phase II: Karie Score: 9    Last vitals: Reviewed and per EMR flowsheets. Anesthesia Post Evaluation    Patient location during evaluation: PACU  Patient participation: complete - patient participated  Level of consciousness: awake and alert  Airway patency: patent  Nausea & Vomiting: no nausea and no vomiting  Complications: no  Cardiovascular status: hemodynamically stable  Respiratory status: acceptable  Hydration status: euvolemic      [facility]  POST-ANESTHESIA NOTE       Name:  Brown Ashley                                         Age:  69 y.o.   MRN:  [id number]      Last Vitals:  BP (!) 141/70   Pulse 65   Temp 97.6 °F (36.4 °C) (Temporal)   Resp 16   Ht 5' 8\" (1.727 m)   Wt 224 lb 6.4 oz (101.8 kg)   SpO2 90%   BMI 34.12 kg/m²   Patient Vitals for the past 4 hrs:   BP Temp Temp src Pulse Resp SpO2   05/18/22 1530 (!) 141/70 97.6 °F (36.4 °C) Temporal 65 16 90 %   05/18/22 1452 (!) 148/68 96.9 °F (36.1 °C) Temporal 67 16 96 %       Level of Consciousness:  Awake    Respiratory:  Stable    Oxygen Saturation:  Stable    Cardiovascular:  Stable    Hydration:  Adequate    PONV:  Stable    Post-op Pain:  Adequate analgesia    Post-op Assessment:  No apparent anesthetic complications    Additional Follow-Up / Treatment / Comment:  None    Hasmukh Valenzuela MD  May 18, 2022   6:16 PM

## 2022-05-18 NOTE — ANESTHESIA PRE PROCEDURE
Department of Anesthesiology  Preprocedure Note       Name:  Mo Braswell   Age:  69 y.o.  :  1955                                          MRN:  [id number]         Date:  2022      Surgeon: Loree Salvador):  Marco A Gibson MD    Procedure: Procedure(s):  Cystoscopy Transurethral Resection of the Prostate Cystolithlapaxy    Medications prior to admission:   Prior to Admission medications    Medication Sig Start Date End Date Taking?  Authorizing Provider   busPIRone (BUSPAR) 10 MG tablet TAKE 1 TABLET BY MOUTH THREE TIMES DAILY  Patient taking differently: Take 10 mg by mouth 3 times daily Only takes 2 times 22   Milton Alex MD   ALLERGEN EXTRACT every 14 days    SANTOSH Sutherland - CNP   HYDROcodone-acetaminophen (NORCO) 5-325 MG per tablet TAKE 1 TABLET BY MOUTH THREE TIMES DAILY AS NEEDED FOR PAIN 3/28/22   Historical Provider, MD   ondansetron (ZOFRAN) 4 MG tablet Take 1 tablet by mouth every 8 hours as needed for Nausea or Vomiting 22   Laila Mason MD   nystatin (MYCOSTATIN) 229256 UNIT/GM cream Apply topically 2 times daily after cleaning it with soap and water air dry 3/31/22   Milton Alex MD   losartan (COZAAR) 50 MG tablet Take 0.5 tablets by mouth daily  Patient not taking: Reported on 2022 3/25/22   Milton Alex MD   cetirizine (ZYRTEC) 10 MG tablet TAKE 1 TABLET BY MOUTH DAILY 3/9/22   Historical Provider, MD   Budeson-Glycopyrrol-Formoterol 160-9-4.8 MCG/ACT AERO 2 puff twice a day 3/10/22   Milton Alex MD   clopidogrel (PLAVIX) 75 MG tablet Take 75 mg by mouth daily  3/2/22   Historical Provider, MD   ipratropium-albuterol (DUONEB) 0.5-2.5 (3) MG/3ML SOLN nebulizer solution Inhale 3 mLs into the lungs every 6 hours as needed for Shortness of Breath 3/3/22   Milton Alex MD   OXYGEN Inhale 3 L into the lungs as needed    Historical Provider, MD   metFORMIN (GLUCOPHAGE) 500 MG tablet TAKE 2 TABLETS BY MOUTH TWICE DAILY WITH MEALS 22   Lisa Rosario MD Prashanth   rOPINIRole (REQUIP) 1 MG tablet TAKE 1 TABLET BY MOUTH THREE TIMES DAILY 2/4/22   Erin Lyn MD   traZODone (DESYREL) 150 MG tablet TAKE 1/2 TO 1 TABLET BY MOUTH EVERY DAY AT BEDTIME AS NEEDED FOR SLEEP 1/26/22   Sharda Roberto MD   glimepiride (AMARYL) 4 MG tablet TAKE 1 TABLET BY MOUTH TWICE DAILY  Patient taking differently: Take 4 mg by mouth Daily with supper TAKE 1 TABLET BY MOUTH 12/24/21   Erin Lyn MD   montelukast (SINGULAIR) 10 MG tablet Take 10 mg by mouth nightly    Historical Provider, MD   sertraline (ZOLOFT) 100 MG tablet TAKE 1 TABLET BY MOUTH TWICE DAILY 10/22/21   Bailey Wood MD   fluticasone [place]) 50 MCG/ACT nasal spray SHAKE LIQUID AND USE 1 SPRAY IN EACH NOSTRIL DAILY 9/16/21   SANTOSH Gerard CNP   VENTOLIN  (90 Base) MCG/ACT inhaler INHALE 2 PUFFS INTO THE LUNGS EVERY 6 HOURS AS NEEDED FOR WHEEZING 9/8/21   Essence Plummer MD   atorvastatin (LIPITOR) 20 MG tablet 20 mg daily  5/31/20   Historical Provider, MD   pantoprazole (PROTONIX) 40 MG tablet Take 40 mg by mouth 2 times daily     Historical Provider, MD   ranolazine (RANEXA) 500 MG extended release tablet Take 500 mg by mouth 2 times daily    Historical Provider, MD   EPINEPHrine (EPIPEN 2-HUBER) 0.3 MG/0.3ML SOAJ injection Inject one pen as directed STAT for allergic reaction, may disp [facility] [address] [id number]  Patient not taking: Reported on 5/18/2022 6/24/19   SANTOSH Gerard CNP   baclofen (LIORESAL) 10 MG tablet 2 times daily as needed  4/5/18   Historical Provider, MD   Omega-3 Fatty Acids (FISH OIL) 1000 MG CAPS Take 1,000 mg by mouth daily. Historical Provider, MD       Current medications:    No current facility-administered medications for this visit. No current outpatient medications on file.      Facility-Administered Medications Ordered in Other Visits   Medication Dose Route Frequency Provider Last Rate Last Admin    0.9 % sodium chloride infusion   IntraVENous Continuous Jayesh Dhillon  mL/hr at 05/18/22 1113 New Bag at 05/18/22 1113    ceFAZolin (ANCEF) 2000 mg in sterile water 20 mL IV syringe  2,000 mg IntraVENous 30 Min Pre-Op Jayesh Dhillon MD           Allergies:  No Known Allergies    Problem List:    Patient Active Problem List   Diagnosis Code    Diabetes ([facility] E11.9    Hyperlipidemia E78.5    Gastroesophageal reflux disease K21.9    PARRISH (obstructive sleep apnea) G47.33    Chronic back pain M54.9, G89.29    DDD (degenerative disc disease), cervical M50.30    Asthma J45.909    Lower urinary tract symptoms (LUTS) R39.9    Hypogonadism in male E29.1    Low testosterone R79.89    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    Left thyroid nodule E04.1    Moderate COPD (chronic obstructive pulmonary disease) (HCC) J44.9    Class 2 severe obesity due to excess calories with serious comorbidity and body mass index (BMI) of 37.0 to 37.9 in adult ([facility] E66.01, Z68.37    Vocal cord disease J38.3    Erythrocytosis D75.1    History of anxiety disorder Z86.59    Personal history of tobacco use, presenting hazards to health Z87.891    Hypertensive disorder I10    COPD (chronic obstructive pulmonary disease) ([facility] J44.9    Hypotension I95.9    Dependence on supplemental oxygen Z99.81    S/P transurethral Photo Vaporization of Prostate Z90.79       Past Medical History:        Diagnosis Date    Acid reflux     Arthritis     Asthma     CAD (coronary artery disease)     Chronic back pain     Class 2 severe obesity due to excess calories with serious comorbidity and body mass index (BMI) of 37.0 to 37.9 in adult [facility]) 8/13/2018    Colon polyps 11/06    COPD (chronic obstructive pulmonary disease) (HCC)     Depression     panic attacks    Diverticulosis     HTN (hypertension)     Hyperlipidemia     Kidney disorder     Panic attack     Pneumonia     Rash     Recurrent upper respiratory infection (URI)     Thumb amputation status 3/13/14    left tip of thumb amputated    Thyroid disease     Type II or unspecified type diabetes mellitus without mention of complication, not stated as uncontrolled        Past Surgical History:        Procedure Laterality Date    BACK SURGERY  2002    BACK SURGERY  2017    BICEPS TENDON REPAIR Right 2017    BRONCHOSCOPY      BRONCHOSCOPY N/A 2019    BRONCHOSCOPY performed by Paula Cooper MD at [facility]  2019    BRONCHOSCOPY ALVEOLAR LAVAGE performed by Paula Cooper MD at [facility]   [address]      CARPAL TUNNEL RELEASE      right hand    CHOLECYSTECTOMY  yrs ago    COLONOSCOPY      CORONARY ANGIOPLASTY WITH STENT PLACEMENT  2020    ENDOSCOPY, COLON, DIAGNOSTIC      EYE SURGERY      foreign body removal    HERNIA REPAIR      Dr Page Forde  ?     LARYNGOSCOPY  2016    Laryngoscopy Micro with Biopsy by Dr Nevin Thornton      uppp    ROTATOR CUFF REPAIR Left 15    SKIN BIOPSY      TONSILLECTOMY  1985    TURP N/A 2022    CYSTOSCOPY GREENLIGHT PHOTOVAPORIZATION OF THE PROSTATE performed by Claudia Villarreal MD at [facility] Left 2014    Dr. Roxy Montiel       Social History:    Social History     Tobacco Use    Smoking status: Former Smoker     Packs/day: 2.00     Years: 42.00     Pack years: 84.00     Types: Cigarettes     Start date: 1971     Quit date: 3/3/2017     Years since quittin.2    Smokeless tobacco: Never Used    Tobacco comment: pts uses just nicotine vap   Substance Use Topics    Alcohol use: No     Alcohol/week: 0.0 standard drinks                                Counseling given: Not Answered  Comment: pts uses just nicotine vap      Vital Dental:    (+) upper dentures and lower dentures      Pulmonary:   (+) COPD:  recent URI:  sleep apnea:  decreased breath sounds,  asthma:                            Cardiovascular:    (+) hypertension:, CAD:,                   Neuro/Psych:   (+) neuromuscular disease:,             GI/Hepatic/Renal:   (+) GERD:,           Endo/Other:    (+) Diabetes, . Abdominal:             Vascular: Other Findings:               Anesthesia Plan      general     ASA 3       Induction: intravenous. MIPS: Postoperative opioids intended and Prophylactic antiemetics administered. Anesthetic plan and risks discussed with patient and spouse.       Plan discussed with CRNA and surgical team.                  Hasmukh Valenzuela MD   5/18/2022

## 2022-05-18 NOTE — FLOWSHEET NOTE
Pt returned to [facility] room 2. Vitals and assessment as charted. 0.9 infusing, @900ml to count from PACU. Pt has ginger ale and sherbet. Family at the bedside. Pt and family verbalized understanding of discharge criteria and call light use. Call light in reach.

## 2022-05-19 VITALS
OXYGEN SATURATION: 92 % | HEART RATE: 67 BPM | SYSTOLIC BLOOD PRESSURE: 112 MMHG | HEIGHT: 68 IN | DIASTOLIC BLOOD PRESSURE: 54 MMHG | BODY MASS INDEX: 34.01 KG/M2 | TEMPERATURE: 97.3 F | RESPIRATION RATE: 16 BRPM | WEIGHT: 224.4 LBS

## 2022-05-19 PROBLEM — I95.9 HYPOTENSION: Status: RESOLVED | Noted: 2022-03-22 | Resolved: 2022-05-19

## 2022-05-19 PROBLEM — D75.1 ERYTHROCYTOSIS: Status: RESOLVED | Noted: 2019-12-17 | Resolved: 2022-05-19

## 2022-05-19 LAB
ANION GAP SERPL CALCULATED.3IONS-SCNC: 11 MEQ/L (ref 8–16)
BASOPHILS # BLD: 0.2 %
BASOPHILS ABSOLUTE: 0 THOU/MM3 (ref 0–0.1)
BUN BLDV-MCNC: 10 MG/DL (ref 7–22)
CALCIUM SERPL-MCNC: 7.6 MG/DL (ref 8.5–10.5)
CHLORIDE BLD-SCNC: 108 MEQ/L (ref 98–111)
CO2: 21 MEQ/L (ref 23–33)
CREAT SERPL-MCNC: 1.1 MG/DL (ref 0.4–1.2)
EOSINOPHIL # BLD: 0.1 %
EOSINOPHILS ABSOLUTE: 0 THOU/MM3 (ref 0–0.4)
ERYTHROCYTE [DISTWIDTH] IN BLOOD BY AUTOMATED COUNT: 16.4 % (ref 11.5–14.5)
ERYTHROCYTE [DISTWIDTH] IN BLOOD BY AUTOMATED COUNT: 56 FL (ref 35–45)
GFR SERPL CREATININE-BSD FRML MDRD: 66 ML/MIN/1.73M2
GLUCOSE BLD-MCNC: 173 MG/DL (ref 70–108)
HCT VFR BLD CALC: 36.7 % (ref 42–52)
HEMOGLOBIN: 12 GM/DL (ref 14–18)
IMMATURE GRANS (ABS): 0.08 THOU/MM3 (ref 0–0.07)
IMMATURE GRANULOCYTES: 0.7 %
LYMPHOCYTES # BLD: 8.6 %
LYMPHOCYTES ABSOLUTE: 0.9 THOU/MM3 (ref 1–4.8)
MCH RBC QN AUTO: 30.5 PG (ref 26–33)
MCHC RBC AUTO-ENTMCNC: 32.7 GM/DL (ref 32.2–35.5)
MCV RBC AUTO: 93.1 FL (ref 80–94)
MONOCYTES # BLD: 6.4 %
MONOCYTES ABSOLUTE: 0.7 THOU/MM3 (ref 0.4–1.3)
NUCLEATED RED BLOOD CELLS: 0 /100 WBC
PLATELET # BLD: 126 THOU/MM3 (ref 130–400)
PMV BLD AUTO: 11.6 FL (ref 9.4–12.4)
POTASSIUM SERPL-SCNC: 4.6 MEQ/L (ref 3.5–5.2)
RBC # BLD: 3.94 MILL/MM3 (ref 4.7–6.1)
SEG NEUTROPHILS: 84 %
SEGMENTED NEUTROPHILS ABSOLUTE COUNT: 9.2 THOU/MM3 (ref 1.8–7.7)
SODIUM BLD-SCNC: 140 MEQ/L (ref 135–145)
WBC # BLD: 11 THOU/MM3 (ref 4.8–10.8)

## 2022-05-19 PROCEDURE — 6360000002 HC RX W HCPCS: Performed by: UROLOGY

## 2022-05-19 PROCEDURE — 85025 COMPLETE CBC W/AUTO DIFF WBC: CPT

## 2022-05-19 PROCEDURE — 99024 POSTOP FOLLOW-UP VISIT: CPT | Performed by: NURSE PRACTITIONER

## 2022-05-19 PROCEDURE — 80048 BASIC METABOLIC PNL TOTAL CA: CPT

## 2022-05-19 PROCEDURE — 6370000000 HC RX 637 (ALT 250 FOR IP): Performed by: NURSE PRACTITIONER

## 2022-05-19 PROCEDURE — 2580000003 HC RX 258: Performed by: UROLOGY

## 2022-05-19 PROCEDURE — APPNB45 APP NON BILLABLE 31-45 MINUTES: Performed by: NURSE PRACTITIONER

## 2022-05-19 PROCEDURE — 94640 AIRWAY INHALATION TREATMENT: CPT

## 2022-05-19 PROCEDURE — 36415 COLL VENOUS BLD VENIPUNCTURE: CPT

## 2022-05-19 RX ORDER — ONDANSETRON 4 MG/1
4 TABLET, FILM COATED ORAL EVERY 8 HOURS PRN
Status: DISCONTINUED | OUTPATIENT
Start: 2022-05-19 | End: 2022-05-19 | Stop reason: HOSPADM

## 2022-05-19 RX ORDER — FLUTICASONE PROPIONATE 50 MCG
1 SPRAY, SUSPENSION (ML) NASAL DAILY
Status: DISCONTINUED | OUTPATIENT
Start: 2022-05-19 | End: 2022-05-19 | Stop reason: HOSPADM

## 2022-05-19 RX ORDER — ATORVASTATIN CALCIUM 20 MG/1
20 TABLET, FILM COATED ORAL DAILY
Status: DISCONTINUED | OUTPATIENT
Start: 2022-05-19 | End: 2022-05-19 | Stop reason: HOSPADM

## 2022-05-19 RX ORDER — IPRATROPIUM BROMIDE AND ALBUTEROL SULFATE 2.5; .5 MG/3ML; MG/3ML
1 SOLUTION RESPIRATORY (INHALATION) EVERY 6 HOURS PRN
Status: DISCONTINUED | OUTPATIENT
Start: 2022-05-19 | End: 2022-05-19 | Stop reason: HOSPADM

## 2022-05-19 RX ORDER — MONTELUKAST SODIUM 10 MG/1
10 TABLET ORAL NIGHTLY
Status: DISCONTINUED | OUTPATIENT
Start: 2022-05-19 | End: 2022-05-19 | Stop reason: HOSPADM

## 2022-05-19 RX ORDER — BUSPIRONE HYDROCHLORIDE 10 MG/1
10 TABLET ORAL 2 TIMES DAILY
Status: DISCONTINUED | OUTPATIENT
Start: 2022-05-19 | End: 2022-05-19 | Stop reason: HOSPADM

## 2022-05-19 RX ORDER — CETIRIZINE HYDROCHLORIDE 10 MG/1
10 TABLET ORAL DAILY
Status: DISCONTINUED | OUTPATIENT
Start: 2022-05-19 | End: 2022-05-19 | Stop reason: HOSPADM

## 2022-05-19 RX ORDER — PANTOPRAZOLE SODIUM 40 MG/1
40 TABLET, DELAYED RELEASE ORAL 2 TIMES DAILY
Status: DISCONTINUED | OUTPATIENT
Start: 2022-05-19 | End: 2022-05-19 | Stop reason: HOSPADM

## 2022-05-19 RX ORDER — RANOLAZINE 500 MG/1
500 TABLET, EXTENDED RELEASE ORAL 2 TIMES DAILY
Status: DISCONTINUED | OUTPATIENT
Start: 2022-05-19 | End: 2022-05-19 | Stop reason: HOSPADM

## 2022-05-19 RX ORDER — ALBUTEROL SULFATE 90 UG/1
2 AEROSOL, METERED RESPIRATORY (INHALATION) EVERY 6 HOURS PRN
Status: DISCONTINUED | OUTPATIENT
Start: 2022-05-19 | End: 2022-05-19 | Stop reason: HOSPADM

## 2022-05-19 RX ORDER — GLIPIZIDE 10 MG/1
10 TABLET ORAL
Status: DISCONTINUED | OUTPATIENT
Start: 2022-05-19 | End: 2022-05-19 | Stop reason: HOSPADM

## 2022-05-19 RX ORDER — DEXTROSE MONOHYDRATE 50 MG/ML
100 INJECTION, SOLUTION INTRAVENOUS PRN
Status: DISCONTINUED | OUTPATIENT
Start: 2022-05-19 | End: 2022-05-19 | Stop reason: HOSPADM

## 2022-05-19 RX ORDER — BACLOFEN 10 MG/1
10 TABLET ORAL 2 TIMES DAILY PRN
Status: DISCONTINUED | OUTPATIENT
Start: 2022-05-19 | End: 2022-05-19 | Stop reason: HOSPADM

## 2022-05-19 RX ORDER — ROPINIROLE 1 MG/1
1 TABLET, FILM COATED ORAL 3 TIMES DAILY
Status: DISCONTINUED | OUTPATIENT
Start: 2022-05-19 | End: 2022-05-19 | Stop reason: HOSPADM

## 2022-05-19 RX ORDER — SERTRALINE HYDROCHLORIDE 100 MG/1
100 TABLET, FILM COATED ORAL 2 TIMES DAILY
Status: DISCONTINUED | OUTPATIENT
Start: 2022-05-19 | End: 2022-05-19 | Stop reason: HOSPADM

## 2022-05-19 RX ORDER — CLOPIDOGREL BISULFATE 75 MG/1
75 TABLET ORAL DAILY
Qty: 30 TABLET | Refills: 3 | Status: SHIPPED
Start: 2022-05-24

## 2022-05-19 RX ADMIN — SERTRALINE 100 MG: 100 TABLET, FILM COATED ORAL at 09:05

## 2022-05-19 RX ADMIN — RANOLAZINE 500 MG: 500 TABLET, FILM COATED, EXTENDED RELEASE ORAL at 09:05

## 2022-05-19 RX ADMIN — BUSPIRONE HYDROCHLORIDE 10 MG: 10 TABLET ORAL at 08:57

## 2022-05-19 RX ADMIN — PANTOPRAZOLE SODIUM 40 MG: 40 TABLET, DELAYED RELEASE ORAL at 09:05

## 2022-05-19 RX ADMIN — TIOTROPIUM BROMIDE INHALATION SPRAY 2 PUFF: 3.12 SPRAY, METERED RESPIRATORY (INHALATION) at 09:29

## 2022-05-19 RX ADMIN — ATORVASTATIN CALCIUM 20 MG: 20 TABLET, FILM COATED ORAL at 09:04

## 2022-05-19 RX ADMIN — SODIUM CHLORIDE: 9 INJECTION, SOLUTION INTRAVENOUS at 06:42

## 2022-05-19 RX ADMIN — MOMETASONE FUROATE AND FORMOTEROL FUMARATE DIHYDRATE 2 PUFF: 200; 5 AEROSOL RESPIRATORY (INHALATION) at 09:29

## 2022-05-19 RX ADMIN — ROPINIROLE 1 MG: 1 TABLET, FILM COATED ORAL at 09:05

## 2022-05-19 RX ADMIN — FLUTICASONE PROPIONATE 1 SPRAY: 50 SPRAY, METERED NASAL at 08:58

## 2022-05-19 RX ADMIN — MORPHINE SULFATE 2 MG: 2 INJECTION, SOLUTION INTRAMUSCULAR; INTRAVENOUS at 01:00

## 2022-05-19 RX ADMIN — CETIRIZINE HYDROCHLORIDE 10 MG: 10 TABLET, FILM COATED ORAL at 08:57

## 2022-05-19 RX ADMIN — MORPHINE SULFATE 2 MG: 2 INJECTION, SOLUTION INTRAMUSCULAR; INTRAVENOUS at 04:13

## 2022-05-19 ASSESSMENT — PAIN SCALES - GENERAL
PAINLEVEL_OUTOF10: 5
PAINLEVEL_OUTOF10: 4
PAINLEVEL_OUTOF10: 0

## 2022-05-19 ASSESSMENT — PAIN DESCRIPTION - DESCRIPTORS: DESCRIPTORS: DISCOMFORT

## 2022-05-19 ASSESSMENT — PAIN - FUNCTIONAL ASSESSMENT: PAIN_FUNCTIONAL_ASSESSMENT: ACTIVITIES ARE NOT PREVENTED

## 2022-05-19 NOTE — DISCHARGE SUMMARY
DISCHARGE SUMMARY NOTE:      Patient Identification  Torrey Vasquez is a 69 y.o. male. :  1955  Admit Date:  2022    Discharge date:  2022                                   Disposition: home    Discharge Diagnoses:   BPH  Bladder stones  DM, HTN, COPD, CAD, Chronic back pain      Patient Active Problem List   Diagnosis    Diabetes ([facility]    Hyperlipidemia    Gastroesophageal reflux disease    PARRISH (obstructive sleep apnea)    Chronic back pain    DDD (degenerative disc disease), cervical    Asthma    Lower urinary tract symptoms (LUTS)    Hypogonadism in male   [place] Low testosterone    Combined arterial insufficiency and corporo-venous occlusive erectile dysfunction    Moderate COPD (chronic obstructive pulmonary disease) ([facility])    Class 2 severe obesity due to excess calories with serious comorbidity and body mass index (BMI) of 37.0 to 37.9 in adult [facility])    History of anxiety disorder    Personal history of tobacco use, presenting hazards to health    Hypertensive disorder    COPD (chronic obstructive pulmonary disease) ([facility]    Dependence on supplemental oxygen    S/P transurethral Photo Vaporization of Prostate    History of BPH       Consults: none    Surgery: POD #1 surgery by Dr. Thea Goodwin. Patient Instructions: Activity: no heavy lifting, pushing, pulling for 6 weeks, no driving for 2 weeks or while on analgesics  Diet: As tolerated  Follow-up with    Hospital course: Patient under went surgery with no complications. Post-operatively course remained stable. Patient is tolerating diet, urinating adequately on his own, pain is minimal, ambulating well with assistance, having flatus. Time spent for discharge 33 minutes.     SANTOSH Ybarra - [place]  2022 1:05 PM

## 2022-05-19 NOTE — PROGRESS NOTES
Urology Progress Note    Chief Complaint: Bladder stones    S/p Cystoscopy, TURP, cystolitholapaxy by Dr. Sandee Burrows     Subjective:     Pt sitting up on side of the bed. Denies pain other than discomfort in penis from catheter. Ready for mcgraw removal.  Tolerating diet. Passing flatus. No chest pain or shortness of breath. Vitals:  /86   Pulse 68   Temp 97.7 °F (36.5 °C) (Oral)   Resp 16   Ht 5' 8\" (1.727 m)   Wt 224 lb 6.4 oz (101.8 kg)   SpO2 93%   BMI 34.12 kg/m²   Temp  Av.6 °F (36.4 °C)  Min: 96.9 °F (36.1 °C)  Max: 98.1 °F (36.7 °C)    Intake/Output Summary (Last 24 hours) at 2022 0731  Last data filed at 2022 2254  Gross per 24 hour   Intake 1000 ml   Output -4950 ml   Net 5950 ml       Social History     Socioeconomic History    Marital status:      Spouse name: Orlando Pena Number of children: 10    Years of education: Not on file    Highest education level: Not on file   Occupational History    Not on file   Tobacco Use    Smoking status: Former Smoker     Packs/day: 2.00     Years: 42.00     Pack years: 84.00     Types: Cigarettes     Start date: 1971     Quit date: 3/3/2017     Years since quittin.2    Smokeless tobacco: Never Used    Tobacco comment: pts uses just nicotine vap   Vaping Use    Vaping Use: Every day    Substances: Nicotine   Substance and Sexual Activity    Alcohol use: No     Alcohol/week: 0.0 standard drinks    Drug use: No    Sexual activity: Yes     Partners: Female   Other Topics Concern    Not on file   Social History Narrative    Not on file     Social Determinants of Health     Financial Resource Strain: Low Risk     Difficulty of Paying Living Expenses: Not very hard   Food Insecurity: No Food Insecurity    Worried About Running Out of Food in the Last Year: Never true    [place] of Food in the Last Year: Never true   Transportation Needs:     Lack of Transportation (Medical):  Not on file    Lack of Transportation (Non-Medical): Not on file   Physical Activity:     Days of Exercise per Week: Not on file    Minutes of Exercise per Session: Not on file   Stress:     Feeling of Stress : Not on file   Social Connections:     Frequency of Communication with Friends and Family: Not on file    Frequency of Social Gatherings with Friends and Family: Not on file    Attends Latter day Services: Not on file    Active Member of [address] [place] [address] [employer] or Organizations: Not on file    Attends Club or Organization Meetings: Not on file    Marital Status: Not on file   Intimate Partner Violence:     Fear of Current or Ex-Partner: Not on file    Emotionally Abused: Not on file    Physically Abused: Not on file    Sexually Abused: Not on file   Housing Stability:     Unable to Pay for Housing in the Last Year: Not on file    Number of [place] in the Last Year: Not on file    Unstable Housing in the Last Year: Not on file     Family History   Problem Relation Age of Onset    Cancer Mother     Breast Cancer Mother     Stroke Mother     Heart Disease Brother     Diabetes Brother     High Blood Pressure Brother     High Cholesterol Brother      No Known Allergies      Constitutional: Alert and oriented times x3, no acute distress, and cooperative to examination with appropriate mood and affect. HEENT:   Head:         Normocephalic and atraumatic. Mucous membranes are normal.   Eyes:         EOM are normal. No scleral icterus. Nose:    The external appearance of the nose is normal  Ears: The ears appear normal to external inspection. Cardiovascular:       Normal rate, regular rhythm. Pulmonary/Chest:  Normal respiratory rate and rhthym. No use of accessory muscles. Lungs clear bilaterally. Abdominal:          Soft. No tenderness. Active bowel sounds. Genitalia:    Jones catheter draining clear yellow urine with CBI clamped since 0600. Musculoskeletal:    Normal range of motion.  Exhibits no edema or tenderness of lower extremities. Extremities:    No cyanosis, clubbing, or edema present. Neurological:    Alert and oriented. Labs:  WBC:    Lab Results   Component Value Date    WBC 6.6 03/25/2022     Hemoglobin/Hematocrit:    Lab Results   Component Value Date    HGB 13.9 03/25/2022    HCT 43.4 03/25/2022     BMP:    Lab Results   Component Value Date     04/06/2022    K 4.1 04/06/2022    K 4.8 03/22/2022     04/06/2022    CO2 27 04/06/2022    BUN 18 04/06/2022    LABALBU 4.0 03/25/2022    LABALBU 4.4 02/27/2012    CREATININE 1.2 04/06/2022    CALCIUM 8.7 04/06/2022    GFRAA >60 02/07/2020    LABGLOM 60 04/06/2022       Impression:  BPH  Bladder stones  DM, HTN, COPD, CAD, Chronic back pain    Plan:  POD #1 surgery by Dr. Bernhard Oppenheim 5/18    Remove mcgraw for voiding trial.  Hold plavix for 5 days. Plan d/c home later today after voiding and ambulating in the hallway.       SANTOSH Osei - [place]  05/19/22 7:31 AM  Urology

## 2022-05-19 NOTE — PROGRESS NOTES
Patient refused offer to ambulate at this time. Patient stated that it would be too painful with the mcgraw in place and he would walk once mcgraw is removed. Patient given incentive spirometer and educated on the when and how to use it. Patient verbalized understanding.

## 2022-05-19 NOTE — PROGRESS NOTES
Family Medicine Progress Notes/Coverage    Today's Date: 5/19/22  [id number]  Medical Record # [id number]  CSN/Account # [de-identified]      Mr. Helga Brennan admitted on 5/18/2022        Subjective / Interval History :     Patient still on CBI  Having pain in the penis with movement of the tube  Patient has not been out from that yet  No shortness of breath or chest pain no abdominal pain  Reviewed notes, consults, laboratory and radiology results,    Objective:       Physical Exam:  Patient Vitals for the past 24 hrs:   BP Temp Temp src Pulse Resp SpO2 Height Weight   05/19/22 0830 (!) 112/54 97.3 °F (36.3 °C) Oral 67 16 92 %     05/19/22 0443     16      05/19/22 0418      93 %     05/19/22 0415      93 %     05/19/22 0413     16      05/19/22 0332 116/86 97.7 °F (36.5 °C) Oral 68 16 95 %     05/19/22 0130     16      05/19/22 0100     16      05/18/22 2330     16      05/18/22 2304 (!) 108/59 98 °F (36.7 °C) Oral 65 16 93 %     05/18/22 2300     16      05/18/22 2130     16      05/18/22 2046      (!) 88 %     05/18/22 2030 133/62 97.4 °F (36.3 °C) Oral 84 18 (!) 83 %     05/18/22 1850 (!) 157/67 98.1 °F (36.7 °C) Oral 65 18      05/18/22 1530 (!) 141/70 97.6 °F (36.4 °C) Temporal 65 16 90 %     05/18/22 1452 (!) 148/68 96.9 °F (36.1 °C) Temporal 67 16 96 %     05/18/22 1411 (!) 144/64 97.3 °F (36.3 °C) Temporal 68 16 94 %     05/18/22 1357 (!) 143/67          05/18/22 1355 (!) 143/67   68 17 94 %     05/18/22 1350 (!) 160/70   73 20 94 %     05/18/22 1345 135/65   74 21 93 %     05/18/22 1340 (!) 156/76   77 18 94 %     05/18/22 1335 (!) 155/85   76 20 96 %     05/18/22 1330 (!) 165/79   80 24 97 %     05/18/22 1325 (!) 188/77   90 20 95 %     05/18/22 1320 (!) 191/88   91 25 95 %     05/18/22 1315 (!) 199/93   100 30 94 %     05/18/22 1310 (!) 212/79   107 29 96 %     05/18/22 1304 (!) 206/93 98 °F (36.7 °C) Temporal 100 24 94 %     05/18/22 1016 124/64 97.6 °F (36.4 °C) Temporal 75 18 94 % 5' 8\" (1.727 m) 224 lb 6.4 oz (101.8 kg)       General Appearance:  Alert, cooperative, no distress, appears stated age   HEENT:  Neck:      Chest/Lungs:  Heart:  Clear to auscultation decreased breath sound  Regular rate and rhythm   Abdomen:  Back:  Globular soft nontender   Extremities:  Neurological Exam:  No edema  Within normal limits       Assessment:     Admitting Diagnosis:    History of BPH [Z87.438]    Active Hospital Problems    Diagnosis Date Noted    History of BPH [Z87.438] 05/18/2022     Priority: Medium    COPD (chronic obstructive pulmonary disease) ([facility] [J44.9] 02/25/2022     Priority: Medium    Class 2 severe obesity due to excess calories with serious comorbidity and body mass index (BMI) of 37.0 to 37.9 in adult [facility]) [E66.01, Z68.37] 08/13/2018     Priority: Medium    Moderate COPD (chronic obstructive pulmonary disease) (HCC) [J44.9]      Priority: Medium    Diabetes ([facility] [E11.9]      Priority: Medium    Gastroesophageal reflux disease [K21.9]      Priority: Medium    Hyperlipidemia [E78.5]      Priority: Medium    PARRISH (obstructive sleep apnea) [G47.33]      Priority: Medium       Plan:     Discussed plans with the nursing staff  Discharge per urology    Medications, Laboratories and Imaging results:    Scheduled Meds:   atorvastatin  20 mg Oral Daily    busPIRone  10 mg Oral BID    cetirizine  10 mg Oral Daily    fluticasone  1 spray Each Nostril Daily    glipiZIDE  10 mg Oral Dinner    montelukast  10 mg Oral Nightly    pantoprazole  40 mg Oral BID    ranolazine  500 mg Oral BID    rOPINIRole  1 mg Oral TID    sertraline  100 mg Oral BID    mometasone-formoterol  2 puff Inhalation BID    tiotropium  2 puff Inhalation Daily Continuous Infusions:   dextrose      sodium chloride 100 mL/hr at 05/19/22 0642     PRN Meds:baclofen, ipratropium-albuterol, ondansetron, albuterol sulfate HFA, glucose, dextrose bolus **OR** dextrose bolus, glucagon (rDNA), dextrose, HYDROcodone-acetaminophen, morphine    Imaging:    Lab Review :    Lab Results   Component Value Date    WBC 11.0 (H) 05/19/2022    HGB 12.0 (L) 05/19/2022    HCT 36.7 (L) 05/19/2022    MCV 93.1 05/19/2022     (L) 05/19/2022     Lab Results   Component Value Date    CREATININE 1.1 05/19/2022    BUN 10 05/19/2022     05/19/2022    K 4.6 05/19/2022     05/19/2022    CO2 21 (L) 05/19/2022     Lab Results   Component Value Date    CKTOTAL 202 (H) 12/20/2013    CKMB 1.0 12/20/2013    TROPONINI <0.006 12/20/2013     Lab Results   Component Value Date    ALT 19 03/25/2022    AST 18 03/25/2022    ALKPHOS 46 03/25/2022    BILITOT 0.2 (L) 03/25/2022     Lab Results   Component Value Date    LIPASE 21.1 04/08/2019         Electronically signed by Fadia Breaux MD on 5/19/22 at 10:06 AM KAREN Kramer M.D.

## 2022-05-19 NOTE — CARE COORDINATION
DISCHARGE PLANNING EVALUATION: OP/OBSERVATION        5/19/22, 11:10 AM EDT    Yony Borden       Admitted from: PACU 5/18/2022/ 0959   Location: [address] [place] [address] Reason for admit: History of BPH [Z87.438]   Admit order signed?: n/a    Procedure:   5/18/2022   Cystoscopy Transurethral Resection of the Prostate evacuation of bladder stones   Pertinent Info/Orders/Treatment Plan:  IV fluids, home medications resumed, prn pain medications and Zofran, remove mcgraw catheter. PCP: Marlee Bustamante MD    Patient Goals/Plan/Treatment Preferences: Met with Yony and his wife Raymond Soto present at bedside. Yony verifies his insurance and PCP. He is able to afford his medications through a program offered by [facility]. He has home oxygen that he wears prn provided by [facility]) DME. Yony denies a need for additional DME or services at discharge. He and his wife manage his health care needs independently. Discharge planned for today. Transportation/Food Security/Housekeeping Addressed:  No issues identified. 5/19/22, 11:35 AM EDT    Patient goals/plan/ treatment preferences discussed by  and . Patient goals/plan/ treatment preferences reviewed with patient/ family. Patient/ family verbalize understanding of discharge plan and are in agreement with goal/plan/treatment preferences. Understanding was demonstrated using the teach back method. AVS provided by RN at time of discharge, which includes all necessary medical information pertaining to the patients current course of illness, treatment, post-discharge goals of care, and treatment preferences.      Services At/After Discharge: None

## 2022-05-19 NOTE — PLAN OF CARE
Problem: Respiratory - Adult  Goal: Clear lung sounds  Outcome: Progressing   Continue with Spiriva and Dulera

## 2022-05-23 NOTE — OP NOTE
[address] [place] [address]. [place]    DATE: 5/18/2022  Patient:  Clarke Almanzar  MRN: [id number]  YOB: 1955    SURGEON: Odin Greenfield MD.    ASSISTANT: none    PREOPERATIVE DIAGNOSIS: BPH with outlet obstruction    POSTOPERATIVE DIAGNOSIS: BPH with outlet obstruction    PROCEDURE PERFORMED: Cystoscopy, Transurethral resection of Prostate ,   extraction of bladder calculi    ANESTHESIA: General    COMPLICATIONS: none    OR BLOOD LOSS:  Minimal    FLUIDS: Cystalloids per Anesthesia    SPECIMENS:  ID Type Source Tests Collected by Time Destination   A : prostate tissue  Tissue Prostate SURGICAL PATHOLOGY Richard Gilbert MD 5/18/2022 1250          DRAINS: 22 fr 3 way mcgraw    INDICATIONS FOR PROCEDURE:  The patient is a 69 y.o. male who presents today with BPH Bladder stone here for Cystoscopy Transurethral Resection of the Prostate evacuation of bladder stones. After risks, benefits and alternatives of the procedure were discussed with the patient, the patient elected to proceed. DETAILS OF PROCEDURE:  After informed consent was obtained, the patient was taken to the operating room. He was transferred to the operating table in the supine position. Anesthesia was induced and antibiotics were given. He was placed in a modified dorsal lithotomy position. He was sterilely prepped and draped in a standard fashion. A timeout occurred in which 2 patient identifiers were used. We entered his urethra with a 25F scope with the visual obturator in place. . . The prostate was surveyed. the bladder neck was open. there were stones and dystrophic calcifications noted. there was some anterior adenoma and apical adenoma . There was no median lobe present. Once within the prostatic urethra, we changed out into our working loop. We located both the ureteral orifices. The ureteral orifices were located and noted to be remote from the bladder neck.     We began by creating a trough at the 5 o'clock position all the way from the bladder neck to the verumontanum. We then resected the entirety of the left lateral lobe. We then achieved hemostasis and again created a trough from the bladder neck all the way back to the verumontanum, this time at the 7 o'clock position. We then addressed the right lateral lobe all the way down to the level of the surgical capsule. We then pulled back to the level of verumontanum. The sphincter was visualized and we went proximal to this. We then did our apical dissection delicately and carefully. The sphincter was preserved. Hemostasis was achieved. The prostate chips were irrigated from the bladder. We obtained further hemostasis at this time. The bladder was then re-evaluated and both ureteral orifices were noted to be untouched and remote from our resection bed. No further prostate chips were noted in the bladder. A 22F 3-Way Jones catheter was placed into the bladder. 30 mL of sterile water was place in the balloon. The patient was hooked up to gentle bladder irrigation. He was then awakened and transferred back to his hospital bed in good and stable condition.

## 2022-05-26 ENCOUNTER — OFFICE VISIT (OUTPATIENT)
Dept: FAMILY MEDICINE CLINIC | Age: 67
End: 2022-05-26

## 2022-05-26 VITALS
WEIGHT: 226.4 LBS | BODY MASS INDEX: 34.31 KG/M2 | HEIGHT: 68 IN | DIASTOLIC BLOOD PRESSURE: 76 MMHG | SYSTOLIC BLOOD PRESSURE: 136 MMHG | HEART RATE: 80 BPM | RESPIRATION RATE: 20 BRPM

## 2022-05-26 DIAGNOSIS — R31.0 GROSS HEMATURIA: ICD-10-CM

## 2022-05-26 DIAGNOSIS — Z09 HOSPITAL DISCHARGE FOLLOW-UP: Primary | ICD-10-CM

## 2022-05-26 DIAGNOSIS — E11.8 TYPE 2 DIABETES MELLITUS WITH COMPLICATION, WITHOUT LONG-TERM CURRENT USE OF INSULIN (HCC): ICD-10-CM

## 2022-05-26 DIAGNOSIS — J44.9 MODERATE COPD (CHRONIC OBSTRUCTIVE PULMONARY DISEASE) (HCC): ICD-10-CM

## 2022-05-26 DIAGNOSIS — E78.5 HYPERLIPIDEMIA, UNSPECIFIED HYPERLIPIDEMIA TYPE: ICD-10-CM

## 2022-05-26 DIAGNOSIS — Z90.79 S/P TRANSURETHRAL RESECTION OF PROSTATE: ICD-10-CM

## 2022-05-26 DIAGNOSIS — Z99.81 DEPENDENCE ON SUPPLEMENTAL OXYGEN: ICD-10-CM

## 2022-05-26 LAB
CHP ED QC CHECK: ABNORMAL
GLUCOSE BLD-MCNC: 127 MG/DL
HBA1C MFR BLD: 6.9 %

## 2022-05-26 PROCEDURE — 83036 HEMOGLOBIN GLYCOSYLATED A1C: CPT | Performed by: EMERGENCY MEDICINE

## 2022-05-26 PROCEDURE — 82962 GLUCOSE BLOOD TEST: CPT | Performed by: EMERGENCY MEDICINE

## 2022-05-26 PROCEDURE — 99214 OFFICE O/P EST MOD 30 MIN: CPT | Performed by: EMERGENCY MEDICINE

## 2022-05-26 ASSESSMENT — ENCOUNTER SYMPTOMS
VOMITING: 0
BACK PAIN: 0
COUGH: 0
ABDOMINAL PAIN: 0
NAUSEA: 0
TROUBLE SWALLOWING: 0
WHEEZING: 0
DIARRHEA: 0
SINUS PRESSURE: 0
SHORTNESS OF BREATH: 1
CONSTIPATION: 0
VOICE CHANGE: 0
SORE THROAT: 0
CHEST TIGHTNESS: 0
RHINORRHEA: 0

## 2022-05-26 ASSESSMENT — PATIENT HEALTH QUESTIONNAIRE - PHQ9
SUM OF ALL RESPONSES TO PHQ QUESTIONS 1-9: 0
1. LITTLE INTEREST OR PLEASURE IN DOING THINGS: 0
SUM OF ALL RESPONSES TO PHQ QUESTIONS 1-9: 0
SUM OF ALL RESPONSES TO PHQ QUESTIONS 1-9: 0
2. FEELING DOWN, DEPRESSED OR HOPELESS: 0
SUM OF ALL RESPONSES TO PHQ9 QUESTIONS 1 & 2: 0
SUM OF ALL RESPONSES TO PHQ QUESTIONS 1-9: 0

## 2022-05-26 NOTE — PROGRESS NOTES
Post-Discharge Transitional Care Follow Up      Yony Borden   YOB: 1955    Date of Office Visit:  5/26/2022  Date of Hospital Admission: 5/18/22  Date of Hospital Discharge: 5/19/22  Readmission Risk Score (high >=14%. Medium >=10%):Readmission Risk Score: 11.1 ( )      Care management risk score Rising risk (score 2-5) and Complex Care (Scores >=6): 12     Non face to face  following discharge, date last encounter closed (first attempt may have been earlier): *No documented post hospital discharge outreach found in the last 14 days     Call initiated 2 business days of discharge: *No response recorded in the last 14 days     Hospital discharge follow-up  -     SD DISCHARGE MEDS RECONCILED W/ CURRENT OUTPATIENT MED LIST  Type 2 diabetes mellitus with complication, without long-term current use of insulin (HCC)  -     POCT Glucose  -     POCT glycosylated hemoglobin (Hb A1C)  Moderate COPD (chronic obstructive pulmonary disease) ([facility])  S/P transurethral Photo Vaporization of Prostate  Hyperlipidemia, unspecified hyperlipidemia type  Dependence on supplemental oxygen  Gross hematuria      Medical Decision Making: moderate complexity  Return in about 3 months (around 8/30/2022) for DM. On this date 5/26/2022 I have spent 30 minutes reviewing previous notes, test results and face to face with the patient discussing the diagnosis and importance of compliance with the treatment plan as well as documenting on the day of the visit. Subjective:   HPI    Inpatient course: Discharge summary reviewed- see chart. Interval history/Current status: Patient is here for follow-up. Patient saturation is 92-96 at room air without exertion however patient's desaturates with SOB with exertion saturation goes to 84-86  Took restarted the Plavix 5/24/2022 and the following day the patient had hematuria yesterday, thus patient stop taking it.   Patient had not seen her cardiologist and pulmonary, sees Dr Ammy Peterson and Dr Arcenio Marina    Patient Active Problem List   Diagnosis    Diabetes ([facility]    Hyperlipidemia    Gastroesophageal reflux disease    PARRISH (obstructive sleep apnea)    Chronic back pain    DDD (degenerative disc disease), cervical    Asthma    Lower urinary tract symptoms (LUTS)    Hypogonadism in male    Low testosterone    Combined arterial insufficiency and corporo-venous occlusive erectile dysfunction    Moderate COPD (chronic obstructive pulmonary disease) ([facility])    Class 2 severe obesity due to excess calories with serious comorbidity and body mass index (BMI) of 37.0 to 37.9 in adult [facility])    History of anxiety disorder    Personal history of tobacco use, presenting hazards to health    Hypertensive disorder    COPD (chronic obstructive pulmonary disease) ([facility]    Dependence on supplemental oxygen    S/P transurethral Photo Vaporization of Prostate    History of BPH       Medications listed as ordered at the time of discharge from hospital     Medication List          Accurate as of May 26, 2022  1:29 PM. If you have any questions, ask your nurse or doctor. CHANGE how you take these medications    busPIRone 10 MG tablet  Commonly known as: BUSPAR  TAKE 1 TABLET BY MOUTH THREE TIMES DAILY  What changed: See the new instructions. glimepiride 4 MG tablet  Commonly known as: AMARYL  TAKE 1 TABLET BY MOUTH TWICE DAILY  What changed: See the new instructions.         CONTINUE taking these medications    ALLERGEN EXTRACT     atorvastatin 20 MG tablet  Commonly known as: LIPITOR     baclofen 10 MG tablet  Commonly known as: LIORESAL     Budeson-Glycopyrrol-Formoterol 160-9-4.8 MCG/ACT Aero  2 puff twice a day     cetirizine 10 MG tablet  Commonly known as: ZYRTEC     clopidogrel 75 MG tablet  Commonly known as: PLAVIX  Take 1 tablet by mouth daily     docusate sodium 100 mg capsule  Commonly known as: Colace  Take 1 capsule by mouth 2 times daily for 14 days     EPINEPHrine 0.3 MG/0.3ML Soaj injection  Commonly known as: EpiPen 2-Wilfredo  Inject one pen as directed STAT for allergic reaction, may disp generic NDC 17207-897-59     fish oil 1000 MG Caps     fluticasone 50 MCG/ACT nasal spray  Commonly known as: FLONASE  SHAKE LIQUID AND USE 1 SPRAY IN EACH NOSTRIL DAILY     HYDROcodone-acetaminophen 5-325 MG per tablet  Commonly known as: NORCO     ipratropium-albuterol 0.5-2.5 (3) MG/3ML Soln nebulizer solution  Commonly known as: DUONEB  Inhale 3 mLs into the lungs every 6 hours as needed for Shortness of Breath     losartan 50 mg tablet  Commonly known as: COZAAR  Take 0.5 tablets by mouth daily     metFORMIN 500 MG tablet  Commonly known as: GLUCOPHAGE  TAKE 2 TABLETS BY MOUTH TWICE DAILY WITH MEALS     montelukast 10 MG tablet  Commonly known as: SINGULAIR     nystatin 468224 UNIT/GM cream  Commonly known as: MYCOSTATIN  Apply topically 2 times daily after cleaning it with soap and water air dry     ondansetron 4 MG tablet  Commonly known as: ZOFRAN  Take 1 tablet by mouth every 8 hours as needed for Nausea or Vomiting     OXYGEN     Protonix 40 MG tablet  Generic drug: pantoprazole     Ranexa 500 MG extended release tablet  Generic drug: ranolazine     rOPINIRole 1 MG tablet  Commonly known as: REQUIP  TAKE 1 TABLET BY MOUTH THREE TIMES DAILY     sertraline 100 MG tablet  Commonly known as: ZOLOFT  TAKE 1 TABLET BY MOUTH TWICE DAILY     traZODone 150 MG tablet  Commonly known as: DESYREL  TAKE 1/2 TO 1 TABLET BY MOUTH EVERY DAY AT BEDTIME AS NEEDED FOR SLEEP     Ventolin  (90 Base) MCG/ACT inhaler  Generic drug: albuterol sulfate HFA  INHALE 2 PUFFS INTO THE LUNGS EVERY 6 HOURS AS NEEDED FOR WHEEZING             Medications marked \"taking\" at this time  Outpatient Medications Marked as Taking for the 5/26/22 encounter (Office Visit) with Young Guan MD   Medication Sig Dispense Refill    clopidogrel (PLAVIX) 75 MG tablet Take 1 tablet by mouth daily 30 tablet 3    docusate sodium (COLACE) 100 MG capsule Take 1 capsule by mouth 2 times daily for 14 days 28 capsule 0    busPIRone (BUSPAR) 10 MG tablet TAKE 1 TABLET BY MOUTH THREE TIMES DAILY (Patient taking differently: Take 10 mg by mouth 3 times daily Only takes 2 times) 270 tablet 1    ALLERGEN EXTRACT every 14 days      HYDROcodone-acetaminophen (NORCO) 5-325 MG per tablet TAKE 1 TABLET BY MOUTH THREE TIMES DAILY AS NEEDED FOR PAIN      ondansetron (ZOFRAN) 4 MG tablet Take 1 tablet by mouth every 8 hours as needed for Nausea or Vomiting 30 tablet 1    nystatin (MYCOSTATIN) 082042 UNIT/GM cream Apply topically 2 times daily after cleaning it with soap and water air dry 30 g 0    losartan (COZAAR) 50 MG tablet Take 0.5 tablets by mouth daily 30 tablet 3    cetirizine (ZYRTEC) 10 MG tablet TAKE 1 TABLET BY MOUTH DAILY      Budeson-Glycopyrrol-Formoterol 160-9-4.8 MCG/ACT AERO 2 puff twice a day 2 each 0    ipratropium-albuterol (DUONEB) 0.5-2.5 (3) MG/3ML SOLN nebulizer solution Inhale 3 mLs into the lungs every 6 hours as needed for Shortness of Breath 30 mL 1    OXYGEN Inhale 3 L into the lungs as needed      metFORMIN (GLUCOPHAGE) 500 MG tablet TAKE 2 TABLETS BY MOUTH TWICE DAILY WITH MEALS 360 tablet 1    rOPINIRole (REQUIP) 1 MG tablet TAKE 1 TABLET BY MOUTH THREE TIMES DAILY 90 tablet 0    traZODone (DESYREL) 150 MG tablet TAKE 1/2 TO 1 TABLET BY MOUTH EVERY DAY AT BEDTIME AS NEEDED FOR SLEEP 90 tablet 0    glimepiride (AMARYL) 4 MG tablet TAKE 1 TABLET BY MOUTH TWICE DAILY (Patient taking differently: Take 4 mg by mouth Daily with supper TAKE 1 TABLET BY MOUTH) 180 tablet 0    montelukast (SINGULAIR) 10 MG tablet Take 10 mg by mouth nightly      sertraline (ZOLOFT) 100 MG tablet TAKE 1 TABLET BY MOUTH TWICE DAILY 180 tablet 1    fluticasone (FLONASE) 50 MCG/ACT nasal spray SHAKE LIQUID AND USE 1 SPRAY IN EACH NOSTRIL DAILY 16 g 11    VENTOLIN  (90 Base) MCG/ACT inhaler INHALE 2 PUFFS INTO THE LUNGS Conjunctiva/sclera: Conjunctivae normal.      Pupils: Pupils are equal, round, and reactive to light. Neck:      Thyroid: No thyromegaly. Vascular: No JVD. Cardiovascular:      Rate and Rhythm: Normal rate and regular rhythm. Heart sounds: No murmur heard. No friction rub. Pulmonary:      Effort: Pulmonary effort is normal.      Breath sounds: Decreased breath sounds present. No wheezing or rales. Chest:      Chest wall: No tenderness. Abdominal:      General: Bowel sounds are normal.      Palpations: Abdomen is soft. There is no mass. Tenderness: There is no abdominal tenderness. Musculoskeletal:      Cervical back: Normal range of motion and neck supple. Lymphadenopathy:      Cervical: No cervical adenopathy. Skin:     Findings: No rash. Neurological:      Mental Status: He is alert and oriented to person, place, and time. Psychiatric:         Behavior: Behavior is cooperative. Lab Results   Component Value Date    LABA1C 6.9 05/26/2022    LABA1C 7.0 (A) 02/11/2022    LABA1C 6.9 (A) 10/29/2021     Lab Results   Component Value Date    GLUF 147 (H) 01/22/2020    LABMICR 2.74 11/04/2016    LDLCALC 44 03/16/2022    CREATININE 1.1 05/19/2022     New Prescriptions    No medications on file       Updated home medications.  (New prescriptions plus current medications)   Current Outpatient Medications   Medication Sig Dispense Refill    clopidogrel (PLAVIX) 75 MG tablet Take 1 tablet by mouth daily 30 tablet 3    docusate sodium (COLACE) 100 MG capsule Take 1 capsule by mouth 2 times daily for 14 days 28 capsule 0    busPIRone (BUSPAR) 10 MG tablet TAKE 1 TABLET BY MOUTH THREE TIMES DAILY (Patient taking differently: Take 10 mg by mouth 3 times daily Only takes 2 times) 270 tablet 1    ALLERGEN EXTRACT every 14 days      HYDROcodone-acetaminophen (NORCO) 5-325 MG per tablet TAKE 1 TABLET BY MOUTH THREE TIMES DAILY AS NEEDED FOR PAIN      ondansetron (ZOFRAN) 4 MG tablet Take 1 tablet by mouth every 8 hours as needed for Nausea or Vomiting 30 tablet 1    nystatin (MYCOSTATIN) 896700 UNIT/GM cream Apply topically 2 times daily after cleaning it with soap and water air dry 30 g 0    losartan (COZAAR) 50 MG tablet Take 0.5 tablets by mouth daily 30 tablet 3    cetirizine (ZYRTEC) 10 MG tablet TAKE 1 TABLET BY MOUTH DAILY      Budeson-Glycopyrrol-Formoterol 160-9-4.8 MCG/ACT AERO 2 puff twice a day 2 each 0    ipratropium-albuterol (DUONEB) 0.5-2.5 (3) MG/3ML SOLN nebulizer solution Inhale 3 mLs into the lungs every 6 hours as needed for Shortness of Breath 30 mL 1    OXYGEN Inhale 3 L into the lungs as needed      metFORMIN (GLUCOPHAGE) 500 MG tablet TAKE 2 TABLETS BY MOUTH TWICE DAILY WITH MEALS 360 tablet 1    rOPINIRole (REQUIP) 1 MG tablet TAKE 1 TABLET BY MOUTH THREE TIMES DAILY 90 tablet 0    traZODone (DESYREL) 150 MG tablet TAKE 1/2 TO 1 TABLET BY MOUTH EVERY DAY AT BEDTIME AS NEEDED FOR SLEEP 90 tablet 0    glimepiride (AMARYL) 4 MG tablet TAKE 1 TABLET BY MOUTH TWICE DAILY (Patient taking differently: Take 4 mg by mouth Daily with supper TAKE 1 TABLET BY MOUTH) 180 tablet 0    montelukast (SINGULAIR) 10 MG tablet Take 10 mg by mouth nightly      sertraline (ZOLOFT) 100 MG tablet TAKE 1 TABLET BY MOUTH TWICE DAILY 180 tablet 1    fluticasone (FLONASE) 50 MCG/ACT nasal spray SHAKE LIQUID AND USE 1 SPRAY IN EACH NOSTRIL DAILY 16 g 11    VENTOLIN  (90 Base) MCG/ACT inhaler INHALE 2 PUFFS INTO THE LUNGS EVERY 6 HOURS AS NEEDED FOR WHEEZING 1 each 11    atorvastatin (LIPITOR) 20 MG tablet 20 mg daily       pantoprazole (PROTONIX) 40 MG tablet Take 40 mg by mouth 2 times daily       ranolazine (RANEXA) 500 MG extended release tablet Take 500 mg by mouth 2 times daily      EPINEPHrine (EPIPEN 2-HUBER) 0.3 MG/0.3ML SOAJ injection Inject one pen as directed STAT for allergic reaction, may disp generic NDC 90058-978-23 6 each 99    baclofen (LIORESAL) 10 MG tablet 2 times daily as needed   0    Omega-3 Fatty Acids (FISH OIL) 1000 MG CAPS Take 1,000 mg by mouth daily. No current facility-administered medications for this visit. Advised the patient to take his oxygen and at this time the patient was looking for the portable one    Encouraged the patient to see his cardiologist and pulmonary specialist    Return in about 3 months (around 8/30/2022) for DM. Patient to return immediately for follow-up if having more problems and concerns. Lewayne Siemens, MD        An electronic signature was used to authenticate this note.   --  Lewayne Siemens, MD

## 2022-05-31 ENCOUNTER — SCHEDULED TELEPHONE ENCOUNTER (OUTPATIENT)
Dept: UROLOGY | Age: 67
End: 2022-05-31

## 2022-05-31 PROCEDURE — 99024 POSTOP FOLLOW-UP VISIT: CPT | Performed by: UROLOGY

## 2022-06-01 ENCOUNTER — NURSE ONLY (OUTPATIENT)
Dept: ALLERGY | Age: 67
End: 2022-06-01
Payer: MEDICARE

## 2022-06-01 VITALS
TEMPERATURE: 98.8 F | SYSTOLIC BLOOD PRESSURE: 128 MMHG | RESPIRATION RATE: 20 BRPM | DIASTOLIC BLOOD PRESSURE: 84 MMHG | HEART RATE: 68 BPM | OXYGEN SATURATION: 91 %

## 2022-06-01 DIAGNOSIS — J30.81 CAT ALLERGIES: ICD-10-CM

## 2022-06-01 DIAGNOSIS — Z91.09 ENCOUNTER FOR DESENSITIZATION TO POLLEN ALLERGEN: ICD-10-CM

## 2022-06-01 DIAGNOSIS — Z51.6 ENCOUNTER FOR DESENSITIZATION TO POLLEN ALLERGEN: ICD-10-CM

## 2022-06-01 DIAGNOSIS — J30.81 ALLERGY TO DOG DANDER: Primary | ICD-10-CM

## 2022-06-01 PROCEDURE — 95117 IMMUNOTHERAPY INJECTIONS: CPT | Performed by: NURSE PRACTITIONER

## 2022-06-01 NOTE — PROGRESS NOTES
Naseem Dawson is a 69 y.o. male evaluated via telephone on 5/31/2022 for No chief complaint on file. .        Documentation:  I communicated with the patient and/or health care decision maker about bph. Details of this discussion including any medical advice provided:     S/p turp cystolithalopaxy  Continued hematuria with heavy lifting  Needs to minimize activity      F/u in three months      Total Time: minutes: 5-10 minutes    Yony Borden was evaluated through a synchronous (real-time) audio encounter. Patient identification was verified at the start of the visit. He (or guardian if applicable) is aware that this is a billable service, which includes applicable co-pays. This visit was conducted with the patient's (and/or legal guardian's) verbal consent. He has not had a related appointment within my department in the past 7 days or scheduled within the next 24 hours. The patient was located at Home: [address] [place] [address]. The provider was located at [facility] ([address] [place], [place] [address]t): [address] [place] [address] [address]  [place] .[facility],  [address].     Note: not billable if this call serves to triage the patient into an appointment for the relevant concern    Efren Dunn MD

## 2022-06-08 ENCOUNTER — OFFICE VISIT (OUTPATIENT)
Dept: FAMILY MEDICINE CLINIC | Age: 67
End: 2022-06-08

## 2022-06-08 VITALS
WEIGHT: 221.2 LBS | BODY MASS INDEX: 33.52 KG/M2 | RESPIRATION RATE: 12 BRPM | HEART RATE: 72 BPM | DIASTOLIC BLOOD PRESSURE: 80 MMHG | HEIGHT: 68 IN | SYSTOLIC BLOOD PRESSURE: 132 MMHG

## 2022-06-08 DIAGNOSIS — R31.9 HEMATURIA, UNSPECIFIED TYPE: ICD-10-CM

## 2022-06-08 DIAGNOSIS — F40.240 CLAUSTROPHOBIA: ICD-10-CM

## 2022-06-08 DIAGNOSIS — Z90.79 S/P TRANSURETHRAL RESECTION OF PROSTATE: Primary | ICD-10-CM

## 2022-06-08 DIAGNOSIS — D69.6 THROMBOCYTOPENIA (HCC): ICD-10-CM

## 2022-06-08 PROCEDURE — 3017F COLORECTAL CA SCREEN DOC REV: CPT | Performed by: FAMILY MEDICINE

## 2022-06-08 PROCEDURE — 1123F ACP DISCUSS/DSCN MKR DOCD: CPT | Performed by: FAMILY MEDICINE

## 2022-06-08 PROCEDURE — 1036F TOBACCO NON-USER: CPT | Performed by: FAMILY MEDICINE

## 2022-06-08 PROCEDURE — 99213 OFFICE O/P EST LOW 20 MIN: CPT | Performed by: FAMILY MEDICINE

## 2022-06-08 PROCEDURE — G8427 DOCREV CUR MEDS BY ELIG CLIN: HCPCS | Performed by: FAMILY MEDICINE

## 2022-06-08 PROCEDURE — G8417 CALC BMI ABV UP PARAM F/U: HCPCS | Performed by: FAMILY MEDICINE

## 2022-06-08 ASSESSMENT — ENCOUNTER SYMPTOMS
BLOOD IN STOOL: 0
ABDOMINAL PAIN: 0
NAUSEA: 0
CHEST TIGHTNESS: 0
DIARRHEA: 0
VOMITING: 0
BACK PAIN: 1
CONSTIPATION: 0
EYES NEGATIVE: 1
SHORTNESS OF BREATH: 0
COUGH: 0

## 2022-06-08 NOTE — PROGRESS NOTES
Date: 2022  Here with his wife  Milton Llamas is a 69 y.o. male who presents today for:  Chief Complaint   Patient presents with    Hematuria he states that he is still having blood in his urine. He is following with urology. HPI:     HPI    has a current medication list which includes the following prescription(s): clopidogrel, buspirone, ALLERGEN EXTRACT, hydrocodone-acetaminophen, ondansetron, nystatin, losartan, cetirizine, budeson-glycopyrrol-formoterol, ipratropium-albuterol, OXYGEN, metformin, ropinirole, trazodone, glimepiride, montelukast, sertraline, fluticasone, ventolin hfa, atorvastatin, pantoprazole, ranolazine, epinephrine, baclofen, and fish oil.     No Known Allergies    Social History     Tobacco Use    Smoking status: Former Smoker     Packs/day: 2.00     Years: 42.00     Pack years: 84.00     Types: Cigarettes     Start date: 1971     Quit date: 3/3/2017     Years since quittin.2    Smokeless tobacco: Never Used    Tobacco comment: pts uses just nicotine vap   Vaping Use    Vaping Use: Every day    Substances: Nicotine   Substance Use Topics    Alcohol use: No     Alcohol/week: 0.0 standard drinks    Drug use: No       Past Medical History:   Diagnosis Date    Acid reflux     Arthritis     Asthma     CAD (coronary artery disease)     Chronic back pain     Class 2 severe obesity due to excess calories with serious comorbidity and body mass index (BMI) of 37.0 to 37.9 in adult [facility]) 2018    Colon polyps     COPD (chronic obstructive pulmonary disease) ([facility] [address]     Depression     panic attacks    Diverticulosis     HTN (hypertension)     Hyperlipidemia     Kidney disorder     Panic attack     Pneumonia     Rash     Recurrent upper respiratory infection (URI)     Thumb amputation status 3/13/14    left tip of thumb amputated    Thyroid disease     Type II or unspecified type diabetes mellitus without mention of complication, not stated as uncontrolled        Past Surgical History:   Procedure Laterality Date    BACK SURGERY  2002    BACK SURGERY  08/11/2017    BICEPS TENDON REPAIR Right 08/16/2017    BRONCHOSCOPY      BRONCHOSCOPY N/A 4/5/2019    BRONCHOSCOPY performed by Elías Ma MD at [facility]  4/5/2019    BRONCHOSCOPY ALVEOLAR LAVAGE performed by Elías Ma MD at [facility]   [address]  07/02 08/05    CARPAL TUNNEL RELEASE  2011    right hand    CHOLECYSTECTOMY  yrs ago    COLONOSCOPY  11/06 02/12 1/14    CORONARY ANGIOPLASTY WITH STENT PLACEMENT  02/2020    ENDOSCOPY, COLON, DIAGNOSTIC      EYE SURGERY      foreign body removal    HERNIA REPAIR  2004    Dr Priscilla Sarah  2008?  LARYNGOSCOPY  04/28/2016    Laryngoscopy Micro with Biopsy by Dr Rosangela Lea  01/07    uppp    ROTATOR CUFF REPAIR Left 5-20-15    SKIN BIOPSY      TONSILLECTOMY  1985    TURP N/A 2/25/2022    CYSTOSCOPY GREENLIGHT PHOTOVAPORIZATION OF THE PROSTATE performed by Brayden Crandall MD at [address] [place] [address] TURP N/A 5/18/2022    Cystoscopy Transurethral Resection of the Prostate evacuation of bladder stones performed by Brayden Crandall MD at [place] [address] Left September 2014    Dr. Tyrell Perry       Family History   Problem Relation Age of Onset    Cancer Mother     Breast Cancer Mother     Stroke Mother     Heart Disease Brother     Diabetes Brother     High Blood Pressure Brother     High Cholesterol Brother      Subjective:     Review of Systems   Constitutional: Negative for activity change, appetite change, diaphoresis and fever. HENT: Negative. Eyes: Negative. Respiratory: Negative for cough, chest tightness and shortness of breath. Cardiovascular: Negative for chest pain, palpitations and leg swelling.    Gastrointestinal: Negative for abdominal pain, blood in stool, constipation, diarrhea, nausea and vomiting. Genitourinary: Positive for frequency and hematuria. Musculoskeletal: Positive for arthralgias and back pain. Skin: Negative. Negative for rash. Neurological: Negative. Negative for dizziness, syncope, weakness, light-headedness and headaches. Psychiatric/Behavioral: Negative.        :   /80 (Site: Right Upper Arm, Position: Sitting, Cuff Size: Large Adult)   Pulse 72   Resp 12   Ht 5' 8\" (1.727 m)   Wt 221 lb 3.2 oz (100.3 kg)   BMI 33.63 kg/m²   Wt Readings from Last 3 Encounters:   06/08/22 221 lb 3.2 oz (100.3 kg)   05/26/22 226 lb 6.4 oz (102.7 kg)   05/18/22 224 lb 6.4 oz (101.8 kg)     Physical Exam  Vitals and nursing note reviewed. Constitutional:       General: He is not in acute distress. Appearance: He is well-developed. He is not diaphoretic. HENT:      Head: Normocephalic and atraumatic. Eyes:      General: No scleral icterus. Right eye: No discharge. Left eye: No discharge. Conjunctiva/sclera: Conjunctivae normal.      Pupils: Pupils are equal, round, and reactive to light. Neck:      Thyroid: No thyromegaly. Vascular: No JVD. Cardiovascular:      Rate and Rhythm: Normal rate and regular rhythm. Heart sounds: Normal heart sounds. No murmur heard. Pulmonary:      Effort: Pulmonary effort is normal. No respiratory distress. Breath sounds: Normal breath sounds. No wheezing, rhonchi or rales. Abdominal:      General: Bowel sounds are normal. There is no distension. Palpations: Abdomen is soft. There is no mass. Tenderness: There is no abdominal tenderness. There is no guarding or rebound. Musculoskeletal:         General: Normal range of motion. Cervical back: Normal range of motion and neck supple. Lymphadenopathy:      Cervical: No cervical adenopathy. Skin:     General: Skin is warm and dry. Findings: No rash.    Neurological:      Mental Status: He is alert and oriented to person, place, and time. Psychiatric:         Behavior: Behavior normal.       :       Diagnosis Orders   1. S/P transurethral Photo Vaporization of Prostate     2. Hematuria, unspecified type     3. Thrombocytopenia (HCC)  CBC with Auto Differential   4.  Claustrophobia         :      Requested Prescriptions      No prescriptions requested or ordered in this encounter     Current Outpatient Medications   Medication Sig Dispense Refill    clopidogrel (PLAVIX) 75 MG tablet Take 1 tablet by mouth daily 30 tablet 3    busPIRone (BUSPAR) 10 MG tablet TAKE 1 TABLET BY MOUTH THREE TIMES DAILY (Patient taking differently: Take 10 mg by mouth 3 times daily Only takes 2 times) 270 tablet 1    ALLERGEN EXTRACT every 14 days      HYDROcodone-acetaminophen (NORCO) 5-325 MG per tablet TAKE 1 TABLET BY MOUTH THREE TIMES DAILY AS NEEDED FOR PAIN      ondansetron (ZOFRAN) 4 MG tablet Take 1 tablet by mouth every 8 hours as needed for Nausea or Vomiting 30 tablet 1    nystatin (MYCOSTATIN) 384898 UNIT/GM cream Apply topically 2 times daily after cleaning it with soap and water air dry 30 g 0    losartan (COZAAR) 50 MG tablet Take 0.5 tablets by mouth daily 30 tablet 3    cetirizine (ZYRTEC) 10 MG tablet TAKE 1 TABLET BY MOUTH DAILY      Budeson-Glycopyrrol-Formoterol 160-9-4.8 MCG/ACT AERO 2 puff twice a day 2 each 0    ipratropium-albuterol (DUONEB) 0.5-2.5 (3) MG/3ML SOLN nebulizer solution Inhale 3 mLs into the lungs every 6 hours as needed for Shortness of Breath 30 mL 1    OXYGEN Inhale 3 L into the lungs as needed      metFORMIN (GLUCOPHAGE) 500 MG tablet TAKE 2 TABLETS BY MOUTH TWICE DAILY WITH MEALS 360 tablet 1    rOPINIRole (REQUIP) 1 MG tablet TAKE 1 TABLET BY MOUTH THREE TIMES DAILY 90 tablet 0    traZODone (DESYREL) 150 MG tablet TAKE 1/2 TO 1 TABLET BY MOUTH EVERY DAY AT BEDTIME AS NEEDED FOR SLEEP 90 tablet 0    glimepiride (AMARYL) 4 MG tablet TAKE 1 TABLET BY MOUTH

## 2022-06-09 LAB
ABSOLUTE BASO #: 100 /CMM (ref 0–200)
ABSOLUTE EOS #: 100 /CMM (ref 0–500)
ABSOLUTE LYMPH #: 1700 /CMM (ref 1000–4800)
ABSOLUTE MONO #: 500 /CMM (ref 0–800)
ABSOLUTE NEUT #: 6200 /CMM (ref 1800–7700)
BASOPHILS RELATIVE PERCENT: 1 % (ref 0–2)
EOSINOPHILS RELATIVE PERCENT: 1.5 % (ref 0–6)
HCT VFR BLD CALC: 42.2 % (ref 40–49)
HEMOGLOBIN: 14.3 GM/DL (ref 13.5–16.5)
LYMPHOCYTES RELATIVE PERCENT: 19.2 % (ref 15–45)
MCH RBC QN AUTO: 30.1 PG (ref 27.5–33)
MCHC RBC AUTO-ENTMCNC: 34 GM/DL (ref 33–36)
MCV RBC AUTO: 88.7 CU MIC (ref 80–97)
MONOCYTES RELATIVE PERCENT: 6.2 % (ref 2–10)
NEUTROPHILS RELATIVE PERCENT: 72.1 % (ref 40–70)
NUCLEATED RBCS: 0 /100 WBC
PDW BLD-RTO: 16.2 % (ref 12–16)
PLATELET # BLD: 178 TH/CMM (ref 150–400)
RBC # BLD: 4.76 MIL/CMM (ref 4.5–6)
WBC # BLD: 8.7 TH/CMM (ref 4.4–10.5)

## 2022-06-10 ENCOUNTER — TELEPHONE (OUTPATIENT)
Dept: FAMILY MEDICINE CLINIC | Age: 67
End: 2022-06-10

## 2022-06-10 NOTE — TELEPHONE ENCOUNTER
----- Message from Tc Stanton MD sent at 6/10/2022  8:25 AM EDT -----  Please let him know that his hemoglobin is ok.

## 2022-06-15 ENCOUNTER — NURSE ONLY (OUTPATIENT)
Dept: ALLERGY | Age: 67
End: 2022-06-15
Payer: MEDICARE

## 2022-06-15 VITALS
TEMPERATURE: 97.2 F | HEART RATE: 93 BPM | OXYGEN SATURATION: 98 % | DIASTOLIC BLOOD PRESSURE: 80 MMHG | RESPIRATION RATE: 16 BRPM | SYSTOLIC BLOOD PRESSURE: 122 MMHG

## 2022-06-15 DIAGNOSIS — Z51.6 ENCOUNTER FOR DESENSITIZATION TO POLLEN ALLERGEN: ICD-10-CM

## 2022-06-15 DIAGNOSIS — J30.81 ALLERGY TO DOG DANDER: Primary | ICD-10-CM

## 2022-06-15 DIAGNOSIS — J30.81 CAT ALLERGIES: ICD-10-CM

## 2022-06-15 DIAGNOSIS — Z91.09 ENCOUNTER FOR DESENSITIZATION TO POLLEN ALLERGEN: ICD-10-CM

## 2022-06-15 PROCEDURE — 95117 IMMUNOTHERAPY INJECTIONS: CPT | Performed by: NURSE PRACTITIONER

## 2022-06-15 NOTE — PROGRESS NOTES
After consent obtained/verified, allergy injection given in back of R/L arm(s). VIAL COLOR OF ALL VIALS TODAY IS red    ALLERGY INJECTION FROM VIAL A GIVEN right  UPPER ARM IN THE AMOUNT OF 0.40 ML    ALLERGY INJECTION FROM VIAL B GIVEN left upper ARM IN THE AMOUNT OF 0.40 ML      Documentation of vial injection specific to arm(s) noted on Allergy Immunotherapy Administration Form. Patient waited 30 minutes for observation. No      Patient tolerated well without adverse reaction WHILE IN OFFICE    SHOT REACTION TREATMENT INSTRUCTIONS    During the 30 minute wait after an allergy injection the following symptoms should be reported:    Itching other than at the injection site  Hives or swelling other than at the injection site  Redness other than at the injection site  Difficulty breathing  Chest tightness  Difficulty swallowing  Throat tightness    If these symptoms occur, NOTIFY PROVIDER and the following treatment should be administered:    1. Epinephrine/Auvi Q 1:1000 IM - 0.3 ml if > 66 lbs or more, 0.15 ml if 33 - 63 lbs, or 0.1 ml if <33 lbs     2. Diphenhydramine - give all intramuscular:     2 to <6 years (off-label use): 6.25 mg,    6 to <12 years: 12.5 to 25 mg;    ?12 years: 25-50 mg.    3.  Famotidine:  Adults 40 mg oral    Adolescents age 12 years and >88 lbs: 40 mg    Children and Adolescents ? 12years of age: Initial: 0.25 mg/kg/dose  every 12 hours (maximum daily dose: 40 mg/day)    Epi/Auvi Q dose may me repeated in 5-15 minutes if adequate resolution of symptoms does not occur    Patient should be observed for at least one hour after final Epi/Auvi Q dose and must be seen by provider. Patients cannot drive themselves if they have received diphenhydramine.

## 2022-06-29 ENCOUNTER — NURSE ONLY (OUTPATIENT)
Dept: ALLERGY | Age: 67
End: 2022-06-29
Payer: MEDICARE

## 2022-06-29 VITALS
HEART RATE: 80 BPM | RESPIRATION RATE: 20 BRPM | OXYGEN SATURATION: 91 % | TEMPERATURE: 97.8 F | DIASTOLIC BLOOD PRESSURE: 78 MMHG | SYSTOLIC BLOOD PRESSURE: 130 MMHG

## 2022-06-29 DIAGNOSIS — Z91.09 ENCOUNTER FOR DESENSITIZATION TO POLLEN ALLERGEN: ICD-10-CM

## 2022-06-29 DIAGNOSIS — J30.81 CAT ALLERGIES: ICD-10-CM

## 2022-06-29 DIAGNOSIS — J30.81 ALLERGY TO DOG DANDER: Primary | ICD-10-CM

## 2022-06-29 DIAGNOSIS — Z51.6 ENCOUNTER FOR DESENSITIZATION TO POLLEN ALLERGEN: ICD-10-CM

## 2022-06-29 PROCEDURE — 95117 IMMUNOTHERAPY INJECTIONS: CPT | Performed by: NURSE PRACTITIONER

## 2022-06-29 NOTE — PROGRESS NOTES
After consent obtained/verified, allergy injection given in back of R/L arm(s). VIAL COLOR OF ALL VIALS TODAY IS RED MAINTENANCE    ALLERGY INJECTION FROM VIAL A GIVEN left  UPPER ARM IN THE AMOUNT OF 0.45 ML    ALLERGY INJECTION FROM VIAL B GIVEN right upper ARM IN THE AMOUNT OF 0.45 ML    Documentation of vial injection specific to arm(s) noted on Allergy Immunotherapy Administration Form. Patient waited 30 minutes for observation. No      Patient tolerated well without adverse reaction WHILE IN OFFICE    SHOT REACTION TREATMENT INSTRUCTIONS    During the 30 minute wait after an allergy injection the following symptoms should be reported:    Itching other than at the injection site  Hives or swelling other than at the injection site  Redness other than at the injection site  Difficulty breathing  Chest tightness  Difficulty swallowing  Throat tightness    If these symptoms occur, NOTIFY PROVIDER and the following treatment should be administered:    1. Epinephrine/Auvi Q 1:1000 IM - 0.3 ml if > 66 lbs or more, 0.15 ml if 33 - 63 lbs, or 0.1 ml if <33 lbs     2. Diphenhydramine - give all intramuscular:     2 to <6 years (off-label use): 6.25 mg,    6 to <12 years: 12.5 to 25 mg;    ?12 years: 25-50 mg.    3.  Famotidine:  Adults 40 mg oral    Adolescents age 12 years and >88 lbs: 40 mg    Children and Adolescents ? 12years of age: Initial: 0.25 mg/kg/dose  every 12 hours (maximum daily dose: 40 mg/day)    Epi/Auvi Q dose may me repeated in 5-15 minutes if adequate resolution of symptoms does not occur    Patient should be observed for at least one hour after final Epi/Auvi Q dose and must be seen by provider. Patients cannot drive themselves if they have received diphenhydramine.

## 2022-07-11 ENCOUNTER — OFFICE VISIT (OUTPATIENT)
Dept: FAMILY MEDICINE CLINIC | Age: 67
End: 2022-07-11

## 2022-07-11 VITALS
RESPIRATION RATE: 16 BRPM | WEIGHT: 227.6 LBS | SYSTOLIC BLOOD PRESSURE: 132 MMHG | HEIGHT: 68 IN | BODY MASS INDEX: 34.49 KG/M2 | HEART RATE: 80 BPM | DIASTOLIC BLOOD PRESSURE: 80 MMHG

## 2022-07-11 DIAGNOSIS — I10 ESSENTIAL HYPERTENSION: Primary | ICD-10-CM

## 2022-07-11 DIAGNOSIS — J44.9 CHRONIC OBSTRUCTIVE PULMONARY DISEASE, UNSPECIFIED COPD TYPE (HCC): ICD-10-CM

## 2022-07-11 DIAGNOSIS — E11.8 TYPE 2 DIABETES MELLITUS WITH COMPLICATION, WITHOUT LONG-TERM CURRENT USE OF INSULIN (HCC): ICD-10-CM

## 2022-07-11 PROCEDURE — 1036F TOBACCO NON-USER: CPT | Performed by: FAMILY MEDICINE

## 2022-07-11 PROCEDURE — G8417 CALC BMI ABV UP PARAM F/U: HCPCS | Performed by: FAMILY MEDICINE

## 2022-07-11 PROCEDURE — 99213 OFFICE O/P EST LOW 20 MIN: CPT | Performed by: FAMILY MEDICINE

## 2022-07-11 PROCEDURE — G8427 DOCREV CUR MEDS BY ELIG CLIN: HCPCS | Performed by: FAMILY MEDICINE

## 2022-07-11 PROCEDURE — 1123F ACP DISCUSS/DSCN MKR DOCD: CPT | Performed by: FAMILY MEDICINE

## 2022-07-11 PROCEDURE — 3017F COLORECTAL CA SCREEN DOC REV: CPT | Performed by: FAMILY MEDICINE

## 2022-07-11 SDOH — ECONOMIC STABILITY: FOOD INSECURITY: WITHIN THE PAST 12 MONTHS, THE FOOD YOU BOUGHT JUST DIDN'T LAST AND YOU DIDN'T HAVE MONEY TO GET MORE.: NEVER TRUE

## 2022-07-11 SDOH — ECONOMIC STABILITY: FOOD INSECURITY: WITHIN THE PAST 12 MONTHS, YOU WORRIED THAT YOUR FOOD WOULD RUN OUT BEFORE YOU GOT MONEY TO BUY MORE.: NEVER TRUE

## 2022-07-11 ASSESSMENT — ENCOUNTER SYMPTOMS
CHEST TIGHTNESS: 0
BACK PAIN: 1
EYES NEGATIVE: 1
COUGH: 0
ABDOMINAL PAIN: 0
DIARRHEA: 0
SHORTNESS OF BREATH: 0
CONSTIPATION: 0
NAUSEA: 0
VOMITING: 0
BLOOD IN STOOL: 0

## 2022-07-11 ASSESSMENT — PATIENT HEALTH QUESTIONNAIRE - PHQ9
SUM OF ALL RESPONSES TO PHQ QUESTIONS 1-9: 0
SUM OF ALL RESPONSES TO PHQ QUESTIONS 1-9: 0
1. LITTLE INTEREST OR PLEASURE IN DOING THINGS: 0
SUM OF ALL RESPONSES TO PHQ9 QUESTIONS 1 & 2: 0
SUM OF ALL RESPONSES TO PHQ QUESTIONS 1-9: 0
2. FEELING DOWN, DEPRESSED OR HOPELESS: 0
SUM OF ALL RESPONSES TO PHQ QUESTIONS 1-9: 0

## 2022-07-11 ASSESSMENT — LIFESTYLE VARIABLES
HOW OFTEN DO YOU HAVE A DRINK CONTAINING ALCOHOL: NEVER
HOW MANY STANDARD DRINKS CONTAINING ALCOHOL DO YOU HAVE ON A TYPICAL DAY: 1 OR 2

## 2022-07-11 ASSESSMENT — SOCIAL DETERMINANTS OF HEALTH (SDOH): HOW HARD IS IT FOR YOU TO PAY FOR THE VERY BASICS LIKE FOOD, HOUSING, MEDICAL CARE, AND HEATING?: NOT HARD AT ALL

## 2022-07-13 ENCOUNTER — NURSE ONLY (OUTPATIENT)
Dept: ALLERGY | Age: 67
End: 2022-07-13
Payer: MEDICARE

## 2022-07-13 VITALS
HEART RATE: 82 BPM | TEMPERATURE: 97.6 F | OXYGEN SATURATION: 95 % | SYSTOLIC BLOOD PRESSURE: 136 MMHG | RESPIRATION RATE: 16 BRPM | DIASTOLIC BLOOD PRESSURE: 72 MMHG

## 2022-07-13 DIAGNOSIS — J30.81 CAT ALLERGIES: ICD-10-CM

## 2022-07-13 DIAGNOSIS — Z51.6 ENCOUNTER FOR DESENSITIZATION TO POLLEN ALLERGEN: ICD-10-CM

## 2022-07-13 DIAGNOSIS — J30.81 ALLERGY TO DOG DANDER: Primary | ICD-10-CM

## 2022-07-13 DIAGNOSIS — Z91.09 ENCOUNTER FOR DESENSITIZATION TO POLLEN ALLERGEN: ICD-10-CM

## 2022-07-13 PROCEDURE — 95117 IMMUNOTHERAPY INJECTIONS: CPT | Performed by: NURSE PRACTITIONER

## 2022-07-13 NOTE — PROGRESS NOTES
After consent obtained/verified, allergy injection given in back of R/L arm(s). VIAL COLOR OF ALL VIALS TODAY IS red    ALLERGY INJECTION FROM VIAL A GIVEN right  UPPER ARM IN THE AMOUNT OF 0.50 ML    ALLERGY INJECTION FROM VIAL B GIVEN left upper ARM IN THE AMOUNT OF 0.50 ML        Documentation of vial injection specific to arm(s) noted on Allergy Immunotherapy Administration Form. Patient waited 30 minutes for observation. No      Patient tolerated well without adverse reaction WHILE IN OFFICE    SHOT REACTION TREATMENT INSTRUCTIONS    During the 30 minute wait after an allergy injection the following symptoms should be reported:    Itching other than at the injection site  Hives or swelling other than at the injection site  Redness other than at the injection site  Difficulty breathing  Chest tightness  Difficulty swallowing  Throat tightness    If these symptoms occur, NOTIFY PROVIDER and the following treatment should be administered:    1. Epinephrine/Auvi Q 1:1000 IM - 0.3 ml if > 66 lbs or more, 0.15 ml if 33 - 63 lbs, or 0.1 ml if <33 lbs     2. Diphenhydramine - give all intramuscular:     2 to <6 years (off-label use): 6.25 mg,    6 to <12 years: 12.5 to 25 mg;    ?12 years: 25-50 mg.    3.  Famotidine:  Adults 40 mg oral    Adolescents age 12 years and >88 lbs: 40 mg    Children and Adolescents ? 12years of age: Initial: 0.25 mg/kg/dose  every 12 hours (maximum daily dose: 40 mg/day)    Epi/Auvi Q dose may me repeated in 5-15 minutes if adequate resolution of symptoms does not occur    Patient should be observed for at least one hour after final Epi/Auvi Q dose and must be seen by provider. Patients cannot drive themselves if they have received diphenhydramine.

## 2022-07-15 ENCOUNTER — HOSPITAL ENCOUNTER (OUTPATIENT)
Dept: PULMONOLOGY | Age: 67
Discharge: HOME | End: 2022-07-15
Payer: MEDICARE

## 2022-07-15 ENCOUNTER — HOSPITAL ENCOUNTER (OUTPATIENT)
Dept: CT IMAGING | Age: 67
Discharge: HOME | End: 2022-07-15
Payer: MEDICARE

## 2022-07-15 DIAGNOSIS — J45.50 SEVERE PERSISTENT ASTHMA WITHOUT COMPLICATION: ICD-10-CM

## 2022-07-15 DIAGNOSIS — Z87.891 PERSONAL HISTORY OF TOBACCO USE, PRESENTING HAZARDS TO HEALTH: ICD-10-CM

## 2022-07-15 DIAGNOSIS — J44.9 MODERATE COPD (CHRONIC OBSTRUCTIVE PULMONARY DISEASE) (HCC): ICD-10-CM

## 2022-07-15 PROCEDURE — 94060 EVALUATION OF WHEEZING: CPT

## 2022-07-15 PROCEDURE — 71271 CT THORAX LUNG CANCER SCR C-: CPT

## 2022-07-28 NOTE — TELEPHONE ENCOUNTER
The pharmacy is  requesting a refill of the below medication which has been pended for you:     Requested Prescriptions     Pending Prescriptions Disp Refills    sertraline (ZOLOFT) 100 MG tablet [Pharmacy Med Name: SERTRALINE 100MG TABLETS] 180 tablet 1     Sig: TAKE 1 TABLET BY MOUTH TWICE DAILY       Last Appointment Date: 5/26/2022  Next Appointment Date: Visit date not found    No Known Allergies

## 2022-07-29 RX ORDER — SERTRALINE HYDROCHLORIDE 100 MG/1
TABLET, FILM COATED ORAL
Qty: 180 TABLET | Refills: 1 | Status: SHIPPED | OUTPATIENT
Start: 2022-07-29 | End: 2022-11-01

## 2022-08-08 DIAGNOSIS — J34.3 HYPERTROPHY OF INFERIOR NASAL TURBINATE: ICD-10-CM

## 2022-08-08 DIAGNOSIS — J30.9 ALLERGIC RHINITIS, UNSPECIFIED SEASONALITY, UNSPECIFIED TRIGGER: ICD-10-CM

## 2022-08-08 RX ORDER — FLUTICASONE PROPIONATE 50 MCG
SPRAY, SUSPENSION (ML) NASAL
Qty: 16 G | Refills: 11 | Status: SHIPPED | OUTPATIENT
Start: 2022-08-08

## 2022-08-10 ENCOUNTER — NURSE ONLY (OUTPATIENT)
Dept: ALLERGY | Age: 67
End: 2022-08-10
Payer: MEDICARE

## 2022-08-10 VITALS
DIASTOLIC BLOOD PRESSURE: 78 MMHG | SYSTOLIC BLOOD PRESSURE: 128 MMHG | OXYGEN SATURATION: 91 % | TEMPERATURE: 97.9 F | RESPIRATION RATE: 16 BRPM

## 2022-08-10 DIAGNOSIS — Z91.09 ENCOUNTER FOR DESENSITIZATION TO POLLEN ALLERGEN: Primary | ICD-10-CM

## 2022-08-10 DIAGNOSIS — Z91.048 ALLERGY TO MOLD: ICD-10-CM

## 2022-08-10 DIAGNOSIS — Z91.09 MITE ALLERGY: ICD-10-CM

## 2022-08-10 DIAGNOSIS — Z51.6 ENCOUNTER FOR DESENSITIZATION TO POLLEN ALLERGEN: Primary | ICD-10-CM

## 2022-08-10 PROCEDURE — 95117 IMMUNOTHERAPY INJECTIONS: CPT | Performed by: NURSE PRACTITIONER

## 2022-08-10 NOTE — PROGRESS NOTES
After consent obtained/verified, allergy injection given in back of R/L arm(s). VIAL COLOR OF ALL VIALS TODAY IS red    ALLERGY INJECTION FROM VIAL A GIVEN left  UPPER ARM IN THE AMOUNT OF 0.50 ML    ALLERGY INJECTION FROM VIAL B GIVEN right upper ARM IN THE AMOUNT OF 0.50 ML          Documentation of vial injection specific to arm(s) noted on Allergy Immunotherapy Administration Form. Patient waited 30 minutes for observation. No      Patient tolerated well without adverse reaction WHILE IN OFFICE    SHOT REACTION TREATMENT INSTRUCTIONS    During the 30 minute wait after an allergy injection the following symptoms should be reported:    Itching other than at the injection site  Hives or swelling other than at the injection site  Redness other than at the injection site  Difficulty breathing  Chest tightness  Difficulty swallowing  Throat tightness    If these symptoms occur, NOTIFY PROVIDER and the following treatment should be administered:    1. Epinephrine/Auvi Q 1:1000 IM - 0.3 ml if > 66 lbs or more, 0.15 ml if 33 - 63 lbs, or 0.1 ml if <33 lbs     2. Diphenhydramine - give all intramuscular:     2 to <6 years (off-label use): 6.25 mg,    6 to <12 years: 12.5 to 25 mg;    ?12 years: 25-50 mg.    3.  Famotidine:  Adults 40 mg oral    Adolescents age 12 years and >88 lbs: 40 mg    Children and Adolescents ? 12years of age: Initial: 0.25 mg/kg/dose  every 12 hours (maximum daily dose: 40 mg/day)    Epi/Auvi Q dose may me repeated in 5-15 minutes if adequate resolution of symptoms does not occur    Patient should be observed for at least one hour after final Epi/Auvi Q dose and must be seen by provider. Patients cannot drive themselves if they have received diphenhydramine.

## 2022-08-18 ENCOUNTER — TELEPHONE (OUTPATIENT)
Dept: ALLERGY | Age: 67
End: 2022-08-18

## 2022-08-18 NOTE — TELEPHONE ENCOUNTER
Patient is no longer on dupixent due to pt's reasons. Dupixent injection chart disassembled and scanned into media.

## 2022-08-21 NOTE — PROGRESS NOTES
[facility]             Pulmonary medicine clinic follow up note. Patient: Milton Llamas  : 1955      Lung Nodule Screening     [x] Qualifies    [] Does not qualify   [] Declined    [x] Completed    Chief complaint and [place]:  Milton Llamas is a 69 y.o.oldmale came for follow up regarding his severe persistent Asthma and moderate COPD after having a recommended spirometry and low-dose CT scan of chest without contrast. He was started on Breztri Aerosphere 160/9/4.8mcg spray MDI, 2 puffs via inhalation BID by his family physician Dr. Bennie Santana MD.  Patient currently tolerating Breztri well no reported side effects. He is currently following with the Ms. Margarita Richardson [facility] allergy immunology service. He is receiving allergy injections along with immunotherapy for his allergies. On today's questioning:  He denies cough or expectoration. He denies hemoptysis. He denies fever or chills. He denies recent hospitalizations or emergency room visits. He is using his prescribed inhalers with good compliance. He is using rescue inhaler/nebs rarely. He denies recent loss of weight or appetite changes. He denies recent decline in functional status. He underwent stent placement by Dr. Lise Phoenix at [facility] in . He is using 2 LPM of home O2 with exercise with no home O2 at night time. How ever, he came to my clinic with out his home oxygen. He told me that he is currently using Inogen portable oxygen concentrator. He gave his Inogen portable oxygen concentrator to repair. He was evaluated by Dr. Edelmira Kraft from ENT service for his thyroid nodule along with hypertrophied nasal turbinates along with abnormally looking vocal cords noted during bronch. He follows with Dr. Sarah Hunter for his GERD.      He is currently using:  -Breztri Aerosphere 160/9/4.8mcg spray MDI, 2 puffs via inhalation BID.   -Albuterol HFA 90mcg/Spray MDI, 2puffs  Q6Hprn  -Albuterol 2.5 mg by nebulization every 6 hourly as needed  -Flonase 50mcg/INH 2sprays each nostril daily. He used to be on treatment with Singulair 10mg 1 tablet at bed time daily. Patient refused to continue Singulair due to his history of depression. He is currently taking antidepressant medications. He feels better with nebulizer meds compared to inhalers. He was initially referred from Dr. Fabio Townsend for chronic cough. Asthma control (Severity) questionnaire:  Asthma symptoms:  > 2 times per week -> No  Night time awakenings: > 2 times per month -> No  Use of short acting beta agonist for symptoms control (Other than pre exercise use) :> 2times per week  -> No  Interference with normal activity  -> none  Lung function: Fev1 >60% Predicted  -> Yes  Number of exacerbations per year: > 2 -> Yes    Social History: Occupation: He is retired now. He used to work as a  in the past.      Patient admits to history of tobacco smoking. He used to smoke 1 to 1.5 PPD for 44 Years. He quit smoking in March, 2017. He denies history of exposure to coal, foundry, [place], asbestos or significant farm dust exposure. His father was diagnosed with asbestos exposure. He used to move close to his father. Patient denies any recent travel. Patient denies history of exposure to birds, pigeons, or chickens in the past. The patient admits toto pet animals at home. Hecurrently had 2cats at home. He denies to history of recreational or IV drug use. Hedenies history of pulmonary embolism or DVT in the past.      Review of Systems:   General/Constitutional: he lost 9lbs of weight from the last visit. No fever or chills. HENT: Negative. Eyes: Negative. Upper respiratory tract: No nasal stuffiness or post nasal drip. Lower respiratory tract/ lungs: No cough or sputum production.   Cardiovascular: No palpitations or chest pain.  Gastrointestinal: No nausea or vomiting. Neurological: No focal neurologiacal weakness. Extremities: No edema. Musculoskeletal: No complaints. Genitourinary: No complaints. Hematological: Negative. Psychiatric/Behavioral: Negative. Skin: No itching. Current Medications:    Past Medical History:   Diagnosis Date    Acid reflux     Arthritis     Asthma     CAD (coronary artery disease)     Chronic back pain     Class 2 severe obesity due to excess calories with serious comorbidity and body mass index (BMI) of 37.0 to 37.9 in adult [facility]) 8/13/2018    Colon polyps 11/06    COPD (chronic obstructive pulmonary disease) (HCC)     Depression     panic attacks    Diverticulosis     HTN (hypertension)     Hyperlipidemia     Kidney disorder     Panic attack     Pneumonia     Rash     Recurrent upper respiratory infection (URI)     Thumb amputation status 3/13/14    left tip of thumb amputated    Thyroid disease     Type II or unspecified type diabetes mellitus without mention of complication, not stated as uncontrolled        Past Surgical History:   Procedure Laterality Date    BACK SURGERY  2002    BACK SURGERY  08/11/2017    BICEPS TENDON REPAIR Right 08/16/2017    BRONCHOSCOPY      BRONCHOSCOPY N/A 4/5/2019    BRONCHOSCOPY performed by Liborio Flowers MD at [address] [place] [address]  4/5/2019    BRONCHOSCOPY ALVEOLAR LAVAGE performed by Lbiorio Flowers MD at [facility]  07/02 08/05    CARPAL TUNNEL RELEASE  2011    right hand    CHOLECYSTECTOMY  yrs ago    COLONOSCOPY  11/06 02/12 1/14    CORONARY ANGIOPLASTY WITH STENT PLACEMENT  02/2020    ENDOSCOPY, COLON, DIAGNOSTIC      EYE SURGERY      foreign body removal    HERNIA REPAIR  2004    Dr Malissa Brock  2008?     LARYNGOSCOPY  04/28/2016    Laryngoscopy Micro with Biopsy by Dr Maile Zarate  01/07    [facility]    ROTATOR TAKE 2 TABLETS BY MOUTH TWICE DAILY WITH MEALS 360 tablet 1    rOPINIRole (REQUIP) 1 MG tablet TAKE 1 TABLET BY MOUTH THREE TIMES DAILY 90 tablet 0    traZODone (DESYREL) 150 MG tablet TAKE 1/2 TO 1 TABLET BY MOUTH EVERY DAY AT BEDTIME AS NEEDED FOR SLEEP 90 tablet 0    glimepiride (AMARYL) 4 MG tablet TAKE 1 TABLET BY MOUTH TWICE DAILY (Patient taking differently: Take 4 mg by mouth Daily with supper TAKE 1 TABLET BY MOUTH) 180 tablet 0    montelukast (SINGULAIR) 10 MG tablet Take 10 mg by mouth nightly      VENTOLIN  (90 Base) MCG/ACT inhaler INHALE 2 PUFFS INTO THE LUNGS EVERY 6 HOURS AS NEEDED FOR WHEEZING 1 each 11    atorvastatin (LIPITOR) 20 MG tablet 20 mg daily       pantoprazole (PROTONIX) 40 MG tablet Take 40 mg by mouth 2 times daily       ranolazine (RANEXA) 500 MG extended release tablet Take 500 mg by mouth 2 times daily       EPINEPHrine (EPIPEN 2-HUBER) 0.3 MG/0.3ML SOAJ injection Inject one pen as directed STAT for allergic reaction, may disp generic NDC 89382-307-41 6 each 99    baclofen (LIORESAL) 10 MG tablet 2 times daily as needed   0    Omega-3 Fatty Acids (FISH OIL) 1000 MG CAPS Take 1,000 mg by mouth daily. No current facility-administered medications for this visit. Family History   Problem Relation Age of Onset    Cancer Mother     Breast Cancer Mother     Stroke Mother     Heart Disease Brother     Diabetes Brother     High Blood Pressure Brother     High Cholesterol Brother          Physical Exam     VITALS:    /72 (Site: Left Upper Arm, Position: Sitting, Cuff Size: Medium Adult)   Pulse 75   Temp 98.2 °F (36.8 °C) (Skin)   Ht 5' 8\" (1.727 m)   Wt 231 lb 3.2 oz (104.9 kg)   SpO2 94% Comment: Patient on room air  BMI 35.15 kg/m²   Nursing note and vitals reviewed. Constitutional: Patient appears moderately built and moderately nourished. No distress. Patient is oriented to person, place, and time.   HENT:   Head: Normocephalic and atraumatic. Right Ear: External ear normal.   Left Ear: External ear normal.   Mouth/Throat: Oropharynx is clear and moist.  No oral thrush. Eyes: Conjunctivae are normal. Pupils are equal, round, and reactive to light. No scleral icterus. Neck: Neck supple. No JVD present. No tracheal deviation present. Cardiovascular: Normal rate, regular rhythm, normal heart sounds. No murmur heard. Pulmonary/Chest: Effort normal and breath sounds normal. No stridor. No respiratory distress. No wheezes. No rales. Patient exhibits no tenderness. Abdominal: Soft. Patient exhibits no distension. No tenderness. Musculoskeletal: Normal range of motion. Extremities: Patient exhibits no edema and no tenderness. Lymphadenopathy:  No cervical adenopathy. Neurological: Patient is alert and oriented to person, place, and time. Skin: Skin is warm and dry. Patient is not diaphoretic. Psychiatric: Patient  has a normal mood and affect. Patient behavior is normal.       Neck Circumference -   18.5 in  Mallampati - II    Diagnostic Data:    MCCT test:3/28/16: Positive. CT neck:  Mar 28, 2016    HRCT of chest:  Feb 2, 2019   PROCEDURE: CT CHEST HIGH RESOLUTION     Bronchoscopy results:  Hospital performed: [facility]. Date of procedure: 4/5/2019  Procedure: Flexible diagnostic fiberoptic bronchoscopy without fluoroscopy. During procedure Bronch washings obtained  from bilateral lower lobes. Bronchioalveolar lavage was performed from right lower lobe posterior segment. Bronch washings:   Acid fast bacilli:  negative  Fungal cultures: Candida albicans   Routine cultures: Escherichia coli   Viral cultures:      negative  Legionella cultures:  negative  Cytology: No malignant cells seen. Multinucleated giant cells. Alveolar macrophages.   Susceptibility     Escherichia coli (1)     Antibiotic Interpretation CAIT Status    gentamicin Sensitive <=1 mcg/mL Final    tetracycline Sensitive <=1 mcg/mL Final cefOXitin Sensitive <=4 mcg/mL Final    trimethoprim-sulfamethoxazole Sensitive <=20 mcg/mL Final    ciprofloxacin Sensitive =0.016 mcg/mL Final    cefuroxime Sensitive           [facility] labs:  4/23/2019  8:43 PM - Rebeca Rich Incoming Lab Results From Soft     Component Value Ref Range & Units Status Collected Lab   Allergen Fungi/Mold A. Alternata Ige 0. 87High   <=0.34 kU/L Final 04/19/2019 12:00 PM [facility]   Allergen Box Elder < 0.10  <=0.34 kU/L Final 04/19/2019 12:00 PM [facility]   Cat Dander IgE 3. 08High   <=0.34 kU/L Final 04/19/2019 12:00 PM [facility]   ALLERGEN MOUNTAIN CEDAR < 0.10  <=0.34 kU/L Final 04/19/2019 12:00 PM [facility]   ALLERGEN COTTONWOOD IGE < 0.10  <=0.34 kU/L Final 04/19/2019 12:00 PM [facility]   Milk IgE < 0.10  <=0.34 kU/L Final 04/19/2019 12:00 PM [facility]   Performed by [place] [address], [address] [facility] [phone]   www. Beatrice Elizondo MD - Lab. Director    Peanut IgE < 0.10  <=0.34 kU/L Final 04/19/2019 12:00 PM [facility]   ALLERGEN WEED, PIGWEED IGE < 0.10  <=0.34 kU/L Final 04/19/2019 12:00 PM [facility]   [place] Thistle IgE < 0.10  <=0.34 kU/L Final 04/19/2019 12:00 PM [facility]   ALLERGEN TRISTEN GRASS < 0.10  <=0.34 kU/L Final 04/19/2019 12:00 PM [facility]   Allergen, Fungi/Mold < 0.10  <=0.34 kU/L Final 04/19/2019 12:00 PM [facility]   Elm IgE < 0.10  <=0.34 kU/L Final 04/19/2019 12:00 PM [facility]   Allergen, Tree, Oak < 0.10  <=0.34 kU/L Final 04/19/2019 12:00 PM [facility]   ALLERGEN BIRCH IgE < 0.10  <=0.34 kU/L Final 04/19/2019 12:00 PM [facility]   Allergen Fungi/Mold A. Fumigatus IgE < 0.10  <=0.34 kU/L Final 04/19/2019 12:00 PM [facility]   Mite Dust Pteronyssinus IgE 6. 86High   <=0.34 kU/L Final 04/19/2019 12:00 PM [facility]   ALLERGEN D. FARINAE (DUST MITE) 6. 53High   <=0.34 kU/L Final 04/19/2019 12:00 PM [facility]   Bermuda Grass IgE < 0.10  <=0.34 kU/L Final 04/19/2019 12:00 PM [facility]   Allergen White Mukund < 0.10  <=0.34 kU/L Final 04/19/2019 12:00 PM [facility]   Allergen Fungi/Mold P.  Notatum IgE < 0.10  <=0.34 kU/L Final 04/19/2019 12:00 PM [facility]   Common-Short Ragweed IgE 1. 01High   <=0.34 kU/L Final 04/19/2019 12:00 PM [facility]   Cockroach IgE 0.15  <=0.34 kU/L Final 04/19/2019 12:00 PM [facility]   Allergen Tree [place] < 0.10  <=0.34 kU/L Final 04/19/2019 12:00 PM [facility]   [place] Tree IgE < 0.10  <=0.34 kU/L Final 04/19/2019 12:00 PM [facility]   Pecan Tree IgE < 0.10  <=0.34 kU/L Final 04/19/2019 12:00 PM [facility]   Mouse Epithelial < 0.10  <=0.34 kU/L Final 04/19/2019 12:00 PM [facility]   Allergen Fungi/Mold, M. racemosus IGE < 0.10  <=0.34 kU/L Final 04/19/2019 12:00 PM [facility]   Allergen White [place] Tree, IGE < 0.10  <=0.34 kU/L Final 04/19/2019 12:00 PM [facility]   Dog Dander IgE 0. 69High   <=0.34 kU/L Final 04/19/2019 12:00 PM [facility]   Sheep Sorrel IgE < 0.10  <=0.34 kU/L Final 04/19/2019 12:00 PM [facility]   Immunoglobulin E 447High   <=214 kU/L Final 04/19/2019 12:00 PM [facility]   REFERENCE INTERVAL: Immunoglobulin E, Serum   Access complete set of age- and/or gender-specific reference   intervals for this test in the [facility] Laboratory Test Directory   ([email or web address]). Six Minute Walk Test done on 7/14/20 at 8:42 AM ALEXT    Yony Borden 1955    Six minute walk test done in my office today by medical assistant Miss: Neno Barrera   Oxygen saturation at rest on room air was (Percent): 90  Oxygen saturation on room air with exertion dropped to (Percent): 86      Time from beginning of the test to above desaturation: 2 minute 0 seconds. The six minute walk test was repeated with oxygen supplementation. Oxygen supplimentation was started with 2LPM via nasal cannula and titrated to 2 LPM via nasal cannula. At the end of the test his oxygen saturation remained at 90 % on 2 LPM with exertion. He is mobile in the home and requires oxygen as outlined above. He needs no home O2 at rest. He needs 2 LPM of home O2 with exercise. He is currently using 3LPM of home O2 at night time.   He will be evaluated for nocturnal home O2 requirement- see separte order.    Please see scanned six minute walk test document in media for details. DME Medical Necessity Documentation    Patient was seen in my clinic on 7/14/20 for the diagnosis COPD. I am prescribing oxygen because the diagnosis and testing requires the patient to have oxygen in the home. Condition will improve or be benefited by oxygen use. The patient is  able to perform good mobility in a home setting and therefore does require the use of a portable oxygen system. Nocturnal pulse oximetry study done on 3 August 2020:            Spirometry: Spirometry performed on 14 July 2021      Low-dose CT scan of chest without contrast:  Wed Jul 14, 2021  9:45 AM   rative NONCONTRAST SCREENING CT CHEST:   HISTORY: Personal history of tobacco use. 1. 5 mm noncalcified nodule in the right middle lobe, previously measuring 4 mm. No additional noncalcified pulmonary nodules. Lung-RADS Score: 2: Benign appearance or behavior (nodules with a very low likelihood of becoming a clinically active cancer due to size or lack of growth). Management: Continue annual screening with LDCT in 12 months. 2. Moderate emphysematous changes. Low dose CT Chest with out contrast for Lung Nodule Screening:  Fri Jul 15, 2022  9:20 AM   NONCONTRAST SCREENING CT CHEST:   HISTORY: History of smoking. 84 pack year history of smoking. 1. There are no suspicious masses or nodules within the lung fields. 2. There are no pathologically enlarged lymph nodes. 3. LUNGRADS ASSESSMENT VALUE: 2,       Spirometry: Performed on 15 July 2022    His FEV1 improved from previous spirometry done in the past.      Assessment:  -Severe persistent bronchial asthma-  under control.  -Moderate COPD with hx of tobacco smoking- under control.  -5 mm noncalcified nodule in the right middle lobe, previously measuring 4 mm- Stable. -CAD S/p Stent placement. He follows with Dr. Mike Mcintyre.   He is currently waiting for a follow-up with his cardiologist next month  -hx of recurrent E coli infection in the lungs and MRSA infection of left side of face in the past- resolved  -Abnormal allergy test with elevated serum Ig E level- he is following with an Allergist.  He is currently receiving allergy shots along with immunotherapy from Ms. Katrinka Duverney, CNP  -Hx of Chronic cough due to uncontrolled asthma Vs allergic rhinitis- Improved  -Allergic rhinitis- on treatment with Flonase nasal spray- Improved  -Abnormal CT scan of neck with ? Lesion over vocal cord and cervical lymphadenopathy on left side S/p Microlaryngoscopy and biopsy by Dr. Kirsten Gallegos on 4/28/16. He follows with ENT. -Heterogeneous low-attenuation nodule within left thyroid gland is incompletely characterized on the current examination. Following with ENT. -Type II or unspecified type diabetes mellitus without mention of complication, not stated as uncontrolled ([address] -Acid reflux on treatment with Nexium. He follows with Dr. Kinjal Chapman  -Chronic tobacco smoking- He quit smoking several years back. -Depression on treatment with meds  -Anxiety disorder.  -Chronic back pain.  -Hx of Sleep apnea. He used to be on CPAP in the past. He underwent surgery on his nose and ? Uvulectomy (Not UPPP surgery) few years back. He never had a follow up sleep test. He refused go for sleep test or get treated. Recommendations/Plan:  -Continue patient on Breztri Aerosphere 160/9/4.8mcg spray MDI, 2 puffs via inhalation BID. Hewas informed about adverse effects of Breztri Aerosphere. He verbalizes understanding.  -Continue Albuterol HFA 90mcg/Spray MDI, 2puffs  Q6Hprn.  -He was instructed to use Albuterol HFA  inhaler 2 puffs Q6h prn or Albuterol 2.5mg nebs Q6h prn (the nebulizer) at one time as rescue medication not both at the same time.  He verbalizes understanding.   -Continue follow up and management by ISABELLA Stoenr for his GERD.  -He was advised to keep scheduled follow up with his Allergy and verbalizes understanding of consequences of his decision including non diagnosis of obstructive sleep apnea resulting in increased morbidity and mortality with cerebro and cardiovascular events including death. He verbalizes understanding. I had a discussion with patient regarding other avialable treatment options for his sleep disorder breathing including but not limited to Dental appliance placement with referral to a local dentist Vs other available surgical options including Uvulopalatopharyngoplasty, maxillomandibular ostomy, Inspire device placement and tracheostomy as last option. At the end of discussion, he decided not to go for any treatment. he verbalizes understanding.    -I personally reviewed and updated the Past medical hx, Past surgical hx,Social hx, Family hx, Medications, Allergies in the discrete data section of the patient chart. I also reviewed pertaining labs and Pulmonary medicine,Sleep medicine related, Pathological, Microbiological and Radiological investigations. Low Dose CT (LDCT) Lung Screening criteria met:     Age 50-77(Medicare) or 50-80 ([facility])   Pack year smoking >20   Still smoking or less than 15 year since quit   No sign or symptoms of lung cancer   > 11 months since last LDCT     Risks and benefits of lung cancer screening with LDCT scans discussed:    Significance of positive screen - False-positive LDCT results often occur. 95% of all positive results do not lead to a diagnosis of cancer. Usually further imaging can resolve most false-positive results; however, some patients may require invasive procedures. Over diagnosis risk - 10% to 12% of screen-detected lung cancer cases are over diagnosed--that is, the cancer would not have been detected in the patient's lifetime without the screening.     Need for follow up screens annually to continue lung cancer screening effectiveness     Risks associated with radiation from annual LDCT- Radiation exposure is about the same

## 2022-08-24 ENCOUNTER — NURSE ONLY (OUTPATIENT)
Dept: ALLERGY | Age: 67
End: 2022-08-24
Payer: MEDICARE

## 2022-08-24 VITALS
RESPIRATION RATE: 20 BRPM | OXYGEN SATURATION: 97 % | DIASTOLIC BLOOD PRESSURE: 80 MMHG | SYSTOLIC BLOOD PRESSURE: 122 MMHG | HEART RATE: 73 BPM | TEMPERATURE: 98.1 F

## 2022-08-24 DIAGNOSIS — Z51.6 ENCOUNTER FOR DESENSITIZATION TO POLLEN ALLERGEN: Primary | ICD-10-CM

## 2022-08-24 DIAGNOSIS — Z91.09 ENCOUNTER FOR DESENSITIZATION TO POLLEN ALLERGEN: Primary | ICD-10-CM

## 2022-08-24 DIAGNOSIS — Z91.09 MITE ALLERGY: ICD-10-CM

## 2022-08-24 DIAGNOSIS — Z91.048 ALLERGY TO MOLD: ICD-10-CM

## 2022-08-24 PROCEDURE — 95117 IMMUNOTHERAPY INJECTIONS: CPT | Performed by: NURSE PRACTITIONER

## 2022-09-09 ENCOUNTER — OFFICE VISIT (OUTPATIENT)
Dept: PULMONOLOGY | Age: 67
End: 2022-09-09
Payer: MEDICARE

## 2022-09-09 VITALS
DIASTOLIC BLOOD PRESSURE: 72 MMHG | SYSTOLIC BLOOD PRESSURE: 128 MMHG | TEMPERATURE: 98.2 F | WEIGHT: 231.2 LBS | BODY MASS INDEX: 35.04 KG/M2 | HEART RATE: 75 BPM | OXYGEN SATURATION: 94 % | HEIGHT: 68 IN

## 2022-09-09 DIAGNOSIS — Z87.891 PERSONAL HISTORY OF TOBACCO USE: ICD-10-CM

## 2022-09-09 DIAGNOSIS — J45.50 SEVERE PERSISTENT ASTHMA WITHOUT COMPLICATION: ICD-10-CM

## 2022-09-09 DIAGNOSIS — J44.9 MODERATE COPD (CHRONIC OBSTRUCTIVE PULMONARY DISEASE) (HCC): Primary | ICD-10-CM

## 2022-09-09 DIAGNOSIS — Z87.891 PERSONAL HISTORY OF TOBACCO USE, PRESENTING HAZARDS TO HEALTH: ICD-10-CM

## 2022-09-09 PROCEDURE — 1036F TOBACCO NON-USER: CPT | Performed by: INTERNAL MEDICINE

## 2022-09-09 PROCEDURE — 3023F SPIROM DOC REV: CPT | Performed by: INTERNAL MEDICINE

## 2022-09-09 PROCEDURE — 99214 OFFICE O/P EST MOD 30 MIN: CPT | Performed by: INTERNAL MEDICINE

## 2022-09-09 PROCEDURE — G8427 DOCREV CUR MEDS BY ELIG CLIN: HCPCS | Performed by: INTERNAL MEDICINE

## 2022-09-09 PROCEDURE — 1123F ACP DISCUSS/DSCN MKR DOCD: CPT | Performed by: INTERNAL MEDICINE

## 2022-09-09 PROCEDURE — G8417 CALC BMI ABV UP PARAM F/U: HCPCS | Performed by: INTERNAL MEDICINE

## 2022-09-09 PROCEDURE — 3017F COLORECTAL CA SCREEN DOC REV: CPT | Performed by: INTERNAL MEDICINE

## 2022-09-09 PROCEDURE — G0296 VISIT TO DETERM LDCT ELIG: HCPCS | Performed by: INTERNAL MEDICINE

## 2022-09-09 RX ORDER — ALBUTEROL SULFATE 2.5 MG/3ML
2.5 SOLUTION RESPIRATORY (INHALATION) EVERY 6 HOURS PRN
Qty: 120 EACH | Refills: 8 | Status: SHIPPED | OUTPATIENT
Start: 2022-09-09 | End: 2023-09-09

## 2022-09-09 NOTE — PATIENT INSTRUCTIONS
Recommendations/Plan:  -Continue patient on Breztri Aerosphere 160/9/4.8mcg spray MDI, 2 puffs via inhalation BID. Hewas informed about adverse effects of Breztri Aerosphere. He verbalizes understanding.  -Continue Albuterol HFA 90mcg/Spray MDI, 2puffs  Q6Hprn.  -He was instructed to use Albuterol HFA  inhaler 2 puffs Q6h prn or Albuterol 2.5mg nebs Q6h prn (the nebulizer) at one time as rescue medication not both at the same time. He verbalizes understanding.   -Continue follow up and management by ISABELLA Huggins for his GERD.  -He was advised to keep scheduled follow up with his Allergy and immunologist. He is currently on immunotherapy with Ms. Richie CNP at [facility].  -Patient educated to update his pneumococcal vaccine with family physician and take influenza vaccine in coming season with out fail. Patient verbalizes understanding.   -Yony Pascualkevin educated about environmental safety precautions he need to practice to prevent exacerbation ofhis Asthma. Yony KEVIN Borden verbalizes understanding.   -He needs no home O2 at rest. He needs 2 LPM of home O2 with exercise. He do not need any oxygen supplementation at nighttime. He was advised to continue oxygen supplementation with good compliance.  -Schedule patient for Spirometry before clinic visit with Bronchodilator response in 1year from now.  -Scheduled patient for for low dose CT scan of chest with out IV contrast as recommended by the  U.S. Preventive Services Task Force and the NCCN for lung Cancer Screening in July next year.  -Schedule patient for follow up with my clinic in 1year with recommended test/s I.e low-dose CT scan of chest without contrast and spirometry before clinic visit. . Patient advised to make early appointment if needed.  -Patient and his wife were educated about my impression and plan. They verbalizes understanding. What is lung cancer screening?   Lung cancer screening is a way in which doctors check the lungs for early signs of cancer in people who have no symptoms of lung cancer. A low-dose CT scan uses much less radiation than a normal CT scan and shows a more detailed image of the lungs than a standard X-ray. The goal of lung cancer screening is to find cancer early, before it has a chance to grow, spread, or cause problems. One large study found that smokers who were screened with low-dose CT scans were less likely to die of lung cancer than those who were screened with standard X-ray. Below is a summary of the things you need to know regarding screening for lung cancer with low-dose computed tomography (LDCT). This is a screening program that involves routine annual screening with LDCT studies of the lung. The LDCTs are done using low-dose radiation that is not thought to increase your cancer risk. If you have other serious medical conditions (other cancers, congestive heart failure) that limit your life expectancy to less than 10 years, you should not undergo lung cancer screening with LDCT. The chance is 20%-60% that the LDCT result will show abnormalities. This would require additional testing which could include repeat imaging or even invasive procedures. Most (about 95%) of \"abnormal\" LDCT results are false in the sense that no lung cancer is ultimately found. Additionally, some (about 10%) of the cancers found would not affect your life expectancy, even if undetected and untreated. If you are still smoking, the single most important thing that you can do to reduce your risk of dying of lung cancer is to quit. For this screening to be covered by Medicare and most other insurers, strict criteria must be met. If you do not meet these criteria, but still wish to undergo LDCT testing, you will be required to sign a waiver indicating your willingness to pay for the scan.

## 2022-09-09 NOTE — PROGRESS NOTES
Neck Circumference -   18.25  Mallampati - 2    Lung Nodule Screening     [x] Qualifies    [] Does not qualify   [] Declined    [] Completed

## 2022-09-12 NOTE — TELEPHONE ENCOUNTER
Madalyn Spann called requesting a refill of their:    Spiriva Respimate 2.5 mcg QD    Send to [facility] on [employer]

## 2022-09-12 NOTE — TELEPHONE ENCOUNTER
Date of last visit:  5/26/2022  Date of next visit:  11/11/2022    Requested Prescriptions     Pending Prescriptions Disp Refills    metFORMIN (GLUCOPHAGE) 500 MG tablet [Pharmacy Med Name: METFORMIN 500MG TABLETS] 360 tablet 1     Sig: TAKE 2 TABLETS BY MOUTH TWICE DAILY WITH MEALS

## 2022-09-13 ENCOUNTER — OFFICE VISIT (OUTPATIENT)
Dept: UROLOGY | Age: 67
End: 2022-09-13
Payer: MEDICARE

## 2022-09-13 VITALS — WEIGHT: 239 LBS | BODY MASS INDEX: 36.22 KG/M2 | HEIGHT: 68 IN

## 2022-09-13 DIAGNOSIS — R31.0 GROSS HEMATURIA: Primary | ICD-10-CM

## 2022-09-13 DIAGNOSIS — E29.1 HYPOGONADISM IN MALE: ICD-10-CM

## 2022-09-13 DIAGNOSIS — N52.9 ERECTILE DYSFUNCTION, UNSPECIFIED ERECTILE DYSFUNCTION TYPE: ICD-10-CM

## 2022-09-13 DIAGNOSIS — N40.1 BENIGN PROSTATIC HYPERPLASIA WITH LOWER URINARY TRACT SYMPTOMS, SYMPTOM DETAILS UNSPECIFIED: ICD-10-CM

## 2022-09-13 LAB
BILIRUBIN URINE: NEGATIVE
BLOOD URINE, POC: NEGATIVE
CHARACTER, URINE: CLEAR
COLOR, URINE: YELLOW
GLUCOSE URINE: NEGATIVE MG/DL
KETONES, URINE: NEGATIVE
LEUKOCYTE CLUMPS, URINE: NEGATIVE
NITRITE, URINE: NEGATIVE
PH, URINE: 5.5 (ref 5–9)
POST VOID RESIDUAL (PVR): 491 ML
PROTEIN, URINE: NEGATIVE MG/DL
SPECIFIC GRAVITY, URINE: 1.02 (ref 1–1.03)
UROBILINOGEN, URINE: 0.2 EU/DL (ref 0–1)

## 2022-09-13 PROCEDURE — 81003 URINALYSIS AUTO W/O SCOPE: CPT | Performed by: UROLOGY

## 2022-09-13 PROCEDURE — 1036F TOBACCO NON-USER: CPT | Performed by: UROLOGY

## 2022-09-13 PROCEDURE — 99214 OFFICE O/P EST MOD 30 MIN: CPT | Performed by: UROLOGY

## 2022-09-13 PROCEDURE — 51798 US URINE CAPACITY MEASURE: CPT | Performed by: UROLOGY

## 2022-09-13 PROCEDURE — G8427 DOCREV CUR MEDS BY ELIG CLIN: HCPCS | Performed by: UROLOGY

## 2022-09-13 PROCEDURE — 3017F COLORECTAL CA SCREEN DOC REV: CPT | Performed by: UROLOGY

## 2022-09-13 PROCEDURE — 1123F ACP DISCUSS/DSCN MKR DOCD: CPT | Performed by: UROLOGY

## 2022-09-13 PROCEDURE — G8417 CALC BMI ABV UP PARAM F/U: HCPCS | Performed by: UROLOGY

## 2022-09-13 RX ORDER — HYDROCHLOROTHIAZIDE 12.5 MG/1
12.5 CAPSULE, GELATIN COATED ORAL DAILY
COMMUNITY

## 2022-09-13 NOTE — TELEPHONE ENCOUNTER
Date of last visit:  7/11/2022  Date of next visit:  11/11/2022    Requested Prescriptions     Pending Prescriptions Disp Refills    tiotropium (SPIRIVA RESPIMAT) 2.5 MCG/ACT AERS inhaler 1 each 5     Sig: Inhale 2 puffs into the lungs daily

## 2022-09-13 NOTE — PROGRESS NOTES
FRANC Bah MD        [address]  [address]  [facility] [address]  Dept: [phone]  Dept Fax: [phone]  Loc: [address] [address] Urology Office Note -     Patient:  Desiree Chappell  YOB: 1955    The patient is a 69 y.o. male who presents today for evaluation of the following problems:   Chief Complaint   Patient presents with    Follow-up    Benign Prostatic Hypertrophy    Hematuria     Hx of gross hemat         HISTORY OF PRESENT ILLNESS:     Hypogonadism  Polycythemia and chest pain- stopped injections  Pt not interested in more therapy    BPH  Doing better after PVP  Some retrograde ejaculation    ED  On meds  stable    Requested/reviewed records from Efraín Oneil MD office and/or outside physician/EMR    (Patient's old records have been requested, reviewed and pertinent findings summarized in today's note.)    Procedures Today: N/A    Last several PSA's:  Lab Results   Component Value Date    PSA 0.83 01/29/2021    PSA 1.92 (H) 12/13/2019    PSA 1.10 (H) 06/14/2019       Last total testosterone:  Lab Results   Component Value Date    TESTOSTERONE 145 (L) 01/17/2020       Urinalysis today:  Results for POC orders placed in visit on 09/13/22   POCT Urinalysis No Micro (Auto)   Result Value Ref Range    Glucose, Ur Negative NEGATIVE mg/dl    Bilirubin Urine Negative     Ketones, Urine Negative NEGATIVE    Specific Gravity, Urine 1.020 1.002 - 1.030    Blood, UA POC Negative NEGATIVE    pH, Urine 5.50 5.0 - 9.0    Protein, Urine Negative NEGATIVE mg/dl    Urobilinogen, Urine 0.20 0.0 - 1.0 eu/dl    Nitrite, Urine Negative NEGATIVE    Leukocyte Clumps, Urine Negative NEGATIVE    Color, Urine Yellow [id number]    Character, Urine Clear [id number]   poct post void residual   Result Value Ref Range    post void residual 491 ml       Last BUN and creatinine:  Lab Results   Component Value Date    BUN 10 05/19/2022     Lab Results   Component Value Date    CREATININE 1.1 05/19/2022       Imaging Reviewed during this Office Visit:   Scot Kessler MD independently reviewed the images and verified the radiology reports from:    [facility] (Annual)    Result Date: 7/14/2021  NONCONTRAST SCREENING CT CHEST: HISTORY: Personal history of tobacco use. COMPARISON: CT chest May 29, 2020. TECHNIQUE: Axial 1 mm images of the thorax and upper abdomen were obtained utilizing low dose screening CT protocol without the administration of intravenous contrast material. All CT scans at this facility use dose modulation, iterative reconstruction, and/or weight-based dosing when appropriate to reduce radiation dose to as low as reasonably achievable. FINDINGS: Lungs: 5 mm noncalcified nodule in the right middle lobe (series 3 image 316). This previously measured 4 mm. Unchanged calcified granuloma within the left lower lobe. No additional discrete pulmonary nodules are identified. There are again moderate centrilobular emphysematous changes within both lungs, with findings somewhat greater on the right lung. There is patchy atelectasis versus scarring in the right middle lobe and lingula. There is mild dependent atelectasis in bilateral lower lobes. There is no pleural effusion. There is no pneumothorax. Proximal tracheobronchial tree is patent. There are probable retained secretions within the trachea. Mediastinum and Lashell: There is no axillary or mediastinal lymphadenopathy. There is limited evaluation of the lashell on this noncontrast enhanced examination, without bulky hilar lymphadenopathy. There are calcified left hilar lymph nodes, relating to remote granulomatous disease. Heart size is normal. There is no pericardial effusion. Thoracic aorta is normal in caliber. There is calcified atherosclerotic plaque of the thoracic aorta.  There are coronary artery calcifications, however the study is not  optimized for coronary artery evaluation. Upper Abdomen: There are multiple calcified granulomata in the partially visualized spleen. Remainder of the visualized upper abdomen is unremarkable. Bones: There are no destructive osseous lesions identified. Vertebral body heights are maintained. There are degenerative changes of the visualized spine. 1. 5 mm noncalcified nodule in the right middle lobe, previously measuring 4 mm. No additional noncalcified pulmonary nodules. Lung-RADS Score: 2: Benign appearance or behavior (nodules with a very low likelihood of becoming a clinically active cancer due to size or lack of growth). Management: Continue annual screening with LDCT in 12 months. 2. Moderate emphysematous changes. LUNG RADS RECOMMENDATIONS: 1. Normal, continue annual screening 2. Benign appearance or behavior, continue annual screening 3.  6 month CT recommended 4A.  3 month CT recommended; may consider PET/CT 4B. Additional diagnostics and/or tissue sampling recommended 4X. Additional diagnostics and/or tissue sampling recommended  **This report has been created using voice recognition software. It may contain minor errors which are inherent in voice recognition technology. ** Final report electronically signed by Dr Valencia De León on 7/14/2021 9:45 AM      PAST MEDICAL, FAMILY AND SOCIAL HISTORY:  Past Medical History:   Diagnosis Date    Acid reflux     Arthritis     Asthma     CAD (coronary artery disease)     Chronic back pain     Class 2 severe obesity due to excess calories with serious comorbidity and body mass index (BMI) of 37.0 to 37.9 in adult [facility]) 8/13/2018    Colon polyps 11/06    COPD (chronic obstructive pulmonary disease) (HCC)     Depression     panic attacks    Diverticulosis     HTN (hypertension)     Hyperlipidemia     Kidney disorder     Panic attack     Pneumonia     Rash     Recurrent upper respiratory infection (URI)     Thumb amputation status 3/13/14    left tip of thumb amputated    Thyroid disease     Type II or unspecified type diabetes mellitus without mention of complication, not stated as uncontrolled      Past Surgical History:   Procedure Laterality Date    BACK SURGERY  2002    BACK SURGERY  08/11/2017    BICEPS TENDON REPAIR Right 08/16/2017    BRONCHOSCOPY      BRONCHOSCOPY N/A 4/5/2019    BRONCHOSCOPY performed by Nancy Ramirez MD at [facility]    BRONCHOSCOPY  4/5/2019    BRONCHOSCOPY ALVEOLAR LAVAGE performed by Nancy Ramirez MD at [facility]  07/02 08/05    CARPAL TUNNEL RELEASE  2011    right hand    CHOLECYSTECTOMY  yrs ago    COLONOSCOPY  11/06 02/12 1/14    CORONARY ANGIOPLASTY WITH STENT PLACEMENT  02/2020    ENDOSCOPY, COLON, DIAGNOSTIC      EYE SURGERY      foreign body removal    HERNIA REPAIR  2004    Dr Dolly Deshpande  2008?     LARYNGOSCOPY  04/28/2016    Laryngoscopy Micro with Biopsy by Dr Hiral Stewart  01/07    uppp    ROTATOR CUFF REPAIR Left 5-20-15    SKIN BIOPSY      TONSILLECTOMY  1985    TURP N/A 2/25/2022    CYSTOSCOPY GREENLIGHT PHOTOVAPORIZATION OF THE PROSTATE performed by Po Gallegos MD at [facility]    TUR N/A 5/18/2022    Cystoscopy Transurethral Resection of the Prostate evacuation of bladder stones performed by Po Gallegos MD at [facility] September 2014    Dr. Santso Early     Family History   Problem Relation Age of Onset    Cancer Mother     Breast Cancer Mother     Stroke Mother     Heart Disease Brother     Diabetes Brother     High Blood Pressure Brother     High Cholesterol Brother      Outpatient Medications Marked as Taking for the 9/13/22 encounter (Office Visit) with Po Gallegos MD   Medication Sig Dispense Refill    hydroCHLOROthiazide (MICROZIDE) 12.5 MG capsule Take 12.5 mg by mouth daily      IPRATROPIUM-ALBUTEROL IN Inhale into the lungs Nebulizer      metFORMIN (GLUCOPHAGE) 500 MG tablet TAKE 2 TABLETS BY MOUTH TWICE DAILY WITH MEALS 360 tablet 1    albuterol (PROVENTIL) (2.5 MG/3ML) 0.083% nebulizer solution Take 3 mLs by nebulization every 6 hours as needed for Wheezing or Shortness of Breath 120 each 8    fluticasone (FLONASE) 50 MCG/ACT nasal spray SHAKE LIQUID AND USE 2 SPRAYS IN EACH NOSTRIL DAILY 16 g 11    sertraline (ZOLOFT) 100 MG tablet TAKE 1 TABLET BY MOUTH TWICE DAILY 180 tablet 1    clopidogrel (PLAVIX) 75 MG tablet Take 1 tablet by mouth daily 30 tablet 3    busPIRone (BUSPAR) 10 MG tablet TAKE 1 TABLET BY MOUTH THREE TIMES DAILY (Patient taking differently: Take 10 mg by mouth 3 times daily Only takes 2 times) 270 tablet 1    ALLERGEN EXTRACT every 14 days      HYDROcodone-acetaminophen (NORCO) 5-325 MG per tablet TAKE 1 TABLET BY MOUTH THREE TIMES DAILY AS NEEDED FOR PAIN      losartan (COZAAR) 50 MG tablet Take 0.5 tablets by mouth daily 30 tablet 3    cetirizine (ZYRTEC) 10 MG tablet TAKE 1 TABLET BY MOUTH DAILY      OXYGEN Inhale 3 L into the lungs as needed      rOPINIRole (REQUIP) 1 MG tablet TAKE 1 TABLET BY MOUTH THREE TIMES DAILY 90 tablet 0    traZODone (DESYREL) 150 MG tablet TAKE 1/2 TO 1 TABLET BY MOUTH EVERY DAY AT BEDTIME AS NEEDED FOR SLEEP 90 tablet 0    glimepiride (AMARYL) 4 MG tablet TAKE 1 TABLET BY MOUTH TWICE DAILY (Patient taking differently: Take 4 mg by mouth Daily with supper TAKE 1 TABLET BY MOUTH) 180 tablet 0    montelukast (SINGULAIR) 10 MG tablet Take 10 mg by mouth nightly      VENTOLIN  (90 Base) MCG/ACT inhaler INHALE 2 PUFFS INTO THE LUNGS EVERY 6 HOURS AS NEEDED FOR WHEEZING 1 each 11    atorvastatin (LIPITOR) 20 MG tablet 20 mg daily       pantoprazole (PROTONIX) 40 MG tablet Take 40 mg by mouth 2 times daily       ranolazine (RANEXA) 500 MG extended release tablet Take 500 mg by mouth 2 times daily       EPINEPHrine (EPIPEN 2-HUBER) 0.3 MG/0.3ML SOAJ injection Inject one pen as directed STAT for allergic reaction, may disp generic NDC 87172-652-02 6 each 99    baclofen (LIORESAL) 10 MG tablet 2 times daily as needed   0    Omega-3 Fatty Acids (FISH OIL) 1000 MG CAPS Take 1,000 mg by mouth daily. Isosorbide nitrate  Social History     Tobacco Use   Smoking Status Former    Packs/day: 2.00    Years: 42.00    Pack years: 84.00    Types: Cigarettes    Start date: 1971    Quit date: 3/3/2017    Years since quittin.5   Smokeless Tobacco Never   Tobacco Comments    pts uses just nicotine vap      (If patient a smoker, smoking cessation counseling offered)   Social History     Substance and Sexual Activity   Alcohol Use No    Alcohol/week: 0.0 standard drinks       REVIEW OF SYSTEMS:  Constitutional: negative  Eyes: negative  Respiratory: negative  Cardiovascular: negative  Gastrointestinal: negative  Genitourinary: see HPI  Musculoskeletal: negative  Skin: negative   Neurological: negative  Hematological/Lymphatic: negative  Psychological: negative        Physical Exam:    This a 69 y.o. male  There were no vitals filed for this visit. Body mass index is 36.34 kg/m². Constitutional: Patient in no acute distress;         Assessment and Plan        1. Gross hematuria    2. Benign prostatic hyperplasia with lower urinary tract symptoms, symptom details unspecified    3. Hypogonadism in male    4. Erectile dysfunction, unspecified erectile dysfunction type               Plan:       Hypogonadism- no TRT due to SE profile    BPH- voiding with some retention but overall satisfied. ED- sildenafil PRN      1 year with PSA and PVR        Prescriptions Ordered:  No orders of the defined types were placed in this encounter.      Orders Placed:  Orders Placed This Encounter   Procedures    POCT Urinalysis No Micro (Auto)    poct post void residual     Bladder scan            FRANC Ross MD

## 2022-09-21 ENCOUNTER — NURSE ONLY (OUTPATIENT)
Dept: ALLERGY | Age: 67
End: 2022-09-21
Payer: MEDICARE

## 2022-09-21 ENCOUNTER — HOSPITAL ENCOUNTER (OUTPATIENT)
Age: 67
Discharge: HOME | End: 2022-09-21
Payer: MEDICARE

## 2022-09-21 VITALS
HEART RATE: 61 BPM | DIASTOLIC BLOOD PRESSURE: 76 MMHG | SYSTOLIC BLOOD PRESSURE: 124 MMHG | OXYGEN SATURATION: 96 % | TEMPERATURE: 97.9 F

## 2022-09-21 DIAGNOSIS — Z51.6 ENCOUNTER FOR DESENSITIZATION TO POLLEN ALLERGEN: Primary | ICD-10-CM

## 2022-09-21 DIAGNOSIS — Z91.09 MITE ALLERGY: ICD-10-CM

## 2022-09-21 DIAGNOSIS — Z91.048 ALLERGY TO MOLD: ICD-10-CM

## 2022-09-21 DIAGNOSIS — Z91.09 ENCOUNTER FOR DESENSITIZATION TO POLLEN ALLERGEN: Primary | ICD-10-CM

## 2022-09-21 LAB
ALBUMIN SERPL-MCNC: 4.4 G/DL (ref 3.5–5.1)
ALP BLD-CCNC: 52 U/L (ref 38–126)
ALT SERPL-CCNC: 15 U/L (ref 11–66)
ANION GAP SERPL CALCULATED.3IONS-SCNC: 11 MEQ/L (ref 8–16)
AST SERPL-CCNC: 17 U/L (ref 5–40)
BILIRUB SERPL-MCNC: 0.3 MG/DL (ref 0.3–1.2)
BILIRUBIN DIRECT: < 0.2 MG/DL (ref 0–0.3)
BUN BLDV-MCNC: 24 MG/DL (ref 7–22)
CALCIUM SERPL-MCNC: 10 MG/DL (ref 8.5–10.5)
CHLORIDE BLD-SCNC: 102 MEQ/L (ref 98–111)
CO2: 26 MEQ/L (ref 23–33)
CREAT SERPL-MCNC: 1.3 MG/DL (ref 0.4–1.2)
GFR SERPL CREATININE-BSD FRML MDRD: 55 ML/MIN/1.73M2
GLUCOSE BLD-MCNC: 115 MG/DL (ref 70–108)
POTASSIUM SERPL-SCNC: 4.1 MEQ/L (ref 3.5–5.2)
SODIUM BLD-SCNC: 139 MEQ/L (ref 135–145)
TOTAL PROTEIN: 7.3 G/DL (ref 6.1–8)

## 2022-09-21 PROCEDURE — 36415 COLL VENOUS BLD VENIPUNCTURE: CPT

## 2022-09-21 PROCEDURE — 80053 COMPREHEN METABOLIC PANEL: CPT

## 2022-09-21 PROCEDURE — 82248 BILIRUBIN DIRECT: CPT

## 2022-09-21 PROCEDURE — 95117 IMMUNOTHERAPY INJECTIONS: CPT | Performed by: NURSE PRACTITIONER

## 2022-10-19 ENCOUNTER — NURSE ONLY (OUTPATIENT)
Dept: ALLERGY | Age: 67
End: 2022-10-19
Payer: MEDICARE

## 2022-10-19 VITALS
OXYGEN SATURATION: 93 % | RESPIRATION RATE: 16 BRPM | HEART RATE: 76 BPM | SYSTOLIC BLOOD PRESSURE: 138 MMHG | DIASTOLIC BLOOD PRESSURE: 88 MMHG | TEMPERATURE: 97.1 F

## 2022-10-19 DIAGNOSIS — Z91.048 ALLERGY TO MOLD: ICD-10-CM

## 2022-10-19 DIAGNOSIS — Z91.09 MITE ALLERGY: ICD-10-CM

## 2022-10-19 DIAGNOSIS — Z51.6 ENCOUNTER FOR DESENSITIZATION TO POLLEN ALLERGEN: Primary | ICD-10-CM

## 2022-10-19 DIAGNOSIS — Z91.09 ENCOUNTER FOR DESENSITIZATION TO POLLEN ALLERGEN: Primary | ICD-10-CM

## 2022-10-19 PROCEDURE — 95117 IMMUNOTHERAPY INJECTIONS: CPT | Performed by: NURSE PRACTITIONER

## 2022-10-19 NOTE — PROGRESS NOTES
After consent obtained/verified, allergy injection given in back of R/L arm(s). VIAL COLOR OF ALL VIALS TODAY IS red    ALLERGY INJECTION FROM VIAL A GIVEN right  UPPER ARM IN THE AMOUNT OF 0.50 ML    ALLERGY INJECTION FROM VIAL B GIVEN left lower ARM IN THE AMOUNT OF 0.50 ML      Documentation of vial injection specific to arm(s) noted on Allergy Immunotherapy Administration Form. Patient waited 30 minutes for observation. No      Patient tolerated well without adverse reaction WHILE IN OFFICE    SHOT REACTION TREATMENT INSTRUCTIONS    During the 30 minute wait after an allergy injection the following symptoms should be reported:    Itching other than at the injection site  Hives or swelling other than at the injection site  Redness other than at the injection site  Difficulty breathing  Chest tightness  Difficulty swallowing  Throat tightness    If these symptoms occur, NOTIFY PROVIDER and the following treatment should be administered:    1. Epinephrine/Auvi Q 1:1000 IM - 0.3 ml if > 66 lbs or more, 0.15 ml if 33 - 63 lbs, or 0.1 ml if <33 lbs     2. Diphenhydramine - give all intramuscular:     2 to <6 years (off-label use): 6.25 mg,    6 to <12 years: 12.5 to 25 mg;    ?12 years: 25-50 mg.    3.  Famotidine:  Adults 40 mg oral    Adolescents age 12 years and >88 lbs: 40 mg    Children and Adolescents ? 12years of age: Initial: 0.25 mg/kg/dose  every 12 hours (maximum daily dose: 40 mg/day)    Epi/Auvi Q dose may me repeated in 5-15 minutes if adequate resolution of symptoms does not occur    Patient should be observed for at least one hour after final Epi/Auvi Q dose and must be seen by provider. Patients cannot drive themselves if they have received diphenhydramine.

## 2022-11-01 RX ORDER — SERTRALINE HYDROCHLORIDE 100 MG/1
TABLET, FILM COATED ORAL
Qty: 180 TABLET | Refills: 1 | Status: SHIPPED | OUTPATIENT
Start: 2022-11-01

## 2022-11-01 NOTE — TELEPHONE ENCOUNTER
The pharmacy is  requesting a refill of the below medication which has been pended for you:     Requested Prescriptions     Pending Prescriptions Disp Refills    sertraline (ZOLOFT) 100 MG tablet [Pharmacy Med Name: SERTRALINE 100MG TABLETS] 180 tablet 1     Sig: TAKE 1 TABLET BY MOUTH TWICE DAILY       Last Appointment Date: 7/11/2022  Next Appointment Date: 11/11/2022    Allergies   Allergen Reactions    Isosorbide Nitrate Other (See Comments)

## 2022-11-02 ENCOUNTER — NURSE ONLY (OUTPATIENT)
Dept: ALLERGY | Age: 67
End: 2022-11-02
Payer: MEDICARE

## 2022-11-02 VITALS
SYSTOLIC BLOOD PRESSURE: 134 MMHG | HEART RATE: 76 BPM | OXYGEN SATURATION: 94 % | DIASTOLIC BLOOD PRESSURE: 80 MMHG | TEMPERATURE: 96.6 F

## 2022-11-02 DIAGNOSIS — Z91.09 MITE ALLERGY: ICD-10-CM

## 2022-11-02 DIAGNOSIS — Z91.09 ENCOUNTER FOR DESENSITIZATION TO POLLEN ALLERGEN: Primary | ICD-10-CM

## 2022-11-02 DIAGNOSIS — Z91.048 ALLERGY TO MOLD: ICD-10-CM

## 2022-11-02 DIAGNOSIS — Z51.6 ENCOUNTER FOR DESENSITIZATION TO POLLEN ALLERGEN: Primary | ICD-10-CM

## 2022-11-02 PROCEDURE — 95117 IMMUNOTHERAPY INJECTIONS: CPT | Performed by: NURSE PRACTITIONER

## 2022-11-08 DIAGNOSIS — J30.9 ALLERGIC RHINITIS, UNSPECIFIED SEASONALITY, UNSPECIFIED TRIGGER: Primary | ICD-10-CM

## 2022-11-08 RX ORDER — TRAZODONE HYDROCHLORIDE 150 MG/1
TABLET ORAL
Qty: 90 TABLET | Refills: 0 | Status: SHIPPED | OUTPATIENT
Start: 2022-11-08

## 2022-11-08 RX ORDER — GLIMEPIRIDE 4 MG/1
4 TABLET ORAL
Qty: 90 TABLET | Refills: 0 | Status: SHIPPED | OUTPATIENT
Start: 2022-11-08 | End: 2022-11-14

## 2022-11-08 NOTE — TELEPHONE ENCOUNTER
Pt called in requesting refill for [facility]izine. [facility]  [address]  [phone].

## 2022-11-08 NOTE — TELEPHONE ENCOUNTER
Date of last visit:  7/11/2022  Date of next visit:  11/11/2022    Requested Prescriptions     Pending Prescriptions Disp Refills    traZODone (DESYREL) 150 MG tablet 90 tablet 0     Sig: TAKE 1/2 TO 1 TABLET BY MOUTH EVERY DAY AT BEDTIME AS NEEDED FOR SLEEP    glimepiride (AMARYL) 4 MG tablet 90 tablet 0     Sig: Take 1 tablet by mouth Daily with supper TAKE 1 TABLET BY MOUTH

## 2022-11-09 RX ORDER — CETIRIZINE HYDROCHLORIDE 10 MG/1
TABLET ORAL
Qty: 30 TABLET | Refills: 0 | Status: SHIPPED | OUTPATIENT
Start: 2022-11-09

## 2022-11-11 ENCOUNTER — OFFICE VISIT (OUTPATIENT)
Dept: FAMILY MEDICINE CLINIC | Age: 67
End: 2022-11-11

## 2022-11-11 VITALS
SYSTOLIC BLOOD PRESSURE: 122 MMHG | HEART RATE: 72 BPM | DIASTOLIC BLOOD PRESSURE: 74 MMHG | RESPIRATION RATE: 16 BRPM | WEIGHT: 233.6 LBS | BODY MASS INDEX: 35.4 KG/M2 | HEIGHT: 68 IN

## 2022-11-11 DIAGNOSIS — Z23 NEED FOR IMMUNIZATION AGAINST INFLUENZA: ICD-10-CM

## 2022-11-11 DIAGNOSIS — I10 ESSENTIAL HYPERTENSION: ICD-10-CM

## 2022-11-11 DIAGNOSIS — G47.33 OSA (OBSTRUCTIVE SLEEP APNEA): ICD-10-CM

## 2022-11-11 DIAGNOSIS — E11.8 TYPE 2 DIABETES MELLITUS WITH COMPLICATION, WITHOUT LONG-TERM CURRENT USE OF INSULIN (HCC): Primary | ICD-10-CM

## 2022-11-11 DIAGNOSIS — J44.9 CHRONIC OBSTRUCTIVE PULMONARY DISEASE, UNSPECIFIED COPD TYPE (HCC): ICD-10-CM

## 2022-11-11 LAB
CHP ED QC CHECK: ABNORMAL
GLUCOSE BLD-MCNC: 148 MG/DL
HBA1C MFR BLD: 6.3 %

## 2022-11-11 PROCEDURE — G0008 ADMIN INFLUENZA VIRUS VAC: HCPCS | Performed by: FAMILY MEDICINE

## 2022-11-11 PROCEDURE — 90686 IIV4 VACC NO PRSV 0.5 ML IM: CPT | Performed by: FAMILY MEDICINE

## 2022-11-11 PROCEDURE — 99213 OFFICE O/P EST LOW 20 MIN: CPT | Performed by: FAMILY MEDICINE

## 2022-11-11 PROCEDURE — 82962 GLUCOSE BLOOD TEST: CPT | Performed by: FAMILY MEDICINE

## 2022-11-11 PROCEDURE — G8417 CALC BMI ABV UP PARAM F/U: HCPCS | Performed by: FAMILY MEDICINE

## 2022-11-11 PROCEDURE — 1036F TOBACCO NON-USER: CPT | Performed by: FAMILY MEDICINE

## 2022-11-11 PROCEDURE — 3017F COLORECTAL CA SCREEN DOC REV: CPT | Performed by: FAMILY MEDICINE

## 2022-11-11 PROCEDURE — 1123F ACP DISCUSS/DSCN MKR DOCD: CPT | Performed by: FAMILY MEDICINE

## 2022-11-11 PROCEDURE — 83036 HEMOGLOBIN GLYCOSYLATED A1C: CPT | Performed by: FAMILY MEDICINE

## 2022-11-11 PROCEDURE — G8427 DOCREV CUR MEDS BY ELIG CLIN: HCPCS | Performed by: FAMILY MEDICINE

## 2022-11-11 ASSESSMENT — ENCOUNTER SYMPTOMS
BACK PAIN: 1
BLOOD IN STOOL: 0
EYES NEGATIVE: 1
ABDOMINAL PAIN: 0
CONSTIPATION: 0
COUGH: 0
DIARRHEA: 0
CHEST TIGHTNESS: 0
NAUSEA: 0
VOMITING: 0
SHORTNESS OF BREATH: 0

## 2022-11-11 NOTE — PROGRESS NOTES
Date: 11/11/2022  Here with his wife  Michelle Dent is a 69 y.o. male who presents today for:  Chief Complaint   Patient presents with    Diabetes    Hyperlipidemia    Hypertension       Current regimen: oral agent (dual therapy)  Current monitoring regimen: home blood tests - 1  Home blood sugar trends: AM -180 He had two days last week that it went to 65 and 62. Any episodes of hypoglycemia? No  Current exercise: he is active  Compliance with treatment: Good  Eye exam current (within one year): Yes  Any history of foot problems? No  Last foot exam: 2/2022  Cardiovascular risk factors: advanced age (older than 54 for men, 72 for women), diabetes mellitus, dyslipidemia, hypertension, male gender, and obesity (BMI >= 30 kg/m2). Immunizations up to date: Flu Yes   Pneumonia Yes  Taking ASA: No   If not why? HPI:     Hypertension  This is a chronic problem. The current episode started more than 1 year ago. The problem is unchanged. The problem is controlled. Pertinent negatives include no chest pain, headaches, orthopnea, palpitations or shortness of breath. Risk factors for coronary artery disease include diabetes mellitus, dyslipidemia, obesity and male gender. Past treatments include diuretics and angiotensin blockers. The current treatment provides significant improvement. Hyperlipidemia  This is a chronic problem. The current episode started more than 1 year ago. The problem is controlled. Recent lipid tests were reviewed and are normal. Pertinent negatives include no chest pain, focal sensory loss, focal weakness, leg pain, myalgias or shortness of breath. Current antihyperlipidemic treatment includes statins. The current treatment provides significant improvement of lipids. There are no compliance problems. Risk factors for coronary artery disease include diabetes mellitus, dyslipidemia, hypertension, male sex and obesity.      has a current medication list which includes the following prescription(s): cetirizine, trazodone, glimepiride, sertraline, tiotropium, hydrochlorothiazide, ipratropium-albuterol, metformin, albuterol, fluticasone, clopidogrel, buspirone, ALLERGEN EXTRACT, hydrocodone-acetaminophen, losartan, OXYGEN, ropinirole, montelukast, ventolin hfa, atorvastatin, pantoprazole, ranexa, epinephrine, baclofen, and fish oil.     Allergies   Allergen Reactions    Isosorbide Nitrate Other (See Comments)       Social History     Tobacco Use    Smoking status: Former     Packs/day: 2.00     Years: 42.00     Pack years: 84.00     Types: Cigarettes     Start date: 1971     Quit date: 3/3/2017     Years since quittin.6    Smokeless tobacco: Never    Tobacco comments:     pts uses just nicotine vap   Vaping Use    Vaping Use: Every day    Substances: Nicotine   Substance Use Topics    Alcohol use: No     Alcohol/week: 0.0 standard drinks    Drug use: No       Past Medical History:   Diagnosis Date    Acid reflux     Arthritis     Asthma     CAD (coronary artery disease)     Chronic back pain     Class 2 severe obesity due to excess calories with serious comorbidity and body mass index (BMI) of 37.0 to 37.9 in adult [facility]) 2018    Colon polyps     COPD (chronic obstructive pulmonary disease) (HCC)     Depression     panic attacks    Diverticulosis     HTN (hypertension)     Hyperlipidemia     Kidney disorder     Panic attack     Pneumonia     Rash     Recurrent upper respiratory infection (URI)     Thumb amputation status 3/13/14    left tip of thumb amputated    Thyroid disease     Type II or unspecified type diabetes mellitus without mention of complication, not stated as uncontrolled        Past Surgical History:   Procedure Laterality Date    BACK SURGERY  2002    BACK SURGERY  2017    BICEPS TENDON REPAIR Right 2017    BRONCHOSCOPY      BRONCHOSCOPY N/A 2019    BRONCHOSCOPY performed by Dennise Greer MD at [facility]    BRONCHOSCOPY  2019 BRONCHOSCOPY ALVEOLAR LAVAGE performed by Carlos Gaytan MD at [facility]  07/02 08/05    CARPAL TUNNEL RELEASE  2011    right hand    CHOLECYSTECTOMY  yrs ago    COLONOSCOPY  11/06 02/12 1/14    CORONARY ANGIOPLASTY WITH STENT PLACEMENT  02/2020    ENDOSCOPY, COLON, DIAGNOSTIC      EYE SURGERY      foreign body removal    HERNIA REPAIR  2004    Dr Deirdre Mcdonald SURGERY  2008? LARYNGOSCOPY  04/28/2016    Laryngoscopy Micro with Biopsy by Dr Taty Otero  01/07    uppp    ROTATOR CUFF REPAIR Left 5-20-15    SKIN BIOPSY      TONSILLECTOMY  1985    TURP N/A 2/25/2022    CYSTOSCOPY GREENLIGHT PHOTOVAPORIZATION OF THE PROSTATE performed by Ailyn Kaiser MD at [place] [facility]    TURP N/A 5/18/2022    Cystoscopy Transurethral Resection of the Prostate evacuation of bladder stones performed by Ailyn Kaiser MD at [facility] Left September 2014    Dr. Vanessa Farnsworth       Family History   Problem Relation Age of Onset    Cancer Mother     Breast Cancer Mother     Stroke Mother     Heart Disease Brother     Diabetes Brother     High Blood Pressure Brother     High Cholesterol Brother        /74 (Site: Right Upper Arm, Position: Sitting, Cuff Size: Large Adult)   Pulse 72   Resp 16   Ht 5' 8\" (1.727 m)   Wt 233 lb 9.6 oz (106 kg)   BMI 35.52 kg/m²   Wt Readings from Last 3 Encounters:   11/11/22 233 lb 9.6 oz (106 kg)   09/13/22 239 lb (108.4 kg)   09/09/22 231 lb 3.2 oz (104.9 kg)       Subjective:      Review of Systems   Constitutional:  Negative for activity change, appetite change, diaphoresis and fever. HENT: Negative. Eyes: Negative. Respiratory:  Negative for cough, chest tightness and shortness of breath. Cardiovascular:  Negative for chest pain, palpitations, orthopnea and leg swelling.    Gastrointestinal:  Negative for abdominal pain, blood in stool, constipation, diarrhea, nausea and vomiting. Genitourinary: Negative. Musculoskeletal:  Positive for arthralgias and back pain. Negative for myalgias. Skin: Negative. Negative for rash. Neurological: Negative. Negative for dizziness, focal weakness, syncope, weakness, light-headedness and headaches. Psychiatric/Behavioral: Negative. Objective:     Physical Exam  Vitals and nursing note reviewed. Constitutional:       General: He is not in acute distress. Appearance: He is well-developed. He is not diaphoretic. HENT:      Head: Normocephalic and atraumatic. Eyes:      General: No scleral icterus. Right eye: No discharge. Left eye: No discharge. Conjunctiva/sclera: Conjunctivae normal.      Pupils: Pupils are equal, round, and reactive to light. Neck:      Thyroid: No thyromegaly. Vascular: No JVD. Cardiovascular:      Rate and Rhythm: Normal rate and regular rhythm. Heart sounds: Normal heart sounds. No murmur heard. Pulmonary:      Effort: Pulmonary effort is normal. No respiratory distress. Breath sounds: Normal breath sounds. No wheezing, rhonchi or rales. Abdominal:      General: Bowel sounds are normal. There is no distension. Palpations: Abdomen is soft. There is no mass. Tenderness: There is no abdominal tenderness. There is no guarding or rebound. Musculoskeletal:         General: Normal range of motion. Cervical back: Normal range of motion and neck supple. Lymphadenopathy:      Cervical: No cervical adenopathy. Skin:     General: Skin is warm and dry. Findings: No rash. Neurological:      Mental Status: He is alert and oriented to person, place, and time. Psychiatric:         Behavior: Behavior normal.       Assessment:       Diagnosis Orders   1.  Type 2 diabetes mellitus with complication, without long-term current use of insulin (HCC)  POCT glycosylated hemoglobin (Hb A1C)    POCT Glucose    Comprehensive Metabolic Panel    Lipid Panel      2. Essential hypertension        3. Chronic obstructive pulmonary disease, unspecified COPD type ([facility] [address]        4. PARRISH (obstructive sleep apnea)        5. Need for immunization against influenza  Influenza, AFLURIA, (age 1 y+), IM, Preservative Free, 0.5 mL          Plan:      Orders Placed:  Orders Placed This Encounter   Procedures    Influenza, AFLURIA, (age 1 y+), IM, Preservative Free, 0.5 mL    Comprehensive Metabolic Panel     Standing Status:   Future     Standing Expiration Date:   11/11/2023    Lipid Panel     Standing Status:   Future     Standing Expiration Date:   11/11/2023     Order Specific Question:   Is Patient Fasting?/# of Hours     Answer:   yes 12 hours    POCT glycosylated hemoglobin (Hb A1C)    POCT Glucose     MedicationsPrescribed:  No orders of the defined types were placed in this encounter. Lab Results   Component Value Date    LABA1C 6.3 11/11/2022    LABA1C 6.9 05/26/2022    LABA1C 7.0 (A) 02/11/2022     Lab Results   Component Value Date    LABMICR 2.74 11/04/2016     Results for POC orders placed in visit on 11/11/22   POCT glycosylated hemoglobin (Hb A1C)   Result Value Ref Range    Hemoglobin A1C 6.3 %   POCT Glucose   Result Value Ref Range    Glucose 148 mg/dL    QC OK?          Lab Results   Component Value Date    BUN 24 (H) 09/21/2022     Lab Results   Component Value Date    CREATININE 1.3 (H) 09/21/2022     Lab Results   Component Value Date    CHOL 114 03/16/2022     Lab Results   Component Value Date    TRIG 166 03/16/2022     Lab Results   Component Value Date    HDL 37 03/16/2022     Lab Results   Component Value Date    LDLCALC 44 03/16/2022     Lab Results   Component Value Date    VLDL 19 01/05/2021     No results found for: CHOLHDLRATIO      Current Outpatient Medications   Medication Sig Dispense Refill    cetirizine (ZYRTEC) 10 MG tablet TAKE 1 TABLET BY MOUTH DAILY 30 tablet 0    traZODone (DESYREL) 150 MG tablet TAKE 1/2 TO 1 TABLET BY MOUTH EVERY DAY AT BEDTIME AS NEEDED FOR SLEEP 90 tablet 0    glimepiride (AMARYL) 4 MG tablet Take 1 tablet by mouth Daily with supper TAKE 1 TABLET BY MOUTH 90 tablet 0    sertraline (ZOLOFT) 100 MG tablet TAKE 1 TABLET BY MOUTH TWICE DAILY 180 tablet 1    tiotropium (SPIRIVA RESPIMAT) 2.5 MCG/ACT AERS inhaler Inhale 2 puffs into the lungs daily 1 each 5    hydroCHLOROthiazide (MICROZIDE) 12.5 MG capsule Take 12.5 mg by mouth daily      IPRATROPIUM-ALBUTEROL IN Inhale into the lungs Nebulizer      metFORMIN (GLUCOPHAGE) 500 MG tablet TAKE 2 TABLETS BY MOUTH TWICE DAILY WITH MEALS 360 tablet 1    albuterol (PROVENTIL) (2.5 MG/3ML) 0.083% nebulizer solution Take 3 mLs by nebulization every 6 hours as needed for Wheezing or Shortness of Breath 120 each 8    fluticasone (FLONASE) 50 MCG/ACT nasal spray SHAKE LIQUID AND USE 2 SPRAYS IN EACH NOSTRIL DAILY 16 g 11    clopidogrel (PLAVIX) 75 MG tablet Take 1 tablet by mouth daily 30 tablet 3    busPIRone (BUSPAR) 10 MG tablet TAKE 1 TABLET BY MOUTH THREE TIMES DAILY (Patient taking differently: Take 10 mg by mouth 3 times daily Only takes 2 times) 270 tablet 1    ALLERGEN EXTRACT every 14 days      HYDROcodone-acetaminophen (NORCO) 5-325 MG per tablet TAKE 1 TABLET BY MOUTH THREE TIMES DAILY AS NEEDED FOR PAIN      losartan (COZAAR) 50 MG tablet Take 0.5 tablets by mouth daily 30 tablet 3    OXYGEN Inhale 3 L into the lungs as needed      rOPINIRole (REQUIP) 1 MG tablet TAKE 1 TABLET BY MOUTH THREE TIMES DAILY 90 tablet 0    montelukast (SINGULAIR) 10 MG tablet Take 10 mg by mouth nightly      VENTOLIN  (90 Base) MCG/ACT inhaler INHALE 2 PUFFS INTO THE LUNGS EVERY 6 HOURS AS NEEDED FOR WHEEZING 1 each 11    atorvastatin (LIPITOR) 20 MG tablet 20 mg daily       pantoprazole (PROTONIX) 40 MG tablet Take 40 mg by mouth 2 times daily       ranolazine (RANEXA) 500 MG extended release tablet Take 500 mg by mouth 2 times daily       EPINEPHrine (EPIPEN 2-HUBER) 0.3 MG/0.3ML SOAJ injection Inject one pen as directed STAT for allergic reaction, may disp generic NDC 17435-998-28 6 each 99    baclofen (LIORESAL) 10 MG tablet 2 times daily as needed   0    Omega-3 Fatty Acids (FISH OIL) 1000 MG CAPS Take 1,000 mg by mouth daily. No current facility-administered medications for this visit. Orders Placed This Encounter   Procedures    Influenza, AFLURIA, (age 1 y+), IM, Preservative Free, 0.5 mL    Comprehensive Metabolic Panel     Standing Status:   Future     Standing Expiration Date:   11/11/2023    Lipid Panel     Standing Status:   Future     Standing Expiration Date:   11/11/2023     Order Specific Question:   Is Patient Fasting?/# of Hours     Answer:   yes 12 hours    POCT glycosylated hemoglobin (Hb A1C)    POCT Glucose     Lab Results   Component Value Date    LABA1C 6.3 11/11/2022    LABA1C 6.9 05/26/2022    LABA1C 7.0 (A) 02/11/2022     Lab Results   Component Value Date    LABMICR 2.74 11/04/2016     Results for POC orders placed in visit on 11/11/22   POCT glycosylated hemoglobin (Hb A1C)   Result Value Ref Range    Hemoglobin A1C 6.3 %   POCT Glucose   Result Value Ref Range    Glucose 148 mg/dL    QC OK? Continue to monitor blood sugars 1 times a day. Keep log of blood sugars and bring with you to the next appointment. Discussed use, benefit, and side effects of prescribed medications. Allpatient questions answered. Pt voiced understanding. Instructed to continue currentmedications, diet and exercise. Patient agreed with treatment plan. Return in about 4 months (around 3/11/2023), or if symptoms worsen or fail to improve, for DM . Allergies, Problem List, Medications, Medical History, Family History, Surgical History and Tobacco History reviewed by provider.

## 2022-11-11 NOTE — PROGRESS NOTES
Immunizations Administered       Name Date Dose Route    Influenza, FLUARIX, [address], [place] (age 10 mo+) AND AFLURIA, (age 1 y+), PF, 0.5mL 11/11/2022 0.5 mL Intramuscular    Site: Deltoid- Right    Lot: [id number]    NDC: 28982-325-44

## 2022-11-14 RX ORDER — GLIMEPIRIDE 4 MG/1
TABLET ORAL
Qty: 90 TABLET | Refills: 1 | Status: SHIPPED | OUTPATIENT
Start: 2022-11-14

## 2022-11-14 NOTE — TELEPHONE ENCOUNTER
Date of last visit:  11/11/2022  Date of next visit:  3/13/2023    Requested Prescriptions     Pending Prescriptions Disp Refills    glimepiride (AMARYL) 4 MG tablet [Pharmacy Med Name: GLIMEPIRIDE 4MG TABLETS] 90 tablet 0     Sig: TAKE 1 TABLET BY MOUTH DAILY WITH SUPPER

## 2022-11-15 ASSESSMENT — ENCOUNTER SYMPTOMS: ORTHOPNEA: 0

## 2022-11-16 ENCOUNTER — NURSE ONLY (OUTPATIENT)
Dept: ALLERGY | Age: 67
End: 2022-11-16
Payer: MEDICARE

## 2022-11-16 VITALS
OXYGEN SATURATION: 95 % | DIASTOLIC BLOOD PRESSURE: 84 MMHG | RESPIRATION RATE: 16 BRPM | SYSTOLIC BLOOD PRESSURE: 130 MMHG | TEMPERATURE: 98.4 F | HEART RATE: 71 BPM

## 2022-11-16 DIAGNOSIS — Z91.09 MITE ALLERGY: ICD-10-CM

## 2022-11-16 DIAGNOSIS — Z91.09 ENCOUNTER FOR DESENSITIZATION TO POLLEN ALLERGEN: Primary | ICD-10-CM

## 2022-11-16 DIAGNOSIS — Z91.048 ALLERGY TO MOLD: ICD-10-CM

## 2022-11-16 DIAGNOSIS — Z51.6 ENCOUNTER FOR DESENSITIZATION TO POLLEN ALLERGEN: Primary | ICD-10-CM

## 2022-11-16 PROCEDURE — 95117 IMMUNOTHERAPY INJECTIONS: CPT | Performed by: NURSE PRACTITIONER

## 2022-11-17 RX ORDER — BUSPIRONE HYDROCHLORIDE 10 MG/1
TABLET ORAL
Qty: 90 TABLET | Refills: 0 | Status: SHIPPED | OUTPATIENT
Start: 2022-11-17

## 2022-11-17 NOTE — TELEPHONE ENCOUNTER
Date of last visit:  5/26/2022  Date of next visit:  3/13/2023    Requested Prescriptions     Pending Prescriptions Disp Refills    busPIRone (BUSPAR) 10 MG tablet [Pharmacy Med Name: BUSPIRONE 10MG TABLETS] 90 tablet 0     Sig: TAKE 1 TABLET BY MOUTH THREE TIMES DAILY

## 2022-11-22 DIAGNOSIS — Z29.8 IMMUNOTHERAPY: ICD-10-CM

## 2022-11-22 DIAGNOSIS — J30.9 CHRONIC ALLERGIC RHINITIS: Primary | ICD-10-CM

## 2022-11-22 PROCEDURE — 95165 ANTIGEN THERAPY SERVICES: CPT | Performed by: NURSE PRACTITIONER

## 2022-11-22 NOTE — PROGRESS NOTES
ALLERGY EXTRACT MAINTENANCE MIXING RED VIAL ONLY  Pt's current vials were expiring. New vials were mixed. Provider onsite during allergy extract preparation. TOTAL VIALS=  2  TOTAL DOSES PER VIAL = 20  TOTAL ANTICIPATED DOSES PREPARED = 40       An allergy extract maintenance solution was prepared in red vial according to the maintenance prescription. The refrigerator shelf-life for each red vial is 12 months or as indicated on the label. Patient vials were labeled with name, date-of-birth, vial number, concentration, and expiration. When injecting from a new maintenance vial, the first injections should be three doses below the previous maintenance dose. This is done because the refill may be slightly stronger than the vial that was just emptied. (Example: if the maintenance dose is 0.5, start at 0.35 and proceed to 0.4, 0.45, 0.5). The three build up injections may be given weekly until the maintenance dosage is reached again. Then every other week injections may be resumed. Patients on maintenance should be seen by the allergy provider every 6-12 months and as needed. The injection record and serum is reviewed and documented at every injection appointment. When down to the last 1/3 of the bottle, a maintenance refill will be prepared (pending patient is without reactions) for next refill. Patients requesting refills that receive injections at outside medical facilities must include the patient's demographic sheet, current insurance information and the injection records. Allow 2-3 weeks for delivery after the refill has been requested. PROTOCOL FOR LATE INJECTIONS  Days late determined from the due date of the injection    Shot Frequency 5-10 Days Late 11-16 Days Late 17-30 Days Late 31-60 Days Late 61+ Days Late   Twice Weekly Repeat last dose Reduce last dose . 2 Reduce last dose . 4 Dilute back 1 vial, start at .05 Patient must start over     Weekly Repeat last dose Reduce last dose .2 Reduce last dose . 4 Dilute back 1 vial, start at .05 Patient must start over   Every 2 Weeks Repeat last dose Reduce last dose . 2. Reduce last dose . 4 Ask provider for instruction Ask provider for instruction   Every 3 Weeks Repeat last dose Reduce last dose . 2 Reduce last dose . 4 Ask provider for instruction Ask provider for instruction   Every 4 Weeks Repeat last dose Reduce last dose . 2 Reduce last dose . 4 Ask provider for instruction Ask provider for instruction

## 2022-11-28 ENCOUNTER — NURSE ONLY (OUTPATIENT)
Dept: ALLERGY | Age: 67
End: 2022-11-28
Payer: MEDICARE

## 2022-11-28 ENCOUNTER — HOSPITAL ENCOUNTER (OUTPATIENT)
Age: 67
Discharge: HOME | End: 2022-11-28
Payer: MEDICARE

## 2022-11-28 ENCOUNTER — OFFICE VISIT (OUTPATIENT)
Dept: ALLERGY | Age: 67
End: 2022-11-28
Payer: MEDICARE

## 2022-11-28 VITALS
RESPIRATION RATE: 20 BRPM | HEIGHT: 68 IN | DIASTOLIC BLOOD PRESSURE: 76 MMHG | HEART RATE: 76 BPM | WEIGHT: 239 LBS | OXYGEN SATURATION: 96 % | BODY MASS INDEX: 36.22 KG/M2 | SYSTOLIC BLOOD PRESSURE: 132 MMHG

## 2022-11-28 VITALS
RESPIRATION RATE: 20 BRPM | DIASTOLIC BLOOD PRESSURE: 76 MMHG | HEART RATE: 76 BPM | SYSTOLIC BLOOD PRESSURE: 132 MMHG | OXYGEN SATURATION: 96 %

## 2022-11-28 DIAGNOSIS — Z91.048 ALLERGY TO MOLD: ICD-10-CM

## 2022-11-28 DIAGNOSIS — J30.9 CHRONIC ALLERGIC RHINITIS: ICD-10-CM

## 2022-11-28 DIAGNOSIS — Z51.6 ENCOUNTER FOR DESENSITIZATION TO ALLERGENS: ICD-10-CM

## 2022-11-28 DIAGNOSIS — Z91.09 MITE ALLERGY: ICD-10-CM

## 2022-11-28 DIAGNOSIS — J45.40 MODERATE PERSISTENT ASTHMA WITHOUT COMPLICATION: ICD-10-CM

## 2022-11-28 DIAGNOSIS — J30.81 ALLERGY TO DOG DANDER: ICD-10-CM

## 2022-11-28 DIAGNOSIS — J30.1 ALLERGY TO TREES: ICD-10-CM

## 2022-11-28 DIAGNOSIS — R76.8 ELEVATED IGE LEVEL: Primary | ICD-10-CM

## 2022-11-28 DIAGNOSIS — Z91.09 MITE ALLERGY: Primary | ICD-10-CM

## 2022-11-28 DIAGNOSIS — J30.81 CAT ALLERGIES: ICD-10-CM

## 2022-11-28 DIAGNOSIS — Z29.8 IMMUNOTHERAPY: ICD-10-CM

## 2022-11-28 DIAGNOSIS — E11.8 TYPE 2 DIABETES MELLITUS WITH COMPLICATION, WITHOUT LONG-TERM CURRENT USE OF INSULIN (HCC): ICD-10-CM

## 2022-11-28 LAB
ALBUMIN SERPL-MCNC: 4.3 G/DL (ref 3.5–5.1)
ALP BLD-CCNC: 46 U/L (ref 38–126)
ALT SERPL-CCNC: 19 U/L (ref 11–66)
ANION GAP SERPL CALCULATED.3IONS-SCNC: 15 MEQ/L (ref 8–16)
AST SERPL-CCNC: 20 U/L (ref 5–40)
BILIRUB SERPL-MCNC: 0.5 MG/DL (ref 0.3–1.2)
BUN BLDV-MCNC: 19 MG/DL (ref 7–22)
CALCIUM SERPL-MCNC: 9.8 MG/DL (ref 8.5–10.5)
CHLORIDE BLD-SCNC: 101 MEQ/L (ref 98–111)
CHOLESTEROL, TOTAL: 134 MG/DL (ref 100–199)
CO2: 27 MEQ/L (ref 23–33)
CREAT SERPL-MCNC: 1.3 MG/DL (ref 0.4–1.2)
GFR SERPL CREATININE-BSD FRML MDRD: 59 ML/MIN/1.73M2
GLUCOSE BLD-MCNC: 176 MG/DL (ref 70–108)
HDLC SERPL-MCNC: 39 MG/DL
LDL CHOLESTEROL CALCULATED: 67 MG/DL
POTASSIUM SERPL-SCNC: 4.1 MEQ/L (ref 3.5–5.2)
SODIUM BLD-SCNC: 143 MEQ/L (ref 135–145)
TOTAL PROTEIN: 7.6 G/DL (ref 6.1–8)
TRIGL SERPL-MCNC: 142 MG/DL (ref 0–199)

## 2022-11-28 PROCEDURE — 36415 COLL VENOUS BLD VENIPUNCTURE: CPT

## 2022-11-28 PROCEDURE — 99213 OFFICE O/P EST LOW 20 MIN: CPT | Performed by: NURSE PRACTITIONER

## 2022-11-28 PROCEDURE — 3074F SYST BP LT 130 MM HG: CPT | Performed by: NURSE PRACTITIONER

## 2022-11-28 PROCEDURE — G8482 FLU IMMUNIZE ORDER/ADMIN: HCPCS | Performed by: NURSE PRACTITIONER

## 2022-11-28 PROCEDURE — 80053 COMPREHEN METABOLIC PANEL: CPT

## 2022-11-28 PROCEDURE — 95117 IMMUNOTHERAPY INJECTIONS: CPT | Performed by: NURSE PRACTITIONER

## 2022-11-28 PROCEDURE — 1123F ACP DISCUSS/DSCN MKR DOCD: CPT | Performed by: NURSE PRACTITIONER

## 2022-11-28 PROCEDURE — 3017F COLORECTAL CA SCREEN DOC REV: CPT | Performed by: NURSE PRACTITIONER

## 2022-11-28 PROCEDURE — G8417 CALC BMI ABV UP PARAM F/U: HCPCS | Performed by: NURSE PRACTITIONER

## 2022-11-28 PROCEDURE — 80061 LIPID PANEL: CPT

## 2022-11-28 PROCEDURE — 1036F TOBACCO NON-USER: CPT | Performed by: NURSE PRACTITIONER

## 2022-11-28 PROCEDURE — 3078F DIAST BP <80 MM HG: CPT | Performed by: NURSE PRACTITIONER

## 2022-11-28 PROCEDURE — G8427 DOCREV CUR MEDS BY ELIG CLIN: HCPCS | Performed by: NURSE PRACTITIONER

## 2022-11-28 ASSESSMENT — ENCOUNTER SYMPTOMS
VOMITING: 0
SHORTNESS OF BREATH: 1
RHINORRHEA: 0
VOICE CHANGE: 0
CHOKING: 0
COUGH: 0
WHEEZING: 0
APNEA: 0
COLOR CHANGE: 0
EYE REDNESS: 0
SINUS PRESSURE: 0
SORE THROAT: 0
DIARRHEA: 0
CHEST TIGHTNESS: 0
TROUBLE SWALLOWING: 0
STRIDOR: 0
NAUSEA: 0
ABDOMINAL PAIN: 0
FACIAL SWELLING: 0

## 2022-11-28 NOTE — PROGRESS NOTES
[facility]    Allergy & Asthma   [address] [facility], [address]  Ph:   [phone]  Fax:[phone]    Provider:  Dr. Magdaleno Hooks:   Chief Complaint   Patient presents with    Follow-up     Allergy management and allergy injections follow up           HISTORY OF PRESENT ILLNESS: ESTABLISHED PATIENT HERE FOR EVALUATION   70-year-old  male here today for follow-up on allergies. Patient has been on immunotherapy for several years. He reports that his symptoms have improved. He denies any nausea, vomiting, chest pain. Patient still has asthma but reports that according to Dr. Dona Fothergill his asthma has overall improved. Patient is also being seen by cardiology. He states that he is no longer on blood thinner however its not been removed from his medication list.  I will let him follow-up with cardiology to determine if he should be taking that or not. Patient is not on a beta-blocker. He reports he has gotten 90% reduction of symptoms from taking allergy shots and they have been very effective. He has been on every other week allergy shots for about the last year. Severity of allergies are now mild. Does not report any recent upper respiratory infections. He continues to take Zyrtec and montelukast along with his other medications        Review of Systems:  Review of Systems   Constitutional:  Negative for activity change, appetite change, chills, diaphoresis, fatigue, fever and unexpected weight change. HENT:  Negative for congestion, dental problem, ear discharge, ear pain, facial swelling, hearing loss, mouth sores, nosebleeds, postnasal drip, rhinorrhea, sinus pressure, sneezing, sore throat, tinnitus, trouble swallowing and voice change. Eyes:  Negative for redness and visual disturbance. Respiratory:  Positive for shortness of breath. Negative for apnea, cough, choking, chest tightness, wheezing and stridor.     Cardiovascular: Negative for chest pain, palpitations and leg swelling. Gastrointestinal:  Negative for abdominal pain, diarrhea, nausea and vomiting. Endocrine: Negative for cold intolerance, heat intolerance, polydipsia and polyuria. Genitourinary:  Negative for dysuria, enuresis and hematuria. Musculoskeletal:  Negative for arthralgias, gait problem, neck pain and neck stiffness. Skin:  Negative for color change and rash. Allergic/Immunologic: Positive for environmental allergies. Negative for food allergies and immunocompromised state. Neurological:  Negative for dizziness, syncope, facial asymmetry, speech difficulty, light-headedness and headaches. Hematological:  Negative for adenopathy. Does not bruise/bleed easily. Psychiatric/Behavioral:  Negative for confusion and sleep disturbance. The patient is not nervous/anxious. All other systems reviewed and are negative.       Past MedicalHistory:    Past Medical History:   Diagnosis Date    Acid reflux     Arthritis     Asthma     CAD (coronary artery disease)     Chronic back pain     Class 2 severe obesity due to excess calories with serious comorbidity and body mass index (BMI) of 37.0 to 37.9 in adult [facility]) 8/13/2018    Colon polyps 11/06    COPD (chronic obstructive pulmonary disease) (HCC)     Depression     panic attacks    Diverticulosis     HTN (hypertension)     Hyperlipidemia     Kidney disorder     Panic attack     Pneumonia     Rash     Recurrent upper respiratory infection (URI)     Thumb amputation status 3/13/14    left tip of thumb amputated    Thyroid disease     Type II or unspecified type diabetes mellitus without mention of complication, not stated as uncontrolled        Past Surgical History:  Past Surgical History:   Procedure Laterality Date    BACK SURGERY  2002    BACK SURGERY  08/11/2017    BICEPS TENDON REPAIR Right 08/16/2017    BRONCHOSCOPY      BRONCHOSCOPY N/A 4/5/2019    BRONCHOSCOPY performed by Jordi Flynn MD at [facility]    BRONCHOSCOPY  2019    BRONCHOSCOPY ALVEOLAR LAVAGE performed by Deb Disla MD at [facility]      CARPAL TUNNEL RELEASE      right hand    CHOLECYSTECTOMY  yrs ago    COLONOSCOPY      CORONARY ANGIOPLASTY WITH STENT PLACEMENT  2020    ENDOSCOPY, COLON, DIAGNOSTIC      EYE SURGERY      foreign body removal    HERNIA REPAIR      Dr Ángela Tompkins SURGERY  ?     LARYNGOSCOPY  2016    Laryngoscopy Micro with Biopsy by Dr Alpa Orr      uppp    ROTATOR CUFF REPAIR Left 5-20-15    SKIN BIOPSY      TONSILLECTOMY  1985    TURP N/A 2022    CYSTOSCOPY GREENLIGHT PHOTOVAPORIZATION OF THE PROSTATE performed by Christine Canela MD at [facility]    TURP N/A 2022    Cystoscopy Transurethral Resection of the Prostate evacuation of bladder stones performed by Christine [facility]ela MD at [facility] 2014    Dr. Gurmeet Saldana       Family History:   Family History   Problem Relation Age of Onset    Cancer Mother     Breast Cancer Mother     Stroke Mother     Heart Disease Brother     Diabetes Brother     High Blood Pressure Brother     High Cholesterol Brother        Social History:   Social History     Tobacco Use    Smoking status: Former     Packs/day: 2.00     Years: 42.00     Pack years: 84.00     Types: Cigarettes     Start date: 1971     Quit date: 3/3/2017     Years since quittin.7    Smokeless tobacco: Never    Tobacco comments:     pts uses just nicotine vap   Substance Use Topics    Alcohol use: No     Alcohol/week: 0.0 standard drinks        Allergies:  Isosorbide nitrate    CurrentMedications:     Current Outpatient Medications:     busPIRone (BUSPAR) 10 MG tablet, TAKE 1 TABLET BY MOUTH THREE TIMES DAILY, Disp: 90 tablet, Rfl: 0    glimepiride (AMARYL) 4 MG tablet, TAKE 1 TABLET BY MOUTH DAILY WITH SUPPER, Disp: 90 tablet, Rfl: 1    cetirizine (ZYRTEC) 10 MG tablet, TAKE 1 TABLET BY MOUTH DAILY, Disp: 30 tablet, Rfl: 0    traZODone (DESYREL) 150 MG tablet, TAKE 1/2 TO 1 TABLET BY MOUTH EVERY DAY AT BEDTIME AS NEEDED FOR SLEEP, Disp: 90 tablet, Rfl: 0    sertraline (ZOLOFT) 100 MG tablet, TAKE 1 TABLET BY MOUTH TWICE DAILY, Disp: 180 tablet, Rfl: 1    tiotropium (SPIRIVA RESPIMAT) 2.5 MCG/ACT AERS inhaler, Inhale 2 puffs into the lungs daily, Disp: 1 each, Rfl: 5    hydroCHLOROthiazide (MICROZIDE) 12.5 MG capsule, Take 12.5 mg by mouth daily, Disp: , Rfl:     IPRATROPIUM-ALBUTEROL IN, Inhale into the lungs Nebulizer, Disp: , Rfl:     metFORMIN (GLUCOPHAGE) 500 MG tablet, TAKE 2 TABLETS BY MOUTH TWICE DAILY WITH MEALS, Disp: 360 tablet, Rfl: 1    albuterol (PROVENTIL) (2.5 MG/3ML) 0.083% nebulizer solution, Take 3 mLs by nebulization every 6 hours as needed for Wheezing or Shortness of Breath, Disp: 120 each, Rfl: 8    fluticasone (FLONASE) 50 MCG/ACT nasal spray, SHAKE LIQUID AND USE 2 SPRAYS IN EACH NOSTRIL DAILY, Disp: 16 g, Rfl: 11    clopidogrel (PLAVIX) 75 MG tablet, Take 1 tablet by mouth daily, Disp: 30 tablet, Rfl: 3    ALLERGEN EXTRACT, every 30 days, Disp: , Rfl:     HYDROcodone-acetaminophen (NORCO) 5-325 MG per tablet, TAKE 1 TABLET BY MOUTH THREE TIMES DAILY AS NEEDED FOR PAIN, Disp: , Rfl:     losartan (COZAAR) 50 MG tablet, Take 0.5 tablets by mouth daily, Disp: 30 tablet, Rfl: 3    OXYGEN, Inhale 3 L into the lungs as needed, Disp: , Rfl:     rOPINIRole (REQUIP) 1 MG tablet, TAKE 1 TABLET BY MOUTH THREE TIMES DAILY, Disp: 90 tablet, Rfl: 0    montelukast (SINGULAIR) 10 MG tablet, Take 10 mg by mouth nightly, Disp: , Rfl:     VENTOLIN  (90 Base) MCG/ACT inhaler, INHALE 2 PUFFS INTO THE LUNGS EVERY 6 HOURS AS NEEDED FOR WHEEZING, Disp: 1 each, Rfl: 11    atorvastatin (LIPITOR) 20 MG tablet, 20 mg daily , Disp: , Rfl:     pantoprazole (PROTONIX) 40 MG tablet, Take 40 mg by mouth 2 times daily , Disp: , Rfl:     ranolazine (RANEXA) 500 MG extended release tablet, Take 500 mg by mouth 2 times daily , Disp: , Rfl:     EPINEPHrine (EPIPEN 2-HUBER) 0.3 MG/0.3ML SOAJ injection, Inject one pen as directed STAT for allergic reaction, may disp generic NDC 84138-615-28, Disp: 6 each, Rfl: 99    baclofen (LIORESAL) 10 MG tablet, 2 times daily as needed , Disp: , Rfl: 0    Omega-3 Fatty Acids (FISH OIL) 1000 MG CAPS, Take 1,000 mg by mouth daily. , Disp: , Rfl:       Physical Exam:      Vitals:    Vitals:    11/28/22 0858   BP: 132/76   Pulse: 76   Resp: 20   SpO2: 96%       239 lb (108.4 kg)         I @FLOWSTAT(6)@ IHeart Rate: 76 I @FLOWSTAT(8)@ I BP: 132/76 I @BWVZYG(96)@; @MJOCFP(38)@ I Resp: 20 I @FLOWSTAT(9)@ I SpO2: 96 % I @FLOWSTAT(10)@ I   I Height: 5' 8\" (172.7 cm) I   I Facility age limit for growth percentiles is 20 years. I     Facility age limit for growth percentiles is 20 years. Facility age limit for growth percentiles is 20 years. Facility age limit for growth percentiles is 20 years. Facility age limit for growth percentiles is 20 years. Physical Exam:    Physical Exam  Vitals and nursing note reviewed. Constitutional:       Appearance: Normal appearance. He is well-developed. He is obese. HENT:      Head: Normocephalic and atraumatic. Right Ear: Ear canal and external ear normal.      Left Ear: Ear canal and external ear normal.      Nose: Nose normal.      Mouth/Throat:      Mouth: Mucous membranes are moist.      Pharynx: Oropharynx is clear. No oropharyngeal exudate. Eyes:      General: No scleral icterus. Right eye: No discharge. Left eye: No discharge. Extraocular Movements: Extraocular movements intact. Conjunctiva/sclera: Conjunctivae normal.      Pupils: Pupils are equal, round, and reactive to light. Neck:      Thyroid: No thyromegaly. Cardiovascular:      Rate and Rhythm: Normal rate and regular rhythm. Pulses: Normal pulses. Heart sounds: Normal heart sounds. Pulmonary:      Effort: Pulmonary effort is normal. No respiratory distress. Breath sounds: Normal breath sounds. No wheezing or rales. Abdominal:      Palpations: Abdomen is soft. Tenderness: There is no abdominal tenderness. Musculoskeletal:         General: No tenderness. Normal range of motion. Cervical back: Normal range of motion and neck supple. Skin:     General: Skin is warm and dry. Findings: No rash. Neurological:      General: No focal deficit present. Mental Status: He is alert and oriented to person, place, and time. Mental status is at baseline. Deep Tendon Reflexes: Reflexes are normal and symmetric. Psychiatric:         Mood and Affect: Mood normal.         Behavior: Behavior normal.         Thought Content:  Thought content normal.         Judgment: Judgment normal.           DATA:  Lab Review:  CBC:   Lab Results   Component Value Date/Time    WBC 8.6 06/09/2022 09:08 AM    RBC 4.67 06/09/2022 09:08 AM    RBC 4.87 03/01/2012 05:21 AM    HGB 14.4 06/09/2022 09:08 AM    HCT 42.1 06/09/2022 09:08 AM    MCV 90.0 06/09/2022 09:08 AM    MCH 30.8 06/09/2022 09:08 AM    MCHC 34.2 06/09/2022 09:08 AM    RDW 16.7 06/09/2022 09:08 AM     06/09/2022 09:08 AM     BMP:    Lab Results   Component Value Date/Time    GLUCOSE 148 11/11/2022 09:19 AM    GLUCOSE 139 02/07/2022 12:08 PM     09/21/2022 09:01 AM    K 4.1 09/21/2022 09:01 AM    K 4.8 03/22/2022 11:45 AM     09/21/2022 09:01 AM    CO2 26 09/21/2022 09:01 AM    ANIONGAP 11.0 09/21/2022 09:01 AM    BUN 24 09/21/2022 09:01 AM    CREATININE 1.3 09/21/2022 09:01 AM    BUNCRER NOT REPORTED 02/07/2020 10:10 AM    CALCIUM 10.0 09/21/2022 09:01 AM    LABGLOM 55 09/21/2022 09:01 AM    GFRAA >60 02/07/2020 10:10 AM    GFR      02/07/2020 10:10 AM    GFR NOT REPORTED 02/07/2020 10:10 AM          IgE   Date/Time Value Ref Range Status   08/11/2021 10:02  (H) <101 IU/mL Final     Comment:     [facility] [id number] [place] [address], [address] [place] [address] [phone]      IgG   Date/Time Value Ref Range Status   2021 10:02  700 - 1600 mg/dL Final     Comment:     [facility] [id number] [place] [address], [address] [place] [address] [phone]     IgA   Date/Time Value Ref Range Status   2021 10:02  70 - 400 mg/dL Final     Comment:     [facility] [address] [place] [address], [address] [place] [address] [phone]      IgM   Date/Time Value Ref Range Status   2021 10:01 AM 94 40 - 230 mg/dL Final     Comment:     [address], [address] [place] [address] [phone]       No results found for: CLAUDIA   No results found for: RF       No results found for this or any previous visit. No results found for this or any previous visit. All current and previous medial labs have been reviewed and discussed with patient         Procedures: Allergy Testin/10/2019    Assessment/Orders:    Diagnosis Orders   1. Elevated IgE level  IgE      2. Chronic allergic rhinitis        3. Immunotherapy        4. Mite allergy        5. Allergy to mold        6. Allergy to dog dander        7. Cat allergies        8. Moderate persistent asthma without complication        9. Allergy to trees            All diagnostic imaging  have been personally reviewed     Plan:  Follow Up:1 year  Reduce allergy injections to once per month  If he choose can stop allergy injections 2023 - 2024. I have made the recommendation he stopped taking allergy injections during his worst season which most likely will be during the spring 2024. Can take zyrtec only on days of allergy injection  Continue Singulair  He was reminded that he must restart allergy shots within 60 days if he wants to continue to be on them otherwise we will have to start him all over on his dosage. If he goes longer than 60 days he has to start all over.   He and his wife expressed understanding    Spent 25 minutes of face-to-face time with the patient with well more than half of the visit being dedicated to the discussion of the various symptom problems, provided education of medications and disease process, as well as discussion of a therapeutic plan for each. Face-to-face education time does not include any time that may have been spent for procedures.     (Please note that portions of this note may have been completed with a voice recognition program.  Efforts were made to edit the dictation but occasionally words are mis-transcribed.)         Signed:  SANTOSH Reynoso CNP  11/28/2022  9:29 AM

## 2022-12-05 DIAGNOSIS — J30.9 ALLERGIC RHINITIS, UNSPECIFIED SEASONALITY, UNSPECIFIED TRIGGER: ICD-10-CM

## 2022-12-05 RX ORDER — CETIRIZINE HYDROCHLORIDE 10 MG/1
TABLET ORAL
Qty: 90 TABLET | Refills: 3 | Status: SHIPPED | OUTPATIENT
Start: 2022-12-05

## 2022-12-13 NOTE — TELEPHONE ENCOUNTER
Date of last visit:  11/11/2022  Date of next visit:  3/13/2023    Requested Prescriptions     Pending Prescriptions Disp Refills    busPIRone (BUSPAR) 10 MG tablet [Pharmacy Med Name: BUSPIRONE 10MG TABLETS] 90 tablet 0     Sig: TAKE 1 TABLET BY MOUTH THREE TIMES DAILY

## 2022-12-14 RX ORDER — BUSPIRONE HYDROCHLORIDE 10 MG/1
TABLET ORAL
Qty: 90 TABLET | Refills: 2 | Status: SHIPPED | OUTPATIENT
Start: 2022-12-14

## 2022-12-21 ENCOUNTER — NURSE ONLY (OUTPATIENT)
Dept: ALLERGY | Age: 67
End: 2022-12-21
Payer: MEDICARE

## 2022-12-21 VITALS
TEMPERATURE: 97.1 F | DIASTOLIC BLOOD PRESSURE: 80 MMHG | HEART RATE: 83 BPM | SYSTOLIC BLOOD PRESSURE: 130 MMHG | OXYGEN SATURATION: 93 %

## 2022-12-21 DIAGNOSIS — J30.9 ALLERGIC RHINITIS, UNSPECIFIED SEASONALITY, UNSPECIFIED TRIGGER: Primary | ICD-10-CM

## 2022-12-21 DIAGNOSIS — Z91.09 MITE ALLERGY: ICD-10-CM

## 2022-12-21 DIAGNOSIS — Z91.048 ALLERGY TO MOLD: ICD-10-CM

## 2022-12-21 PROCEDURE — 95117 IMMUNOTHERAPY INJECTIONS: CPT | Performed by: NURSE PRACTITIONER

## 2023-01-09 RX ORDER — ROPINIROLE 1 MG/1
TABLET, FILM COATED ORAL
Qty: 90 TABLET | Refills: 1 | Status: SHIPPED | OUTPATIENT
Start: 2023-01-09

## 2023-01-09 NOTE — TELEPHONE ENCOUNTER
Date of last visit:  11/11/2022  Date of next visit:  3/13/2023    Requested Prescriptions     Pending Prescriptions Disp Refills    rOPINIRole (REQUIP) 1 MG tablet [Pharmacy Med Name: ROPINIROLE 1MG TABLETS] 90 tablet 1     Sig: TAKE 1 TABLET BY MOUTH THREE TIMES DAILY

## 2023-01-15 NOTE — PROGRESS NOTES
Medicare Annual Wellness Visit    Darion Amado is here for Medicare AWV and Hypertension    Assessment & Plan   Essential hypertension  Type 2 diabetes mellitus with complication, without long-term current use of insulin ([facility] [address]  Chronic obstructive pulmonary disease, unspecified COPD type ([facility] [address]      Recommendations for Preventive Services Due: see orders and patient instructions/AVS.  Recommended screening schedule for the next 5-10 years is provided to the patient in written form: see Patient Instructions/AVS.     Return in about 4 months (around 11/11/2022), or if symptoms worsen or fail to improve. Patient's complete Health Risk Assessment and screening values have been reviewed and are found in Flowsheets. The following problems were reviewed today and where indicated follow up appointments were made and/or referrals ordered. Positive Risk Factor Screenings with Interventions:    Fall Risk:  Do you feel unsteady or are you worried about falling? : (!) yes  2 or more falls in past year?: (!) yes  Fall with injury in past year?: no     Fall Risk Interventions:    · Home safety tips provided  · he almost fell from a patio chair but his wife helped him.  He has not had injury        Tobacco Use:     Tobacco Use: Medium Risk    Smoking Tobacco Use: Former Smoker    Smokeless Tobacco Use: Never Used     E-cigarette/Vaping     Questions Responses    E-cigarette/Vaping Use Current Every Day User    Start Date     Passive Exposure     Quit Date     Counseling Given     Comments Unknown        Substance Use - Tobacco Interventions:  he quit in 2017           [address] and ACP:  General  In general, how would you say your health is?: Fair  In the past 7 days, have you experienced any of the following: New or Increased Pain, New or Increased Fatigue, Loneliness, Social Isolation, Stress or Anger?: (!) Yes  Select all that apply: (!) Anger  Do you get the social and emotional support that you need?: Yes  Do you have a Living Will?: (!) No    Advance Directives     Power of [address] Will ACP-Advance Directive ACP-Power of     Not on File Not on File Not on File Not on File      General Health Risk Interventions:  · Anger: he states that he gets angry at himself because he can't do what he used to do. · No Living Will: discussed with patient and he is thinking about it. Health Habits/Nutrition:     Physical Activity: Inactive    Days of Exercise per Week: 0 days    Minutes of Exercise per Session: 0 min     Have you lost any weight without trying in the past 3 months?: (!) Yes  Body mass index: (!) 34.6  Have you seen the dentist within the past year?: Appointment is scheduled    Health Habits/Nutrition Interventions:  · he states that since his surgery he lost weight     Safety:  Do you have working smoke detectors?: Yes  Do you have any tripping hazards - loose or unsecured carpets or rugs?: No  Do you have any tripping hazards - clutter in doorways, halls, or stairs?: No  Do you have either shower bars, grab bars, non-slip mats or non-slip surfaces in your shower or bathtub?: (!) No  Do all of your stairways have a railing or banister?: Not Applicable  Do you always fasten your seatbelt when you are in a car?: (!) No    Safety Interventions:  · he states that his shower is not slippery. He does not wear a setbelt because he does not like to be told what to do. He [place] it [place] the highway.      ADLs:  In the past 7 days, did you need help from others to perform any of the following everyday activities: Eating, dressing, grooming, bathing, toileting, or walking/balance?: No  In the past 7 days, did you need help from others to take care of any of the following: Laundry, housekeeping, banking/finances, shopping, telephone use, food preparation, transportation, or taking medications?: (!) Yes  Select all that apply: (!) Banking/Finances,Transportation,Taking Medications    ADL Interventions:  · his wife helps him. Objective   Vitals:    07/11/22 0943   BP: 132/80   Site: Left Upper Arm   Position: Sitting   Cuff Size: Large Adult   Pulse: 80   Resp: 16   Weight: 227 lb 9.6 oz (103.2 kg)   Height: 5' 8\" (1.727 m)      Body mass index is 34.61 kg/m². No Known Allergies  Prior to Visit Medications    Medication Sig Taking?  Authorizing Provider   clopidogrel (PLAVIX) 75 MG tablet Take 1 tablet by mouth daily Yes SANTOSH Wilkersno CNP   busPIRone (BUSPAR) 10 MG tablet TAKE 1 TABLET BY MOUTH THREE TIMES DAILY  Patient taking differently: Take 10 mg by mouth 3 times daily Only takes 2 times Yes Alejandra French MD   ALLERGEN EXTRACT every 14 days Yes Arnette Spurling, APRN - CNP   HYDROcodone-acetaminophen ([place]) 5-325 MG per tablet TAKE 1 TABLET BY MOUTH THREE TIMES DAILY AS NEEDED FOR PAIN Yes Historical Provider, MD   ondansetron (ZOFRAN) 4 MG tablet Take 1 tablet by mouth every 8 hours as needed for Nausea or Vomiting Yes Caroline Tsai MD   nystatin (MYCOSTATIN) 657601 UNIT/GM cream Apply topically 2 times daily after cleaning it with soap and water air dry Yes Alejandra French MD   losartan (COZAAR) 50 MG tablet Take 0.5 tablets by mouth daily Yes Alejandra French MD   cetirizine (ZYRTEC) 10 MG tablet TAKE 1 TABLET BY MOUTH DAILY Yes Historical Provider, MD   Budeson-Glycopyrrol-Formoterol 160-9-4.8 MCG/ACT AERO 2 puff twice a day Yes Alejandra French MD   ipratropium-albuterol (DUONEB) 0.5-2.5 (3) MG/3ML SOLN nebulizer solution Inhale 3 mLs into the lungs every 6 hours as needed for Shortness of Breath Yes Alejandra French MD   OXYGEN Inhale 3 L into the lungs as needed Yes Historical Provider, MD   metFORMIN (GLUCOPHAGE) 500 MG tablet TAKE 2 TABLETS BY MOUTH TWICE DAILY WITH MEALS Yes Alejandra French MD   rOPINIRole (REQUIP) 1 MG tablet TAKE 1 TABLET BY MOUTH THREE TIMES DAILY Yes Can Damon MD   traZODone (DESYREL) 150 MG tablet TAKE 1/2 TO 1 TABLET BY MOUTH EVERY DAY AT BEDTIME AS NEEDED FOR SLEEP Yes Alonzo Vargas MD   glimepiride (AMARYL) 4 MG tablet TAKE 1 TABLET BY MOUTH TWICE DAILY  Patient taking differently: Take 4 mg by mouth Daily with supper TAKE 1 TABLET BY MOUTH Yes Delgado Del Rio MD   montelukast (SINGULAIR) 10 MG tablet Take 10 mg by mouth nightly Yes Historical Provider, MD   sertraline (ZOLOFT) 100 MG tablet TAKE 1 TABLET BY MOUTH TWICE DAILY Yes Hema Barrientos MD   fluticasone (FLONASE) 50 MCG/ACT nasal spray SHAKE LIQUID AND USE 1 SPRAY IN EACH NOSTRIL DAILY Yes SANTOSH Ibarra CNP   VENTOLIN  (90 Base) MCG/ACT inhaler INHALE 2 PUFFS INTO THE LUNGS EVERY 6 HOURS AS NEEDED FOR WHEEZING Yes Kayla Phillip MD   atorvastatin (LIPITOR) 20 MG tablet 20 mg daily  Yes Historical Provider, MD   pantoprazole (PROTONIX) 40 MG tablet Take 40 mg by mouth 2 times daily  Yes Historical Provider, MD   ranolazine (RANEXA) 500 MG extended release tablet Take 500 mg by mouth 2 times daily  Yes Historical Provider, MD   EPINEPHrine (EPIPEN 2-HUBER) 0.3 MG/0.3ML SOAJ injection Inject one pen as directed STAT for allergic reaction, may disp generic NDC 82414-797-03 Yes SANTOSH Ibarra CNP   baclofen (LIORESAL) 10 MG tablet 2 times daily as needed  Yes Historical Provider, MD   Omega-3 Fatty Acids (FISH OIL) 1000 MG CAPS Take 1,000 mg by mouth daily.    Yes Historical Provider, MD Sepulveda (Including outside providers/suppliers regularly involved in providing care):   Patient Care Team:  Alonzo Vargas MD as PCP - General (Family Medicine)  Alonzo Vargas MD as PCP - [facility] Empaneled Provider  Pa Tellez RN as Nurse Navigator     Reviewed and updated this visit:  Tobacco  Allergies  Meds  Problems  Med Hx  Surg Hx  Soc Hx  Fam Hx                  Advance Care Planning   Advanced Care Planning: Discussed the patients choices for care and treatment in case of a health event that adversely affects decision-making abilities. Also discussed the patients long-term treatment options. Reviewed with the patient the appropriate state-specific advance directive documents. Reviewed the process of designating a competent adult as an Agent (or -in-fact) that could take make health care decisions for the patient if incompetent. Patient was asked to complete the declaration forms, either acknowledge the forms by a public notary or an eligible witness and provide a signed copy to the practice office. Cardiovascular Disease Risk Counseling: Assessed the patient's risk to develop cardiovascular disease and reviewed main risk factors. Reviewed steps to reduce disease risk including:   · Quitting tobacco use, reducing amount smoked, or not starting the habit  · Making healthy food choices  · Being physically active and gradualy increasing activity levels   · Reduce weight and determine a healthy BMI goal  · Monitor blood pressure and treat if higher than 140/90 mmHg  · Maintain blood total cholesterol levels under 5 mmol/l or 190 mg/dl  · Maintain LDL cholesterol levels under 3.0 mmol/l or 115 mg/dl   · Control blood glucose levels    Provided a follow up plan. Date: 7/11/2022    Irene Saldana is a 69 y.o. male who presents today for:  Chief Complaint   Patient presents with    Medicare AWV    Hypertension stable       HPI:     HPI    has a current medication list which includes the following prescription(s): clopidogrel, buspirone, ALLERGEN EXTRACT, hydrocodone-acetaminophen, ondansetron, nystatin, losartan, cetirizine, budeson-glycopyrrol-formoterol, ipratropium-albuterol, OXYGEN, metformin, ropinirole, trazodone, glimepiride, montelukast, sertraline, fluticasone, ventolin hfa, atorvastatin, pantoprazole, ranexa, epinephrine, baclofen, and fish oil.     No Known Allergies    Social History     Tobacco Use    Smoking status: Former Smoker     Packs/day: 2.00     Years: 42.00 Pack years: 84.00     Types: Cigarettes     Start date: 1971     Quit date: 3/3/2017     Years since quittin.3    Smokeless tobacco: Never Used    Tobacco comment: pts uses just nicotine vap   Vaping Use    Vaping Use: Every day    Substances: Nicotine   Substance Use Topics    Alcohol use: No     Alcohol/week: 0.0 standard drinks    Drug use: No       Past Medical History:   Diagnosis Date    Acid reflux     Arthritis     Asthma     CAD (coronary artery disease)     Chronic back pain     Class 2 severe obesity due to excess calories with serious comorbidity and body mass index (BMI) of 37.0 to 37.9 in adult [facility]) 2018    Colon polyps     COPD (chronic obstructive pulmonary disease) ([facility] [address]     Depression     panic attacks    Diverticulosis     HTN (hypertension)     Hyperlipidemia     Kidney disorder     Panic attack     Pneumonia     Rash     Recurrent upper respiratory infection (URI)     Thumb amputation status 3/13/14    left tip of thumb amputated    Thyroid disease     Type II or unspecified type diabetes mellitus without mention of complication, not stated as uncontrolled        Past Surgical History:   Procedure Laterality Date    BACK SURGERY  2002    BACK SURGERY  2017    BICEPS TENDON REPAIR Right 2017    BRONCHOSCOPY      BRONCHOSCOPY N/A 2019    BRONCHOSCOPY performed by Mansi Ramsey MD at [address] [place] [address]  2019    BRONCHOSCOPY ALVEOLAR LAVAGE performed by Mansi Ramsey MD at [facility]      CARPAL TUNNEL RELEASE  2011    right hand    CHOLECYSTECTOMY  yrs ago    COLONOSCOPY      CORONARY ANGIOPLASTY WITH STENT PLACEMENT  2020    ENDOSCOPY, COLON, DIAGNOSTIC      EYE SURGERY      foreign body removal    HERNIA REPAIR      Dr Hui Read  ?     LARYNGOSCOPY  2016    Laryngoscopy Micro with Biopsy by Dr Yandel Lou  01/07    uppp    ROTATOR CUFF REPAIR Left 5-20-15    SKIN BIOPSY      TONSILLECTOMY  1985    TURP N/A 2/25/2022    CYSTOSCOPY GREENLIGHT PHOTOVAPORIZATION OF THE PROSTATE performed by Shavon Mills MD at [address] [place] [address] TURP N/A 5/18/2022    Cystoscopy Transurethral Resection of the Prostate evacuation of bladder stones performed by Shavon Mills MD at [place] [address] Left September 2014    Dr. Danae Conde       Family History   Problem Relation Age of Onset    Cancer Mother     Breast Cancer Mother     Stroke Mother     Heart Disease Brother     Diabetes Brother     High Blood Pressure Brother     High Cholesterol Brother      Subjective:     Review of Systems   Constitutional: Negative for activity change, appetite change, diaphoresis and fever. HENT: Negative. Eyes: Negative. Respiratory: Negative for cough, chest tightness and shortness of breath. Cardiovascular: Negative for chest pain, palpitations and leg swelling. Gastrointestinal: Negative for abdominal pain, blood in stool, constipation, diarrhea, nausea and vomiting. Genitourinary: Positive for dysuria and urgency. He states that he finally stopped bleeding   Musculoskeletal: Positive for arthralgias and back pain. Skin: Negative. Negative for rash. Neurological: Positive for dizziness. Negative for syncope, weakness, light-headedness and headaches. Psychiatric/Behavioral: Negative.        :   /80 (Site: Left Upper Arm, Position: Sitting, Cuff Size: Large Adult)   Pulse 80   Resp 16   Ht 5' 8\" (1.727 m)   Wt 227 lb 9.6 oz (103.2 kg)   BMI 34.61 kg/m²   Wt Readings from Last 3 Encounters:   07/11/22 227 lb 9.6 oz (103.2 kg)   06/08/22 221 lb 3.2 oz (100.3 kg)   05/26/22 226 lb 6.4 oz (102.7 kg)     Physical Exam  Vitals and nursing note reviewed.    Constitutional:       General: He is not in acute distress. Appearance: He is well-developed. He is not diaphoretic. HENT:      Head: Normocephalic and atraumatic. Eyes:      General: No scleral icterus. Right eye: No discharge. Left eye: No discharge. Conjunctiva/sclera: Conjunctivae normal.      Pupils: Pupils are equal, round, and reactive to light. Neck:      Thyroid: No thyromegaly. Vascular: No JVD. Cardiovascular:      Rate and Rhythm: Normal rate and regular rhythm. Heart sounds: Normal heart sounds. No murmur heard. Pulmonary:      Effort: Pulmonary effort is normal. No respiratory distress. Breath sounds: Normal breath sounds. No wheezing, rhonchi or rales. Abdominal:      General: Bowel sounds are normal. There is no distension. Palpations: Abdomen is soft. There is no mass. Tenderness: There is no abdominal tenderness. There is no guarding or rebound. Musculoskeletal:         General: Normal range of motion. Cervical back: Normal range of motion and neck supple. Lymphadenopathy:      Cervical: No cervical adenopathy. Skin:     General: Skin is warm and dry. Findings: No rash. Neurological:      Mental Status: He is alert and oriented to person, place, and time. Psychiatric:         Behavior: Behavior normal.       :       Diagnosis Orders   1. Essential hypertension     2. Type 2 diabetes mellitus with complication, without long-term current use of insulin ([facility] [address]     3.  Chronic obstructive pulmonary disease, unspecified COPD type ([facility] [address]         :      Requested Prescriptions      No prescriptions requested or ordered in this encounter     Current Outpatient Medications   Medication Sig Dispense Refill    clopidogrel (PLAVIX) 75 MG tablet Take 1 tablet by mouth daily 30 tablet 3    busPIRone (BUSPAR) 10 MG tablet TAKE 1 TABLET BY MOUTH THREE TIMES DAILY (Patient taking differently: Take 10 mg by mouth 3 times daily Only takes 2 times) 270 tablet 1    ALLERGEN EXTRACT every 14 days      HYDROcodone-acetaminophen (NORCO) 5-325 MG per tablet TAKE 1 TABLET BY MOUTH THREE TIMES DAILY AS NEEDED FOR PAIN      ondansetron (ZOFRAN) 4 MG tablet Take 1 tablet by mouth every 8 hours as needed for Nausea or Vomiting 30 tablet 1    nystatin (MYCOSTATIN) 668013 UNIT/GM cream Apply topically 2 times daily after cleaning it with soap and water air dry 30 g 0    losartan (COZAAR) 50 MG tablet Take 0.5 tablets by mouth daily 30 tablet 3    cetirizine (ZYRTEC) 10 MG tablet TAKE 1 TABLET BY MOUTH DAILY      Budeson-Glycopyrrol-Formoterol 160-9-4.8 MCG/ACT AERO 2 puff twice a day 2 each 0    ipratropium-albuterol (DUONEB) 0.5-2.5 (3) MG/3ML SOLN nebulizer solution Inhale 3 mLs into the lungs every 6 hours as needed for Shortness of Breath 30 mL 1    OXYGEN Inhale 3 L into the lungs as needed      metFORMIN (GLUCOPHAGE) 500 MG tablet TAKE 2 TABLETS BY MOUTH TWICE DAILY WITH MEALS 360 tablet 1    rOPINIRole (REQUIP) 1 MG tablet TAKE 1 TABLET BY MOUTH THREE TIMES DAILY 90 tablet 0    traZODone (DESYREL) 150 MG tablet TAKE 1/2 TO 1 TABLET BY MOUTH EVERY DAY AT BEDTIME AS NEEDED FOR SLEEP 90 tablet 0    glimepiride (AMARYL) 4 MG tablet TAKE 1 TABLET BY MOUTH TWICE DAILY (Patient taking differently: Take 4 mg by mouth Daily with supper TAKE 1 TABLET BY MOUTH) 180 tablet 0    montelukast (SINGULAIR) 10 MG tablet Take 10 mg by mouth nightly      sertraline (ZOLOFT) 100 MG tablet TAKE 1 TABLET BY MOUTH TWICE DAILY 180 tablet 1    fluticasone (FLONASE) 50 MCG/ACT nasal spray SHAKE LIQUID AND USE 1 SPRAY IN EACH NOSTRIL DAILY 16 g 11    VENTOLIN  (90 Base) MCG/ACT inhaler INHALE 2 PUFFS INTO THE LUNGS EVERY 6 HOURS AS NEEDED FOR WHEEZING 1 each 11    atorvastatin (LIPITOR) 20 MG tablet 20 mg daily       pantoprazole (PROTONIX) 40 MG tablet Take 40 mg by mouth 2 times daily       ranolazine (RANEXA) 500 MG extended release tablet Take 500 mg by mouth 2 times daily       EPINEPHrine (EPIPEN 2-HUBER) 0.3 MG/0.3ML SOAJ injection Inject one pen as directed STAT for allergic reaction, may disp generic NDC 89947-861-65 6 each 99    baclofen (LIORESAL) 10 MG tablet 2 times daily as needed   0    Omega-3 Fatty Acids (FISH OIL) 1000 MG CAPS Take 1,000 mg by mouth daily. No current facility-administered medications for this visit. No orders of the defined types were placed in this encounter. Continue current medications. Return in about 4 months (around 11/11/2022), or if symptoms worsen or fail to improve. Discussed use, benefit, and side effects of prescribed medications. All patient questions answered. Pt voiced understanding. Instructed to continue current medications,diet and exercise. Patient agreed with treatment plan. 41.7

## 2023-02-01 ENCOUNTER — NURSE ONLY (OUTPATIENT)
Dept: ALLERGY | Age: 68
End: 2023-02-01
Payer: MEDICARE

## 2023-02-01 VITALS
DIASTOLIC BLOOD PRESSURE: 80 MMHG | SYSTOLIC BLOOD PRESSURE: 128 MMHG | OXYGEN SATURATION: 92 % | HEART RATE: 86 BPM | RESPIRATION RATE: 20 BRPM

## 2023-02-01 DIAGNOSIS — Z51.6 ENCOUNTER FOR DESENSITIZATION TO ALLERGENS: Primary | ICD-10-CM

## 2023-02-01 DIAGNOSIS — Z91.048 ALLERGY TO MOLD: ICD-10-CM

## 2023-02-01 DIAGNOSIS — Z91.09 MITE ALLERGY: ICD-10-CM

## 2023-02-01 PROCEDURE — 95117 IMMUNOTHERAPY INJECTIONS: CPT | Performed by: NURSE PRACTITIONER

## 2023-02-01 NOTE — PROGRESS NOTES
After consent obtained/verified, allergy injection given in back of R/L arm(s). VIAL COLOR OF ALL VIALS TODAY IS red    ALLERGY INJECTION FROM VIAL A GIVEN left  UPPER ARM IN THE AMOUNT OF 0.40 ML    ALLERGY INJECTION FROM VIAL B GIVEN right upper ARM IN THE AMOUNT OF 0.40 ML        Documentation of vial injection specific to arm(s) noted on Allergy Immunotherapy Administration Form. Patient waited 30 minutes for observation. No      Patient tolerated well without adverse reaction WHILE IN OFFICE    SHOT REACTION TREATMENT INSTRUCTIONS    During the 30 minute wait after an allergy injection the following symptoms should be reported:    Itching other than at the injection site  Hives or swelling other than at the injection site  Redness other than at the injection site  Difficulty breathing  Chest tightness  Difficulty swallowing  Throat tightness    If these symptoms occur, NOTIFY PROVIDER and the following treatment should be administered:    1. Epinephrine/Auvi Q 1:1000 IM - 0.3 ml if > 66 lbs or more, 0.15 ml if 33 - 63 lbs, or 0.1 ml if <33 lbs     2. Diphenhydramine - give all intramuscular:     2 to <6 years (off-label use): 6.25 mg,    6 to <12 years: 12.5 to 25 mg;    ?12 years: 25-50 mg.    3.  Famotidine:  Adults 40 mg oral    Adolescents age 12 years and >88 lbs: 40 mg    Children and Adolescents ? 12years of age: Initial: 0.25 mg/kg/dose  every 12 hours (maximum daily dose: 40 mg/day)    Epi/Auvi Q dose may me repeated in 5-15 minutes if adequate resolution of symptoms does not occur    Patient should be observed for at least one hour after final Epi/Auvi Q dose and must be seen by provider. Patients cannot drive themselves if they have received diphenhydramine.

## 2023-02-09 NOTE — TELEPHONE ENCOUNTER
The pharmacy is requesting a refill of the below medication which has been pended for you:     Requested Prescriptions     Pending Prescriptions Disp Refills    traZODone (DESYREL) 150 MG tablet [Pharmacy Med Name: TRAZODONE 150MG (HUNDRED-FIFTY) TAB] 90 tablet 1     Sig: TAKE 1/2 TO 1 TABLET BY MOUTH EVERY DAY AT BEDTIME AS NEEDED FOR SLEEP       Last Appointment Date: 11/11/2022  Next Appointment Date: 3/13/2023    Allergies   Allergen Reactions    Isosorbide Nitrate Other (See Comments)

## 2023-02-10 RX ORDER — TRAZODONE HYDROCHLORIDE 150 MG/1
TABLET ORAL
Qty: 90 TABLET | Refills: 1 | Status: SHIPPED | OUTPATIENT
Start: 2023-02-10

## 2023-03-01 ENCOUNTER — NURSE ONLY (OUTPATIENT)
Dept: ALLERGY | Age: 68
End: 2023-03-01
Payer: MEDICARE

## 2023-03-01 VITALS
HEART RATE: 79 BPM | OXYGEN SATURATION: 91 % | DIASTOLIC BLOOD PRESSURE: 74 MMHG | SYSTOLIC BLOOD PRESSURE: 128 MMHG | RESPIRATION RATE: 16 BRPM | TEMPERATURE: 97.8 F

## 2023-03-01 DIAGNOSIS — Z91.048 ALLERGY TO MOLD: ICD-10-CM

## 2023-03-01 DIAGNOSIS — Z91.09 MITE ALLERGY: ICD-10-CM

## 2023-03-01 DIAGNOSIS — Z51.6 ENCOUNTER FOR DESENSITIZATION TO ALLERGENS: Primary | ICD-10-CM

## 2023-03-01 PROCEDURE — 95117 IMMUNOTHERAPY INJECTIONS: CPT | Performed by: NURSE PRACTITIONER

## 2023-03-01 NOTE — PROGRESS NOTES
After consent obtained/verified, allergy injection given in back of R/L arm(s). VIAL COLOR OF ALL VIALS TODAY IS red    ALLERGY INJECTION FROM VIAL A GIVEN right  UPPER ARM IN THE AMOUNT OF 0.45 ML    ALLERGY INJECTION FROM VIAL B GIVEN left upper ARM IN THE AMOUNT OF 0.45 ML        Documentation of vial injection specific to arm(s) noted on Allergy Immunotherapy Administration Form. Patient waited 30 minutes for observation. No      Patient tolerated well without adverse reaction WHILE IN OFFICE    SHOT REACTION TREATMENT INSTRUCTIONS    During the 30 minute wait after an allergy injection the following symptoms should be reported:    Itching other than at the injection site  Hives or swelling other than at the injection site  Redness other than at the injection site  Difficulty breathing  Chest tightness  Difficulty swallowing  Throat tightness    If these symptoms occur, NOTIFY PROVIDER and the following treatment should be administered:    1. Epinephrine/Auvi Q 1:1000 IM - 0.3 ml if > 66 lbs or more, 0.15 ml if 33 - 63 lbs, or 0.1 ml if <33 lbs     2. Diphenhydramine - give all intramuscular:     2 to <6 years (off-label use): 6.25 mg,    6 to <12 years: 12.5 to 25 mg;    ?12 years: 25-50 mg.    3.  Famotidine:  Adults 40 mg oral    Adolescents age 12 years and >88 lbs: 40 mg    Children and Adolescents ? 12years of age: Initial: 0.25 mg/kg/dose  every 12 hours (maximum daily dose: 40 mg/day)    Epi/Auvi Q dose may me repeated in 5-15 minutes if adequate resolution of symptoms does not occur    Patient should be observed for at least one hour after final Epi/Auvi Q dose and must be seen by provider. Patients cannot drive themselves if they have received diphenhydramine.

## 2023-03-22 ENCOUNTER — OFFICE VISIT (OUTPATIENT)
Dept: FAMILY MEDICINE CLINIC | Age: 68
End: 2023-03-22

## 2023-03-22 VITALS
HEART RATE: 72 BPM | BODY MASS INDEX: 36.49 KG/M2 | HEIGHT: 68 IN | WEIGHT: 240.8 LBS | RESPIRATION RATE: 12 BRPM | DIASTOLIC BLOOD PRESSURE: 76 MMHG | SYSTOLIC BLOOD PRESSURE: 128 MMHG

## 2023-03-22 DIAGNOSIS — E11.8 TYPE 2 DIABETES MELLITUS WITH COMPLICATION, WITHOUT LONG-TERM CURRENT USE OF INSULIN (HCC): Primary | ICD-10-CM

## 2023-03-22 DIAGNOSIS — G25.81 RESTLESS LEG SYNDROME: ICD-10-CM

## 2023-03-22 DIAGNOSIS — I50.22 CHRONIC SYSTOLIC (CONGESTIVE) HEART FAILURE (HCC): ICD-10-CM

## 2023-03-22 DIAGNOSIS — E66.01 SEVERE OBESITY (BMI 35.0-39.9) WITH COMORBIDITY (HCC): ICD-10-CM

## 2023-03-22 DIAGNOSIS — D69.6 THROMBOCYTOPENIA (HCC): ICD-10-CM

## 2023-03-22 DIAGNOSIS — J40 BRONCHITIS: ICD-10-CM

## 2023-03-22 DIAGNOSIS — I10 ESSENTIAL HYPERTENSION: ICD-10-CM

## 2023-03-22 DIAGNOSIS — J44.9 CHRONIC OBSTRUCTIVE PULMONARY DISEASE, UNSPECIFIED COPD TYPE (HCC): ICD-10-CM

## 2023-03-22 LAB
CHP ED QC CHECK: ABNORMAL
GLUCOSE BLD-MCNC: 222 MG/DL
HBA1C MFR BLD: 6.8 %

## 2023-03-22 PROCEDURE — 99214 OFFICE O/P EST MOD 30 MIN: CPT | Performed by: FAMILY MEDICINE

## 2023-03-22 PROCEDURE — 3017F COLORECTAL CA SCREEN DOC REV: CPT | Performed by: FAMILY MEDICINE

## 2023-03-22 PROCEDURE — 82962 GLUCOSE BLOOD TEST: CPT | Performed by: FAMILY MEDICINE

## 2023-03-22 PROCEDURE — G8427 DOCREV CUR MEDS BY ELIG CLIN: HCPCS | Performed by: FAMILY MEDICINE

## 2023-03-22 PROCEDURE — 1123F ACP DISCUSS/DSCN MKR DOCD: CPT | Performed by: FAMILY MEDICINE

## 2023-03-22 PROCEDURE — 83036 HEMOGLOBIN GLYCOSYLATED A1C: CPT | Performed by: FAMILY MEDICINE

## 2023-03-22 PROCEDURE — G8417 CALC BMI ABV UP PARAM F/U: HCPCS | Performed by: FAMILY MEDICINE

## 2023-03-22 PROCEDURE — 1036F TOBACCO NON-USER: CPT | Performed by: FAMILY MEDICINE

## 2023-03-22 RX ORDER — AMOXICILLIN AND CLAVULANATE POTASSIUM 875; 125 MG/1; MG/1
1 TABLET, FILM COATED ORAL 2 TIMES DAILY
Qty: 20 TABLET | Refills: 0 | Status: SHIPPED | OUTPATIENT
Start: 2023-03-22 | End: 2023-04-01

## 2023-03-22 RX ORDER — GLIMEPIRIDE 4 MG/1
4 TABLET ORAL
Qty: 90 TABLET | Refills: 1 | Status: SHIPPED | OUTPATIENT
Start: 2023-03-22

## 2023-03-22 RX ORDER — ROPINIROLE 1 MG/1
1 TABLET, FILM COATED ORAL 3 TIMES DAILY
Qty: 90 TABLET | Refills: 1 | Status: SHIPPED | OUTPATIENT
Start: 2023-03-22

## 2023-03-22 RX ORDER — ROPINIROLE 1 MG/1
TABLET, FILM COATED ORAL
Qty: 270 TABLET | Refills: 1 | OUTPATIENT
Start: 2023-03-22

## 2023-03-22 SDOH — ECONOMIC STABILITY: FOOD INSECURITY: WITHIN THE PAST 12 MONTHS, YOU WORRIED THAT YOUR FOOD WOULD RUN OUT BEFORE YOU GOT MONEY TO BUY MORE.: NEVER TRUE

## 2023-03-22 SDOH — ECONOMIC STABILITY: FOOD INSECURITY: WITHIN THE PAST 12 MONTHS, THE FOOD YOU BOUGHT JUST DIDN'T LAST AND YOU DIDN'T HAVE MONEY TO GET MORE.: NEVER TRUE

## 2023-03-22 SDOH — ECONOMIC STABILITY: INCOME INSECURITY: HOW HARD IS IT FOR YOU TO PAY FOR THE VERY BASICS LIKE FOOD, HOUSING, MEDICAL CARE, AND HEATING?: NOT HARD AT ALL

## 2023-03-22 SDOH — ECONOMIC STABILITY: HOUSING INSECURITY
IN THE LAST 12 MONTHS, WAS THERE A TIME WHEN YOU DID NOT HAVE A STEADY PLACE TO SLEEP OR SLEPT IN A SHELTER (INCLUDING NOW)?: NO

## 2023-03-22 ASSESSMENT — ENCOUNTER SYMPTOMS
RHINORRHEA: 0
DIARRHEA: 0
ORTHOPNEA: 0
BLOOD IN STOOL: 0
NAUSEA: 0
VOMITING: 0
SORE THROAT: 0
CHEST TIGHTNESS: 0
SINUS PAIN: 0
ABDOMINAL PAIN: 0
BLURRED VISION: 0
BACK PAIN: 1
COUGH: 1
WHEEZING: 1
EYES NEGATIVE: 1
SINUS PRESSURE: 0
CONSTIPATION: 0
SHORTNESS OF BREATH: 0

## 2023-03-22 ASSESSMENT — PATIENT HEALTH QUESTIONNAIRE - PHQ9
SUM OF ALL RESPONSES TO PHQ QUESTIONS 1-9: 0
4. FEELING TIRED OR HAVING LITTLE ENERGY: 0
10. IF YOU CHECKED OFF ANY PROBLEMS, HOW DIFFICULT HAVE THESE PROBLEMS MADE IT FOR YOU TO DO YOUR WORK, TAKE CARE OF THINGS AT HOME, OR GET ALONG WITH OTHER PEOPLE: 0
SUM OF ALL RESPONSES TO PHQ QUESTIONS 1-9: 0
SUM OF ALL RESPONSES TO PHQ QUESTIONS 1-9: 0
3. TROUBLE FALLING OR STAYING ASLEEP: 0
2. FEELING DOWN, DEPRESSED OR HOPELESS: 0
SUM OF ALL RESPONSES TO PHQ QUESTIONS 1-9: 0
7. TROUBLE CONCENTRATING ON THINGS, SUCH AS READING THE NEWSPAPER OR WATCHING TELEVISION: 0
5. POOR APPETITE OR OVEREATING: 0
8. MOVING OR SPEAKING SO SLOWLY THAT OTHER PEOPLE COULD HAVE NOTICED. OR THE OPPOSITE, BEING SO FIGETY OR RESTLESS THAT YOU HAVE BEEN MOVING AROUND A LOT MORE THAN USUAL: 0
SUM OF ALL RESPONSES TO PHQ9 QUESTIONS 1 & 2: 0
9. THOUGHTS THAT YOU WOULD BE BETTER OFF DEAD, OR OF HURTING YOURSELF: 0
1. LITTLE INTEREST OR PLEASURE IN DOING THINGS: 0
6. FEELING BAD ABOUT YOURSELF - OR THAT YOU ARE A FAILURE OR HAVE LET YOURSELF OR YOUR FAMILY DOWN: 0

## 2023-03-22 NOTE — PROGRESS NOTES
35.0-39. 9) with comorbidity ([facility] [address]            Plan:      Orders Placed:  Orders Placed This Encounter   Procedures    POCT glycosylated hemoglobin (Hb A1C)    POCT Glucose    HM DIABETES FOOT EXAM     MedicationsPrescribed:  Orders Placed This Encounter   Medications    metFORMIN (GLUCOPHAGE) 500 MG tablet     Sig: Take 2 tablets by mouth 2 times daily (with meals)     Dispense:  360 tablet     Refill:  1     ZERO refills remain on this prescription. Your patient is requesting advance approval of refills for this medication to PREVENT ANY MISSED DOSES    glimepiride (AMARYL) 4 MG tablet     Sig: Take 1 tablet by mouth Daily with supper     Dispense:  90 tablet     Refill:  1     ZERO refills remain on this prescription. Your patient is requesting advance approval of refills for this medication to [employer]    rOPINIRole (REQUIP) 1 MG tablet     Sig: Take 1 tablet by mouth 3 times daily     Dispense:  90 tablet     Refill:  1    amoxicillin-clavulanate (AUGMENTIN) 875-125 MG per tablet     Sig: Take 1 tablet by mouth 2 times daily for 10 days     Dispense:  20 tablet     Refill:  0           Lab Results   Component Value Date    LABA1C 6.8 03/22/2023    LABA1C 6.3 11/11/2022    LABA1C 6.9 05/26/2022     Lab Results   Component Value Date    LABMICR 2.74 11/04/2016     Results for POC orders placed in visit on 03/22/23   POCT glycosylated hemoglobin (Hb A1C)   Result Value Ref Range    Hemoglobin A1C 6.8 %   POCT Glucose   Result Value Ref Range    Glucose 222 mg/dL    QC OK?          Lab Results   Component Value Date    BUN 19 11/28/2022     Lab Results   Component Value Date    CREATININE 1.3 (H) 11/28/2022     Lab Results   Component Value Date    CHOL 107 01/17/2023     Lab Results   Component Value Date    TRIG 84 01/17/2023     Lab Results   Component Value Date    HDL 42 01/17/2023     Lab Results   Component Value Date    LDLCALC 67 11/28/2022     Lab Results   Component Value Date    VLDL 16

## 2023-04-05 ENCOUNTER — NURSE ONLY (OUTPATIENT)
Dept: ALLERGY | Age: 68
End: 2023-04-05

## 2023-04-05 VITALS
HEART RATE: 86 BPM | SYSTOLIC BLOOD PRESSURE: 130 MMHG | RESPIRATION RATE: 20 BRPM | OXYGEN SATURATION: 92 % | DIASTOLIC BLOOD PRESSURE: 78 MMHG

## 2023-04-05 DIAGNOSIS — Z51.6 ENCOUNTER FOR DESENSITIZATION TO ALLERGENS: Primary | ICD-10-CM

## 2023-04-05 DIAGNOSIS — Z91.09 MITE ALLERGY: ICD-10-CM

## 2023-04-05 DIAGNOSIS — Z91.048 ALLERGY TO MOLD: ICD-10-CM

## 2023-04-06 ENCOUNTER — TELEPHONE (OUTPATIENT)
Dept: FAMILY MEDICINE CLINIC | Age: 68
End: 2023-04-06

## 2023-04-06 NOTE — TELEPHONE ENCOUNTER
Pts wife called in pt is still not feeling well. Dr Rajendra Villarreal told pt to call back after taking the antibiotic and if this didn't work she would send in a steroid for the pt.        [facility]

## 2023-04-07 RX ORDER — PREDNISONE 10 MG/1
TABLET ORAL
Qty: 16 TABLET | Refills: 0 | Status: SHIPPED | OUTPATIENT
Start: 2023-04-07

## 2023-04-21 RX ORDER — BUSPIRONE HYDROCHLORIDE 10 MG/1
TABLET ORAL
Qty: 90 TABLET | Refills: 0 | Status: SHIPPED | OUTPATIENT
Start: 2023-04-21 | End: 2023-05-17

## 2023-05-03 ENCOUNTER — NURSE ONLY (OUTPATIENT)
Dept: ALLERGY | Age: 68
End: 2023-05-03

## 2023-05-03 VITALS
OXYGEN SATURATION: 92 % | HEART RATE: 90 BPM | SYSTOLIC BLOOD PRESSURE: 126 MMHG | DIASTOLIC BLOOD PRESSURE: 76 MMHG | RESPIRATION RATE: 20 BRPM

## 2023-05-03 DIAGNOSIS — Z51.6 ENCOUNTER FOR DESENSITIZATION TO ALLERGENS: Primary | ICD-10-CM

## 2023-05-03 DIAGNOSIS — Z91.09 MITE ALLERGY: ICD-10-CM

## 2023-05-03 DIAGNOSIS — Z91.048 ALLERGY TO MOLD: ICD-10-CM

## 2023-05-13 DIAGNOSIS — J34.3 HYPERTROPHY OF INFERIOR NASAL TURBINATE: ICD-10-CM

## 2023-05-13 DIAGNOSIS — J30.9 ALLERGIC RHINITIS, UNSPECIFIED SEASONALITY, UNSPECIFIED TRIGGER: ICD-10-CM

## 2023-05-15 RX ORDER — SERTRALINE HYDROCHLORIDE 100 MG/1
TABLET, FILM COATED ORAL
Qty: 180 TABLET | Refills: 0 | Status: SHIPPED | OUTPATIENT
Start: 2023-05-15

## 2023-05-15 RX ORDER — FLUTICASONE PROPIONATE 50 MCG
SPRAY, SUSPENSION (ML) NASAL
Qty: 16 G | Refills: 11 | Status: SHIPPED | OUTPATIENT
Start: 2023-05-15

## 2023-05-17 RX ORDER — BUSPIRONE HYDROCHLORIDE 10 MG/1
TABLET ORAL
Qty: 90 TABLET | Refills: 0 | Status: SHIPPED | OUTPATIENT
Start: 2023-05-17

## 2023-05-17 NOTE — TELEPHONE ENCOUNTER
The pharmacy is  requesting a refill of the below medication which has been pended for you:     Requested Prescriptions     Pending Prescriptions Disp Refills    busPIRone (BUSPAR) 10 MG tablet [Pharmacy Med Name: BUSPIRONE 10MG TABLETS] 90 tablet 0     Sig: TAKE 1 TABLET BY MOUTH THREE TIMES DAILY       Last Appointment Date: 3/22/2023  Next Appointment Date: 7/24/2023    Allergies   Allergen Reactions    Isosorbide Nitrate Other (See Comments)

## 2023-05-25 RX ORDER — TIOTROPIUM BROMIDE INHALATION SPRAY 3.12 UG/1
SPRAY, METERED RESPIRATORY (INHALATION)
Qty: 4 G | Refills: 1 | Status: SHIPPED | OUTPATIENT
Start: 2023-05-25

## 2023-05-25 NOTE — TELEPHONE ENCOUNTER
The pharmacy is requesting a refill of the below medication which has been pended for you:     Requested Prescriptions     Pending Prescriptions Disp Refills    SPIRIVA RESPIMAT 2.5 MCG/ACT AERS inhaler [Pharmacy Med Name: Beatriz Baldwin 2.5MCG INH 4GM 60D] 4 g      Sig: INHALE 2 PUFFS INTO THE LUNGS DAILY       Last Appointment Date: 3/22/2023  Next Appointment Date: 7/24/2023    Allergies   Allergen Reactions    Isosorbide Nitrate Other (See Comments)

## 2023-06-07 ENCOUNTER — NURSE ONLY (OUTPATIENT)
Dept: ALLERGY | Age: 68
End: 2023-06-07
Payer: MEDICARE

## 2023-06-07 VITALS — HEART RATE: 74 BPM | OXYGEN SATURATION: 89 % | SYSTOLIC BLOOD PRESSURE: 128 MMHG | DIASTOLIC BLOOD PRESSURE: 68 MMHG

## 2023-06-07 DIAGNOSIS — Z91.09 MITE ALLERGY: ICD-10-CM

## 2023-06-07 DIAGNOSIS — Z91.048 ALLERGY TO MOLD: ICD-10-CM

## 2023-06-07 DIAGNOSIS — Z51.6 ENCOUNTER FOR DESENSITIZATION TO ALLERGENS: Primary | ICD-10-CM

## 2023-06-07 PROCEDURE — 95117 IMMUNOTHERAPY INJECTIONS: CPT | Performed by: NURSE PRACTITIONER

## 2023-06-28 ENCOUNTER — NURSE ONLY (OUTPATIENT)
Dept: ALLERGY | Age: 68
End: 2023-06-28
Payer: MEDICARE

## 2023-06-28 DIAGNOSIS — Z91.09 MITE ALLERGY: ICD-10-CM

## 2023-06-28 DIAGNOSIS — Z51.6 ENCOUNTER FOR DESENSITIZATION TO ALLERGENS: Primary | ICD-10-CM

## 2023-06-28 DIAGNOSIS — Z91.048 ALLERGY TO MOLD: ICD-10-CM

## 2023-06-28 PROCEDURE — 95117 IMMUNOTHERAPY INJECTIONS: CPT | Performed by: NURSE PRACTITIONER

## 2023-07-13 NOTE — TELEPHONE ENCOUNTER
Date of last visit:  3/22/2023  Date of next visit:  7/24/2023    Requested Prescriptions     Pending Prescriptions Disp Refills    tiotropium (SPIRIVA RESPIMAT) 2.5 MCG/ACT AERS inhaler 4 g 1     Sig: Inhale 2 puffs into the lungs daily

## 2023-08-07 ENCOUNTER — OFFICE VISIT (OUTPATIENT)
Dept: FAMILY MEDICINE CLINIC | Age: 68
End: 2023-08-07

## 2023-08-07 VITALS
RESPIRATION RATE: 14 BRPM | BODY MASS INDEX: 35.5 KG/M2 | SYSTOLIC BLOOD PRESSURE: 132 MMHG | HEIGHT: 68 IN | WEIGHT: 234.25 LBS | HEART RATE: 76 BPM | DIASTOLIC BLOOD PRESSURE: 62 MMHG

## 2023-08-07 DIAGNOSIS — E11.8 TYPE 2 DIABETES MELLITUS WITH COMPLICATION, WITHOUT LONG-TERM CURRENT USE OF INSULIN (HCC): Primary | ICD-10-CM

## 2023-08-07 DIAGNOSIS — J44.9 CHRONIC OBSTRUCTIVE PULMONARY DISEASE, UNSPECIFIED COPD TYPE (HCC): ICD-10-CM

## 2023-08-07 DIAGNOSIS — F41.9 ANXIETY: ICD-10-CM

## 2023-08-07 DIAGNOSIS — I10 ESSENTIAL HYPERTENSION: ICD-10-CM

## 2023-08-07 LAB
CHP ED QC CHECK: ABNORMAL
GLUCOSE BLD-MCNC: 220 MG/DL
HBA1C MFR BLD: 8.6 %

## 2023-08-07 PROCEDURE — 1036F TOBACCO NON-USER: CPT | Performed by: FAMILY MEDICINE

## 2023-08-07 PROCEDURE — 3017F COLORECTAL CA SCREEN DOC REV: CPT | Performed by: FAMILY MEDICINE

## 2023-08-07 PROCEDURE — G8417 CALC BMI ABV UP PARAM F/U: HCPCS | Performed by: FAMILY MEDICINE

## 2023-08-07 PROCEDURE — G8427 DOCREV CUR MEDS BY ELIG CLIN: HCPCS | Performed by: FAMILY MEDICINE

## 2023-08-07 PROCEDURE — 82962 GLUCOSE BLOOD TEST: CPT | Performed by: FAMILY MEDICINE

## 2023-08-07 PROCEDURE — 99213 OFFICE O/P EST LOW 20 MIN: CPT | Performed by: FAMILY MEDICINE

## 2023-08-07 PROCEDURE — 1123F ACP DISCUSS/DSCN MKR DOCD: CPT | Performed by: FAMILY MEDICINE

## 2023-08-07 PROCEDURE — 83036 HEMOGLOBIN GLYCOSYLATED A1C: CPT | Performed by: FAMILY MEDICINE

## 2023-08-07 RX ORDER — SERTRALINE HYDROCHLORIDE 100 MG/1
100 TABLET, FILM COATED ORAL 2 TIMES DAILY
Qty: 180 TABLET | Refills: 0 | Status: SHIPPED | OUTPATIENT
Start: 2023-08-07

## 2023-08-07 RX ORDER — ASPIRIN 81 MG/1
81 TABLET ORAL DAILY
COMMUNITY

## 2023-08-07 RX ORDER — METOPROLOL SUCCINATE 25 MG/1
25 TABLET, EXTENDED RELEASE ORAL DAILY
COMMUNITY

## 2023-08-07 ASSESSMENT — ENCOUNTER SYMPTOMS
ABDOMINAL PAIN: 0
NAUSEA: 0
COUGH: 0
VOMITING: 0
DIARRHEA: 0
EYES NEGATIVE: 1
CONSTIPATION: 0
CHEST TIGHTNESS: 0
SHORTNESS OF BREATH: 0
BLOOD IN STOOL: 0
BACK PAIN: 1

## 2023-08-07 NOTE — PROGRESS NOTES
Date: 8/7/2023  Here with his wife. Elisabeth Wyatt is a 77 y.o. male who presents today for:  Chief Complaint   Patient presents with    Follow-up    Diabetes    Cholesterol Problem       Current regimen: oral agent (dual therapy)  Current monitoring regimen: home blood tests - 1  Home blood sugar trends: AM -150's  Any episodes of hypoglycemia? No  Current exercise: he is active  Compliance with treatment: Good  Eye exam current (within one year): Yes  Any history of foot problems? No  Last foot exam: 3/23  Cardiovascular risk factors: advanced age (older than 54 for men, 72 for women), diabetes mellitus, dyslipidemia, hypertension, male gender, and obesity (BMI >= 30 kg/m2). Immunizations up to date: Flu Yes   Pneumonia Yes      HPI:     Diabetes  He presents for his follow-up diabetic visit. He has type 2 diabetes mellitus. His disease course has been stable. There are no hypoglycemic associated symptoms. Pertinent negatives for hypoglycemia include no dizziness or headaches. There are no diabetic associated symptoms. Pertinent negatives for diabetes include no chest pain and no weakness. There are no hypoglycemic complications. Symptoms are stable. Risk factors for coronary artery disease include diabetes mellitus, hypertension, male sex and obesity. Current diabetic treatment includes oral agent (dual therapy). He is compliant with treatment most of the time. His weight is stable. Hypertension  This is a chronic problem. The current episode started more than 1 year ago. The problem is unchanged. The problem is controlled. Pertinent negatives include no chest pain, headaches, palpitations or shortness of breath. Risk factors for coronary artery disease include diabetes mellitus, male gender and obesity. Past treatments include beta blockers and angiotensin blockers. The current treatment provides significant improvement. There are no compliance problems. Hypertensive end-organ damage includes CAD/MI.

## 2023-08-09 RX ORDER — BUSPIRONE HYDROCHLORIDE 10 MG/1
TABLET ORAL
Qty: 90 TABLET | Refills: 1 | Status: SHIPPED | OUTPATIENT
Start: 2023-08-09

## 2023-08-09 NOTE — TELEPHONE ENCOUNTER
The pharmacy is  requesting a refill of the below medication which has been pended for you:     Requested Prescriptions     Pending Prescriptions Disp Refills    busPIRone (BUSPAR) 10 MG tablet [Pharmacy Med Name: BUSPIRONE 10MG TABLETS] 90 tablet 1     Sig: TAKE 1 TABLET BY MOUTH THREE TIMES DAILY       Last Appointment Date: 8/7/2023  Next Appointment Date: 11/13/2023    Allergies   Allergen Reactions    Isosorbide Nitrate Other (See Comments)

## 2023-08-12 DIAGNOSIS — G25.81 RESTLESS LEG SYNDROME: ICD-10-CM

## 2023-08-14 DIAGNOSIS — G25.81 RESTLESS LEG SYNDROME: ICD-10-CM

## 2023-08-14 RX ORDER — ROPINIROLE 1 MG/1
TABLET, FILM COATED ORAL
Qty: 90 TABLET | Refills: 1 | Status: SHIPPED | OUTPATIENT
Start: 2023-08-14

## 2023-08-14 RX ORDER — ROPINIROLE 1 MG/1
TABLET, FILM COATED ORAL
Qty: 270 TABLET | OUTPATIENT
Start: 2023-08-14

## 2023-08-14 NOTE — TELEPHONE ENCOUNTER
Date of last visit:  8/7/2023  Date of next visit:  11/13/2023    Requested Prescriptions     Pending Prescriptions Disp Refills    rOPINIRole (REQUIP) 1 MG tablet [Pharmacy Med Name: ROPINIROLE 1MG TABLETS] 270 tablet      Sig: TAKE 1 TABLET BY MOUTH THREE TIMES DAILY

## 2023-08-14 NOTE — TELEPHONE ENCOUNTER
The pharmacy is requesting a refill of the below medication which has been pended for you:     Requested Prescriptions     Pending Prescriptions Disp Refills    rOPINIRole (REQUIP) 1 MG tablet [Pharmacy Med Name: ROPINIROLE 1MG TABLETS] 90 tablet 1     Sig: TAKE 1 TABLET BY MOUTH THREE TIMES DAILY       Last Appointment Date: 8/7/2023  Next Appointment Date: 11/13/2023    Allergies   Allergen Reactions    Isosorbide Nitrate Other (See Comments)

## 2023-08-15 NOTE — TELEPHONE ENCOUNTER
The pharmacy is requesting a refill of the below medication which has been pended for you:     Requested Prescriptions     Pending Prescriptions Disp Refills    traZODone (DESYREL) 150 MG tablet [Pharmacy Med Name: TRAZODONE 150MG (HUNDRED-FIFTY) TAB] 90 tablet 1     Sig: TAKE 1/2 TO 1 TABLET BY MOUTH EVERY DAY AT BEDTIME AS NEEDED FOR SLEEP       Last Appointment Date: 8/7/2023  Next Appointment Date: 11/13/2023    Allergies   Allergen Reactions    Isosorbide Nitrate Other (See Comments)

## 2023-08-16 RX ORDER — TRAZODONE HYDROCHLORIDE 150 MG/1
TABLET ORAL
Qty: 90 TABLET | Refills: 1 | Status: SHIPPED | OUTPATIENT
Start: 2023-08-16

## 2023-08-20 NOTE — PROGRESS NOTES
[facility]             Pulmonary medicine clinic follow up note. Patient: Candelario Flores  : 1955      Lung Nodule Screening     [x] Qualifies    [] Does not qualify   [] Declined    [x] Completed    Chief complaint and [place]:  Candelario Flores is a 77 y. o.oldmale came for follow up regarding his severe persistent Asthma and moderate COPD after having a recommended spirometry and low-dose CT scan of chest without contrast.    He used to be on treatment with Dartha Livers Aerosphere 160/9/4.8mcg spray MDI, 2 puffs via inhalation BID in the past.  It was initially prescribed by his family physician Dr. Kunal Ruth MD.      He is Reddy Remedies was discontinued by Seina Zarate MD.  He was started on Spiriva Respimat 2 puffs ventilation daily    He is currently following with the Ms. Hazel Fernandez [facility] allergy immunology service. He is receiving allergy injections along with immunotherapy for his allergies. On today's questioning:  He denies cough or expectoration. He denies hemoptysis. He denies fever or chills. He denies recent hospitalizations or emergency room visits. He is using his prescribed inhalers with good compliance. He is using rescue inhaler/nebs rarely. He denies recent loss of weight or appetite changes. He denies recent decline in functional status. He underwent stent placement by Dr. Mariangel Tilley at [facility] in . He is using 2 LPM of home O2 with exercise with no home O2 at night time. He is currently on following inhalers  Fay Respimat 2 puffs via INH daily. Albuterol HFA 2 puffs every 6 hourly as needed  Albuterol 2.5 mg by nebulization every 6 hourly as needed. Flonase 50mcg/INH 2sprays each nostril daily. He used to be on treatment with Singulair 10mg 1 tablet at bed time daily. Patient refused to continue Singulair due to his history of depression.   He is

## 2023-08-30 ENCOUNTER — NURSE ONLY (OUTPATIENT)
Dept: ALLERGY | Age: 68
End: 2023-08-30
Payer: MEDICARE

## 2023-08-30 VITALS — SYSTOLIC BLOOD PRESSURE: 136 MMHG | DIASTOLIC BLOOD PRESSURE: 84 MMHG | HEART RATE: 83 BPM

## 2023-08-30 DIAGNOSIS — Z91.048 ALLERGY TO MOLD: ICD-10-CM

## 2023-08-30 DIAGNOSIS — Z51.6 ENCOUNTER FOR DESENSITIZATION TO ALLERGENS: Primary | ICD-10-CM

## 2023-08-30 DIAGNOSIS — Z91.09 MITE ALLERGY: ICD-10-CM

## 2023-08-30 PROCEDURE — 95117 IMMUNOTHERAPY INJECTIONS: CPT | Performed by: NURSE PRACTITIONER

## 2023-09-05 ENCOUNTER — TELEPHONE (OUTPATIENT)
Dept: PULMONOLOGY | Age: 68
End: 2023-09-05

## 2023-09-05 DIAGNOSIS — J44.9 MODERATE COPD (CHRONIC OBSTRUCTIVE PULMONARY DISEASE) (HCC): Primary | ICD-10-CM

## 2023-09-05 NOTE — TELEPHONE ENCOUNTER
Received a called from [facility] stating pt has a gabby on  and the order is going to . If you can please  put a new order in. Thank you.

## 2023-09-06 ENCOUNTER — TELEMEDICINE (OUTPATIENT)
Dept: FAMILY MEDICINE CLINIC | Age: 68
End: 2023-09-06

## 2023-09-06 DIAGNOSIS — U07.1 COVID-19 VIRUS INFECTION: Primary | ICD-10-CM

## 2023-09-06 ASSESSMENT — ENCOUNTER SYMPTOMS
EYES NEGATIVE: 1
NAUSEA: 0
ABDOMINAL PAIN: 0
SORE THROAT: 1
CONSTIPATION: 0
VOMITING: 0
WHEEZING: 0
CHEST TIGHTNESS: 0
BLOOD IN STOOL: 0
COUGH: 1
DIARRHEA: 1
RHINORRHEA: 1
SHORTNESS OF BREATH: 1

## 2023-09-06 NOTE — PROGRESS NOTES
Yony agreed to Video Chat/Exam in presence of  and myself. Verified who was present in room with Yony. Yony informed the e-mail address used to Face Time cannot be used to contact the Provider, if they are any questions or concerns they need to call the office directly. Yony stated understanding.
time he had one was this AM.). Negative for activity change and diaphoresis. HENT:  Positive for rhinorrhea and sore throat. He does not have a much of a sense of taste. His sense of smell is ok. Eyes: Negative. Respiratory:  Positive for cough (with a little p[hlegm up occasionally green to brown.) and shortness of breath. Negative for chest tightness and wheezing. Cardiovascular:  Negative for chest pain, palpitations and leg swelling. Gastrointestinal:  Positive for diarrhea (chronic.). Negative for abdominal pain, blood in stool, constipation, nausea and vomiting. Genitourinary: Negative. Musculoskeletal: Negative. Skin: Negative. Negative for rash. Neurological: Negative. Negative for dizziness, syncope, weakness, light-headedness and headaches. Psychiatric/Behavioral: Negative. Pulse ox per patient now was 92%     Objective   Patient-Reported Vitals  No data recorded     Physical Exam  [INSTRUCTIONS:  \"[x]\" Indicates a positive item  \"[]\" Indicates a negative item  -- DELETE ALL ITEMS NOT EXAMINED]    Constitutional: [x] Appears well-developed and well-nourished      He is wearing oxygen by nasal cannula, in no apparent distress.     Mental status: [x] Alert and awake  [x] Oriented to person/place/time [x] Able to follow commands    [] Abnormal -     Eyes:   EOM    [x]  Normal    [] Abnormal -   Sclera  [x]  Normal    [] Abnormal -          Discharge [x]  None visible   [] Abnormal -     HENT: [x] Normocephalic, atraumatic  [] Abnormal -   [x] Mouth/Throat: Mucous membranes are moist    External Ears [x] Normal  [] Abnormal -    Neck: [x] No visualized mass [] Abnormal -     Pulmonary/Chest: [x] Respiratory effort normal   [x] No visualized signs of difficulty breathing or respiratory distress        [] Abnormal -      Musculoskeletal:   [x] Normal gait with no signs of ataxia         [x] Normal range of motion of neck        [] Abnormal -     Neurological:        [x]

## 2023-09-13 DIAGNOSIS — E11.8 TYPE 2 DIABETES MELLITUS WITH COMPLICATION, WITHOUT LONG-TERM CURRENT USE OF INSULIN (HCC): ICD-10-CM

## 2023-09-13 RX ORDER — GLIMEPIRIDE 4 MG/1
4 TABLET ORAL
Qty: 90 TABLET | Refills: 1 | Status: SHIPPED | OUTPATIENT
Start: 2023-09-13

## 2023-09-13 NOTE — TELEPHONE ENCOUNTER
Date of last visit:  9/6/2023  Date of next visit:  11/13/2023    Requested Prescriptions     Pending Prescriptions Disp Refills    metFORMIN (GLUCOPHAGE) 500 MG tablet [Pharmacy Med Name: METFORMIN 500MG TABLETS] 360 tablet 1     Sig: TAKE 2 TABLETS BY MOUTH TWICE DAILY WITH MEALS    glimepiride (AMARYL) 4 MG tablet [Pharmacy Med Name: GLIMEPIRIDE 4MG TABLETS] 90 tablet 1     Sig: TAKE 1 TABLET BY MOUTH DAILY WITH SUPPER

## 2023-09-15 ENCOUNTER — TELEMEDICINE (OUTPATIENT)
Dept: FAMILY MEDICINE CLINIC | Age: 68
End: 2023-09-15

## 2023-09-15 DIAGNOSIS — J44.9 CHRONIC OBSTRUCTIVE PULMONARY DISEASE, UNSPECIFIED COPD TYPE (HCC): ICD-10-CM

## 2023-09-15 DIAGNOSIS — U07.1 COVID-19 VIRUS INFECTION: Primary | ICD-10-CM

## 2023-09-15 RX ORDER — PREDNISONE 10 MG/1
TABLET ORAL
Qty: 16 TABLET | Refills: 0 | Status: SHIPPED | OUTPATIENT
Start: 2023-09-15

## 2023-09-15 RX ORDER — CEFUROXIME AXETIL 500 MG/1
500 TABLET ORAL 2 TIMES DAILY
Qty: 20 TABLET | Refills: 0 | Status: SHIPPED | OUTPATIENT
Start: 2023-09-15 | End: 2023-09-25

## 2023-09-15 ASSESSMENT — ENCOUNTER SYMPTOMS
EYES NEGATIVE: 1
COUGH: 1
BLOOD IN STOOL: 0
SHORTNESS OF BREATH: 1
DIARRHEA: 0
SORE THROAT: 0
ABDOMINAL PAIN: 0
WHEEZING: 1
VOMITING: 0
CONSTIPATION: 0
NAUSEA: 0
CHEST TIGHTNESS: 0

## 2023-09-15 NOTE — PROGRESS NOTES
Yony agreed to Video Chat/Exam in presence of  and myself. Verified who was present in room with Yony. Yony informed the e-mail address used to [employer] cannot be used to contact the Provider, if they are any questions or concerns they need to call the office directly. Yony stated understanding.

## 2023-09-15 NOTE — PROGRESS NOTES
Yony Borden, was evaluated through a synchronous (real-time) audio-video encounter. The patient (or guardian if applicable) is aware that this is a billable service, which includes applicable co-pays. This Virtual Visit was conducted with patient's (and/or legal guardian's) consent. Patient identification was verified, and a caregiver was present when appropriate. The patient was located at Home: [address] [facility] [address]  Provider was located at [facility] (Appt Dept): [address],  [address]      Brandt Muller (:  1955) is a Established patient, presenting virtually for evaluation of the following:    Assessment & Plan   Below is the assessment and plan developed based on review of pertinent history, physical exam, labs, studies, and medications. 1. COVID-19 virus infection  -     predniSONE (DELTASONE) 10 MG tablet; 2 tablets 2 times a day for 2 days, then 1 Tablet 2 times a day for 2 days, then 1 tablet daily till gone, Disp-16 tablet, R-0Normal  -     XR CHEST (2 VW); Future  -     cefUROXime (CEFTIN) 500 MG tablet; Take 1 tablet by mouth 2 times daily for 10 days, Disp-20 tablet, R-0Normal  2. Chronic obstructive pulmonary disease, unspecified COPD type (HCC)  -     cefUROXime (CEFTIN) 500 MG tablet; Take 1 tablet by mouth 2 times daily for 10 days, Disp-20 tablet, R-0Normal    Return if symptoms worsen or fail to improve. Subjective   Cough  The problem has been waxing and waning. Associated symptoms include shortness of breath and wheezing. Pertinent negatives include no chest pain, fever, headaches, rash or sore throat. The symptoms are aggravated by exercise. Wheezing   Associated symptoms include coughing (productive of green brown sputum.) and shortness of breath. Pertinent negatives include no abdominal pain, chest pain, diarrhea, fever, headaches, rash, sore throat or vomiting.      Review of Systems   Constitutional:  Negative for activity change, appetite change,

## 2023-09-17 ENCOUNTER — HOSPITAL ENCOUNTER (OUTPATIENT)
Age: 68
Discharge: HOME | End: 2023-09-17
Payer: MEDICARE

## 2023-09-17 ENCOUNTER — HOSPITAL ENCOUNTER (OUTPATIENT)
Dept: GENERAL RADIOLOGY | Age: 68
Discharge: HOME | End: 2023-09-17
Payer: MEDICARE

## 2023-09-17 DIAGNOSIS — U07.1 COVID-19 VIRUS INFECTION: ICD-10-CM

## 2023-09-17 PROCEDURE — 71046 X-RAY EXAM CHEST 2 VIEWS: CPT

## 2023-09-19 ENCOUNTER — TELEPHONE (OUTPATIENT)
Dept: FAMILY MEDICINE CLINIC | Age: 68
End: 2023-09-19

## 2023-09-19 NOTE — TELEPHONE ENCOUNTER
----- Message from Esther Izaguirre MD sent at 9/19/2023  8:12 AM EDT -----  Please let him know that his chest X-ray did not show pneumonia. Did show his chronic lung changes but nothing acute.

## 2023-10-02 ENCOUNTER — NURSE ONLY (OUTPATIENT)
Dept: ALLERGY | Age: 68
End: 2023-10-02
Payer: MEDICARE

## 2023-10-02 VITALS — SYSTOLIC BLOOD PRESSURE: 110 MMHG | OXYGEN SATURATION: 93 % | DIASTOLIC BLOOD PRESSURE: 70 MMHG | HEART RATE: 60 BPM

## 2023-10-02 DIAGNOSIS — Z91.09 MITE ALLERGY: ICD-10-CM

## 2023-10-02 DIAGNOSIS — Z51.6 ENCOUNTER FOR DESENSITIZATION TO ALLERGENS: Primary | ICD-10-CM

## 2023-10-02 DIAGNOSIS — Z91.048 ALLERGY TO MOLD: ICD-10-CM

## 2023-10-02 PROCEDURE — 95117 IMMUNOTHERAPY INJECTIONS: CPT | Performed by: NURSE PRACTITIONER

## 2023-10-11 RX ORDER — TIOTROPIUM BROMIDE INHALATION SPRAY 3.12 UG/1
2 SPRAY, METERED RESPIRATORY (INHALATION) DAILY
Qty: 4 G | Refills: 2 | Status: SHIPPED | OUTPATIENT
Start: 2023-10-11

## 2023-10-11 RX ORDER — BUSPIRONE HYDROCHLORIDE 10 MG/1
TABLET ORAL
Qty: 90 TABLET | Refills: 1 | Status: SHIPPED | OUTPATIENT
Start: 2023-10-11

## 2023-10-11 NOTE — TELEPHONE ENCOUNTER
Date of last visit:  9/15/2023  Date of next visit:  11/13/2023    Requested Prescriptions     Pending Prescriptions Disp Refills    busPIRone (BUSPAR) 10 MG tablet [Pharmacy Med Name: BUSPIRONE 10MG TABLETS] 90 tablet 1     Sig: TAKE 1 TABLET BY MOUTH THREE TIMES DAILY    SPIRIVA RESPIMAT 2.5 MCG/ACT AERS inhaler [Pharmacy Med Name: Barbara Garcia 2.5MCG INH 4GM 60D] 4 g 2     Sig: INHALE 2 PUFFS INTO THE LUNGS DAILY

## 2023-10-14 DIAGNOSIS — G25.81 RESTLESS LEG SYNDROME: ICD-10-CM

## 2023-10-15 DIAGNOSIS — G25.81 RESTLESS LEG SYNDROME: ICD-10-CM

## 2023-10-16 RX ORDER — ROPINIROLE 1 MG/1
TABLET, FILM COATED ORAL
Qty: 270 TABLET | Refills: 1 | Status: SHIPPED | OUTPATIENT
Start: 2023-10-16

## 2023-10-16 RX ORDER — ROPINIROLE 1 MG/1
TABLET, FILM COATED ORAL
Qty: 90 TABLET | Refills: 1 | OUTPATIENT
Start: 2023-10-16

## 2023-10-16 NOTE — TELEPHONE ENCOUNTER
The pharmacy is  requesting a refill of the below medication which has been pended for you:     Requested Prescriptions     Pending Prescriptions Disp Refills    rOPINIRole (REQUIP) 1 MG tablet [Pharmacy Med Name: ROPINIROLE 1MG TABLETS] 90 tablet 1     Sig: TAKE 1 TABLET BY MOUTH THREE TIMES DAILY       Last Appointment Date: 9/15/2023  Next Appointment Date: 10/15/2023    Allergies   Allergen Reactions    Isosorbide Nitrate Other (See Comments)

## 2023-11-08 DIAGNOSIS — F41.9 ANXIETY: ICD-10-CM

## 2023-11-08 RX ORDER — SERTRALINE HYDROCHLORIDE 100 MG/1
100 TABLET, FILM COATED ORAL 2 TIMES DAILY
Qty: 180 TABLET | Refills: 1 | Status: SHIPPED | OUTPATIENT
Start: 2023-11-08

## 2023-11-08 RX ORDER — SERTRALINE HYDROCHLORIDE 100 MG/1
100 TABLET, FILM COATED ORAL 2 TIMES DAILY
Qty: 180 TABLET | Refills: 0 | OUTPATIENT
Start: 2023-11-08

## 2023-11-08 NOTE — TELEPHONE ENCOUNTER
The pharmacy is requesting a refill of the below medication which has been pended for you:     Requested Prescriptions     Pending Prescriptions Disp Refills    sertraline (ZOLOFT) 100 MG tablet [Pharmacy Med Name: SERTRALINE 100MG TABLETS] 180 tablet 1     Sig: TAKE 1 TABLET BY MOUTH TWICE DAILY       Last Appointment Date: 9/15/2023  Next Appointment Date: 11/13/2023    Allergies   Allergen Reactions    Isosorbide Nitrate Other (See Comments)

## 2023-11-13 ENCOUNTER — OFFICE VISIT (OUTPATIENT)
Dept: FAMILY MEDICINE CLINIC | Age: 68
End: 2023-11-13

## 2023-11-13 VITALS
DIASTOLIC BLOOD PRESSURE: 80 MMHG | HEART RATE: 76 BPM | BODY MASS INDEX: 36.22 KG/M2 | SYSTOLIC BLOOD PRESSURE: 132 MMHG | HEIGHT: 68 IN | WEIGHT: 239 LBS | RESPIRATION RATE: 16 BRPM

## 2023-11-13 DIAGNOSIS — E11.8 TYPE 2 DIABETES MELLITUS WITH COMPLICATION, WITHOUT LONG-TERM CURRENT USE OF INSULIN (HCC): Primary | ICD-10-CM

## 2023-11-13 DIAGNOSIS — E78.5 HYPERLIPIDEMIA, UNSPECIFIED HYPERLIPIDEMIA TYPE: ICD-10-CM

## 2023-11-13 DIAGNOSIS — Z00.00 MEDICARE ANNUAL WELLNESS VISIT, SUBSEQUENT: ICD-10-CM

## 2023-11-13 DIAGNOSIS — R80.9 MICROALBUMINURIA: ICD-10-CM

## 2023-11-13 DIAGNOSIS — G47.33 OSA (OBSTRUCTIVE SLEEP APNEA): ICD-10-CM

## 2023-11-13 DIAGNOSIS — I10 ESSENTIAL HYPERTENSION: ICD-10-CM

## 2023-11-13 DIAGNOSIS — J44.9 CHRONIC OBSTRUCTIVE PULMONARY DISEASE, UNSPECIFIED COPD TYPE (HCC): ICD-10-CM

## 2023-11-13 DIAGNOSIS — I50.9 CHRONIC CONGESTIVE HEART FAILURE, UNSPECIFIED HEART FAILURE TYPE (HCC): ICD-10-CM

## 2023-11-13 DIAGNOSIS — Z23 NEED FOR IMMUNIZATION AGAINST INFLUENZA: ICD-10-CM

## 2023-11-13 LAB
CHP ED QC CHECK: ABNORMAL
CREATININE URINE POCT: 100
GLUCOSE BLD-MCNC: 190 MG/DL
HBA1C MFR BLD: 7.4 %
MICROALBUMIN/CREAT 24H UR: 80 MG/G{CREAT}
MICROALBUMIN/CREAT UR-RTO: ABNORMAL

## 2023-11-13 PROCEDURE — G0008 ADMIN INFLUENZA VIRUS VAC: HCPCS | Performed by: FAMILY MEDICINE

## 2023-11-13 PROCEDURE — G8427 DOCREV CUR MEDS BY ELIG CLIN: HCPCS | Performed by: FAMILY MEDICINE

## 2023-11-13 PROCEDURE — G0439 PPPS, SUBSEQ VISIT: HCPCS | Performed by: FAMILY MEDICINE

## 2023-11-13 PROCEDURE — 99213 OFFICE O/P EST LOW 20 MIN: CPT | Performed by: FAMILY MEDICINE

## 2023-11-13 PROCEDURE — 82962 GLUCOSE BLOOD TEST: CPT | Performed by: FAMILY MEDICINE

## 2023-11-13 PROCEDURE — 83036 HEMOGLOBIN GLYCOSYLATED A1C: CPT | Performed by: FAMILY MEDICINE

## 2023-11-13 PROCEDURE — 3017F COLORECTAL CA SCREEN DOC REV: CPT | Performed by: FAMILY MEDICINE

## 2023-11-13 PROCEDURE — 82044 UR ALBUMIN SEMIQUANTITATIVE: CPT | Performed by: FAMILY MEDICINE

## 2023-11-13 PROCEDURE — 1123F ACP DISCUSS/DSCN MKR DOCD: CPT | Performed by: FAMILY MEDICINE

## 2023-11-13 PROCEDURE — 90688 IIV4 VACCINE SPLT 0.5 ML IM: CPT | Performed by: FAMILY MEDICINE

## 2023-11-13 ASSESSMENT — ENCOUNTER SYMPTOMS
ABDOMINAL PAIN: 0
DIARRHEA: 0
EYES NEGATIVE: 1
SHORTNESS OF BREATH: 1
COUGH: 0
NAUSEA: 0
CONSTIPATION: 0
BACK PAIN: 1
BLOOD IN STOOL: 0
VOMITING: 0
CHEST TIGHTNESS: 0

## 2023-11-13 ASSESSMENT — PATIENT HEALTH QUESTIONNAIRE - PHQ9
2. FEELING DOWN, DEPRESSED OR HOPELESS: 0
SUM OF ALL RESPONSES TO PHQ9 QUESTIONS 1 & 2: 0
SUM OF ALL RESPONSES TO PHQ QUESTIONS 1-9: 0
1. LITTLE INTEREST OR PLEASURE IN DOING THINGS: 0

## 2023-11-13 ASSESSMENT — LIFESTYLE VARIABLES
HOW MANY STANDARD DRINKS CONTAINING ALCOHOL DO YOU HAVE ON A TYPICAL DAY: PATIENT DOES NOT DRINK
HOW OFTEN DO YOU HAVE A DRINK CONTAINING ALCOHOL: NEVER

## 2023-11-13 NOTE — PROGRESS NOTES
Medicare Annual Wellness Visit    Here with his wife. Jeronimo Conner is here for Medicare AWV, Hypertension, Hyperlipidemia, Gastroesophageal Reflux, and Diabetes    Assessment & Plan   Type 2 diabetes mellitus with complication, without long-term current use of insulin (HCC)  -     POCT glycosylated hemoglobin (Hb A1C)  -     POCT Glucose  -     POCT microalbumin  Essential hypertension  -     CBC with Auto Differential; Future  -     Comprehensive Metabolic Panel; Future  PARRISH (obstructive sleep apnea)  Chronic obstructive pulmonary disease, unspecified COPD type (HCC)  Chronic congestive heart failure, unspecified heart failure type ([address])  Hyperlipidemia, unspecified hyperlipidemia type  -     Lipid Panel; Future  Microalbuminuria  -     Protein, Urine, 24 Hour; Future  -     Creatinine, Urine, 24 Hour; Future  Need for immunization against influenza  -     Influenza, FLUZONE, (age 10 mo+), IM, Multi-Dose Vial, 0.5 mL    Recommendations for Preventive Services Due: see orders and patient instructions/AVS.  Recommended screening schedule for the next 5-10 years is provided to the patient in written form: see Patient Instructions/AVS.     Return in about 4 months (around 3/13/2024), or if symptoms worsen or fail to improve, for DM. Subjective       Patient's complete Health Risk Assessment and screening values have been reviewed and are found in Flowsheets. The following problems were reviewed today and where indicated follow up appointments were made and/or referrals ordered. Positive Risk Factor Screenings with Interventions:    Fall Risk:  Do you feel unsteady or are you worried about falling? : (!) yes  2 or more falls in past year?: no  Fall with injury in past year?: no     Interventions:    He states that he is very careful when he walks. Opioid Risk: (Low risk score <55) Opioid risk score: 13    Patient is low risk for opioid use disorder or overdose.   Last PDMP Cindra Litter as Reviewed:  Review

## 2023-11-13 NOTE — PROGRESS NOTES
Immunizations Administered       Name Date Dose Route    Influenza, AFLURIA (age 1 yrs+), FLUZONE, (age 10 mo+), MDV, 0.5mL 11/13/2023 0.5 mL Intramuscular    Site: Deltoid- Right    Lot: [id number]    NDC: 46579-121-19

## 2023-11-14 ENCOUNTER — HOSPITAL ENCOUNTER (OUTPATIENT)
Dept: PULMONOLOGY | Age: 68
Discharge: HOME | End: 2023-11-14
Attending: INTERNAL MEDICINE
Payer: MEDICARE

## 2023-11-14 ENCOUNTER — HOSPITAL ENCOUNTER (OUTPATIENT)
Dept: CT IMAGING | Age: 68
Discharge: HOME | End: 2023-11-14
Attending: INTERNAL MEDICINE
Payer: MEDICARE

## 2023-11-14 DIAGNOSIS — Z87.891 PERSONAL HISTORY OF TOBACCO USE, PRESENTING HAZARDS TO HEALTH: ICD-10-CM

## 2023-11-14 DIAGNOSIS — J45.50 SEVERE PERSISTENT ASTHMA WITHOUT COMPLICATION: ICD-10-CM

## 2023-11-14 DIAGNOSIS — J44.9 MODERATE COPD (CHRONIC OBSTRUCTIVE PULMONARY DISEASE) (HCC): ICD-10-CM

## 2023-11-14 PROCEDURE — 94060 EVALUATION OF WHEEZING: CPT

## 2023-11-14 PROCEDURE — 71271 CT THORAX LUNG CANCER SCR C-: CPT

## 2023-11-17 ENCOUNTER — HOSPITAL ENCOUNTER (OUTPATIENT)
Age: 68
Discharge: HOME | End: 2023-11-17
Payer: MEDICARE

## 2023-11-17 DIAGNOSIS — R80.9 MICROALBUMINURIA: ICD-10-CM

## 2023-11-17 DIAGNOSIS — I10 ESSENTIAL HYPERTENSION: ICD-10-CM

## 2023-11-17 DIAGNOSIS — E78.5 HYPERLIPIDEMIA, UNSPECIFIED HYPERLIPIDEMIA TYPE: ICD-10-CM

## 2023-11-17 LAB
ALBUMIN SERPL BCG-MCNC: 4.3 G/DL (ref 3.5–5.1)
ALP SERPL-CCNC: 55 U/L (ref 38–126)
ALT SERPL W/O P-5'-P-CCNC: 14 U/L (ref 11–66)
ANION GAP SERPL CALC-SCNC: 12 MEQ/L (ref 8–16)
AST SERPL-CCNC: 13 U/L (ref 5–40)
BASOPHILS ABSOLUTE: 0 THOU/MM3 (ref 0–0.1)
BASOPHILS NFR BLD AUTO: 0.5 %
BILIRUB SERPL-MCNC: 0.4 MG/DL (ref 0.3–1.2)
BUN SERPL-MCNC: 21 MG/DL (ref 7–22)
CALCIUM SERPL-MCNC: 9 MG/DL (ref 8.5–10.5)
CHLORIDE SERPL-SCNC: 104 MEQ/L (ref 98–111)
CHOLEST SERPL-MCNC: 120 MG/DL (ref 100–199)
CO2 SERPL-SCNC: 28 MEQ/L (ref 23–33)
CREAT 24H UR-MRATE: 1.5 GM/24HR (ref 1–2.4)
CREAT SERPL-MCNC: 1.6 MG/DL (ref 0.4–1.2)
CREAT UR-MCNC: 180.9 MG/DL
DEPRECATED RDW RBC AUTO: 47.4 FL (ref 35–45)
EOSINOPHIL NFR BLD AUTO: 3.7 %
EOSINOPHILS ABSOLUTE: 0.3 THOU/MM3 (ref 0–0.4)
ERYTHROCYTE [DISTWIDTH] IN BLOOD BY AUTOMATED COUNT: 14.1 % (ref 11.5–14.5)
GFR SERPL CREATININE-BSD FRML MDRD: 46 ML/MIN/1.73M2
GLUCOSE SERPL-MCNC: 126 MG/DL (ref 70–108)
HCT VFR BLD AUTO: 44.8 % (ref 42–52)
HDLC SERPL-MCNC: 38 MG/DL
HGB BLD-MCNC: 15.2 GM/DL (ref 14–18)
HOURS COLLECTED: 24 HRS
HOURS COLLECTED: 24 HRS
IMM GRANULOCYTES # BLD AUTO: 0.04 THOU/MM3 (ref 0–0.07)
IMM GRANULOCYTES NFR BLD AUTO: 0.5 %
LDLC SERPL CALC-MCNC: 54 MG/DL
LYMPHOCYTES ABSOLUTE: 1.3 THOU/MM3 (ref 1–4.8)
LYMPHOCYTES NFR BLD AUTO: 15.4 %
MCH RBC QN AUTO: 31 PG (ref 26–33)
MCHC RBC AUTO-ENTMCNC: 33.9 GM/DL (ref 32.2–35.5)
MCV RBC AUTO: 91.2 FL (ref 80–94)
MONOCYTES ABSOLUTE: 0.6 THOU/MM3 (ref 0.4–1.3)
MONOCYTES NFR BLD AUTO: 7 %
NEUTROPHILS NFR BLD AUTO: 72.9 %
NRBC BLD AUTO-RTO: 0 /100 WBC
PLATELET # BLD AUTO: 157 THOU/MM3 (ref 130–400)
PMV BLD AUTO: 11.9 FL (ref 9.4–12.4)
POTASSIUM SERPL-SCNC: 4.3 MEQ/L (ref 3.5–5.2)
PROT 24H UR-MCNC: 139 MG/24 HR (ref 42–225)
PROT SERPL-MCNC: 7.3 G/DL (ref 6.1–8)
PROT UR-MCNC: 16.7 MG/DL
RBC # BLD AUTO: 4.91 MILL/MM3 (ref 4.7–6.1)
SEGMENTED NEUTROPHILS ABSOLUTE COUNT: 6.3 THOU/MM3 (ref 1.8–7.7)
SODIUM SERPL-SCNC: 144 MEQ/L (ref 135–145)
TRIGL SERPL-MCNC: 140 MG/DL (ref 0–199)
URINE VOLUME, 24 HOUR: 835 ML
URINE VOLUME: 835 ML
WBC # BLD AUTO: 8.7 THOU/MM3 (ref 4.8–10.8)

## 2023-11-17 PROCEDURE — 85025 COMPLETE CBC W/AUTO DIFF WBC: CPT

## 2023-11-17 PROCEDURE — 81050 URINALYSIS VOLUME MEASURE: CPT

## 2023-11-17 PROCEDURE — 36415 COLL VENOUS BLD VENIPUNCTURE: CPT

## 2023-11-17 PROCEDURE — 80053 COMPREHEN METABOLIC PANEL: CPT

## 2023-11-17 PROCEDURE — 80061 LIPID PANEL: CPT

## 2023-11-21 ENCOUNTER — OFFICE VISIT (OUTPATIENT)
Dept: PULMONOLOGY | Age: 68
End: 2023-11-21
Payer: MEDICARE

## 2023-11-21 VITALS
HEART RATE: 87 BPM | OXYGEN SATURATION: 95 % | TEMPERATURE: 98.4 F | DIASTOLIC BLOOD PRESSURE: 78 MMHG | SYSTOLIC BLOOD PRESSURE: 138 MMHG | HEIGHT: 68 IN | BODY MASS INDEX: 36.22 KG/M2 | WEIGHT: 239 LBS

## 2023-11-21 DIAGNOSIS — Z87.891 PERSONAL HISTORY OF TOBACCO USE, PRESENTING HAZARDS TO HEALTH: ICD-10-CM

## 2023-11-21 DIAGNOSIS — J44.9 MODERATE COPD (CHRONIC OBSTRUCTIVE PULMONARY DISEASE) (HCC): ICD-10-CM

## 2023-11-21 DIAGNOSIS — J45.50 SEVERE PERSISTENT ASTHMA WITHOUT COMPLICATION: ICD-10-CM

## 2023-11-21 DIAGNOSIS — Z87.891 PERSONAL HISTORY OF TOBACCO USE: Primary | ICD-10-CM

## 2023-11-21 PROCEDURE — 3075F SYST BP GE 130 - 139MM HG: CPT | Performed by: INTERNAL MEDICINE

## 2023-11-21 PROCEDURE — G8427 DOCREV CUR MEDS BY ELIG CLIN: HCPCS | Performed by: INTERNAL MEDICINE

## 2023-11-21 PROCEDURE — 3078F DIAST BP <80 MM HG: CPT | Performed by: INTERNAL MEDICINE

## 2023-11-21 PROCEDURE — G8417 CALC BMI ABV UP PARAM F/U: HCPCS | Performed by: INTERNAL MEDICINE

## 2023-11-21 PROCEDURE — G0296 VISIT TO DETERM LDCT ELIG: HCPCS | Performed by: INTERNAL MEDICINE

## 2023-11-21 PROCEDURE — G8482 FLU IMMUNIZE ORDER/ADMIN: HCPCS | Performed by: INTERNAL MEDICINE

## 2023-11-21 PROCEDURE — 3017F COLORECTAL CA SCREEN DOC REV: CPT | Performed by: INTERNAL MEDICINE

## 2023-11-21 PROCEDURE — 1036F TOBACCO NON-USER: CPT | Performed by: INTERNAL MEDICINE

## 2023-11-21 PROCEDURE — 3023F SPIROM DOC REV: CPT | Performed by: INTERNAL MEDICINE

## 2023-11-21 PROCEDURE — 99214 OFFICE O/P EST MOD 30 MIN: CPT | Performed by: INTERNAL MEDICINE

## 2023-11-21 PROCEDURE — 1123F ACP DISCUSS/DSCN MKR DOCD: CPT | Performed by: INTERNAL MEDICINE

## 2023-11-21 NOTE — PATIENT INSTRUCTIONS
scan of chest without contrast and spirometry before clinic visit. . Patient advised to make early appointment if needed.  -Patient and his wife were educated about my impression and plan. They verbalizes understanding. Learning About Lung Cancer Screening  What is screening for lung cancer? Lung cancer screening is a way to find some lung cancers early, before a person has any symptoms of the cancer. Lung cancer screening may help those who have the highest risk for lung cancer--people age 48 and older who are or were heavy smokers. For most people, who aren't at increased risk, screening for lung cancer probably isn't helpful. Screening won't prevent cancer. And it may not find all lung cancers. Lung cancer screening may lower the risk of dying from lung cancer in a small number of people. How is it done? Lung cancer screening is done with a low-dose CT (computed tomography) scan. A CT scan uses X-rays, or radiation, to make detailed pictures of your body. Experts recommend that screening be done in medical centers that focus on finding and treating lung cancer. Who is screening recommended for? Lung cancer screening is recommended for people age 48 and older who are or were heavy smokers. That means people with a smoking history of at least 20 pack years. A pack year is a way to measure how heavy a smoker you are or were. To figure out your pack years, multiply how many packs a day on average (assuming 20 cigarettes per pack) you have smoked by how many years you have smoked. For example: If you smoked 1 pack a day for 20 years, that's 1 times 20. So you have a smoking history of 20 pack years. If you smoked 2 packs a day for 10 years, that's 2 times 10. So you have a smoking history of 20 pack years. Experts agree that screening is for people who have a high risk of lung cancer. But experts don't agree on what high risk means.  Some say people age 48 or older with at least a 20-pack-year

## 2023-11-21 NOTE — PROGRESS NOTES
Neck Circumference -   18.5  Mallampati - 2    Lung Nodule Screening     [] Qualifies    [] Does not qualify   [] Declined    [] Completed

## 2023-11-22 RX ORDER — TRAZODONE HYDROCHLORIDE 150 MG/1
TABLET ORAL
Qty: 90 TABLET | Refills: 1 | Status: SHIPPED | OUTPATIENT
Start: 2023-11-22

## 2023-11-22 NOTE — TELEPHONE ENCOUNTER
Date of last visit:  11/13/2023  Date of next visit:  3/13/2024    Requested Prescriptions     Pending Prescriptions Disp Refills    traZODone (DESYREL) 150 MG tablet [Pharmacy Med Name: TRAZODONE 150MG (HUNDRED-FIFTY) TAB] 90 tablet 1     Sig: TAKE 1/2 TO 1 TABLET BY MOUTH EVERY DAY AT BEDTIME AS NEEDED FOR SLEEP

## 2023-12-01 ENCOUNTER — TELEPHONE (OUTPATIENT)
Dept: PULMONOLOGY | Age: 68
End: 2023-12-01

## 2023-12-01 NOTE — TELEPHONE ENCOUNTER
Patient called to see if we had an order for O2 tubing and if we could send to [facility]    I seen the order in the computer so I called [facility]

## 2023-12-06 DIAGNOSIS — J30.9 CHRONIC ALLERGIC RHINITIS: ICD-10-CM

## 2023-12-06 DIAGNOSIS — Z29.8 IMMUNOTHERAPY: Primary | ICD-10-CM

## 2023-12-06 PROCEDURE — 95165 ANTIGEN THERAPY SERVICES: CPT | Performed by: NURSE PRACTITIONER

## 2023-12-06 NOTE — PROGRESS NOTES
ALLERGY EXTRACT MAINTENANCE MIXING RED VIAL ONLY  Pt's current vials are . New vials mixed. Provider onsite during allergy extract preparation. TOTAL VIALS=  2  TOTAL DOSES PER VIAL = 20  TOTAL ANTICIPATED DOSES PREPARED = 40  TOTAL doses BILLED TODAY: 30      An allergy extract maintenance solution was prepared in red vial according to the maintenance prescription. The refrigerator shelf-life for each red vial is 12 months or as indicated on the label. Patient vials were labeled with name, date-of-birth, vial number, concentration, and expiration. When injecting from a new maintenance vial, the first injections should be three doses below the previous maintenance dose. This is done because the refill may be slightly stronger than the vial that was just emptied. (Example: if the maintenance dose is 0.5, start at 0.35 and proceed to 0.4, 0.45, 0.5). The three build up injections may be given weekly until the maintenance dosage is reached again. Then every other week injections may be resumed. Patients on maintenance should be seen by the allergy provider every 6-12 months and as needed. The injection record and serum is reviewed and documented at every injection appointment. When down to the last 1/3 of the bottle, a maintenance refill will be prepared (pending patient is without reactions) for next refill. Patients requesting refills that receive injections at outside medical facilities must include the patient's demographic sheet, current insurance information and the injection records. Allow 2-3 weeks for delivery after the refill has been requested. ALLERGY IMMUNOTHERAPY DOSES IS BILLED AT NO MORE THAN 30 UNITS PER MONTH. TOTAL DOSES BILLED IS AT 30 DOSES PER MONTH UNTIL ALL ANTICIPATED DOSES ARE BILLED.            PROTOCOL FOR LATE INJECTIONS  Days late determined from the due date of the injection    Shot Frequency 5-10 Days Late 11-16 Days Late 17-30 Days Late 31-60 Days Late

## 2023-12-07 ENCOUNTER — NURSE ONLY (OUTPATIENT)
Dept: ALLERGY | Age: 68
End: 2023-12-07
Payer: MEDICARE

## 2023-12-07 VITALS
OXYGEN SATURATION: 95 % | RESPIRATION RATE: 16 BRPM | SYSTOLIC BLOOD PRESSURE: 118 MMHG | DIASTOLIC BLOOD PRESSURE: 72 MMHG | HEART RATE: 90 BPM

## 2023-12-07 DIAGNOSIS — Z51.6 ENCOUNTER FOR DESENSITIZATION TO ALLERGENS: Primary | ICD-10-CM

## 2023-12-07 DIAGNOSIS — Z91.09 MITE ALLERGY: ICD-10-CM

## 2023-12-07 DIAGNOSIS — Z91.048 ALLERGY TO MOLD: ICD-10-CM

## 2023-12-07 PROCEDURE — 95117 IMMUNOTHERAPY INJECTIONS: CPT | Performed by: NURSE PRACTITIONER

## 2023-12-07 NOTE — PROGRESS NOTES
After consent obtained/verified, allergy injection given in back of R/L arm(s). VIAL COLOR OF ALL VIALS TODAY IS red 1:1    ALLERGY INJECTION FROM VIAL A GIVEN left  UPPER ARM IN THE AMOUNT OF 0.35 ML    ALLERGY INJECTION FROM VIAL B GIVEN right upper ARM IN THE AMOUNT OF 0.35  ML        Documentation of vial injection specific to arm(s) noted on Allergy Immunotherapy Administration Form. Patient waited 30 minutes for observation. No      Patient tolerated well without adverse reaction WHILE IN OFFICE    SHOT REACTION TREATMENT INSTRUCTIONS    During the 30 minute wait after an allergy injection the following symptoms should be reported:    Itching other than at the injection site  Hives or swelling other than at the injection site  Redness other than at the injection site  Difficulty breathing  Chest tightness  Difficulty swallowing  Throat tightness    If these symptoms occur, NOTIFY PROVIDER and the following treatment should be administered:    1. Epinephrine/Auvi Q 1:1000 IM - 0.3 ml if > 66 lbs or more, 0.15 ml if 33 - 63 lbs, or 0.1 ml if <33 lbs     2. Diphenhydramine - give all intramuscular:     2 to <6 years (off-label use): 6.25 mg,    6 to <12 years: 12.5 to 25 mg;    ?12 years: 25-50 mg.    3.  Famotidine:  Adults 40 mg oral    Adolescents age 12 years and >88 lbs: 40 mg    Children and Adolescents ? 12years of age: Initial: 0.25 mg/kg/dose  every 12 hours (maximum daily dose: 40 mg/day)    Epi/Auvi Q dose may me repeated in 5-15 minutes if adequate resolution of symptoms does not occur    Patient should be observed for at least one hour after final Epi/Auvi Q dose and must be seen by provider. Patients cannot drive themselves if they have received diphenhydramine.

## 2023-12-15 DIAGNOSIS — E11.8 TYPE 2 DIABETES MELLITUS WITH COMPLICATION, WITHOUT LONG-TERM CURRENT USE OF INSULIN (HCC): ICD-10-CM

## 2023-12-15 RX ORDER — GLIMEPIRIDE 4 MG/1
4 TABLET ORAL
Qty: 90 TABLET | Refills: 1 | Status: SHIPPED | OUTPATIENT
Start: 2023-12-15

## 2023-12-15 NOTE — TELEPHONE ENCOUNTER
The pharmacy is  requesting a refill of the below medication which has been pended for you:     Requested Prescriptions     Pending Prescriptions Disp Refills    glimepiride (AMARYL) 4 MG tablet [Pharmacy Med Name: GLIMEPIRIDE 4MG TABLETS] 90 tablet 1     Sig: TAKE 1 TABLET BY MOUTH DAILY WITH SUPPER       Last Appointment Date: 11/13/2023  Next Appointment Date: 3/13/2024    Allergies   Allergen Reactions    Isosorbide Nitrate Other (See Comments)

## 2023-12-27 ENCOUNTER — HOSPITAL ENCOUNTER (OUTPATIENT)
Dept: ULTRASOUND IMAGING | Age: 68
Discharge: HOME | End: 2023-12-27
Attending: FAMILY MEDICINE
Payer: MEDICARE

## 2023-12-27 DIAGNOSIS — R80.9 MICROALBUMINURIA: ICD-10-CM

## 2023-12-27 DIAGNOSIS — N28.9 RENAL INSUFFICIENCY: ICD-10-CM

## 2023-12-27 PROCEDURE — 76770 US EXAM ABDO BACK WALL COMP: CPT

## 2023-12-28 ENCOUNTER — TELEPHONE (OUTPATIENT)
Dept: FAMILY MEDICINE CLINIC | Age: 68
End: 2023-12-28

## 2023-12-28 ENCOUNTER — HOSPITAL ENCOUNTER (OUTPATIENT)
Age: 68
Discharge: HOME | End: 2023-12-28
Payer: MEDICARE

## 2023-12-28 ENCOUNTER — OFFICE VISIT (OUTPATIENT)
Dept: NEPHROLOGY | Age: 68
End: 2023-12-28
Payer: MEDICARE

## 2023-12-28 VITALS
BODY MASS INDEX: 35.88 KG/M2 | DIASTOLIC BLOOD PRESSURE: 89 MMHG | SYSTOLIC BLOOD PRESSURE: 183 MMHG | HEART RATE: 73 BPM | WEIGHT: 236 LBS | OXYGEN SATURATION: 90 %

## 2023-12-28 DIAGNOSIS — I10 ESSENTIAL HYPERTENSION: ICD-10-CM

## 2023-12-28 DIAGNOSIS — N18.31 STAGE 3A CHRONIC KIDNEY DISEASE (HCC): Primary | ICD-10-CM

## 2023-12-28 LAB
ALBUMIN SERPL BCG-MCNC: 4.3 G/DL (ref 3.5–5.1)
ALP SERPL-CCNC: 49 U/L (ref 38–126)
ALT SERPL W/O P-5'-P-CCNC: 15 U/L (ref 11–66)
ANION GAP SERPL CALC-SCNC: 13 MEQ/L (ref 8–16)
AST SERPL-CCNC: 13 U/L (ref 5–40)
BILIRUB SERPL-MCNC: 0.5 MG/DL (ref 0.3–1.2)
BUN SERPL-MCNC: 23 MG/DL (ref 7–22)
CALCIUM SERPL-MCNC: 9.3 MG/DL (ref 8.5–10.5)
CHLORIDE SERPL-SCNC: 105 MEQ/L (ref 98–111)
CO2 SERPL-SCNC: 26 MEQ/L (ref 23–33)
CREAT SERPL-MCNC: 1.2 MG/DL (ref 0.4–1.2)
GFR SERPL CREATININE-BSD FRML MDRD: > 60 ML/MIN/1.73M2
GLUCOSE SERPL-MCNC: 169 MG/DL (ref 70–108)
POTASSIUM SERPL-SCNC: 4.7 MEQ/L (ref 3.5–5.2)
PROT SERPL-MCNC: 7.3 G/DL (ref 6.1–8)
SODIUM SERPL-SCNC: 144 MEQ/L (ref 135–145)

## 2023-12-28 PROCEDURE — 99204 OFFICE O/P NEW MOD 45 MIN: CPT | Performed by: INTERNAL MEDICINE

## 2023-12-28 PROCEDURE — 80053 COMPREHEN METABOLIC PANEL: CPT

## 2023-12-28 PROCEDURE — G8417 CALC BMI ABV UP PARAM F/U: HCPCS | Performed by: INTERNAL MEDICINE

## 2023-12-28 PROCEDURE — 1123F ACP DISCUSS/DSCN MKR DOCD: CPT | Performed by: INTERNAL MEDICINE

## 2023-12-28 PROCEDURE — 36415 COLL VENOUS BLD VENIPUNCTURE: CPT

## 2023-12-28 PROCEDURE — 3077F SYST BP >= 140 MM HG: CPT | Performed by: INTERNAL MEDICINE

## 2023-12-28 PROCEDURE — G8427 DOCREV CUR MEDS BY ELIG CLIN: HCPCS | Performed by: INTERNAL MEDICINE

## 2023-12-28 PROCEDURE — G8482 FLU IMMUNIZE ORDER/ADMIN: HCPCS | Performed by: INTERNAL MEDICINE

## 2023-12-28 PROCEDURE — 1036F TOBACCO NON-USER: CPT | Performed by: INTERNAL MEDICINE

## 2023-12-28 PROCEDURE — 3017F COLORECTAL CA SCREEN DOC REV: CPT | Performed by: INTERNAL MEDICINE

## 2023-12-28 PROCEDURE — 3079F DIAST BP 80-89 MM HG: CPT | Performed by: INTERNAL MEDICINE

## 2023-12-28 RX ORDER — ATORVASTATIN CALCIUM 20 MG/1
20 TABLET, FILM COATED ORAL DAILY
COMMUNITY
Start: 2023-12-18

## 2023-12-28 NOTE — TELEPHONE ENCOUNTER
----- Message from Dayton Strickland MD sent at 12/28/2023  6:59 AM EST -----  Keep appt  with renal

## 2023-12-28 NOTE — PROGRESS NOTES
[facility]         Outpatient Initial consult note         12/28/2023 9:54 AM    [facility] [place] [facility]  [facility]  [address] [facility]  Dept: [phone]  Loc: [phone]      Patient Name:   Dianne Stevenson  YOB: 1955  Primary Care Physician:  Enid Salgado MD     Chief Complaint : Evaluation of   worsening renal function     History of presenting illness :Dianne Stevenson is a 77 y.o.   male with Past Medical History as stated below was sent / referred by Enid Salgado MD forevaluation of the above problem. Patient has a history of hypertension for 3 years. Patient has history of diabetes mellitus, without retinopathy &nephropathy, and for 25 years. The patient admits to history of renal stones anddenies NSAID use. Patient has no history of proteinuria. Patient Never had a kidney biopsy done. Patient does not use Herbal remedies/vitamin supplements. Patient denies frequency, hematuria, hesitancy, retention. Patient has no family history of kidney disease.   Patient had a history of heart disease and had a stent placed in the LAD apparently 5 years ago    Patient has some labs drawn by the PCP recently which showed elevated serum creatinine so was referred to us for further evaluation and management     Past History :     Past Medical History:   Diagnosis Date    Acid reflux     Arthritis     Asthma     CAD (coronary artery disease)     Chronic back pain     Class 2 severe obesity due to excess calories with serious comorbidity and body mass index (BMI) of 37.0 to 37.9 in adult [facility]) 8/13/2018    Colon polyps 11/06    COPD (chronic obstructive pulmonary disease) (HCC)     Depression     panic attacks    Diverticulosis     HTN (hypertension)     Hyperlipidemia     Kidney disorder     Panic attack     Pneumonia     Rash     Recurrent upper respiratory infection (URI)     Thumb

## 2023-12-28 NOTE — TELEPHONE ENCOUNTER
----- Message from Barbar Mortimer, MD sent at 12/28/2023  6:59 AM EST -----  Call as renal  ultrasound all good

## 2024-01-09 ENCOUNTER — NURSE ONLY (OUTPATIENT)
Dept: ALLERGY | Age: 69
End: 2024-01-09
Payer: MEDICARE

## 2024-01-09 VITALS
OXYGEN SATURATION: 98 % | HEART RATE: 72 BPM | SYSTOLIC BLOOD PRESSURE: 156 MMHG | DIASTOLIC BLOOD PRESSURE: 84 MMHG | RESPIRATION RATE: 18 BRPM

## 2024-01-09 DIAGNOSIS — Z29.8 IMMUNOTHERAPY: ICD-10-CM

## 2024-01-09 DIAGNOSIS — Z91.048 ALLERGY TO MOLD: ICD-10-CM

## 2024-01-09 DIAGNOSIS — J30.81 ALLERGY TO DOG DANDER: ICD-10-CM

## 2024-01-09 DIAGNOSIS — J30.9 CHRONIC ALLERGIC RHINITIS: Primary | ICD-10-CM

## 2024-01-09 DIAGNOSIS — Z51.6 ENCOUNTER FOR DESENSITIZATION TO ALLERGENS: Primary | ICD-10-CM

## 2024-01-09 PROCEDURE — 95117 IMMUNOTHERAPY INJECTIONS: CPT | Performed by: NURSE PRACTITIONER

## 2024-01-09 NOTE — TELEPHONE ENCOUNTER
The pharmacy is requesting a refill of the below medication which has been pended for you:     Requested Prescriptions     Pending Prescriptions Disp Refills    busPIRone (BUSPAR) 10 MG tablet [Pharmacy Med Name: BUSPIRONE 10MG TABLETS] 90 tablet 1     Sig: TAKE 1 TABLET BY MOUTH THREE TIMES DAILY       Last Appointment Date: 11/13/2023  Next Appointment Date: 3/13/2024    Allergies   Allergen Reactions    Isosorbide Nitrate Other (See Comments)

## 2024-01-09 NOTE — PROGRESS NOTES
ALLERGY EXTRACT MIXING.  MONTHLY BILLING    DATE OF MIXING= 12/06/23  TOTAL NUMBER OF SET OF VIALS= 2  TOTAL VIALS PREPARED = 2  TOTAL INJECTABLE DOSES =   20 INJECTIONS PER SET  TOTAL ANTICIPATED DOSES = 40 INJECTIONS   TOTAL NUMBER OF INJECTIONS BILLED TODAY = 10     ALLERGY IMMUNOTHERAPY DOSES IS BILLED AT NO MORE THAN 30 UNITS PER MONTH.  TOTAL DOSES BILLED IS AT 30 DOSES PER MONTH UNTIL ALL ANTICIPATED DOSES ARE BILLED.     An allergy extract was prepared according to written prescription by provider.  Provider onsite during allergy extract preparation. Patient vial(s) include the following:     RED VIAL - 1:1 CONCENTRATION - EXPIRES 12 MONTHS  YELLOW VIAL-1:10 CONCENTRATION - EXPIRES 12 MONTHS  BLUE VIAL-1:100 CONCENTRATION - EXPIRES 12 MONTHS  GREEN VIAL-1:1,000 CONCENTRATION - EXPIRES 6 MONTHS  SILVER VIAL-1:10,000 CONCENTRATION - EXPIRES 6 MONTHS    Allergy extract was prepared by the following method:   RED TO YELLOW:  Once the  one-to-one concentration was prepared in the red vial, 0.5 ML's was then extracted in place into the yellow vial to achieve a 1:10 concentration.    YELLOW TO BLUE:  Then 0.5 ML's was extracted from the yellow vial and placed into the blue file with dilutant to achieve a 1:100 concentration.    BLUE TO GREEN:  Then 0.5 ML's was extracted from the blue vial and placed into Green vial with dilute to achieve a 1:1,000 concentration.    GREEN TO SILVER:  Then 0.5 ML's was taken from the green vial and placed in the Silver vial with dilutant to achieve a 1:10,000 concentration.      Patient vials were labeled with name, date-of-birth, vial (A,B,or C), concentration, and expiration.    When injecting from a new  vial the first injections is 0.05 ml and are increased by 0.05 increments until 0.5ml from the vial is achieved or the patient reaches maximum tolerated dose.   Injections are given once ot three times weekly and at least 24 hours apart, according to provider orders.   If patient

## 2024-01-09 NOTE — PROGRESS NOTES
After consent obtained/verified, allergy injection given in back of R/L arm(s).      VIAL COLOR OF ALL VIALS TODAY IS red 1:1    ALLERGY INJECTION FROM VIAL A GIVEN right  UPPER ARM IN THE AMOUNT OF 0.40 ML    ALLERGY INJECTION FROM VIAL B GIVEN left upper ARM IN THE AMOUNT OF 0.40  ML          Documentation of vial injection specific to arm(s) noted on Allergy Immunotherapy Administration Form.         Patient waited 30 minutes for observation.No      Patient tolerated well without adverse reaction WHILE IN OFFICE    SHOT REACTION TREATMENT INSTRUCTIONS    During the 30 minute wait after an allergy injection the following symptoms should be reported:    Itching other than at the injection site  Hives or swelling other than at the injection site  Redness other than at the injection site  Difficulty breathing  Chest tightness  Difficulty swallowing  Throat tightness    If these symptoms occur, NOTIFY PROVIDER and the following treatment should be administered:    1.  Epinephrine/Auvi Q 1:1000 IM - 0.3 ml if > 66 lbs or more, 0.15 ml if 33 - 63 lbs, or 0.1 ml if <33 lbs     2.  Diphenhydramine - give all intramuscular:     2 to <6 years (off-label use): 6.25 mg,    6 to <12 years: 12.5 to 25 mg;    ?12 years: 25-50 mg.    3.  Famotidine:  Adults 40 mg oral    Adolescents age 16 years and >88 lbs: 40 mg    Children and Adolescents ?16 years of age: Initial: 0.25 mg/kg/dose  every 12 hours (maximum daily dose: 40 mg/day)    Epi/Auvi Q dose may me repeated in 5-15 minutes if adequate resolution of symptoms does not occur    Patient should be observed for at least one hour after final Epi/Auvi Q dose and must be seen by provider.  Patients cannot drive themselves if they have received diphenhydramine.

## 2024-01-10 RX ORDER — BUSPIRONE HYDROCHLORIDE 10 MG/1
TABLET ORAL
Qty: 90 TABLET | Refills: 1 | Status: SHIPPED | OUTPATIENT
Start: 2024-01-10

## 2024-01-11 DIAGNOSIS — J30.9 ALLERGIC RHINITIS, UNSPECIFIED SEASONALITY, UNSPECIFIED TRIGGER: ICD-10-CM

## 2024-01-11 RX ORDER — CETIRIZINE HYDROCHLORIDE 10 MG/1
10 TABLET ORAL DAILY
Qty: 90 TABLET | Refills: 3 | Status: SHIPPED | OUTPATIENT
Start: 2024-01-11

## 2024-01-11 NOTE — TELEPHONE ENCOUNTER
Pt called in and left a message for following refill.           Pharmacy (need the exact pharmacy): [facility]    Patient last received medication on date: 12/5/22  Number of refills given at last fill: 90 for 3 refills  (If refills are current the patient does not need another refill)    Last appointment: 12/5/22         Next appt : 1/23/24    Was patient was a no show for last appt?:  No, pt was reschedule due to provider being out on medical leave.

## 2024-02-19 ENCOUNTER — HOSPITAL ENCOUNTER (OUTPATIENT)
Age: 69
Discharge: HOME | End: 2024-02-19
Payer: MEDICARE

## 2024-02-19 DIAGNOSIS — N18.31 STAGE 3A CHRONIC KIDNEY DISEASE (HCC): ICD-10-CM

## 2024-02-19 DIAGNOSIS — I10 ESSENTIAL HYPERTENSION: ICD-10-CM

## 2024-02-19 LAB
ALBUMIN SERPL BCG-MCNC: 4.9 G/DL (ref 3.5–5.1)
ANION GAP SERPL CALC-SCNC: 16 MEQ/L (ref 8–16)
BACTERIA: ABNORMAL
BILIRUB UR QL STRIP: NEGATIVE
BUN SERPL-MCNC: 27 MG/DL (ref 7–22)
CALCIUM SERPL-MCNC: 10.1 MG/DL (ref 8.5–10.5)
CASTS #/AREA URNS LPF: ABNORMAL /LPF
CASTS #/AREA URNS LPF: ABNORMAL /LPF
CHARACTER UR: CLEAR
CHARCOAL URNS QL MICRO: ABNORMAL
CHLORIDE SERPL-SCNC: 101 MEQ/L (ref 98–111)
CO2 SERPL-SCNC: 26 MEQ/L (ref 23–33)
COLOR UR: YELLOW
CREAT SERPL-MCNC: 1.6 MG/DL (ref 0.4–1.2)
CREAT UR-MCNC: 205.6 MG/DL
CREAT UR-MCNC: 205.6 MG/DL
CRYSTALS URNS QL MICRO: ABNORMAL
EPITHELIAL CELLS, UA: ABNORMAL /HPF
GFR SERPL CREATININE-BSD FRML MDRD: 46 ML/MIN/1.73M2
GLUCOSE SERPL-MCNC: 120 MG/DL (ref 70–108)
GLUCOSE UR QL STRIP.AUTO: NEGATIVE MG/DL
HGB UR QL STRIP.AUTO: NEGATIVE
KETONES UR QL STRIP.AUTO: NEGATIVE
LEUKOCYTE ESTERASE UR QL STRIP.AUTO: NEGATIVE
MICROALBUMIN UR-MCNC: 11.37 MG/DL
MICROALBUMIN/CREAT RATIO PNL UR: 55 MG/G (ref 0–30)
NITRITE UR QL STRIP.AUTO: NEGATIVE
PH UR STRIP.AUTO: 5.5 [PH] (ref 5–9)
PHOSPHATE SERPL-MCNC: 4.1 MG/DL (ref 2.4–4.7)
POTASSIUM SERPL-SCNC: 4.7 MEQ/L (ref 3.5–5.2)
PROT UR STRIP.AUTO-MCNC: ABNORMAL MG/DL
PROT UR-MCNC: 34.3 MG/DL
PROT/CREAT 24H UR: 0.17 MG/G{CREAT}
RBC #/AREA URNS HPF: ABNORMAL /HPF
RENAL EPI CELLS #/AREA URNS HPF: ABNORMAL /[HPF]
SODIUM SERPL-SCNC: 143 MEQ/L (ref 135–145)
SPECIFIC GRAVITY UA: 1.02 (ref 1–1.03)
UROBILINOGEN, URINE: 0.2 EU/DL (ref 0–1)
WBC #/AREA URNS HPF: ABNORMAL /HPF
YEAST LIKE FUNGI URNS QL MICRO: ABNORMAL

## 2024-02-19 PROCEDURE — 82043 UR ALBUMIN QUANTITATIVE: CPT

## 2024-02-19 PROCEDURE — 84156 ASSAY OF PROTEIN URINE: CPT

## 2024-02-19 PROCEDURE — 82570 ASSAY OF URINE CREATININE: CPT

## 2024-02-19 PROCEDURE — 36415 COLL VENOUS BLD VENIPUNCTURE: CPT

## 2024-02-19 PROCEDURE — 81001 URINALYSIS AUTO W/SCOPE: CPT

## 2024-02-19 PROCEDURE — 80069 RENAL FUNCTION PANEL: CPT

## 2024-03-06 ENCOUNTER — OFFICE VISIT (OUTPATIENT)
Dept: ALLERGY | Age: 69
End: 2024-03-06
Payer: MEDICARE

## 2024-03-06 VITALS
DIASTOLIC BLOOD PRESSURE: 86 MMHG | HEART RATE: 80 BPM | OXYGEN SATURATION: 90 % | SYSTOLIC BLOOD PRESSURE: 116 MMHG | WEIGHT: 244 LBS | RESPIRATION RATE: 17 BRPM | BODY MASS INDEX: 37.1 KG/M2

## 2024-03-06 DIAGNOSIS — J30.81 ALLERGY TO DOG DANDER: ICD-10-CM

## 2024-03-06 DIAGNOSIS — J30.9 CHRONIC ALLERGIC RHINITIS: ICD-10-CM

## 2024-03-06 DIAGNOSIS — Z91.048 ALLERGY TO MOLD: ICD-10-CM

## 2024-03-06 DIAGNOSIS — J44.9 CHRONIC OBSTRUCTIVE PULMONARY DISEASE, UNSPECIFIED COPD TYPE (HCC): ICD-10-CM

## 2024-03-06 DIAGNOSIS — R76.8 ELEVATED IGE LEVEL: ICD-10-CM

## 2024-03-06 DIAGNOSIS — J44.89 ASTHMA-COPD OVERLAP SYNDROME: ICD-10-CM

## 2024-03-06 DIAGNOSIS — J45.40 MODERATE PERSISTENT ASTHMA WITHOUT COMPLICATION: ICD-10-CM

## 2024-03-06 DIAGNOSIS — J30.9 ALLERGIC RHINITIS, UNSPECIFIED SEASONALITY, UNSPECIFIED TRIGGER: Primary | ICD-10-CM

## 2024-03-06 DIAGNOSIS — Z29.89 IMMUNOTHERAPY: ICD-10-CM

## 2024-03-06 DIAGNOSIS — T50.B95A VACCINE INDUCED VARICELLA, INITIAL ENCOUNTER: ICD-10-CM

## 2024-03-06 DIAGNOSIS — T88.0XXA VACCINE INDUCED VARICELLA, INITIAL ENCOUNTER: ICD-10-CM

## 2024-03-06 DIAGNOSIS — J30.81 CAT ALLERGIES: ICD-10-CM

## 2024-03-06 DIAGNOSIS — J34.3 HYPERTROPHY OF INFERIOR NASAL TURBINATE: ICD-10-CM

## 2024-03-06 DIAGNOSIS — B01.9 VACCINE INDUCED VARICELLA, INITIAL ENCOUNTER: ICD-10-CM

## 2024-03-06 DIAGNOSIS — Z91.09 MITE ALLERGY: ICD-10-CM

## 2024-03-06 PROCEDURE — 3023F SPIROM DOC REV: CPT | Performed by: NURSE PRACTITIONER

## 2024-03-06 PROCEDURE — G8427 DOCREV CUR MEDS BY ELIG CLIN: HCPCS | Performed by: NURSE PRACTITIONER

## 2024-03-06 PROCEDURE — 95012 NITRIC OXIDE EXP GAS DETER: CPT | Performed by: NURSE PRACTITIONER

## 2024-03-06 PROCEDURE — G8417 CALC BMI ABV UP PARAM F/U: HCPCS | Performed by: NURSE PRACTITIONER

## 2024-03-06 PROCEDURE — G8482 FLU IMMUNIZE ORDER/ADMIN: HCPCS | Performed by: NURSE PRACTITIONER

## 2024-03-06 PROCEDURE — A4617 MOUTH PIECE: HCPCS | Performed by: NURSE PRACTITIONER

## 2024-03-06 PROCEDURE — 1036F TOBACCO NON-USER: CPT | Performed by: NURSE PRACTITIONER

## 2024-03-06 PROCEDURE — 3017F COLORECTAL CA SCREEN DOC REV: CPT | Performed by: NURSE PRACTITIONER

## 2024-03-06 PROCEDURE — 3079F DIAST BP 80-89 MM HG: CPT | Performed by: NURSE PRACTITIONER

## 2024-03-06 PROCEDURE — 99214 OFFICE O/P EST MOD 30 MIN: CPT | Performed by: NURSE PRACTITIONER

## 2024-03-06 PROCEDURE — 3074F SYST BP LT 130 MM HG: CPT | Performed by: NURSE PRACTITIONER

## 2024-03-06 PROCEDURE — 1123F ACP DISCUSS/DSCN MKR DOCD: CPT | Performed by: NURSE PRACTITIONER

## 2024-03-06 RX ORDER — SODIUM, POTASSIUM,MAG SULFATES 17.5-3.13G
SOLUTION, RECONSTITUTED, ORAL ORAL
COMMUNITY
Start: 2024-01-16

## 2024-03-06 RX ORDER — ZOSTER VACCINE RECOMBINANT, ADJUVANTED 50 MCG/0.5
0.5 KIT INTRAMUSCULAR SEE ADMIN INSTRUCTIONS
Qty: 0.5 ML | Refills: 0 | Status: SHIPPED | OUTPATIENT
Start: 2024-03-06 | End: 2024-09-02

## 2024-03-06 NOTE — PROGRESS NOTES
helping.  He formally did take Dulera.  I did instruct patient that he cannot receive live vaccines while on Dupixent and this is the reason why we are delay starting any Dupixent or biological therapy so the patient has time to receive his single shingles shot as he is overdue    Spent 45 minutes of face-to-face time with the patient with well more than half of the visit being dedicated to the discussion of the various symptom problems, provided education of medications and disease process, as well as discussion of a therapeutic plan for each.  Face-to-face education time does not include any time that may have been spent for procedures.    Patient's case was discussed with  Dr. Jim Quiroz who reviewed patient's case.  We will continue with current recommendations and treatment plan.    (Please note that portions of this note may have been completed with a voice recognition program.  Efforts were made to edit the dictation but occasionally words are mis-transcribed.)         Signed:  SANTOSH Lamas CNP  3/6/2024  11:41 AM

## 2024-03-08 ENCOUNTER — HOSPITAL ENCOUNTER (OUTPATIENT)
Age: 69
Discharge: HOME | End: 2024-03-08
Payer: MEDICARE

## 2024-03-08 DIAGNOSIS — J44.89 ASTHMA-COPD OVERLAP SYNDROME: ICD-10-CM

## 2024-03-08 LAB
BASOPHILS ABSOLUTE: 0 THOU/MM3 (ref 0–0.1)
BASOPHILS NFR BLD AUTO: 0.2 %
DEPRECATED RDW RBC AUTO: 49.3 FL (ref 35–45)
EOSINOPHIL NFR BLD AUTO: 1.9 %
EOSINOPHILS ABSOLUTE: 0.2 THOU/MM3 (ref 0–0.4)
ERYTHROCYTE [DISTWIDTH] IN BLOOD BY AUTOMATED COUNT: 14.8 % (ref 11.5–14.5)
HCT VFR BLD AUTO: 50.1 % (ref 42–52)
HGB BLD-MCNC: 16.5 GM/DL (ref 14–18)
IMM GRANULOCYTES # BLD AUTO: 0.03 THOU/MM3 (ref 0–0.07)
IMM GRANULOCYTES NFR BLD AUTO: 0.4 %
LYMPHOCYTES ABSOLUTE: 1.4 THOU/MM3 (ref 1–4.8)
LYMPHOCYTES NFR BLD AUTO: 17 %
MCH RBC QN AUTO: 29.9 PG (ref 26–33)
MCHC RBC AUTO-ENTMCNC: 32.9 GM/DL (ref 32.2–35.5)
MCV RBC AUTO: 90.9 FL (ref 80–94)
MONOCYTES ABSOLUTE: 0.5 THOU/MM3 (ref 0.4–1.3)
MONOCYTES NFR BLD AUTO: 5.4 %
NEUTROPHILS NFR BLD AUTO: 75.1 %
NRBC BLD AUTO-RTO: 0 /100 WBC
PLATELET # BLD AUTO: 138 THOU/MM3 (ref 130–400)
PMV BLD AUTO: 12.1 FL (ref 9.4–12.4)
RBC # BLD AUTO: 5.51 MILL/MM3 (ref 4.7–6.1)
SEGMENTED NEUTROPHILS ABSOLUTE COUNT: 6.4 THOU/MM3 (ref 1.8–7.7)
WBC # BLD AUTO: 8.5 THOU/MM3 (ref 4.8–10.8)

## 2024-03-08 PROCEDURE — 85025 COMPLETE CBC W/AUTO DIFF WBC: CPT

## 2024-03-08 PROCEDURE — 82785 ASSAY OF IGE: CPT

## 2024-03-08 PROCEDURE — 82784 ASSAY IGA/IGD/IGG/IGM EACH: CPT

## 2024-03-08 PROCEDURE — 86682 HELMINTH ANTIBODY: CPT

## 2024-03-08 PROCEDURE — 36415 COLL VENOUS BLD VENIPUNCTURE: CPT

## 2024-03-09 LAB
IGA SERPL-MCNC: 187 MG/DL (ref 70–400)
IGE SERPL-ACNC: 343 IU/ML (ref 0–100)
IGG SERPL-MCNC: 846 MG/DL (ref 700–1600)

## 2024-03-11 LAB — IGM SERPL-MCNC: 125 MG/DL (ref 40–230)

## 2024-03-11 RX ORDER — BUSPIRONE HYDROCHLORIDE 10 MG/1
TABLET ORAL
Qty: 90 TABLET | Refills: 0 | Status: SHIPPED | OUTPATIENT
Start: 2024-03-11

## 2024-03-11 RX ORDER — TIOTROPIUM BROMIDE INHALATION SPRAY 3.12 UG/1
2 SPRAY, METERED RESPIRATORY (INHALATION) DAILY
Qty: 4 G | Refills: 0 | Status: SHIPPED | OUTPATIENT
Start: 2024-03-11

## 2024-03-11 NOTE — TELEPHONE ENCOUNTER
The pharmacy is  requesting a refill of the below medication which has been pended for you:     Requested Prescriptions     Pending Prescriptions Disp Refills    SPIRIVA RESPIMAT 2.5 MCG/ACT AERS inhaler [Pharmacy Med Name: SPIRIVA RESPIMAT 2.5MCG INH 4GM 60D] 4 g 2     Sig: INHALE 2 PUFFS INTO THE LUNGS DAILY    busPIRone (BUSPAR) 10 MG tablet [Pharmacy Med Name: BUSPIRONE 10MG TABLETS] 90 tablet 1     Sig: TAKE 1 TABLET BY MOUTH THREE TIMES DAILY       Last Appointment Date: 11/13/2023  Next Appointment Date: 3/13/2024    Allergies   Allergen Reactions    Isosorbide Nitrate Other (See Comments)

## 2024-03-12 ENCOUNTER — TELEPHONE (OUTPATIENT)
Dept: ALLERGY | Age: 69
End: 2024-03-12

## 2024-03-12 LAB — STRONGYLOIDES IGG SER IA-ACNC: NORMAL

## 2024-03-12 NOTE — TELEPHONE ENCOUNTER
----- Message from SANTOSH Lamas CNP sent at 3/12/2024  8:05 AM EDT -----  Please tell mom that his IgE is less than half of what it used to be.  I am very happy with this.  He still should take antihistamines though because he may have some breakthrough allergies

## 2024-03-18 ENCOUNTER — HOSPITAL ENCOUNTER (OUTPATIENT)
Age: 69
Discharge: HOME | End: 2024-03-18
Payer: MEDICARE

## 2024-03-18 ENCOUNTER — OFFICE VISIT (OUTPATIENT)
Dept: FAMILY MEDICINE CLINIC | Age: 69
End: 2024-03-18

## 2024-03-18 VITALS
HEIGHT: 68 IN | RESPIRATION RATE: 16 BRPM | HEART RATE: 64 BPM | DIASTOLIC BLOOD PRESSURE: 80 MMHG | WEIGHT: 243.2 LBS | SYSTOLIC BLOOD PRESSURE: 130 MMHG | BODY MASS INDEX: 36.86 KG/M2

## 2024-03-18 DIAGNOSIS — K21.9 GASTROESOPHAGEAL REFLUX DISEASE, UNSPECIFIED WHETHER ESOPHAGITIS PRESENT: ICD-10-CM

## 2024-03-18 DIAGNOSIS — I10 ESSENTIAL HYPERTENSION: ICD-10-CM

## 2024-03-18 DIAGNOSIS — J45.40 MODERATE PERSISTENT ASTHMA, UNSPECIFIED WHETHER COMPLICATED: ICD-10-CM

## 2024-03-18 DIAGNOSIS — N28.9 RENAL INSUFFICIENCY: ICD-10-CM

## 2024-03-18 DIAGNOSIS — E66.01 SEVERE OBESITY (BMI 35.0-39.9) WITH COMORBIDITY (HCC): ICD-10-CM

## 2024-03-18 DIAGNOSIS — J44.9 CHRONIC OBSTRUCTIVE PULMONARY DISEASE, UNSPECIFIED COPD TYPE (HCC): ICD-10-CM

## 2024-03-18 DIAGNOSIS — E11.8 TYPE 2 DIABETES MELLITUS WITH COMPLICATION, WITHOUT LONG-TERM CURRENT USE OF INSULIN (HCC): Primary | ICD-10-CM

## 2024-03-18 DIAGNOSIS — E78.5 HYPERLIPIDEMIA, UNSPECIFIED HYPERLIPIDEMIA TYPE: ICD-10-CM

## 2024-03-18 LAB
BUN SERPL-MCNC: 14 MG/DL (ref 7–22)
CHP ED QC CHECK: ABNORMAL
CREAT SERPL-MCNC: 1.3 MG/DL (ref 0.4–1.2)
GFR SERPL CREATININE-BSD FRML MDRD: 59 ML/MIN/1.73M2
GLUCOSE BLD-MCNC: 140 MG/DL
HBA1C MFR BLD: 7.2 %

## 2024-03-18 PROCEDURE — G8427 DOCREV CUR MEDS BY ELIG CLIN: HCPCS | Performed by: FAMILY MEDICINE

## 2024-03-18 PROCEDURE — 3017F COLORECTAL CA SCREEN DOC REV: CPT | Performed by: FAMILY MEDICINE

## 2024-03-18 PROCEDURE — 82962 GLUCOSE BLOOD TEST: CPT | Performed by: FAMILY MEDICINE

## 2024-03-18 PROCEDURE — 1123F ACP DISCUSS/DSCN MKR DOCD: CPT | Performed by: FAMILY MEDICINE

## 2024-03-18 PROCEDURE — 36415 COLL VENOUS BLD VENIPUNCTURE: CPT

## 2024-03-18 PROCEDURE — 99214 OFFICE O/P EST MOD 30 MIN: CPT | Performed by: FAMILY MEDICINE

## 2024-03-18 PROCEDURE — 82565 ASSAY OF CREATININE: CPT

## 2024-03-18 PROCEDURE — 84520 ASSAY OF UREA NITROGEN: CPT

## 2024-03-18 PROCEDURE — 83036 HEMOGLOBIN GLYCOSYLATED A1C: CPT | Performed by: FAMILY MEDICINE

## 2024-03-18 RX ORDER — CETIRIZINE HYDROCHLORIDE 10 MG/1
10 TABLET ORAL DAILY
COMMUNITY

## 2024-03-18 ASSESSMENT — PATIENT HEALTH QUESTIONNAIRE - PHQ9
2. FEELING DOWN, DEPRESSED OR HOPELESS: NOT AT ALL
9. THOUGHTS THAT YOU WOULD BE BETTER OFF DEAD, OR OF HURTING YOURSELF: NOT AT ALL
SUM OF ALL RESPONSES TO PHQ QUESTIONS 1-9: 0
5. POOR APPETITE OR OVEREATING: NOT AT ALL
SUM OF ALL RESPONSES TO PHQ9 QUESTIONS 1 & 2: 0
10. IF YOU CHECKED OFF ANY PROBLEMS, HOW DIFFICULT HAVE THESE PROBLEMS MADE IT FOR YOU TO DO YOUR WORK, TAKE CARE OF THINGS AT HOME, OR GET ALONG WITH OTHER PEOPLE: NOT DIFFICULT AT ALL
3. TROUBLE FALLING OR STAYING ASLEEP: NOT AT ALL
SUM OF ALL RESPONSES TO PHQ QUESTIONS 1-9: 0
SUM OF ALL RESPONSES TO PHQ QUESTIONS 1-9: 0
7. TROUBLE CONCENTRATING ON THINGS, SUCH AS READING THE NEWSPAPER OR WATCHING TELEVISION: NOT AT ALL
8. MOVING OR SPEAKING SO SLOWLY THAT OTHER PEOPLE COULD HAVE NOTICED. OR THE OPPOSITE, BEING SO FIGETY OR RESTLESS THAT YOU HAVE BEEN MOVING AROUND A LOT MORE THAN USUAL: NOT AT ALL
SUM OF ALL RESPONSES TO PHQ QUESTIONS 1-9: 0
4. FEELING TIRED OR HAVING LITTLE ENERGY: NOT AT ALL

## 2024-03-18 ASSESSMENT — ENCOUNTER SYMPTOMS
BLOOD IN STOOL: 0
BACK PAIN: 1
DIARRHEA: 0
ABDOMINAL PAIN: 0
CHEST TIGHTNESS: 0
VOMITING: 0
NAUSEA: 0
SHORTNESS OF BREATH: 1
COUGH: 0
CONSTIPATION: 0

## 2024-03-18 NOTE — PROGRESS NOTES
Date: 3/18/2024    Here with his wife.    Yony Borden is a 68 y.o. male who presents today for:  Chief Complaint   Patient presents with    Diabetes    Hyperlipidemia    Hypertension         HPI:     Diabetes  He presents for his follow-up diabetic visit. He has type 2 diabetes mellitus. His disease course has been stable. There are no hypoglycemic associated symptoms. Pertinent negatives for hypoglycemia include no dizziness, headaches, pallor, seizures, speech difficulty or sweats. There are no diabetic associated symptoms. Pertinent negatives for diabetes include no chest pain and no weakness. There are no hypoglycemic complications. Symptoms are stable. Risk factors for coronary artery disease include diabetes mellitus. Current diabetic treatment includes oral agent (dual therapy). He is compliant with treatment most of the time. (Blood sugars running in the 110-140's) An ACE inhibitor/angiotensin II receptor blocker is being taken.   Hypertension  This is a chronic problem. The current episode started more than 1 year ago. The problem is unchanged. The problem is controlled. Associated symptoms include shortness of breath. Pertinent negatives include no chest pain, headaches, palpitations or sweats. Risk factors for coronary artery disease include diabetes mellitus, dyslipidemia, obesity and male gender. Past treatments include angiotensin blockers. The current treatment provides significant improvement.   Hyperlipidemia  This is a chronic problem. The current episode started more than 1 year ago. The problem is controlled. Recent lipid tests were reviewed and are normal. Associated symptoms include shortness of breath. Pertinent negatives include no chest pain, focal sensory loss, focal weakness, leg pain or myalgias. Current antihyperlipidemic treatment includes statins. The current treatment provides significant improvement of lipids. Risk factors for coronary artery disease include diabetes mellitus,

## 2024-03-19 ENCOUNTER — TELEPHONE (OUTPATIENT)
Dept: FAMILY MEDICINE CLINIC | Age: 69
End: 2024-03-19

## 2024-03-19 NOTE — TELEPHONE ENCOUNTER
----- Message from Blanche Alvarado MD sent at 3/18/2024  5:46 PM EDT -----  Let him know that his kidney function is a little bit better and we will continue with the metformin to make sure that he keeps the appointment with the kidney doctor.

## 2024-03-26 ENCOUNTER — OFFICE VISIT (OUTPATIENT)
Dept: NEPHROLOGY | Age: 69
End: 2024-03-26
Payer: MEDICARE

## 2024-03-26 VITALS
WEIGHT: 239 LBS | DIASTOLIC BLOOD PRESSURE: 72 MMHG | OXYGEN SATURATION: 93 % | SYSTOLIC BLOOD PRESSURE: 150 MMHG | BODY MASS INDEX: 36.34 KG/M2 | HEART RATE: 75 BPM

## 2024-03-26 DIAGNOSIS — I10 ESSENTIAL HYPERTENSION: Primary | ICD-10-CM

## 2024-03-26 DIAGNOSIS — N18.31 STAGE 3A CHRONIC KIDNEY DISEASE (HCC): ICD-10-CM

## 2024-03-26 PROCEDURE — 3078F DIAST BP <80 MM HG: CPT | Performed by: INTERNAL MEDICINE

## 2024-03-26 PROCEDURE — G8427 DOCREV CUR MEDS BY ELIG CLIN: HCPCS | Performed by: INTERNAL MEDICINE

## 2024-03-26 PROCEDURE — 99213 OFFICE O/P EST LOW 20 MIN: CPT | Performed by: INTERNAL MEDICINE

## 2024-03-26 PROCEDURE — G8417 CALC BMI ABV UP PARAM F/U: HCPCS | Performed by: INTERNAL MEDICINE

## 2024-03-26 PROCEDURE — 3077F SYST BP >= 140 MM HG: CPT | Performed by: INTERNAL MEDICINE

## 2024-03-26 PROCEDURE — 1123F ACP DISCUSS/DSCN MKR DOCD: CPT | Performed by: INTERNAL MEDICINE

## 2024-03-26 PROCEDURE — G8482 FLU IMMUNIZE ORDER/ADMIN: HCPCS | Performed by: INTERNAL MEDICINE

## 2024-03-26 PROCEDURE — 3017F COLORECTAL CA SCREEN DOC REV: CPT | Performed by: INTERNAL MEDICINE

## 2024-03-26 PROCEDURE — 1036F TOBACCO NON-USER: CPT | Performed by: INTERNAL MEDICINE

## 2024-03-26 NOTE — PROGRESS NOTES
[facility]        Outpatient Follow-Up note         3/26/2024 3:04 PM    Patient Name:   Yony Borden  YOB: 1955  Primary Care Physician:  Blanche Alvarado MD   [facility]  [address]  [address]  [facility] [address]  Dept: [phone]  Loc: [phone]     Chief Complaint / Reason for follow-up : Follow Up of CKD     Interval History :  Patient seen and examined by me.   Feels well. No hospitalizations and no med changes      Past History :  Past Medical History:   Diagnosis Date    Acid reflux     Arthritis     Asthma     CAD (coronary artery disease)     Chronic back pain     Class 2 severe obesity due to excess calories with serious comorbidity and body mass index (BMI) of 37.0 to 37.9 in adult ([facility]) 8/13/2018    Colon polyps 11/06    COPD (chronic obstructive pulmonary disease) ([facility])     Depression     panic attacks    Diverticulosis     HTN (hypertension)     Hyperlipidemia     Kidney disorder     Panic attack     Pneumonia     Rash     Recurrent upper respiratory infection (URI)     Thumb amputation status 3/13/14    left tip of thumb amputated    Thyroid disease     Type II or unspecified type diabetes mellitus without mention of complication, not stated as uncontrolled      Past Surgical History:   Procedure Laterality Date    BACK SURGERY  2002    BACK SURGERY  08/11/2017    BICEPS TENDON REPAIR Right 08/16/2017    BRONCHOSCOPY      BRONCHOSCOPY N/A 4/5/2019    BRONCHOSCOPY performed by Sudheer Espinoza MD at [facility]    BRONCHOSCOPY  4/5/2019    BRONCHOSCOPY ALVEOLAR LAVAGE performed by Sudheer Espinoza MD at [facility]    CARDIAC CATHETERIZATION  07/02 08/05    CARPAL TUNNEL RELEASE  2011    right hand    CHOLECYSTECTOMY  yrs ago    COLONOSCOPY  11/06 02/12 1/14    CORONARY ANGIOPLASTY WITH STENT PLACEMENT  02/2020    ENDOSCOPY, COLON, DIAGNOSTIC      EYE

## 2024-04-11 NOTE — TELEPHONE ENCOUNTER
Date of last visit:  3/18/2024  Date of next visit:  7/22/2024    Requested Prescriptions     Pending Prescriptions Disp Refills    SPIRIVA RESPIMAT 2.5 MCG/ACT AERS inhaler [Pharmacy Med Name: SPIRIVA RESPIMAT 2.5MCG INH 4GM 60D] 4 g 1     Sig: INHALE 2 PUFFS INTO THE LUNGS DAILY    busPIRone (BUSPAR) 10 MG tablet [Pharmacy Med Name: BUSPIRONE 10MG TABLETS] 90 tablet 1     Sig: TAKE 1 TABLET BY MOUTH THREE TIMES DAILY

## 2024-04-12 RX ORDER — BUSPIRONE HYDROCHLORIDE 10 MG/1
TABLET ORAL
Qty: 90 TABLET | Refills: 1 | Status: SHIPPED | OUTPATIENT
Start: 2024-04-12

## 2024-04-12 RX ORDER — TIOTROPIUM BROMIDE INHALATION SPRAY 3.12 UG/1
2 SPRAY, METERED RESPIRATORY (INHALATION) DAILY
Qty: 4 G | Refills: 1 | Status: SHIPPED | OUTPATIENT
Start: 2024-04-12

## 2024-04-14 DIAGNOSIS — G25.81 RESTLESS LEG SYNDROME: ICD-10-CM

## 2024-04-15 RX ORDER — ROPINIROLE 1 MG/1
TABLET, FILM COATED ORAL
Qty: 270 TABLET | Refills: 1 | Status: SHIPPED | OUTPATIENT
Start: 2024-04-15

## 2024-04-15 NOTE — TELEPHONE ENCOUNTER
Date of last visit:  3/18/2024  Date of next visit:  7/22/2024    Requested Prescriptions     Pending Prescriptions Disp Refills    rOPINIRole (REQUIP) 1 MG tablet [Pharmacy Med Name: ROPINIROLE 1MG TABLETS] 270 tablet 1     Sig: TAKE 1 TABLET BY MOUTH THREE TIMES DAILY

## 2024-05-06 ENCOUNTER — TELEMEDICINE (OUTPATIENT)
Dept: FAMILY MEDICINE CLINIC | Age: 69
End: 2024-05-06

## 2024-05-06 DIAGNOSIS — J44.9 CHRONIC OBSTRUCTIVE PULMONARY DISEASE, UNSPECIFIED COPD TYPE (HCC): ICD-10-CM

## 2024-05-06 DIAGNOSIS — J40 BRONCHITIS: Primary | ICD-10-CM

## 2024-05-06 PROBLEM — F33.0 MAJOR DEPRESSIVE DISORDER, RECURRENT, MILD (HCC): Status: ACTIVE | Noted: 2024-05-06

## 2024-05-06 PROBLEM — G20.A1 PARKINSON'S DISEASE (HCC): Status: ACTIVE | Noted: 2024-05-06

## 2024-05-06 PROBLEM — F33.9 MAJOR DEPRESSIVE DISORDER, RECURRENT, UNSPECIFIED (HCC): Status: ACTIVE | Noted: 2024-05-06

## 2024-05-06 PROBLEM — F33.1 MAJOR DEPRESSIVE DISORDER, RECURRENT, MODERATE (HCC): Status: ACTIVE | Noted: 2024-05-06

## 2024-05-06 PROCEDURE — 1036F TOBACCO NON-USER: CPT | Performed by: FAMILY MEDICINE

## 2024-05-06 PROCEDURE — 99213 OFFICE O/P EST LOW 20 MIN: CPT | Performed by: FAMILY MEDICINE

## 2024-05-06 PROCEDURE — 1123F ACP DISCUSS/DSCN MKR DOCD: CPT | Performed by: FAMILY MEDICINE

## 2024-05-06 PROCEDURE — 3017F COLORECTAL CA SCREEN DOC REV: CPT | Performed by: FAMILY MEDICINE

## 2024-05-06 PROCEDURE — G8427 DOCREV CUR MEDS BY ELIG CLIN: HCPCS | Performed by: FAMILY MEDICINE

## 2024-05-06 PROCEDURE — G8417 CALC BMI ABV UP PARAM F/U: HCPCS | Performed by: FAMILY MEDICINE

## 2024-05-06 RX ORDER — LEVOFLOXACIN 500 MG/1
500 TABLET, FILM COATED ORAL DAILY
Qty: 10 TABLET | Refills: 0 | Status: SHIPPED | OUTPATIENT
Start: 2024-05-06 | End: 2024-05-16

## 2024-05-06 RX ORDER — PREDNISONE 10 MG/1
TABLET ORAL
Qty: 16 TABLET | Refills: 0 | Status: SHIPPED | OUTPATIENT
Start: 2024-05-06

## 2024-05-06 SDOH — ECONOMIC STABILITY: FOOD INSECURITY: WITHIN THE PAST 12 MONTHS, THE FOOD YOU BOUGHT JUST DIDN'T LAST AND YOU DIDN'T HAVE MONEY TO GET MORE.: NEVER TRUE

## 2024-05-06 SDOH — ECONOMIC STABILITY: INCOME INSECURITY: HOW HARD IS IT FOR YOU TO PAY FOR THE VERY BASICS LIKE FOOD, HOUSING, MEDICAL CARE, AND HEATING?: NOT VERY HARD

## 2024-05-06 SDOH — ECONOMIC STABILITY: FOOD INSECURITY: WITHIN THE PAST 12 MONTHS, YOU WORRIED THAT YOUR FOOD WOULD RUN OUT BEFORE YOU GOT MONEY TO BUY MORE.: NEVER TRUE

## 2024-05-06 ASSESSMENT — ENCOUNTER SYMPTOMS
SINUS PAIN: 0
SHORTNESS OF BREATH: 1
COUGH: 1
NAUSEA: 0
BLOOD IN STOOL: 0
CHEST TIGHTNESS: 0
ABDOMINAL PAIN: 0
SORE THROAT: 1
VOMITING: 0
EYES NEGATIVE: 1
RHINORRHEA: 0
WHEEZING: 1
CONSTIPATION: 0
SINUS PRESSURE: 0
DIARRHEA: 0

## 2024-05-06 NOTE — PROGRESS NOTES
Yony Borden, was evaluated through a synchronous (real-time) audio-video encounter. The patient (or guardian if applicable) is aware that this is a billable service, which includes applicable co-pays. This Virtual Visit was conducted with patient's (and/or legal guardian's) consent. Patient identification was verified, and a caregiver was present when appropriate.   The patient was located at Home: [address]  [place] [address] with his wife and his mother in law.   Provider was located at Facility (Appt Dept): [address] [place] [id number]  Confirm you are appropriately licensed, registered, or certified to deliver care in the state where the patient is located as indicated above. If you are not or unsure, please re-schedule the visit: Yes, I confirm.     Yony Borden (:  1955) is a Established patient, presenting virtually for evaluation of the following: cough, wheezing, SOB. He states that he feels same as when he has bronchitis.     Assessment & Plan   Below is the assessment and plan developed based on review of pertinent history, physical exam, labs, studies, and medications.  1. Bronchitis  -     predniSONE (DELTASONE) 10 MG tablet; 2 tablets 2 times a day for 2 days, then 1 Tablet 2 times a day for 2 days, then 1 tablet daily till gone, Disp-16 tablet, R-0Normal  -     levoFLOXacin (LEVAQUIN) 500 MG tablet; Take 1 tablet by mouth daily for 10 days, Disp-10 tablet, R-0Normal  2. Chronic obstructive pulmonary disease, unspecified COPD type (HCC)  -     predniSONE (DELTASONE) 10 MG tablet; 2 tablets 2 times a day for 2 days, then 1 Tablet 2 times a day for 2 days, then 1 tablet daily till gone, Disp-16 tablet, R-0Normal  -     levoFLOXacin (LEVAQUIN) 500 MG tablet; Take 1 tablet by mouth daily for 10 days, Disp-10 tablet, R-0Normal    Return if symptoms worsen or fail to improve.       Subjective   Cough  This is a new problem. Episode onset: since yesterday AM. The problem has been

## 2024-06-07 DIAGNOSIS — J30.9 ALLERGIC RHINITIS, UNSPECIFIED SEASONALITY, UNSPECIFIED TRIGGER: ICD-10-CM

## 2024-06-07 DIAGNOSIS — J34.3 HYPERTROPHY OF INFERIOR NASAL TURBINATE: ICD-10-CM

## 2024-06-10 RX ORDER — FLUTICASONE PROPIONATE 50 MCG
SPRAY, SUSPENSION (ML) NASAL
Qty: 16 G | Refills: 11 | Status: SHIPPED | OUTPATIENT
Start: 2024-06-10

## 2024-06-10 NOTE — TELEPHONE ENCOUNTER
REMIND PATIENTS TO REQUESTS MEDICATIONS DURING THEIR REGULAR OFFICE VISIT EVEN IF THE MEDICATION IS NOT DUE.  THIS SAVES TIME FOR THE PATIENT AND PROVIDER.      IF MEDICATIONS ARE NOT DUE AT THAT TIME, THE PHARMACY MAY HOLD THE PRESCRIPTION TO FILL AT A LATER DATE.    MAKE SURE THE CORRECT PHARMACY IS SELECTED WITH THE MEDICATION REFILL REQUESTS.  MEDICATIONS THAT ARE SENT TO A DIFFERENT PHARMACY WILL NEED TO BE TRANSFERRED BY THE PATIENT TO THE CORRECT PHARMACY.  PLEASE VERIFY THE CORRECT PHARMACY AND LINK THE PHARMACY TO THE REFILL REQUEST.    ____________________________________________________________________________________________________________________________________    Pharmacy requests the following medication to be refilled:      How often does patient take medication? DAILY    Patient is requesting 30 days of medication      Pharmacy (need the exact  pharmacy):     Patient last received medication on date: 5/15/23  Number of refills given at last fill: 11  (If refills are current the patient does not need another refill)    Last appointment: 3/6/24  IF THE PATIENT HAS NOT BEEN SEEN DURING THE LAST YEAR, THE MUST HAVE AN APPT TO BE SEEN AND I WILL ONLD SEND IN 90 DAYS ONE TIME.    Next appt : 9/3/24    DID THE PATIENT MISS THE LAST APPT AS A NO SHOW?  no.  IF THE PATIENT WAS A NO SHOW I WILL NOT FILL THE MEDICATION.

## 2024-06-17 RX ORDER — BUSPIRONE HYDROCHLORIDE 10 MG/1
TABLET ORAL
Qty: 90 TABLET | Refills: 1 | Status: SHIPPED | OUTPATIENT
Start: 2024-06-17

## 2024-06-17 NOTE — TELEPHONE ENCOUNTER
Date of last visit:  5/6/2024  Date of next visit:  7/22/2024    Requested Prescriptions     Pending Prescriptions Disp Refills    busPIRone (BUSPAR) 10 MG tablet [Pharmacy Med Name: BUSPIRONE 10MG TABLETS] 90 tablet 1     Sig: TAKE 1 TABLET BY MOUTH THREE TIMES DAILY

## 2024-07-08 RX ORDER — TIOTROPIUM BROMIDE INHALATION SPRAY 3.12 UG/1
2 SPRAY, METERED RESPIRATORY (INHALATION) DAILY
Qty: 4 G | Refills: 1 | Status: SHIPPED | OUTPATIENT
Start: 2024-07-08

## 2024-07-08 NOTE — TELEPHONE ENCOUNTER
The pharmacy is  requesting a refill of the below medication which has been pended for you:     Requested Prescriptions     Pending Prescriptions Disp Refills    SPIRIVA RESPIMAT 2.5 MCG/ACT AERS inhaler [Pharmacy Med Name: SPIRIVA RESPIMAT 2.5MCG INH 4GM 60D] 4 g 1     Sig: INHALE 2 PUFFS INTO THE LUNGS DAILY       Last Appointment Date: 5/6/2024  Next Appointment Date: 7/22/2024    Allergies   Allergen Reactions    Isosorbide Nitrate Other (See Comments)

## 2024-07-21 DIAGNOSIS — G25.81 RESTLESS LEG SYNDROME: ICD-10-CM

## 2024-07-22 ENCOUNTER — OFFICE VISIT (OUTPATIENT)
Dept: FAMILY MEDICINE CLINIC | Age: 69
End: 2024-07-22

## 2024-07-22 VITALS
HEIGHT: 68 IN | HEART RATE: 68 BPM | WEIGHT: 232.4 LBS | SYSTOLIC BLOOD PRESSURE: 132 MMHG | DIASTOLIC BLOOD PRESSURE: 80 MMHG | BODY MASS INDEX: 35.22 KG/M2 | RESPIRATION RATE: 16 BRPM

## 2024-07-22 DIAGNOSIS — F41.9 ANXIETY: ICD-10-CM

## 2024-07-22 DIAGNOSIS — E78.5 HYPERLIPIDEMIA, UNSPECIFIED HYPERLIPIDEMIA TYPE: ICD-10-CM

## 2024-07-22 DIAGNOSIS — I10 ESSENTIAL HYPERTENSION: ICD-10-CM

## 2024-07-22 DIAGNOSIS — E11.8 TYPE 2 DIABETES MELLITUS WITH COMPLICATION, WITHOUT LONG-TERM CURRENT USE OF INSULIN (HCC): Primary | ICD-10-CM

## 2024-07-22 DIAGNOSIS — K21.9 GASTROESOPHAGEAL REFLUX DISEASE, UNSPECIFIED WHETHER ESOPHAGITIS PRESENT: ICD-10-CM

## 2024-07-22 DIAGNOSIS — F33.9 EPISODE OF RECURRENT MAJOR DEPRESSIVE DISORDER, UNSPECIFIED DEPRESSION EPISODE SEVERITY (HCC): ICD-10-CM

## 2024-07-22 DIAGNOSIS — G25.81 RESTLESS LEG SYNDROME: ICD-10-CM

## 2024-07-22 DIAGNOSIS — J44.9 CHRONIC OBSTRUCTIVE PULMONARY DISEASE, UNSPECIFIED COPD TYPE (HCC): ICD-10-CM

## 2024-07-22 LAB
CHP ED QC CHECK: ABNORMAL
GLUCOSE BLD-MCNC: 170 MG/DL
HBA1C MFR BLD: 7.7 %

## 2024-07-22 RX ORDER — ROPINIROLE 1 MG/1
TABLET, FILM COATED ORAL
Qty: 270 TABLET | Refills: 1 | Status: SHIPPED | OUTPATIENT
Start: 2024-07-22

## 2024-07-22 RX ORDER — BUSPIRONE HYDROCHLORIDE 15 MG/1
15 TABLET ORAL 3 TIMES DAILY
Qty: 30 TABLET | Refills: 2 | Status: SHIPPED | OUTPATIENT
Start: 2024-07-22

## 2024-07-22 RX ORDER — SERTRALINE HYDROCHLORIDE 100 MG/1
100 TABLET, FILM COATED ORAL 2 TIMES DAILY
Qty: 60 TABLET | Refills: 3 | Status: SHIPPED | OUTPATIENT
Start: 2024-07-22 | End: 2024-08-15

## 2024-07-22 NOTE — PROGRESS NOTES
Date: 7/22/2024    Here with his wife.    Yony Borden is a 69 y.o. male who presents today for:  Chief Complaint   Patient presents with    Diabetes    Hyperlipidemia       Current regimen: oral agent (dual therapy)  Current monitoring regimen: home blood tests - 1  Home blood sugar trends: AM -170's  Any episodes of hypoglycemia? No  Current exercise: he is as active as he can.   Compliance with treatment: Good  Eye exam current (within one year): Yes  Any history of foot problems? No  Last foot exam: 3/22/2024  Cardiovascular risk factors: advanced age (older than 55 for men, 65 for women), diabetes mellitus, dyslipidemia, hypertension, male gender, obesity (BMI >= 30 kg/m2), and sedentary lifestyle.        HPI:     Diabetes  He presents for his follow-up diabetic visit. He has type 2 diabetes mellitus. His disease course has been stable. Hypoglycemia symptoms include nervousness/anxiousness. Pertinent negatives for hypoglycemia include no dizziness, headaches or speech difficulty. There are no diabetic associated symptoms. Pertinent negatives for diabetes include no chest pain and no weakness. There are no hypoglycemic complications. Risk factors for coronary artery disease include diabetes mellitus, dyslipidemia, male sex, hypertension, obesity and sedentary lifestyle. He rarely participates in exercise. An ACE inhibitor/angiotensin II receptor blocker is being taken.   Hyperlipidemia  This is a chronic problem. The current episode started more than 1 year ago. The problem is controlled. Recent lipid tests were reviewed and are normal. Associated symptoms include shortness of breath (at baseline). Pertinent negatives include no chest pain, focal sensory loss, focal weakness, leg pain or myalgias. Current antihyperlipidemic treatment includes statins and diet change. The current treatment provides significant improvement of lipids. Compliance problems include adherence to exercise.  Risk factors for

## 2024-07-22 NOTE — TELEPHONE ENCOUNTER
The pharmacy is  requesting a refill of the below medication which has been pended for you:     Requested Prescriptions     Pending Prescriptions Disp Refills    rOPINIRole (REQUIP) 1 MG tablet [Pharmacy Med Name: ROPINIROLE 1MG TABLETS] 270 tablet 1     Sig: TAKE 1 TABLET BY MOUTH THREE TIMES DAILY       Last Appointment Date: 5/6/2024  Next Appointment Date: 7/22/2024    Allergies   Allergen Reactions    Isosorbide Nitrate Other (See Comments)

## 2024-08-09 ENCOUNTER — TELEPHONE (OUTPATIENT)
Dept: FAMILY MEDICINE CLINIC | Age: 69
End: 2024-08-09

## 2024-08-09 ENCOUNTER — TELEMEDICINE (OUTPATIENT)
Dept: FAMILY MEDICINE CLINIC | Age: 69
End: 2024-08-09

## 2024-08-09 DIAGNOSIS — J21.9 ACUTE BRONCHIOLITIS DUE TO UNSPECIFIED ORGANISM: ICD-10-CM

## 2024-08-09 DIAGNOSIS — J44.9 CHRONIC OBSTRUCTIVE PULMONARY DISEASE, UNSPECIFIED COPD TYPE (HCC): Primary | ICD-10-CM

## 2024-08-09 RX ORDER — LEVOFLOXACIN 500 MG/1
500 TABLET, FILM COATED ORAL DAILY
Qty: 10 TABLET | Refills: 0 | Status: SHIPPED | OUTPATIENT
Start: 2024-08-09 | End: 2024-08-19

## 2024-08-09 RX ORDER — TIOTROPIUM BROMIDE INHALATION SPRAY 3.12 UG/1
2 SPRAY, METERED RESPIRATORY (INHALATION) DAILY
Qty: 4 G | Refills: 1 | Status: SHIPPED | OUTPATIENT
Start: 2024-08-09

## 2024-08-09 RX ORDER — PREDNISONE 10 MG/1
TABLET ORAL
Qty: 16 TABLET | Refills: 0 | Status: SHIPPED | OUTPATIENT
Start: 2024-08-09

## 2024-08-09 ASSESSMENT — ENCOUNTER SYMPTOMS
NAUSEA: 0
EYES NEGATIVE: 1
COUGH: 1
CONSTIPATION: 0
SORE THROAT: 1
DIARRHEA: 1
VOMITING: 0
SHORTNESS OF BREATH: 1
CHEST TIGHTNESS: 0
WHEEZING: 1
RHINORRHEA: 1
BLOOD IN STOOL: 0
ABDOMINAL PAIN: 0

## 2024-08-09 NOTE — PROGRESS NOTES
Negative for rash.   Neurological: Negative.  Negative for dizziness, syncope, weakness, light-headedness and headaches.   Psychiatric/Behavioral: Negative.            Objective   He is wearing his oxygen by nasal cannula and his pulse ox is in the 92-93 range.   Patient-Reported Vitals  No data recorded     Physical Exam  [INSTRUCTIONS:  \"[x]\" Indicates a positive item  \"[]\" Indicates a negative item  -- DELETE ALL ITEMS NOT EXAMINED]    Constitutional: [x] Appears well-developed and well-nourished [x] No apparent distress      [] Abnormal -     Mental status: [x] Alert and awake  [x] Oriented to person/place/time [x] Able to follow commands    [] Abnormal -     Eyes:   EOM    [x]  Normal    [] Abnormal -   Sclera  [x]  Normal    [] Abnormal -          Discharge [x]  None visible   [] Abnormal -     HENT: [x] Normocephalic, atraumatic  [] Abnormal -   [x] Mouth/Throat: Mucous membranes are moist    External Ears [x] Normal  [] Abnormal -    Neck: [x] No visualized mass [] Abnormal -     Pulmonary/Chest: [x] Respiratory effort normal   [x] No visualized signs of difficulty breathing or respiratory distress        [] Abnormal -      Musculoskeletal:   [x] Normal gait with no signs of ataxia         [x] Normal range of motion of neck        [] Abnormal -     Neurological:        [x] No Facial Asymmetry (Cranial nerve 7 motor function) (limited exam due to video visit)          [x] No gaze palsy        [] Abnormal -          Skin:        [x] No significant exanthematous lesions or discoloration noted on facial skin         [] Abnormal -            Psychiatric:       [x] Normal Affect [] Abnormal -        [x] No Hallucinations    Other pertinent observable physical exam findings:-    He will do a COVID test and call us back with results.         --Blanche Alvarado MD

## 2024-08-09 NOTE — TELEPHONE ENCOUNTER
Note the prescription was done for Levaquin and prednisone.  To be very careful monitoring his blood sugars while on this 2 medications

## 2024-08-09 NOTE — TELEPHONE ENCOUNTER
The pharmacy is requesting a refill of the below medication which has been pended for you:     Requested Prescriptions     Pending Prescriptions Disp Refills    SPIRIVA RESPIMAT 2.5 MCG/ACT AERS inhaler [Pharmacy Med Name: SPIRIVA RESPIMAT 2.5MCG INH 4GM 60D] 4 g 1     Sig: INHALE 2 PUFFS INTO THE LUNGS DAILY       Last Appointment Date: 8/9/2024  Next Appointment Date: 8/23/2024    Allergies   Allergen Reactions    Isosorbide Nitrate Other (See Comments)

## 2024-08-11 DIAGNOSIS — U07.1 COVID-19 VIRUS INFECTION: ICD-10-CM

## 2024-08-15 ENCOUNTER — OFFICE VISIT (OUTPATIENT)
Dept: FAMILY MEDICINE CLINIC | Age: 69
End: 2024-08-15

## 2024-08-15 VITALS
RESPIRATION RATE: 18 BRPM | HEIGHT: 68 IN | DIASTOLIC BLOOD PRESSURE: 64 MMHG | WEIGHT: 236 LBS | SYSTOLIC BLOOD PRESSURE: 126 MMHG | HEART RATE: 68 BPM | BODY MASS INDEX: 35.77 KG/M2

## 2024-08-15 DIAGNOSIS — M54.9 CHRONIC BILATERAL BACK PAIN, UNSPECIFIED BACK LOCATION: ICD-10-CM

## 2024-08-15 DIAGNOSIS — G89.29 CHRONIC BILATERAL BACK PAIN, UNSPECIFIED BACK LOCATION: ICD-10-CM

## 2024-08-15 DIAGNOSIS — J45.50 SEVERE PERSISTENT ASTHMA WITHOUT COMPLICATION: ICD-10-CM

## 2024-08-15 DIAGNOSIS — D69.6 THROMBOCYTOPENIA (HCC): ICD-10-CM

## 2024-08-15 DIAGNOSIS — T79.2XXA POST-TRAUMATIC SEROMA (HCC): Primary | ICD-10-CM

## 2024-08-15 DIAGNOSIS — F33.1 MAJOR DEPRESSIVE DISORDER, RECURRENT, MODERATE (HCC): ICD-10-CM

## 2024-08-15 DIAGNOSIS — J44.9 CHRONIC OBSTRUCTIVE PULMONARY DISEASE, UNSPECIFIED COPD TYPE (HCC): ICD-10-CM

## 2024-08-15 DIAGNOSIS — I50.9 CHRONIC CONGESTIVE HEART FAILURE, UNSPECIFIED HEART FAILURE TYPE (HCC): ICD-10-CM

## 2024-08-15 PROBLEM — G20.A1 PARKINSON'S DISEASE (HCC): Status: RESOLVED | Noted: 2024-05-06 | Resolved: 2024-08-15

## 2024-08-15 RX ORDER — CEPHALEXIN 500 MG/1
500 CAPSULE ORAL 3 TIMES DAILY
Qty: 30 CAPSULE | Refills: 0 | Status: SHIPPED | OUTPATIENT
Start: 2024-08-15

## 2024-08-15 RX ORDER — CEPHALEXIN 500 MG/1
500 CAPSULE ORAL 3 TIMES DAILY
Qty: 30 CAPSULE | Refills: 0 | Status: SHIPPED | OUTPATIENT
Start: 2024-08-15 | End: 2024-08-15

## 2024-08-15 ASSESSMENT — ENCOUNTER SYMPTOMS
TROUBLE SWALLOWING: 0
COUGH: 0
WHEEZING: 0
VOICE CHANGE: 0
RHINORRHEA: 0
SINUS PRESSURE: 0
SORE THROAT: 0
NAUSEA: 0
DIARRHEA: 0
ABDOMINAL PAIN: 0
SHORTNESS OF BREATH: 0
CONSTIPATION: 0
CHEST TIGHTNESS: 0
VOMITING: 0
BACK PAIN: 1

## 2024-08-15 NOTE — PROGRESS NOTES
HYDROcodone-acetaminophen (NORCO) 5-325 MG per tablet TAKE 1 TABLET BY MOUTH THREE TIMES DAILY AS NEEDED FOR PAIN      losartan (COZAAR) 50 MG tablet Take 0.5 tablets by mouth daily 30 tablet 3    OXYGEN Inhale 3 L into the lungs as needed      montelukast (SINGULAIR) 10 MG tablet Take 1 tablet by mouth nightly      VENTOLIN  (90 Base) MCG/ACT inhaler INHALE 2 PUFFS INTO THE LUNGS EVERY 6 HOURS AS NEEDED FOR WHEEZING 1 each 11    pantoprazole (PROTONIX) 40 MG tablet Take 1 tablet by mouth in the morning and 1 tablet in the evening.      EPINEPHrine (EPIPEN 2-HUBER) 0.3 MG/0.3ML SOAJ injection Inject one pen as directed STAT for allergic reaction, may disp generic NDC 32620-001-32 6 each 99    baclofen (LIORESAL) 10 MG tablet 2 times daily as needed   0    Omega-3 Fatty Acids (FISH OIL) 1000 MG CAPS Take 1 capsule by mouth daily      albuterol (PROVENTIL) (2.5 MG/3ML) 0.083% nebulizer solution Take 3 mLs by nebulization every 6 hours as needed for Wheezing or Shortness of Breath 120 each 8     No current facility-administered medications for this visit.       Subjective:      Review of Systems   Constitutional:  Negative for appetite change, chills, diaphoresis, fatigue and fever.   HENT:  Negative for congestion, ear discharge, ear pain, postnasal drip, rhinorrhea, sinus pressure, sore throat, trouble swallowing and voice change.    Respiratory:  Negative for cough, chest tightness, shortness of breath and wheezing.    Cardiovascular:  Negative for chest pain, palpitations and leg swelling.   Gastrointestinal:  Negative for abdominal pain, constipation, diarrhea, nausea and vomiting.   Musculoskeletal:  Positive for back pain. Negative for arthralgias, joint swelling, myalgias, neck pain and neck stiffness.        Clear fluid leaking from the punctures in the lower lumbar area   Skin:  Negative for rash.   Neurological:  Negative for dizziness, syncope, weakness, light-headedness, numbness and headaches.

## 2024-08-21 LAB — AEROBIC CULTURE: NORMAL

## 2024-08-22 ENCOUNTER — TELEPHONE (OUTPATIENT)
Dept: FAMILY MEDICINE CLINIC | Age: 69
End: 2024-08-22

## 2024-08-22 LAB — ANAEROBIC CULTURE: NORMAL

## 2024-08-22 NOTE — TELEPHONE ENCOUNTER
----- Message from Dr. Paul Alford MD sent at 8/22/2024  3:24 PM EDT -----  Culture of the fluid on his lumbar spine showed no growth

## 2024-08-23 ENCOUNTER — OFFICE VISIT (OUTPATIENT)
Dept: FAMILY MEDICINE CLINIC | Age: 69
End: 2024-08-23

## 2024-08-23 VITALS
RESPIRATION RATE: 12 BRPM | BODY MASS INDEX: 35.04 KG/M2 | DIASTOLIC BLOOD PRESSURE: 80 MMHG | HEART RATE: 76 BPM | HEIGHT: 68 IN | WEIGHT: 231.2 LBS | SYSTOLIC BLOOD PRESSURE: 130 MMHG

## 2024-08-23 DIAGNOSIS — I10 ESSENTIAL HYPERTENSION: ICD-10-CM

## 2024-08-23 DIAGNOSIS — F33.41 RECURRENT MAJOR DEPRESSIVE DISORDER, IN PARTIAL REMISSION (HCC): ICD-10-CM

## 2024-08-23 DIAGNOSIS — F41.9 ANXIETY: ICD-10-CM

## 2024-08-23 DIAGNOSIS — J44.9 CHRONIC OBSTRUCTIVE PULMONARY DISEASE, UNSPECIFIED COPD TYPE (HCC): Primary | ICD-10-CM

## 2024-08-23 ASSESSMENT — ENCOUNTER SYMPTOMS
CONSTIPATION: 0
DIARRHEA: 0
CHEST TIGHTNESS: 0
EYES NEGATIVE: 1
BLOOD IN STOOL: 0
SHORTNESS OF BREATH: 1
NAUSEA: 0
VOMITING: 0
BACK PAIN: 1
ABDOMINAL PAIN: 0
COUGH: 0

## 2024-08-23 NOTE — PROGRESS NOTES
Date: 2024    Here with his wife.    Yony Borden is a 69 y.o. male who presents today for:  Chief Complaint   Patient presents with    1 Month Follow-Up    Bronchitis he states that he feels better he is back to his baseline.    Anxiety he states that he did not want to take the extra dose of BuSpar.  He states that him and his wife are working things out and he is feeling better.        HPI:     Anxiety  Presents for follow-up visit. Symptoms include insomnia (he sleeps well with his sleeping meds.), nervous/anxious behavior and shortness of breath (at his baseline). Patient reports no chest pain, depressed mood, dizziness, excessive worry, irritability (it is better), nausea or palpitations. Symptoms occur occasionally. The quality of sleep is good. Nighttime awakenings: none.           has a current medication list which includes the following prescription(s): cephalexin, spiriva respimat, ropinirole, buspirone, fluticasone, cetirizine, atorvastatin, glimepiride, trazodone, sertraline, metformin, aspirin, hydrocodone-acetaminophen, losartan, OXYGEN, montelukast, ventolin hfa, pantoprazole, epinephrine, baclofen, fish oil, and albuterol.    Allergies   Allergen Reactions    Isosorbide Nitrate Other (See Comments)       Social History     Tobacco Use    Smoking status: Former     Current packs/day: 0.00     Average packs/day: 2.0 packs/day for 45.7 years (91.5 ttl pk-yrs)     Types: Cigarettes     Start date: 1971     Quit date: 3/3/2017     Years since quittin.4     Passive exposure: Past    Smokeless tobacco: Never    Tobacco comments:     pts uses just nicotine vap   Vaping Use    Vaping status: Every Day    Substances: Nicotine   Substance Use Topics    Alcohol use: No     Alcohol/week: 0.0 standard drinks of alcohol    Drug use: No       Past Medical History:   Diagnosis Date    Acid reflux     Arthritis     Asthma     CAD (coronary artery disease)     Chronic back pain     Class 2 severe obesity

## 2024-08-29 NOTE — PATIENT INSTRUCTIONS
Health Maintenance Due   Topic Date Due    Hepatitis C screen  1955    HIV screen  05/03/1968    Pneumococcal med risk (1 of 1 - PPSV23) 05/03/1972    Diabetic microalbuminuria test  11/04/2017    Diabetic foot exam  11/14/2017     Recommendations/Plan:  -He was instructed call his family physician/Primary care physician as soon as possible for optimization of his anti HTN medications. He verbalizes understanding.  -Astelin 0.1% solution ( 137mcg/ spray) 2 sprays each nostril bid  -Continue Albuterol HFA 90mcg/Spray MDI, 2puffs  Q6Hprn.  -Continue Accolate ( Zafirlukast) 20mg po bid.  -Continue Symbicort 160/4.5mcg spray MDI, 2 puffs via inhalation BID.  -He instructed to use Albuterol HFA  inhaler 2 puffs Q6h prn or Duonebs nebs Q6h prn (the nebulizer) at one time as rescue medication not both at the same time. He verbalizes understanding.   -Continue Zantac for his GERD. -Patient educated to update his pneumococcal vaccine with family physician and take influenza vaccine in coming season with out fail. Patient verbalizes understanding.  -Yony Borden educated about environmental safety precautions he need to practice to prevent exacerbation ofhis Asthma. Yony Borden verbalizes understanding.   - He is following with Dr.Charles Tammy MD [facility] for his Left thyroid nodule. - Patient educated about my impression and plan. Patient verbalizes understanding.  - Schedule patient for follow up with my clinic in 9months with spirometry before clinic visit. Patient advised to make early appointment if needed. done

## 2024-09-03 ENCOUNTER — NURSE ONLY (OUTPATIENT)
Dept: ALLERGY | Age: 69
End: 2024-09-03
Payer: MEDICARE

## 2024-09-03 ENCOUNTER — TELEPHONE (OUTPATIENT)
Dept: ALLERGY | Age: 69
End: 2024-09-03

## 2024-09-03 ENCOUNTER — OFFICE VISIT (OUTPATIENT)
Dept: ALLERGY | Age: 69
End: 2024-09-03
Payer: MEDICARE

## 2024-09-03 VITALS
HEIGHT: 68 IN | OXYGEN SATURATION: 92 % | DIASTOLIC BLOOD PRESSURE: 78 MMHG | HEART RATE: 98 BPM | WEIGHT: 236 LBS | BODY MASS INDEX: 35.77 KG/M2 | RESPIRATION RATE: 20 BRPM | SYSTOLIC BLOOD PRESSURE: 154 MMHG

## 2024-09-03 VITALS
RESPIRATION RATE: 20 BRPM | SYSTOLIC BLOOD PRESSURE: 154 MMHG | OXYGEN SATURATION: 92 % | DIASTOLIC BLOOD PRESSURE: 78 MMHG | HEART RATE: 98 BPM

## 2024-09-03 DIAGNOSIS — R76.8 ELEVATED IGE LEVEL: ICD-10-CM

## 2024-09-03 DIAGNOSIS — J44.89 ASTHMA-COPD OVERLAP SYNDROME (HCC): ICD-10-CM

## 2024-09-03 DIAGNOSIS — F41.9 ANXIETY: ICD-10-CM

## 2024-09-03 DIAGNOSIS — J44.9 CHRONIC OBSTRUCTIVE PULMONARY DISEASE, UNSPECIFIED COPD TYPE (HCC): ICD-10-CM

## 2024-09-03 DIAGNOSIS — J30.9 CHRONIC ALLERGIC RHINITIS: ICD-10-CM

## 2024-09-03 DIAGNOSIS — J45.50 SEVERE PERSISTENT ASTHMA WITHOUT COMPLICATION: Primary | ICD-10-CM

## 2024-09-03 DIAGNOSIS — J30.1 ALLERGY TO TREES: ICD-10-CM

## 2024-09-03 PROCEDURE — G8417 CALC BMI ABV UP PARAM F/U: HCPCS | Performed by: NURSE PRACTITIONER

## 2024-09-03 PROCEDURE — 3077F SYST BP >= 140 MM HG: CPT | Performed by: NURSE PRACTITIONER

## 2024-09-03 PROCEDURE — 96372 THER/PROPH/DIAG INJ SC/IM: CPT | Performed by: NURSE PRACTITIONER

## 2024-09-03 PROCEDURE — 1036F TOBACCO NON-USER: CPT | Performed by: NURSE PRACTITIONER

## 2024-09-03 PROCEDURE — G8428 CUR MEDS NOT DOCUMENT: HCPCS | Performed by: NURSE PRACTITIONER

## 2024-09-03 PROCEDURE — 99214 OFFICE O/P EST MOD 30 MIN: CPT | Performed by: NURSE PRACTITIONER

## 2024-09-03 PROCEDURE — A4617 MOUTH PIECE: HCPCS | Performed by: NURSE PRACTITIONER

## 2024-09-03 PROCEDURE — 3078F DIAST BP <80 MM HG: CPT | Performed by: NURSE PRACTITIONER

## 2024-09-03 PROCEDURE — 3017F COLORECTAL CA SCREEN DOC REV: CPT | Performed by: NURSE PRACTITIONER

## 2024-09-03 PROCEDURE — 3023F SPIROM DOC REV: CPT | Performed by: NURSE PRACTITIONER

## 2024-09-03 PROCEDURE — 95012 NITRIC OXIDE EXP GAS DETER: CPT | Performed by: NURSE PRACTITIONER

## 2024-09-03 PROCEDURE — 1123F ACP DISCUSS/DSCN MKR DOCD: CPT | Performed by: NURSE PRACTITIONER

## 2024-09-03 RX ORDER — DUPILUMAB 300 MG/2ML
300 INJECTION, SOLUTION SUBCUTANEOUS
Qty: 2 ADJUSTABLE DOSE PRE-FILLED PEN SYRINGE | Refills: 5 | Status: SHIPPED | OUTPATIENT
Start: 2024-09-03

## 2024-09-03 RX ORDER — DUPILUMAB 300 MG/2ML
300 INJECTION, SOLUTION SUBCUTANEOUS
Qty: 2 ADJUSTABLE DOSE PRE-FILLED PEN SYRINGE | Refills: 5 | Status: SHIPPED | OUTPATIENT
Start: 2024-09-03 | End: 2024-09-03 | Stop reason: SDUPTHER

## 2024-09-03 ASSESSMENT — ENCOUNTER SYMPTOMS
COUGH: 1
SHORTNESS OF BREATH: 1

## 2024-09-03 NOTE — TELEPHONE ENCOUNTER
[facility] [place]. [facility] [place]. Can you please change.

## 2024-09-03 NOTE — PROGRESS NOTES
Following obtaining written consent for Dupixent 600 mg SQ administered at the following site: [facility]    NDC # : 0421-4774-13  Lot #: [id number]  EXP: 04/30/26    Medication was supplied by patient:  YES/NO: no         Medication was supplied as a sample:  YES/NO: Yes                         Recommended injections sites for Dupixent is into the upper arm, thigh, or abdomen.        Patient has tolerated injection well.  No signs and symptoms of reaction noted in the office. Recommended to stay for observation for 30 minutes.   Patient was instructed to report any problems questions or concerns to the office immediately.      Patient understands adverse reactions may include reaction at the injection site, conjunctivitis, blepharitis, oral herpes, keratitis, eye pruritus, other herpes simplex virus infections and dry eyes.     0831

## 2024-09-03 NOTE — TELEPHONE ENCOUNTER
Date of last visit:  8/23/2024  Date of next visit:  11/22/2024    Requested Prescriptions     Pending Prescriptions Disp Refills    sertraline (ZOLOFT) 100 MG tablet [Pharmacy Med Name: SERTRALINE 100MG TABLETS] 180 tablet 1     Sig: TAKE 1 TABLET BY MOUTH TWICE DAILY

## 2024-09-03 NOTE — PROGRESS NOTES
AFOREMENTIONED CONDITION WHILE ON THIS MEDICATION.      (IF PATIENT ON MEDICARE MUST RECEIVE IN OFFICE TO RUN AS MEDICAL WITH A SOSY)    ASTHMA (J45.40, J45.50)  DATE OF ANTIHISTAMINE 1 NAME AND TRIAL PERIOD:cetrizine 10 mg FROM 2024 to present  DATE OF ANTIHISTAMINE 2 DATE AND TRIAL PERIOD: .loratadine 10 mg  FROM 2023 to 2024  DATE OF SINGULAIR 10 MG DAILY TRIAL PERIOD FROM 2021  TO present   PREDNISONE BURST PACK TRIAL PERIODS DURING LAST YEAR - DATE AND MONTH:  TRIED NASAL SPRAYS FLUTICASONE 50 MCG 2 NASAL SPRAYS TWICE A DAY FROM: 2024 TO present  TRIED NASAL SPRAY MOMETASONE 50 MCG 2 NASAL SPRAYS TWICE A DAY FROM: 2024  TO 2024  IGE LEVEL: 343 ON 2024    SKIN TESTIN2021. HAS COMPLETED IMMUNOTHERAPY  INHALED MEDICATIONS TRIED:    ALBUTEROL 2 PUFFS EVERY 4 HOURS AS NEEDED SINCE  2022 to present    BREZTRI 2022 to 2024 TRIED AND FAILED. INEFFECTIVE    DUONEB: 2023 TO 2024 TRIED AND FAILED INEFFECTIVE    SYMBICORT 2022 TO 2024. TRIED AND FAILED. INEFFECTIVE  TRELEGY:   SPIRIVA:  2.5 mcg 2 puffs daily from 10/2023 to present    Oral corticosteroids:  Prednisone 40 mg daily from 2024 to 2024  Prednisone  40 mg daily from 2024 to 2024  09/15/2023 prednisone 40 mg daily from 09/15/2023 to 2023    +++++++++++++++++++++++++++++++++++++++++++++++++++++++++++++++++++++++++++++++++  PATIENT HAS BEEN INSTRUCTED ON THE AFOREMENTIONED BIOLOGIC INCLUDING DRUG INTERACTION AND SIDE EFFECTS NOT LIMITED TO THE FOLLOWING:     Hypersensitivity: Hypersensitivity reactions including anaphylaxis, serum sickness, angioedema, urticaria, rash, erythema nodosum, and erythema multiforme  have occurred.    Conjunctivitis and Keratitis: Advise patients to report new onset or worsening eye  symptoms to their healthcare provider. Consider ophthalmological examination,  as appropriate. (5.2)   Eosinophilic Conditions:

## 2024-09-04 RX ORDER — SERTRALINE HYDROCHLORIDE 100 MG/1
100 TABLET, FILM COATED ORAL 2 TIMES DAILY
Qty: 180 TABLET | Refills: 1 | Status: SHIPPED | OUTPATIENT
Start: 2024-09-04

## 2024-09-12 DIAGNOSIS — F41.9 ANXIETY: ICD-10-CM

## 2024-09-12 RX ORDER — BUSPIRONE HYDROCHLORIDE 15 MG/1
15 TABLET ORAL 3 TIMES DAILY
Qty: 30 TABLET | Refills: 2 | Status: SHIPPED | OUTPATIENT
Start: 2024-09-12

## 2024-09-16 ENCOUNTER — TELEPHONE (OUTPATIENT)
Dept: FAMILY MEDICINE CLINIC | Age: 69
End: 2024-09-16

## 2024-09-16 RX ORDER — BUSPIRONE HYDROCHLORIDE 10 MG/1
10 TABLET ORAL 3 TIMES DAILY
Qty: 90 TABLET | Refills: 2 | Status: SHIPPED | OUTPATIENT
Start: 2024-09-16 | End: 2024-12-15

## 2024-09-17 ENCOUNTER — HOSPITAL ENCOUNTER (OUTPATIENT)
Age: 69
Discharge: HOME | End: 2024-09-17
Payer: MEDICARE

## 2024-09-17 ENCOUNTER — NURSE ONLY (OUTPATIENT)
Dept: ALLERGY | Age: 69
End: 2024-09-17

## 2024-09-17 VITALS
RESPIRATION RATE: 20 BRPM | HEART RATE: 84 BPM | DIASTOLIC BLOOD PRESSURE: 69 MMHG | OXYGEN SATURATION: 90 % | SYSTOLIC BLOOD PRESSURE: 122 MMHG

## 2024-09-17 DIAGNOSIS — N18.31 STAGE 3A CHRONIC KIDNEY DISEASE (HCC): ICD-10-CM

## 2024-09-17 DIAGNOSIS — I10 ESSENTIAL HYPERTENSION: ICD-10-CM

## 2024-09-17 DIAGNOSIS — J45.50 SEVERE PERSISTENT ASTHMA WITHOUT COMPLICATION: Primary | ICD-10-CM

## 2024-09-17 LAB
ANION GAP SERPL CALC-SCNC: 12 MEQ/L (ref 8–16)
BUN SERPL-MCNC: 12 MG/DL (ref 7–22)
CALCIUM SERPL-MCNC: 8.2 MG/DL (ref 8.5–10.5)
CHLORIDE SERPL-SCNC: 104 MEQ/L (ref 98–111)
CO2 SERPL-SCNC: 27 MEQ/L (ref 23–33)
CREAT SERPL-MCNC: 1.2 MG/DL (ref 0.4–1.2)
GFR SERPL CREATININE-BSD FRML MDRD: 65 ML/MIN/1.73M2
GLUCOSE SERPL-MCNC: 143 MG/DL (ref 70–108)
POTASSIUM SERPL-SCNC: 4 MEQ/L (ref 3.5–5.2)
SODIUM SERPL-SCNC: 143 MEQ/L (ref 135–145)

## 2024-09-17 PROCEDURE — 80048 BASIC METABOLIC PNL TOTAL CA: CPT

## 2024-09-17 PROCEDURE — 36415 COLL VENOUS BLD VENIPUNCTURE: CPT

## 2024-09-17 RX ORDER — LOPERAMIDE HCL 2 MG
2 CAPSULE ORAL PRN
COMMUNITY
Start: 2014-01-03

## 2024-09-19 ENCOUNTER — TELEMEDICINE (OUTPATIENT)
Dept: FAMILY MEDICINE CLINIC | Age: 69
End: 2024-09-19

## 2024-09-19 ENCOUNTER — TELEPHONE (OUTPATIENT)
Dept: FAMILY MEDICINE CLINIC | Age: 69
End: 2024-09-19

## 2024-09-19 DIAGNOSIS — J44.9 CHRONIC OBSTRUCTIVE PULMONARY DISEASE, UNSPECIFIED COPD TYPE (HCC): ICD-10-CM

## 2024-09-19 DIAGNOSIS — J20.9 COPD WITH ACUTE BRONCHITIS (HCC): Primary | ICD-10-CM

## 2024-09-19 DIAGNOSIS — J44.0 COPD WITH ACUTE BRONCHITIS (HCC): Primary | ICD-10-CM

## 2024-09-19 DIAGNOSIS — E11.8 TYPE 2 DIABETES MELLITUS WITH COMPLICATION, WITHOUT LONG-TERM CURRENT USE OF INSULIN (HCC): ICD-10-CM

## 2024-09-19 PROCEDURE — 1036F TOBACCO NON-USER: CPT | Performed by: EMERGENCY MEDICINE

## 2024-09-19 PROCEDURE — 1123F ACP DISCUSS/DSCN MKR DOCD: CPT | Performed by: EMERGENCY MEDICINE

## 2024-09-19 PROCEDURE — G8427 DOCREV CUR MEDS BY ELIG CLIN: HCPCS | Performed by: EMERGENCY MEDICINE

## 2024-09-19 PROCEDURE — 99214 OFFICE O/P EST MOD 30 MIN: CPT | Performed by: EMERGENCY MEDICINE

## 2024-09-19 PROCEDURE — 3017F COLORECTAL CA SCREEN DOC REV: CPT | Performed by: EMERGENCY MEDICINE

## 2024-09-19 PROCEDURE — G8417 CALC BMI ABV UP PARAM F/U: HCPCS | Performed by: EMERGENCY MEDICINE

## 2024-09-19 RX ORDER — LEVOFLOXACIN 500 MG/1
500 TABLET, FILM COATED ORAL DAILY
Qty: 10 TABLET | Refills: 0 | Status: SHIPPED | OUTPATIENT
Start: 2024-09-19 | End: 2024-09-29

## 2024-09-19 RX ORDER — PREDNISONE 10 MG/1
TABLET ORAL
Qty: 16 TABLET | Refills: 0 | Status: SHIPPED | OUTPATIENT
Start: 2024-09-19

## 2024-09-19 ASSESSMENT — ENCOUNTER SYMPTOMS
DIARRHEA: 0
ABDOMINAL PAIN: 0
BACK PAIN: 0
CONSTIPATION: 0
TROUBLE SWALLOWING: 0
SHORTNESS OF BREATH: 1
CHEST TIGHTNESS: 0
SORE THROAT: 0
VOICE CHANGE: 0
COUGH: 1
SINUS PRESSURE: 0
WHEEZING: 0
RHINORRHEA: 1
VOMITING: 0
NAUSEA: 0

## 2024-09-24 ENCOUNTER — TELEPHONE (OUTPATIENT)
Dept: FAMILY MEDICINE CLINIC | Age: 69
End: 2024-09-24

## 2024-09-24 ENCOUNTER — HOSPITAL ENCOUNTER (OUTPATIENT)
Age: 69
Discharge: HOME | End: 2024-09-24
Payer: MEDICARE

## 2024-09-24 ENCOUNTER — HOSPITAL ENCOUNTER (OUTPATIENT)
Dept: GENERAL RADIOLOGY | Age: 69
Discharge: HOME | End: 2024-09-24
Payer: MEDICARE

## 2024-09-24 DIAGNOSIS — J44.0 COPD WITH ACUTE BRONCHITIS (HCC): ICD-10-CM

## 2024-09-24 DIAGNOSIS — J44.9 CHRONIC OBSTRUCTIVE PULMONARY DISEASE, UNSPECIFIED COPD TYPE (HCC): ICD-10-CM

## 2024-09-24 DIAGNOSIS — J20.9 COPD WITH ACUTE BRONCHITIS (HCC): ICD-10-CM

## 2024-09-24 PROCEDURE — 71046 X-RAY EXAM CHEST 2 VIEWS: CPT

## 2024-10-01 ENCOUNTER — TELEPHONE (OUTPATIENT)
Dept: ALLERGY | Age: 69
End: 2024-10-01

## 2024-10-01 NOTE — TELEPHONE ENCOUNTER
Pt's wife called back and pt is not wanting to go to ED because he wants to know exactly what hospital will do with him and give him for treatments of this reaction to dupixent. Pt's wife gave phone to pt and pt stated \"Until you talk to Dasha, I am not going to ED.\" Informed pt in his best interest is to go to ED as soon as possible due to symptoms. Pt states again \"You have the doctor call me\". Informed pt provider is with a patient and again it is best to go to ED to be seen before she is available to talk or have me give the message to her.

## 2024-10-01 NOTE — TELEPHONE ENCOUNTER
Attempted to reach [facility] medical records department to medical records. Unable to reach medical records department. Left voicemail stating office needs EKG and cardiology notes from today 10/1/2024 and to please call office back

## 2024-10-01 NOTE — TELEPHONE ENCOUNTER
Pt's wife called in and stated that pt received dupixent injection at home today. Since dupixent injection today, pt has had stomach cramps, heart racing, lightheaded and dizziness. Pt's wife states he cannot get his oxygen level above 88%. Pt's wife states he has had a total of 3 injections so far. Pt' wife reports that pt has been sick and had nothing but breathing problems since starting dupixent. Informed pt's wife that provider is seeing a patient but at this time due to assessement from what she is telling me, I need pt to go to the ED and be assessed. Informed pt's wife if anything further more develops with pt not being able to breath, 911 needs to be called.  Pt's wife verbalized understanding and thanked me.

## 2024-10-01 NOTE — PROGRESS NOTES
Patient complains of increasing shortness of breath.  Patient is concerned that it could be the Dupixent is causing him to become more short of breath.  He states he has been like this ever since starting the shot about the last 3 to 4 weeks.  Patient reports that he saw his heart doctor today and he was told that his heart is fluttering and it is running away with him.  He reports that he was not made the recommendation to go to the emergency room and the heart doctor is going to set him up with another test possibly tomorrow.  I have instructed the patient due to the heart fluttering and racing away that he should go immediately to the emergency room.  Patient is not on any type of blood thinner medication.  We will discontinue the Dupixent.  He should call 911 and be transported that way.  Patient is reluctant to go to the emergency room.  His wife may end up transporting him to the emergency room.  Patient states that he would call back and let us know if he chooses to go to the emergency room or not and will have his wife Zeinab call.  I did instruct to the patient this could be life-threatening and very serious and if his heart is not adequately pumping correctly he could be developing clots which caused him to have a heart attack and be life-threatening.  Patient expressed understanding

## 2024-10-02 ENCOUNTER — TELEPHONE (OUTPATIENT)
Dept: ALLERGY | Age: 69
End: 2024-10-02

## 2024-10-02 NOTE — TELEPHONE ENCOUNTER
Called patient's home and left voicemail.  Phone call to see how he was doing and if he went to the emergency room.   were requesting medical records from [facility] regarding his cardiology visit there was no information on the patient going to [facility].  Left voicemail for him or his wife to call us back and let us know how he is doing.

## 2024-10-02 NOTE — TELEPHONE ENCOUNTER
Received call back from [facility] medical records. They have no records of pt being at [facility] since June of 2023.

## 2024-11-18 ENCOUNTER — HOSPITAL ENCOUNTER (OUTPATIENT)
Dept: PULMONOLOGY | Age: 69
Discharge: HOME | End: 2024-11-18
Attending: INTERNAL MEDICINE
Payer: MEDICARE

## 2024-11-18 ENCOUNTER — HOSPITAL ENCOUNTER (OUTPATIENT)
Dept: CT IMAGING | Age: 69
Discharge: HOME | End: 2024-11-18
Attending: INTERNAL MEDICINE
Payer: MEDICARE

## 2024-11-18 DIAGNOSIS — Z87.891 PERSONAL HISTORY OF TOBACCO USE: ICD-10-CM

## 2024-11-18 DIAGNOSIS — J44.9 MODERATE COPD (CHRONIC OBSTRUCTIVE PULMONARY DISEASE) (HCC): ICD-10-CM

## 2024-11-18 DIAGNOSIS — Z87.891 PERSONAL HISTORY OF TOBACCO USE, PRESENTING HAZARDS TO HEALTH: ICD-10-CM

## 2024-11-18 DIAGNOSIS — J45.50 SEVERE PERSISTENT ASTHMA WITHOUT COMPLICATION: ICD-10-CM

## 2024-11-18 PROCEDURE — 94060 EVALUATION OF WHEEZING: CPT

## 2024-11-18 PROCEDURE — 71271 CT THORAX LUNG CANCER SCR C-: CPT

## 2024-11-19 ENCOUNTER — OFFICE VISIT (OUTPATIENT)
Dept: NEPHROLOGY | Age: 69
End: 2024-11-19
Payer: MEDICARE

## 2024-11-19 VITALS
BODY MASS INDEX: 35.28 KG/M2 | SYSTOLIC BLOOD PRESSURE: 110 MMHG | WEIGHT: 232 LBS | OXYGEN SATURATION: 84 % | HEART RATE: 86 BPM | DIASTOLIC BLOOD PRESSURE: 86 MMHG

## 2024-11-19 DIAGNOSIS — I10 ESSENTIAL HYPERTENSION: Primary | ICD-10-CM

## 2024-11-19 DIAGNOSIS — N18.31 STAGE 3A CHRONIC KIDNEY DISEASE (HCC): ICD-10-CM

## 2024-11-19 PROCEDURE — 1159F MED LIST DOCD IN RCRD: CPT | Performed by: INTERNAL MEDICINE

## 2024-11-19 PROCEDURE — 3017F COLORECTAL CA SCREEN DOC REV: CPT | Performed by: INTERNAL MEDICINE

## 2024-11-19 PROCEDURE — 99213 OFFICE O/P EST LOW 20 MIN: CPT | Performed by: INTERNAL MEDICINE

## 2024-11-19 PROCEDURE — G8427 DOCREV CUR MEDS BY ELIG CLIN: HCPCS | Performed by: INTERNAL MEDICINE

## 2024-11-19 PROCEDURE — 3074F SYST BP LT 130 MM HG: CPT | Performed by: INTERNAL MEDICINE

## 2024-11-19 PROCEDURE — 3079F DIAST BP 80-89 MM HG: CPT | Performed by: INTERNAL MEDICINE

## 2024-11-19 PROCEDURE — G8484 FLU IMMUNIZE NO ADMIN: HCPCS | Performed by: INTERNAL MEDICINE

## 2024-11-19 PROCEDURE — 1036F TOBACCO NON-USER: CPT | Performed by: INTERNAL MEDICINE

## 2024-11-19 PROCEDURE — G2211 COMPLEX E/M VISIT ADD ON: HCPCS | Performed by: INTERNAL MEDICINE

## 2024-11-19 PROCEDURE — 1123F ACP DISCUSS/DSCN MKR DOCD: CPT | Performed by: INTERNAL MEDICINE

## 2024-11-19 PROCEDURE — G8417 CALC BMI ABV UP PARAM F/U: HCPCS | Performed by: INTERNAL MEDICINE

## 2024-11-19 NOTE — PROGRESS NOTES
[facility]        Outpatient Follow-Up note         11/19/2024 10:08 AM    Patient Name:   Yony Borden  YOB: 1955  Primary Care Physician:  Blanche Alvarado MD   [facility]  [address]  [address]  [facility] [address]  Dept: [phone]  Loc: [phone]     Chief Complaint / Reason for follow-up : Follow Up of CKD     Interval History :  Patient seen and examined by me.   Feels well. No hospitalizations and no med changes      Past History :  Past Medical History:   Diagnosis Date    Acid reflux     Arthritis     Asthma     CAD (coronary artery disease)     Chronic back pain     Class 2 severe obesity due to excess calories with serious comorbidity and body mass index (BMI) of 37.0 to 37.9 in adult 8/13/2018    Colon polyps 11/06    COPD (chronic obstructive pulmonary disease) (HCC)     Depression     panic attacks    Diverticulosis     HTN (hypertension)     Hyperlipidemia     Kidney disorder     Panic attack     Pneumonia     Rash     Recurrent upper respiratory infection (URI)     Thumb amputation status 3/13/14    left tip of thumb amputated    Thyroid disease     Type II or unspecified type diabetes mellitus without mention of complication, not stated as uncontrolled      Past Surgical History:   Procedure Laterality Date    BACK SURGERY  2002    BACK SURGERY  08/11/2017    BICEPS TENDON REPAIR Right 08/16/2017    BRONCHOSCOPY      BRONCHOSCOPY N/A 04/05/2019    BRONCHOSCOPY performed by Sudheer Espinoza MD at [facility]    BRONCHOSCOPY  04/05/2019    BRONCHOSCOPY ALVEOLAR LAVAGE performed by Sudheer Espinoza MD at [facility]    CARDIAC CATHETERIZATION  07/02 08/05    CARPAL TUNNEL RELEASE  2011    right hand    CHOLECYSTECTOMY  yrs ago    COLONOSCOPY  11/06 02/12 1/14    CORONARY ANGIOPLASTY WITH STENT PLACEMENT  02/2020    ENDOSCOPY, COLON, DIAGNOSTIC      EYE

## 2024-11-22 ENCOUNTER — OFFICE VISIT (OUTPATIENT)
Dept: FAMILY MEDICINE CLINIC | Age: 69
End: 2024-11-22

## 2024-11-22 VITALS
RESPIRATION RATE: 16 BRPM | SYSTOLIC BLOOD PRESSURE: 130 MMHG | BODY MASS INDEX: 35.81 KG/M2 | WEIGHT: 236.3 LBS | TEMPERATURE: 98 F | OXYGEN SATURATION: 91 % | DIASTOLIC BLOOD PRESSURE: 80 MMHG | HEIGHT: 68 IN | HEART RATE: 68 BPM

## 2024-11-22 DIAGNOSIS — I10 ESSENTIAL HYPERTENSION: ICD-10-CM

## 2024-11-22 DIAGNOSIS — J44.0 COPD WITH ACUTE BRONCHITIS (HCC): ICD-10-CM

## 2024-11-22 DIAGNOSIS — E78.5 HYPERLIPIDEMIA, UNSPECIFIED HYPERLIPIDEMIA TYPE: ICD-10-CM

## 2024-11-22 DIAGNOSIS — J20.9 COPD WITH ACUTE BRONCHITIS (HCC): ICD-10-CM

## 2024-11-22 DIAGNOSIS — G89.29 CHRONIC BILATERAL LOW BACK PAIN WITHOUT SCIATICA: ICD-10-CM

## 2024-11-22 DIAGNOSIS — E11.8 TYPE 2 DIABETES MELLITUS WITH COMPLICATION, WITHOUT LONG-TERM CURRENT USE OF INSULIN (HCC): Primary | ICD-10-CM

## 2024-11-22 DIAGNOSIS — F33.1 MAJOR DEPRESSIVE DISORDER, RECURRENT, MODERATE (HCC): ICD-10-CM

## 2024-11-22 DIAGNOSIS — Z00.00 MEDICARE ANNUAL WELLNESS VISIT, SUBSEQUENT: ICD-10-CM

## 2024-11-22 DIAGNOSIS — M54.50 CHRONIC BILATERAL LOW BACK PAIN WITHOUT SCIATICA: ICD-10-CM

## 2024-11-22 LAB
CHP ED QC CHECK: NORMAL
GLUCOSE BLD-MCNC: 86 MG/DL
HBA1C MFR BLD: 7.8 %

## 2024-11-22 RX ORDER — LEVOFLOXACIN 500 MG/1
500 TABLET, FILM COATED ORAL DAILY
Qty: 10 TABLET | Refills: 0 | Status: SHIPPED | OUTPATIENT
Start: 2024-11-22 | End: 2024-12-02

## 2024-11-22 RX ORDER — PREDNISONE 10 MG/1
TABLET ORAL
Qty: 16 TABLET | Refills: 0 | Status: SHIPPED | OUTPATIENT
Start: 2024-11-22

## 2024-11-22 ASSESSMENT — PATIENT HEALTH QUESTIONNAIRE - PHQ9
1. LITTLE INTEREST OR PLEASURE IN DOING THINGS: NOT AT ALL
5. POOR APPETITE OR OVEREATING: NOT AT ALL
SUM OF ALL RESPONSES TO PHQ QUESTIONS 1-9: 0
10. IF YOU CHECKED OFF ANY PROBLEMS, HOW DIFFICULT HAVE THESE PROBLEMS MADE IT FOR YOU TO DO YOUR WORK, TAKE CARE OF THINGS AT HOME, OR GET ALONG WITH OTHER PEOPLE: NOT DIFFICULT AT ALL
7. TROUBLE CONCENTRATING ON THINGS, SUCH AS READING THE NEWSPAPER OR WATCHING TELEVISION: NOT AT ALL
SUM OF ALL RESPONSES TO PHQ QUESTIONS 1-9: 0
SUM OF ALL RESPONSES TO PHQ9 QUESTIONS 1 & 2: 0
SUM OF ALL RESPONSES TO PHQ QUESTIONS 1-9: 0
SUM OF ALL RESPONSES TO PHQ9 QUESTIONS 1 & 2: 0
2. FEELING DOWN, DEPRESSED OR HOPELESS: NOT AT ALL
3. TROUBLE FALLING OR STAYING ASLEEP: NOT AT ALL
4. FEELING TIRED OR HAVING LITTLE ENERGY: NOT AT ALL
1. LITTLE INTEREST OR PLEASURE IN DOING THINGS: NOT AT ALL
SUM OF ALL RESPONSES TO PHQ QUESTIONS 1-9: 0
SUM OF ALL RESPONSES TO PHQ QUESTIONS 1-9: 0
2. FEELING DOWN, DEPRESSED OR HOPELESS: NOT AT ALL
SUM OF ALL RESPONSES TO PHQ QUESTIONS 1-9: 0
9. THOUGHTS THAT YOU WOULD BE BETTER OFF DEAD, OR OF HURTING YOURSELF: NOT AT ALL
SUM OF ALL RESPONSES TO PHQ QUESTIONS 1-9: 0
SUM OF ALL RESPONSES TO PHQ QUESTIONS 1-9: 0
8. MOVING OR SPEAKING SO SLOWLY THAT OTHER PEOPLE COULD HAVE NOTICED. OR THE OPPOSITE, BEING SO FIGETY OR RESTLESS THAT YOU HAVE BEEN MOVING AROUND A LOT MORE THAN USUAL: NOT AT ALL
6. FEELING BAD ABOUT YOURSELF - OR THAT YOU ARE A FAILURE OR HAVE LET YOURSELF OR YOUR FAMILY DOWN: NOT AT ALL

## 2024-11-22 ASSESSMENT — ENCOUNTER SYMPTOMS
NAUSEA: 0
CHEST TIGHTNESS: 0
EYES NEGATIVE: 1
COUGH: 1
BLOOD IN STOOL: 0
DIARRHEA: 0
ABDOMINAL PAIN: 0
VOMITING: 0
CONSTIPATION: 0
SHORTNESS OF BREATH: 1
WHEEZING: 1

## 2024-11-22 ASSESSMENT — LIFESTYLE VARIABLES
HOW OFTEN DO YOU HAVE A DRINK CONTAINING ALCOHOL: NEVER
HOW MANY STANDARD DRINKS CONTAINING ALCOHOL DO YOU HAVE ON A TYPICAL DAY: PATIENT DOES NOT DRINK

## 2024-11-22 ASSESSMENT — COPD QUESTIONNAIRES: COPD: 1

## 2024-11-22 NOTE — PROGRESS NOTES
VENTOLIN  (90 Base) MCG/ACT inhaler INHALE 2 PUFFS INTO THE LUNGS EVERY 6 HOURS AS NEEDED FOR WHEEZING 1 each 11    pantoprazole (PROTONIX) 40 MG tablet Take 1 tablet by mouth in the morning and 1 tablet in the evening.      EPINEPHrine (EPIPEN 2-HUBER) 0.3 MG/0.3ML SOAJ injection Inject one pen as directed STAT for allergic reaction, may disp generic NDC [id number] 6 each 99    baclofen (LIORESAL) 10 MG tablet 2 times daily as needed   0    Omega-3 Fatty Acids (FISH OIL) 1000 MG CAPS Take 1 capsule by mouth daily       No current facility-administered medications for this visit.     Orders Placed This Encounter   Procedures    POCT glycosylated hemoglobin (Hb A1C)    POCT Glucose     Lab Results   Component Value Date    LABA1C 7.8 11/22/2024    LABA1C 7.7 07/22/2024    LABA1C 7.2 03/18/2024     No results found for: \"[id number]\"  Results for orders placed or performed in visit on 11/22/24   POCT glycosylated hemoglobin (Hb A1C)   Result Value Ref Range    Hemoglobin A1C 7.8 %   POCT Glucose   Result Value Ref Range    POC Glucose 86     QC OK?       Discussed with the importance of wearing his oxygen 24/7.    Continue to monitor blood sugars 1 times a day. Keep log of blood sugars and bring with you to the next appointment.     Discussed use, benefit, and side effects of prescribed medications.  Allpatient questions answered.  Pt voiced understanding.  Instructed to continue currentmedications, diet and exercise.  Patient agreed with treatment plan.       Return if symptoms worsen or fail to improve.    Allergies, Problem List, Medications, Medical History, Family History, Surgical History and Tobacco History reviewed by provider.                     Medicare Annual Wellness Visit    Yony Borden is here for Diabetes, COPD, and Medicare AWV    Assessment & Plan   Type 2 diabetes mellitus with complication, without long-term current use of insulin (HCC)  -     POCT glycosylated hemoglobin (Hb A1C)  -

## 2024-11-22 NOTE — PATIENT INSTRUCTIONS
paid in full while other may be subject to a deductible, co-insurance, and/or copay.    Some of these benefits include a comprehensive review of your medical history including lifestyle, illnesses that may run in your family, and various assessments and screenings as appropriate.    After reviewing your medical record and screening and assessments performed today your provider may have ordered immunizations, labs, imaging, and/or referrals for you.  A list of these orders (if applicable) as well as your Preventive Care list are included within your After Visit Summary for your review.    Other Preventive Recommendations:    A preventive eye exam performed by an eye specialist is recommended every 1-2 years to screen for glaucoma; cataracts, macular degeneration, and other eye disorders.  A preventive dental visit is recommended every 6 months.  Try to get at least 150 minutes of exercise per week or 10,000 steps per day on a pedometer .  Order or download the FREE \"Exercise & Physical Activity: Your Everyday Guide\" from [employer]. Call 1-662.907.9253 or search [employer] online.  You need 5763-6572 mg of calcium and 5670-3871 IU of vitamin D per day. It is possible to meet your calcium requirement with diet alone, but a vitamin D supplement is usually necessary to meet this goal.  When exposed to the sun, use a sunscreen that protects against both UVA and UVB radiation with an SPF of 30 or greater. Reapply every 2 to 3 hours or after sweating, drying off with a towel, or swimming.  Always wear a seat belt when traveling in a car. Always wear a helmet when riding a bicycle or motorcycle.

## 2024-12-03 RX ORDER — BUSPIRONE HYDROCHLORIDE 10 MG/1
10 TABLET ORAL 3 TIMES DAILY
Qty: 90 TABLET | Refills: 1 | Status: SHIPPED | OUTPATIENT
Start: 2024-12-03

## 2024-12-03 NOTE — TELEPHONE ENCOUNTER
The pharmacy is  requesting a refill of the below medication which has been pended for you:     Requested Prescriptions     Pending Prescriptions Disp Refills    busPIRone (BUSPAR) 10 MG tablet [Pharmacy Med Name: BUSPIRONE 10MG TABLETS] 90 tablet 1     Sig: TAKE 1 TABLET BY MOUTH THREE TIMES DAILY       Last Appointment Date: 11/22/2024  Next Appointment Date: 3/24/2025    Allergies   Allergen Reactions    Dupixent [Dupilumab]     Isosorbide Nitrate Other (See Comments)

## 2024-12-04 DIAGNOSIS — F41.9 ANXIETY: ICD-10-CM

## 2024-12-04 RX ORDER — SERTRALINE HYDROCHLORIDE 100 MG/1
100 TABLET, FILM COATED ORAL 2 TIMES DAILY
Qty: 180 TABLET | Refills: 1 | Status: SHIPPED | OUTPATIENT
Start: 2024-12-04

## 2024-12-04 NOTE — TELEPHONE ENCOUNTER
The pharmacy is  requesting a refill of the below medication which has been pended for you:     Requested Prescriptions     Pending Prescriptions Disp Refills    sertraline (ZOLOFT) 100 MG tablet [Pharmacy Med Name: SERTRALINE 100MG TABLETS] 180 tablet 1     Sig: TAKE 1 TABLET BY MOUTH TWICE DAILY       Last Appointment Date: 11/22/2024  Next Appointment Date: 3/24/2025    Allergies   Allergen Reactions    Dupixent [Dupilumab]     Isosorbide Nitrate Other (See Comments)

## 2024-12-05 NOTE — PROGRESS NOTES
Cardiovascular:  Positive for chest pain and palpitations. Negative for leg swelling.   Genitourinary:  Negative for difficulty urinating.   Allergic/Immunologic: Negative for environmental allergies.        Physical exam   /68 (Site: Right Upper Arm, Position: Sitting, Cuff Size: Medium Adult)   Pulse 69   Temp 97.9 °F (36.6 °C) (Skin)   Ht 1.727 m (5' 8\")   Wt 108.6 kg (239 lb 6.4 oz)   SpO2 93% Comment: Patient on room air  BMI 36.40 kg/m²    Wt Readings from Last 3 Encounters:   12/06/24 108.6 kg (239 lb 6.4 oz)   11/22/24 107.2 kg (236 lb 4.8 oz)   11/19/24 105.2 kg (232 lb)       Physical Exam  Vitals reviewed.   Constitutional:       General: He is not in acute distress.     Appearance: He is obese.   HENT:      Head: Normocephalic and atraumatic.      Right Ear: External ear normal.      Left Ear: External ear normal.      Mouth/Throat:      Mouth: Mucous membranes are moist.      Pharynx: No oropharyngeal exudate or posterior oropharyngeal erythema.   Eyes:      General:         Right eye: No discharge.         Left eye: No discharge.   Cardiovascular:      Rate and Rhythm: Normal rate and regular rhythm.      Heart sounds: Normal heart sounds.   Pulmonary:      Effort: Pulmonary effort is normal. No respiratory distress.      Breath sounds: Rales present. No wheezing or rhonchi.   Chest:      Chest wall: No tenderness.   Musculoskeletal:      Cervical back: Neck supple.      Right lower leg: No edema.      Left lower leg: No edema.   Skin:     General: Skin is warm and dry.   Neurological:      General: No focal deficit present.      Mental Status: He is alert.   Psychiatric:         Mood and Affect: Mood normal.         Behavior: Behavior normal.         Thought Content: Thought content normal.            Electronically signed by SANTOSH Watters CNP on 12/6/2024 at 12:02 PM     (Please note that portions of this note may have been completed with a voice recognition program. Efforts

## 2024-12-06 ENCOUNTER — OFFICE VISIT (OUTPATIENT)
Dept: PULMONOLOGY | Age: 69
End: 2024-12-06

## 2024-12-06 VITALS
HEART RATE: 69 BPM | TEMPERATURE: 97.9 F | WEIGHT: 239.4 LBS | BODY MASS INDEX: 36.28 KG/M2 | OXYGEN SATURATION: 93 % | SYSTOLIC BLOOD PRESSURE: 118 MMHG | DIASTOLIC BLOOD PRESSURE: 68 MMHG | HEIGHT: 68 IN

## 2024-12-06 DIAGNOSIS — Z87.891 PERSONAL HISTORY OF TOBACCO USE: ICD-10-CM

## 2024-12-06 DIAGNOSIS — J44.9 MODERATE COPD (CHRONIC OBSTRUCTIVE PULMONARY DISEASE) (HCC): Primary | ICD-10-CM

## 2024-12-06 DIAGNOSIS — E66.9 OBESITY (BMI 30-39.9): ICD-10-CM

## 2024-12-06 RX ORDER — RANOLAZINE 500 MG/1
500 TABLET, EXTENDED RELEASE ORAL 2 TIMES DAILY
COMMUNITY

## 2024-12-06 RX ORDER — METOPROLOL TARTRATE 25 MG/1
25 TABLET, FILM COATED ORAL 2 TIMES DAILY
COMMUNITY

## 2024-12-06 RX ORDER — DILTIAZEM HCL 60 MG
60 TABLET ORAL 3 TIMES DAILY
COMMUNITY

## 2024-12-06 ASSESSMENT — ENCOUNTER SYMPTOMS
SHORTNESS OF BREATH: 1
SINUS PAIN: 0
EYE ITCHING: 1
CHEST TIGHTNESS: 0
SINUS PRESSURE: 0
WHEEZING: 0
RHINORRHEA: 1
COUGH: 1

## 2024-12-09 ENCOUNTER — IMMUNIZATION (OUTPATIENT)
Dept: FAMILY MEDICINE CLINIC | Age: 69
End: 2024-12-09

## 2024-12-09 DIAGNOSIS — Z23 NEED FOR IMMUNIZATION AGAINST INFLUENZA: Primary | ICD-10-CM

## 2024-12-09 PROCEDURE — G0008 ADMIN INFLUENZA VIRUS VAC: HCPCS | Performed by: FAMILY MEDICINE

## 2024-12-09 PROCEDURE — 90658 IIV3 VACCINE SPLT 0.5 ML IM: CPT | Performed by: FAMILY MEDICINE

## 2024-12-09 NOTE — PROGRESS NOTES
Immunizations Administered       Name Date Dose Route    Influenza, AFLURIA, FLUZONE, (age2 y+), IM, Trivalent MDV, 0.5mL 12/9/2024 0.5 mL Intramuscular    Site: Deltoid- Right    Lot: [id number]    NDC: 72022-677-83

## 2024-12-23 ENCOUNTER — TELEMEDICINE (OUTPATIENT)
Dept: FAMILY MEDICINE CLINIC | Age: 69
End: 2024-12-23

## 2024-12-23 DIAGNOSIS — J44.1 COPD WITH EXACERBATION (HCC): Primary | ICD-10-CM

## 2024-12-23 DIAGNOSIS — G47.33 OSA (OBSTRUCTIVE SLEEP APNEA): ICD-10-CM

## 2024-12-23 DIAGNOSIS — I10 ESSENTIAL HYPERTENSION: ICD-10-CM

## 2024-12-23 DIAGNOSIS — E11.8 TYPE 2 DIABETES MELLITUS WITH COMPLICATION, WITHOUT LONG-TERM CURRENT USE OF INSULIN (HCC): ICD-10-CM

## 2024-12-23 PROCEDURE — 99213 OFFICE O/P EST LOW 20 MIN: CPT | Performed by: FAMILY MEDICINE

## 2024-12-23 RX ORDER — PREDNISONE 20 MG/1
TABLET ORAL
Qty: 21 TABLET | Refills: 0 | Status: SHIPPED | OUTPATIENT
Start: 2024-12-23

## 2024-12-23 ASSESSMENT — ENCOUNTER SYMPTOMS
NAUSEA: 0
SHORTNESS OF BREATH: 1
EYE PAIN: 0
TROUBLE SWALLOWING: 0
CONSTIPATION: 0
SORE THROAT: 0
COUGH: 1
BACK PAIN: 0
WHEEZING: 1
ABDOMINAL PAIN: 0
BLOOD IN STOOL: 0
CHEST TIGHTNESS: 0

## 2024-12-23 NOTE — PROGRESS NOTES
Yony agreed to Video Chat/Exam in presence of Dr Molina and myself. Verified who was present in room with Yony. Yony informed the e-mail address used to Google Meet cannot be used to contact the Provider, if they are any questions or concerns they need to call the office directly. Yony stated understanding.

## 2024-12-23 NOTE — PROGRESS NOTES
Yony Borden, was evaluated through a synchronous (real-time) audio-video encounter. The patient (or guardian if applicable) is aware that this is a billable service, which includes applicable co-pays. This Virtual Visit was conducted with patient's (and/or legal guardian's) consent. Patient identification was verified, and a caregiver was present when appropriate.   The patient was located at Home: [facility] [address] [address]  [facility] [place] [address]  Provider was located at Facility (Appt Dept): [address] [place] [id number]  Confirm you are appropriately licensed, registered, or certified to deliver care in the state where the patient is located as indicated above. If you are not or unsure, please re-schedule the visit: Yes, I confirm.     Yony Borden (:  1955) is a Established patient, presenting virtually for evaluation of the following:      Below is the assessment and plan developed based on review of pertinent history, physical exam, labs, studies, and medications.     Assessment & Plan  COPD with exacerbation (HCC)       Orders:    amoxicillin-clavulanate (AUGMENTIN) 875-125 MG per tablet; Take 1 tablet by mouth 2 times daily for 10 days    predniSONE (DELTASONE) 20 MG tablet; One  tab po tid for  2  days  then one  bid  for 5 daysd then one day  for 5 days    Type 2 diabetes mellitus with complication, without long-term current use of insulin (HCC)            Essential hypertension            PARRIHS (obstructive sleep apnea)                ICD-10-CM    1. COPD with exacerbation (HCC)  J44.1 amoxicillin-clavulanate (AUGMENTIN) 875-125 MG per tablet     predniSONE (DELTASONE) 20 MG tablet      2. Type 2 diabetes mellitus with complication, without long-term current use of insulin (HCC)  E11.8       3. Essential hypertension  I10       4. PARRISH (obstructive sleep apnea)  G47.33          No follow-ups on file.       PLAN    Current Outpatient Medications   Medication Sig Dispense Refill    amoxicillin-clavulanate

## 2025-01-09 ENCOUNTER — TELEPHONE (OUTPATIENT)
Dept: ALLERGY | Age: 70
End: 2025-01-09

## 2025-01-24 DIAGNOSIS — G25.81 RESTLESS LEG SYNDROME: ICD-10-CM

## 2025-01-24 RX ORDER — ROPINIROLE 1 MG/1
TABLET, FILM COATED ORAL
Qty: 270 TABLET | Refills: 1 | Status: SHIPPED | OUTPATIENT
Start: 2025-01-24

## 2025-01-24 NOTE — TELEPHONE ENCOUNTER
Date of last visit:  11/22/2024  Date of next visit:  3/24/2025    Requested Prescriptions     Pending Prescriptions Disp Refills    rOPINIRole (REQUIP) 1 MG tablet [Pharmacy Med Name: ROPINIROLE 1MG TABLETS] 270 tablet 1     Sig: TAKE 1 TABLET BY MOUTH THREE TIMES DAILY

## 2025-01-27 ENCOUNTER — TELEMEDICINE (OUTPATIENT)
Dept: FAMILY MEDICINE CLINIC | Age: 70
End: 2025-01-27

## 2025-01-27 DIAGNOSIS — J40 BRONCHITIS: ICD-10-CM

## 2025-01-27 DIAGNOSIS — J44.1 COPD WITH EXACERBATION (HCC): Primary | ICD-10-CM

## 2025-01-27 RX ORDER — LEVOFLOXACIN 500 MG/1
500 TABLET, FILM COATED ORAL DAILY
Qty: 10 TABLET | Refills: 0 | Status: SHIPPED | OUTPATIENT
Start: 2025-01-27 | End: 2025-02-06

## 2025-01-27 SDOH — ECONOMIC STABILITY: FOOD INSECURITY: WITHIN THE PAST 12 MONTHS, YOU WORRIED THAT YOUR FOOD WOULD RUN OUT BEFORE YOU GOT MONEY TO BUY MORE.: NEVER TRUE

## 2025-01-27 SDOH — ECONOMIC STABILITY: FOOD INSECURITY: WITHIN THE PAST 12 MONTHS, THE FOOD YOU BOUGHT JUST DIDN'T LAST AND YOU DIDN'T HAVE MONEY TO GET MORE.: NEVER TRUE

## 2025-01-27 ASSESSMENT — ENCOUNTER SYMPTOMS
VOMITING: 0
SORE THROAT: 1
WHEEZING: 1
COUGH: 1
DIARRHEA: 0
CONSTIPATION: 0
NAUSEA: 0
EYES NEGATIVE: 1
BLOOD IN STOOL: 0
ABDOMINAL PAIN: 0
CHEST TIGHTNESS: 0
RHINORRHEA: 1
SHORTNESS OF BREATH: 0

## 2025-01-27 ASSESSMENT — PATIENT HEALTH QUESTIONNAIRE - PHQ9
3. TROUBLE FALLING OR STAYING ASLEEP: NOT AT ALL
7. TROUBLE CONCENTRATING ON THINGS, SUCH AS READING THE NEWSPAPER OR WATCHING TELEVISION: NOT AT ALL
8. MOVING OR SPEAKING SO SLOWLY THAT OTHER PEOPLE COULD HAVE NOTICED. OR THE OPPOSITE, BEING SO FIGETY OR RESTLESS THAT YOU HAVE BEEN MOVING AROUND A LOT MORE THAN USUAL: NOT AT ALL
SUM OF ALL RESPONSES TO PHQ QUESTIONS 1-9: 0
SUM OF ALL RESPONSES TO PHQ QUESTIONS 1-9: 0
4. FEELING TIRED OR HAVING LITTLE ENERGY: NOT AT ALL
2. FEELING DOWN, DEPRESSED OR HOPELESS: NOT AT ALL
9. THOUGHTS THAT YOU WOULD BE BETTER OFF DEAD, OR OF HURTING YOURSELF: NOT AT ALL
SUM OF ALL RESPONSES TO PHQ QUESTIONS 1-9: 0
5. POOR APPETITE OR OVEREATING: NOT AT ALL
1. LITTLE INTEREST OR PLEASURE IN DOING THINGS: NOT AT ALL
6. FEELING BAD ABOUT YOURSELF - OR THAT YOU ARE A FAILURE OR HAVE LET YOURSELF OR YOUR FAMILY DOWN: NOT AT ALL
SUM OF ALL RESPONSES TO PHQ9 QUESTIONS 1 & 2: 0
SUM OF ALL RESPONSES TO PHQ QUESTIONS 1-9: 0
10. IF YOU CHECKED OFF ANY PROBLEMS, HOW DIFFICULT HAVE THESE PROBLEMS MADE IT FOR YOU TO DO YOUR WORK, TAKE CARE OF THINGS AT HOME, OR GET ALONG WITH OTHER PEOPLE: NOT DIFFICULT AT ALL

## 2025-01-27 NOTE — PROGRESS NOTES
Yony agreed to Video Chat/Exam in presence of  and myself. Verified who was present in room with Yony. Yony informed the e-mail address used to Face Time cannot be used to contact the Provider, if they are any questions or concerns they need to call the office directly. Yony stated understanding.

## 2025-01-27 NOTE — PROGRESS NOTES
Yony Borden, was evaluated through a synchronous (real-time) audio-video encounter. The patient (or guardian if applicable) is aware that this is a billable service, which includes applicable co-pays. This Virtual Visit was conducted with patient's (and/or legal guardian's) consent. Patient identification was verified, and a caregiver was present when appropriate.   The patient was located at Home: [facility] [address]  [place] [address]  Provider was located at Facility (Appt Dept): [address] [place] [id number]  Confirm you are appropriately licensed, registered, or certified to deliver care in the state where the patient is located as indicated above. If you are not or unsure, please re-schedule the visit: Yes, I confirm.     Yony Borden (:  1955) is a Established patient, presenting virtually for evaluation of the following:cough, congestion, wheezing on and off for the last 3 weeks. He states that he took a course of antibiotics in December and felt better and then started all over again.       Below is the assessment and plan developed based on review of pertinent history, physical exam, labs, studies, and medications.     Assessment & Plan  COPD with exacerbation (HCC)       Orders:    levoFLOXacin (LEVAQUIN) 500 MG tablet; Take 1 tablet by mouth daily for 10 days    Bronchitis       Orders:    levoFLOXacin (LEVAQUIN) 500 MG tablet; Take 1 tablet by mouth daily for 10 days      No follow-ups on file.       Subjective   Chest Congestion  Associated symptoms include congestion, coughing (bringing brown green sputum.) and a sore throat. Pertinent negatives include no abdominal pain, chest pain, chills, diaphoresis, fever, headaches, nausea, rash, vomiting or weakness.   Cough  Associated symptoms include rhinorrhea (green sometimes with some blood.), a sore throat and wheezing (on and off. He uses his nebulizer and it helps.). Pertinent negatives include no chest pain, chills, fever, headaches,

## 2025-02-04 RX ORDER — SOFT LENS DISINFECTANT
SOLUTION, NON-ORAL MISCELLANEOUS
Qty: 1 EACH | Refills: 1 | Status: SHIPPED | OUTPATIENT
Start: 2025-02-04

## 2025-02-04 NOTE — TELEPHONE ENCOUNTER
Pt wife called and pt needs a new script for Nebulizer Hose, piping and cup.    [facility]

## 2025-02-10 RX ORDER — BUSPIRONE HYDROCHLORIDE 10 MG/1
10 TABLET ORAL 3 TIMES DAILY
Qty: 90 TABLET | Refills: 1 | Status: SHIPPED | OUTPATIENT
Start: 2025-02-10

## 2025-02-10 NOTE — TELEPHONE ENCOUNTER
Date of last visit:  9/19/2024  Date of next visit:  3/24/2025    Requested Prescriptions     Pending Prescriptions Disp Refills    busPIRone (BUSPAR) 10 MG tablet [Pharmacy Med Name: BUSPIRONE 10MG TABLETS] 90 tablet 1     Sig: TAKE 1 TABLET BY MOUTH THREE TIMES DAILY

## 2025-03-03 ENCOUNTER — TELEPHONE (OUTPATIENT)
Dept: ALLERGY | Age: 70
End: 2025-03-03

## 2025-03-03 ENCOUNTER — HOSPITAL ENCOUNTER (INPATIENT)
Age: 70
LOS: 3 days | Discharge: HOME | End: 2025-03-07
Attending: EMERGENCY MEDICINE
Payer: MEDICARE

## 2025-03-03 ENCOUNTER — APPOINTMENT (OUTPATIENT)
Dept: CT IMAGING | Age: 70
End: 2025-03-03
Payer: MEDICARE

## 2025-03-03 ENCOUNTER — APPOINTMENT (OUTPATIENT)
Dept: GENERAL RADIOLOGY | Age: 70
End: 2025-03-03
Payer: MEDICARE

## 2025-03-03 DIAGNOSIS — I50.9 ACUTE CONGESTIVE HEART FAILURE, UNSPECIFIED HEART FAILURE TYPE (HCC): Primary | ICD-10-CM

## 2025-03-03 DIAGNOSIS — R06.02 SHORTNESS OF BREATH: ICD-10-CM

## 2025-03-03 DIAGNOSIS — N18.31 STAGE 3A CHRONIC KIDNEY DISEASE (HCC): ICD-10-CM

## 2025-03-03 DIAGNOSIS — J96.21 ACUTE ON CHRONIC RESPIRATORY FAILURE WITH HYPOXIA (HCC): ICD-10-CM

## 2025-03-03 LAB
ALBUMIN SERPL BCG-MCNC: 4.4 G/DL (ref 3.4–4.9)
ALP SERPL-CCNC: 45 U/L (ref 40–129)
ALT SERPL W/O P-5'-P-CCNC: 9 U/L (ref 10–50)
ANION GAP SERPL CALC-SCNC: 11 MEQ/L (ref 8–16)
AST SERPL-CCNC: 15 U/L (ref 10–50)
BASOPHILS ABSOLUTE: 0 THOU/MM3 (ref 0–0.1)
BASOPHILS NFR BLD AUTO: 0.4 %
BILIRUB CONJ SERPL-MCNC: 0.2 MG/DL (ref 0–0.2)
BILIRUB SERPL-MCNC: 0.5 MG/DL (ref 0.3–1.2)
BUN SERPL-MCNC: 13 MG/DL (ref 8–23)
CALCIUM SERPL-MCNC: 8.6 MG/DL (ref 8.8–10.2)
CHLORIDE SERPL-SCNC: 105 MEQ/L (ref 98–111)
CO2 SERPL-SCNC: 27 MEQ/L (ref 22–29)
CREAT SERPL-MCNC: 1.2 MG/DL (ref 0.7–1.2)
DEPRECATED RDW RBC AUTO: 47.5 FL (ref 35–45)
EKG ATRIAL RATE: 55 BPM
EKG P AXIS: 48 DEGREES
EKG P-R INTERVAL: 178 MS
EKG Q-T INTERVAL: 458 MS
EKG QRS DURATION: 116 MS
EKG QTC CALCULATION (BAZETT): 438 MS
EKG R AXIS: -138 DEGREES
EKG T AXIS: 61 DEGREES
EKG VENTRICULAR RATE: 55 BPM
EOSINOPHIL NFR BLD AUTO: 2 %
EOSINOPHILS ABSOLUTE: 0.2 THOU/MM3 (ref 0–0.4)
ERYTHROCYTE [DISTWIDTH] IN BLOOD BY AUTOMATED COUNT: 14.7 % (ref 11.5–14.5)
FLUAV RNA RESP QL NAA+PROBE: NOT DETECTED
FLUBV RNA RESP QL NAA+PROBE: NOT DETECTED
GFR SERPL CREATININE-BSD FRML MDRD: 64 ML/MIN/1.73M2
GLUCOSE SERPL-MCNC: 91 MG/DL (ref 74–109)
HCT VFR BLD AUTO: 38.7 % (ref 42–52)
HGB BLD-MCNC: 13.5 GM/DL (ref 14–18)
IMM GRANULOCYTES # BLD AUTO: 0.04 THOU/MM3 (ref 0–0.07)
IMM GRANULOCYTES NFR BLD AUTO: 0.5 %
LYMPHOCYTES ABSOLUTE: 1.2 THOU/MM3 (ref 1–4.8)
LYMPHOCYTES NFR BLD AUTO: 15 %
MCH RBC QN AUTO: 31 PG (ref 26–33)
MCHC RBC AUTO-ENTMCNC: 34.9 GM/DL (ref 32.2–35.5)
MCV RBC AUTO: 89 FL (ref 80–94)
MONOCYTES ABSOLUTE: 0.5 THOU/MM3 (ref 0.4–1.3)
MONOCYTES NFR BLD AUTO: 6.1 %
NEUTROPHILS ABSOLUTE: 5.9 THOU/MM3 (ref 1.8–7.7)
NEUTROPHILS NFR BLD AUTO: 76 %
NRBC BLD AUTO-RTO: 0 /100 WBC
NT-PROBNP SERPL IA-MCNC: 1290 PG/ML (ref 0–124)
OSMOLALITY SERPL CALC.SUM OF ELEC: 284.7 MOSMOL/KG (ref 275–300)
PLATELET # BLD AUTO: 151 THOU/MM3 (ref 130–400)
PMV BLD AUTO: 10.6 FL (ref 9.4–12.4)
POTASSIUM SERPL-SCNC: 4.2 MEQ/L (ref 3.5–5.2)
PROCALCITONIN SERPL IA-MCNC: 0.04 NG/ML (ref 0.01–0.09)
PROT SERPL-MCNC: 7 G/DL (ref 6.4–8.3)
RBC # BLD AUTO: 4.35 MILL/MM3 (ref 4.7–6.1)
SARS-COV-2 RNA RESP QL NAA+PROBE: NOT DETECTED
SODIUM SERPL-SCNC: 143 MEQ/L (ref 135–145)
TROPONIN, HIGH SENSITIVITY: 13 NG/L (ref 0–12)
WBC # BLD AUTO: 7.7 THOU/MM3 (ref 4.8–10.8)

## 2025-03-03 PROCEDURE — 93005 ELECTROCARDIOGRAM TRACING: CPT | Performed by: EMERGENCY MEDICINE

## 2025-03-03 PROCEDURE — 80048 BASIC METABOLIC PNL TOTAL CA: CPT

## 2025-03-03 PROCEDURE — 84443 ASSAY THYROID STIM HORMONE: CPT

## 2025-03-03 PROCEDURE — 84439 ASSAY OF FREE THYROXINE: CPT

## 2025-03-03 PROCEDURE — 84484 ASSAY OF TROPONIN QUANT: CPT

## 2025-03-03 PROCEDURE — 36415 COLL VENOUS BLD VENIPUNCTURE: CPT

## 2025-03-03 PROCEDURE — 71045 X-RAY EXAM CHEST 1 VIEW: CPT

## 2025-03-03 PROCEDURE — 6360000004 HC RX CONTRAST MEDICATION

## 2025-03-03 PROCEDURE — 71275 CT ANGIOGRAPHY CHEST: CPT

## 2025-03-03 PROCEDURE — 85025 COMPLETE CBC W/AUTO DIFF WBC: CPT

## 2025-03-03 PROCEDURE — 80076 HEPATIC FUNCTION PANEL: CPT

## 2025-03-03 PROCEDURE — 99285 EMERGENCY DEPT VISIT HI MDM: CPT

## 2025-03-03 PROCEDURE — 83880 ASSAY OF NATRIURETIC PEPTIDE: CPT

## 2025-03-03 PROCEDURE — 0202U NFCT DS 22 TRGT SARS-COV-2: CPT

## 2025-03-03 PROCEDURE — 87636 SARSCOV2 & INF A&B AMP PRB: CPT

## 2025-03-03 PROCEDURE — 84145 PROCALCITONIN (PCT): CPT

## 2025-03-03 RX ORDER — IOPAMIDOL 755 MG/ML
80 INJECTION, SOLUTION INTRAVASCULAR
Status: COMPLETED | OUTPATIENT
Start: 2025-03-03 | End: 2025-03-03

## 2025-03-03 RX ADMIN — IOPAMIDOL 80 ML: 755 INJECTION, SOLUTION INTRAVENOUS at 23:31

## 2025-03-03 ASSESSMENT — PAIN - FUNCTIONAL ASSESSMENT
PAIN_FUNCTIONAL_ASSESSMENT: NONE - DENIES PAIN
PAIN_FUNCTIONAL_ASSESSMENT: NONE - DENIES PAIN

## 2025-03-03 NOTE — TELEPHONE ENCOUNTER
Patient was a no-show for appointment.  Noticed that his EpiPen is being  and he is never close another 1.  Patient is no longer on allergy shots.  No food allergies or venom allergies noted.  Will discontinue EpiPen

## 2025-03-04 ENCOUNTER — APPOINTMENT (OUTPATIENT)
Age: 70
End: 2025-03-04
Payer: MEDICARE

## 2025-03-04 PROBLEM — I50.9 ACUTE CONGESTIVE HEART FAILURE (HCC): Status: ACTIVE | Noted: 2025-03-04

## 2025-03-04 PROBLEM — N18.31 STAGE 3A CHRONIC KIDNEY DISEASE (HCC): Status: ACTIVE | Noted: 2025-03-04

## 2025-03-04 PROBLEM — I25.10 CORONARY ARTERY DISEASE INVOLVING NATIVE CORONARY ARTERY OF NATIVE HEART WITHOUT ANGINA PECTORIS: Status: ACTIVE | Noted: 2025-03-04

## 2025-03-04 PROBLEM — J96.21 ACUTE ON CHRONIC RESPIRATORY FAILURE WITH HYPOXIA: Status: ACTIVE | Noted: 2025-03-04

## 2025-03-04 LAB
ALBUMIN SERPL BCG-MCNC: 4.3 G/DL (ref 3.4–4.9)
ALP SERPL-CCNC: 43 U/L (ref 40–129)
ALT SERPL W/O P-5'-P-CCNC: 9 U/L (ref 10–50)
ANION GAP SERPL CALC-SCNC: 10 MEQ/L (ref 8–16)
AST SERPL-CCNC: 14 U/L (ref 10–50)
B PERT DNA NPH QL NAA+PROBE: NOT DETECTED
BACTERIA: NORMAL
BASOPHILS ABSOLUTE: 0 THOU/MM3 (ref 0–0.1)
BASOPHILS NFR BLD AUTO: 0.2 %
BILIRUB CONJ SERPL-MCNC: 0.3 MG/DL (ref 0–0.2)
BILIRUB SERPL-MCNC: 0.6 MG/DL (ref 0.3–1.2)
BILIRUB UR QL STRIP: NEGATIVE
BORDETELLA PARAPERTUSSIS BY PCR: NOT DETECTED
BUN SERPL-MCNC: 11 MG/DL (ref 8–23)
C PNEUM DNA SPEC QL NAA+PROBE: NOT DETECTED
CALCIUM SERPL-MCNC: 9.1 MG/DL (ref 8.8–10.2)
CASTS #/AREA URNS LPF: NORMAL /LPF
CASTS #/AREA URNS LPF: NORMAL /LPF
CHARACTER UR: CLEAR
CHARCOAL URNS QL MICRO: NORMAL
CHLORIDE SERPL-SCNC: 102 MEQ/L (ref 98–111)
CHOLEST SERPL-MCNC: 103 MG/DL (ref 100–199)
CO2 SERPL-SCNC: 31 MEQ/L (ref 22–29)
COLOR UR: YELLOW
CREAT SERPL-MCNC: 1.1 MG/DL (ref 0.7–1.2)
CRYSTALS URNS QL MICRO: NORMAL
DEPRECATED RDW RBC AUTO: 47.4 FL (ref 35–45)
ECHO AO ASC DIAM: 2.8 CM
ECHO AO ASCENDING AORTA INDEX: 1.28 CM/M2
ECHO AV CUSP MM: 2 CM
ECHO AV PEAK GRADIENT: 12 MMHG
ECHO AV PEAK VELOCITY: 1.7 M/S
ECHO AV VELOCITY RATIO: 0.65
ECHO EST RA PRESSURE: 5 MMHG
ECHO LA AREA 2C: 14.2 CM2
ECHO LA AREA 4C: 13.4 CM2
ECHO LA DIAMETER INDEX: 1.83 CM/M2
ECHO LA DIAMETER: 4 CM
ECHO LA MAJOR AXIS: 5.6 CM
ECHO LA MINOR AXIS: 5.1 CM
ECHO LA VOL BP: 30 ML (ref 18–58)
ECHO LA VOL MOD A2C: 33 ML (ref 18–58)
ECHO LA VOL MOD A4C: 26 ML (ref 18–58)
ECHO LA VOL/BSA BIPLANE: 14 ML/M2 (ref 16–34)
ECHO LA VOLUME INDEX MOD A2C: 15 ML/M2 (ref 16–34)
ECHO LA VOLUME INDEX MOD A4C: 12 ML/M2 (ref 16–34)
ECHO LV E' LATERAL VELOCITY: 10.2 CM/S
ECHO LV E' SEPTAL VELOCITY: 7.6 CM/S
ECHO LV EDV A2C: 79 ML
ECHO LV EDV A4C: 105 ML
ECHO LV EDV INDEX A4C: 48 ML/M2
ECHO LV EDV NDEX A2C: 36 ML/M2
ECHO LV EF PHYSICIAN: 60 %
ECHO LV EJECTION FRACTION A2C: 56 %
ECHO LV EJECTION FRACTION A4C: 61 %
ECHO LV EJECTION FRACTION BIPLANE: 59 % (ref 55–100)
ECHO LV ESV A2C: 35 ML
ECHO LV ESV A4C: 41 ML
ECHO LV ESV INDEX A2C: 16 ML/M2
ECHO LV ESV INDEX A4C: 19 ML/M2
ECHO LV FRACTIONAL SHORTENING: 33 % (ref 28–44)
ECHO LV INTERNAL DIMENSION DIASTOLE INDEX: 2.48 CM/M2
ECHO LV INTERNAL DIMENSION DIASTOLIC: 5.4 CM (ref 4.2–5.9)
ECHO LV INTERNAL DIMENSION SYSTOLIC INDEX: 1.65 CM/M2
ECHO LV INTERNAL DIMENSION SYSTOLIC: 3.6 CM
ECHO LV ISOVOLUMETRIC RELAXATION TIME (IVRT): 77 MS
ECHO LV IVSD: 0.9 CM (ref 0.6–1)
ECHO LV MASS 2D: 180.1 G (ref 88–224)
ECHO LV MASS INDEX 2D: 82.6 G/M2 (ref 49–115)
ECHO LV POSTERIOR WALL DIASTOLIC: 0.9 CM (ref 0.6–1)
ECHO LV RELATIVE WALL THICKNESS RATIO: 0.33
ECHO LVOT PEAK GRADIENT: 5 MMHG
ECHO LVOT PEAK VELOCITY: 1.1 M/S
ECHO MV A VELOCITY: 1.03 M/S
ECHO MV E DECELERATION TIME (DT): 258 MS
ECHO MV E VELOCITY: 0.97 M/S
ECHO MV E/A RATIO: 0.94
ECHO MV E/E' LATERAL: 9.51
ECHO MV E/E' RATIO (AVERAGED): 11.14
ECHO MV E/E' SEPTAL: 12.76
ECHO MV REGURGITANT PEAK GRADIENT: 74 MMHG
ECHO MV REGURGITANT PEAK VELOCITY: 4.3 M/S
ECHO PV MAX VELOCITY: 0.7 M/S
ECHO PV PEAK GRADIENT: 2 MMHG
ECHO RIGHT VENTRICULAR SYSTOLIC PRESSURE (RVSP): 57 MMHG
ECHO RV INTERNAL DIMENSION: 3.6 CM
ECHO RV TAPSE: 3 CM (ref 1.7–?)
ECHO TV E WAVE: 0.8 M/S
ECHO TV REGURGITANT MAX VELOCITY: 3.6 M/S
ECHO TV REGURGITANT PEAK GRADIENT: 52 MMHG
EKG ATRIAL RATE: 57 BPM
EKG P AXIS: -2 DEGREES
EKG P-R INTERVAL: 180 MS
EKG Q-T INTERVAL: 484 MS
EKG QRS DURATION: 124 MS
EKG QTC CALCULATION (BAZETT): 471 MS
EKG R AXIS: -151 DEGREES
EKG T AXIS: 40 DEGREES
EKG VENTRICULAR RATE: 57 BPM
EOSINOPHIL NFR BLD AUTO: 2.8 %
EOSINOPHILS ABSOLUTE: 0.2 THOU/MM3 (ref 0–0.4)
EPITHELIAL CELLS, UA: NORMAL /HPF
ERYTHROCYTE [DISTWIDTH] IN BLOOD BY AUTOMATED COUNT: 14.7 % (ref 11.5–14.5)
FERRITIN SERPL IA-MCNC: 271 NG/ML (ref 30–400)
FLUAV RNA NPH QL NAA+PROBE: NOT DETECTED
FLUBV RNA NPH QL NAA+PROBE: NOT DETECTED
GFR SERPL CREATININE-BSD FRML MDRD: 71 ML/MIN/1.73M2
GLUCOSE BLD STRIP.AUTO-MCNC: 110 MG/DL (ref 70–108)
GLUCOSE BLD STRIP.AUTO-MCNC: 114 MG/DL (ref 70–108)
GLUCOSE BLD STRIP.AUTO-MCNC: 123 MG/DL (ref 70–108)
GLUCOSE BLD STRIP.AUTO-MCNC: 130 MG/DL (ref 70–108)
GLUCOSE BLD STRIP.AUTO-MCNC: 175 MG/DL (ref 70–108)
GLUCOSE SERPL-MCNC: 126 MG/DL (ref 74–109)
GLUCOSE UR QL STRIP.AUTO: NEGATIVE MG/DL
HADV DNA NPH QL NAA+PROBE: NOT DETECTED
HCOV 229E RNA SPEC QL NAA+PROBE: NOT DETECTED
HCOV HKU1 RNA SPEC QL NAA+PROBE: NOT DETECTED
HCOV NL63 RNA SPEC QL NAA+PROBE: NOT DETECTED
HCOV OC43 RNA SPEC QL NAA+PROBE: NOT DETECTED
HCT VFR BLD AUTO: 38.4 % (ref 42–52)
HDLC SERPL-MCNC: 31 MG/DL
HGB BLD-MCNC: 12.9 GM/DL (ref 14–18)
HGB RETIC QN AUTO: 34 PG (ref 28.2–35.7)
HGB UR QL STRIP.AUTO: NEGATIVE
HMPV RNA NPH QL NAA+PROBE: NOT DETECTED
HPIV1 RNA NPH QL NAA+PROBE: NOT DETECTED
HPIV2 RNA NPH QL NAA+PROBE: NOT DETECTED
HPIV3 RNA NPH QL NAA+PROBE: NOT DETECTED
HPIV4 RNA NPH QL NAA+PROBE: NOT DETECTED
IMM GRANULOCYTES # BLD AUTO: 0.05 THOU/MM3 (ref 0–0.07)
IMM GRANULOCYTES NFR BLD AUTO: 0.6 %
IMM RETICS NFR: 23.3 % (ref 2.3–13.4)
IRON SATN MFR SERPL: 13 % (ref 20–50)
IRON SERPL-MCNC: 39 UG/DL (ref 61–157)
KETONES UR QL STRIP.AUTO: NEGATIVE
LDLC SERPL CALC-MCNC: 50 MG/DL
LEFT VENTRICULAR EJECTION FRACTION MODE: NORMAL
LEUKOCYTE ESTERASE UR QL STRIP.AUTO: NEGATIVE
LV EF: 60 %
LYMPHOCYTES ABSOLUTE: 1 THOU/MM3 (ref 1–4.8)
LYMPHOCYTES NFR BLD AUTO: 11.8 %
M PNEUMO DNA SPEC QL NAA+PROBE: NOT DETECTED
MAGNESIUM SERPL-MCNC: 1.3 MG/DL (ref 1.6–2.6)
MCH RBC QN AUTO: 30 PG (ref 26–33)
MCHC RBC AUTO-ENTMCNC: 33.6 GM/DL (ref 32.2–35.5)
MCV RBC AUTO: 89.3 FL (ref 80–94)
MONOCYTES ABSOLUTE: 0.6 THOU/MM3 (ref 0.4–1.3)
MONOCYTES NFR BLD AUTO: 6.9 %
MRSA DNA SPEC QL NAA+PROBE: NEGATIVE
NEUTROPHILS ABSOLUTE: 6.4 THOU/MM3 (ref 1.8–7.7)
NEUTROPHILS NFR BLD AUTO: 77.7 %
NITRITE UR QL STRIP.AUTO: NEGATIVE
NRBC BLD AUTO-RTO: 0 /100 WBC
OSMOLALITY SERPL CALC.SUM OF ELEC: 285.9 MOSMOL/KG (ref 275–300)
PH UR STRIP.AUTO: 7 [PH] (ref 5–9)
PLATELET # BLD AUTO: 149 THOU/MM3 (ref 130–400)
PMV BLD AUTO: 11 FL (ref 9.4–12.4)
POTASSIUM SERPL-SCNC: 4.2 MEQ/L (ref 3.5–5.2)
PROT SERPL-MCNC: 6.9 G/DL (ref 6.4–8.3)
PROT UR STRIP.AUTO-MCNC: NEGATIVE MG/DL
RBC # BLD AUTO: 4.3 MILL/MM3 (ref 4.7–6.1)
RBC #/AREA URNS HPF: NORMAL /HPF
RENAL EPI CELLS #/AREA URNS HPF: NORMAL /[HPF]
RETICS # AUTO: 84 THOU/MM3 (ref 20–115)
RETICS/RBC NFR AUTO: 2 % (ref 0.5–2)
RSV RNA NPH QL NAA+PROBE: NOT DETECTED
RV+EV RNA SPEC QL NAA+PROBE: NOT DETECTED
SARS-COV-2 RNA NPH QL NAA+NON-PROBE: NOT DETECTED
SODIUM SERPL-SCNC: 143 MEQ/L (ref 135–145)
SPECIFIC GRAVITY UA: 1.01 (ref 1–1.03)
T4 FREE SERPL-MCNC: 1.1 NG/DL (ref 0.92–1.68)
TIBC SERPL-MCNC: 305 UG/DL (ref 171–450)
TRIGL SERPL-MCNC: 111 MG/DL (ref 0–199)
TROPONIN, HIGH SENSITIVITY: 11 NG/L (ref 0–12)
TSH SERPL DL<=0.05 MIU/L-ACNC: 5.24 UIU/ML (ref 0.27–4.2)
UROBILINOGEN, URINE: 0.2 EU/DL (ref 0–1)
WBC # BLD AUTO: 8.3 THOU/MM3 (ref 4.8–10.8)
WBC #/AREA URNS HPF: NORMAL /HPF
YEAST LIKE FUNGI URNS QL MICRO: NORMAL

## 2025-03-04 PROCEDURE — 80061 LIPID PANEL: CPT

## 2025-03-04 PROCEDURE — 2700000000 HC OXYGEN THERAPY PER DAY

## 2025-03-04 PROCEDURE — 82248 BILIRUBIN DIRECT: CPT

## 2025-03-04 PROCEDURE — 94640 AIRWAY INHALATION TREATMENT: CPT

## 2025-03-04 PROCEDURE — 94669 MECHANICAL CHEST WALL OSCILL: CPT

## 2025-03-04 PROCEDURE — 93005 ELECTROCARDIOGRAM TRACING: CPT

## 2025-03-04 PROCEDURE — 83735 ASSAY OF MAGNESIUM: CPT

## 2025-03-04 PROCEDURE — 93306 TTE W/DOPPLER COMPLETE: CPT | Performed by: INTERNAL MEDICINE

## 2025-03-04 PROCEDURE — 94761 N-INVAS EAR/PLS OXIMETRY MLT: CPT

## 2025-03-04 PROCEDURE — 82728 ASSAY OF FERRITIN: CPT

## 2025-03-04 PROCEDURE — 85046 RETICYTE/HGB CONCENTRATE: CPT

## 2025-03-04 PROCEDURE — 93306 TTE W/DOPPLER COMPLETE: CPT

## 2025-03-04 PROCEDURE — 80053 COMPREHEN METABOLIC PANEL: CPT

## 2025-03-04 PROCEDURE — 87070 CULTURE OTHR SPECIMN AEROBIC: CPT

## 2025-03-04 PROCEDURE — 99223 1ST HOSP IP/OBS HIGH 75: CPT

## 2025-03-04 PROCEDURE — 81001 URINALYSIS AUTO W/SCOPE: CPT

## 2025-03-04 PROCEDURE — 83540 ASSAY OF IRON: CPT

## 2025-03-04 PROCEDURE — 6370000000 HC RX 637 (ALT 250 FOR IP)

## 2025-03-04 PROCEDURE — 83550 IRON BINDING TEST: CPT

## 2025-03-04 PROCEDURE — 87205 SMEAR GRAM STAIN: CPT

## 2025-03-04 PROCEDURE — 2060000000 HC ICU INTERMEDIATE R&B

## 2025-03-04 PROCEDURE — 6360000002 HC RX W HCPCS

## 2025-03-04 PROCEDURE — 82948 REAGENT STRIP/BLOOD GLUCOSE: CPT

## 2025-03-04 PROCEDURE — 87641 MR-STAPH DNA AMP PROBE: CPT

## 2025-03-04 PROCEDURE — 2500000003 HC RX 250 WO HCPCS

## 2025-03-04 PROCEDURE — 36415 COLL VENOUS BLD VENIPUNCTURE: CPT

## 2025-03-04 PROCEDURE — 85025 COMPLETE CBC W/AUTO DIFF WBC: CPT

## 2025-03-04 RX ORDER — ALBUTEROL SULFATE 90 UG/1
2 INHALANT RESPIRATORY (INHALATION) EVERY 4 HOURS PRN
Status: DISCONTINUED | OUTPATIENT
Start: 2025-03-04 | End: 2025-03-07 | Stop reason: HOSPADM

## 2025-03-04 RX ORDER — SODIUM CHLORIDE 0.9 % (FLUSH) 0.9 %
5-40 SYRINGE (ML) INJECTION EVERY 12 HOURS SCHEDULED
Status: DISCONTINUED | OUTPATIENT
Start: 2025-03-04 | End: 2025-03-07 | Stop reason: HOSPADM

## 2025-03-04 RX ORDER — DEXTROSE MONOHYDRATE 100 MG/ML
INJECTION, SOLUTION INTRAVENOUS CONTINUOUS PRN
Status: DISCONTINUED | OUTPATIENT
Start: 2025-03-04 | End: 2025-03-07 | Stop reason: HOSPADM

## 2025-03-04 RX ORDER — MAGNESIUM SULFATE IN WATER 40 MG/ML
2000 INJECTION, SOLUTION INTRAVENOUS PRN
Status: DISCONTINUED | OUTPATIENT
Start: 2025-03-04 | End: 2025-03-07 | Stop reason: HOSPADM

## 2025-03-04 RX ORDER — ROPINIROLE 1 MG/1
1 TABLET, FILM COATED ORAL 3 TIMES DAILY
Status: DISCONTINUED | OUTPATIENT
Start: 2025-03-04 | End: 2025-03-07 | Stop reason: HOSPADM

## 2025-03-04 RX ORDER — ASPIRIN 81 MG/1
81 TABLET ORAL DAILY
Status: DISCONTINUED | OUTPATIENT
Start: 2025-03-04 | End: 2025-03-07 | Stop reason: HOSPADM

## 2025-03-04 RX ORDER — PANTOPRAZOLE SODIUM 40 MG/1
40 TABLET, DELAYED RELEASE ORAL 2 TIMES DAILY
Status: DISCONTINUED | OUTPATIENT
Start: 2025-03-04 | End: 2025-03-07 | Stop reason: HOSPADM

## 2025-03-04 RX ORDER — BUSPIRONE HYDROCHLORIDE 10 MG/1
10 TABLET ORAL 3 TIMES DAILY
Status: DISCONTINUED | OUTPATIENT
Start: 2025-03-04 | End: 2025-03-07 | Stop reason: HOSPADM

## 2025-03-04 RX ORDER — SERTRALINE HYDROCHLORIDE 100 MG/1
100 TABLET, FILM COATED ORAL 2 TIMES DAILY
Status: DISCONTINUED | OUTPATIENT
Start: 2025-03-04 | End: 2025-03-07 | Stop reason: HOSPADM

## 2025-03-04 RX ORDER — ATORVASTATIN CALCIUM 20 MG/1
20 TABLET, FILM COATED ORAL DAILY
Status: DISCONTINUED | OUTPATIENT
Start: 2025-03-04 | End: 2025-03-07 | Stop reason: HOSPADM

## 2025-03-04 RX ORDER — ACETAMINOPHEN 650 MG/1
650 SUPPOSITORY RECTAL EVERY 6 HOURS PRN
Status: DISCONTINUED | OUTPATIENT
Start: 2025-03-04 | End: 2025-03-07 | Stop reason: HOSPADM

## 2025-03-04 RX ORDER — HYDROCODONE BITARTRATE AND ACETAMINOPHEN 5; 325 MG/1; MG/1
1 TABLET ORAL 3 TIMES DAILY PRN
Status: DISCONTINUED | OUTPATIENT
Start: 2025-03-04 | End: 2025-03-07 | Stop reason: HOSPADM

## 2025-03-04 RX ORDER — SODIUM CHLORIDE 9 MG/ML
INJECTION, SOLUTION INTRAVENOUS PRN
Status: DISCONTINUED | OUTPATIENT
Start: 2025-03-04 | End: 2025-03-07 | Stop reason: HOSPADM

## 2025-03-04 RX ORDER — MONTELUKAST SODIUM 10 MG/1
10 TABLET ORAL NIGHTLY
Status: DISCONTINUED | OUTPATIENT
Start: 2025-03-04 | End: 2025-03-07 | Stop reason: HOSPADM

## 2025-03-04 RX ORDER — BUMETANIDE 0.25 MG/ML
1 INJECTION, SOLUTION INTRAMUSCULAR; INTRAVENOUS 2 TIMES DAILY
Status: DISCONTINUED | OUTPATIENT
Start: 2025-03-04 | End: 2025-03-04

## 2025-03-04 RX ORDER — BACLOFEN 10 MG/1
10 TABLET ORAL 2 TIMES DAILY PRN
Status: DISCONTINUED | OUTPATIENT
Start: 2025-03-04 | End: 2025-03-07 | Stop reason: HOSPADM

## 2025-03-04 RX ORDER — BUMETANIDE 0.25 MG/ML
1 INJECTION, SOLUTION INTRAMUSCULAR; INTRAVENOUS 2 TIMES DAILY
Status: DISCONTINUED | OUTPATIENT
Start: 2025-03-04 | End: 2025-03-07 | Stop reason: HOSPADM

## 2025-03-04 RX ORDER — ACETAMINOPHEN 325 MG/1
650 TABLET ORAL EVERY 6 HOURS PRN
Status: DISCONTINUED | OUTPATIENT
Start: 2025-03-04 | End: 2025-03-07 | Stop reason: HOSPADM

## 2025-03-04 RX ORDER — POTASSIUM CHLORIDE 1500 MG/1
40 TABLET, EXTENDED RELEASE ORAL PRN
Status: DISCONTINUED | OUTPATIENT
Start: 2025-03-04 | End: 2025-03-07 | Stop reason: HOSPADM

## 2025-03-04 RX ORDER — ONDANSETRON 2 MG/ML
4 INJECTION INTRAMUSCULAR; INTRAVENOUS EVERY 6 HOURS PRN
Status: DISCONTINUED | OUTPATIENT
Start: 2025-03-04 | End: 2025-03-07 | Stop reason: HOSPADM

## 2025-03-04 RX ORDER — ALBUTEROL SULFATE 0.83 MG/ML
2.5 SOLUTION RESPIRATORY (INHALATION) EVERY 4 HOURS PRN
Status: DISCONTINUED | OUTPATIENT
Start: 2025-03-04 | End: 2025-03-07 | Stop reason: HOSPADM

## 2025-03-04 RX ORDER — LOSARTAN POTASSIUM 25 MG/1
25 TABLET ORAL DAILY
Status: DISCONTINUED | OUTPATIENT
Start: 2025-03-04 | End: 2025-03-07 | Stop reason: HOSPADM

## 2025-03-04 RX ORDER — POTASSIUM CHLORIDE 7.45 MG/ML
10 INJECTION INTRAVENOUS PRN
Status: DISCONTINUED | OUTPATIENT
Start: 2025-03-04 | End: 2025-03-07 | Stop reason: HOSPADM

## 2025-03-04 RX ORDER — ONDANSETRON 4 MG/1
4 TABLET, ORALLY DISINTEGRATING ORAL EVERY 8 HOURS PRN
Status: DISCONTINUED | OUTPATIENT
Start: 2025-03-04 | End: 2025-03-07 | Stop reason: HOSPADM

## 2025-03-04 RX ORDER — POLYETHYLENE GLYCOL 3350 17 G/17G
17 POWDER, FOR SOLUTION ORAL DAILY PRN
Status: DISCONTINUED | OUTPATIENT
Start: 2025-03-04 | End: 2025-03-07 | Stop reason: HOSPADM

## 2025-03-04 RX ORDER — SODIUM CHLORIDE 0.9 % (FLUSH) 0.9 %
5-40 SYRINGE (ML) INJECTION PRN
Status: DISCONTINUED | OUTPATIENT
Start: 2025-03-04 | End: 2025-03-07 | Stop reason: HOSPADM

## 2025-03-04 RX ORDER — DILTIAZEM HCL 60 MG
60 TABLET ORAL 3 TIMES DAILY
Status: DISCONTINUED | OUTPATIENT
Start: 2025-03-04 | End: 2025-03-07 | Stop reason: HOSPADM

## 2025-03-04 RX ORDER — ENOXAPARIN SODIUM 100 MG/ML
30 INJECTION SUBCUTANEOUS 2 TIMES DAILY
Status: DISCONTINUED | OUTPATIENT
Start: 2025-03-04 | End: 2025-03-07 | Stop reason: HOSPADM

## 2025-03-04 RX ORDER — INSULIN LISPRO 100 [IU]/ML
0-4 INJECTION, SOLUTION INTRAVENOUS; SUBCUTANEOUS
Status: DISCONTINUED | OUTPATIENT
Start: 2025-03-04 | End: 2025-03-07 | Stop reason: HOSPADM

## 2025-03-04 RX ORDER — GLUCAGON 1 MG/ML
1 KIT INJECTION PRN
Status: DISCONTINUED | OUTPATIENT
Start: 2025-03-04 | End: 2025-03-07 | Stop reason: HOSPADM

## 2025-03-04 RX ORDER — METOPROLOL TARTRATE 25 MG/1
25 TABLET, FILM COATED ORAL 2 TIMES DAILY
Status: DISCONTINUED | OUTPATIENT
Start: 2025-03-04 | End: 2025-03-07 | Stop reason: HOSPADM

## 2025-03-04 RX ADMIN — SERTRALINE 100 MG: 100 TABLET, FILM COATED ORAL at 08:44

## 2025-03-04 RX ADMIN — PANTOPRAZOLE SODIUM 40 MG: 40 TABLET, DELAYED RELEASE ORAL at 15:51

## 2025-03-04 RX ADMIN — HYDROCODONE BITARTRATE AND ACETAMINOPHEN 1 TABLET: 5; 325 TABLET ORAL at 09:00

## 2025-03-04 RX ADMIN — BUSPIRONE HYDROCHLORIDE 10 MG: 10 TABLET ORAL at 08:42

## 2025-03-04 RX ADMIN — TIOTROPIUM BROMIDE INHALATION SPRAY 2 PUFF: 3.12 SPRAY, METERED RESPIRATORY (INHALATION) at 09:12

## 2025-03-04 RX ADMIN — BUMETANIDE 1 MG: 0.25 INJECTION INTRAMUSCULAR; INTRAVENOUS at 03:47

## 2025-03-04 RX ADMIN — ROPINIROLE HYDROCHLORIDE 1 MG: 1 TABLET, FILM COATED ORAL at 15:50

## 2025-03-04 RX ADMIN — DILTIAZEM HYDROCHLORIDE 60 MG: 60 TABLET ORAL at 15:50

## 2025-03-04 RX ADMIN — ENOXAPARIN SODIUM 30 MG: 100 INJECTION SUBCUTANEOUS at 20:25

## 2025-03-04 RX ADMIN — SERTRALINE 100 MG: 100 TABLET, FILM COATED ORAL at 20:23

## 2025-03-04 RX ADMIN — DILTIAZEM HYDROCHLORIDE 60 MG: 60 TABLET ORAL at 08:42

## 2025-03-04 RX ADMIN — ENOXAPARIN SODIUM 30 MG: 100 INJECTION SUBCUTANEOUS at 08:47

## 2025-03-04 RX ADMIN — LOSARTAN POTASSIUM 25 MG: 25 TABLET, FILM COATED ORAL at 08:44

## 2025-03-04 RX ADMIN — MONTELUKAST 10 MG: 10 TABLET, FILM COATED ORAL at 20:23

## 2025-03-04 RX ADMIN — ROPINIROLE HYDROCHLORIDE 1 MG: 1 TABLET, FILM COATED ORAL at 22:34

## 2025-03-04 RX ADMIN — PANTOPRAZOLE SODIUM 40 MG: 40 TABLET, DELAYED RELEASE ORAL at 08:47

## 2025-03-04 RX ADMIN — ASPIRIN 81 MG: 81 TABLET, COATED ORAL at 08:47

## 2025-03-04 RX ADMIN — SODIUM CHLORIDE, PRESERVATIVE FREE 10 ML: 5 INJECTION INTRAVENOUS at 20:25

## 2025-03-04 RX ADMIN — BUMETANIDE 1 MG: 0.25 INJECTION INTRAMUSCULAR; INTRAVENOUS at 08:52

## 2025-03-04 RX ADMIN — ATORVASTATIN CALCIUM 20 MG: 20 TABLET, FILM COATED ORAL at 08:42

## 2025-03-04 RX ADMIN — ROPINIROLE HYDROCHLORIDE 1 MG: 1 TABLET, FILM COATED ORAL at 08:43

## 2025-03-04 RX ADMIN — SODIUM CHLORIDE, PRESERVATIVE FREE 10 ML: 5 INJECTION INTRAVENOUS at 08:44

## 2025-03-04 RX ADMIN — DILTIAZEM HYDROCHLORIDE 60 MG: 60 TABLET ORAL at 20:23

## 2025-03-04 RX ADMIN — METOPROLOL TARTRATE 25 MG: 25 TABLET, FILM COATED ORAL at 08:42

## 2025-03-04 RX ADMIN — BUSPIRONE HYDROCHLORIDE 10 MG: 10 TABLET ORAL at 22:34

## 2025-03-04 RX ADMIN — BUMETANIDE 1 MG: 0.25 INJECTION INTRAMUSCULAR; INTRAVENOUS at 18:02

## 2025-03-04 RX ADMIN — HYDROCODONE BITARTRATE AND ACETAMINOPHEN 1 TABLET: 5; 325 TABLET ORAL at 20:23

## 2025-03-04 RX ADMIN — BUSPIRONE HYDROCHLORIDE 10 MG: 10 TABLET ORAL at 15:50

## 2025-03-04 RX ADMIN — BACLOFEN 10 MG: 10 TABLET ORAL at 08:43

## 2025-03-04 ASSESSMENT — PAIN - FUNCTIONAL ASSESSMENT: PAIN_FUNCTIONAL_ASSESSMENT: ACTIVITIES ARE NOT PREVENTED

## 2025-03-04 ASSESSMENT — PAIN SCALES - GENERAL
PAINLEVEL_OUTOF10: 9
PAINLEVEL_OUTOF10: 0
PAINLEVEL_OUTOF10: 7

## 2025-03-04 ASSESSMENT — PAIN DESCRIPTION - ORIENTATION
ORIENTATION: RIGHT;LEFT;POSTERIOR
ORIENTATION: LEFT;RIGHT
ORIENTATION: RIGHT;LEFT

## 2025-03-04 ASSESSMENT — PAIN DESCRIPTION - LOCATION
LOCATION: BACK

## 2025-03-04 ASSESSMENT — PAIN DESCRIPTION - PAIN TYPE: TYPE: ACUTE PAIN

## 2025-03-04 ASSESSMENT — PAIN DESCRIPTION - DESCRIPTORS
DESCRIPTORS: ACHING;BURNING;STABBING
DESCRIPTORS: ACHING;BURNING;STABBING
DESCRIPTORS: ACHING

## 2025-03-04 ASSESSMENT — PAIN DESCRIPTION - ONSET: ONSET: ON-GOING

## 2025-03-04 ASSESSMENT — PAIN DESCRIPTION - FREQUENCY: FREQUENCY: CONTINUOUS

## 2025-03-04 NOTE — PLAN OF CARE
Problem: Chronic Conditions and Co-morbidities  Goal: Patient's chronic conditions and co-morbidity symptoms are monitored and maintained or improved  Outcome: Progressing  Flowsheets (Taken 3/4/2025 0505)  Care Plan - Patient's Chronic Conditions and Co-Morbidity Symptoms are Monitored and Maintained or Improved: Monitor and assess patient's chronic conditions and comorbid symptoms for stability, deterioration, or improvement     Problem: Discharge Planning  Goal: Discharge to home or other facility with appropriate resources  Outcome: Progressing  Flowsheets (Taken 3/4/2025 0505)  Discharge to home or other facility with appropriate resources:   Identify barriers to discharge with patient and caregiver   Arrange for needed discharge resources and transportation as appropriate   Identify discharge learning needs (meds, wound care, etc)   Refer to discharge planning if patient needs post-hospital services based on physician order or complex needs related to functional status, cognitive ability or social support system     Problem: Safety - Adult  Goal: Free from fall injury  Outcome: Progressing  Note: Patient remained free from falls this shift. Used call light appropriately. Wore nonskid socks when ambulating with assistance. Bed alarm on. Call light in reach.        Problem: ABCDS Injury Assessment  Goal: Absence of physical injury  Outcome: Progressing  Flowsheets (Taken 3/4/2025 0505)  Absence of Physical Injury: Implement safety measures based on patient assessment     Care plan reviewed with patient.  Patient verbalizes understanding of the care plan and contributed to goal setting.

## 2025-03-04 NOTE — H&P
Hospitalist History & Physical    Patient:  Yony Borden    Unit/Bed:07/007A  YOB: 1955  MRN: [id number]   Acct: [id number]   PCP: Blanche Alvarado MD  Code Status: Prior    Date of Service: Pt seen/examined on 03/04/25 and admitted to Inpatient with expected LOS greater than two midnights due to medical therapy.     Chief Complaint: Shortness of Breath    Assessment/Plan:    Acute on Chronic Respiratory Failure with hypoxia secondary to exacerbation of HFpEF vs COPD exacerbation/CAP: Complains of increasing SOB, worse with exertion and laying down, productive cough with green-brown sputum, chills, increasing BLE edema. SpO2 76% on 4LNC on arrival, increased to 6LNC, baseline is PRN O2. CXR bilateral interstitial airspace opacities secondary to edema and/or infection. CTA chest no PE, subpleural consolidative changes in the right lower lobe suggesting pneumonia, bilateral septal thickening secondary to inflammation, infection, or fluid overload, atelectasis and/or infiltrates in the lingula and left lower lobe. BNP 1290. No s/s of infection, will defer ABX at this time. Most likely CHF exacerbation.   Wean O2 as tolerated to maintain SpO2 >88-90%  COVID/Influenza negative  Pulmonary hygiene: IS, Acapella, PRN albuterol  Respiratory culture and virus panel pending  Home O2 eval prior to discharge    Acute HFpEF exacerbation, ?new onset: 10/2024 TTE: EF 65%.   Bumex 1 mg IV BID  Echo in AM  Daily weights, Strict I&O  Low Na Diet, 2L fluid restriction  Consider Cardiology consult pending clinical course    COPD: Follows with Dr. Espinoza with pulmonology as OP, last seen 12/2024.   Continue Singulair, Spiriva, PRN albuterol    CAD: Follows with Dr. Marino with [facility]ology at [facility].   Continue ASA, statin    CKD stage 3a: Cr 1.2 and stable at baseline. Follows with Dr. Pickard with Nephrology as OP, last seen 11/2024.   Avoid nephrotoxins  BMP in AM    NIDDMII: 11/2024 A1c 7.8%. On Glimepiride,

## 2025-03-04 NOTE — PROCEDURES
PROCEDURE NOTE  Date: 3/4/2025   Name: Yony Borden  YOB: 1955    Procedures    12 lead EKG completed. Results handed to Elizabeth RIVERA.

## 2025-03-04 NOTE — ED PROVIDER NOTES
[facility] EMERGENCY DEPARTMENT  EMERGENCY DEPARTMENT ENCOUNTER          Pt Name: Yony Borden  MRN: [id number]  Birthdate 1955  Date of evaluation: 3/3/2025  Physician: Kathleen Wolfe MD  Supervising Attending Physician: Bryce Ferrera MD       CHIEF COMPLAINT       Chief Complaint   Patient presents with    Shortness of Breath         HISTORY OF PRESENT ILLNESS    HPI  Yony Borden is a 69 y.o. male who presents to the emergency department from home,  wheelchair  for evaluation of shortness of breath.  Patient notes having worsening shortness of breath over the last 3 months, but over the last few days has been intolerable for him.  Patient notes she usually having O2 at home as needed on 2 L for COPD, but lately has been using 4L constantly and still feeling short of breath.  He notes having worsening shortness of breath with minimal exertion like walking to his bathroom.  He has a chronic cough and usually has brown/green sputum, but has been unable to get anything up over the last few days.  He did call his PCP who wrote him send attics and steroids he noted feeling better for a week after that, but just went back to not feeling great.  Patient denies any chest pain, palpitations, headache, vision changes, difficulty swallowing, abdominal pain, nausea, vomiting, diarrhea, blood in stool, burning while urinating, recent trauma/falls, or recent travel.  Patient has been having bilateral lower limb swelling without any pain.  The patient has no other acute complaints at this time.      REVIEW OF SYSTEMS   Review of Systems      PAST MEDICAL AND SURGICAL HISTORY     Past Medical History:   Diagnosis Date    Acid reflux     Arthritis     Asthma     CAD (coronary artery disease)     Chronic back pain     Class 2 severe obesity due to excess calories with serious comorbidity and body mass index (BMI) of 37.0 to 37.9 in adult 8/13/2018    Colon polyps 11/06    COPD (chronic obstructive 
   infection.      This document has been electronically signed by: Bria Tuttle DO on    03/03/2025 11:15 PM        Medications   iopamidol (ISOVUE-370) 76 % injection 80 mL (80 mLs IntraVENous Given 3/3/25 9021)     FINAL IMPRESSION AND DISPOSITION      1. Acute congestive heart failure, unspecified heart failure type (HCC)    2. Acute on chronic respiratory failure with hypoxia (HCC)        DISPOSITION Decision To Admit 03/04/2025 12:32:57 AM   DISPOSITION CONDITION Stable     PATIENT REFERRED TO:  No follow-up provider specified.  DISCHARGE MEDICATIONS:  New Prescriptions    No medications on file       (Please note that portions of this note were completed with a voice recognition program.  Efforts were made to edit the dictations but occasionally words aremis-transcribed.)    MD Maisha Lal Jian, MD  03/04/25 0038

## 2025-03-04 NOTE — PLAN OF CARE
Problem: Respiratory - Adult  Goal: Clear lung sounds  Description: Clear lung sounds  Outcome: Progressing    Continue tx's to improve breath sounds, increase aeration and decrease WOB.

## 2025-03-04 NOTE — PROGRESS NOTES
Heart Failure Extra Tools     Heart Failure Clinic at [facility] [phone]      Heart Failure Interactive Workbook:  Go to https://[employer]/publication/?l=553297 for a Free Heart Failure Interactive Workbook provided by The American Heart Association. This interactive workbook will provide information on Healthier Living with Heart Failure. Please copy and paste link into search bar. Use your mouse to scroll through the pages.    Heart and Stroke Pearson Saul:   Free Heart Failure and Stroke smart phone saul available for iPhone and Android download. Use your phone to track sodium intake, fluid intake, symptoms, and weight.           Home Exercise Program:   Identification of Green/Yellow/Red zones:  You should be able to identify when you feel good (green zone), if you have 1-2 symptoms of HF (yellow zone), or if you are in need of medical attention (red zone).  In your CHF education folder you were provided a “stop light tool” to outline this information.     Elements of Energy Conservation:   Prioritize/Plan: Decide what needs to be done today, and what can wait for a later date, write to do lists, plan ahead to avoid extra trips, and gather supplies and equipment needed before starting an activity.   Position: Avoid tiring and awkward posture that may impair breathing.  Pace: Slow and steady pace, never rushing!  Pursed lip breathing.  Pursed Lip Breathing: \"Smell the roses, blow out the candles\".

## 2025-03-04 NOTE — ED NOTES
Patient presents to ED with c/o SOB. Patient states he has a hx of COPD and is normally on 2L nasal canula. Patient states he can hardly walk without feeling like he is going to pass out. Patient reports using 4L of o2 to help with symptoms w/o relief. Patient denies chest pain. Patient alert and oriented x4.

## 2025-03-04 NOTE — RT PROTOCOL NOTE
RT Inhaler-Nebulizer Bronchodilator Protocol Note    There is a bronchodilator order in the chart from a provider indicating to follow the RT Bronchodilator Protocol and there is an “Initiate RT Inhaler-Nebulizer Bronchodilator Protocol” order as well (see protocol at bottom of note).    CXR Findings:  XR CHEST PORTABLE    Result Date: 3/3/2025  Bilateral interstitial and airspace opacities secondary to edema and/or infection. This document has been electronically signed by: Bria Tuttle DO on 03/03/2025 11:15 PM      The findings from the last RT Protocol Assessment were as follows:   History Pulmonary Disease: Chronic pulmonary disease  Respiratory Pattern: Regular pattern and RR 12-20 bpm  Breath Sounds: Slightly diminished and/or crackles  Cough: Strong, spontaneous, non-productive  Indication for Bronchodilator Therapy:    Bronchodilator Assessment Score: 4    Pt wants PRN as at home    Aerosolized bronchodilator medication orders have been revised according to the RT Inhaler-Nebulizer Bronchodilator Protocol below.    Respiratory Therapist to perform RT Therapy Protocol Assessment initially then follow the protocol.  Repeat RT Therapy Protocol Assessment PRN for score 0-3 or on second treatment, BID, and PRN for scores above 3.    No Indications - adjust the frequency to every 6 hours PRN wheezing or bronchospasm, if no treatments needed after 48 hours then discontinue using Per Protocol order mode.     If indication present, adjust the RT bronchodilator orders based on the Bronchodilator Assessment Score as indicated below.  Use Inhaler orders unless patient has one or more of the following: on home nebulizer, not able to hold breath for 10 seconds, is not alert and oriented, cannot activate and use MDI correctly, or respiratory rate 25 breaths per minute or more, then use the equivalent nebulizer order(s) with same Frequency and PRN reasons based on the score.  If a patient is on this medication at home  then do not decrease Frequency below that used at home.    0-3 - enter or revise RT bronchodilator order(s) to equivalent RT Bronchodilator order with Frequency of every 4 hours PRN for wheezing or increased work of breathing using Per Protocol order mode.        4-6 - enter or revise RT Bronchodilator order(s) to two equivalent RT bronchodilator orders with one order with BID Frequency and one order with Frequency of every 4 hours PRN wheezing or increased work of breathing using Per Protocol order mode.        7-10 - enter or revise RT Bronchodilator order(s) to two equivalent RT bronchodilator orders with one order with TID Frequency and one order with Frequency of every 4 hours PRN wheezing or increased work of breathing using Per Protocol order mode.       11-13 - enter or revise RT Bronchodilator order(s) to one equivalent RT bronchodilator order with QID Frequency and an Albuterol order with Frequency of every 4 hours PRN wheezing or increased work of breathing using Per Protocol order mode.      Greater than 13 - enter or revise RT Bronchodilator order(s) to one equivalent RT bronchodilator order with every 4 hours Frequency and an Albuterol order with Frequency of every 2 hours PRN wheezing or increased work of breathing using Per Protocol order mode.     RT to enter RT Home Evaluation for COPD & MDI Assessment order using Per Protocol order mode.    Electronically signed by Mary Daniel RCP on 3/4/2025 at 9:17 AM

## 2025-03-04 NOTE — PLAN OF CARE
PLAN OF CARE UPDATE    Name: Yony Borden  : 1955  MRN: [id number]  Date of Service: 3/4/25      2:50 PM: Assumed care this a.m.  Patient is a 69-year-old male with a past medical history of COPD, DM, CKD who presented to [facility] with shortness of breath.  He reports a progressive history of shortness breath of the past 3 months, has been treated outpatient with antibiotics and steroids multiple times without improvement.  He reports the last 4 days he has had significant worsening shortness of breath prompting presentation to [facility].  On arrival, patient noted be significant hypoxic on 4 L nasal cannula requiring increasing supplemental O2.  He has a known baseline of chronic hypoxic respiratory failure on 2 L with exertion as needed.  CTA chest on admission demonstrated subpleural consolidative changes in the right lower lobe, bilateral septal thickening.  No signs/symptoms of infection on assessment, suspect likely hypervolemia/pulmonary edema causing patient's symptoms.  Will continue with diuresis with Bumex 1 mg twice daily IV.  Defer antibiotics at this time.  Magnesium replacement ongoing, will repeat in AM.  Continued on DAPT in the setting of CAD.  Mild wheezing appreciated, recently completed outpatient steroid course/antibiotics.  Will defer further steroids at this time. ECHO ordered/pending.     Electronically signed by Ray Becker MD on 3/4/2025 at 2:50 PM

## 2025-03-04 NOTE — PROGRESS NOTES
Pt admitted to  4K15 from ED.     Complaints: Shortness of breath.      No IV fluids infusing. IV site free of s/s of infection or infiltration.     Vital signs obtained. Assessment and data collection initiated. Two nurse skin assessment performed by Elizabeth RIVERA and Nadege RIVERA.    Policies and procedures for  explained. All questions answered with no further questions at this time. Fall prevention and safety brochure discussed with patient.  Bed alarm on. Call light in reach. Oriented to room.

## 2025-03-04 NOTE — ED NOTES
ED to inpatient nurses report      Chief Complaint:  Chief Complaint   Patient presents with    Shortness of Breath     Present to ED from: home    MOA:     LOC: alert and orientated to name, place, date  Mobility: Requires assistance * 1  Oxygen Baseline: 2L    Current needs required: 6L     Code Status:   Full Code      Mental Status:  Level of Consciousness: Alert (0)    Psych Assessment:        Vitals:  Patient Vitals for the past 24 hrs:   BP Temp Temp src Pulse Resp SpO2 Height Weight   03/04/25 0120 (!) 146/84 -- -- 59 20 98 % -- --   03/03/25 2315 (!) 143/80 -- -- 56 21 95 % -- --   03/03/25 2257 (!) 143/68 -- -- 58 21 95 % -- --   03/03/25 2153 -- -- -- -- 28 97 % -- --   03/03/25 2151 (!) 151/55 98.5 °F (36.9 °C) Oral 59 26 -- -- 105.2 kg (232 lb)   03/03/25 2150 -- -- -- -- 26 (!) 76 % 1.727 m (5' 8\") --        LDAs:   Peripheral IV 03/03/25 Right Antecubital (Active)   Site Assessment Clean, dry & intact 03/03/25 2159   Line Status Brisk blood return 03/03/25 2159       Ambulatory Status:  No data recorded    Diagnosis:  DISPOSITION Admitted 03/04/2025 01:36:51 AM   Final diagnoses:   Acute congestive heart failure, unspecified heart failure type (HCC)   Acute on chronic respiratory failure with hypoxia (HCC)        Consults:  IP CONSULT TO HEART FAILURE NURSE/COORDINATOR     Pain Score:  Pain Assessment  Pain Assessment: None - Denies Pain    C-SSRS:   Risk of Suicide: No Risk    Sepsis Screening:       M[place]more Fall Risk:       Swallow Screening        Preferred Language:   English      ALLERGIES     Dupixent [dupilumab], Dust mite extract, and Isosorbide nitrate    SURGICAL HISTORY       Past Surgical History:   Procedure Laterality Date    BACK SURGERY  2002    BACK SURGERY  08/11/2017    BICEPS TENDON REPAIR Right 08/16/2017    BRONCHOSCOPY      BRONCHOSCOPY N/A 04/05/2019    BRONCHOSCOPY performed by Sudheer Espinoza MD at [facility]    BRONCHOSCOPY  04/05/2019    BRONCHOSCOPY ALVEOLAR  LAVAGE performed by Sudheer Espinoza MD at [facility]    CARDIAC CATHETERIZATION  07/02 08/05    CARPAL TUNNEL RELEASE  2011    right hand    CHOLECYSTECTOMY  yrs ago    COLONOSCOPY  11/06 02/12 1/14    CORONARY ANGIOPLASTY WITH STENT PLACEMENT  02/2020    ENDOSCOPY, COLON, DIAGNOSTIC      EYE SURGERY      foreign body removal    HERNIA REPAIR  2004    Dr Gore-Umbilical    KIDNEY STONE SURGERY  2008?    LARYNGOSCOPY  04/28/2016    Laryngoscopy Micro with Biopsy by Dr Munoz    NASAL SEPTUM SURGERY  01/2007    uppp    ROTATOR CUFF REPAIR Left 05/20/2015    SKIN BIOPSY      TONSILLECTOMY  1985    TURP N/A 02/25/2022    CYSTOSCOPY GREENLIGHT PHOTOVAPORIZATION OF THE PROSTATE performed by Estuardo Lora MD at [facility] OR    TURP N/A 05/18/2022    Cystoscopy Transurethral Resection of the Prostate evacuation of bladder stones performed by Estuardo Lora MD at [facility] OR    UPPER GASTROINTESTINAL ENDOSCOPY  1997    UVULOPALATOPHARYGOPLASTY      VEIN SURGERY Left 09/2014    Dr. Azar       PAST MEDICAL HISTORY       Past Medical History:   Diagnosis Date    Acid reflux     Arthritis     Asthma     CAD (coronary artery disease)     Chronic back pain     Class 2 severe obesity due to excess calories with serious comorbidity and body mass index (BMI) of 37.0 to 37.9 in adult 8/13/2018    Colon polyps 11/06    COPD (chronic obstructive pulmonary disease) (HCC)     Depression     panic attacks    Diverticulosis     HTN (hypertension)     Hyperlipidemia     Kidney disorder     Panic attack     Pneumonia     Rash     Recurrent upper respiratory infection (URI)     Thumb amputation status 3/13/14    left tip of thumb amputated    Thyroid disease     Type II or unspecified type diabetes mellitus without mention of complication, not stated as uncontrolled            Electronically signed by KELLEY RUBIO RN on 3/4/2025 at 2:19 AM

## 2025-03-04 NOTE — CONSULTS
[facility]   HEART FAILURE PROGRAM      NAME:  Yony Borden  MEDICAL RECORD NUMBER:  [id number]  AGE: 69 y.o.   GENDER: male  : 1955  Attending: Ray Becker MD TODAY'S DATE:  3/4/2025    Subjective:     VISIT TYPE: Bedside CHF Education      Code Status: Full Code    PAST MEDICAL HISTORY        Diagnosis Date    Acid reflux     Arthritis     Asthma     CAD (coronary artery disease)     Chronic back pain     Class 2 severe obesity due to excess calories with serious comorbidity and body mass index (BMI) of 37.0 to 37.9 in adult 2018    Colon polyps     COPD (chronic obstructive pulmonary disease) (HCC)     Depression     panic attacks    Diverticulosis     HTN (hypertension)     Hyperlipidemia     Kidney disorder     Panic attack     Pneumonia     Rash     Recurrent upper respiratory infection (URI)     Thumb amputation status 3/13/14    left tip of thumb amputated    Thyroid disease     Type II or unspecified type diabetes mellitus without mention of complication, not stated as uncontrolled        SOCIAL HISTORY    Social History     Tobacco Use    Smoking status: Former     Current packs/day: 0.00     Average packs/day: 2.0 packs/day for 45.7 years (91.5 ttl pk-yrs)     Types: Cigarettes     Start date: 1971     Quit date: 3/3/2017     Years since quittin.0     Passive exposure: Past    Smokeless tobacco: Never    Tobacco comments:     pts uses just nicotine vap   Vaping Use    Vaping status: Every Day    Substances: Nicotine   Substance Use Topics    Alcohol use: No     Alcohol/week: 0.0 standard drinks of alcohol    Drug use: No         Cardiac Medications       Calcium Channel Blockers       dilTIAZem (CARDIZEM) immediate release tablet 60 mg 60 mg, Oral, 3 TIMES DAILY       Beta Blockers Cardio-Selective       metoprolol tartrate (LOPRESSOR) tablet 25 mg 25 mg, Oral, 2 TIMES DAILY, Hold for SBP &lt;110 or HR &lt;65       Loop Diuretics       bumetanide (BUMEX)  weights, diet, taking medications as prescribed, attending all follow up appointments, and knowing when and who to call.  5. If EF </=40%, ensure GDMT has been met at time of discharge (if not on GDMT ensure contraindication is documented).  6. Discharge Follow Up appt with [facility]F Clinic within 7 days of discharge. CHF Clinic to make this appointment. They are aware.    7.Continue to support patient goals             Thank you,   Chelsy Vee RN 3/4/2025 2:18 PM

## 2025-03-05 ENCOUNTER — APPOINTMENT (OUTPATIENT)
Dept: GENERAL RADIOLOGY | Age: 70
End: 2025-03-05
Payer: MEDICARE

## 2025-03-05 LAB
ANION GAP SERPL CALC-SCNC: 12 MEQ/L (ref 8–16)
BUN SERPL-MCNC: 19 MG/DL (ref 8–23)
CALCIUM SERPL-MCNC: 9 MG/DL (ref 8.8–10.2)
CHLORIDE SERPL-SCNC: 98 MEQ/L (ref 98–111)
CO2 SERPL-SCNC: 32 MEQ/L (ref 22–29)
CREAT SERPL-MCNC: 1.4 MG/DL (ref 0.7–1.2)
DEPRECATED RDW RBC AUTO: 46.4 FL (ref 35–45)
ERYTHROCYTE [DISTWIDTH] IN BLOOD BY AUTOMATED COUNT: 14.6 % (ref 11.5–14.5)
GFR SERPL CREATININE-BSD FRML MDRD: 53 ML/MIN/1.73M2
GLUCOSE BLD STRIP.AUTO-MCNC: 142 MG/DL (ref 70–108)
GLUCOSE BLD STRIP.AUTO-MCNC: 150 MG/DL (ref 70–108)
GLUCOSE BLD STRIP.AUTO-MCNC: 185 MG/DL (ref 70–108)
GLUCOSE BLD STRIP.AUTO-MCNC: 221 MG/DL (ref 70–108)
GLUCOSE BLD STRIP.AUTO-MCNC: 246 MG/DL (ref 70–108)
GLUCOSE SERPL-MCNC: 152 MG/DL (ref 74–109)
HCT VFR BLD AUTO: 39.9 % (ref 42–52)
HGB BLD-MCNC: 13.8 GM/DL (ref 14–18)
MAGNESIUM SERPL-MCNC: 1.4 MG/DL (ref 1.6–2.6)
MCH RBC QN AUTO: 30.6 PG (ref 26–33)
MCHC RBC AUTO-ENTMCNC: 34.6 GM/DL (ref 32.2–35.5)
MCV RBC AUTO: 88.5 FL (ref 80–94)
PLATELET # BLD AUTO: 153 THOU/MM3 (ref 130–400)
PMV BLD AUTO: 11.6 FL (ref 9.4–12.4)
POTASSIUM SERPL-SCNC: 3.9 MEQ/L (ref 3.5–5.2)
RBC # BLD AUTO: 4.51 MILL/MM3 (ref 4.7–6.1)
SODIUM SERPL-SCNC: 142 MEQ/L (ref 135–145)
WBC # BLD AUTO: 9 THOU/MM3 (ref 4.8–10.8)

## 2025-03-05 PROCEDURE — 71045 X-RAY EXAM CHEST 1 VIEW: CPT

## 2025-03-05 PROCEDURE — 6370000000 HC RX 637 (ALT 250 FOR IP)

## 2025-03-05 PROCEDURE — 2700000000 HC OXYGEN THERAPY PER DAY

## 2025-03-05 PROCEDURE — 36415 COLL VENOUS BLD VENIPUNCTURE: CPT

## 2025-03-05 PROCEDURE — 2060000000 HC ICU INTERMEDIATE R&B

## 2025-03-05 PROCEDURE — 85027 COMPLETE CBC AUTOMATED: CPT

## 2025-03-05 PROCEDURE — 6360000002 HC RX W HCPCS

## 2025-03-05 PROCEDURE — 83735 ASSAY OF MAGNESIUM: CPT

## 2025-03-05 PROCEDURE — 2500000003 HC RX 250 WO HCPCS

## 2025-03-05 PROCEDURE — 99233 SBSQ HOSP IP/OBS HIGH 50: CPT

## 2025-03-05 PROCEDURE — 94640 AIRWAY INHALATION TREATMENT: CPT

## 2025-03-05 PROCEDURE — 80048 BASIC METABOLIC PNL TOTAL CA: CPT

## 2025-03-05 PROCEDURE — 82948 REAGENT STRIP/BLOOD GLUCOSE: CPT

## 2025-03-05 RX ORDER — MAGNESIUM SULFATE IN WATER 40 MG/ML
4000 INJECTION, SOLUTION INTRAVENOUS ONCE
Status: COMPLETED | OUTPATIENT
Start: 2025-03-05 | End: 2025-03-05

## 2025-03-05 RX ADMIN — BUSPIRONE HYDROCHLORIDE 10 MG: 10 TABLET ORAL at 08:44

## 2025-03-05 RX ADMIN — INSULIN LISPRO 1 UNITS: 100 INJECTION, SOLUTION INTRAVENOUS; SUBCUTANEOUS at 21:33

## 2025-03-05 RX ADMIN — DILTIAZEM HYDROCHLORIDE 60 MG: 60 TABLET ORAL at 13:13

## 2025-03-05 RX ADMIN — INSULIN LISPRO 1 UNITS: 100 INJECTION, SOLUTION INTRAVENOUS; SUBCUTANEOUS at 13:04

## 2025-03-05 RX ADMIN — HYDROCODONE BITARTRATE AND ACETAMINOPHEN 1 TABLET: 5; 325 TABLET ORAL at 21:40

## 2025-03-05 RX ADMIN — PANTOPRAZOLE SODIUM 40 MG: 40 TABLET, DELAYED RELEASE ORAL at 08:44

## 2025-03-05 RX ADMIN — BUSPIRONE HYDROCHLORIDE 10 MG: 10 TABLET ORAL at 13:13

## 2025-03-05 RX ADMIN — SERTRALINE 100 MG: 100 TABLET, FILM COATED ORAL at 08:44

## 2025-03-05 RX ADMIN — BUMETANIDE 1 MG: 0.25 INJECTION INTRAMUSCULAR; INTRAVENOUS at 08:44

## 2025-03-05 RX ADMIN — SODIUM CHLORIDE, PRESERVATIVE FREE 10 ML: 5 INJECTION INTRAVENOUS at 21:34

## 2025-03-05 RX ADMIN — ATORVASTATIN CALCIUM 20 MG: 20 TABLET, FILM COATED ORAL at 08:44

## 2025-03-05 RX ADMIN — DILTIAZEM HYDROCHLORIDE 60 MG: 60 TABLET ORAL at 08:44

## 2025-03-05 RX ADMIN — SODIUM CHLORIDE, PRESERVATIVE FREE 10 ML: 5 INJECTION INTRAVENOUS at 08:44

## 2025-03-05 RX ADMIN — PANTOPRAZOLE SODIUM 40 MG: 40 TABLET, DELAYED RELEASE ORAL at 15:44

## 2025-03-05 RX ADMIN — HYDROCODONE BITARTRATE AND ACETAMINOPHEN 1 TABLET: 5; 325 TABLET ORAL at 13:13

## 2025-03-05 RX ADMIN — ROPINIROLE HYDROCHLORIDE 1 MG: 1 TABLET, FILM COATED ORAL at 13:13

## 2025-03-05 RX ADMIN — LOSARTAN POTASSIUM 25 MG: 25 TABLET, FILM COATED ORAL at 08:44

## 2025-03-05 RX ADMIN — SERTRALINE 100 MG: 100 TABLET, FILM COATED ORAL at 21:34

## 2025-03-05 RX ADMIN — ASPIRIN 81 MG: 81 TABLET, COATED ORAL at 08:44

## 2025-03-05 RX ADMIN — BUSPIRONE HYDROCHLORIDE 10 MG: 10 TABLET ORAL at 21:34

## 2025-03-05 RX ADMIN — ENOXAPARIN SODIUM 30 MG: 100 INJECTION SUBCUTANEOUS at 08:44

## 2025-03-05 RX ADMIN — ALBUTEROL SULFATE 2 PUFF: 90 AEROSOL, METERED RESPIRATORY (INHALATION) at 00:54

## 2025-03-05 RX ADMIN — BUMETANIDE 1 MG: 0.25 INJECTION INTRAMUSCULAR; INTRAVENOUS at 17:46

## 2025-03-05 RX ADMIN — METOPROLOL TARTRATE 25 MG: 25 TABLET, FILM COATED ORAL at 21:34

## 2025-03-05 RX ADMIN — ROPINIROLE HYDROCHLORIDE 1 MG: 1 TABLET, FILM COATED ORAL at 08:44

## 2025-03-05 RX ADMIN — DILTIAZEM HYDROCHLORIDE 60 MG: 60 TABLET ORAL at 21:34

## 2025-03-05 RX ADMIN — TIOTROPIUM BROMIDE INHALATION SPRAY 2 PUFF: 3.12 SPRAY, METERED RESPIRATORY (INHALATION) at 10:34

## 2025-03-05 RX ADMIN — ROPINIROLE HYDROCHLORIDE 1 MG: 1 TABLET, FILM COATED ORAL at 21:34

## 2025-03-05 RX ADMIN — MONTELUKAST 10 MG: 10 TABLET, FILM COATED ORAL at 21:34

## 2025-03-05 RX ADMIN — MAGNESIUM SULFATE HEPTAHYDRATE 4000 MG: 40 INJECTION, SOLUTION INTRAVENOUS at 11:34

## 2025-03-05 RX ADMIN — ENOXAPARIN SODIUM 30 MG: 100 INJECTION SUBCUTANEOUS at 21:35

## 2025-03-05 ASSESSMENT — PAIN SCALES - GENERAL
PAINLEVEL_OUTOF10: 8
PAINLEVEL_OUTOF10: 8
PAINLEVEL_OUTOF10: 0
PAINLEVEL_OUTOF10: 9

## 2025-03-05 ASSESSMENT — PAIN DESCRIPTION - LOCATION
LOCATION: BACK

## 2025-03-05 ASSESSMENT — PAIN DESCRIPTION - ORIENTATION: ORIENTATION: POSTERIOR

## 2025-03-05 ASSESSMENT — PAIN DESCRIPTION - PAIN TYPE: TYPE: ACUTE PAIN

## 2025-03-05 ASSESSMENT — PAIN - FUNCTIONAL ASSESSMENT: PAIN_FUNCTIONAL_ASSESSMENT: ACTIVITIES ARE NOT PREVENTED

## 2025-03-05 ASSESSMENT — PAIN DESCRIPTION - FREQUENCY: FREQUENCY: CONTINUOUS

## 2025-03-05 ASSESSMENT — PAIN DESCRIPTION - ONSET: ONSET: ON-GOING

## 2025-03-05 ASSESSMENT — PAIN DESCRIPTION - DESCRIPTORS: DESCRIPTORS: ACHING

## 2025-03-05 NOTE — CARE COORDINATION
Case Management Assessment Initial Evaluation    Date/Time of Evaluation: 3/5/2025 3:12 PM  Assessment Completed by: Sweta Parish RN    If patient is discharged prior to next notation, then this note serves as note for discharge by case management.    Patient Name: Yony Borden                   YOB: 1955  Diagnosis: Shortness of breath [R06.02]  Acute on chronic respiratory failure with hypoxia (HCC) [J96.21]  Acute congestive heart failure, unspecified heart failure type (HCC) [I50.9]                   Date / Time: 3/3/2025  9:43 PM  Location: [facility]15/015     Patient Admission Status: Inpatient   Readmission Risk Low 0-14, Mod 15-19), High > 20: Readmission Risk Score: 13.5    Current PCP: Blanche Alvarado MD  Health Care Decision Makers:     Additional Case Management Notes: Presented to ED with increased SOB, over 3 months. Baseline O2 2L PRN. Noted to have BLE edema. O2 sats 76% on 4L NC on arrival. Increased to 6L, now on 5L (92%). IV Bumex BID. Pulmonary hygiene. Daily weights, strict I&O.     Procedures:   3/4 Echo EF 60-65%.     Imaging:   3/3 CXR: Bilateral interstitial and airspace opacities secondary to edema and/or infection.    3/4 CTA Chest:   1. No pulmonary embolus.  2. Subpleural consolidative changes in the right lower lobe suggesting pneumonia.  3. Bilateral septal thickening secondary to inflammation, infection or fluid overload.  4. Atelectasis and/or infiltrates in the lingula and left lower lobe.  5. Additional findings as above.       Patient Goals/Plan/Treatment Preferences: Return home with wife. Following O2 needs. Baseline 2L PRN through [facility].        03/05/25 1100   Service Assessment   Patient Orientation Alert and Oriented   Cognition Alert   History Provided By Patient;Spouse   Primary Caregiver Self   Accompanied By/Relationship wife   Support Systems Spouse/Significant Other;Family Members   Patient's Healthcare Decision Maker is: Patient Declined (Legal Next of  Kin Remains as Decision Maker)   PCP Verified by CM Yes   Last Visit to PCP Within last 3 months   Prior Functional Level Independent in ADLs/IADLs   Current Functional Level Independent in ADLs/IADLs   Can patient return to prior living arrangement Yes   Ability to make needs known: Good   Family able to assist with home care needs: Yes   Would you like for me to discuss the discharge plan with any other family members/significant others, and if so, who? Yes  (wife)   Financial Resources Medicare;Other (Comment)  (secondary med mutual)   Community Resources None   Social/Functional History   Active  Yes   Discharge Planning   Type of Residence House   Living Arrangements Spouse/Significant Other   Current Services Prior To Admission Durable Medical Equipment   Current DME Prior to Arrival Walker;Cane;Oxygen Therapy (Comment)  (has walker and cane from past back surgeries but does not use. O2 baseline 2L PRN through [facility])   Potential Assistance Needed Other (Comment)  (following o2 needs)   DME Ordered? No   Potential Assistance Purchasing Medications No   Type of Home Care Services None   Patient expects to be discharged to: House   Services At/After Discharge   Mode of Transport at Discharge Other (see comment)  (family)   Confirm Follow Up Transport Self   Condition of Participation: Discharge Planning   The Plan for Transition of Care is related to the following treatment goals: diurese and wean o2

## 2025-03-05 NOTE — PROGRESS NOTES
Hospitalist Progress Note      Patient:  Yony Borden 69 y.o. male     : 1955  Unit/Bed:[facility]15/015-A  Date of Admission: 3/3/2025        ASSESSMENT AND PLAN    Active Problems  #AoC hypoxic respiratory failure: Baseline 2L with exertion prn. In setting of COPD. Secondary to suspected AoC diastolic HF initially. ECHO performed 3/5/25 with normal LV/diastolic function. Prior ECHO with E/A flow reversal. Possible contribution from underlying mild pHTN noted prior, likely PARRISH component as well (refused sleep study in the past). Imaging on admission with significant volume overload, improved on repeat CXR 3/5/25. Continue with diuresis 1mg Bumex bid at this time, wean supplemental O2 as tolerated  #AoC HFpEF: ECHO 3/25: EF 60-65%, normal diastolic function. Prior E/A flow reversal previously noted. Mild pHTN noted on ECHO. Continuing with diuresis, daily weights, I/Os  #COPD: Not acutely exacerbated. Multiple recent steroid/abx trials for SOB OP without resolution. No wheezing on exmaination. Will continue with BDs  #CAD: S/p prior stent . On DAPT - continued  #CKDIIIa: Renal function stable, will monitor  #Metabolic alkalosis: Suspected contraction in setting of diuresis, will consider diamox for further augmentation   #NIDDMII: SSI, hypoglycemia protocol, accu-checks  #HTN: Diltiazem/losartan/metoprolol  #Chronic LBP: Prn baclofen/norco  #MDD: Buspar/zoloft/prn trazodone  #Tobacco use disorder      LDA: []CVC / []PICC / []Midline / []Jones / []Drains / []Mediport / []None  Antibiotics: No  Steroids: No  Labs (still needed?): []Yes / []No  IVF (still needed?): []Yes / []No    Level of care: [x]Step Down / []Med-Surg  Bed Status: [x]Inpatient / []Observation  Telemetry: [x]Yes / []No  PT/OT: [x]Yes / []No    DVT Prophylaxis: [x] Lovenox / [] Heparin / [] SCDs / [] Already on Systemic Anticoagulation / [] None     Expected discharge date:  2-3 days  Disposition: Home     Code status: Full Code

## 2025-03-05 NOTE — PROGRESS NOTES
HEART FAILURE  / CONGESTIVE HEART FAILURE  DISCHARGE INSTRUCTIONS:  GUIDELINES TO FOLLOW AT HOME    Self- Managed Care:     MEDICATIONS:  Take your medication as directed. If you are experiencing any side effects, inform your doctor, Do not stop taking any of your medications without letting your doctor know.   Check with your doctor before taking any over-the-counter medications / herbal / or dietary supplements. They may interfere with your other medications.  Do not take ibuprofen (Advil or Motrin) and naproxen (Aleve) without talking to your doctor first. They could make your heart failure worse.         WEIGHT MONITORING:   Weigh yourself everyday (with the same scale) around the same time of the day and write it down. (you can chart them on a calendar or keep track of them on paper.   Notify your doctor of a weight gain of 3 pounds or more in 1 day   OR a total of 5 pounds or more in 1 week    Take your weight record to your doctor visits  Also, the same goes if you loose more than 3 pounds in one day, let your heart doctor know.         DIET:   Cardiac heart healthy diet- Low saturated / low trans fat, no added salt, caffeine restricted, Low sodium diet-   No more than 2,000mg (2 grams) of salt / sodium per day (which equals to a little less than  a teaspoon of salt)  If your doctor wants you on a fluid restriction...it is usually recommended a fluid limit of 2,000ml -  Fluid restriction- 2,000 ml (milliliters) = 64 ounces = you can have 8 glasses of fluid per day (each glass 8 ounces)    Follow a low salt diet - avoid using salt at the table, avoid / limit use of canned soups, processed / packaged foods, salted snacks, olives and pickles.  Do not use a salt substitute without checking with your doctor, they may contain a high amount of potassioum. (Mrs. Dash is safe to use).    Limit the use of alcohol       CALL YOUR DOCTOR THE FIRST DAY YOU NOTICE ANY OF THESE   SYMPTOMS:  You have a  take action: decide what your options are, such as: (call your doctor for an extra visit, take a prescribed medication, such as your water pill if your doctor has given you directions to do so, CALL YOUR DOCTOR)    4. Come up with a strategy:  (now you call the doctor for advice / appointment. This is where you take action!!!  Do not wait, catch the symptom early and treat it before it worsens.    5. Evaluate the response: The next day, check your Heart Failure Zones: are you in the GREEN ZONE (safe zone)?  Worsening symptoms of YELLOW ZONE? Or have you moved to the RED ZONE and need to call 911 or go to the Emergency Room for evaluation? Call your doctor's office to update them on your symptoms of heart failure. [phone].

## 2025-03-05 NOTE — PROGRESS NOTES
Spiritual Health History and Assessment/Progress Note  [facility]    (P) Spiritual/Emotional Needs,  ,  ,      Name: Yony Borden MRN: [id number]    Age: 69 y.o.     Sex: male   Language: English   Yazidi: Non-Jewish   Acute on chronic respiratory failure with hypoxia (HCC)     Date: 3/5/2025            Total Time Calculated: (P) 10 min              Spiritual Assessment began in [facility] ICU STEPDOWN TELEMETRY 4K        Referral/Consult From: (P) Rounding   Encounter Overview/Reason: (P) Spiritual/Emotional Needs  Service Provided For: (P) Patient and family together    Andreina, Belief, Meaning:   Patient has beliefs or practices that help with coping during difficult times  Family/Friends have beliefs or practices that help with coping during difficult times      Importance and Influence:  Patient has spiritual/personal beliefs that influence decisions regarding their health  Family/Friends have spiritual/personal beliefs that influence decisions regarding the patient's health    Community:  Patient feels well-supported. Support system includes: Spouse/Partner  Family/Friends feel well-supported. Support system includes: Spouse/Partner    Assessment and Plan of Care:     Patient Interventions include: Facilitated expression of thoughts and feelings, Explored spiritual coping/struggle/distress, and Other:  prayed for patient bedside, with spouse present.  Family/Friends Interventions include: Facilitated expression of thoughts and feelings, Explored spiritual coping/struggle/distress, Affirmed coping skills/support systems, and Other:  prayed for patient bedside, with spouse present.    Patient Plan of Care: Spiritual Care available upon further referral  Family/Friends Plan of Care: Spiritual Care available upon further referral    Electronically signed by Chaplain Eyal on 3/5/2025 at 3:04 PM

## 2025-03-06 LAB
ANION GAP SERPL CALC-SCNC: 12 MEQ/L (ref 8–16)
BACTERIA SPEC RESP CULT: NORMAL
BUN SERPL-MCNC: 22 MG/DL (ref 8–23)
CALCIUM SERPL-MCNC: 9.2 MG/DL (ref 8.8–10.2)
CHLORIDE SERPL-SCNC: 98 MEQ/L (ref 98–111)
CO2 SERPL-SCNC: 31 MEQ/L (ref 22–29)
CREAT SERPL-MCNC: 1.2 MG/DL (ref 0.7–1.2)
DEPRECATED RDW RBC AUTO: 45.7 FL (ref 35–45)
ERYTHROCYTE [DISTWIDTH] IN BLOOD BY AUTOMATED COUNT: 14.4 % (ref 11.5–14.5)
GFR SERPL CREATININE-BSD FRML MDRD: 64 ML/MIN/1.73M2
GLUCOSE BLD STRIP.AUTO-MCNC: 172 MG/DL (ref 70–108)
GLUCOSE BLD STRIP.AUTO-MCNC: 209 MG/DL (ref 70–108)
GLUCOSE BLD STRIP.AUTO-MCNC: 242 MG/DL (ref 70–108)
GLUCOSE BLD STRIP.AUTO-MCNC: 249 MG/DL (ref 70–108)
GLUCOSE SERPL-MCNC: 152 MG/DL (ref 74–109)
GRAM STN SPEC: NORMAL
HCT VFR BLD AUTO: 41.9 % (ref 42–52)
HGB BLD-MCNC: 14.5 GM/DL (ref 14–18)
MAGNESIUM SERPL-MCNC: 2.6 MG/DL (ref 1.6–2.6)
MCH RBC QN AUTO: 30.6 PG (ref 26–33)
MCHC RBC AUTO-ENTMCNC: 34.6 GM/DL (ref 32.2–35.5)
MCV RBC AUTO: 88.4 FL (ref 80–94)
PLATELET # BLD AUTO: 166 THOU/MM3 (ref 130–400)
PMV BLD AUTO: 11.2 FL (ref 9.4–12.4)
POTASSIUM SERPL-SCNC: 3.9 MEQ/L (ref 3.5–5.2)
RBC # BLD AUTO: 4.74 MILL/MM3 (ref 4.7–6.1)
REASON FOR REJECTION: NORMAL
REJECTED TEST: NORMAL
SODIUM SERPL-SCNC: 141 MEQ/L (ref 135–145)
WBC # BLD AUTO: 11.2 THOU/MM3 (ref 4.8–10.8)

## 2025-03-06 PROCEDURE — 6370000000 HC RX 637 (ALT 250 FOR IP)

## 2025-03-06 PROCEDURE — 6360000002 HC RX W HCPCS

## 2025-03-06 PROCEDURE — 85027 COMPLETE CBC AUTOMATED: CPT

## 2025-03-06 PROCEDURE — 2700000000 HC OXYGEN THERAPY PER DAY

## 2025-03-06 PROCEDURE — 94761 N-INVAS EAR/PLS OXIMETRY MLT: CPT

## 2025-03-06 PROCEDURE — 99233 SBSQ HOSP IP/OBS HIGH 50: CPT

## 2025-03-06 PROCEDURE — 2060000000 HC ICU INTERMEDIATE R&B

## 2025-03-06 PROCEDURE — 2500000003 HC RX 250 WO HCPCS

## 2025-03-06 PROCEDURE — 82948 REAGENT STRIP/BLOOD GLUCOSE: CPT

## 2025-03-06 PROCEDURE — 83735 ASSAY OF MAGNESIUM: CPT

## 2025-03-06 PROCEDURE — 94640 AIRWAY INHALATION TREATMENT: CPT

## 2025-03-06 PROCEDURE — 94669 MECHANICAL CHEST WALL OSCILL: CPT

## 2025-03-06 PROCEDURE — 36415 COLL VENOUS BLD VENIPUNCTURE: CPT

## 2025-03-06 PROCEDURE — 80048 BASIC METABOLIC PNL TOTAL CA: CPT

## 2025-03-06 RX ORDER — AZITHROMYCIN 250 MG/1
500 TABLET, FILM COATED ORAL DAILY
Status: DISCONTINUED | OUTPATIENT
Start: 2025-03-06 | End: 2025-03-07 | Stop reason: HOSPADM

## 2025-03-06 RX ADMIN — ATORVASTATIN CALCIUM 20 MG: 20 TABLET, FILM COATED ORAL at 08:56

## 2025-03-06 RX ADMIN — WATER 2000 MG: 1 INJECTION INTRAMUSCULAR; INTRAVENOUS; SUBCUTANEOUS at 12:44

## 2025-03-06 RX ADMIN — PANTOPRAZOLE SODIUM 40 MG: 40 TABLET, DELAYED RELEASE ORAL at 08:57

## 2025-03-06 RX ADMIN — SODIUM CHLORIDE, PRESERVATIVE FREE 10 ML: 5 INJECTION INTRAVENOUS at 08:57

## 2025-03-06 RX ADMIN — BUSPIRONE HYDROCHLORIDE 10 MG: 10 TABLET ORAL at 15:30

## 2025-03-06 RX ADMIN — MONTELUKAST 10 MG: 10 TABLET, FILM COATED ORAL at 20:22

## 2025-03-06 RX ADMIN — INSULIN LISPRO 1 UNITS: 100 INJECTION, SOLUTION INTRAVENOUS; SUBCUTANEOUS at 20:34

## 2025-03-06 RX ADMIN — DILTIAZEM HYDROCHLORIDE 60 MG: 60 TABLET ORAL at 15:30

## 2025-03-06 RX ADMIN — DILTIAZEM HYDROCHLORIDE 60 MG: 60 TABLET ORAL at 20:21

## 2025-03-06 RX ADMIN — ENOXAPARIN SODIUM 30 MG: 100 INJECTION SUBCUTANEOUS at 08:57

## 2025-03-06 RX ADMIN — SODIUM CHLORIDE, PRESERVATIVE FREE 10 ML: 5 INJECTION INTRAVENOUS at 20:21

## 2025-03-06 RX ADMIN — INSULIN LISPRO 1 UNITS: 100 INJECTION, SOLUTION INTRAVENOUS; SUBCUTANEOUS at 08:57

## 2025-03-06 RX ADMIN — ENOXAPARIN SODIUM 30 MG: 100 INJECTION SUBCUTANEOUS at 20:21

## 2025-03-06 RX ADMIN — SERTRALINE 100 MG: 100 TABLET, FILM COATED ORAL at 20:21

## 2025-03-06 RX ADMIN — BUMETANIDE 1 MG: 0.25 INJECTION INTRAMUSCULAR; INTRAVENOUS at 17:40

## 2025-03-06 RX ADMIN — PANTOPRAZOLE SODIUM 40 MG: 40 TABLET, DELAYED RELEASE ORAL at 15:32

## 2025-03-06 RX ADMIN — METOPROLOL TARTRATE 25 MG: 25 TABLET, FILM COATED ORAL at 20:21

## 2025-03-06 RX ADMIN — LOSARTAN POTASSIUM 25 MG: 25 TABLET, FILM COATED ORAL at 08:56

## 2025-03-06 RX ADMIN — BUMETANIDE 1 MG: 0.25 INJECTION INTRAMUSCULAR; INTRAVENOUS at 08:57

## 2025-03-06 RX ADMIN — ROPINIROLE HYDROCHLORIDE 1 MG: 1 TABLET, FILM COATED ORAL at 15:30

## 2025-03-06 RX ADMIN — INSULIN LISPRO 1 UNITS: 100 INJECTION, SOLUTION INTRAVENOUS; SUBCUTANEOUS at 17:40

## 2025-03-06 RX ADMIN — ROPINIROLE HYDROCHLORIDE 1 MG: 1 TABLET, FILM COATED ORAL at 08:57

## 2025-03-06 RX ADMIN — AZITHROMYCIN DIHYDRATE 500 MG: 250 TABLET ORAL at 12:44

## 2025-03-06 RX ADMIN — SERTRALINE 100 MG: 100 TABLET, FILM COATED ORAL at 08:57

## 2025-03-06 RX ADMIN — TIOTROPIUM BROMIDE INHALATION SPRAY 2 PUFF: 3.12 SPRAY, METERED RESPIRATORY (INHALATION) at 08:24

## 2025-03-06 RX ADMIN — METOPROLOL TARTRATE 25 MG: 25 TABLET, FILM COATED ORAL at 08:56

## 2025-03-06 RX ADMIN — BUSPIRONE HYDROCHLORIDE 10 MG: 10 TABLET ORAL at 08:57

## 2025-03-06 RX ADMIN — BUSPIRONE HYDROCHLORIDE 10 MG: 10 TABLET ORAL at 20:21

## 2025-03-06 RX ADMIN — BACLOFEN 10 MG: 10 TABLET ORAL at 00:27

## 2025-03-06 RX ADMIN — ASPIRIN 81 MG: 81 TABLET, COATED ORAL at 08:57

## 2025-03-06 RX ADMIN — DILTIAZEM HYDROCHLORIDE 60 MG: 60 TABLET ORAL at 08:56

## 2025-03-06 RX ADMIN — ROPINIROLE HYDROCHLORIDE 1 MG: 1 TABLET, FILM COATED ORAL at 20:21

## 2025-03-06 ASSESSMENT — PAIN SCALES - GENERAL
PAINLEVEL_OUTOF10: 0

## 2025-03-06 NOTE — PLAN OF CARE
Problem: Chronic Conditions and Co-morbidities  Goal: Patient's chronic conditions and co-morbidity symptoms are monitored and maintained or improved  Outcome: Progressing  Flowsheets (Taken 3/6/2025 0815)  Care Plan - Patient's Chronic Conditions and Co-Morbidity Symptoms are Monitored and Maintained or Improved: Monitor and assess patient's chronic conditions and comorbid symptoms for stability, deterioration, or improvement     Problem: Discharge Planning  Goal: Discharge to home or other facility with appropriate resources  Outcome: Progressing  Flowsheets (Taken 3/6/2025 0815)  Discharge to home or other facility with appropriate resources: Identify barriers to discharge with patient and caregiver     Problem: Safety - Adult  Goal: Free from fall injury  Outcome: Progressing     Problem: ABCDS Injury Assessment  Goal: Absence of physical injury  Outcome: Progressing     Problem: Respiratory - Adult  Goal: Clear lung sounds  Description: Clear lung sounds  3/6/2025 1010 by Lia Levine RCP  Outcome: Progressing     Problem: Pain  Goal: Verbalizes/displays adequate comfort level or baseline comfort level  Outcome: Progressing

## 2025-03-06 NOTE — PROGRESS NOTES
Hospitalist Progress Note      Patient:  Yony Borden 69 y.o. male     : 1955  Unit/Bed:[facility]15/015  Date of Admission: 3/3/2025        ASSESSMENT AND PLAN    Active Problems  #AoC hypoxic respiratory failure: Baseline 2L with exertion prn. In setting of COPD. Secondary to suspected AoC diastolic HF initially. ECHO performed 3/5/25 with normal LV/diastolic function. Prior ECHO with E/A flow reversal. Possible contribution from underlying mild pHTN noted prior, likely PARRISH component as well (refused sleep study in the past). Imaging on admission with significant volume overload, improved on repeat CXR 3/5/25. Continue with diuresis 1mg Bumex bid at this time, wean supplemental O2 as tolerated. Given persistent hypoxia, will empirically cover with abx given pleural thickening noted on CT chest on admission.   #AoC HFpEF: ECHO 3/25: EF 60-65%, normal diastolic function. Prior E/A flow reversal previously noted. Mild pHTN noted on ECHO. Continuing with diuresis, daily weights, I/Os  #CAP: CT chest on admission with septal thickening, some condolidations. Will empirically treat with Rocephin/Azithromycin  #COPD: Not acutely exacerbated. Multiple recent steroid/abx trials for SOB OP without resolution. No wheezing on exmaination. Will continue with BDs  #CAD: S/p prior stent . On DAPT - continued  #CKDIIIa: Renal function stable, will monitor  #Metabolic alkalosis: Suspected contraction in setting of diuresis, will consider diamox for further augmentation   #NIDDMII: SSI, hypoglycemia protocol, accu-checks  #HTN: Diltiazem/losartan/metoprolol  #PARRISH: Known history, refuses sleep study now and has in the past. De-sats noted overnight in this setting, will monitor. States he will not use PAP machine   #Chronic LBP: Prn baclofen/norco  #MDD: Buspar/zoloft/prn trazodone  #Tobacco use disorder      LDA: []CVC / []PICC / []Midline / []Jones / []Drains / []Mediport / []None  Antibiotics: No  Steroids: No  Labs (still    General: No distress, appears stated age.  Eyes:  PERRL. Conjunctivae/corneas clear.  HENT: Head normal appearing. Nares normal. Oral mucosa moist.  Hearing intact.   Neck: Supple, with full range of motion. Trachea midline.  No gross JVD appreciated.  Respiratory:  Normal effort. Bibasilar crackles  Cardiovascular: Normal rate, regular rhythm with normal S1/S2 without murmurs.    No lower extremity edema.   Abdomen: Soft, non-tender, non-distended with normal bowel sounds.  Musculoskeletal: No joint swelling or tenderness. Normal tone. No abnormal movements.   Skin: Warm and dry. No rashes or lesions.  Neurologic:  No focal sensory/motor deficits in the upper or lower extremities. Cranial nerves:  grossly non-focal 2-12.     Psychiatric: Alert and oriented, normal insight and thought content.   Capillary Refill: Brisk,< 3 seconds.  Peripheral Pulses: +2 palpable, equal bilaterally.       Labs/Radiology: See chart or assessment above.     Electronically signed by Ray Becker MD on 3/6/2025 at 1:43 PM

## 2025-03-07 VITALS
HEIGHT: 68 IN | OXYGEN SATURATION: 92 % | BODY MASS INDEX: 33.41 KG/M2 | HEART RATE: 60 BPM | DIASTOLIC BLOOD PRESSURE: 66 MMHG | TEMPERATURE: 98.1 F | SYSTOLIC BLOOD PRESSURE: 124 MMHG | RESPIRATION RATE: 17 BRPM | WEIGHT: 220.46 LBS

## 2025-03-07 LAB
ANION GAP SERPL CALC-SCNC: 11 MEQ/L (ref 8–16)
BUN SERPL-MCNC: 28 MG/DL (ref 8–23)
CALCIUM SERPL-MCNC: 9 MG/DL (ref 8.8–10.2)
CHLORIDE SERPL-SCNC: 95 MEQ/L (ref 98–111)
CO2 SERPL-SCNC: 32 MEQ/L (ref 22–29)
CREAT SERPL-MCNC: 1.2 MG/DL (ref 0.7–1.2)
DEPRECATED RDW RBC AUTO: 45.9 FL (ref 35–45)
ERYTHROCYTE [DISTWIDTH] IN BLOOD BY AUTOMATED COUNT: 14.2 % (ref 11.5–14.5)
GFR SERPL CREATININE-BSD FRML MDRD: 64 ML/MIN/1.73M2
GLUCOSE BLD STRIP.AUTO-MCNC: 186 MG/DL (ref 70–108)
GLUCOSE BLD STRIP.AUTO-MCNC: 193 MG/DL (ref 70–108)
GLUCOSE SERPL-MCNC: 195 MG/DL (ref 74–109)
HCT VFR BLD AUTO: 43.8 % (ref 42–52)
HGB BLD-MCNC: 14.9 GM/DL (ref 14–18)
MAGNESIUM SERPL-MCNC: 1.8 MG/DL (ref 1.6–2.6)
MCH RBC QN AUTO: 30.5 PG (ref 26–33)
MCHC RBC AUTO-ENTMCNC: 34 GM/DL (ref 32.2–35.5)
MCV RBC AUTO: 89.6 FL (ref 80–94)
PLATELET # BLD AUTO: 155 THOU/MM3 (ref 130–400)
PMV BLD AUTO: 11.2 FL (ref 9.4–12.4)
POTASSIUM SERPL-SCNC: 3.8 MEQ/L (ref 3.5–5.2)
RBC # BLD AUTO: 4.89 MILL/MM3 (ref 4.7–6.1)
SODIUM SERPL-SCNC: 138 MEQ/L (ref 135–145)
WBC # BLD AUTO: 10.1 THOU/MM3 (ref 4.8–10.8)

## 2025-03-07 PROCEDURE — 36415 COLL VENOUS BLD VENIPUNCTURE: CPT

## 2025-03-07 PROCEDURE — 99239 HOSP IP/OBS DSCHRG MGMT >30: CPT

## 2025-03-07 PROCEDURE — 94640 AIRWAY INHALATION TREATMENT: CPT

## 2025-03-07 PROCEDURE — 83735 ASSAY OF MAGNESIUM: CPT

## 2025-03-07 PROCEDURE — 6370000000 HC RX 637 (ALT 250 FOR IP)

## 2025-03-07 PROCEDURE — 80048 BASIC METABOLIC PNL TOTAL CA: CPT

## 2025-03-07 PROCEDURE — 2500000003 HC RX 250 WO HCPCS

## 2025-03-07 PROCEDURE — 6360000002 HC RX W HCPCS

## 2025-03-07 PROCEDURE — 94669 MECHANICAL CHEST WALL OSCILL: CPT

## 2025-03-07 PROCEDURE — 82948 REAGENT STRIP/BLOOD GLUCOSE: CPT

## 2025-03-07 PROCEDURE — 85027 COMPLETE CBC AUTOMATED: CPT

## 2025-03-07 RX ORDER — AZITHROMYCIN 500 MG/1
500 TABLET, FILM COATED ORAL DAILY
Qty: 1 TABLET | Refills: 0 | Status: SHIPPED | OUTPATIENT
Start: 2025-03-08 | End: 2025-03-09

## 2025-03-07 RX ORDER — BUMETANIDE 1 MG/1
1 TABLET ORAL DAILY
Qty: 30 TABLET | Refills: 0 | Status: SHIPPED | OUTPATIENT
Start: 2025-03-07 | End: 2025-04-06

## 2025-03-07 RX ADMIN — INSULIN LISPRO 1 UNITS: 100 INJECTION, SOLUTION INTRAVENOUS; SUBCUTANEOUS at 08:10

## 2025-03-07 RX ADMIN — LOSARTAN POTASSIUM 25 MG: 25 TABLET, FILM COATED ORAL at 08:11

## 2025-03-07 RX ADMIN — SERTRALINE 100 MG: 100 TABLET, FILM COATED ORAL at 08:11

## 2025-03-07 RX ADMIN — METOPROLOL TARTRATE 25 MG: 25 TABLET, FILM COATED ORAL at 08:11

## 2025-03-07 RX ADMIN — BUMETANIDE 1 MG: 0.25 INJECTION INTRAMUSCULAR; INTRAVENOUS at 08:11

## 2025-03-07 RX ADMIN — ROPINIROLE HYDROCHLORIDE 1 MG: 1 TABLET, FILM COATED ORAL at 08:11

## 2025-03-07 RX ADMIN — SODIUM CHLORIDE, PRESERVATIVE FREE 10 ML: 5 INJECTION INTRAVENOUS at 08:15

## 2025-03-07 RX ADMIN — DILTIAZEM HYDROCHLORIDE 60 MG: 60 TABLET ORAL at 08:11

## 2025-03-07 RX ADMIN — WATER 2000 MG: 1 INJECTION INTRAMUSCULAR; INTRAVENOUS; SUBCUTANEOUS at 10:23

## 2025-03-07 RX ADMIN — BUSPIRONE HYDROCHLORIDE 10 MG: 10 TABLET ORAL at 08:11

## 2025-03-07 RX ADMIN — ASPIRIN 81 MG: 81 TABLET, COATED ORAL at 08:10

## 2025-03-07 RX ADMIN — TIOTROPIUM BROMIDE INHALATION SPRAY 2 PUFF: 3.12 SPRAY, METERED RESPIRATORY (INHALATION) at 07:13

## 2025-03-07 RX ADMIN — AZITHROMYCIN DIHYDRATE 500 MG: 250 TABLET ORAL at 08:11

## 2025-03-07 RX ADMIN — ENOXAPARIN SODIUM 30 MG: 100 INJECTION SUBCUTANEOUS at 08:11

## 2025-03-07 RX ADMIN — PANTOPRAZOLE SODIUM 40 MG: 40 TABLET, DELAYED RELEASE ORAL at 05:48

## 2025-03-07 RX ADMIN — INSULIN LISPRO 1 UNITS: 100 INJECTION, SOLUTION INTRAVENOUS; SUBCUTANEOUS at 11:51

## 2025-03-07 RX ADMIN — ATORVASTATIN CALCIUM 20 MG: 20 TABLET, FILM COATED ORAL at 08:11

## 2025-03-07 NOTE — PLAN OF CARE
Problem: Chronic Conditions and Co-morbidities  Goal: Patient's chronic conditions and co-morbidity symptoms are monitored and maintained or improved  Outcome: Progressing  Flowsheets (Taken 3/7/2025 0424)  Care Plan - Patient's Chronic Conditions and Co-Morbidity Symptoms are Monitored and Maintained or Improved:   Monitor and assess patient's chronic conditions and comorbid symptoms for stability, deterioration, or improvement   Collaborate with multidisciplinary team to address chronic and comorbid conditions and prevent exacerbation or deterioration   Update acute care plan with appropriate goals if chronic or comorbid symptoms are exacerbated and prevent overall improvement and discharge     Problem: Discharge Planning  Goal: Discharge to home or other facility with appropriate resources  Outcome: Progressing  Flowsheets (Taken 3/7/2025 0424)  Discharge to home or other facility with appropriate resources:   Identify barriers to discharge with patient and caregiver   Arrange for needed discharge resources and transportation as appropriate   Identify discharge learning needs (meds, wound care, etc)     Problem: Safety - Adult  Goal: Free from fall injury  Outcome: Progressing  Flowsheets (Taken 3/7/2025 0424)  Free From Fall Injury: Instruct family/caregiver on patient safety     Problem: ABCDS Injury Assessment  Goal: Absence of physical injury  Outcome: Progressing  Flowsheets (Taken 3/7/2025 0424)  Absence of Physical Injury: Implement safety measures based on patient assessment     Problem: Pain  Goal: Verbalizes/displays adequate comfort level or baseline comfort level  Outcome: Progressing  Flowsheets (Taken 3/7/2025 0424)  Verbalizes/displays adequate comfort level or baseline comfort level:   Encourage patient to monitor pain and request assistance   Assess pain using appropriate pain scale   Administer analgesics based on type and severity of pain and evaluate response   Implement non-pharmacological

## 2025-03-07 NOTE — PROGRESS NOTES
A home oxygen evaluation has been completed.     [x]Patient is an inpatient. It is expected that the patient will be discharged within the next 48 hours. Qualified provider to write order for home prescription if patient qualifies. Social service/care managers will arrange for home oxygen.  If patient is active, arrange for Home Medical supplier to assess for Oxygen Conserving Device per pulse oximetry.  []Patient is an outpatient. Results will be faxed to the ordering provider. Qualified provider to write order for home prescription if patient qualifies and arranges for home oxygen.      Patient was placed on room air for 6 minutes. SpO2 was 83 % on room air at rest. Patients SpO2 was below 89% and qualified for home oxygen. Oxygen was applied at 4 lpm via nasal cannula to maintain a SpO2 between 90-92% while at rest. Actual SpO2 was 94 %. Patient able to ambulate for exercise flow rate. Patients was ambulated, SpO2 was 93% on 5 lpm to maintain SpO2 between 90-92% while exercising.    If oxygen need is greater than 4 lpm the SpO2 on 4 lpm was 79.     Note: For any SpO2 at 89% see policy and procedure for possible qualifications.

## 2025-03-07 NOTE — PLAN OF CARE
Patient was evaluated today for the diagnosis of COPD, severe asthma .  I entered a DME order for home oxygen at 5 lpm because the diagnosis and testing require the patient to have supplemental oxygen.  Condition will improve or be benefited by oxygen use.  The patient is  able to perform good mobility in a home setting and therefore does require the use of a portable oxygen system.  The need for this equipment was discussed with the patient and he understands and is in agreement.

## 2025-03-07 NOTE — DISCHARGE SUMMARY
Subpleural consolidative changes in the right lower lobe suggesting    pneumonia.   3. Bilateral septal thickening secondary to inflammation, infection or    fluid overload.   4. Atelectasis and/or infiltrates in the lingula and left lower lobe.   5. Additional findings as above.      This document has been electronically signed by: Bria Tuttle DO on    03/04/2025 12:25 AM      All CTs at this facility use dose modulation techniques and iterative    reconstructions, and/or weight-based dosing   when appropriate to reduce radiation to a low as reasonably achievable.      3D Post-processing was performed on this study.      XR CHEST PORTABLE   Final Result   Bilateral interstitial and airspace opacities secondary to edema and/or    infection.      This document has been electronically signed by: Bria Tuttle DO on    03/03/2025 11:15 PM             Consults:   IP CONSULT TO HEART FAILURE NURSE/COORDINATOR      Disposition: Home  Condition at Discharge: Stable    Code Status:  Full Code     Patient Instructions:    Discharge lab work: BMP 1 week  Activity: activity as tolerated  Diet: ADULT DIET; Regular; 3 carb choices (45 gm/meal); No Added Salt (3-4 gm); 2000 ml      Follow-up visits:   No follow-up provider specified.       Discharge Medications:        Medication List        CONTINUE taking these medications      * Nebulizer Cup/Tubing Gretchen  Use with breathing treatments.     * Nebulizer Mask Adult Misc  Use with with breathing treatments.           * This list has 2 medication(s) that are the same as other medications prescribed for you. Read the directions carefully, and ask your doctor or other care provider to review them with you.                ASK your doctor about these medications      aspirin 81 MG EC tablet     atorvastatin 20 MG tablet  Commonly known as: LIPITOR     baclofen 10 MG tablet  Commonly known as: LIORESAL     busPIRone 10 MG tablet  Commonly known as: BUSPAR  TAKE 1 TABLET BY                  Time Spent on discharge is 34 minutes in the examination, evaluation, counseling and review of medications and discharge plan.    Thank you Blanche Alvarado MD for the opportunity to be involved in this patient's care.      Signed:    Electronically signed by Ray Becker MD on 3/7/25 at 7:42 AM EST

## 2025-03-07 NOTE — PLAN OF CARE
Problem: Respiratory - Adult  Goal: Clear lung sounds  Description: Clear lung sounds  Outcome: Progressing   Spiriva to improve breath sounds. Patient mutually agreed on goals.

## 2025-03-07 NOTE — PLAN OF CARE
Problem: Chronic Conditions and Co-morbidities  Goal: Patient's chronic conditions and co-morbidity symptoms are monitored and maintained or improved  3/7/2025 1012 by Eric Bradshaw RN  Outcome: Progressing     Problem: Discharge Planning  Goal: Discharge to home or other facility with appropriate resources  3/7/2025 1012 by Eric Bradshaw RN  Outcome: Progressing     Problem: Safety - Adult  Goal: Free from fall injury  3/7/2025 1012 by Eric Bradshaw RN  Outcome: Progressing     Problem: ABCDS Injury Assessment  Goal: Absence of physical injury  3/7/2025 1012 by Eric Bradshaw RN  Outcome: Progressing     Problem: Pain  Goal: Verbalizes/displays adequate comfort level or baseline comfort level  3/7/2025 1012 by Eric Bradshaw RN  Outcome: Progressing    Care plan reviewed with patient , patient verbalized understanding of plan of care and contributed to goal setting.

## 2025-03-07 NOTE — CARE COORDINATION
3/7/25, 1:07 PM EST    Patient goals/plan/ treatment preferences discussed by  and .  Patient goals/plan/ treatment preferences reviewed with patient/ family.  Patient/ family verbalize understanding of discharge plan and are in agreement with goal/plan/treatment preferences.  Understanding was demonstrated using the teach back method.  AVS provided by RN at time of discharge, which includes all necessary medical information pertaining to the patients current course of illness, treatment, post-discharge goals of care, and treatment preferences.     Services At/After Discharge: New home O2 through [facility] with flow rate of 4L at rest and 5L with activity. Returning home with wife.     Adilene from [facility] will deliver home O2 once orders are in and verified.

## 2025-03-07 NOTE — DISCHARGE INSTRUCTIONS
Home O2: Utilize 4L at rest, 5L with exertion  Start Bumex 1mg daily  Continue Augmentin two times day for 3 days  Continue Azithromycin 1 time for 1 day  Get BMP lab drawn in 1 week  Follow-up with PCP 1 week  Follow-up with Dr. Espinoza in 1-2 weeks

## 2025-03-10 ENCOUNTER — CARE COORDINATION (OUTPATIENT)
Dept: CARE COORDINATION | Age: 70
End: 2025-03-10

## 2025-03-10 DIAGNOSIS — I50.9 ACUTE CONGESTIVE HEART FAILURE, UNSPECIFIED HEART FAILURE TYPE (HCC): Primary | ICD-10-CM

## 2025-03-10 NOTE — CARE COORDINATION
Care Transitions Note    Initial Call - Call within 2 business days of discharge: Yes    Patient Current Location:  Home: [address]  [facility] [address]    Care Transition Nurse contacted the spouse/partner  by telephone to perform post hospital discharge assessment, verified name and  as identifiers. Provided introduction to self, and explanation of the Care Transition Nurse role.     Patient: Yony Borden    Patient : 1955   MRN: [id number]    Reason for Admission: CHF  Discharge Date: 3/7/25  RURS: Readmission Risk Score: 12.9      Last Discharge Facility       Date Complaint Diagnosis Description Type Department Provider    3/3/25 Shortness of Breath Acute congestive heart failure, unspecified heart failure type (HCC) ... ED to Hosp-Admission (Discharged) (ADMITTED) Ray Khanna MD; Bryce Ferrera M...            Was this an external facility discharge? No    Additional needs identified to be addressed with provider   No needs identified             Method of communication with provider: none.    Patients top risk factors for readmission: lack of knowledge about disease, medical condition-CHF, COPD, CKD3, HTN, DM, and polypharmacy    Interventions to address risk factors:   Education: CHF, RPM  Review of patient management of conditions/medications:    Communication with providers: HFU scheduled    Care Summary Note:  Spoke with wife, Zeinab, said Yony is doing ok.  Still has some GUTIERREZ but recovers with rest.  O2 is set on 3L.  When going to BR and coming back, pulse ox 85% but once rests, goes up to 93%.  Discussed adjusting O2 to 5L when walking.  Denies chest pain, fever, chills, dizziness.  Reviewed medications/changes.  Appetite and fluid intake is good.  -160.  /80.  Discussed the importance of weighing daily, in the morning after urinating, same amount of clothing and that if he has weight gain of 2-3 lbs or more overnight OR 5 lbs or more in 3 days to call his physician immediately

## 2025-03-12 ENCOUNTER — CARE COORDINATION (OUTPATIENT)
Dept: PRIMARY CARE CLINIC | Age: 70
End: 2025-03-12

## 2025-03-12 NOTE — CARE COORDINATION
Remote Patient Kit Ordering Note      Date/Time:  3/12/2025 11:04 AM      [facility] placed phone call to patient/family today to notify of RPM kit order; patient/family was available; discussed the following topics below and all questions answered.    [x] [facility] confirmed patient shipping address  [x] Patient will receive package over the next 1-3 business days. Someone 21 years or older must be present to sign for UPS delivery.  [x] HRS will contact patient within 24 hours, an HRS  will call the patient directly: If the patient does not answer, HRS will follow up with the clinical team notifying them about the unsuccessful attempt to contact the patient. HRS will make three call attempts to the patient.Provide patient with [facility] Virtual install number is: [phone].  [x] The RPM Nurse will contact patient once equipment is active to welcome them to the program.                                                         [x] Hours of RPM monitoring - Monday-Friday 2410-2962; encourage patient to get vitals entered by Noon each day to have the alert addressed same day.  [x][facility] mailed RPM Patient flyer to patient.                      LPN made aware the RPM kit has been ordered.

## 2025-03-14 ENCOUNTER — CARE COORDINATION (OUTPATIENT)
Dept: CARE COORDINATION | Age: 70
End: 2025-03-14

## 2025-03-14 NOTE — CARE COORDINATION
Care Transitions Note    Follow Up Call     Patient Current Location:  Home: [place] [address]    Care Transition Nurse contacted the spouse/partner  by telephone. Verified name and  as identifiers.    Additional needs identified to be addressed with provider   No needs identified                 Method of communication with provider: none.    Care Summary Note:  Spoke with wife, Zeinab, said Yony is doing a little better.  Still has some GUTIERREZ but not as bad.  O2 @ 3 L and only drops to 88-89% when walking to BR.  Above 90% with rest.  Denies chest pain, dyspnea, fever, chills, dizziness.  BP, BS are still good.  Has not checked his wt yet.  Has a little bit of LE edema but not bad.  Eating, drinking, sleeping good.  Has not received RPM kit yet.  Will see PCP on Monday.  No other issues to report.  Denies any other needs.  No other questions or concerns at this time.  Will continue to follow, agreeable to calls.    Plan of care updates since last contact:  No changes       Advance Care Planning:   Does patient have an Advance Directive: reviewed during previous call, see note. .    Medication Review:  Full medication reconciliation completed during previous call. and No changes since last call.     Remote Patient Monitoring:  Offered patient enrollment in the Remote Patient Monitoring (RPM) program for in-home monitoring: Yes, patient enrolled; current status is awaiting kit.    Assessments:  No changes since last call    Follow Up Appointment:   Reviewed upcoming appointment(s).  Future Appointments         Provider Specialty Dept Phone    3/17/2025 9:40 AM Blanche Alvarado MD Family Medicine [phone]    3/24/2025 9:00 AM Blanche Alvarado MD Family Medicine [phone]    2025 11:00 AM Quinn Pierre, APRN - CNP Pulmonology [phone]    2025 9:00 AM Juan Pickard MD Nephrology [phone]    2025 10:20 AM (Arrive by 9:50 AM) STR CT IMAGING RM1  [facility]

## 2025-03-17 ENCOUNTER — OFFICE VISIT (OUTPATIENT)
Dept: FAMILY MEDICINE CLINIC | Age: 70
End: 2025-03-17

## 2025-03-17 VITALS
WEIGHT: 234.4 LBS | HEART RATE: 80 BPM | DIASTOLIC BLOOD PRESSURE: 72 MMHG | BODY MASS INDEX: 35.52 KG/M2 | RESPIRATION RATE: 16 BRPM | HEIGHT: 68 IN | SYSTOLIC BLOOD PRESSURE: 130 MMHG

## 2025-03-17 DIAGNOSIS — J98.11 BILATERAL ATELECTASIS: ICD-10-CM

## 2025-03-17 DIAGNOSIS — J44.1 COPD WITH EXACERBATION (HCC): ICD-10-CM

## 2025-03-17 DIAGNOSIS — J45.50 SEVERE PERSISTENT ASTHMA WITHOUT COMPLICATION (HCC): ICD-10-CM

## 2025-03-17 DIAGNOSIS — E11.8 TYPE 2 DIABETES MELLITUS WITH COMPLICATION, WITHOUT LONG-TERM CURRENT USE OF INSULIN (HCC): Primary | ICD-10-CM

## 2025-03-17 DIAGNOSIS — E83.42 HYPOMAGNESEMIA: ICD-10-CM

## 2025-03-17 DIAGNOSIS — F33.1 MAJOR DEPRESSIVE DISORDER, RECURRENT, MODERATE (HCC): ICD-10-CM

## 2025-03-17 LAB
CHP ED QC CHECK: ABNORMAL
GLUCOSE BLD-MCNC: 179 MG/DL
HBA1C MFR BLD: 7 %

## 2025-03-17 PROCEDURE — 83036 HEMOGLOBIN GLYCOSYLATED A1C: CPT | Performed by: FAMILY MEDICINE

## 2025-03-17 PROCEDURE — 99495 TRANSJ CARE MGMT MOD F2F 14D: CPT | Performed by: FAMILY MEDICINE

## 2025-03-17 PROCEDURE — 82962 GLUCOSE BLOOD TEST: CPT | Performed by: FAMILY MEDICINE

## 2025-03-17 RX ORDER — BUSPIRONE HYDROCHLORIDE 10 MG/1
10 TABLET ORAL 3 TIMES DAILY
Qty: 90 TABLET | Refills: 1 | Status: SHIPPED | OUTPATIENT
Start: 2025-03-17

## 2025-03-17 NOTE — TELEPHONE ENCOUNTER
The pharmacy is  requesting a refill of the below medication which has been pended for you:     Requested Prescriptions     Pending Prescriptions Disp Refills    busPIRone (BUSPAR) 10 MG tablet [Pharmacy Med Name: BUSPIRONE 10MG TABLETS] 90 tablet 1     Sig: TAKE 1 TABLET BY MOUTH THREE TIMES DAILY       Last Appointment Date: 1/27/2025  Next Appointment Date: 3/17/2025    Allergies   Allergen Reactions    Dupixent [Dupilumab]     Dust Mite Extract     Isosorbide Nitrate Other (See Comments)

## 2025-03-17 NOTE — PROGRESS NOTES
appointment.    Discussed use, benefit, and side effects of prescribed medications.  All patient questions answered.  Pt voiced understanding.  Instructed to continue current medications, ADA diet and exercise.  Patient agreed with treatment plan.     Return in about 4 months (around 7/17/2025), or if symptoms worsen or fail to improve, for DM.    An electronic signature was used to authenticate this note.  --Blanche Alvarado MD

## 2025-03-18 ENCOUNTER — CARE COORDINATION (OUTPATIENT)
Dept: CARE COORDINATION | Age: 70
End: 2025-03-18

## 2025-03-19 ENCOUNTER — CARE COORDINATION (OUTPATIENT)
Dept: CARE COORDINATION | Age: 70
End: 2025-03-19

## 2025-03-19 NOTE — CARE COORDINATION
Room.**    Plan/Follow Up: Will continue to review, monitor and address alerts with follow up based on severity of symptoms and risk factors.    [facility]/ CTN/ Remote Patient monitoring  [phone]

## 2025-03-19 NOTE — CARE COORDINATION
Care Transitions Note    Follow Up Call     Patient Current Location:  Home: [place] [address]    Care Transition Nurse contacted the spouse/partner  by telephone. Verified name and  as identifiers.    Additional needs identified to be addressed with provider   Yony is enrolled in RPM program.  The alert is set for below 92% and nurse has to call. His wife said his pulse ox usually runs 90-92%. Continues on O2. Can we change the alert, to below 90% so he doesn't get a call everyday?  Thank you!                 Method of communication with provider: chart routing.    Care Summary Note:  Spoke with wife, Zeinab, said Yony is doing pretty good.  Denies chest pain, dyspnea, dizziness.  Reviewed RPM metrics.  Has had pulse ox below 90%.  Said his usual pulse ox is 90-92%.  The alert to call is set at 92%.  Will send message to pulmonary if can change alert to below 90%.  BP, wt, BS stable.  Denies any swelling of LE's.  Eating, drinking, sleeping good.  Saw PCP this week, no changes.  No other issues to report.  Denies any other needs.  No other questions or concerns at this time.  Will continue to follow, agreeable to calls.    Plan of care updates since last contact:  No changes       Advance Care Planning:   Does patient have an Advance Directive: reviewed during previous call, see note. .    Medication Review:  No changes since last call.     Remote Patient Monitoring:  Offered patient enrollment in the Remote Patient Monitoring (RPM) program for in-home monitoring: Yes, patient enrolled; current status is activated and monitoring.    Assessments:  No changes since last call    Follow Up Appointment:   Reviewed upcoming appointment(s). and ELLYN appointment attended as scheduled   Future Appointments         Provider Specialty Dept Phone    2025 11:00 AM Quinn Pierre, [facility] [phone]    2025 9:10 AM Blanche Alvarado MD Family Medicine [phone]    2025 9:00 AM

## 2025-03-26 ENCOUNTER — CARE COORDINATION (OUTPATIENT)
Dept: CARE COORDINATION | Age: 70
End: 2025-03-26

## 2025-03-26 NOTE — CARE COORDINATION
Care Transitions Note    Follow Up Call     Patient Current Location:  Home: [place] [address]    Care Transition Nurse contacted the spouse/partner  by telephone. Verified name and  as identifiers.    Additional needs identified to be addressed with provider   No needs identified                 Method of communication with provider: none.    Care Summary Note:  Spoke with wife, Zeinab, said Yony is doing pretty good.  Still has some GUTIERREZ at times.  Has a little swelling of LE but not bad.  Reviewed RPM metrics.  Wt fluctuates couple lbs.  BP, BS, pulse ox stable.  Denies chest pain, dizziness, dyspnea.  Eating, drinking, sleeping good.  No issues with B&B.  No other issues to report.  Denies any other needs.  No other questions or concerns at this time.  Will continue to follow, agreeable to calls.    Plan of care updates since last contact:  No changes       Advance Care Planning:   Does patient have an Advance Directive: reviewed during previous call, see note. .    Medication Review:  No changes since last call.     Remote Patient Monitoring:  Offered patient enrollment in the Remote Patient Monitoring (RPM) program for in-home monitoring: Yes, patient enrolled; current status is activated and monitoring.    Assessments:  No changes since last call    Follow Up Appointment:   Reviewed upcoming appointment(s).  Future Appointments         Provider Specialty Dept Phone    2025 11:00 AM Quinn Pierre APRN - [facility] [phone]    2025 9:10 AM Blanche Alvarado MD Family Medicine [phone]    2025 9:00 AM Juan Pickard MD Nephrology [phone]    2025 10:20 AM (Arrive by 9:50 AM) STR CT IMAGING RM1  [facility] [phone]    2025 11:00 AM Quinn Pierre APRN - CNP Pulmonology [phone]            Care Transition Nurse provided contact information.  Plan for follow-up call in 6-10 days based on severity of symptoms and risk factors.  Plan

## 2025-03-28 ENCOUNTER — HOSPITAL ENCOUNTER (OUTPATIENT)
Dept: GENERAL RADIOLOGY | Age: 70
Discharge: HOME | End: 2025-03-28
Payer: MEDICARE

## 2025-03-28 ENCOUNTER — HOSPITAL ENCOUNTER (OUTPATIENT)
Age: 70
Discharge: HOME | End: 2025-03-28
Payer: MEDICARE

## 2025-03-28 VITALS
WEIGHT: 229.5 LBS | BODY MASS INDEX: 34.9 KG/M2 | SYSTOLIC BLOOD PRESSURE: 125 MMHG | DIASTOLIC BLOOD PRESSURE: 77 MMHG | OXYGEN SATURATION: 92 % | HEART RATE: 63 BPM

## 2025-03-28 DIAGNOSIS — J98.11 BILATERAL ATELECTASIS: ICD-10-CM

## 2025-03-28 DIAGNOSIS — E11.8 TYPE 2 DIABETES MELLITUS WITH COMPLICATION, WITHOUT LONG-TERM CURRENT USE OF INSULIN (HCC): ICD-10-CM

## 2025-03-28 DIAGNOSIS — E83.42 HYPOMAGNESEMIA: ICD-10-CM

## 2025-03-28 LAB
CREAT UR-MCNC: 266 MG/DL
MAGNESIUM SERPL-MCNC: 0.9 MG/DL (ref 1.6–2.6)
MICROALBUMIN UR-MCNC: 3.17 MG/DL
MICROALBUMIN/CREAT RATIO PNL UR: 12 MG/G (ref 0–30)

## 2025-03-28 PROCEDURE — 83735 ASSAY OF MAGNESIUM: CPT

## 2025-03-28 PROCEDURE — 82043 UR ALBUMIN QUANTITATIVE: CPT

## 2025-03-28 PROCEDURE — 36415 COLL VENOUS BLD VENIPUNCTURE: CPT

## 2025-03-28 PROCEDURE — 71046 X-RAY EXAM CHEST 2 VIEWS: CPT

## 2025-04-01 ENCOUNTER — CARE COORDINATION (OUTPATIENT)
Dept: CARE COORDINATION | Age: 70
End: 2025-04-01

## 2025-04-01 NOTE — CARE COORDINATION
Care Transitions Note    Follow Up Call     Patient Current Location:  [place]: [address]  [place] [address]    Care Transition Nurse contacted the spouse/partner  by telephone. Verified name and  as identifiers.    Additional needs identified to be addressed with provider   No needs identified                 Method of communication with provider: none.    Care Summary Note:  Spoke with wife, Zeinab, said Yony is doing pretty good.  Denies chest pain, dyspnea, dizziness, fever, chills.  Reviewed RPM metrics, stable.  Did have a pulse ox of 88% this morning, asymptomatic.  Rechecked 92%.  Has had a couple of readings 88-89%, asymptomatic.  Sent message to Amy Chambers if could set alert to below 88% as suggested by pulmonary.  Eating, drinking, sleeping good.  No issues with B&B.  No other issues to report.  Denies any other needs.  No other questions or concerns at this time.  Will continue to follow for 1 more call and hand to ACM, voiced understanding.    Plan of care updates since last contact:  No changes       Advance Care Planning:   Does patient have an Advance Directive: reviewed during previous call, see note. .    Medication Review:  No changes since last call.     Remote Patient Monitoring:  Offered patient enrollment in the Remote Patient Monitoring (RPM) program for in-home monitoring: Yes, patient enrolled; current status is activated and monitoring.    Assessments:  No changes since last call    Follow Up Appointment:   Reviewed upcoming appointment(s).  Future Appointments         Provider Specialty Dept Phone    2025 11:00 AM Quinn Pierre APRN - CNP Pulmonology [phone]    2025 9:10 AM Blanche Alvarado MD Family Medicine [phone]    2025 9:00 AM Juan Pickard MD Nephrology [phone]    2025 10:20 AM (Arrive by 9:50 AM) STR CT IMAGING RM1  [facility] [phone]    2025 11:00 AM Quinn Pierre APRN - CNP Pulmonology [phone]

## 2025-04-01 NOTE — CARE COORDINATION
Remote Alert Monitoring Note      Date/Time:  2025 8:31 AM  Patient Current Location: Home: [address]  [facility] [address]    LPN contacted  wife Zeinab  by telephone regarding red alert received for pulse ox reading (88%). Verified patients name and  as identifiers.    Rpm alert to be reviewed by the provider                         Background: Kidney Disease, High Blood-Pressure, COPD, CHF, Diabetes     Refer to 911 immediately if:  Patient unresponsive or unable to provide history  Change in cognition or sudden confusion  Patient unable to respond in complete sentences  Intense chest pain/tightness  Any concern for any clinical emergency  Red Alert: Provider response time of 1 hr required for any red alert requiring intervention  Yellow Alert: Provider response time of 3hr required for any escalated yellow alert        Oxygen O2 Triage  Are you having any Chest Pain? no   Are you having any Shortness of Breath? no      Are you having any other health concerns or issues? no  .............................................................................................................................................................................................  Do you use oxygen? yes     Patient/Caregiver educated on how to how to properly place pulse oximeter. Patient/Caregiver verbalizes understanding.       Clinical Interventions: Reviewed and followed up on alerts and treatments-. Pt is asymptomatic and in no apparent distress, speaking in full sentences.  Zeinab states patient is in the restroom at this time but have him recheck once he returns. Education provided on allowing patient time to rest and warming hands prior to rechecking SP02. Will await update.      **For any new or worsening symptoms or you are concerned in anyway, please contact your Provider or report to the nearest Emergency Room.**    Plan/Follow Up: Will continue to review, monitor and address alerts with follow up based on severity of

## 2025-04-04 ENCOUNTER — RESULTS FOLLOW-UP (OUTPATIENT)
Dept: FAMILY MEDICINE CLINIC | Age: 70
End: 2025-04-04

## 2025-04-04 DIAGNOSIS — E83.42 HYPOMAGNESEMIA: Primary | ICD-10-CM

## 2025-04-04 NOTE — TELEPHONE ENCOUNTER
----- Message from Dr. Blanche Alvarado MD sent at 4/4/2025  2:12 PM EDT -----  Need to take magnesium daily  Rx done

## 2025-04-04 NOTE — TELEPHONE ENCOUNTER
Patient informed and he will be going to [facility] to get the magnesium drawn in a month.

## 2025-04-08 ENCOUNTER — CARE COORDINATION (OUTPATIENT)
Dept: CARE COORDINATION | Age: 70
End: 2025-04-08

## 2025-04-08 NOTE — CARE COORDINATION
Ambulatory Care Coordination Note     4/8/2025 1:47 PM     ACM received RPM patient hand off from CARO Mayorga .  ACM will outreach to patient in 1-2 business days to enroll in a High Risk Care Management Program.

## 2025-04-08 NOTE — CARE COORDINATION
needs or concerns that I can assist you with?: No  Identified Barriers: Lack of Education  Care Transitions Interventions     Transportation Support: Declined   Other Interventions:              Upcoming Appointments:    Future Appointments         Provider Specialty Dept Phone    4/10/2025 10:30 AM Van Pelayo DO      4/17/2025 11:00 AM Quinn Pierre, [facility] [phone]    7/21/2025 9:10 AM Blanche Alvarado MD Family Medicine [phone]    11/18/2025 9:00 AM Juan Pickard MD Nephrology [phone]    12/2/2025 10:20 AM (Arrive by 9:50 AM) STR CT IMAGING RM1  [facility] [phone]    12/12/2025 11:00 AM Quinn Pierre, [facility] [phone]              Patient has agreed to contact primary care provider and/or specialist for any further questions, concerns, or needs.    Kanwal Mayorga RN

## 2025-04-09 ENCOUNTER — CARE COORDINATION (OUTPATIENT)
Dept: CARE COORDINATION | Age: 70
End: 2025-04-09

## 2025-04-09 NOTE — CARE COORDINATION
weigh first thing in the morning, after going to the bathroom; before eating breakfast, and before having anything to drink.  Instructed on importance of not wearing shoes on the scale, and wearing the same type of clothing each day.          Clinical Interventions: Reviewed and followed up on alerts and treatments-.5.5lb weight in crease over the past 5 days.Pt is in no apparent distress, speaking in full sentences.  Wife Zeinab states patient has developed mild swelling in BLE and states he notices some changes in his breathing. They deny the need to be evaluated in the ED stating things are not that severe. He is also scheduled to see Cardiology tomorrow for routine visit.      Signs and symptoms of CHF discussed with patient, such as feeling more tired than normal, feeling short of breath, coughing that increases when lying down, sudden weight gain, swelling of the feet, legs or belly.  Patient verbalized understanding to notify physician office if these symptoms occur.  Will provided update to ACM and PCP at this time.    **For any new or worsening symptoms or you are concerned in anyway, please contact your Provider or report to the nearest Emergency Room.**    Plan/Follow Up: Will continue to review, monitor and address alerts with follow up based on severity of symptoms and risk factors.    Joselyn Green LPN  [facility]/ CTN/ Remote Patient monitoring  [phone]

## 2025-04-11 ENCOUNTER — CARE COORDINATION (OUTPATIENT)
Dept: CARE COORDINATION | Age: 70
End: 2025-04-11

## 2025-04-11 ASSESSMENT — ENCOUNTER SYMPTOMS: DYSPNEA ASSOCIATED WITH: EXERTION

## 2025-04-11 NOTE — CARE COORDINATION
Ambulatory Care Coordination Note     2025 11:06 AM     Patient Current Location:  Home: [address]  [facility] [address]     This patient was received as a referral from Care Transition Nurse.    ACM contacted the spouse/partner by telephone. Verified name and  with spouse/partner as identifiers. Provided introduction to self, and explanation of the ACM role.   Spouse/Partner accepted care management services at this time.          ACM: Grace Milton RN     Challenges to be reviewed by the provider   Additional needs identified to be addressed with provider No  none               Method of communication with provider: none.    Utilization: Initial Call - N/A    Care Summary Note: Spoke with wife  Introduced self/role  Was CTN referral for support  Active with RPM per CTN enrollment  Discussed baseline of chronic conditions  SOB is baseline  Follows with [facility] cardiology Dr. Guevara, appt completed 4-10-25.  Wife states was informed that heart function is good and breathing issues are not heart related  Stopped diuretic and did not any additional medications  No NTG ordered  Has follow up with [facility] pulmonology next week  Using O2 4L at rest and 5 on exertion  O2 supplies from  Home Medical  Denies barriers with medication affordability  Was agreeable to ACM follow up and support    Plan  Continue with RPM monitoring  Collaborate with [facility] cardiology as needed  Mail out zone tools  A1C at 7.0  Reinforce importance of early symptom recognition and reporting to prevent exacerbation and unnecessary hospitalization   Will follow up in 2 weeks and prn due to 30 day CTN follow up weekly    Offered patient enrollment in the Remote Patient Monitoring (RPM) program for in-home monitoring: Yes, patient enrolled; current status is activated and monitoring.     Assessments Completed:   Diabetes Assessment    Medic Alert ID: No  Meal Planning: Avoidance of concentrated sweets   How often do you test your blood sugar?:

## 2025-04-11 NOTE — CARE COORDINATION
Dr. Alvarado,     I have enrolled Yony into Care Coordination following Care Transition nurse referral.  He was enrolled into [facility] Remote Patient Monitoring by Care Transition Nurse.  [facility] cardiology and [facility] pulmonology are following alerts.      Please approve Plan of Care using smart phrase . Ccplanofcare.  Thank you!

## 2025-04-14 NOTE — PROGRESS NOTES
[facility]    Patient: IDALMIS GALINDO, 69 y.o.   : 1955  2025      Patient of Dr. Espinoza     Assessment/Plan      Diagnosis Orders   1. Moderate COPD (chronic obstructive pulmonary disease) (HCC)  Full PFT Study With Bronchodilator    XR CHEST (2 VW)    tiotropium-olodaterol (STIOLTO RESPIMAT) 2.5-2.5 MCG/ACT AERS      2. SOB (shortness of breath) on exertion  Full PFT Study With Bronchodilator    XR CHEST (2 VW)    bumetanide (BUMEX) 1 MG tablet    Brain Natriuretic Peptide      3. Weight gain  bumetanide (BUMEX) 1 MG tablet    Brain Natriuretic Peptide      4. Bilateral lower extremity edema  bumetanide (BUMEX) 1 MG tablet    Brain Natriuretic Peptide      5. Personal history of tobacco use        6. Obesity (BMI 30-39.9)             - Patient presents today with significant symptoms including shortness of breath with exertion, orthopnea, lower extremity edema and recent history of hospitalization last where he was treated for fluid volume overload, COPD exacerbation with possible pneumonia.  - No obvious cardiac etiology at this time with EF 60 to 65% and recent normal stress test  - Patient already seen by cardiology at [facility], consider right heart cath if indicated in future and planning on following with patient in approximately 4 months  -Cardiology recommends optimizing pulm function and if not effective will consider RHC. Cardiology note scanned into media.   - Multiple findings on CT imaging suggesting likely pneumonia, patient was treated appropriately and patient and finished treatment outpatient  -Pulmonary exam is essentially normal with very mild rales noted to bilateral lower lobes.  No wheezing or rhonchi noted on exam today.  - At this time medication regimen is not optimized, will discontinue Spiriva  - Begin using Stiolto 2 puffs once daily  - Patient unable to begin triple therapy related to history of thrush with ICS use  - Further evaluate lung  Followup with ENT specialist in 1-2 weeks for reevaluation of facial/nose injury.  For cough take mucinex as prescribed.  Followup with PMD/OB for reevaluation in 1-2 days.    Return to ED if you develops worsening symptoms.

## 2025-04-15 ENCOUNTER — RESULTS FOLLOW-UP (OUTPATIENT)
Dept: FAMILY MEDICINE CLINIC | Age: 70
End: 2025-04-15

## 2025-04-15 ENCOUNTER — HOSPITAL ENCOUNTER (OUTPATIENT)
Age: 70
Discharge: HOME | End: 2025-04-15
Payer: MEDICARE

## 2025-04-15 DIAGNOSIS — R79.0 LOW MAGNESIUM LEVEL: Primary | ICD-10-CM

## 2025-04-15 DIAGNOSIS — E83.42 HYPOMAGNESEMIA: ICD-10-CM

## 2025-04-15 LAB — MAGNESIUM SERPL-MCNC: 1.3 MG/DL (ref 1.6–2.6)

## 2025-04-15 PROCEDURE — 83735 ASSAY OF MAGNESIUM: CPT

## 2025-04-15 PROCEDURE — 36415 COLL VENOUS BLD VENIPUNCTURE: CPT

## 2025-04-17 ENCOUNTER — LAB (OUTPATIENT)
Dept: LAB | Age: 70
End: 2025-04-17

## 2025-04-17 ENCOUNTER — OFFICE VISIT (OUTPATIENT)
Dept: PULMONOLOGY | Age: 70
End: 2025-04-17
Payer: MEDICARE

## 2025-04-17 VITALS
TEMPERATURE: 98.7 F | HEART RATE: 68 BPM | WEIGHT: 236.6 LBS | SYSTOLIC BLOOD PRESSURE: 120 MMHG | OXYGEN SATURATION: 91 % | DIASTOLIC BLOOD PRESSURE: 68 MMHG | HEIGHT: 68 IN | BODY MASS INDEX: 35.86 KG/M2

## 2025-04-17 DIAGNOSIS — R60.0 BILATERAL LOWER EXTREMITY EDEMA: ICD-10-CM

## 2025-04-17 DIAGNOSIS — E66.9 OBESITY (BMI 30-39.9): ICD-10-CM

## 2025-04-17 DIAGNOSIS — Z87.891 PERSONAL HISTORY OF TOBACCO USE: ICD-10-CM

## 2025-04-17 DIAGNOSIS — J44.9 MODERATE COPD (CHRONIC OBSTRUCTIVE PULMONARY DISEASE) (HCC): Primary | ICD-10-CM

## 2025-04-17 DIAGNOSIS — R63.5 WEIGHT GAIN: ICD-10-CM

## 2025-04-17 DIAGNOSIS — R06.02 SOB (SHORTNESS OF BREATH) ON EXERTION: ICD-10-CM

## 2025-04-17 LAB — NT-PROBNP SERPL IA-MCNC: 408 PG/ML (ref 0–124)

## 2025-04-17 PROCEDURE — 3078F DIAST BP <80 MM HG: CPT

## 2025-04-17 PROCEDURE — 3017F COLORECTAL CA SCREEN DOC REV: CPT

## 2025-04-17 PROCEDURE — 1036F TOBACCO NON-USER: CPT

## 2025-04-17 PROCEDURE — 1159F MED LIST DOCD IN RCRD: CPT

## 2025-04-17 PROCEDURE — G8417 CALC BMI ABV UP PARAM F/U: HCPCS

## 2025-04-17 PROCEDURE — 1124F ACP DISCUSS-NO DSCNMKR DOCD: CPT

## 2025-04-17 PROCEDURE — G8427 DOCREV CUR MEDS BY ELIG CLIN: HCPCS

## 2025-04-17 PROCEDURE — 3074F SYST BP LT 130 MM HG: CPT

## 2025-04-17 PROCEDURE — 99215 OFFICE O/P EST HI 40 MIN: CPT

## 2025-04-17 PROCEDURE — 3023F SPIROM DOC REV: CPT

## 2025-04-17 RX ORDER — BUMETANIDE 1 MG/1
1 TABLET ORAL
Qty: 30 TABLET | Refills: 1 | Status: SHIPPED | OUTPATIENT
Start: 2025-04-18 | End: 2025-09-05

## 2025-04-17 ASSESSMENT — ENCOUNTER SYMPTOMS
SHORTNESS OF BREATH: 1
WHEEZING: 0
COUGH: 0
SINUS PRESSURE: 0
RHINORRHEA: 0
CHEST TIGHTNESS: 0
SINUS PAIN: 0

## 2025-04-21 ENCOUNTER — RESULTS FOLLOW-UP (OUTPATIENT)
Dept: PULMONOLOGY | Age: 70
End: 2025-04-21

## 2025-04-23 ENCOUNTER — CARE COORDINATION (OUTPATIENT)
Dept: CARE COORDINATION | Age: 70
End: 2025-04-23

## 2025-04-24 ENCOUNTER — CARE COORDINATION (OUTPATIENT)
Dept: CARE COORDINATION | Age: 70
End: 2025-04-24

## 2025-04-24 ENCOUNTER — HOSPITAL ENCOUNTER (OUTPATIENT)
Dept: PULMONOLOGY | Age: 70
Discharge: HOME | End: 2025-04-24
Payer: MEDICARE

## 2025-04-24 DIAGNOSIS — J44.9 MODERATE COPD (CHRONIC OBSTRUCTIVE PULMONARY DISEASE) (HCC): ICD-10-CM

## 2025-04-24 DIAGNOSIS — R06.02 SOB (SHORTNESS OF BREATH) ON EXERTION: ICD-10-CM

## 2025-04-24 PROCEDURE — 94726 PLETHYSMOGRAPHY LUNG VOLUMES: CPT

## 2025-04-24 PROCEDURE — 94060 EVALUATION OF WHEEZING: CPT

## 2025-04-24 PROCEDURE — 94729 DIFFUSING CAPACITY: CPT

## 2025-04-24 ASSESSMENT — ENCOUNTER SYMPTOMS: DYSPNEA ASSOCIATED WITH: EXERTION

## 2025-04-24 NOTE — CARE COORDINATION
Ambulatory Care Coordination Note     2025 1:33 PM     Patient Current Location:  Home: [address] [address]     ACM contacted the spouse/partner by telephone. Verified name and  with spouse/partner as identifiers.         ACM: Grace Milton RN     Challenges to be reviewed by the provider   Additional needs identified to be addressed with provider No  none               Method of communication with provider: none.    Utilization: Patient has not had any utilization since our last call.     Care Summary Note: Spoke with wife  Discussed RPM and alert noted today  WNL on recheck  States Yony is still short of breath on even a little exertion  Wearing O2 as ordered  Completed pulmonology appt  New inhaler started, costing over $100/month  Spoke with Sridevi Kincaid and will send over referral to see if any support is offered  Has PFT's scheduled today  Follows with [facility] cardiology    Plan  Referral to Sridevi [place] for med assistance  Ensure labs and testing completed  Continue with RPM monitoring  Reinforce importance of early symptom recognition and reporting to prevent exacerbation and unnecessary hospitalization    Offered patient enrollment in the Remote Patient Monitoring (RPM) program for in-home monitoring: Yes, patient enrolled; current status is activated and monitoring.     Assessments Completed:   Diabetes Assessment    Medic Alert ID: No  Meal Planning: Avoidance of concentrated sweets   How often do you test your blood sugar?: Daily   Do you have barriers with adherence to non-pharmacologic self-management interventions? (Nutrition/Exercise/Self-Monitoring): No   Have you ever had to go to the ED for symptoms of low blood sugar?: No       Do you have hyperglycemia symptoms?: No   Do you have hypoglycemia symptoms?: No   Last Blood Sugar Value: 153         ,   Congestive Heart Failure Assessment    Do you understand a low sodium diet?: Yes  Do you understand how to read food labels?: Yes  Do

## 2025-04-24 NOTE — CARE COORDINATION
Sridevi,     Could you please reach out to wife to see if Yony qualifies for any support with his inhaler and/or any other high cost medications you find?  Thank you!

## 2025-04-24 NOTE — CARE COORDINATION
Remote Alert Monitoring Note      Date/Time:  2025 8:47 AM  Patient Current Location: Home: [address]  [facility] [address]    LPN contacted  wife Zeinab  by telephone regarding red alert received for pulse ox reading (87%). Verified patients name and  as identifiers.    Rpm alert to be reviewed by the provider                        Background: Kidney Disease, High Blood-Pressure, COPD, CHF, Diabetes     Refer to 911 immediately if:  Patient unresponsive or unable to provide history  Change in cognition or sudden confusion  Patient unable to respond in complete sentences  Intense chest pain/tightness  Any concern for any clinical emergency  Red Alert: Provider response time of 1 hr required for any red alert requiring intervention  Yellow Alert: Provider response time of 3hr required for any escalated yellow alert        Oxygen O2 Triage  Are you having any Chest Pain? no   Are you having any Shortness of Breath? no      Are you having any other health concerns or issues? no  .............................................................................................................................................................................................  Do you use oxygen? yes     Patient/Caregiver educated on how to how to properly place pulse oximeter. Patient/Caregiver verbalizes understanding.       Clinical Interventions: Reviewed and followed up on alerts and treatments-. Pt is asymptomatic and in no apparent distress, speaking in full sentences.  Patient was able to recheck while on the call and got a reading within parameters of 89%.    **For any new or worsening symptoms or you are concerned in anyway, please contact your Provider or report to the nearest Emergency Room.**    Plan/Follow Up: Will continue to review, monitor and address alerts with follow up based on severity of symptoms and risk factors.    [facility]/ CTN/ Remote Patient

## 2025-04-29 ENCOUNTER — HOSPITAL ENCOUNTER (OUTPATIENT)
Age: 70
Discharge: HOME | End: 2025-04-29
Payer: MEDICARE

## 2025-04-29 ENCOUNTER — RESULTS FOLLOW-UP (OUTPATIENT)
Dept: FAMILY MEDICINE CLINIC | Age: 70
End: 2025-04-29

## 2025-04-29 DIAGNOSIS — R79.0 LOW MAGNESIUM LEVEL: ICD-10-CM

## 2025-04-29 LAB — MAGNESIUM SERPL-MCNC: 1.6 MG/DL (ref 1.6–2.6)

## 2025-04-29 PROCEDURE — 36415 COLL VENOUS BLD VENIPUNCTURE: CPT

## 2025-04-29 PROCEDURE — 83735 ASSAY OF MAGNESIUM: CPT

## 2025-04-30 NOTE — TELEPHONE ENCOUNTER
Zeinab called stating that patient's magnesium capsule was increased to twice a day. He is not out due to the increase. She is asking for a refill to be called in for him.    Magnesium 400 mg BID    Send to Petr Hair

## 2025-04-30 NOTE — TELEPHONE ENCOUNTER
Date of last visit:  3/17/2025  Date of next visit:  7/21/2025    Requested Prescriptions     Pending Prescriptions Disp Refills    Magnesium 400 MG CAPS 60 capsule 2     Sig: Take 400 mg by mouth 2 times daily

## 2025-05-05 ENCOUNTER — HOSPITAL ENCOUNTER (OUTPATIENT)
Dept: GENERAL RADIOLOGY | Age: 70
Discharge: HOME | End: 2025-05-05
Payer: MEDICARE

## 2025-05-05 ENCOUNTER — OFFICE VISIT (OUTPATIENT)
Dept: PULMONOLOGY | Age: 70
End: 2025-05-05
Payer: MEDICARE

## 2025-05-05 ENCOUNTER — HOSPITAL ENCOUNTER (OUTPATIENT)
Age: 70
Discharge: HOME | End: 2025-05-05
Payer: MEDICARE

## 2025-05-05 VITALS
SYSTOLIC BLOOD PRESSURE: 140 MMHG | OXYGEN SATURATION: 90 % | HEIGHT: 68 IN | HEART RATE: 78 BPM | TEMPERATURE: 97.9 F | WEIGHT: 232 LBS | DIASTOLIC BLOOD PRESSURE: 76 MMHG | BODY MASS INDEX: 35.16 KG/M2

## 2025-05-05 DIAGNOSIS — R94.2 DECREASED DIFFUSION CAPACITY OF LUNG: Primary | ICD-10-CM

## 2025-05-05 DIAGNOSIS — J44.9 MODERATE COPD (CHRONIC OBSTRUCTIVE PULMONARY DISEASE) (HCC): ICD-10-CM

## 2025-05-05 DIAGNOSIS — R06.09 EXERTIONAL DYSPNEA: ICD-10-CM

## 2025-05-05 DIAGNOSIS — R06.02 SOB (SHORTNESS OF BREATH) ON EXERTION: ICD-10-CM

## 2025-05-05 PROCEDURE — 1036F TOBACCO NON-USER: CPT

## 2025-05-05 PROCEDURE — 3023F SPIROM DOC REV: CPT

## 2025-05-05 PROCEDURE — G8417 CALC BMI ABV UP PARAM F/U: HCPCS

## 2025-05-05 PROCEDURE — 3077F SYST BP >= 140 MM HG: CPT

## 2025-05-05 PROCEDURE — 3017F COLORECTAL CA SCREEN DOC REV: CPT

## 2025-05-05 PROCEDURE — 3078F DIAST BP <80 MM HG: CPT

## 2025-05-05 PROCEDURE — 1124F ACP DISCUSS-NO DSCNMKR DOCD: CPT

## 2025-05-05 PROCEDURE — 99214 OFFICE O/P EST MOD 30 MIN: CPT

## 2025-05-05 PROCEDURE — G8427 DOCREV CUR MEDS BY ELIG CLIN: HCPCS

## 2025-05-05 PROCEDURE — 1159F MED LIST DOCD IN RCRD: CPT

## 2025-05-05 PROCEDURE — 71046 X-RAY EXAM CHEST 2 VIEWS: CPT

## 2025-05-05 RX ORDER — GUAIFENESIN 600 MG/1
600 TABLET, EXTENDED RELEASE ORAL 2 TIMES DAILY
Qty: 60 TABLET | Refills: 11 | Status: SHIPPED | OUTPATIENT
Start: 2025-05-05 | End: 2026-05-05

## 2025-05-05 RX ORDER — LANOLIN ALCOHOL/MO/W.PET/CERES
400 CREAM (GRAM) TOPICAL DAILY
COMMUNITY
Start: 2025-04-04

## 2025-05-05 ASSESSMENT — ENCOUNTER SYMPTOMS
WHEEZING: 1
CHEST TIGHTNESS: 1
SINUS PRESSURE: 0
SINUS PAIN: 0
COUGH: 1
RHINORRHEA: 0
SHORTNESS OF BREATH: 1

## 2025-05-05 NOTE — PROGRESS NOTES
tablet 1    albuterol sulfate (PROAIR RESPICLICK) 108 (90 Base) MCG/ACT aerosol powder inhalation Inhale into the lungs every 4 hours as needed for Wheezing or Shortness of Breath      metoprolol tartrate (LOPRESSOR) 25 MG tablet Take 1 tablet by mouth 2 times daily      dilTIAZem (CARDIZEM) 60 MG tablet Take 1 tablet by mouth 3 times daily      sertraline (ZOLOFT) 100 MG tablet TAKE 1 TABLET BY MOUTH TWICE DAILY 180 tablet 1    atorvastatin (LIPITOR) 20 MG tablet Take 1 tablet by mouth daily      glimepiride (AMARYL) 4 MG tablet TAKE 1 TABLET BY MOUTH DAILY WITH SUPPER 90 tablet 1    traZODone (DESYREL) 150 MG tablet TAKE 1/2 TO 1 TABLET BY MOUTH EVERY DAY AT BEDTIME AS NEEDED FOR SLEEP 90 tablet 1    metFORMIN (GLUCOPHAGE) 500 MG tablet TAKE 2 TABLETS BY MOUTH TWICE DAILY WITH MEALS 360 tablet 1    aspirin 81 MG EC tablet Take 1 tablet by mouth daily      HYDROcodone-acetaminophen (NORCO) 5-325 MG per tablet TAKE 1 TABLET BY MOUTH THREE TIMES DAILY AS NEEDED FOR PAIN      losartan (COZAAR) 50 MG tablet Take 0.5 tablets by mouth daily 30 tablet 3    OXYGEN Inhale 3 L into the lungs as needed      montelukast (SINGULAIR) 10 MG tablet Take 1 tablet by mouth nightly      VENTOLIN  (90 Base) MCG/ACT inhaler INHALE 2 PUFFS INTO THE LUNGS EVERY 6 HOURS AS NEEDED FOR WHEEZING 1 each 11    pantoprazole (PROTONIX) 40 MG tablet Take 1 tablet by mouth in the morning and 1 tablet in the evening.      baclofen (LIORESAL) 10 MG tablet 2 times daily as needed   0    Omega-3 Fatty Acids (FISH OIL) 1000 MG CAPS Take 1 capsule by mouth daily       No current facility-administered medications for this visit.     .    ROS   Review of Systems   Constitutional:  Negative for appetite change, fever and unexpected weight change.   HENT:  Negative for congestion, postnasal drip, rhinorrhea, sinus pressure, sinus pain and sneezing.    Respiratory:  Positive for cough, chest tightness, shortness of breath and wheezing.         Denies

## 2025-05-06 ENCOUNTER — CARE COORDINATION (OUTPATIENT)
Dept: CARE COORDINATION | Age: 70
End: 2025-05-06

## 2025-05-06 ASSESSMENT — ENCOUNTER SYMPTOMS: SHORTNESS OF BREATH: 1

## 2025-05-06 NOTE — CARE COORDINATION
Remote Alert Monitoring Note      Date/Time:  2025 8:00 AM  Patient Current Location: Home: [address]  [facility] [address]    LPN contacted  wife Zeinab  by telephone regarding red alert received for weight increase (229.5lb). Verified patients name and  as identifiers.    Rpm alert to be reviewed by the provider                        Background: Kidney Disease, High Blood-Pressure, COPD, CHF, Diabetes     Refer to 911 immediately if:  Patient unresponsive or unable to provide history  Change in cognition or sudden confusion  Patient unable to respond in complete sentences  Intense chest pain/tightness  Any concern for any clinical emergency  Red Alert: Provider response time of 1 hr required for any red alert requiring intervention  Yellow Alert: Provider response time of 3hr required for any escalated yellow alert            Weight Triage  Are you weighing any different than you did yesterday? (time of day, clothes and shoes on or off, etc)? no   Do you have any shortness of breath? no   Do you have any swelling in your hands of feet? no   Are you having any other health concerns or issues? no    Instructed Patient/Caregiver on:  Patient to weigh on the same scale at the same time each day  Patient to weigh first thing in the morning, after going to the bathroom; before eating breakfast, and before having anything to drink.  Instructed on importance of not wearing shoes on the scale, and wearing the same type of clothing each day.          Clinical Interventions: Reviewed and followed up on alerts and treatments-. 6lb weight gain noted over the past 4 days.  Abnormally low weight of 223.5lb noted 4 days ago. Patients weight today is baseline. Pt is asymptomatic and in no apparent distress, speaking in full sentences.  Spoke with wife Zeinab she states patient is not having any swelling or breathing concerns at this time. He was seen by Pulmonologist yesterday. Will send to Np for review.     Signs and symptoms

## 2025-05-06 NOTE — CARE COORDINATION
Called to speak with Zeinab, wife and I received a message from RPM nurse that Yony is wanting to turn in RPM equipment.    Called Zeinab and she asked me to call her back at another  time.  Will continue to follow up.

## 2025-05-08 ENCOUNTER — CARE COORDINATION (OUTPATIENT)
Dept: CARE COORDINATION | Age: 70
End: 2025-05-08

## 2025-05-08 NOTE — CARE COORDINATION
Spoke with wife, Zeinab.  States they would like to turn in RPM equipment but would like to wait to send the DC order until next week as they will be camping this weekend and won't be available to [employer] until next week.  [facility] informed wife I would wait until early next week to put in the DC order and to continue monitoring vitals until Monday of next week.  She verbalized understanding.

## 2025-05-09 ENCOUNTER — CARE COORDINATION (OUTPATIENT)
Dept: CARE COORDINATION | Age: 70
End: 2025-05-09

## 2025-05-09 NOTE — CARE COORDINATION
Remote Alert Monitoring Note      Date/Time:  2025 7:45 AM  Patient Current Location: Home: [address]  [place] [address]    LPN contacted  wife Zeinab  by telephone regarding red alert received for weight increase (230lb). Verified patients name and  as identifiers.    Rpm alert to be reviewed by the provider                        Background: Kidney Disease, High Blood-Pressure, COPD, CHF, Diabetes     Refer to 911 immediately if:  Patient unresponsive or unable to provide history  Change in cognition or sudden confusion  Patient unable to respond in complete sentences  Intense chest pain/tightness  Any concern for any clinical emergency  Red Alert: Provider response time of 1 hr required for any red alert requiring intervention  Yellow Alert: Provider response time of 3hr required for any escalated yellow alert          Have you taken your medications as instructed by your doctor today? Yes   Weight Triage  Are you weighing any different than you did yesterday? (time of day, clothes and shoes on or off, etc)? no   Do you have any shortness of breath? baseline   Do you have any swelling in your hands of feet? no   Are you having any other health concerns or issues? no    Instructed Patient/Caregiver on:  Patient to weigh on the same scale at the same time each day  Patient to weigh first thing in the morning, after going to the bathroom; before eating breakfast, and before having anything to drink.  Instructed on importance of not wearing shoes on the scale, and wearing the same type of clothing each day.          Clinical Interventions: Reviewed and followed up on alerts and treatments-. Pt is asymptomatic and in no apparent distress, speaking in full sentences.  6.5lb weight gain over the past 7 days (abnormally low weight 7 days ago at 223.5). Spoke with wife Zeinab, she states patient is doing fine and denies any swelling or breathing changes. Will forward to NP for review.     Signs and symptoms of CHF

## 2025-05-12 ENCOUNTER — CARE COORDINATION (OUTPATIENT)
Dept: CARE COORDINATION | Age: 70
End: 2025-05-12

## 2025-05-13 ENCOUNTER — CARE COORDINATION (OUTPATIENT)
Dept: CARE COORDINATION | Age: 70
End: 2025-05-13

## 2025-05-13 NOTE — CARE COORDINATION
RPM team,     Yony is ready to graduate from RPM.  Could you please assist with turning in equipment? Thank you    Remote Patient Monitoring Graduation      Date/Time:  5/13/2025 8:21 AM  Patient Current Location: Home: [facility] [address]  [facility] [address]  Patient has graduated from the Remote Patient Monitoring program on 5/13/2025.   RPM goals have been met at this time.      Patient has been provided instruction on process to return RPM equipment and RPM has been deactivated.     Patient has ACM's contact information for any further questions, concerns, or needs.

## 2025-05-20 ENCOUNTER — CARE COORDINATION (OUTPATIENT)
Dept: CARE COORDINATION | Age: 70
End: 2025-05-20

## 2025-05-20 DIAGNOSIS — J34.3 HYPERTROPHY OF INFERIOR NASAL TURBINATE: ICD-10-CM

## 2025-05-20 DIAGNOSIS — J30.9 ALLERGIC RHINITIS, UNSPECIFIED SEASONALITY, UNSPECIFIED TRIGGER: ICD-10-CM

## 2025-05-20 ASSESSMENT — ENCOUNTER SYMPTOMS: DYSPNEA ASSOCIATED WITH: EXERTION

## 2025-05-20 NOTE — TELEPHONE ENCOUNTER
Pt wife called req ref fluticasone (FLONASE) 50 MCG/ACT nasal spray : SHAKE LIQUID AND USE 2 SPRAYS IN EACH NOSTRIL DAILY 16g refs 11    Petr landry

## 2025-05-20 NOTE — CARE COORDINATION
Ambulatory Care Coordination Note     2025 9:41 AM     Patient Current Location:  Home: [place] [address]     ACM contacted the spouse/partner by telephone. Verified name and  with spouse/partner as identifiers.         ACM: Grace Milton RN     Challenges to be reviewed by the provider   Additional needs identified to be addressed with provider No                  Method of communication with provider: none.    Utilization: Patient has not had any utilization since our last call.     Care Summary Note: Spoke with wife  States they are out of town for this week  States RPM equipment has not been picked up yet  States they will be back in town next week Tuesday  Informed wife I would have RPM team put in another ticket for RPM  on 25 to ensure they will be back home  RPM is currently not being monitored at this time  States Yony is doing well  Chronic conditions at baseline  Not able to get weight during call  States cost of inhaler was not able to be improved after referral to Sridevi    Plan  Work to get RPM equipment turned back in  Send order to RPM next week to have [employer] come back to  equipment  Encourage continued weight and blood pressure tracking at home  Reinforce importance of early symptom recognition and reporting to prevent exacerbation and unnecessary hospitalization    Offered patient enrollment in the Remote Patient Monitoring (RPM) program for in-home monitoring: Yes, patient enrolled; current status is patient paused in RPM because graduated but has not had equipment picked up yet due to being out of town .     Assessments Completed:   Congestive Heart Failure Assessment    Do you understand a low sodium diet?: Yes  Do you understand how to read food labels?: Yes  Do you salt your food before tasting it?: No         Symptoms:  CHF associated angina: Neg, CHF associated dyspnea on exertion: Pos, CHF associated fatigue: Neg, CHF associated orthostatic hypotension:

## 2025-05-20 NOTE — TELEPHONE ENCOUNTER
Pt's wife called requesting a refill of the below medication which has been pended for you:     Requested Prescriptions     Pending Prescriptions Disp Refills    fluticasone (FLONASE) 50 MCG/ACT nasal spray 16 g 11     Si sprays by Each Nostril route daily Shake liquid       Last Appointment Date: 3/17/2025  Next Appointment Date: 2025    Allergies   Allergen Reactions    Dupixent [Dupilumab]     Dust Mite Extract     Isosorbide Nitrate Other (See Comments)

## 2025-05-21 RX ORDER — FLUTICASONE PROPIONATE 50 MCG
2 SPRAY, SUSPENSION (ML) NASAL DAILY
Qty: 16 G | Refills: 5 | Status: SHIPPED | OUTPATIENT
Start: 2025-05-21

## 2025-05-22 NOTE — TELEPHONE ENCOUNTER
Caller: Pumphrey, Marilyn J    Relationship to patient: Self    Best call back number: [phone]     Patient is needing: PT HAS STRESS TEST SCHEDULED AND SHE STATED SHE SPOKE WITH A REPRESENTATIVE AND THEY ADVISED HER TO GO AHEAD AND MAKE FU W/ DR ABURTO AFTER STRESS TEST. NO SCHEDULING DIRECTION FOR HUB TO SCHEDULE. PLS CALL PT TO SCHEDULE.          Received a fax from [facility] to [place] stating patient is not eligible for assistance. Fax scanned into media.

## 2025-05-28 ENCOUNTER — CARE COORDINATION (OUTPATIENT)
Dept: CARE COORDINATION | Age: 70
End: 2025-05-28

## 2025-05-28 NOTE — CARE COORDINATION
RPM team,     Could you please assist with getting RPM equipment turned back in.  Yony and wife were out of town last week but are back today.  Could you please assist with this? Thank you!

## 2025-06-04 ENCOUNTER — CARE COORDINATION (OUTPATIENT)
Dept: CARE COORDINATION | Age: 70
End: 2025-06-04

## 2025-06-04 NOTE — CARE COORDINATION
Dr. Alvarado,      I have been working with Yony on Care Coordination program to provide support and education to help manage chronic conditions.  Pt has met those goals and all education has been completed with no new barriers noted.  I would like to graduate pt from Care Coordination at this time pending PCP approval.  Do you approve of this discharge?  Please advise. Thank you.

## 2025-06-05 ENCOUNTER — HOSPITAL ENCOUNTER (OUTPATIENT)
Dept: CT IMAGING | Age: 70
Discharge: HOME | End: 2025-06-05
Payer: MEDICARE

## 2025-06-05 DIAGNOSIS — R94.2 DECREASED DIFFUSION CAPACITY OF LUNG: ICD-10-CM

## 2025-06-05 DIAGNOSIS — J44.9 MODERATE COPD (CHRONIC OBSTRUCTIVE PULMONARY DISEASE) (HCC): ICD-10-CM

## 2025-06-05 PROCEDURE — 71250 CT THORAX DX C-: CPT

## 2025-06-06 ENCOUNTER — RESULTS FOLLOW-UP (OUTPATIENT)
Dept: PULMONOLOGY | Age: 70
End: 2025-06-06

## 2025-06-16 ENCOUNTER — OFFICE VISIT (OUTPATIENT)
Dept: PULMONOLOGY | Age: 70
End: 2025-06-16
Payer: MEDICARE

## 2025-06-16 VITALS
OXYGEN SATURATION: 93 % | HEART RATE: 71 BPM | SYSTOLIC BLOOD PRESSURE: 126 MMHG | DIASTOLIC BLOOD PRESSURE: 74 MMHG | HEIGHT: 68 IN | BODY MASS INDEX: 34.65 KG/M2 | TEMPERATURE: 97.5 F | WEIGHT: 228.6 LBS

## 2025-06-16 DIAGNOSIS — Z87.891 PERSONAL HISTORY OF TOBACCO USE: ICD-10-CM

## 2025-06-16 DIAGNOSIS — I50.22 CHRONIC SYSTOLIC (CONGESTIVE) HEART FAILURE (HCC): ICD-10-CM

## 2025-06-16 DIAGNOSIS — R94.2 DECREASED DIFFUSION CAPACITY OF LUNG: ICD-10-CM

## 2025-06-16 DIAGNOSIS — J43.2 CENTRILOBULAR EMPHYSEMA (HCC): ICD-10-CM

## 2025-06-16 DIAGNOSIS — J44.9 MODERATE COPD (CHRONIC OBSTRUCTIVE PULMONARY DISEASE) (HCC): Primary | ICD-10-CM

## 2025-06-16 PROCEDURE — 99214 OFFICE O/P EST MOD 30 MIN: CPT

## 2025-06-16 PROCEDURE — 3023F SPIROM DOC REV: CPT

## 2025-06-16 PROCEDURE — 1159F MED LIST DOCD IN RCRD: CPT

## 2025-06-16 PROCEDURE — 3017F COLORECTAL CA SCREEN DOC REV: CPT

## 2025-06-16 PROCEDURE — 1036F TOBACCO NON-USER: CPT

## 2025-06-16 PROCEDURE — G8427 DOCREV CUR MEDS BY ELIG CLIN: HCPCS

## 2025-06-16 PROCEDURE — G8417 CALC BMI ABV UP PARAM F/U: HCPCS

## 2025-06-16 PROCEDURE — 3078F DIAST BP <80 MM HG: CPT

## 2025-06-16 PROCEDURE — 3074F SYST BP LT 130 MM HG: CPT

## 2025-06-16 PROCEDURE — 1124F ACP DISCUSS-NO DSCNMKR DOCD: CPT

## 2025-06-16 ASSESSMENT — ENCOUNTER SYMPTOMS
SINUS PRESSURE: 0
SHORTNESS OF BREATH: 1
CHEST TIGHTNESS: 0
COUGH: 0
WHEEZING: 0
SINUS PAIN: 0
RHINORRHEA: 0

## 2025-06-16 NOTE — PROGRESS NOTES
[facility]    Patient: IDALMIS GALINDO, 70 y.o.   : 1955  2025      Patient of Dr. Espinoza     Assessment/Plan      Diagnosis Orders   1. Moderate COPD (chronic obstructive pulmonary disease) (HCC)  tiotropium-olodaterol (STIOLTO RESPIMAT) 2.5-2.5 MCG/ACT AERS      2. Centrilobular emphysema (HCC)  tiotropium-olodaterol (STIOLTO RESPIMAT) 2.5-2.5 MCG/ACT AERS      3. Decreased diffusion capacity of lung        4. Chronic systolic (congestive) heart failure        5. Personal history of tobacco use           -Continue Stiolto as previously ordered.  Patient continues to refuse ICS therapy related to history of thrush  -Encouraged to wear oxygen as ordered, patient declined 6-minute walk in office today to evaluate oxygen needs related to severe bilateral lower extremity pain with ambulation  -Personally reviewed recent HRCT and discussed results with patient.  No acute findings noted.  -Continue to follow with cardiology at [facility]  -Monitor bilateral lower extremity swelling  -Encouraged activity as tolerated  -Reviewed preventative vaccinations    Advised patient to call office with any changes, questions, or concerns regarding respiratory status or issues with prescribed medications    Return in about 6 months (around 2025) for COPD.     Subjective     Chief Complaint   Patient presents with    Follow-up     1 month COPD follow up with chest CT 25.        HPI  Idalmis is here for follow up for moderate COPD, centrilobular emphysema, decreased diffusion capacity, exertional dyspnea.     Patient presents with unremarkable updated HRCT showing a stable 5 mm right middle lobe pulmonary nodule, mild dependent atelectasis/scarring at the lung bases (unchanged).  No honeycombing or bronchiectasis is visualized.  Centrilobular emphysema is present, unchanged.  Mild biapical scarring present, unchanged.    He is wearing oxygen prn if symptomatic from allergies or weather.

## 2025-07-03 DIAGNOSIS — R63.5 WEIGHT GAIN: ICD-10-CM

## 2025-07-03 DIAGNOSIS — R06.02 SOB (SHORTNESS OF BREATH) ON EXERTION: ICD-10-CM

## 2025-07-03 DIAGNOSIS — R60.0 BILATERAL LOWER EXTREMITY EDEMA: ICD-10-CM

## 2025-07-07 RX ORDER — BUMETANIDE 1 MG/1
TABLET ORAL
Qty: 30 TABLET | Refills: 1 | OUTPATIENT
Start: 2025-07-07

## 2025-07-08 ENCOUNTER — TELEPHONE (OUTPATIENT)
Dept: PULMONOLOGY | Age: 70
End: 2025-07-08

## 2025-07-08 ENCOUNTER — TELEPHONE (OUTPATIENT)
Dept: FAMILY MEDICINE CLINIC | Age: 70
End: 2025-07-08

## 2025-07-08 DIAGNOSIS — E83.42 HYPOMAGNESEMIA: Primary | ICD-10-CM

## 2025-07-08 NOTE — TELEPHONE ENCOUNTER
BUMETANIDE 1mg    Patient called and is asking if you want him to keep taking this medication... Please call the wife at [phone]

## 2025-07-08 NOTE — TELEPHONE ENCOUNTER
He should continue them.  If he has been taking them daily he should get a non fasting magnesium level now.

## 2025-07-08 NOTE — TELEPHONE ENCOUNTER
Spouse called asking if Yony is to continue taking the Magnesium pills.  He does have a refill if he should continue them.    Please call Zeinab

## 2025-07-08 NOTE — TELEPHONE ENCOUNTER
Zeinab informed by phone. They will use [facility] lab for the bloodwork.

## 2025-07-18 ENCOUNTER — HOSPITAL ENCOUNTER (OUTPATIENT)
Age: 70
Discharge: HOME | End: 2025-07-18
Payer: MEDICARE

## 2025-07-18 DIAGNOSIS — E83.42 HYPOMAGNESEMIA: ICD-10-CM

## 2025-07-18 LAB — MAGNESIUM SERPL-MCNC: 1.5 MG/DL (ref 1.6–2.6)

## 2025-07-18 PROCEDURE — 83735 ASSAY OF MAGNESIUM: CPT

## 2025-07-18 PROCEDURE — 36415 COLL VENOUS BLD VENIPUNCTURE: CPT

## 2025-07-21 ENCOUNTER — OFFICE VISIT (OUTPATIENT)
Dept: FAMILY MEDICINE CLINIC | Age: 70
End: 2025-07-21

## 2025-07-21 VITALS
HEIGHT: 68 IN | DIASTOLIC BLOOD PRESSURE: 64 MMHG | RESPIRATION RATE: 17 BRPM | HEART RATE: 72 BPM | SYSTOLIC BLOOD PRESSURE: 116 MMHG | WEIGHT: 227.4 LBS | BODY MASS INDEX: 34.46 KG/M2

## 2025-07-21 DIAGNOSIS — J43.9 PULMONARY EMPHYSEMA, UNSPECIFIED EMPHYSEMA TYPE (HCC): ICD-10-CM

## 2025-07-21 DIAGNOSIS — G25.81 RESTLESS LEG SYNDROME: ICD-10-CM

## 2025-07-21 DIAGNOSIS — E78.5 HYPERLIPIDEMIA, UNSPECIFIED HYPERLIPIDEMIA TYPE: ICD-10-CM

## 2025-07-21 DIAGNOSIS — E83.42 HYPOMAGNESEMIA: ICD-10-CM

## 2025-07-21 DIAGNOSIS — I10 ESSENTIAL HYPERTENSION: ICD-10-CM

## 2025-07-21 DIAGNOSIS — E11.8 TYPE 2 DIABETES MELLITUS WITH COMPLICATION, WITHOUT LONG-TERM CURRENT USE OF INSULIN (HCC): Primary | ICD-10-CM

## 2025-07-21 DIAGNOSIS — F41.9 ANXIETY: ICD-10-CM

## 2025-07-21 LAB
CHP ED QC CHECK: ABNORMAL
GLUCOSE BLD-MCNC: 107 MG/DL
HBA1C MFR BLD: 6.5 %

## 2025-07-21 PROCEDURE — 82962 GLUCOSE BLOOD TEST: CPT | Performed by: FAMILY MEDICINE

## 2025-07-21 PROCEDURE — 1036F TOBACCO NON-USER: CPT | Performed by: FAMILY MEDICINE

## 2025-07-21 PROCEDURE — G8417 CALC BMI ABV UP PARAM F/U: HCPCS | Performed by: FAMILY MEDICINE

## 2025-07-21 PROCEDURE — 83036 HEMOGLOBIN GLYCOSYLATED A1C: CPT | Performed by: FAMILY MEDICINE

## 2025-07-21 PROCEDURE — 99214 OFFICE O/P EST MOD 30 MIN: CPT | Performed by: FAMILY MEDICINE

## 2025-07-21 PROCEDURE — 3017F COLORECTAL CA SCREEN DOC REV: CPT | Performed by: FAMILY MEDICINE

## 2025-07-21 PROCEDURE — G8427 DOCREV CUR MEDS BY ELIG CLIN: HCPCS | Performed by: FAMILY MEDICINE

## 2025-07-21 RX ORDER — ROPINIROLE 1 MG/1
1 TABLET, FILM COATED ORAL 3 TIMES DAILY
Qty: 270 TABLET | Refills: 1 | Status: SHIPPED | OUTPATIENT
Start: 2025-07-21

## 2025-07-21 RX ORDER — ATORVASTATIN CALCIUM 20 MG/1
20 TABLET, FILM COATED ORAL DAILY
Qty: 90 TABLET | Refills: 1 | Status: SHIPPED | OUTPATIENT
Start: 2025-07-21

## 2025-07-21 RX ORDER — SERTRALINE HYDROCHLORIDE 100 MG/1
100 TABLET, FILM COATED ORAL 2 TIMES DAILY
Qty: 180 TABLET | Refills: 1 | Status: SHIPPED | OUTPATIENT
Start: 2025-07-21

## 2025-07-21 ASSESSMENT — ENCOUNTER SYMPTOMS
BLOOD IN STOOL: 0
CHEST TIGHTNESS: 0
BACK PAIN: 1
DIARRHEA: 0
ABDOMINAL PAIN: 0
SHORTNESS OF BREATH: 1
NAUSEA: 0
CONSTIPATION: 0
VOMITING: 0
EYES NEGATIVE: 1
COUGH: 0

## 2025-07-21 NOTE — PROGRESS NOTES
Date: 7/21/2025    Yony Borden is a 70 y.o. male who presents today for:  Chief Complaint   Patient presents with    Diabetes he states that his BS are doing really good.     Hypertension stable     Cholesterol Problem  He follows with cardiologist, nephrologist and pulmonary.        Current regimen: diet and oral agent (dual therapy)  Current monitoring regimen: home blood tests - 1  Home blood sugar trends: AM -150's  Any episodes of hypoglycemia? No  Current exercise: He is active but no routine exercise.   Compliance with treatment: Good  Eye exam current (within one year): Yes  Any history of foot problems? No  Last foot exam: today  Cardiovascular risk factors: advanced age (older than 55 for men, 65 for women), diabetes mellitus, dyslipidemia, hypertension, male gender, and obesity (BMI >= 30 kg/m2).   Immunizations up to date: Flu Yes   Pneumonia Yes  Taking ASA: Yes   If not why?     HPI:     Hypertension  This is a chronic problem. The current episode started more than 1 year ago. The problem is unchanged. The problem is controlled. Associated symptoms include shortness of breath (at baseline). Pertinent negatives include no chest pain, headaches, neck pain, palpitations, peripheral edema or PND. Risk factors for coronary artery disease include diabetes mellitus, dyslipidemia, obesity and male gender. Past treatments include beta blockers, calcium channel blockers and angiotensin blockers. The current treatment provides significant improvement. Compliance problems include exercise.    Hyperlipidemia  This is a chronic problem. The current episode started more than 1 year ago. The problem is controlled. Recent lipid tests were reviewed and are normal. Associated symptoms include shortness of breath (at baseline). Pertinent negatives include no chest pain, focal sensory loss, focal weakness, leg pain or myalgias. Current antihyperlipidemic treatment includes statins and diet change. The current

## 2025-07-31 DIAGNOSIS — E11.8 TYPE 2 DIABETES MELLITUS WITH COMPLICATION, WITHOUT LONG-TERM CURRENT USE OF INSULIN (HCC): ICD-10-CM

## 2025-07-31 NOTE — TELEPHONE ENCOUNTER
Date of last visit:  7/21/2025  Date of next visit:  11/21/2025    Requested Prescriptions     Pending Prescriptions Disp Refills    blood glucose test strips (ONETOUCH ULTRA) strip [Pharmacy Med Name: ONE TOUCH ULTRA BLUE TESTST(NEW)100] 100 strip 0     Sig: TEST AS NEEDED ONCE DAILY

## 2025-08-01 ENCOUNTER — TELEPHONE (OUTPATIENT)
Dept: FAMILY MEDICINE CLINIC | Age: 70
End: 2025-08-01

## 2025-08-01 ENCOUNTER — TELEMEDICINE (OUTPATIENT)
Dept: FAMILY MEDICINE CLINIC | Age: 70
End: 2025-08-01

## 2025-08-01 DIAGNOSIS — J40 BRONCHITIS: Primary | ICD-10-CM

## 2025-08-01 DIAGNOSIS — J44.1 COPD WITH EXACERBATION (HCC): ICD-10-CM

## 2025-08-01 RX ORDER — BLOOD SUGAR DIAGNOSTIC
STRIP MISCELLANEOUS
Qty: 100 STRIP | Refills: 0 | Status: SHIPPED | OUTPATIENT
Start: 2025-08-01

## 2025-08-01 RX ORDER — LEVOFLOXACIN 500 MG/1
500 TABLET, FILM COATED ORAL DAILY
Qty: 10 TABLET | Refills: 0 | Status: SHIPPED | OUTPATIENT
Start: 2025-08-01 | End: 2025-08-11

## 2025-08-01 RX ORDER — PREDNISONE 10 MG/1
TABLET ORAL
Qty: 16 TABLET | Refills: 0 | Status: SHIPPED | OUTPATIENT
Start: 2025-08-01

## 2025-08-01 NOTE — TELEPHONE ENCOUNTER
Consult Date:  03/18/2019      REFERRAL SOURCE:  Tara Duran MD      REASON FOR REFERRAL:  Congestive heart failure.      PRIMARY CARDIOLOGIST:  Lizbeth Bond MD      HISTORY OF PRESENT ILLNESS:    Jamilah Rodriguez is known to Cardiology.  Her primary cardiologist is Dr. Bond, who saw her back in 10/2018.  The patient's daughter was present in the room when I visited.      Jamilah is a physically frail (BMI 19.7 kg/m2, weight 52 kg), but mentally very astute 85-year-old lady who has had a fairly rough month.  She lives on her own, and up until this year has been physically very active and in fact, used to exercise several times a week.  She was initially hospitalized in 10/2018 with chest discomfort and shortness of breath in the context of prior paroxysmal atrial fibrillation and chronic left bundle branch block.  Her echocardiogram had demonstrated a new drop in LVEF to 20% (normal LVEF on a pharmacological stress test in 2015).     She underwent a complete workup for her new diagnosis of cardiomyopathy.  Cardiac MRI showed severely reduced LVEF of 19% with severe global hypokinesis, moderately reduced right ventricular systolic function with an RVEF of 41%, significant bi-atrial enlargement and a small, insignificant circumferential pericardial effusion and overall picture consistent with nonischemic cardiomyopathy.  Coronary angiogram showed a right dominant circulation with moderate non-occlusive disease and normal LV end-diastolic pressure of 11 mmHg.  She remained on medical therapy of losartan and spironolactone 12.5 mg daily.  She had a previous allergy to atenolol. It is unclear why she is not on a different beta blocker instead of diltiazem. She was also seen by electrophysiology (Dr. Arana) as an outpatient. In the context of the patient declining invasive therapies, biventricular ICD implantation was not pursued at that time.  However, Dr. Arana did mention that if she had further CHF decompensation, BiV pacing  Zeinab ohara.    could be reconsidered.      The patient was hospitalized for a week a month ago (early 02/2019) with progressive shortness of breath and was positive for influenza A.  She was treated with Tamiflu and prednisone for COPD exacerbation. Inpatient Cardiology consultation was obtained (seen by Dr. Baker), who did not think that her symptoms were related to heart failure.  She was then discharged to a nursing home for a brief convalescence and has been back home for the last 2 or 3 weeks.  I note that on discharge, she was started on Eliquis anticoagulation (2.5 mg b.i.d.) for atrial fibrillation, diltiazem 120 mg daily, prednisone (40 mg for 5 days) and continued on established spironolactone 12.5 mg daily, but she was not on a loop diuretic.      Following discharge from the nursing home 3 weeks ago, she developed a rash and went to see her primary provider, Dr. Hurtado, who appropriately discontinued the Eliquis and switched it to rivaroxaban and gave her an additional tapering dose of prednisone. The patient's daughter showed me photographs of the rash and it was indeed extensive, hives on her back and front of the chest.      She is readmitted with shortness of breath and mildly increased lower extremity edema.  There is no orthopnea, PND or chest pain.  Chest x-ray shows hyperinflated lungs consistent with COPD, but no pleural effusions or pulmonary edema.  NT proBNP is elevated at almost 11,000, no significant troponin elevation (0.026), renal function is stable.  She was given Lasix 40 mg b.i.d. and feels improved.  Her ECG on admission showed atrial fibrillation with a left bundle branch block and a ventricular rate of 120-130 BPM.  QRS width is 132 milliseconds.   Influenza A testing is negative.      The other new issue is that of progressive anemia since being started on the anticoagulation.  Her hemoglobin has drifted down from 13.3 five weeks ago, down progressively to 7.1 today.  There is no overt source of  bleeding; however, her hemoglobin drop is after she was started on anticoagulation.      Other labs:  Hemoglobin 7.1, platelets 302, leukocytes 13.2 (in the context of receiving prednisone).  Sodium 134, potassium 3.6, BUN 42, creatinine 1.29 with an estimated GFR of 37.      I reviewed the chest x-ray, recent transthoracic echocardiogram, coronary angiogram, left heart cath, cardiac MRI -- As documented above.      SOCIAL HISTORY:  The patient is single.  Used to be a heavy tobacco user, but quit approximately 9 years ago.  No alcohol.  Has 2 children.  Lives independently.      PHYSICAL EXAMINATION:   VITAL SIGNS:  Temperature 98 Fahrenheit, blood pressure 106/64, pulse 98 per minute, irregularly irregular, respiratory rate 20 per minute, sats 99% on room air, weight 52.2 kg.   CONSTITUTIONAL:  Elderly, physically frail with severe kyphosis, but mentally very astute and alert.  Slightly hard of hearing.  No conversational dyspnea or cyanosis.   EYES:  Mild pallor, no icterus.   NECK:  No thyromegaly.   RESPIRATORY:  She has a few coarse crackles at both bases (left more than right).  No pleural effusion.   CARDIOVASCULAR:  Jugular venous pressure is normal.  Carotid impulses normal.  No bruit.  Heart sounds are regular without audible murmur.   GASTROINTESTINAL:  Soft and nontender.   MUSCULOSKELETAL:  Severe kyphosis.  Poor muscle mass.   NEUROPSYCHIATRIC:  Normal.   EXTREMITIES:  She has 2+ pitting edema up to her knees.      DIAGNOSES:   1.  Multifactorial dyspnea -- Due to fluid overload from recent prednisone bursts, recovery from recent influenza, in the context of known severe nonischemic cardiomyopathy with an left ventricular function of 19%.   2.  Recent influenza A (treated with Tamiflu 02/2019).   3.  New diagnosis of anemia with a hemoglobin of 7.1 without overt blood loss, since starting anticoagulation in 02/2019.   4.  Nonischemic cardiomyopathy with LVEF of 19% (diagnosed 10/2018).   5.  Moderate  right ventricular systolic dysfunction without clinical decompensation.   6.  Moderate nonocclusive coronary artery disease, asymptomatic.   7.  Persistent atrial fibrillation -- rate control and anticoagulation strategy.   8.  Left bundle branch block with a QRS greater than 130 milliseconds.  Defibrillator placement deferred by patient.   9.  History of infrarenal abdominal aortic aneurysm (3.9 x 4.6 cm).   10.  History of perforated bowel and small-bowel obstruction, status post exploratory laparotomy (04/2018).   11.  Elderly frailty.      ASSESSMENT AND PLAN:    Jamilah has been readmitted with progressive shortness of breath, failure to thrive, and a 2-pound weight gain.  On exam/chest x-ray, she does not have pulmonary edema or pleural effusions, but she certainly has lower extremity edema.  I think this is primarily due to her receiving consecutive high-dose prednisone burst therapy (initially for chronic obstructive pulmonary disease exacerbation, second for extensive hives thought related to Eliquis).  During this time, she was only on 12.5 mg of spironolactone and not on a loop diuretic.  Since receiving 40 mg b.i.d. of Lasix, she has already lost 8 pounds in weight overnight and is feeling better.  The other etiology in her dyspnea is her 5 gram drop in hemoglobin over the last month, since being started on anticoagulation.  She also has significant COPD and has been a heavy tobacco user.  I note that recurrent influenza has been ruled out.  Therefore, I do not think her dyspnea is due to acute heart failure, but rather due to a combination of fluid gain from the prednisone, COPD and convalescence from recent influenza.  I agree with furosemide therapy.      Her anemia certainly warrants investigation.  A 5 gram drop after anticoagulation initiation should be evaluated.  I note that a year ago she had a perforated bowel and small-bowel obstruction.  At the very least, anticoagulation should be held  while GI is consulted for potential endoscopy.  Alternatively, consider switching her to warfarin.  Until the anemia has been completely evaluated, anticoagulation is best held.      Strictly, current hospitalization cannot be attributed as a heart failure decompensation. Certainly, if she has another episode, we should reevaluate the option of CRT-D.  She has been having AFib RVR, but this is in the context of illness and her heart rates are already better controlled.  I did talk to them about the differences between defibrillator and pacemaker.  My sense is that she assumed that the defibrillator procedure was a major surgery and therefore declined it.  She is open to having this reevaluated.      1.  Switch from furosemide 40 mg b.i.d. to furosemide 10 mg daily from tomorrow for patient convenience.   2.  Hold spironolactone 12.5 mg for now.  She has been having rash, etc., so it would be worthwhile to take spironolactone out of the picture.   3.  Hold anticoagulation as above.   4.  Continue diltiazem 120 mg daily for atrial fibrillation, rate controlled.  In the next few days, I would like to switch this to a guideline directed beta-blocker such as metoprolol XL for her heart failure and atrial fibrillation.  5.  Cardiology will follow.  We will set up a repeat outpatient visit for CRT-D question down the line, if the patient requested.      Thank you for consulting us.  I spent 60 minutes in the care of this patient.  Greater than 50% of the time was spent in counseling and coordination of care.         CHRISTINE KNAPP MD             D: 2019   T: 2019   MT: TIM      Name:     VANITA SHERIFF   MRN:      [id number]        Account:       [id number]   :      [id number]           Consult Date:  2019      Document: [id number]       cc: Lizbeth Hurtado MD

## 2025-08-20 ENCOUNTER — CLINICAL DOCUMENTATION (OUTPATIENT)
Dept: SPIRITUAL SERVICES | Facility: CLINIC | Age: 70
End: 2025-08-20

## 2025-08-20 DIAGNOSIS — E11.8 TYPE 2 DIABETES MELLITUS WITH COMPLICATION, WITHOUT LONG-TERM CURRENT USE OF INSULIN (HCC): ICD-10-CM

## 2025-08-20 RX ORDER — GLIMEPIRIDE 4 MG/1
4 TABLET ORAL
Qty: 90 TABLET | Refills: 0 | Status: SHIPPED | OUTPATIENT
Start: 2025-08-20

## 2025-08-21 ENCOUNTER — APPOINTMENT (OUTPATIENT)
Dept: GENERAL RADIOLOGY | Age: 70
DRG: 189 | End: 2025-08-21
Payer: MEDICARE

## 2025-08-21 ENCOUNTER — HOSPITAL ENCOUNTER (INPATIENT)
Age: 70
LOS: 2 days | Discharge: HOME | DRG: 189 | End: 2025-08-24
Attending: FAMILY MEDICINE | Admitting: STUDENT IN AN ORGANIZED HEALTH CARE EDUCATION/TRAINING PROGRAM
Payer: MEDICARE

## 2025-08-21 ENCOUNTER — APPOINTMENT (OUTPATIENT)
Dept: CT IMAGING | Age: 70
DRG: 189 | End: 2025-08-21
Payer: MEDICARE

## 2025-08-21 DIAGNOSIS — G47.33 OSA (OBSTRUCTIVE SLEEP APNEA): ICD-10-CM

## 2025-08-21 DIAGNOSIS — R09.02 HYPOXEMIA: ICD-10-CM

## 2025-08-21 DIAGNOSIS — J44.1 COPD EXACERBATION (HCC): Primary | ICD-10-CM

## 2025-08-21 LAB
ALBUMIN SERPL BCG-MCNC: 3.5 G/DL (ref 3.4–4.9)
ALP SERPL-CCNC: 43 U/L (ref 40–129)
ALT SERPL W/O P-5'-P-CCNC: 9 U/L (ref 10–50)
ANION GAP SERPL CALC-SCNC: 10 MEQ/L (ref 8–16)
APTT PPP: 27.9 SECONDS (ref 22–38)
AST SERPL-CCNC: 43 U/L (ref 10–50)
BASE EXCESS BLDA CALC-SCNC: 2.7 MMOL/L (ref -2.5–2.5)
BASOPHILS ABSOLUTE: 0 THOU/MM3 (ref 0–0.1)
BASOPHILS NFR BLD AUTO: 0.2 %
BDY SITE: ABNORMAL
BILIRUB CONJ SERPL-MCNC: < 0.1 MG/DL (ref 0–0.2)
BILIRUB SERPL-MCNC: 0.3 MG/DL (ref 0.3–1.2)
BUN SERPL-MCNC: 22 MG/DL (ref 8–23)
CALCIUM SERPL-MCNC: 8.3 MG/DL (ref 8.5–10.5)
CHLORIDE SERPL-SCNC: 104 MEQ/L (ref 98–111)
CO2 SERPL-SCNC: 25 MEQ/L (ref 22–29)
COLLECTED BY:: ABNORMAL
CREAT SERPL-MCNC: 1.6 MG/DL (ref 0.7–1.2)
DEPRECATED RDW RBC AUTO: 53.1 FL (ref 35–45)
DEVICE: ABNORMAL
EKG ATRIAL RATE: 98 BPM
EKG P AXIS: 27 DEGREES
EKG P-R INTERVAL: 172 MS
EKG Q-T INTERVAL: 378 MS
EKG QRS DURATION: 130 MS
EKG QTC CALCULATION (BAZETT): 482 MS
EKG R AXIS: -105 DEGREES
EKG T AXIS: 60 DEGREES
EKG VENTRICULAR RATE: 98 BPM
EOSINOPHIL NFR BLD AUTO: 0.7 %
EOSINOPHILS ABSOLUTE: 0.1 THOU/MM3 (ref 0–0.4)
ERYTHROCYTE [DISTWIDTH] IN BLOOD BY AUTOMATED COUNT: 15.1 % (ref 11.5–14.5)
GFR SERPL CREATININE-BSD FRML MDRD: 45 ML/MIN/1.73M2
GLUCOSE SERPL-MCNC: 143 MG/DL (ref 74–109)
HCO3 BLDA-SCNC: 25 MMOL/L (ref 23–28)
HCT VFR BLD AUTO: 37.6 % (ref 42–52)
HEPARIN UNFRACTIONATED: < 0.04 U/ML (ref 0.3–0.7)
HGB BLD-MCNC: 12.2 GM/DL (ref 14–18)
IMM GRANULOCYTES # BLD AUTO: 0.05 THOU/MM3 (ref 0–0.07)
IMM GRANULOCYTES NFR BLD AUTO: 0.5 %
INR PPP: 1.04 (ref 0.85–1.13)
LYMPHOCYTES ABSOLUTE: 0.8 THOU/MM3 (ref 1–4.8)
LYMPHOCYTES NFR BLD AUTO: 8.1 %
MAGNESIUM SERPL-MCNC: 1.8 MG/DL (ref 1.6–2.6)
MCH RBC QN AUTO: 30.7 PG (ref 26–33)
MCHC RBC AUTO-ENTMCNC: 32.4 GM/DL (ref 32.2–35.5)
MCV RBC AUTO: 94.5 FL (ref 80–94)
MONOCYTES ABSOLUTE: 0.6 THOU/MM3 (ref 0.4–1.3)
MONOCYTES NFR BLD AUTO: 5.9 %
NEUTROPHILS ABSOLUTE: 8.7 THOU/MM3 (ref 1.8–7.7)
NEUTROPHILS NFR BLD AUTO: 84.6 %
NRBC BLD AUTO-RTO: 0 /100 WBC
NT-PROBNP SERPL IA-MCNC: 388 PG/ML (ref 0–124)
OSMOLALITY SERPL CALC.SUM OF ELEC: 283.3 MOSMOL/KG (ref 275–300)
PCO2 BLDA: 30 MMHG (ref 35–45)
PH BLDA: 7.53 [PH] (ref 7.35–7.45)
PLATELET # BLD AUTO: 89 THOU/MM3 (ref 130–400)
PLATELET BLD QL SMEAR: ABNORMAL
PMV BLD AUTO: 12.3 FL (ref 9.4–12.4)
PO2 BLDA: 73 MMHG (ref 71–104)
POTASSIUM SERPL-SCNC: 4.4 MEQ/L (ref 3.5–5.2)
PROT SERPL-MCNC: 5.8 G/DL (ref 6.4–8.3)
PROTHROMBIN TIME: 12.2 SECONDS (ref 10–13.5)
RBC # BLD AUTO: 3.98 MILL/MM3 (ref 4.7–6.1)
SAO2 % BLDA: 96 %
SCAN OF BLOOD SMEAR: NORMAL
SODIUM SERPL-SCNC: 139 MEQ/L (ref 135–145)
TROPONIN, HIGH SENSITIVITY: 10 NG/L (ref 0–12)
VENTILATION MODE VENT: ABNORMAL
WBC # BLD AUTO: 10.3 THOU/MM3 (ref 4.8–10.8)

## 2025-08-21 PROCEDURE — 99285 EMERGENCY DEPT VISIT HI MDM: CPT

## 2025-08-21 PROCEDURE — 96375 TX/PRO/DX INJ NEW DRUG ADDON: CPT

## 2025-08-21 PROCEDURE — 36600 WITHDRAWAL OF ARTERIAL BLOOD: CPT

## 2025-08-21 PROCEDURE — 6360000004 HC RX CONTRAST MEDICATION: Performed by: FAMILY MEDICINE

## 2025-08-21 PROCEDURE — 83880 ASSAY OF NATRIURETIC PEPTIDE: CPT

## 2025-08-21 PROCEDURE — 80053 COMPREHEN METABOLIC PANEL: CPT

## 2025-08-21 PROCEDURE — 85025 COMPLETE CBC W/AUTO DIFF WBC: CPT

## 2025-08-21 PROCEDURE — 36415 COLL VENOUS BLD VENIPUNCTURE: CPT

## 2025-08-21 PROCEDURE — 96366 THER/PROPH/DIAG IV INF ADDON: CPT

## 2025-08-21 PROCEDURE — 82803 BLOOD GASES ANY COMBINATION: CPT

## 2025-08-21 PROCEDURE — 96365 THER/PROPH/DIAG IV INF INIT: CPT

## 2025-08-21 PROCEDURE — 85610 PROTHROMBIN TIME: CPT

## 2025-08-21 PROCEDURE — 85520 HEPARIN ASSAY: CPT

## 2025-08-21 PROCEDURE — 94640 AIRWAY INHALATION TREATMENT: CPT

## 2025-08-21 PROCEDURE — 71275 CT ANGIOGRAPHY CHEST: CPT

## 2025-08-21 PROCEDURE — 2700000000 HC OXYGEN THERAPY PER DAY

## 2025-08-21 PROCEDURE — 6370000000 HC RX 637 (ALT 250 FOR IP)

## 2025-08-21 PROCEDURE — 71045 X-RAY EXAM CHEST 1 VIEW: CPT

## 2025-08-21 PROCEDURE — 84484 ASSAY OF TROPONIN QUANT: CPT

## 2025-08-21 PROCEDURE — 82248 BILIRUBIN DIRECT: CPT

## 2025-08-21 PROCEDURE — 5A0935A ASSISTANCE WITH RESPIRATORY VENTILATION, LESS THAN 24 CONSECUTIVE HOURS, HIGH NASAL FLOW/VELOCITY: ICD-10-PCS

## 2025-08-21 PROCEDURE — 6360000002 HC RX W HCPCS

## 2025-08-21 PROCEDURE — 85730 THROMBOPLASTIN TIME PARTIAL: CPT

## 2025-08-21 PROCEDURE — 93005 ELECTROCARDIOGRAM TRACING: CPT

## 2025-08-21 PROCEDURE — 83735 ASSAY OF MAGNESIUM: CPT

## 2025-08-21 PROCEDURE — 94761 N-INVAS EAR/PLS OXIMETRY MLT: CPT

## 2025-08-21 RX ORDER — IPRATROPIUM BROMIDE AND ALBUTEROL SULFATE 2.5; .5 MG/3ML; MG/3ML
1 SOLUTION RESPIRATORY (INHALATION) ONCE
Status: DISCONTINUED | OUTPATIENT
Start: 2025-08-21 | End: 2025-08-22

## 2025-08-21 RX ORDER — IPRATROPIUM BROMIDE AND ALBUTEROL SULFATE 2.5; .5 MG/3ML; MG/3ML
2 SOLUTION RESPIRATORY (INHALATION) ONCE
Status: COMPLETED | OUTPATIENT
Start: 2025-08-21 | End: 2025-08-21

## 2025-08-21 RX ORDER — HEPARIN SODIUM 1000 [USP'U]/ML
40 INJECTION, SOLUTION INTRAVENOUS; SUBCUTANEOUS PRN
Status: DISCONTINUED | OUTPATIENT
Start: 2025-08-21 | End: 2025-08-22

## 2025-08-21 RX ORDER — HEPARIN SODIUM 1000 [USP'U]/ML
80 INJECTION, SOLUTION INTRAVENOUS; SUBCUTANEOUS PRN
Status: DISCONTINUED | OUTPATIENT
Start: 2025-08-21 | End: 2025-08-22

## 2025-08-21 RX ORDER — IOPAMIDOL 755 MG/ML
80 INJECTION, SOLUTION INTRAVASCULAR
Status: COMPLETED | OUTPATIENT
Start: 2025-08-21 | End: 2025-08-21

## 2025-08-21 RX ORDER — HEPARIN SODIUM 1000 [USP'U]/ML
80 INJECTION, SOLUTION INTRAVENOUS; SUBCUTANEOUS ONCE
Status: COMPLETED | OUTPATIENT
Start: 2025-08-21 | End: 2025-08-21

## 2025-08-21 RX ORDER — HEPARIN SODIUM 10000 [USP'U]/100ML
5-30 INJECTION, SOLUTION INTRAVENOUS CONTINUOUS
Status: DISCONTINUED | OUTPATIENT
Start: 2025-08-21 | End: 2025-08-22

## 2025-08-21 RX ADMIN — HYDROMORPHONE HYDROCHLORIDE 0.5 MG: 1 INJECTION, SOLUTION INTRAMUSCULAR; INTRAVENOUS; SUBCUTANEOUS at 22:44

## 2025-08-21 RX ADMIN — IPRATROPIUM BROMIDE AND ALBUTEROL SULFATE 2 DOSE: .5; 3 SOLUTION RESPIRATORY (INHALATION) at 19:50

## 2025-08-21 RX ADMIN — HEPARIN SODIUM AND DEXTROSE 18 UNITS/KG/HR: 10000; 5 INJECTION INTRAVENOUS at 21:29

## 2025-08-21 RX ADMIN — IOPAMIDOL 80 ML: 755 INJECTION, SOLUTION INTRAVENOUS at 22:15

## 2025-08-21 RX ADMIN — HEPARIN SODIUM 8200 UNITS: 1000 INJECTION, SOLUTION INTRAVENOUS; SUBCUTANEOUS at 21:29

## 2025-08-21 ASSESSMENT — PAIN DESCRIPTION - ORIENTATION: ORIENTATION: LEFT

## 2025-08-21 ASSESSMENT — PAIN DESCRIPTION - LOCATION: LOCATION: HIP

## 2025-08-21 ASSESSMENT — PAIN - FUNCTIONAL ASSESSMENT: PAIN_FUNCTIONAL_ASSESSMENT: 0-10

## 2025-08-21 ASSESSMENT — PAIN SCALES - GENERAL: PAINLEVEL_OUTOF10: 8

## 2025-08-22 ENCOUNTER — APPOINTMENT (OUTPATIENT)
Dept: ULTRASOUND IMAGING | Age: 70
DRG: 189 | End: 2025-08-22
Payer: MEDICARE

## 2025-08-22 ENCOUNTER — APPOINTMENT (OUTPATIENT)
Dept: GENERAL RADIOLOGY | Age: 70
DRG: 189 | End: 2025-08-22
Payer: MEDICARE

## 2025-08-22 PROBLEM — J96.21 ACUTE ON CHRONIC HYPOXIC RESPIRATORY FAILURE (HCC): Status: ACTIVE | Noted: 2025-08-22

## 2025-08-22 PROBLEM — N17.9 AKI (ACUTE KIDNEY INJURY): Status: ACTIVE | Noted: 2025-08-22

## 2025-08-22 PROBLEM — J44.1 COPD EXACERBATION (HCC): Status: ACTIVE | Noted: 2025-08-22

## 2025-08-22 PROBLEM — I27.20 PULMONARY HTN (HCC): Status: ACTIVE | Noted: 2025-08-22

## 2025-08-22 PROBLEM — I50.32 CHRONIC HEART FAILURE WITH PRESERVED EJECTION FRACTION (HFPEF) (HCC): Status: ACTIVE | Noted: 2025-08-22

## 2025-08-22 PROBLEM — E66.2 OBESITY HYPOVENTILATION SYNDROME (HCC): Status: ACTIVE | Noted: 2025-08-22

## 2025-08-22 LAB
ANION GAP SERPL CALC-SCNC: 12 MEQ/L (ref 8–16)
BACTERIA: ABNORMAL
BASOPHILS ABSOLUTE: 0 THOU/MM3 (ref 0–0.1)
BASOPHILS NFR BLD AUTO: 0.1 %
BILIRUB UR QL STRIP: NEGATIVE
BUN SERPL-MCNC: 19 MG/DL (ref 8–23)
CALCIUM SERPL-MCNC: 8.6 MG/DL (ref 8.5–10.5)
CASTS #/AREA URNS LPF: ABNORMAL /LPF
CASTS #/AREA URNS LPF: ABNORMAL /LPF
CHARACTER UR: CLEAR
CHARCOAL URNS QL MICRO: ABNORMAL
CHLORIDE SERPL-SCNC: 103 MEQ/L (ref 98–111)
CO2 SERPL-SCNC: 25 MEQ/L (ref 22–29)
COLOR UR: YELLOW
CREAT SERPL-MCNC: 1.4 MG/DL (ref 0.7–1.2)
CREAT UR-MCNC: 188 MG/DL
CRYSTALS URNS QL MICRO: ABNORMAL
DEPRECATED RDW RBC AUTO: 52.3 FL (ref 35–45)
EKG ATRIAL RATE: 109 BPM
EKG P AXIS: 46 DEGREES
EKG P-R INTERVAL: 150 MS
EKG Q-T INTERVAL: 336 MS
EKG QRS DURATION: 120 MS
EKG QTC CALCULATION (BAZETT): 452 MS
EKG R AXIS: -88 DEGREES
EKG T AXIS: 43 DEGREES
EKG VENTRICULAR RATE: 109 BPM
EOSINOPHIL NFR BLD AUTO: 0.8 %
EOSINOPHILS ABSOLUTE: 0.1 THOU/MM3 (ref 0–0.4)
EPITHELIAL CELLS, UA: ABNORMAL /HPF
ERYTHROCYTE [DISTWIDTH] IN BLOOD BY AUTOMATED COUNT: 15.4 % (ref 11.5–14.5)
GFR SERPL CREATININE-BSD FRML MDRD: 53 ML/MIN/1.73M2
GLUCOSE BLD STRIP.AUTO-MCNC: 146 MG/DL (ref 70–108)
GLUCOSE BLD STRIP.AUTO-MCNC: 170 MG/DL (ref 70–108)
GLUCOSE BLD STRIP.AUTO-MCNC: 205 MG/DL (ref 70–108)
GLUCOSE BLD STRIP.AUTO-MCNC: 283 MG/DL (ref 70–108)
GLUCOSE BLD STRIP.AUTO-MCNC: 321 MG/DL (ref 70–108)
GLUCOSE SERPL-MCNC: 140 MG/DL (ref 74–109)
GLUCOSE UR QL STRIP.AUTO: NEGATIVE MG/DL
HCT VFR BLD AUTO: 36.3 % (ref 42–52)
HEPARIN UNFRACTIONATED: 0.11 U/ML (ref 0.3–0.7)
HEPARIN UNFRACTIONATED: 0.14 U/ML (ref 0.3–0.7)
HEPARIN UNFRACTIONATED: 0.15 U/ML (ref 0.3–0.7)
HEPARIN UNFRACTIONATED: 0.17 U/ML (ref 0.3–0.7)
HGB BLD-MCNC: 12 GM/DL (ref 14–18)
HGB UR QL STRIP.AUTO: ABNORMAL
IMM GRANULOCYTES # BLD AUTO: 0.06 THOU/MM3 (ref 0–0.07)
IMM GRANULOCYTES NFR BLD AUTO: 0.6 %
KETONES UR QL STRIP.AUTO: ABNORMAL
LACTATE SERPL-SCNC: 1.5 MMOL/L (ref 0.5–2.2)
LEUKOCYTE ESTERASE UR QL STRIP.AUTO: NEGATIVE
LYMPHOCYTES ABSOLUTE: 0.9 THOU/MM3 (ref 1–4.8)
LYMPHOCYTES NFR BLD AUTO: 9.4 %
MCH RBC QN AUTO: 30.7 PG (ref 26–33)
MCHC RBC AUTO-ENTMCNC: 33.1 GM/DL (ref 32.2–35.5)
MCV RBC AUTO: 92.8 FL (ref 80–94)
MONOCYTES ABSOLUTE: 0.6 THOU/MM3 (ref 0.4–1.3)
MONOCYTES NFR BLD AUTO: 6.7 %
NEUTROPHILS ABSOLUTE: 7.7 THOU/MM3 (ref 1.8–7.7)
NEUTROPHILS NFR BLD AUTO: 82.4 %
NITRITE UR QL STRIP.AUTO: NEGATIVE
NRBC BLD AUTO-RTO: 0 /100 WBC
OSMOLALITY UR: 678 MOSMOL/KG (ref 250–750)
PH UR STRIP.AUTO: 5.5 [PH] (ref 5–9)
PLATELET # BLD AUTO: 84 THOU/MM3 (ref 130–400)
PMV BLD AUTO: 12.2 FL (ref 9.4–12.4)
POTASSIUM SERPL-SCNC: 4.6 MEQ/L (ref 3.5–5.2)
PROT UR STRIP.AUTO-MCNC: 30 MG/DL
RBC # BLD AUTO: 3.91 MILL/MM3 (ref 4.7–6.1)
RBC #/AREA URNS HPF: ABNORMAL /HPF
RENAL EPI CELLS #/AREA URNS HPF: ABNORMAL /[HPF]
SODIUM SERPL-SCNC: 140 MEQ/L (ref 135–145)
SODIUM UR-SCNC: 54 MEQ/L
SPECIFIC GRAVITY UA: > 1.03 (ref 1–1.03)
UROBILINOGEN, URINE: 0.2 EU/DL (ref 0–1)
UUN 24H UR-MCNC: 823 MG/DL
WBC # BLD AUTO: 9.3 THOU/MM3 (ref 4.8–10.8)
WBC #/AREA URNS HPF: ABNORMAL /HPF
YEAST LIKE FUNGI URNS QL MICRO: ABNORMAL

## 2025-08-22 PROCEDURE — 93005 ELECTROCARDIOGRAM TRACING: CPT | Performed by: PHYSICIAN ASSISTANT

## 2025-08-22 PROCEDURE — 6370000000 HC RX 637 (ALT 250 FOR IP)

## 2025-08-22 PROCEDURE — 6360000002 HC RX W HCPCS: Performed by: PHYSICIAN ASSISTANT

## 2025-08-22 PROCEDURE — 84300 ASSAY OF URINE SODIUM: CPT

## 2025-08-22 PROCEDURE — 76770 US EXAM ABDO BACK WALL COMP: CPT

## 2025-08-22 PROCEDURE — 84540 ASSAY OF URINE/UREA-N: CPT

## 2025-08-22 PROCEDURE — 73502 X-RAY EXAM HIP UNI 2-3 VIEWS: CPT

## 2025-08-22 PROCEDURE — 94761 N-INVAS EAR/PLS OXIMETRY MLT: CPT

## 2025-08-22 PROCEDURE — 36415 COLL VENOUS BLD VENIPUNCTURE: CPT

## 2025-08-22 PROCEDURE — 85520 HEPARIN ASSAY: CPT

## 2025-08-22 PROCEDURE — 6370000000 HC RX 637 (ALT 250 FOR IP): Performed by: INTERNAL MEDICINE

## 2025-08-22 PROCEDURE — 6370000000 HC RX 637 (ALT 250 FOR IP): Performed by: PHYSICIAN ASSISTANT

## 2025-08-22 PROCEDURE — 6360000002 HC RX W HCPCS: Performed by: INTERNAL MEDICINE

## 2025-08-22 PROCEDURE — 2060000000 HC ICU INTERMEDIATE R&B

## 2025-08-22 PROCEDURE — 2500000003 HC RX 250 WO HCPCS: Performed by: INTERNAL MEDICINE

## 2025-08-22 PROCEDURE — 85025 COMPLETE CBC W/AUTO DIFF WBC: CPT

## 2025-08-22 PROCEDURE — 87086 URINE CULTURE/COLONY COUNT: CPT

## 2025-08-22 PROCEDURE — 2700000000 HC OXYGEN THERAPY PER DAY

## 2025-08-22 PROCEDURE — 99223 1ST HOSP IP/OBS HIGH 75: CPT | Performed by: INTERNAL MEDICINE

## 2025-08-22 PROCEDURE — 83935 ASSAY OF URINE OSMOLALITY: CPT

## 2025-08-22 PROCEDURE — 80048 BASIC METABOLIC PNL TOTAL CA: CPT

## 2025-08-22 PROCEDURE — 2580000003 HC RX 258

## 2025-08-22 PROCEDURE — 83605 ASSAY OF LACTIC ACID: CPT

## 2025-08-22 PROCEDURE — 97530 THERAPEUTIC ACTIVITIES: CPT

## 2025-08-22 PROCEDURE — 81001 URINALYSIS AUTO W/SCOPE: CPT

## 2025-08-22 PROCEDURE — 82570 ASSAY OF URINE CREATININE: CPT

## 2025-08-22 PROCEDURE — 97116 GAIT TRAINING THERAPY: CPT

## 2025-08-22 PROCEDURE — 82948 REAGENT STRIP/BLOOD GLUCOSE: CPT

## 2025-08-22 PROCEDURE — 97162 PT EVAL MOD COMPLEX 30 MIN: CPT

## 2025-08-22 PROCEDURE — 99223 1ST HOSP IP/OBS HIGH 75: CPT | Performed by: PHYSICIAN ASSISTANT

## 2025-08-22 PROCEDURE — 87081 CULTURE SCREEN ONLY: CPT

## 2025-08-22 PROCEDURE — 94640 AIRWAY INHALATION TREATMENT: CPT

## 2025-08-22 PROCEDURE — 2500000003 HC RX 250 WO HCPCS: Performed by: PHYSICIAN ASSISTANT

## 2025-08-22 RX ORDER — SODIUM CHLORIDE 0.9 % (FLUSH) 0.9 %
10 SYRINGE (ML) INJECTION PRN
Status: DISCONTINUED | OUTPATIENT
Start: 2025-08-22 | End: 2025-08-24 | Stop reason: HOSPADM

## 2025-08-22 RX ORDER — MIDAZOLAM HYDROCHLORIDE 2 MG/2ML
1 INJECTION, SOLUTION INTRAMUSCULAR; INTRAVENOUS PRN
Status: DISCONTINUED | OUTPATIENT
Start: 2025-08-22 | End: 2025-08-24 | Stop reason: HOSPADM

## 2025-08-22 RX ORDER — MAGNESIUM SULFATE IN WATER 40 MG/ML
2000 INJECTION, SOLUTION INTRAVENOUS PRN
Status: DISCONTINUED | OUTPATIENT
Start: 2025-08-22 | End: 2025-08-24 | Stop reason: HOSPADM

## 2025-08-22 RX ORDER — HYDROCODONE BITARTRATE AND ACETAMINOPHEN 5; 325 MG/1; MG/1
1 TABLET ORAL 3 TIMES DAILY PRN
Refills: 0 | Status: DISCONTINUED | OUTPATIENT
Start: 2025-08-22 | End: 2025-08-24 | Stop reason: HOSPADM

## 2025-08-22 RX ORDER — DILTIAZEM HCL 60 MG
60 TABLET ORAL 3 TIMES DAILY
Status: DISCONTINUED | OUTPATIENT
Start: 2025-08-22 | End: 2025-08-22 | Stop reason: DRUGHIGH

## 2025-08-22 RX ORDER — CHLORAL HYDRATE 500 MG
1 CAPSULE ORAL DAILY
Status: DISCONTINUED | OUTPATIENT
Start: 2025-08-22 | End: 2025-08-24 | Stop reason: HOSPADM

## 2025-08-22 RX ORDER — LOSARTAN POTASSIUM 25 MG/1
25 TABLET ORAL DAILY
Status: DISCONTINUED | OUTPATIENT
Start: 2025-08-22 | End: 2025-08-24 | Stop reason: HOSPADM

## 2025-08-22 RX ORDER — GLUCAGON 1 MG/ML
1 KIT INJECTION PRN
Status: DISCONTINUED | OUTPATIENT
Start: 2025-08-22 | End: 2025-08-24 | Stop reason: HOSPADM

## 2025-08-22 RX ORDER — ACETAMINOPHEN 325 MG/1
650 TABLET ORAL EVERY 6 HOURS PRN
Status: DISCONTINUED | OUTPATIENT
Start: 2025-08-22 | End: 2025-08-24 | Stop reason: HOSPADM

## 2025-08-22 RX ORDER — BUSPIRONE HYDROCHLORIDE 10 MG/1
10 TABLET ORAL 3 TIMES DAILY
Status: DISCONTINUED | OUTPATIENT
Start: 2025-08-22 | End: 2025-08-24 | Stop reason: HOSPADM

## 2025-08-22 RX ORDER — MIDAZOLAM HYDROCHLORIDE 2 MG/2ML
1 INJECTION, SOLUTION INTRAMUSCULAR; INTRAVENOUS ONCE
Status: COMPLETED | OUTPATIENT
Start: 2025-08-22 | End: 2025-08-22

## 2025-08-22 RX ORDER — PANTOPRAZOLE SODIUM 40 MG/1
40 TABLET, DELAYED RELEASE ORAL
Status: DISCONTINUED | OUTPATIENT
Start: 2025-08-22 | End: 2025-08-24 | Stop reason: HOSPADM

## 2025-08-22 RX ORDER — ROPINIROLE 1 MG/1
1 TABLET, FILM COATED ORAL 3 TIMES DAILY
Status: DISCONTINUED | OUTPATIENT
Start: 2025-08-22 | End: 2025-08-24 | Stop reason: HOSPADM

## 2025-08-22 RX ORDER — ALBUTEROL SULFATE 0.83 MG/ML
2.5 SOLUTION RESPIRATORY (INHALATION) EVERY 6 HOURS PRN
Status: DISCONTINUED | OUTPATIENT
Start: 2025-08-22 | End: 2025-08-24 | Stop reason: HOSPADM

## 2025-08-22 RX ORDER — ATORVASTATIN CALCIUM 20 MG/1
20 TABLET, FILM COATED ORAL DAILY
Status: DISCONTINUED | OUTPATIENT
Start: 2025-08-22 | End: 2025-08-24 | Stop reason: HOSPADM

## 2025-08-22 RX ORDER — ACETAMINOPHEN 650 MG/1
650 SUPPOSITORY RECTAL EVERY 6 HOURS PRN
Status: DISCONTINUED | OUTPATIENT
Start: 2025-08-22 | End: 2025-08-24 | Stop reason: HOSPADM

## 2025-08-22 RX ORDER — IPRATROPIUM BROMIDE AND ALBUTEROL SULFATE 2.5; .5 MG/3ML; MG/3ML
1 SOLUTION RESPIRATORY (INHALATION)
Status: DISCONTINUED | OUTPATIENT
Start: 2025-08-22 | End: 2025-08-23

## 2025-08-22 RX ORDER — ALBUTEROL SULFATE 0.83 MG/ML
2.5 SOLUTION RESPIRATORY (INHALATION)
Status: DISCONTINUED | OUTPATIENT
Start: 2025-08-22 | End: 2025-08-22

## 2025-08-22 RX ORDER — POLYETHYLENE GLYCOL 3350 17 G/17G
17 POWDER, FOR SOLUTION ORAL DAILY PRN
Status: DISCONTINUED | OUTPATIENT
Start: 2025-08-22 | End: 2025-08-24 | Stop reason: HOSPADM

## 2025-08-22 RX ORDER — POTASSIUM CHLORIDE 7.45 MG/ML
10 INJECTION INTRAVENOUS PRN
Status: DISCONTINUED | OUTPATIENT
Start: 2025-08-22 | End: 2025-08-24 | Stop reason: HOSPADM

## 2025-08-22 RX ORDER — GUAIFENESIN 600 MG/1
600 TABLET, EXTENDED RELEASE ORAL 2 TIMES DAILY
Status: DISCONTINUED | OUTPATIENT
Start: 2025-08-22 | End: 2025-08-24 | Stop reason: HOSPADM

## 2025-08-22 RX ORDER — DILTIAZEM HCL 60 MG
60 TABLET ORAL 2 TIMES DAILY
Status: DISCONTINUED | OUTPATIENT
Start: 2025-08-22 | End: 2025-08-24 | Stop reason: HOSPADM

## 2025-08-22 RX ORDER — FLUTICASONE PROPIONATE 50 MCG
2 SPRAY, SUSPENSION (ML) NASAL DAILY
Status: DISCONTINUED | OUTPATIENT
Start: 2025-08-22 | End: 2025-08-22

## 2025-08-22 RX ORDER — SODIUM CHLORIDE 0.9 % (FLUSH) 0.9 %
10 SYRINGE (ML) INJECTION EVERY 12 HOURS SCHEDULED
Status: DISCONTINUED | OUTPATIENT
Start: 2025-08-22 | End: 2025-08-24 | Stop reason: HOSPADM

## 2025-08-22 RX ORDER — ONDANSETRON 4 MG/1
4 TABLET, ORALLY DISINTEGRATING ORAL EVERY 8 HOURS PRN
Status: DISCONTINUED | OUTPATIENT
Start: 2025-08-22 | End: 2025-08-24 | Stop reason: HOSPADM

## 2025-08-22 RX ORDER — MONTELUKAST SODIUM 10 MG/1
10 TABLET ORAL
Status: DISCONTINUED | OUTPATIENT
Start: 2025-08-22 | End: 2025-08-24 | Stop reason: HOSPADM

## 2025-08-22 RX ORDER — LANOLIN ALCOHOL/MO/W.PET/CERES
400 CREAM (GRAM) TOPICAL DAILY
Status: DISCONTINUED | OUTPATIENT
Start: 2025-08-22 | End: 2025-08-24 | Stop reason: HOSPADM

## 2025-08-22 RX ORDER — FLUTICASONE PROPIONATE 50 MCG
2 SPRAY, SUSPENSION (ML) NASAL DAILY PRN
Status: DISCONTINUED | OUTPATIENT
Start: 2025-08-22 | End: 2025-08-24 | Stop reason: HOSPADM

## 2025-08-22 RX ORDER — ONDANSETRON 2 MG/ML
4 INJECTION INTRAMUSCULAR; INTRAVENOUS EVERY 6 HOURS PRN
Status: DISCONTINUED | OUTPATIENT
Start: 2025-08-22 | End: 2025-08-24 | Stop reason: HOSPADM

## 2025-08-22 RX ORDER — SERTRALINE HYDROCHLORIDE 100 MG/1
100 TABLET, FILM COATED ORAL 2 TIMES DAILY
Status: DISCONTINUED | OUTPATIENT
Start: 2025-08-22 | End: 2025-08-24 | Stop reason: HOSPADM

## 2025-08-22 RX ORDER — SODIUM PHOSPHATE, DIBASIC AND SODIUM PHOSPHATE, MONOBASIC 7; 19 G/230ML; G/230ML
1 ENEMA RECTAL
Status: DISCONTINUED | OUTPATIENT
Start: 2025-08-22 | End: 2025-08-24 | Stop reason: HOSPADM

## 2025-08-22 RX ORDER — ALBUTEROL SULFATE 0.83 MG/ML
2.5 SOLUTION RESPIRATORY (INHALATION) EVERY 4 HOURS PRN
Status: DISCONTINUED | OUTPATIENT
Start: 2025-08-22 | End: 2025-08-22

## 2025-08-22 RX ORDER — SODIUM CHLORIDE 9 MG/ML
INJECTION, SOLUTION INTRAVENOUS PRN
Status: DISCONTINUED | OUTPATIENT
Start: 2025-08-22 | End: 2025-08-24 | Stop reason: HOSPADM

## 2025-08-22 RX ORDER — DOCUSATE SODIUM 100 MG/1
100 CAPSULE, LIQUID FILLED ORAL DAILY
Status: COMPLETED | OUTPATIENT
Start: 2025-08-22 | End: 2025-08-23

## 2025-08-22 RX ORDER — SODIUM CHLORIDE, SODIUM LACTATE, POTASSIUM CHLORIDE, CALCIUM CHLORIDE 600; 310; 30; 20 MG/100ML; MG/100ML; MG/100ML; MG/100ML
INJECTION, SOLUTION INTRAVENOUS CONTINUOUS
Status: ACTIVE | OUTPATIENT
Start: 2025-08-22 | End: 2025-08-22

## 2025-08-22 RX ORDER — ENOXAPARIN SODIUM 100 MG/ML
30 INJECTION SUBCUTANEOUS EVERY 12 HOURS
Status: DISCONTINUED | OUTPATIENT
Start: 2025-08-22 | End: 2025-08-24 | Stop reason: HOSPADM

## 2025-08-22 RX ORDER — DEXTROSE MONOHYDRATE 100 MG/ML
INJECTION, SOLUTION INTRAVENOUS CONTINUOUS PRN
Status: DISCONTINUED | OUTPATIENT
Start: 2025-08-22 | End: 2025-08-24 | Stop reason: HOSPADM

## 2025-08-22 RX ORDER — BISACODYL 10 MG
10 SUPPOSITORY, RECTAL RECTAL PRN
Status: DISCONTINUED | OUTPATIENT
Start: 2025-08-22 | End: 2025-08-24 | Stop reason: HOSPADM

## 2025-08-22 RX ORDER — METOPROLOL TARTRATE 25 MG/1
25 TABLET, FILM COATED ORAL 2 TIMES DAILY
Status: DISCONTINUED | OUTPATIENT
Start: 2025-08-22 | End: 2025-08-24 | Stop reason: HOSPADM

## 2025-08-22 RX ORDER — INSULIN LISPRO 100 [IU]/ML
0-4 INJECTION, SOLUTION INTRAVENOUS; SUBCUTANEOUS
Status: DISCONTINUED | OUTPATIENT
Start: 2025-08-22 | End: 2025-08-24 | Stop reason: HOSPADM

## 2025-08-22 RX ORDER — IPRATROPIUM BROMIDE AND ALBUTEROL SULFATE 2.5; .5 MG/3ML; MG/3ML
1 SOLUTION RESPIRATORY (INHALATION) EVERY 4 HOURS PRN
Status: DISCONTINUED | OUTPATIENT
Start: 2025-08-22 | End: 2025-08-22 | Stop reason: CLARIF

## 2025-08-22 RX ORDER — ASPIRIN 81 MG/1
81 TABLET ORAL DAILY
Status: DISCONTINUED | OUTPATIENT
Start: 2025-08-22 | End: 2025-08-24 | Stop reason: HOSPADM

## 2025-08-22 RX ORDER — POTASSIUM CHLORIDE 1500 MG/1
40 TABLET, EXTENDED RELEASE ORAL PRN
Status: DISCONTINUED | OUTPATIENT
Start: 2025-08-22 | End: 2025-08-24 | Stop reason: HOSPADM

## 2025-08-22 RX ADMIN — ASPIRIN 81 MG: 81 TABLET, COATED ORAL at 10:41

## 2025-08-22 RX ADMIN — BUSPIRONE HYDROCHLORIDE 10 MG: 10 TABLET ORAL at 20:06

## 2025-08-22 RX ADMIN — GUAIFENESIN 600 MG: 600 TABLET, EXTENDED RELEASE ORAL at 20:06

## 2025-08-22 RX ADMIN — ENOXAPARIN SODIUM 30 MG: 100 INJECTION SUBCUTANEOUS at 12:37

## 2025-08-22 RX ADMIN — METOPROLOL TARTRATE 25 MG: 25 TABLET, FILM COATED ORAL at 02:11

## 2025-08-22 RX ADMIN — MONTELUKAST 10 MG: 10 TABLET, FILM COATED ORAL at 23:25

## 2025-08-22 RX ADMIN — GUAIFENESIN 600 MG: 600 TABLET, EXTENDED RELEASE ORAL at 10:42

## 2025-08-22 RX ADMIN — SERTRALINE 100 MG: 100 TABLET, FILM COATED ORAL at 02:11

## 2025-08-22 RX ADMIN — BUSPIRONE HYDROCHLORIDE 10 MG: 10 TABLET ORAL at 12:59

## 2025-08-22 RX ADMIN — IPRATROPIUM BROMIDE AND ALBUTEROL SULFATE 1 DOSE: .5; 3 SOLUTION RESPIRATORY (INHALATION) at 15:46

## 2025-08-22 RX ADMIN — MIDAZOLAM HYDROCHLORIDE 1 MG: 1 INJECTION, SOLUTION INTRAMUSCULAR; INTRAVENOUS at 02:06

## 2025-08-22 RX ADMIN — INSULIN LISPRO 2 UNITS: 100 INJECTION, SOLUTION INTRAVENOUS; SUBCUTANEOUS at 18:32

## 2025-08-22 RX ADMIN — SODIUM CHLORIDE, PRESERVATIVE FREE 10 ML: 5 INJECTION INTRAVENOUS at 20:07

## 2025-08-22 RX ADMIN — SODIUM CHLORIDE, SODIUM LACTATE, POTASSIUM CHLORIDE, AND CALCIUM CHLORIDE: .6; .31; .03; .02 INJECTION, SOLUTION INTRAVENOUS at 11:02

## 2025-08-22 RX ADMIN — DOCUSATE SODIUM 100 MG: 100 CAPSULE, LIQUID FILLED ORAL at 13:00

## 2025-08-22 RX ADMIN — DILTIAZEM HYDROCHLORIDE 60 MG: 60 TABLET ORAL at 23:25

## 2025-08-22 RX ADMIN — WATER 40 MG: 1 INJECTION INTRAMUSCULAR; INTRAVENOUS; SUBCUTANEOUS at 20:07

## 2025-08-22 RX ADMIN — HYDROMORPHONE HYDROCHLORIDE 1 MG: 1 INJECTION, SOLUTION INTRAMUSCULAR; INTRAVENOUS; SUBCUTANEOUS at 12:37

## 2025-08-22 RX ADMIN — ROPINIROLE HYDROCHLORIDE 1 MG: 1 TABLET, FILM COATED ORAL at 20:06

## 2025-08-22 RX ADMIN — ROPINIROLE HYDROCHLORIDE 1 MG: 1 TABLET, FILM COATED ORAL at 10:41

## 2025-08-22 RX ADMIN — ROPINIROLE HYDROCHLORIDE 1 MG: 1 TABLET, FILM COATED ORAL at 13:04

## 2025-08-22 RX ADMIN — PANTOPRAZOLE SODIUM 40 MG: 40 TABLET, DELAYED RELEASE ORAL at 16:40

## 2025-08-22 RX ADMIN — HYDROCODONE BITARTRATE AND ACETAMINOPHEN 1 TABLET: 5; 325 TABLET ORAL at 10:41

## 2025-08-22 RX ADMIN — HYDROCODONE BITARTRATE AND ACETAMINOPHEN 1 TABLET: 5; 325 TABLET ORAL at 01:43

## 2025-08-22 RX ADMIN — METOPROLOL TARTRATE 25 MG: 25 TABLET, FILM COATED ORAL at 10:41

## 2025-08-22 RX ADMIN — SERTRALINE 100 MG: 100 TABLET, FILM COATED ORAL at 10:41

## 2025-08-22 RX ADMIN — TRAZODONE HYDROCHLORIDE 150 MG: 100 TABLET ORAL at 23:25

## 2025-08-22 RX ADMIN — SERTRALINE 100 MG: 100 TABLET, FILM COATED ORAL at 20:06

## 2025-08-22 RX ADMIN — HYDROCODONE BITARTRATE AND ACETAMINOPHEN 1 TABLET: 5; 325 TABLET ORAL at 20:06

## 2025-08-22 RX ADMIN — FLUTICASONE PROPIONATE 2 SPRAY: 50 SPRAY, METERED NASAL at 13:00

## 2025-08-22 RX ADMIN — HYDROMORPHONE HYDROCHLORIDE 1 MG: 1 INJECTION, SOLUTION INTRAMUSCULAR; INTRAVENOUS; SUBCUTANEOUS at 05:28

## 2025-08-22 RX ADMIN — DILTIAZEM HYDROCHLORIDE 60 MG: 60 TABLET ORAL at 10:41

## 2025-08-22 RX ADMIN — GUAIFENESIN 600 MG: 600 TABLET, EXTENDED RELEASE ORAL at 02:11

## 2025-08-22 RX ADMIN — METOPROLOL TARTRATE 25 MG: 25 TABLET, FILM COATED ORAL at 20:06

## 2025-08-22 RX ADMIN — INSULIN LISPRO 3 UNITS: 100 INJECTION, SOLUTION INTRAVENOUS; SUBCUTANEOUS at 20:06

## 2025-08-22 RX ADMIN — PANTOPRAZOLE SODIUM 40 MG: 40 TABLET, DELAYED RELEASE ORAL at 10:57

## 2025-08-22 RX ADMIN — WATER 40 MG: 1 INJECTION INTRAMUSCULAR; INTRAVENOUS; SUBCUTANEOUS at 10:45

## 2025-08-22 RX ADMIN — Medication 400 MG: at 10:42

## 2025-08-22 RX ADMIN — Medication 1000 MG: at 10:41

## 2025-08-22 RX ADMIN — IPRATROPIUM BROMIDE AND ALBUTEROL SULFATE 1 DOSE: .5; 3 SOLUTION RESPIRATORY (INHALATION) at 19:42

## 2025-08-22 RX ADMIN — ENOXAPARIN SODIUM 30 MG: 100 INJECTION SUBCUTANEOUS at 23:26

## 2025-08-22 RX ADMIN — ENOXAPARIN SODIUM 30 MG: 100 INJECTION SUBCUTANEOUS at 01:49

## 2025-08-22 RX ADMIN — SODIUM CHLORIDE, PRESERVATIVE FREE 10 ML: 5 INJECTION INTRAVENOUS at 10:42

## 2025-08-22 RX ADMIN — ATORVASTATIN CALCIUM 20 MG: 20 TABLET, FILM COATED ORAL at 10:42

## 2025-08-22 ASSESSMENT — PAIN SCALES - GENERAL
PAINLEVEL_OUTOF10: 8
PAINLEVEL_OUTOF10: 6
PAINLEVEL_OUTOF10: 8

## 2025-08-22 ASSESSMENT — PAIN DESCRIPTION - FREQUENCY: FREQUENCY: CONTINUOUS

## 2025-08-22 ASSESSMENT — PAIN DESCRIPTION - LOCATION
LOCATION: HIP

## 2025-08-22 ASSESSMENT — PAIN - FUNCTIONAL ASSESSMENT
PAIN_FUNCTIONAL_ASSESSMENT: 0-10

## 2025-08-22 ASSESSMENT — PAIN DESCRIPTION - DESCRIPTORS
DESCRIPTORS: ACHING
DESCRIPTORS: ACHING
DESCRIPTORS: SHARP
DESCRIPTORS: SHARP
DESCRIPTORS: ACHING

## 2025-08-22 ASSESSMENT — PAIN DESCRIPTION - ORIENTATION
ORIENTATION: LEFT

## 2025-08-22 ASSESSMENT — PAIN DESCRIPTION - PAIN TYPE: TYPE: SURGICAL PAIN

## 2025-08-22 ASSESSMENT — PAIN DESCRIPTION - ONSET: ONSET: ON-GOING

## 2025-08-23 LAB
ALBUMIN SERPL BCG-MCNC: 3.2 G/DL (ref 3.4–4.9)
ALP SERPL-CCNC: 39 U/L (ref 40–129)
ALT SERPL W/O P-5'-P-CCNC: 6 U/L (ref 10–50)
ANION GAP SERPL CALC-SCNC: 10 MEQ/L (ref 8–16)
AST SERPL-CCNC: 28 U/L (ref 10–50)
BILIRUB SERPL-MCNC: 0.4 MG/DL (ref 0.3–1.2)
BUN SERPL-MCNC: 22 MG/DL (ref 8–23)
CALCIUM SERPL-MCNC: 9 MG/DL (ref 8.5–10.5)
CHLORIDE SERPL-SCNC: 103 MEQ/L (ref 98–111)
CO2 SERPL-SCNC: 24 MEQ/L (ref 22–29)
CREAT SERPL-MCNC: 1.2 MG/DL (ref 0.7–1.2)
DEPRECATED RDW RBC AUTO: 52.6 FL (ref 35–45)
ERYTHROCYTE [DISTWIDTH] IN BLOOD BY AUTOMATED COUNT: 15 % (ref 11.5–14.5)
GFR SERPL CREATININE-BSD FRML MDRD: 64 ML/MIN/1.73M2
GLUCOSE BLD STRIP.AUTO-MCNC: 163 MG/DL (ref 70–108)
GLUCOSE BLD STRIP.AUTO-MCNC: 238 MG/DL (ref 70–108)
GLUCOSE BLD STRIP.AUTO-MCNC: 252 MG/DL (ref 70–108)
GLUCOSE BLD STRIP.AUTO-MCNC: 302 MG/DL (ref 70–108)
GLUCOSE SERPL-MCNC: 265 MG/DL (ref 74–109)
HCT VFR BLD AUTO: 33.5 % (ref 42–52)
HGB BLD-MCNC: 10.9 GM/DL (ref 14–18)
MAGNESIUM SERPL-MCNC: 2 MG/DL (ref 1.6–2.6)
MCH RBC QN AUTO: 30.6 PG (ref 26–33)
MCHC RBC AUTO-ENTMCNC: 32.5 GM/DL (ref 32.2–35.5)
MCV RBC AUTO: 94.1 FL (ref 80–94)
MRSA SPEC QL CULT: NORMAL
PLATELET # BLD AUTO: 84 THOU/MM3 (ref 130–400)
PMV BLD AUTO: 12.1 FL (ref 9.4–12.4)
POTASSIUM SERPL-SCNC: 5.1 MEQ/L (ref 3.5–5.2)
PROT SERPL-MCNC: 5.8 G/DL (ref 6.4–8.3)
RBC # BLD AUTO: 3.56 MILL/MM3 (ref 4.7–6.1)
SCAN OF BLOOD SMEAR: NORMAL
SODIUM SERPL-SCNC: 137 MEQ/L (ref 135–145)
WBC # BLD AUTO: 10 THOU/MM3 (ref 4.8–10.8)

## 2025-08-23 PROCEDURE — 6370000000 HC RX 637 (ALT 250 FOR IP)

## 2025-08-23 PROCEDURE — 94640 AIRWAY INHALATION TREATMENT: CPT

## 2025-08-23 PROCEDURE — 94761 N-INVAS EAR/PLS OXIMETRY MLT: CPT

## 2025-08-23 PROCEDURE — 99232 SBSQ HOSP IP/OBS MODERATE 35: CPT | Performed by: STUDENT IN AN ORGANIZED HEALTH CARE EDUCATION/TRAINING PROGRAM

## 2025-08-23 PROCEDURE — 1200000003 HC TELEMETRY R&B

## 2025-08-23 PROCEDURE — 6370000000 HC RX 637 (ALT 250 FOR IP): Performed by: PHYSICIAN ASSISTANT

## 2025-08-23 PROCEDURE — 6370000000 HC RX 637 (ALT 250 FOR IP): Performed by: INTERNAL MEDICINE

## 2025-08-23 PROCEDURE — 85027 COMPLETE CBC AUTOMATED: CPT

## 2025-08-23 PROCEDURE — 2500000003 HC RX 250 WO HCPCS: Performed by: PHYSICIAN ASSISTANT

## 2025-08-23 PROCEDURE — 97116 GAIT TRAINING THERAPY: CPT

## 2025-08-23 PROCEDURE — 97110 THERAPEUTIC EXERCISES: CPT

## 2025-08-23 PROCEDURE — 36415 COLL VENOUS BLD VENIPUNCTURE: CPT

## 2025-08-23 PROCEDURE — 83735 ASSAY OF MAGNESIUM: CPT

## 2025-08-23 PROCEDURE — 99232 SBSQ HOSP IP/OBS MODERATE 35: CPT

## 2025-08-23 PROCEDURE — 6360000002 HC RX W HCPCS: Performed by: PHYSICIAN ASSISTANT

## 2025-08-23 PROCEDURE — 80053 COMPREHEN METABOLIC PANEL: CPT

## 2025-08-23 PROCEDURE — 82948 REAGENT STRIP/BLOOD GLUCOSE: CPT

## 2025-08-23 PROCEDURE — 2700000000 HC OXYGEN THERAPY PER DAY

## 2025-08-23 PROCEDURE — 97530 THERAPEUTIC ACTIVITIES: CPT

## 2025-08-23 RX ORDER — IPRATROPIUM BROMIDE AND ALBUTEROL SULFATE 2.5; .5 MG/3ML; MG/3ML
1 SOLUTION RESPIRATORY (INHALATION)
Status: DISCONTINUED | OUTPATIENT
Start: 2025-08-23 | End: 2025-08-24 | Stop reason: HOSPADM

## 2025-08-23 RX ORDER — PREDNISONE 20 MG/1
40 TABLET ORAL DAILY
Status: DISCONTINUED | OUTPATIENT
Start: 2025-08-23 | End: 2025-08-24 | Stop reason: HOSPADM

## 2025-08-23 RX ADMIN — ENOXAPARIN SODIUM 30 MG: 100 INJECTION SUBCUTANEOUS at 12:37

## 2025-08-23 RX ADMIN — BUSPIRONE HYDROCHLORIDE 10 MG: 10 TABLET ORAL at 13:56

## 2025-08-23 RX ADMIN — ROPINIROLE HYDROCHLORIDE 1 MG: 1 TABLET, FILM COATED ORAL at 13:56

## 2025-08-23 RX ADMIN — PREDNISONE 40 MG: 20 TABLET ORAL at 11:49

## 2025-08-23 RX ADMIN — BUSPIRONE HYDROCHLORIDE 10 MG: 10 TABLET ORAL at 20:49

## 2025-08-23 RX ADMIN — SODIUM CHLORIDE, PRESERVATIVE FREE 10 ML: 5 INJECTION INTRAVENOUS at 09:34

## 2025-08-23 RX ADMIN — IPRATROPIUM BROMIDE AND ALBUTEROL SULFATE 1 DOSE: .5; 3 SOLUTION RESPIRATORY (INHALATION) at 20:31

## 2025-08-23 RX ADMIN — DILTIAZEM HYDROCHLORIDE 60 MG: 60 TABLET ORAL at 09:33

## 2025-08-23 RX ADMIN — Medication 1000 MG: at 09:33

## 2025-08-23 RX ADMIN — HYDROCODONE BITARTRATE AND ACETAMINOPHEN 1 TABLET: 5; 325 TABLET ORAL at 04:02

## 2025-08-23 RX ADMIN — GUAIFENESIN 600 MG: 600 TABLET, EXTENDED RELEASE ORAL at 09:33

## 2025-08-23 RX ADMIN — IPRATROPIUM BROMIDE AND ALBUTEROL SULFATE 1 DOSE: .5; 3 SOLUTION RESPIRATORY (INHALATION) at 12:31

## 2025-08-23 RX ADMIN — ROPINIROLE HYDROCHLORIDE 1 MG: 1 TABLET, FILM COATED ORAL at 20:49

## 2025-08-23 RX ADMIN — METOPROLOL TARTRATE 25 MG: 25 TABLET, FILM COATED ORAL at 09:34

## 2025-08-23 RX ADMIN — SERTRALINE 100 MG: 100 TABLET, FILM COATED ORAL at 20:49

## 2025-08-23 RX ADMIN — PANTOPRAZOLE SODIUM 40 MG: 40 TABLET, DELAYED RELEASE ORAL at 17:14

## 2025-08-23 RX ADMIN — HYDROCODONE BITARTRATE AND ACETAMINOPHEN 1 TABLET: 5; 325 TABLET ORAL at 20:49

## 2025-08-23 RX ADMIN — METOPROLOL TARTRATE 25 MG: 25 TABLET, FILM COATED ORAL at 20:49

## 2025-08-23 RX ADMIN — ATORVASTATIN CALCIUM 20 MG: 20 TABLET, FILM COATED ORAL at 09:34

## 2025-08-23 RX ADMIN — TRAZODONE HYDROCHLORIDE 150 MG: 100 TABLET ORAL at 22:33

## 2025-08-23 RX ADMIN — DILTIAZEM HYDROCHLORIDE 60 MG: 60 TABLET ORAL at 20:49

## 2025-08-23 RX ADMIN — HYDROCODONE BITARTRATE AND ACETAMINOPHEN 1 TABLET: 5; 325 TABLET ORAL at 12:37

## 2025-08-23 RX ADMIN — ASPIRIN 81 MG: 81 TABLET, COATED ORAL at 09:32

## 2025-08-23 RX ADMIN — Medication 400 MG: at 09:34

## 2025-08-23 RX ADMIN — MONTELUKAST 10 MG: 10 TABLET, FILM COATED ORAL at 20:49

## 2025-08-23 RX ADMIN — INSULIN LISPRO 3 UNITS: 100 INJECTION, SOLUTION INTRAVENOUS; SUBCUTANEOUS at 20:50

## 2025-08-23 RX ADMIN — PANTOPRAZOLE SODIUM 40 MG: 40 TABLET, DELAYED RELEASE ORAL at 04:02

## 2025-08-23 RX ADMIN — GUAIFENESIN 600 MG: 600 TABLET, EXTENDED RELEASE ORAL at 20:49

## 2025-08-23 RX ADMIN — SERTRALINE 100 MG: 100 TABLET, FILM COATED ORAL at 09:33

## 2025-08-23 RX ADMIN — ROPINIROLE HYDROCHLORIDE 1 MG: 1 TABLET, FILM COATED ORAL at 09:33

## 2025-08-23 RX ADMIN — IPRATROPIUM BROMIDE AND ALBUTEROL SULFATE 1 DOSE: .5; 3 SOLUTION RESPIRATORY (INHALATION) at 15:40

## 2025-08-23 RX ADMIN — SODIUM CHLORIDE, PRESERVATIVE FREE 10 ML: 5 INJECTION INTRAVENOUS at 20:50

## 2025-08-23 RX ADMIN — INSULIN LISPRO 2 UNITS: 100 INJECTION, SOLUTION INTRAVENOUS; SUBCUTANEOUS at 09:32

## 2025-08-23 RX ADMIN — INSULIN LISPRO 1 UNITS: 100 INJECTION, SOLUTION INTRAVENOUS; SUBCUTANEOUS at 17:14

## 2025-08-23 RX ADMIN — BUSPIRONE HYDROCHLORIDE 10 MG: 10 TABLET ORAL at 09:33

## 2025-08-23 RX ADMIN — DOCUSATE SODIUM 100 MG: 100 CAPSULE, LIQUID FILLED ORAL at 09:33

## 2025-08-23 ASSESSMENT — PAIN DESCRIPTION - DESCRIPTORS
DESCRIPTORS: ACHING

## 2025-08-23 ASSESSMENT — PAIN DESCRIPTION - LOCATION
LOCATION: HIP

## 2025-08-23 ASSESSMENT — PAIN DESCRIPTION - ORIENTATION
ORIENTATION: LEFT

## 2025-08-23 ASSESSMENT — PAIN SCALES - GENERAL
PAINLEVEL_OUTOF10: 8
PAINLEVEL_OUTOF10: 7
PAINLEVEL_OUTOF10: 8

## 2025-08-23 ASSESSMENT — PAIN - FUNCTIONAL ASSESSMENT
PAIN_FUNCTIONAL_ASSESSMENT: 0-10

## 2025-08-23 ASSESSMENT — PAIN DESCRIPTION - ONSET: ONSET: ON-GOING

## 2025-08-23 ASSESSMENT — PAIN DESCRIPTION - PAIN TYPE: TYPE: SURGICAL PAIN

## 2025-08-23 ASSESSMENT — PAIN DESCRIPTION - FREQUENCY: FREQUENCY: CONTINUOUS

## 2025-08-24 VITALS
HEIGHT: 67 IN | DIASTOLIC BLOOD PRESSURE: 66 MMHG | OXYGEN SATURATION: 91 % | RESPIRATION RATE: 18 BRPM | HEART RATE: 73 BPM | BODY MASS INDEX: 36.02 KG/M2 | SYSTOLIC BLOOD PRESSURE: 140 MMHG | TEMPERATURE: 97.7 F | WEIGHT: 229.5 LBS

## 2025-08-24 LAB
ALBUMIN SERPL BCG-MCNC: 3.2 G/DL (ref 3.4–4.9)
ALP SERPL-CCNC: 40 U/L (ref 40–129)
ALT SERPL W/O P-5'-P-CCNC: 7 U/L (ref 10–50)
ANION GAP SERPL CALC-SCNC: 11 MEQ/L (ref 8–16)
AST SERPL-CCNC: 20 U/L (ref 10–50)
BACTERIA UR CULT: NORMAL
BILIRUB SERPL-MCNC: 0.3 MG/DL (ref 0.3–1.2)
BUN SERPL-MCNC: 26 MG/DL (ref 8–23)
CALCIUM SERPL-MCNC: 9.1 MG/DL (ref 8.5–10.5)
CHLORIDE SERPL-SCNC: 105 MEQ/L (ref 98–111)
CO2 SERPL-SCNC: 24 MEQ/L (ref 22–29)
CREAT SERPL-MCNC: 1.3 MG/DL (ref 0.7–1.2)
DEPRECATED RDW RBC AUTO: 51.7 FL (ref 35–45)
ERYTHROCYTE [DISTWIDTH] IN BLOOD BY AUTOMATED COUNT: 15 % (ref 11.5–14.5)
GFR SERPL CREATININE-BSD FRML MDRD: 58 ML/MIN/1.73M2
GLUCOSE BLD STRIP.AUTO-MCNC: 259 MG/DL (ref 70–108)
GLUCOSE BLD STRIP.AUTO-MCNC: 278 MG/DL (ref 70–108)
GLUCOSE SERPL-MCNC: 255 MG/DL (ref 74–109)
HCT VFR BLD AUTO: 32 % (ref 42–52)
HGB BLD-MCNC: 10.4 GM/DL (ref 14–18)
MAGNESIUM SERPL-MCNC: 2 MG/DL (ref 1.6–2.6)
MCH RBC QN AUTO: 30.5 PG (ref 26–33)
MCHC RBC AUTO-ENTMCNC: 32.5 GM/DL (ref 32.2–35.5)
MCV RBC AUTO: 93.8 FL (ref 80–94)
PLATELET # BLD AUTO: 104 THOU/MM3 (ref 130–400)
PMV BLD AUTO: 12.4 FL (ref 9.4–12.4)
POTASSIUM SERPL-SCNC: 4.7 MEQ/L (ref 3.5–5.2)
PROT SERPL-MCNC: 5.8 G/DL (ref 6.4–8.3)
RBC # BLD AUTO: 3.41 MILL/MM3 (ref 4.7–6.1)
SODIUM SERPL-SCNC: 140 MEQ/L (ref 135–145)
WBC # BLD AUTO: 9.9 THOU/MM3 (ref 4.8–10.8)

## 2025-08-24 PROCEDURE — 94640 AIRWAY INHALATION TREATMENT: CPT

## 2025-08-24 PROCEDURE — 6370000000 HC RX 637 (ALT 250 FOR IP)

## 2025-08-24 PROCEDURE — 83735 ASSAY OF MAGNESIUM: CPT

## 2025-08-24 PROCEDURE — 6370000000 HC RX 637 (ALT 250 FOR IP): Performed by: INTERNAL MEDICINE

## 2025-08-24 PROCEDURE — 99239 HOSP IP/OBS DSCHRG MGMT >30: CPT | Performed by: STUDENT IN AN ORGANIZED HEALTH CARE EDUCATION/TRAINING PROGRAM

## 2025-08-24 PROCEDURE — 80053 COMPREHEN METABOLIC PANEL: CPT

## 2025-08-24 PROCEDURE — 2700000000 HC OXYGEN THERAPY PER DAY

## 2025-08-24 PROCEDURE — 6370000000 HC RX 637 (ALT 250 FOR IP): Performed by: PHYSICIAN ASSISTANT

## 2025-08-24 PROCEDURE — 82948 REAGENT STRIP/BLOOD GLUCOSE: CPT

## 2025-08-24 PROCEDURE — 94761 N-INVAS EAR/PLS OXIMETRY MLT: CPT

## 2025-08-24 PROCEDURE — 2500000003 HC RX 250 WO HCPCS: Performed by: PHYSICIAN ASSISTANT

## 2025-08-24 PROCEDURE — 99232 SBSQ HOSP IP/OBS MODERATE 35: CPT

## 2025-08-24 PROCEDURE — 36415 COLL VENOUS BLD VENIPUNCTURE: CPT

## 2025-08-24 PROCEDURE — 85027 COMPLETE CBC AUTOMATED: CPT

## 2025-08-24 PROCEDURE — 6360000002 HC RX W HCPCS: Performed by: PHYSICIAN ASSISTANT

## 2025-08-24 RX ORDER — PREDNISONE 20 MG/1
40 TABLET ORAL DAILY
Qty: 4 TABLET | Refills: 0 | Status: SHIPPED | OUTPATIENT
Start: 2025-08-25 | End: 2025-08-27

## 2025-08-24 RX ORDER — SENNOSIDES 8.6 MG
325 CAPSULE ORAL ONCE
Status: COMPLETED | OUTPATIENT
Start: 2025-08-24 | End: 2025-08-24

## 2025-08-24 RX ADMIN — INSULIN LISPRO 2 UNITS: 100 INJECTION, SOLUTION INTRAVENOUS; SUBCUTANEOUS at 08:40

## 2025-08-24 RX ADMIN — PANTOPRAZOLE SODIUM 40 MG: 40 TABLET, DELAYED RELEASE ORAL at 05:33

## 2025-08-24 RX ADMIN — DILTIAZEM HYDROCHLORIDE 60 MG: 60 TABLET ORAL at 08:32

## 2025-08-24 RX ADMIN — ASPIRIN 325 MG: 325 TABLET, COATED ORAL at 13:37

## 2025-08-24 RX ADMIN — PREDNISONE 40 MG: 20 TABLET ORAL at 08:32

## 2025-08-24 RX ADMIN — ATORVASTATIN CALCIUM 20 MG: 20 TABLET, FILM COATED ORAL at 08:32

## 2025-08-24 RX ADMIN — IPRATROPIUM BROMIDE AND ALBUTEROL SULFATE 1 DOSE: .5; 3 SOLUTION RESPIRATORY (INHALATION) at 08:14

## 2025-08-24 RX ADMIN — BUSPIRONE HYDROCHLORIDE 10 MG: 10 TABLET ORAL at 13:37

## 2025-08-24 RX ADMIN — ROPINIROLE HYDROCHLORIDE 1 MG: 1 TABLET, FILM COATED ORAL at 13:37

## 2025-08-24 RX ADMIN — Medication 1000 MG: at 08:32

## 2025-08-24 RX ADMIN — Medication 400 MG: at 08:32

## 2025-08-24 RX ADMIN — ASPIRIN 81 MG: 81 TABLET, COATED ORAL at 08:32

## 2025-08-24 RX ADMIN — SODIUM CHLORIDE, PRESERVATIVE FREE 10 ML: 5 INJECTION INTRAVENOUS at 08:32

## 2025-08-24 RX ADMIN — GUAIFENESIN 600 MG: 600 TABLET, EXTENDED RELEASE ORAL at 08:32

## 2025-08-24 RX ADMIN — ENOXAPARIN SODIUM 30 MG: 100 INJECTION SUBCUTANEOUS at 01:20

## 2025-08-24 RX ADMIN — IPRATROPIUM BROMIDE AND ALBUTEROL SULFATE 1 DOSE: .5; 3 SOLUTION RESPIRATORY (INHALATION) at 12:18

## 2025-08-24 RX ADMIN — INSULIN LISPRO 2 UNITS: 100 INJECTION, SOLUTION INTRAVENOUS; SUBCUTANEOUS at 12:35

## 2025-08-24 RX ADMIN — METOPROLOL TARTRATE 25 MG: 25 TABLET, FILM COATED ORAL at 08:32

## 2025-08-24 RX ADMIN — SERTRALINE 100 MG: 100 TABLET, FILM COATED ORAL at 08:32

## 2025-08-24 RX ADMIN — ENOXAPARIN SODIUM 30 MG: 100 INJECTION SUBCUTANEOUS at 12:36

## 2025-08-24 RX ADMIN — BUSPIRONE HYDROCHLORIDE 10 MG: 10 TABLET ORAL at 08:32

## 2025-08-24 RX ADMIN — HYDROCODONE BITARTRATE AND ACETAMINOPHEN 1 TABLET: 5; 325 TABLET ORAL at 06:49

## 2025-08-24 RX ADMIN — ROPINIROLE HYDROCHLORIDE 1 MG: 1 TABLET, FILM COATED ORAL at 08:31

## 2025-08-24 ASSESSMENT — PAIN DESCRIPTION - LOCATION
LOCATION: HIP
LOCATION: HIP

## 2025-08-24 ASSESSMENT — PAIN DESCRIPTION - ORIENTATION
ORIENTATION: LEFT
ORIENTATION: LEFT

## 2025-08-24 ASSESSMENT — PAIN DESCRIPTION - DESCRIPTORS
DESCRIPTORS: ACHING
DESCRIPTORS: SORE;ACHING;SHARP

## 2025-08-24 ASSESSMENT — PAIN SCALES - GENERAL
PAINLEVEL_OUTOF10: 7
PAINLEVEL_OUTOF10: 7

## 2025-08-24 ASSESSMENT — PAIN - FUNCTIONAL ASSESSMENT: PAIN_FUNCTIONAL_ASSESSMENT: 0-10

## 2025-08-25 ENCOUNTER — CARE COORDINATION (OUTPATIENT)
Dept: CARE COORDINATION | Age: 70
End: 2025-08-25

## 2025-08-25 ENCOUNTER — TELEPHONE (OUTPATIENT)
Dept: PULMONOLOGY | Age: 70
End: 2025-08-25

## 2025-08-25 DIAGNOSIS — J44.1 COPD EXACERBATION (HCC): Primary | ICD-10-CM

## 2025-08-28 ENCOUNTER — APPOINTMENT (OUTPATIENT)
Dept: GENERAL RADIOLOGY | Age: 70
End: 2025-08-28
Payer: MEDICARE

## 2025-08-28 ENCOUNTER — APPOINTMENT (OUTPATIENT)
Dept: INTERVENTIONAL RADIOLOGY/VASCULAR | Age: 70
End: 2025-08-28
Payer: MEDICARE

## 2025-08-28 ENCOUNTER — HOSPITAL ENCOUNTER (INPATIENT)
Age: 70
LOS: 2 days | Discharge: HOME | End: 2025-08-30
Attending: EMERGENCY MEDICINE | Admitting: INTERNAL MEDICINE
Payer: MEDICARE

## 2025-08-28 ENCOUNTER — APPOINTMENT (OUTPATIENT)
Dept: CT IMAGING | Age: 70
End: 2025-08-28
Payer: MEDICARE

## 2025-08-28 DIAGNOSIS — A41.9 SEPSIS, DUE TO UNSPECIFIED ORGANISM, UNSPECIFIED WHETHER ACUTE ORGAN DYSFUNCTION PRESENT (HCC): ICD-10-CM

## 2025-08-28 DIAGNOSIS — J96.21 ACUTE ON CHRONIC HYPOXIC RESPIRATORY FAILURE (HCC): ICD-10-CM

## 2025-08-28 DIAGNOSIS — J44.1 COPD EXACERBATION (HCC): Primary | ICD-10-CM

## 2025-08-28 PROBLEM — J96.01 ACUTE HYPOXIC RESPIRATORY FAILURE (HCC): Status: ACTIVE | Noted: 2025-08-28

## 2025-08-28 LAB
ALBUMIN SERPL BCG-MCNC: 3.5 G/DL (ref 3.4–4.9)
ALP SERPL-CCNC: 46 U/L (ref 40–129)
ALT SERPL W/O P-5'-P-CCNC: 12 U/L (ref 10–50)
ANION GAP SERPL CALC-SCNC: 14 MEQ/L (ref 8–16)
AST SERPL-CCNC: 18 U/L (ref 10–50)
BASE EXCESS BLDA CALC-SCNC: 7.6 MMOL/L (ref -2–3)
BASOPHILS ABSOLUTE: 0.1 THOU/MM3 (ref 0–0.1)
BASOPHILS NFR BLD AUTO: 0.4 %
BILIRUB CONJ SERPL-MCNC: < 0.1 MG/DL (ref 0–0.2)
BILIRUB SERPL-MCNC: 0.3 MG/DL (ref 0.3–1.2)
BILIRUB UR QL STRIP.AUTO: NEGATIVE
BUN SERPL-MCNC: 34 MG/DL (ref 8–23)
CALCIUM SERPL-MCNC: 9.3 MG/DL (ref 8.5–10.5)
CHARACTER UR: CLEAR
CHLORIDE SERPL-SCNC: 102 MEQ/L (ref 98–111)
CO2 SERPL-SCNC: 24 MEQ/L (ref 22–29)
COLLECTED BY:: ABNORMAL
COLOR, UA: YELLOW
CREAT SERPL-MCNC: 1.6 MG/DL (ref 0.7–1.2)
DEPRECATED RDW RBC AUTO: 51.3 FL (ref 35–45)
DEVICE: ABNORMAL
EKG ATRIAL RATE: 113 BPM
EKG P AXIS: 41 DEGREES
EKG P-R INTERVAL: 148 MS
EKG Q-T INTERVAL: 336 MS
EKG QRS DURATION: 110 MS
EKG QTC CALCULATION (BAZETT): 460 MS
EKG R AXIS: -122 DEGREES
EKG T AXIS: 61 DEGREES
EKG VENTRICULAR RATE: 113 BPM
EOSINOPHIL NFR BLD AUTO: 2.9 %
EOSINOPHILS ABSOLUTE: 0.4 THOU/MM3 (ref 0–0.4)
ERYTHROCYTE [DISTWIDTH] IN BLOOD BY AUTOMATED COUNT: 15.5 % (ref 11.5–14.5)
FIO2 ON VENT O2 ANALYZER: 6 %
FLUAV RNA RESP QL NAA+PROBE: NOT DETECTED
FLUBV RNA RESP QL NAA+PROBE: NOT DETECTED
GFR SERPL CREATININE-BSD FRML MDRD: 45 ML/MIN/1.73M2
GLUCOSE BLD STRIP.AUTO-MCNC: 279 MG/DL (ref 70–108)
GLUCOSE BLD STRIP.AUTO-MCNC: 358 MG/DL (ref 70–108)
GLUCOSE BLD STRIP.AUTO-MCNC: 367 MG/DL (ref 70–108)
GLUCOSE BLD-MCNC: 136 MG/DL (ref 70–108)
GLUCOSE SERPL-MCNC: 173 MG/DL (ref 74–109)
GLUCOSE UR QL STRIP.AUTO: NEGATIVE MG/DL
HCO3 BLDA-SCNC: 34 MMOL/L (ref 23–28)
HCT VFR BLD AUTO: 39.8 % (ref 42–52)
HGB BLD-MCNC: 13.2 GM/DL (ref 14–18)
HGB UR QL STRIP.AUTO: NEGATIVE
IMM GRANULOCYTES # BLD AUTO: 0.4 THOU/MM3 (ref 0–0.07)
IMM GRANULOCYTES NFR BLD AUTO: 3.2 %
KETONES UR QL STRIP.AUTO: NEGATIVE
LACTIC ACID, SEPSIS: 1.5 MMOL/L (ref 0.5–1.9)
LYMPHOCYTES ABSOLUTE: 0.9 THOU/MM3 (ref 1–4.8)
LYMPHOCYTES NFR BLD AUTO: 6.9 %
MAGNESIUM SERPL-MCNC: 1.4 MG/DL (ref 1.6–2.6)
MCH RBC QN AUTO: 30.4 PG (ref 26–33)
MCHC RBC AUTO-ENTMCNC: 33.2 GM/DL (ref 32.2–35.5)
MCV RBC AUTO: 91.7 FL (ref 80–94)
MONOCYTES ABSOLUTE: 0.6 THOU/MM3 (ref 0.4–1.3)
MONOCYTES NFR BLD AUTO: 4.9 %
NEUTROPHILS ABSOLUTE: 10.4 THOU/MM3 (ref 1.8–7.7)
NEUTROPHILS NFR BLD AUTO: 81.7 %
NITRITE UR QL STRIP: NEGATIVE
NRBC BLD AUTO-RTO: 0 /100 WBC
OSMOLALITY SERPL CALC.SUM OF ELEC: 291.2 MOSMOL/KG (ref 275–300)
PCO2 TEMP ADJ BLDMV: 52 MMHG (ref 41–51)
PH BLDMV: 7.42 [PH] (ref 7.31–7.41)
PH UR STRIP.AUTO: 6.5 [PH] (ref 5–9)
PHOSPHATE SERPL-MCNC: 3.5 MG/DL (ref 2.5–4.5)
PLATELET # BLD AUTO: 206 THOU/MM3 (ref 130–400)
PMV BLD AUTO: 12 FL (ref 9.4–12.4)
PO2 BLDMV: 14 MMHG (ref 25–40)
POTASSIUM SERPL-SCNC: 4.8 MEQ/L (ref 3.5–5.2)
PROCALCITONIN SERPL IA-MCNC: 0.37 NG/ML (ref 0.01–0.09)
PROT SERPL-MCNC: 6.3 G/DL (ref 6.4–8.3)
PROT UR STRIP.AUTO-MCNC: NEGATIVE MG/DL
RBC # BLD AUTO: 4.34 MILL/MM3 (ref 4.7–6.1)
REASON FOR REJECTION: NORMAL
REJECTED TEST: NORMAL
SAO2 % BLDMV: 18 %
SARS-COV-2 RNA RESP QL NAA+PROBE: NOT DETECTED
SITE: ABNORMAL
SODIUM SERPL-SCNC: 140 MEQ/L (ref 135–145)
SP GR UR REFRACT.AUTO: 1.02 (ref 1–1.03)
TROPONIN, HIGH SENSITIVITY: 16 NG/L (ref 0–12)
UROBILINOGEN, URINE: 0.2 EU/DL (ref 0–1)
VENTILATION MODE VENT: ABNORMAL
WBC # BLD AUTO: 12.7 THOU/MM3 (ref 4.8–10.8)
WBC #/AREA URNS HPF: NEGATIVE /[HPF]

## 2025-08-28 PROCEDURE — 2140000000 HC CCU INTERMEDIATE R&B

## 2025-08-28 PROCEDURE — 84484 ASSAY OF TROPONIN QUANT: CPT

## 2025-08-28 PROCEDURE — 82803 BLOOD GASES ANY COMBINATION: CPT

## 2025-08-28 PROCEDURE — 93971 EXTREMITY STUDY: CPT

## 2025-08-28 PROCEDURE — 83605 ASSAY OF LACTIC ACID: CPT

## 2025-08-28 PROCEDURE — 6370000000 HC RX 637 (ALT 250 FOR IP)

## 2025-08-28 PROCEDURE — 71045 X-RAY EXAM CHEST 1 VIEW: CPT

## 2025-08-28 PROCEDURE — 96365 THER/PROPH/DIAG IV INF INIT: CPT

## 2025-08-28 PROCEDURE — 71275 CT ANGIOGRAPHY CHEST: CPT

## 2025-08-28 PROCEDURE — 81003 URINALYSIS AUTO W/O SCOPE: CPT

## 2025-08-28 PROCEDURE — 96375 TX/PRO/DX INJ NEW DRUG ADDON: CPT

## 2025-08-28 PROCEDURE — 82948 REAGENT STRIP/BLOOD GLUCOSE: CPT

## 2025-08-28 PROCEDURE — 99223 1ST HOSP IP/OBS HIGH 75: CPT | Performed by: INTERNAL MEDICINE

## 2025-08-28 PROCEDURE — 89220 SPUTUM SPECIMEN COLLECTION: CPT

## 2025-08-28 PROCEDURE — 2580000003 HC RX 258

## 2025-08-28 PROCEDURE — 87040 BLOOD CULTURE FOR BACTERIA: CPT

## 2025-08-28 PROCEDURE — 2700000000 HC OXYGEN THERAPY PER DAY

## 2025-08-28 PROCEDURE — 82248 BILIRUBIN DIRECT: CPT

## 2025-08-28 PROCEDURE — 94761 N-INVAS EAR/PLS OXIMETRY MLT: CPT

## 2025-08-28 PROCEDURE — 6360000002 HC RX W HCPCS

## 2025-08-28 PROCEDURE — 83735 ASSAY OF MAGNESIUM: CPT

## 2025-08-28 PROCEDURE — 80053 COMPREHEN METABOLIC PANEL: CPT

## 2025-08-28 PROCEDURE — 93005 ELECTROCARDIOGRAM TRACING: CPT

## 2025-08-28 PROCEDURE — 84145 PROCALCITONIN (PCT): CPT

## 2025-08-28 PROCEDURE — 84100 ASSAY OF PHOSPHORUS: CPT

## 2025-08-28 PROCEDURE — 94640 AIRWAY INHALATION TREATMENT: CPT

## 2025-08-28 PROCEDURE — 82947 ASSAY GLUCOSE BLOOD QUANT: CPT

## 2025-08-28 PROCEDURE — 85025 COMPLETE CBC W/AUTO DIFF WBC: CPT

## 2025-08-28 PROCEDURE — 36415 COLL VENOUS BLD VENIPUNCTURE: CPT

## 2025-08-28 PROCEDURE — 87636 SARSCOV2 & INF A&B AMP PRB: CPT

## 2025-08-28 PROCEDURE — 2500000003 HC RX 250 WO HCPCS

## 2025-08-28 PROCEDURE — 99285 EMERGENCY DEPT VISIT HI MDM: CPT

## 2025-08-28 PROCEDURE — 93010 ELECTROCARDIOGRAM REPORT: CPT | Performed by: INTERNAL MEDICINE

## 2025-08-28 PROCEDURE — 6370000000 HC RX 637 (ALT 250 FOR IP): Performed by: INTERNAL MEDICINE

## 2025-08-28 PROCEDURE — 6360000004 HC RX CONTRAST MEDICATION

## 2025-08-28 RX ORDER — DILTIAZEM HYDROCHLORIDE 30 MG/1
60 TABLET, FILM COATED ORAL 2 TIMES DAILY
Status: DISCONTINUED | OUTPATIENT
Start: 2025-08-28 | End: 2025-08-30 | Stop reason: HOSPADM

## 2025-08-28 RX ORDER — ONDANSETRON 4 MG/1
4 TABLET, ORALLY DISINTEGRATING ORAL EVERY 8 HOURS PRN
Status: DISCONTINUED | OUTPATIENT
Start: 2025-08-28 | End: 2025-08-30 | Stop reason: HOSPADM

## 2025-08-28 RX ORDER — SERTRALINE HYDROCHLORIDE 100 MG/1
100 TABLET, FILM COATED ORAL 2 TIMES DAILY
Status: DISCONTINUED | OUTPATIENT
Start: 2025-08-28 | End: 2025-08-30 | Stop reason: HOSPADM

## 2025-08-28 RX ORDER — OXYCODONE HYDROCHLORIDE 5 MG/1
5 TABLET ORAL EVERY 4 HOURS PRN
Refills: 0 | Status: DISCONTINUED | OUTPATIENT
Start: 2025-08-28 | End: 2025-08-30 | Stop reason: HOSPADM

## 2025-08-28 RX ORDER — PREDNISONE 20 MG/1
40 TABLET ORAL DAILY
Status: DISCONTINUED | OUTPATIENT
Start: 2025-08-29 | End: 2025-08-30 | Stop reason: HOSPADM

## 2025-08-28 RX ORDER — FLUTICASONE PROPIONATE 50 MCG
2 SPRAY, SUSPENSION (ML) NASAL DAILY PRN
Status: DISCONTINUED | OUTPATIENT
Start: 2025-08-28 | End: 2025-08-30 | Stop reason: HOSPADM

## 2025-08-28 RX ORDER — ACETAMINOPHEN 500 MG
1000 TABLET ORAL ONCE
Status: COMPLETED | OUTPATIENT
Start: 2025-08-28 | End: 2025-08-28

## 2025-08-28 RX ORDER — MAGNESIUM SULFATE IN WATER 40 MG/ML
2000 INJECTION, SOLUTION INTRAVENOUS PRN
Status: DISCONTINUED | OUTPATIENT
Start: 2025-08-28 | End: 2025-08-30 | Stop reason: HOSPADM

## 2025-08-28 RX ORDER — OXYCODONE HYDROCHLORIDE 5 MG/1
10 TABLET ORAL EVERY 4 HOURS PRN
Refills: 0 | Status: DISCONTINUED | OUTPATIENT
Start: 2025-08-28 | End: 2025-08-30 | Stop reason: HOSPADM

## 2025-08-28 RX ORDER — SODIUM CHLORIDE 0.9 % (FLUSH) 0.9 %
5-40 SYRINGE (ML) INJECTION EVERY 12 HOURS SCHEDULED
Status: DISCONTINUED | OUTPATIENT
Start: 2025-08-28 | End: 2025-08-30 | Stop reason: HOSPADM

## 2025-08-28 RX ORDER — IPRATROPIUM BROMIDE AND ALBUTEROL SULFATE 2.5; .5 MG/3ML; MG/3ML
1 SOLUTION RESPIRATORY (INHALATION)
Status: DISCONTINUED | OUTPATIENT
Start: 2025-08-28 | End: 2025-08-28

## 2025-08-28 RX ORDER — INSULIN LISPRO 100 [IU]/ML
0-16 INJECTION, SOLUTION INTRAVENOUS; SUBCUTANEOUS
Status: DISCONTINUED | OUTPATIENT
Start: 2025-08-28 | End: 2025-08-30 | Stop reason: HOSPADM

## 2025-08-28 RX ORDER — SODIUM CHLORIDE 9 MG/ML
INJECTION, SOLUTION INTRAVENOUS PRN
Status: DISCONTINUED | OUTPATIENT
Start: 2025-08-28 | End: 2025-08-30 | Stop reason: HOSPADM

## 2025-08-28 RX ORDER — SODIUM CHLORIDE, SODIUM LACTATE, POTASSIUM CHLORIDE, AND CALCIUM CHLORIDE .6; .31; .03; .02 G/100ML; G/100ML; G/100ML; G/100ML
1000 INJECTION, SOLUTION INTRAVENOUS ONCE
Status: COMPLETED | OUTPATIENT
Start: 2025-08-28 | End: 2025-08-28

## 2025-08-28 RX ORDER — MORPHINE SULFATE 4 MG/ML
4 INJECTION, SOLUTION INTRAMUSCULAR; INTRAVENOUS ONCE
Status: COMPLETED | OUTPATIENT
Start: 2025-08-28 | End: 2025-08-28

## 2025-08-28 RX ORDER — GLUCAGON 1 MG/ML
1 KIT INJECTION PRN
Status: DISCONTINUED | OUTPATIENT
Start: 2025-08-28 | End: 2025-08-30 | Stop reason: HOSPADM

## 2025-08-28 RX ORDER — POLYETHYLENE GLYCOL 3350 17 G/17G
17 POWDER, FOR SOLUTION ORAL DAILY PRN
Status: DISCONTINUED | OUTPATIENT
Start: 2025-08-28 | End: 2025-08-30 | Stop reason: HOSPADM

## 2025-08-28 RX ORDER — IOPAMIDOL 755 MG/ML
80 INJECTION, SOLUTION INTRAVASCULAR
Status: COMPLETED | OUTPATIENT
Start: 2025-08-28 | End: 2025-08-28

## 2025-08-28 RX ORDER — ACETAMINOPHEN 650 MG/1
650 SUPPOSITORY RECTAL EVERY 6 HOURS PRN
Status: DISCONTINUED | OUTPATIENT
Start: 2025-08-28 | End: 2025-08-30 | Stop reason: HOSPADM

## 2025-08-28 RX ORDER — ONDANSETRON 2 MG/ML
4 INJECTION INTRAMUSCULAR; INTRAVENOUS EVERY 6 HOURS PRN
Status: DISCONTINUED | OUTPATIENT
Start: 2025-08-28 | End: 2025-08-30 | Stop reason: HOSPADM

## 2025-08-28 RX ORDER — POTASSIUM CHLORIDE 7.45 MG/ML
10 INJECTION INTRAVENOUS PRN
Status: DISCONTINUED | OUTPATIENT
Start: 2025-08-28 | End: 2025-08-30 | Stop reason: HOSPADM

## 2025-08-28 RX ORDER — IPRATROPIUM BROMIDE AND ALBUTEROL SULFATE 2.5; .5 MG/3ML; MG/3ML
1 SOLUTION RESPIRATORY (INHALATION) EVERY 4 HOURS PRN
Status: DISCONTINUED | OUTPATIENT
Start: 2025-08-28 | End: 2025-08-30 | Stop reason: HOSPADM

## 2025-08-28 RX ORDER — IPRATROPIUM BROMIDE AND ALBUTEROL SULFATE 2.5; .5 MG/3ML; MG/3ML
2 SOLUTION RESPIRATORY (INHALATION) ONCE
Status: COMPLETED | OUTPATIENT
Start: 2025-08-28 | End: 2025-08-28

## 2025-08-28 RX ORDER — MAGNESIUM SULFATE IN WATER 40 MG/ML
2000 INJECTION, SOLUTION INTRAVENOUS ONCE
Status: COMPLETED | OUTPATIENT
Start: 2025-08-28 | End: 2025-08-28

## 2025-08-28 RX ORDER — ACETAMINOPHEN 325 MG/1
650 TABLET ORAL EVERY 6 HOURS PRN
Status: DISCONTINUED | OUTPATIENT
Start: 2025-08-28 | End: 2025-08-30 | Stop reason: HOSPADM

## 2025-08-28 RX ORDER — DEXTROSE MONOHYDRATE 100 MG/ML
INJECTION, SOLUTION INTRAVENOUS CONTINUOUS PRN
Status: DISCONTINUED | OUTPATIENT
Start: 2025-08-28 | End: 2025-08-30 | Stop reason: HOSPADM

## 2025-08-28 RX ORDER — BUSPIRONE HYDROCHLORIDE 10 MG/1
10 TABLET ORAL 3 TIMES DAILY
Status: DISCONTINUED | OUTPATIENT
Start: 2025-08-28 | End: 2025-08-30 | Stop reason: HOSPADM

## 2025-08-28 RX ORDER — SODIUM CHLORIDE 0.9 % (FLUSH) 0.9 %
5-40 SYRINGE (ML) INJECTION PRN
Status: DISCONTINUED | OUTPATIENT
Start: 2025-08-28 | End: 2025-08-30 | Stop reason: HOSPADM

## 2025-08-28 RX ORDER — LOSARTAN POTASSIUM 25 MG/1
25 TABLET ORAL DAILY
Status: DISCONTINUED | OUTPATIENT
Start: 2025-08-28 | End: 2025-08-30 | Stop reason: HOSPADM

## 2025-08-28 RX ORDER — INSULIN LISPRO 100 [IU]/ML
0-4 INJECTION, SOLUTION INTRAVENOUS; SUBCUTANEOUS
Status: DISCONTINUED | OUTPATIENT
Start: 2025-08-28 | End: 2025-08-28

## 2025-08-28 RX ORDER — IPRATROPIUM BROMIDE AND ALBUTEROL SULFATE 2.5; .5 MG/3ML; MG/3ML
1 SOLUTION RESPIRATORY (INHALATION)
Status: DISCONTINUED | OUTPATIENT
Start: 2025-08-29 | End: 2025-08-30

## 2025-08-28 RX ORDER — ATORVASTATIN CALCIUM 20 MG/1
20 TABLET, FILM COATED ORAL DAILY
Status: DISCONTINUED | OUTPATIENT
Start: 2025-08-28 | End: 2025-08-30 | Stop reason: HOSPADM

## 2025-08-28 RX ORDER — ENOXAPARIN SODIUM 100 MG/ML
40 INJECTION SUBCUTANEOUS DAILY
Status: DISCONTINUED | OUTPATIENT
Start: 2025-08-28 | End: 2025-08-29

## 2025-08-28 RX ORDER — CYCLOBENZAPRINE HCL 10 MG
10 TABLET ORAL 3 TIMES DAILY PRN
COMMUNITY

## 2025-08-28 RX ORDER — SODIUM CHLORIDE, SODIUM LACTATE, POTASSIUM CHLORIDE, CALCIUM CHLORIDE 600; 310; 30; 20 MG/100ML; MG/100ML; MG/100ML; MG/100ML
INJECTION, SOLUTION INTRAVENOUS CONTINUOUS
Status: ACTIVE | OUTPATIENT
Start: 2025-08-28 | End: 2025-08-29

## 2025-08-28 RX ORDER — PANTOPRAZOLE SODIUM 40 MG/1
40 TABLET, DELAYED RELEASE ORAL 2 TIMES DAILY
Status: DISCONTINUED | OUTPATIENT
Start: 2025-08-28 | End: 2025-08-30 | Stop reason: HOSPADM

## 2025-08-28 RX ORDER — POTASSIUM CHLORIDE 1500 MG/1
40 TABLET, EXTENDED RELEASE ORAL PRN
Status: DISCONTINUED | OUTPATIENT
Start: 2025-08-28 | End: 2025-08-30 | Stop reason: HOSPADM

## 2025-08-28 RX ORDER — KETOROLAC TROMETHAMINE 30 MG/ML
30 INJECTION, SOLUTION INTRAMUSCULAR; INTRAVENOUS ONCE
Status: COMPLETED | OUTPATIENT
Start: 2025-08-28 | End: 2025-08-28

## 2025-08-28 RX ORDER — METOPROLOL TARTRATE 50 MG
25 TABLET ORAL 2 TIMES DAILY
Status: DISCONTINUED | OUTPATIENT
Start: 2025-08-28 | End: 2025-08-30 | Stop reason: HOSPADM

## 2025-08-28 RX ORDER — MONTELUKAST SODIUM 10 MG/1
10 TABLET ORAL NIGHTLY
Status: DISCONTINUED | OUTPATIENT
Start: 2025-08-28 | End: 2025-08-30 | Stop reason: HOSPADM

## 2025-08-28 RX ADMIN — MAGNESIUM SULFATE HEPTAHYDRATE 2000 MG: 40 INJECTION, SOLUTION INTRAVENOUS at 16:35

## 2025-08-28 RX ADMIN — BUSPIRONE HYDROCHLORIDE 10 MG: 10 TABLET ORAL at 20:06

## 2025-08-28 RX ADMIN — MORPHINE SULFATE 4 MG: 4 INJECTION, SOLUTION INTRAMUSCULAR; INTRAVENOUS at 14:02

## 2025-08-28 RX ADMIN — IPRATROPIUM BROMIDE AND ALBUTEROL SULFATE 2 DOSE: .5; 3 SOLUTION RESPIRATORY (INHALATION) at 11:59

## 2025-08-28 RX ADMIN — METOPROLOL TARTRATE 25 MG: 50 TABLET, FILM COATED ORAL at 20:06

## 2025-08-28 RX ADMIN — SODIUM CHLORIDE, SODIUM LACTATE, POTASSIUM CHLORIDE, AND CALCIUM CHLORIDE 1000 ML: .6; .31; .03; .02 INJECTION, SOLUTION INTRAVENOUS at 14:24

## 2025-08-28 RX ADMIN — AZITHROMYCIN MONOHYDRATE 500 MG: 500 INJECTION, POWDER, LYOPHILIZED, FOR SOLUTION INTRAVENOUS at 12:52

## 2025-08-28 RX ADMIN — INSULIN LISPRO 1 UNITS: 100 INJECTION, SOLUTION INTRAVENOUS; SUBCUTANEOUS at 16:29

## 2025-08-28 RX ADMIN — INSULIN LISPRO 16 UNITS: 100 INJECTION, SOLUTION INTRAVENOUS; SUBCUTANEOUS at 22:25

## 2025-08-28 RX ADMIN — WATER 125 MG: 1 INJECTION INTRAMUSCULAR; INTRAVENOUS; SUBCUTANEOUS at 12:03

## 2025-08-28 RX ADMIN — KETOROLAC TROMETHAMINE 30 MG: 30 INJECTION, SOLUTION INTRAMUSCULAR at 13:01

## 2025-08-28 RX ADMIN — ATORVASTATIN CALCIUM 20 MG: 20 TABLET, FILM COATED ORAL at 16:31

## 2025-08-28 RX ADMIN — MONTELUKAST 10 MG: 10 TABLET, FILM COATED ORAL at 20:06

## 2025-08-28 RX ADMIN — ACETAMINOPHEN 1000 MG: 500 TABLET ORAL at 12:28

## 2025-08-28 RX ADMIN — SODIUM CHLORIDE, SODIUM LACTATE, POTASSIUM CHLORIDE, AND CALCIUM CHLORIDE: .6; .31; .03; .02 INJECTION, SOLUTION INTRAVENOUS at 15:52

## 2025-08-28 RX ADMIN — SERTRALINE 100 MG: 100 TABLET, FILM COATED ORAL at 20:06

## 2025-08-28 RX ADMIN — IOPAMIDOL 80 ML: 755 INJECTION, SOLUTION INTRAVENOUS at 15:17

## 2025-08-28 RX ADMIN — INSULIN LISPRO 4 UNITS: 100 INJECTION, SOLUTION INTRAVENOUS; SUBCUTANEOUS at 20:04

## 2025-08-28 RX ADMIN — IPRATROPIUM BROMIDE AND ALBUTEROL SULFATE 1 DOSE: .5; 3 SOLUTION RESPIRATORY (INHALATION) at 20:47

## 2025-08-28 RX ADMIN — IPRATROPIUM BROMIDE AND ALBUTEROL SULFATE 1 DOSE: .5; 3 SOLUTION RESPIRATORY (INHALATION) at 16:26

## 2025-08-28 RX ADMIN — PANTOPRAZOLE SODIUM 40 MG: 40 TABLET, DELAYED RELEASE ORAL at 16:29

## 2025-08-28 RX ADMIN — DILTIAZEM HYDROCHLORIDE 60 MG: 30 TABLET ORAL at 22:26

## 2025-08-28 RX ADMIN — ENOXAPARIN SODIUM 40 MG: 100 INJECTION SUBCUTANEOUS at 16:29

## 2025-08-28 RX ADMIN — WATER 2000 MG: 1 INJECTION INTRAMUSCULAR; INTRAVENOUS; SUBCUTANEOUS at 12:41

## 2025-08-28 ASSESSMENT — PAIN SCALES - GENERAL
PAINLEVEL_OUTOF10: 5
PAINLEVEL_OUTOF10: 6
PAINLEVEL_OUTOF10: 6
PAINLEVEL_OUTOF10: 5
PAINLEVEL_OUTOF10: 7

## 2025-08-28 ASSESSMENT — PAIN DESCRIPTION - LOCATION
LOCATION: FLANK;HIP;LEG
LOCATION: HIP;LEG
LOCATION: HIP;FLANK;LEG
LOCATION: HIP
LOCATION: FLANK;LEG;HIP

## 2025-08-28 ASSESSMENT — PAIN DESCRIPTION - ORIENTATION
ORIENTATION: LEFT

## 2025-08-28 ASSESSMENT — PAIN DESCRIPTION - DESCRIPTORS
DESCRIPTORS: SHARP

## 2025-08-28 ASSESSMENT — PAIN - FUNCTIONAL ASSESSMENT
PAIN_FUNCTIONAL_ASSESSMENT: 0-10
PAIN_FUNCTIONAL_ASSESSMENT: 0-10

## 2025-08-28 ASSESSMENT — PAIN DESCRIPTION - FREQUENCY: FREQUENCY: INTERMITTENT

## 2025-08-29 LAB
ANION GAP SERPL CALC-SCNC: 12 MEQ/L (ref 8–16)
BUN SERPL-MCNC: 35 MG/DL (ref 8–23)
CALCIUM SERPL-MCNC: 9.3 MG/DL (ref 8.5–10.5)
CHLORIDE SERPL-SCNC: 103 MEQ/L (ref 98–111)
CO2 SERPL-SCNC: 25 MEQ/L (ref 22–29)
CREAT SERPL-MCNC: 1.5 MG/DL (ref 0.7–1.2)
DEPRECATED RDW RBC AUTO: 51.6 FL (ref 35–45)
ERYTHROCYTE [DISTWIDTH] IN BLOOD BY AUTOMATED COUNT: 15.7 % (ref 11.5–14.5)
GFR SERPL CREATININE-BSD FRML MDRD: 49 ML/MIN/1.73M2
GLUCOSE BLD STRIP.AUTO-MCNC: 119 MG/DL (ref 70–108)
GLUCOSE BLD STRIP.AUTO-MCNC: 145 MG/DL (ref 70–108)
GLUCOSE BLD STRIP.AUTO-MCNC: 198 MG/DL (ref 70–108)
GLUCOSE BLD STRIP.AUTO-MCNC: 204 MG/DL (ref 70–108)
GLUCOSE SERPL-MCNC: 144 MG/DL (ref 74–109)
HCT VFR BLD AUTO: 34.7 % (ref 42–52)
HGB BLD-MCNC: 11.7 GM/DL (ref 14–18)
MCH RBC QN AUTO: 30.6 PG (ref 26–33)
MCHC RBC AUTO-ENTMCNC: 33.7 GM/DL (ref 32.2–35.5)
MCV RBC AUTO: 90.8 FL (ref 80–94)
PLATELET # BLD AUTO: 179 THOU/MM3 (ref 130–400)
PMV BLD AUTO: 11.3 FL (ref 9.4–12.4)
POTASSIUM SERPL-SCNC: 4.6 MEQ/L (ref 3.5–5.2)
RBC # BLD AUTO: 3.82 MILL/MM3 (ref 4.7–6.1)
SODIUM SERPL-SCNC: 140 MEQ/L (ref 135–145)
WBC # BLD AUTO: 16.5 THOU/MM3 (ref 4.8–10.8)

## 2025-08-29 PROCEDURE — 80048 BASIC METABOLIC PNL TOTAL CA: CPT

## 2025-08-29 PROCEDURE — 6360000002 HC RX W HCPCS

## 2025-08-29 PROCEDURE — 97162 PT EVAL MOD COMPLEX 30 MIN: CPT

## 2025-08-29 PROCEDURE — 94761 N-INVAS EAR/PLS OXIMETRY MLT: CPT

## 2025-08-29 PROCEDURE — 94640 AIRWAY INHALATION TREATMENT: CPT

## 2025-08-29 PROCEDURE — 97530 THERAPEUTIC ACTIVITIES: CPT

## 2025-08-29 PROCEDURE — 6370000000 HC RX 637 (ALT 250 FOR IP)

## 2025-08-29 PROCEDURE — 2700000000 HC OXYGEN THERAPY PER DAY

## 2025-08-29 PROCEDURE — 97166 OT EVAL MOD COMPLEX 45 MIN: CPT

## 2025-08-29 PROCEDURE — 36415 COLL VENOUS BLD VENIPUNCTURE: CPT

## 2025-08-29 PROCEDURE — 6370000000 HC RX 637 (ALT 250 FOR IP): Performed by: INTERNAL MEDICINE

## 2025-08-29 PROCEDURE — 85027 COMPLETE CBC AUTOMATED: CPT

## 2025-08-29 PROCEDURE — 99232 SBSQ HOSP IP/OBS MODERATE 35: CPT | Performed by: PHYSICIAN ASSISTANT

## 2025-08-29 PROCEDURE — 97110 THERAPEUTIC EXERCISES: CPT

## 2025-08-29 PROCEDURE — 97535 SELF CARE MNGMENT TRAINING: CPT

## 2025-08-29 PROCEDURE — 99222 1ST HOSP IP/OBS MODERATE 55: CPT | Performed by: INTERNAL MEDICINE

## 2025-08-29 PROCEDURE — 2500000003 HC RX 250 WO HCPCS

## 2025-08-29 PROCEDURE — 2140000000 HC CCU INTERMEDIATE R&B

## 2025-08-29 PROCEDURE — 82948 REAGENT STRIP/BLOOD GLUCOSE: CPT

## 2025-08-29 PROCEDURE — 2580000003 HC RX 258

## 2025-08-29 RX ORDER — LOSARTAN POTASSIUM 50 MG/1
50 TABLET ORAL DAILY
COMMUNITY

## 2025-08-29 RX ORDER — ENOXAPARIN SODIUM 100 MG/ML
30 INJECTION SUBCUTANEOUS 2 TIMES DAILY
Status: DISCONTINUED | OUTPATIENT
Start: 2025-08-29 | End: 2025-08-30 | Stop reason: HOSPADM

## 2025-08-29 RX ORDER — ASPIRIN 81 MG/1
81 TABLET ORAL DAILY
COMMUNITY
Start: 2025-10-05

## 2025-08-29 RX ORDER — ASPIRIN 325 MG
325 TABLET ORAL SEE ADMIN INSTRUCTIONS
COMMUNITY
Start: 2025-08-22 | End: 2025-10-04

## 2025-08-29 RX ADMIN — MONTELUKAST 10 MG: 10 TABLET, FILM COATED ORAL at 22:05

## 2025-08-29 RX ADMIN — OXYCODONE HYDROCHLORIDE 5 MG: 5 TABLET ORAL at 04:41

## 2025-08-29 RX ADMIN — PANTOPRAZOLE SODIUM 40 MG: 40 TABLET, DELAYED RELEASE ORAL at 08:12

## 2025-08-29 RX ADMIN — METOPROLOL TARTRATE 25 MG: 50 TABLET, FILM COATED ORAL at 22:04

## 2025-08-29 RX ADMIN — OXYCODONE HYDROCHLORIDE 10 MG: 5 TABLET ORAL at 18:06

## 2025-08-29 RX ADMIN — BUSPIRONE HYDROCHLORIDE 10 MG: 10 TABLET ORAL at 14:15

## 2025-08-29 RX ADMIN — PANTOPRAZOLE SODIUM 40 MG: 40 TABLET, DELAYED RELEASE ORAL at 16:55

## 2025-08-29 RX ADMIN — ENOXAPARIN SODIUM 40 MG: 100 INJECTION SUBCUTANEOUS at 08:12

## 2025-08-29 RX ADMIN — BUSPIRONE HYDROCHLORIDE 10 MG: 10 TABLET ORAL at 08:16

## 2025-08-29 RX ADMIN — SERTRALINE 100 MG: 100 TABLET, FILM COATED ORAL at 22:05

## 2025-08-29 RX ADMIN — OXYCODONE HYDROCHLORIDE 10 MG: 5 TABLET ORAL at 22:05

## 2025-08-29 RX ADMIN — PREDNISONE 40 MG: 20 TABLET ORAL at 08:12

## 2025-08-29 RX ADMIN — METOPROLOL TARTRATE 25 MG: 50 TABLET, FILM COATED ORAL at 08:11

## 2025-08-29 RX ADMIN — DILTIAZEM HYDROCHLORIDE 60 MG: 30 TABLET ORAL at 08:11

## 2025-08-29 RX ADMIN — IPRATROPIUM BROMIDE AND ALBUTEROL SULFATE 1 DOSE: .5; 3 SOLUTION RESPIRATORY (INHALATION) at 20:01

## 2025-08-29 RX ADMIN — BUSPIRONE HYDROCHLORIDE 10 MG: 10 TABLET ORAL at 22:05

## 2025-08-29 RX ADMIN — SERTRALINE 100 MG: 100 TABLET, FILM COATED ORAL at 08:16

## 2025-08-29 RX ADMIN — WATER 2000 MG: 1 INJECTION INTRAMUSCULAR; INTRAVENOUS; SUBCUTANEOUS at 11:23

## 2025-08-29 RX ADMIN — INSULIN LISPRO 4 UNITS: 100 INJECTION, SOLUTION INTRAVENOUS; SUBCUTANEOUS at 18:04

## 2025-08-29 RX ADMIN — OXYCODONE HYDROCHLORIDE 10 MG: 5 TABLET ORAL at 12:35

## 2025-08-29 RX ADMIN — DILTIAZEM HYDROCHLORIDE 60 MG: 30 TABLET ORAL at 22:04

## 2025-08-29 RX ADMIN — IPRATROPIUM BROMIDE AND ALBUTEROL SULFATE 1 DOSE: .5; 3 SOLUTION RESPIRATORY (INHALATION) at 10:23

## 2025-08-29 RX ADMIN — INSULIN LISPRO 4 UNITS: 100 INJECTION, SOLUTION INTRAVENOUS; SUBCUTANEOUS at 22:04

## 2025-08-29 RX ADMIN — SODIUM CHLORIDE, PRESERVATIVE FREE 10 ML: 5 INJECTION INTRAVENOUS at 08:18

## 2025-08-29 RX ADMIN — ATORVASTATIN CALCIUM 20 MG: 20 TABLET, FILM COATED ORAL at 08:10

## 2025-08-29 RX ADMIN — SODIUM CHLORIDE, PRESERVATIVE FREE 10 ML: 5 INJECTION INTRAVENOUS at 22:19

## 2025-08-29 RX ADMIN — ENOXAPARIN SODIUM 30 MG: 100 INJECTION SUBCUTANEOUS at 22:04

## 2025-08-29 RX ADMIN — TRAZODONE HYDROCHLORIDE 150 MG: 100 TABLET ORAL at 23:52

## 2025-08-29 RX ADMIN — AZITHROMYCIN MONOHYDRATE 500 MG: 500 INJECTION, POWDER, LYOPHILIZED, FOR SOLUTION INTRAVENOUS at 11:46

## 2025-08-29 ASSESSMENT — PAIN DESCRIPTION - DESCRIPTORS
DESCRIPTORS: ACHING
DESCRIPTORS: SHARP

## 2025-08-29 ASSESSMENT — PAIN DESCRIPTION - FREQUENCY
FREQUENCY: CONTINUOUS
FREQUENCY: INTERMITTENT

## 2025-08-29 ASSESSMENT — PAIN - FUNCTIONAL ASSESSMENT
PAIN_FUNCTIONAL_ASSESSMENT: PREVENTS OR INTERFERES WITH ALL ACTIVE AND SOME PASSIVE ACTIVITIES
PAIN_FUNCTIONAL_ASSESSMENT: 0-10
PAIN_FUNCTIONAL_ASSESSMENT: 0-10
PAIN_FUNCTIONAL_ASSESSMENT: PREVENTS OR INTERFERES WITH MANY ACTIVE NOT PASSIVE ACTIVITIES
PAIN_FUNCTIONAL_ASSESSMENT: PREVENTS OR INTERFERES SOME ACTIVE ACTIVITIES AND ADLS
PAIN_FUNCTIONAL_ASSESSMENT: 0-10
PAIN_FUNCTIONAL_ASSESSMENT: 0-10
PAIN_FUNCTIONAL_ASSESSMENT: PREVENTS OR INTERFERES SOME ACTIVE ACTIVITIES AND ADLS
PAIN_FUNCTIONAL_ASSESSMENT: 0-10
PAIN_FUNCTIONAL_ASSESSMENT: PREVENTS OR INTERFERES SOME ACTIVE ACTIVITIES AND ADLS

## 2025-08-29 ASSESSMENT — PAIN DESCRIPTION - ORIENTATION
ORIENTATION: LOWER
ORIENTATION: LEFT

## 2025-08-29 ASSESSMENT — PAIN DESCRIPTION - ONSET
ONSET: ON-GOING

## 2025-08-29 ASSESSMENT — PAIN SCALES - GENERAL
PAINLEVEL_OUTOF10: 2
PAINLEVEL_OUTOF10: 9
PAINLEVEL_OUTOF10: 5
PAINLEVEL_OUTOF10: 5
PAINLEVEL_OUTOF10: 4
PAINLEVEL_OUTOF10: 7
PAINLEVEL_OUTOF10: 5

## 2025-08-29 ASSESSMENT — PAIN DESCRIPTION - PAIN TYPE
TYPE: SURGICAL PAIN

## 2025-08-29 ASSESSMENT — PAIN DESCRIPTION - LOCATION
LOCATION: HIP
LOCATION: HIP;LEG
LOCATION: BACK

## 2025-08-30 VITALS
RESPIRATION RATE: 18 BRPM | HEIGHT: 68 IN | DIASTOLIC BLOOD PRESSURE: 66 MMHG | BODY MASS INDEX: 35.45 KG/M2 | WEIGHT: 233.91 LBS | SYSTOLIC BLOOD PRESSURE: 137 MMHG | HEART RATE: 58 BPM | TEMPERATURE: 98.4 F | OXYGEN SATURATION: 94 %

## 2025-08-30 LAB
ANION GAP SERPL CALC-SCNC: 11 MEQ/L (ref 8–16)
BACTERIA BLD AEROBE CULT: NORMAL
BACTERIA BLD AEROBE CULT: NORMAL
BUN SERPL-MCNC: 30 MG/DL (ref 8–23)
CALCIUM SERPL-MCNC: 8.8 MG/DL (ref 8.5–10.5)
CHLORIDE SERPL-SCNC: 103 MEQ/L (ref 98–111)
CO2 SERPL-SCNC: 24 MEQ/L (ref 22–29)
CREAT SERPL-MCNC: 1.3 MG/DL (ref 0.7–1.2)
DEPRECATED RDW RBC AUTO: 53.5 FL (ref 35–45)
ERYTHROCYTE [DISTWIDTH] IN BLOOD BY AUTOMATED COUNT: 15.8 % (ref 11.5–14.5)
GFR SERPL CREATININE-BSD FRML MDRD: 58 ML/MIN/1.73M2
GLUCOSE BLD STRIP.AUTO-MCNC: 201 MG/DL (ref 70–108)
GLUCOSE BLD STRIP.AUTO-MCNC: 210 MG/DL (ref 70–108)
GLUCOSE BLD STRIP.AUTO-MCNC: 257 MG/DL (ref 70–108)
GLUCOSE SERPL-MCNC: 170 MG/DL (ref 74–109)
HCT VFR BLD AUTO: 35.4 % (ref 42–52)
HGB BLD-MCNC: 11.5 GM/DL (ref 14–18)
MCH RBC QN AUTO: 30.3 PG (ref 26–33)
MCHC RBC AUTO-ENTMCNC: 32.5 GM/DL (ref 32.2–35.5)
MCV RBC AUTO: 93.2 FL (ref 80–94)
PLATELET # BLD AUTO: 180 THOU/MM3 (ref 130–400)
PMV BLD AUTO: 11.5 FL (ref 9.4–12.4)
POTASSIUM SERPL-SCNC: 4.7 MEQ/L (ref 3.5–5.2)
RBC # BLD AUTO: 3.8 MILL/MM3 (ref 4.7–6.1)
SODIUM SERPL-SCNC: 138 MEQ/L (ref 135–145)
WBC # BLD AUTO: 12.6 THOU/MM3 (ref 4.8–10.8)

## 2025-08-30 PROCEDURE — 99232 SBSQ HOSP IP/OBS MODERATE 35: CPT

## 2025-08-30 PROCEDURE — 6370000000 HC RX 637 (ALT 250 FOR IP): Performed by: INTERNAL MEDICINE

## 2025-08-30 PROCEDURE — 94669 MECHANICAL CHEST WALL OSCILL: CPT

## 2025-08-30 PROCEDURE — 36415 COLL VENOUS BLD VENIPUNCTURE: CPT

## 2025-08-30 PROCEDURE — 85027 COMPLETE CBC AUTOMATED: CPT

## 2025-08-30 PROCEDURE — 6360000002 HC RX W HCPCS

## 2025-08-30 PROCEDURE — 82948 REAGENT STRIP/BLOOD GLUCOSE: CPT

## 2025-08-30 PROCEDURE — 2500000003 HC RX 250 WO HCPCS

## 2025-08-30 PROCEDURE — 6370000000 HC RX 637 (ALT 250 FOR IP)

## 2025-08-30 PROCEDURE — 99232 SBSQ HOSP IP/OBS MODERATE 35: CPT | Performed by: PHYSICIAN ASSISTANT

## 2025-08-30 PROCEDURE — 2700000000 HC OXYGEN THERAPY PER DAY

## 2025-08-30 PROCEDURE — 2580000003 HC RX 258

## 2025-08-30 PROCEDURE — 94761 N-INVAS EAR/PLS OXIMETRY MLT: CPT

## 2025-08-30 PROCEDURE — 94640 AIRWAY INHALATION TREATMENT: CPT

## 2025-08-30 PROCEDURE — 80048 BASIC METABOLIC PNL TOTAL CA: CPT

## 2025-08-30 RX ORDER — AZITHROMYCIN 500 MG/1
500 TABLET, FILM COATED ORAL DAILY
Qty: 3 TABLET | Refills: 0 | Status: SHIPPED | OUTPATIENT
Start: 2025-08-30 | End: 2025-09-02

## 2025-08-30 RX ORDER — PREDNISONE 20 MG/1
40 TABLET ORAL DAILY
Qty: 6 TABLET | Refills: 0 | Status: SHIPPED | OUTPATIENT
Start: 2025-08-31 | End: 2025-09-03

## 2025-08-30 RX ADMIN — INSULIN LISPRO 4 UNITS: 100 INJECTION, SOLUTION INTRAVENOUS; SUBCUTANEOUS at 09:01

## 2025-08-30 RX ADMIN — AZITHROMYCIN MONOHYDRATE 500 MG: 500 INJECTION, POWDER, LYOPHILIZED, FOR SOLUTION INTRAVENOUS at 12:57

## 2025-08-30 RX ADMIN — OXYCODONE HYDROCHLORIDE 10 MG: 5 TABLET ORAL at 02:53

## 2025-08-30 RX ADMIN — PANTOPRAZOLE SODIUM 40 MG: 40 TABLET, DELAYED RELEASE ORAL at 09:01

## 2025-08-30 RX ADMIN — PREDNISONE 40 MG: 20 TABLET ORAL at 09:01

## 2025-08-30 RX ADMIN — INSULIN LISPRO 8 UNITS: 100 INJECTION, SOLUTION INTRAVENOUS; SUBCUTANEOUS at 17:15

## 2025-08-30 RX ADMIN — INSULIN LISPRO 4 UNITS: 100 INJECTION, SOLUTION INTRAVENOUS; SUBCUTANEOUS at 12:45

## 2025-08-30 RX ADMIN — IPRATROPIUM BROMIDE AND ALBUTEROL SULFATE 1 DOSE: .5; 3 SOLUTION RESPIRATORY (INHALATION) at 08:41

## 2025-08-30 RX ADMIN — SERTRALINE 100 MG: 100 TABLET, FILM COATED ORAL at 09:01

## 2025-08-30 RX ADMIN — BUSPIRONE HYDROCHLORIDE 10 MG: 10 TABLET ORAL at 09:01

## 2025-08-30 RX ADMIN — BUSPIRONE HYDROCHLORIDE 10 MG: 10 TABLET ORAL at 14:58

## 2025-08-30 RX ADMIN — ENOXAPARIN SODIUM 30 MG: 100 INJECTION SUBCUTANEOUS at 09:01

## 2025-08-30 RX ADMIN — FLUTICASONE PROPIONATE 2 SPRAY: 50 SPRAY, METERED NASAL at 15:28

## 2025-08-30 RX ADMIN — PANTOPRAZOLE SODIUM 40 MG: 40 TABLET, DELAYED RELEASE ORAL at 15:28

## 2025-08-30 RX ADMIN — WATER 2000 MG: 1 INJECTION INTRAMUSCULAR; INTRAVENOUS; SUBCUTANEOUS at 12:46

## 2025-08-30 RX ADMIN — DILTIAZEM HYDROCHLORIDE 60 MG: 30 TABLET ORAL at 09:01

## 2025-08-30 RX ADMIN — ATORVASTATIN CALCIUM 20 MG: 20 TABLET, FILM COATED ORAL at 09:01

## 2025-08-30 ASSESSMENT — PAIN DESCRIPTION - LOCATION: LOCATION: HIP;LEG

## 2025-08-30 ASSESSMENT — PAIN SCALES - GENERAL
PAINLEVEL_OUTOF10: 0
PAINLEVEL_OUTOF10: 8
PAINLEVEL_OUTOF10: 6

## 2025-08-30 ASSESSMENT — PAIN DESCRIPTION - FREQUENCY: FREQUENCY: CONTINUOUS

## 2025-08-30 ASSESSMENT — PAIN - FUNCTIONAL ASSESSMENT
PAIN_FUNCTIONAL_ASSESSMENT: PREVENTS OR INTERFERES SOME ACTIVE ACTIVITIES AND ADLS
PAIN_FUNCTIONAL_ASSESSMENT: 0-10

## 2025-08-30 ASSESSMENT — PAIN DESCRIPTION - PAIN TYPE: TYPE: SURGICAL PAIN

## 2025-08-30 ASSESSMENT — PAIN DESCRIPTION - ORIENTATION: ORIENTATION: LEFT

## 2025-08-30 ASSESSMENT — PAIN DESCRIPTION - ONSET: ONSET: ON-GOING

## 2025-08-30 ASSESSMENT — PAIN DESCRIPTION - DESCRIPTORS: DESCRIPTORS: ACHING;SHARP

## 2025-09-02 ENCOUNTER — CARE COORDINATION (OUTPATIENT)
Dept: CARE COORDINATION | Age: 70
End: 2025-09-02

## 2025-09-02 DIAGNOSIS — J44.1 COPD EXACERBATION (HCC): Primary | ICD-10-CM

## 2025-09-02 LAB
BACTERIA BLD AEROBE CULT: NORMAL
BACTERIA BLD AEROBE CULT: NORMAL

## (undated) DEVICE — DRAINBAG,ANTI-REFLUX TOWER,L/F,2000ML,LL: Brand: MEDLINE

## (undated) DEVICE — LASER SURG W22XH58IN D36IN 475LB SLD STATE FREQ DOUBLED

## (undated) DEVICE — SET ADMIN 25ML L117IN PMP MOD CK VLV RLER CLMP 2 SMRTSITE

## (undated) DEVICE — CATHETER ETER IV 22GA L1IN POLYUR STR RADPQ INTROCAN SFTY

## (undated) DEVICE — CONTAINER,SPECIMEN,PNEU TUBE,4OZ,OR STRL: Brand: MEDLINE

## (undated) DEVICE — SOLUTION IV 1000ML 0.45% SOD CHL PH 5 INJ USP VIAFLX PLAS

## (undated) DEVICE — SOLUTION IV IRRIG POUR BRL 0.9% SODIUM CHL 2F7124

## (undated) DEVICE — YANKAUER,BULB TIP,W/O VENT,RIGID,STERILE: Brand: MEDLINE

## (undated) DEVICE — IV START KIT: Brand: MEDLINE INDUSTRIES, INC.

## (undated) DEVICE — STRAP,CATHETER,ELASTIC,HOOK&LOOP: Brand: MEDLINE

## (undated) DEVICE — SOLUTION IV IRRIG WATER 1000ML POUR BRL 2F7114

## (undated) DEVICE — SET IRRIG L94IN ID0.281IN W/ 4.5IN DST FLX CONN 2 LD ON OFF

## (undated) DEVICE — TUBING IV STOPCOCK 48 CM 3 W

## (undated) DEVICE — CYSTO PACK: Brand: MEDLINE INDUSTRIES, INC.

## (undated) DEVICE — EVACUATOR URO BLDR W/ ADPT UROVAC

## (undated) DEVICE — SOLUTION IRRIG 3000ML 0.9% SOD CHL USP UROMATIC PLAS CONT

## (undated) DEVICE — SYRINGE, LUER LOCK, 60ML: Brand: MEDLINE

## (undated) DEVICE — CATHETER URETH 24FR BLLN 30CC SIL ALLY W/ SIL HYDRGEL 3 W F

## (undated) DEVICE — LINER SUCT CANSTR 1500CC SEMI RIG W/ POR HYDROPHOBIC SHUT

## (undated) DEVICE — TRAP,MUCUS SPECIMEN, 80CC: Brand: MEDLINE

## (undated) DEVICE — HF-RESECTION ELECTRODE PLASMALOOP LOOP, MEDIUM, 24 FR., 12°-30°, ESG TURIS: Brand: OLYMPUS

## (undated) DEVICE — TUBING, SUCTION, 1/4" X 20', STRAIGHT: Brand: MEDLINE INDUSTRIES, INC.